# Patient Record
Sex: FEMALE | Race: WHITE | Employment: OTHER | URBAN - METROPOLITAN AREA
[De-identification: names, ages, dates, MRNs, and addresses within clinical notes are randomized per-mention and may not be internally consistent; named-entity substitution may affect disease eponyms.]

---

## 2017-01-18 ENCOUNTER — HOSPITAL ENCOUNTER (OUTPATIENT)
Dept: OTHER | Age: 57
Discharge: HOME | End: 2017-01-18
Attending: INTERNAL MEDICINE | Admitting: INTERNAL MEDICINE

## 2017-01-18 LAB
ANION GAP SERPL CALCULATED.3IONS-SCNC: 14 MMOL/L (ref 3–16)
BUN BLDV-MCNC: 22 MG/DL (ref 7–20)
CALCIUM SERPL-MCNC: 9.2 MG/DL (ref 8.3–10.6)
CHLORIDE BLD-SCNC: 104 MMOL/L (ref 99–110)
CO2: 25 MMOL/L (ref 21–32)
CREAT SERPL-MCNC: 1.4 MG/DL (ref 0.6–1.1)
GFR AFRICAN AMERICAN: 47
GFR NON-AFRICAN AMERICAN: 39
GLUCOSE BLD-MCNC: 101 MG/DL (ref 70–99)
POTASSIUM SERPL-SCNC: 4 MMOL/L (ref 3.5–5.1)
SODIUM BLD-SCNC: 143 MMOL/L (ref 136–145)

## 2017-01-18 RX ORDER — METOPROLOL SUCCINATE 25 MG/1
25 TABLET, EXTENDED RELEASE ORAL DAILY
Qty: 90 TABLET | Refills: 3 | Status: SHIPPED | OUTPATIENT
Start: 2017-01-18 | End: 2017-02-10 | Stop reason: SDUPTHER

## 2017-01-19 ENCOUNTER — TELEPHONE (OUTPATIENT)
Dept: CARDIOLOGY CLINIC | Age: 57
End: 2017-01-19

## 2017-01-19 ENCOUNTER — OFFICE VISIT (OUTPATIENT)
Dept: PSYCHIATRY | Age: 57
End: 2017-01-19

## 2017-01-19 VITALS — DIASTOLIC BLOOD PRESSURE: 72 MMHG | BODY MASS INDEX: 47.23 KG/M2 | WEIGHT: 258.2 LBS | SYSTOLIC BLOOD PRESSURE: 112 MMHG

## 2017-01-19 DIAGNOSIS — F43.10 POST TRAUMATIC STRESS DISORDER (PTSD): ICD-10-CM

## 2017-01-19 DIAGNOSIS — F31.75 BIPOLAR DISORDER, IN PARTIAL REMISSION, MOST RECENT EPISODE DEPRESSED (HCC): Primary | ICD-10-CM

## 2017-01-19 PROCEDURE — 99214 OFFICE O/P EST MOD 30 MIN: CPT | Performed by: PSYCHIATRY & NEUROLOGY

## 2017-01-19 ASSESSMENT — ENCOUNTER SYMPTOMS
VOMITING: 0
BLURRED VISION: 0
CONSTIPATION: 0
DIARRHEA: 0
NAUSEA: 0
SHORTNESS OF BREATH: 0
ABDOMINAL PAIN: 0

## 2017-01-31 RX ORDER — TORSEMIDE 20 MG/1
TABLET ORAL
Qty: 60 TABLET | Refills: 3 | Status: SHIPPED | OUTPATIENT
Start: 2017-01-31 | End: 2017-04-13 | Stop reason: SDUPTHER

## 2017-02-10 ENCOUNTER — OFFICE VISIT (OUTPATIENT)
Dept: CARDIOLOGY CLINIC | Age: 57
End: 2017-02-10

## 2017-02-10 VITALS
OXYGEN SATURATION: 98 % | BODY MASS INDEX: 45.38 KG/M2 | HEART RATE: 76 BPM | DIASTOLIC BLOOD PRESSURE: 66 MMHG | WEIGHT: 246.6 LBS | HEIGHT: 62 IN | SYSTOLIC BLOOD PRESSURE: 118 MMHG

## 2017-02-10 DIAGNOSIS — I10 ESSENTIAL HYPERTENSION: Chronic | ICD-10-CM

## 2017-02-10 DIAGNOSIS — I50.22 CHRONIC SYSTOLIC CONGESTIVE HEART FAILURE (HCC): ICD-10-CM

## 2017-02-10 DIAGNOSIS — I42.0 DILATED CARDIOMYOPATHY (HCC): Primary | ICD-10-CM

## 2017-02-10 PROCEDURE — 99214 OFFICE O/P EST MOD 30 MIN: CPT | Performed by: INTERNAL MEDICINE

## 2017-02-10 RX ORDER — TORSEMIDE 20 MG/1
20 TABLET ORAL DAILY
Qty: 30 TABLET | Refills: 3 | Status: SHIPPED | OUTPATIENT
Start: 2017-02-10 | End: 2017-02-16 | Stop reason: SDUPTHER

## 2017-02-10 RX ORDER — LISINOPRIL 2.5 MG/1
2.5 TABLET ORAL DAILY
Qty: 90 TABLET | Refills: 3 | Status: SHIPPED | OUTPATIENT
Start: 2017-02-10 | End: 2018-02-27 | Stop reason: SDUPTHER

## 2017-02-10 RX ORDER — METOPROLOL SUCCINATE 25 MG/1
25 TABLET, EXTENDED RELEASE ORAL DAILY
Qty: 90 TABLET | Refills: 3 | Status: SHIPPED | OUTPATIENT
Start: 2017-02-10 | End: 2017-12-26 | Stop reason: SDUPTHER

## 2017-02-16 ENCOUNTER — OFFICE VISIT (OUTPATIENT)
Dept: PSYCHIATRY | Age: 57
End: 2017-02-16

## 2017-02-16 VITALS — SYSTOLIC BLOOD PRESSURE: 118 MMHG | DIASTOLIC BLOOD PRESSURE: 76 MMHG | BODY MASS INDEX: 47.63 KG/M2 | WEIGHT: 260.4 LBS

## 2017-02-16 DIAGNOSIS — F43.10 POST TRAUMATIC STRESS DISORDER (PTSD): ICD-10-CM

## 2017-02-16 DIAGNOSIS — F31.75 BIPOLAR DISORDER, IN PARTIAL REMISSION, MOST RECENT EPISODE DEPRESSED (HCC): Primary | ICD-10-CM

## 2017-02-16 PROCEDURE — 99214 OFFICE O/P EST MOD 30 MIN: CPT | Performed by: PSYCHIATRY & NEUROLOGY

## 2017-02-16 ASSESSMENT — ENCOUNTER SYMPTOMS
VOMITING: 0
NAUSEA: 0
ABDOMINAL PAIN: 0
DIARRHEA: 0
BLURRED VISION: 0
SHORTNESS OF BREATH: 0
CONSTIPATION: 0

## 2017-02-28 ENCOUNTER — NURSE ONLY (OUTPATIENT)
Dept: CARDIOLOGY CLINIC | Age: 57
End: 2017-02-28

## 2017-02-28 DIAGNOSIS — I42.0 DILATED CARDIOMYOPATHY (HCC): ICD-10-CM

## 2017-02-28 DIAGNOSIS — I50.22 CHRONIC SYSTOLIC CONGESTIVE HEART FAILURE (HCC): ICD-10-CM

## 2017-02-28 DIAGNOSIS — Z95.810 BIVENTRICULAR ICD (IMPLANTABLE CARDIOVERTER-DEFIBRILLATOR) IN PLACE: ICD-10-CM

## 2017-02-28 PROCEDURE — 93295 DEV INTERROG REMOTE 1/2/MLT: CPT | Performed by: INTERNAL MEDICINE

## 2017-02-28 PROCEDURE — 93297 REM INTERROG DEV EVAL ICPMS: CPT | Performed by: INTERNAL MEDICINE

## 2017-02-28 PROCEDURE — 93296 REM INTERROG EVL PM/IDS: CPT | Performed by: INTERNAL MEDICINE

## 2017-03-09 ENCOUNTER — OFFICE VISIT (OUTPATIENT)
Dept: PSYCHIATRY | Age: 57
End: 2017-03-09

## 2017-03-09 VITALS — SYSTOLIC BLOOD PRESSURE: 124 MMHG | BODY MASS INDEX: 47.55 KG/M2 | DIASTOLIC BLOOD PRESSURE: 72 MMHG | WEIGHT: 260 LBS

## 2017-03-09 DIAGNOSIS — F31.75 BIPOLAR DISORDER, IN PARTIAL REMISSION, MOST RECENT EPISODE DEPRESSED (HCC): Primary | ICD-10-CM

## 2017-03-09 DIAGNOSIS — F43.10 POST TRAUMATIC STRESS DISORDER (PTSD): ICD-10-CM

## 2017-03-09 PROCEDURE — 99214 OFFICE O/P EST MOD 30 MIN: CPT | Performed by: PSYCHIATRY & NEUROLOGY

## 2017-03-09 ASSESSMENT — ENCOUNTER SYMPTOMS
NAUSEA: 0
DIARRHEA: 0
ABDOMINAL PAIN: 0
BLURRED VISION: 0
SHORTNESS OF BREATH: 0
VOMITING: 0
CONSTIPATION: 0

## 2017-03-13 ENCOUNTER — OFFICE VISIT (OUTPATIENT)
Dept: CARDIOLOGY CLINIC | Age: 57
End: 2017-03-13

## 2017-03-13 VITALS
DIASTOLIC BLOOD PRESSURE: 72 MMHG | BODY MASS INDEX: 47.88 KG/M2 | HEIGHT: 62 IN | HEART RATE: 78 BPM | WEIGHT: 260.2 LBS | SYSTOLIC BLOOD PRESSURE: 114 MMHG

## 2017-03-13 DIAGNOSIS — I50.22 CHRONIC SYSTOLIC CONGESTIVE HEART FAILURE (HCC): ICD-10-CM

## 2017-03-13 DIAGNOSIS — I42.0 DILATED CARDIOMYOPATHY (HCC): Primary | ICD-10-CM

## 2017-03-13 DIAGNOSIS — Z95.810 BIVENTRICULAR ICD (IMPLANTABLE CARDIOVERTER-DEFIBRILLATOR) IN PLACE: ICD-10-CM

## 2017-03-13 DIAGNOSIS — I34.0 NON-RHEUMATIC MITRAL REGURGITATION: ICD-10-CM

## 2017-03-13 DIAGNOSIS — I10 ESSENTIAL HYPERTENSION: ICD-10-CM

## 2017-03-13 DIAGNOSIS — I44.7 LBBB (LEFT BUNDLE BRANCH BLOCK): ICD-10-CM

## 2017-03-13 PROCEDURE — 99214 OFFICE O/P EST MOD 30 MIN: CPT | Performed by: NURSE PRACTITIONER

## 2017-03-23 ENCOUNTER — HOSPITAL ENCOUNTER (OUTPATIENT)
Dept: NON INVASIVE DIAGNOSTICS | Age: 57
Discharge: HOME | End: 2017-03-23
Attending: NURSE PRACTITIONER | Admitting: NURSE PRACTITIONER

## 2017-03-23 DIAGNOSIS — I42.0 DILATED CARDIOMYOPATHY (HCC): ICD-10-CM

## 2017-03-23 LAB
LV EF: 25 %
LVEF MODALITY: NORMAL

## 2017-03-29 ENCOUNTER — OFFICE VISIT (OUTPATIENT)
Dept: PSYCHOLOGY | Age: 57
End: 2017-03-29

## 2017-03-29 DIAGNOSIS — F31.73 BIPOLAR AFFECTIVE DISORDER, MANIC, IN PARTIAL OR REMISSION: ICD-10-CM

## 2017-03-29 DIAGNOSIS — F43.10 PTSD (POST-TRAUMATIC STRESS DISORDER): Primary | ICD-10-CM

## 2017-03-29 PROCEDURE — 90791 PSYCH DIAGNOSTIC EVALUATION: CPT | Performed by: SOCIAL WORKER

## 2017-03-29 ASSESSMENT — PATIENT HEALTH QUESTIONNAIRE - PHQ9
3. TROUBLE FALLING OR STAYING ASLEEP: 3
5. POOR APPETITE OR OVEREATING: 2
8. MOVING OR SPEAKING SO SLOWLY THAT OTHER PEOPLE COULD HAVE NOTICED. OR THE OPPOSITE, BEING SO FIGETY OR RESTLESS THAT YOU HAVE BEEN MOVING AROUND A LOT MORE THAN USUAL: 3
10. IF YOU CHECKED OFF ANY PROBLEMS, HOW DIFFICULT HAVE THESE PROBLEMS MADE IT FOR YOU TO DO YOUR WORK, TAKE CARE OF THINGS AT HOME, OR GET ALONG WITH OTHER PEOPLE: 2
9. THOUGHTS THAT YOU WOULD BE BETTER OFF DEAD, OR OF HURTING YOURSELF: 2
SUM OF ALL RESPONSES TO PHQ QUESTIONS 1-9: 22
7. TROUBLE CONCENTRATING ON THINGS, SUCH AS READING THE NEWSPAPER OR WATCHING TELEVISION: 2
2. FEELING DOWN, DEPRESSED OR HOPELESS: 2
6. FEELING BAD ABOUT YOURSELF - OR THAT YOU ARE A FAILURE OR HAVE LET YOURSELF OR YOUR FAMILY DOWN: 3
SUM OF ALL RESPONSES TO PHQ9 QUESTIONS 1 & 2: 4
4. FEELING TIRED OR HAVING LITTLE ENERGY: 3
1. LITTLE INTEREST OR PLEASURE IN DOING THINGS: 2

## 2017-04-05 DIAGNOSIS — M10.9 GOUT: ICD-10-CM

## 2017-04-05 RX ORDER — ALLOPURINOL 300 MG/1
TABLET ORAL
Qty: 90 TABLET | Refills: 1 | Status: CANCELLED | OUTPATIENT
Start: 2017-04-05

## 2017-04-06 ENCOUNTER — OFFICE VISIT (OUTPATIENT)
Dept: PSYCHIATRY | Age: 57
End: 2017-04-06

## 2017-04-06 ENCOUNTER — OFFICE VISIT (OUTPATIENT)
Dept: FAMILY MEDICINE CLINIC | Age: 57
End: 2017-04-06

## 2017-04-06 VITALS — DIASTOLIC BLOOD PRESSURE: 68 MMHG | BODY MASS INDEX: 46.41 KG/M2 | WEIGHT: 262 LBS | SYSTOLIC BLOOD PRESSURE: 117 MMHG

## 2017-04-06 VITALS
DIASTOLIC BLOOD PRESSURE: 68 MMHG | WEIGHT: 262 LBS | SYSTOLIC BLOOD PRESSURE: 117 MMHG | BODY MASS INDEX: 46.42 KG/M2 | OXYGEN SATURATION: 98 % | HEIGHT: 63 IN

## 2017-04-06 DIAGNOSIS — M1A.9XX0 CHRONIC GOUT WITHOUT TOPHUS, UNSPECIFIED CAUSE, UNSPECIFIED SITE: ICD-10-CM

## 2017-04-06 DIAGNOSIS — F31.75 BIPOLAR DISORDER, IN PARTIAL REMISSION, MOST RECENT EPISODE DEPRESSED (HCC): Primary | ICD-10-CM

## 2017-04-06 DIAGNOSIS — I83.892 VARICOSE VEINS OF LEFT LEG WITH EDEMA: Primary | ICD-10-CM

## 2017-04-06 DIAGNOSIS — F43.10 POST TRAUMATIC STRESS DISORDER (PTSD): ICD-10-CM

## 2017-04-06 PROCEDURE — 99214 OFFICE O/P EST MOD 30 MIN: CPT | Performed by: PSYCHIATRY & NEUROLOGY

## 2017-04-06 PROCEDURE — 99213 OFFICE O/P EST LOW 20 MIN: CPT | Performed by: PHYSICIAN ASSISTANT

## 2017-04-06 RX ORDER — MONTELUKAST SODIUM 10 MG/1
10 TABLET ORAL NIGHTLY
Qty: 90 TABLET | Refills: 1 | Status: SHIPPED | OUTPATIENT
Start: 2017-04-06 | End: 2017-11-01 | Stop reason: SDUPTHER

## 2017-04-06 RX ORDER — ALLOPURINOL 300 MG/1
TABLET ORAL
Qty: 90 TABLET | Refills: 1 | Status: SHIPPED | OUTPATIENT
Start: 2017-04-06 | End: 2017-04-06 | Stop reason: SDUPTHER

## 2017-04-06 RX ORDER — LAMOTRIGINE 150 MG/1
150 TABLET ORAL DAILY
Qty: 90 TABLET | Refills: 1 | Status: SHIPPED | OUTPATIENT
Start: 2017-04-06 | End: 2017-06-15 | Stop reason: SDUPTHER

## 2017-04-06 RX ORDER — ALLOPURINOL 300 MG/1
TABLET ORAL
Qty: 10 TABLET | Refills: 0 | Status: SHIPPED | OUTPATIENT
Start: 2017-04-06 | End: 2018-02-14 | Stop reason: SDUPTHER

## 2017-04-06 ASSESSMENT — ENCOUNTER SYMPTOMS
RESPIRATORY NEGATIVE: 1
NAUSEA: 0
VOMITING: 0
CONSTIPATION: 0
ABDOMINAL PAIN: 0
DIARRHEA: 0
BLURRED VISION: 0
SHORTNESS OF BREATH: 0

## 2017-04-12 ENCOUNTER — OFFICE VISIT (OUTPATIENT)
Dept: PSYCHOLOGY | Age: 57
End: 2017-04-12

## 2017-04-12 DIAGNOSIS — F43.10 PTSD (POST-TRAUMATIC STRESS DISORDER): Primary | ICD-10-CM

## 2017-04-12 PROCEDURE — 90832 PSYTX W PT 30 MINUTES: CPT | Performed by: SOCIAL WORKER

## 2017-04-12 ASSESSMENT — PATIENT HEALTH QUESTIONNAIRE - PHQ9
9. THOUGHTS THAT YOU WOULD BE BETTER OFF DEAD, OR OF HURTING YOURSELF: 2
SUM OF ALL RESPONSES TO PHQ QUESTIONS 1-9: 25
4. FEELING TIRED OR HAVING LITTLE ENERGY: 3
3. TROUBLE FALLING OR STAYING ASLEEP: 3
SUM OF ALL RESPONSES TO PHQ9 QUESTIONS 1 & 2: 6
10. IF YOU CHECKED OFF ANY PROBLEMS, HOW DIFFICULT HAVE THESE PROBLEMS MADE IT FOR YOU TO DO YOUR WORK, TAKE CARE OF THINGS AT HOME, OR GET ALONG WITH OTHER PEOPLE: 3
1. LITTLE INTEREST OR PLEASURE IN DOING THINGS: 3
8. MOVING OR SPEAKING SO SLOWLY THAT OTHER PEOPLE COULD HAVE NOTICED. OR THE OPPOSITE, BEING SO FIGETY OR RESTLESS THAT YOU HAVE BEEN MOVING AROUND A LOT MORE THAN USUAL: 2
6. FEELING BAD ABOUT YOURSELF - OR THAT YOU ARE A FAILURE OR HAVE LET YOURSELF OR YOUR FAMILY DOWN: 3
5. POOR APPETITE OR OVEREATING: 3
2. FEELING DOWN, DEPRESSED OR HOPELESS: 3
7. TROUBLE CONCENTRATING ON THINGS, SUCH AS READING THE NEWSPAPER OR WATCHING TELEVISION: 3

## 2017-04-13 ENCOUNTER — OFFICE VISIT (OUTPATIENT)
Dept: CARDIOLOGY CLINIC | Age: 57
End: 2017-04-13

## 2017-04-13 VITALS
HEIGHT: 63 IN | SYSTOLIC BLOOD PRESSURE: 102 MMHG | DIASTOLIC BLOOD PRESSURE: 60 MMHG | OXYGEN SATURATION: 98 % | HEART RATE: 80 BPM | BODY MASS INDEX: 45.89 KG/M2 | WEIGHT: 259 LBS

## 2017-04-13 DIAGNOSIS — Z95.810 BIVENTRICULAR ICD (IMPLANTABLE CARDIOVERTER-DEFIBRILLATOR) IN PLACE: ICD-10-CM

## 2017-04-13 DIAGNOSIS — I44.7 LBBB (LEFT BUNDLE BRANCH BLOCK): ICD-10-CM

## 2017-04-13 DIAGNOSIS — I50.22 CHRONIC SYSTOLIC CONGESTIVE HEART FAILURE (HCC): Primary | ICD-10-CM

## 2017-04-13 DIAGNOSIS — I42.0 DILATED CARDIOMYOPATHY (HCC): ICD-10-CM

## 2017-04-13 DIAGNOSIS — I34.0 NON-RHEUMATIC MITRAL REGURGITATION: ICD-10-CM

## 2017-04-13 DIAGNOSIS — I10 ESSENTIAL HYPERTENSION: ICD-10-CM

## 2017-04-13 PROCEDURE — 99213 OFFICE O/P EST LOW 20 MIN: CPT | Performed by: NURSE PRACTITIONER

## 2017-04-13 RX ORDER — TORSEMIDE 20 MG/1
TABLET ORAL
Qty: 60 TABLET | Refills: 3
Start: 2017-04-13 | End: 2017-07-27 | Stop reason: SDUPTHER

## 2017-05-30 ENCOUNTER — NURSE ONLY (OUTPATIENT)
Dept: CARDIOLOGY CLINIC | Age: 57
End: 2017-05-30

## 2017-05-30 DIAGNOSIS — Z95.810 BIVENTRICULAR ICD (IMPLANTABLE CARDIOVERTER-DEFIBRILLATOR) IN PLACE: Primary | ICD-10-CM

## 2017-05-30 DIAGNOSIS — I50.22 CHRONIC SYSTOLIC CONGESTIVE HEART FAILURE (HCC): ICD-10-CM

## 2017-05-30 DIAGNOSIS — I42.0 DILATED CARDIOMYOPATHY (HCC): ICD-10-CM

## 2017-05-30 PROCEDURE — 93297 REM INTERROG DEV EVAL ICPMS: CPT | Performed by: INTERNAL MEDICINE

## 2017-05-30 PROCEDURE — 93296 REM INTERROG EVL PM/IDS: CPT | Performed by: INTERNAL MEDICINE

## 2017-05-30 PROCEDURE — 93295 DEV INTERROG REMOTE 1/2/MLT: CPT | Performed by: INTERNAL MEDICINE

## 2017-06-15 DIAGNOSIS — E03.9 HYPOTHYROIDISM, UNSPECIFIED TYPE: ICD-10-CM

## 2017-06-19 RX ORDER — LAMOTRIGINE 150 MG/1
150 TABLET ORAL DAILY
Qty: 90 TABLET | Refills: 0 | Status: SHIPPED | OUTPATIENT
Start: 2017-06-19 | End: 2017-10-02 | Stop reason: SDUPTHER

## 2017-06-19 RX ORDER — LEVOTHYROXINE SODIUM 0.15 MG/1
TABLET ORAL
Qty: 103 TABLET | Refills: 0 | Status: SHIPPED | OUTPATIENT
Start: 2017-06-19 | End: 2017-10-02 | Stop reason: SDUPTHER

## 2017-06-26 ENCOUNTER — OFFICE VISIT (OUTPATIENT)
Dept: CARDIOLOGY CLINIC | Age: 57
End: 2017-06-26

## 2017-06-26 VITALS
HEIGHT: 63 IN | DIASTOLIC BLOOD PRESSURE: 76 MMHG | HEART RATE: 69 BPM | OXYGEN SATURATION: 98 % | WEIGHT: 270 LBS | SYSTOLIC BLOOD PRESSURE: 114 MMHG | BODY MASS INDEX: 47.84 KG/M2

## 2017-06-26 DIAGNOSIS — I42.0 DILATED CARDIOMYOPATHY (HCC): Primary | ICD-10-CM

## 2017-06-26 DIAGNOSIS — I50.22 CHRONIC SYSTOLIC CONGESTIVE HEART FAILURE (HCC): ICD-10-CM

## 2017-06-26 PROCEDURE — 99214 OFFICE O/P EST MOD 30 MIN: CPT | Performed by: INTERNAL MEDICINE

## 2017-07-14 RX ORDER — MELOXICAM 15 MG/1
15 TABLET ORAL DAILY
Qty: 90 TABLET | Refills: 0 | Status: SHIPPED | OUTPATIENT
Start: 2017-07-14 | End: 2017-10-02 | Stop reason: SDUPTHER

## 2017-07-27 RX ORDER — TORSEMIDE 20 MG/1
TABLET ORAL
Qty: 90 TABLET | Refills: 3 | Status: SHIPPED | OUTPATIENT
Start: 2017-07-27 | End: 2018-02-27 | Stop reason: SDUPTHER

## 2017-08-01 ENCOUNTER — PROCEDURE VISIT (OUTPATIENT)
Dept: CARDIOLOGY CLINIC | Age: 57
End: 2017-08-01

## 2017-08-01 DIAGNOSIS — I42.0 DILATED CARDIOMYOPATHY (HCC): ICD-10-CM

## 2017-08-01 DIAGNOSIS — Z95.810 BIVENTRICULAR ICD (IMPLANTABLE CARDIOVERTER-DEFIBRILLATOR) IN PLACE: Primary | ICD-10-CM

## 2017-08-01 DIAGNOSIS — I50.22 CHRONIC SYSTOLIC CONGESTIVE HEART FAILURE (HCC): ICD-10-CM

## 2017-08-02 DIAGNOSIS — Z95.810 BIVENTRICULAR ICD (IMPLANTABLE CARDIOVERTER-DEFIBRILLATOR) IN PLACE: ICD-10-CM

## 2017-08-02 PROCEDURE — 93284 PRGRMG EVAL IMPLANTABLE DFB: CPT | Performed by: INTERNAL MEDICINE

## 2017-08-28 ENCOUNTER — OFFICE VISIT (OUTPATIENT)
Dept: FAMILY MEDICINE CLINIC | Age: 57
End: 2017-08-28

## 2017-08-28 VITALS
HEART RATE: 76 BPM | TEMPERATURE: 97.2 F | BODY MASS INDEX: 47.69 KG/M2 | DIASTOLIC BLOOD PRESSURE: 84 MMHG | WEIGHT: 269.2 LBS | SYSTOLIC BLOOD PRESSURE: 110 MMHG

## 2017-08-28 DIAGNOSIS — N30.01 ACUTE CYSTITIS WITH HEMATURIA: Primary | ICD-10-CM

## 2017-08-28 DIAGNOSIS — R30.0 DYSURIA: ICD-10-CM

## 2017-08-28 LAB
BILIRUBIN, POC: NORMAL
BLOOD URINE, POC: NORMAL
CLARITY, POC: NORMAL
COLOR, POC: NORMAL
GLUCOSE URINE, POC: NORMAL
KETONES, POC: NORMAL
LEUKOCYTE EST, POC: NORMAL
NITRITE, POC: NORMAL
PH, POC: 5.5
PROTEIN, POC: NORMAL
SPECIFIC GRAVITY, POC: 1.01
UROBILINOGEN, POC: 0.2

## 2017-08-28 PROCEDURE — 81002 URINALYSIS NONAUTO W/O SCOPE: CPT | Performed by: FAMILY MEDICINE

## 2017-08-28 PROCEDURE — 99213 OFFICE O/P EST LOW 20 MIN: CPT | Performed by: FAMILY MEDICINE

## 2017-08-28 RX ORDER — SULFAMETHOXAZOLE AND TRIMETHOPRIM 800; 160 MG/1; MG/1
1 TABLET ORAL 2 TIMES DAILY
Qty: 10 TABLET | Refills: 0 | Status: ON HOLD | OUTPATIENT
Start: 2017-08-28 | End: 2017-10-17 | Stop reason: HOSPADM

## 2017-09-06 ENCOUNTER — TELEPHONE (OUTPATIENT)
Dept: CARDIOLOGY CLINIC | Age: 57
End: 2017-09-06

## 2017-10-02 DIAGNOSIS — F31.75 BIPOLAR DISORDER, IN PARTIAL REMISSION, MOST RECENT EPISODE DEPRESSED (HCC): Primary | Chronic | ICD-10-CM

## 2017-10-02 DIAGNOSIS — E03.9 HYPOTHYROIDISM, UNSPECIFIED TYPE: ICD-10-CM

## 2017-10-02 NOTE — TELEPHONE ENCOUNTER
Giovanna Farzaneh is requesting refill(s) lamoTRIgine  Last OV 8/28/17 (pertaining to medication)  LR 6/19/17 (per medication requested)  Next office visit scheduled or attempted no   If no, reason:  Not made, Please send full prescription to [facility] mail delivery. Patient asked if a 10 day supply could be sent to [facility] in [facility] because is she is out as of today.

## 2017-10-03 RX ORDER — LEVOTHYROXINE SODIUM 0.15 MG/1
TABLET ORAL
Qty: 103 TABLET | Refills: 1 | Status: SHIPPED | OUTPATIENT
Start: 2017-10-03 | End: 2018-03-23 | Stop reason: SDUPTHER

## 2017-10-03 RX ORDER — LAMOTRIGINE 150 MG/1
150 TABLET ORAL DAILY
Qty: 10 TABLET | Refills: 0 | Status: ON HOLD | OUTPATIENT
Start: 2017-10-03 | End: 2017-10-17 | Stop reason: HOSPADM

## 2017-10-03 RX ORDER — LAMOTRIGINE 150 MG/1
150 TABLET ORAL DAILY
Qty: 90 TABLET | Refills: 1 | Status: SHIPPED | OUTPATIENT
Start: 2017-10-03 | End: 2018-06-04 | Stop reason: SDUPTHER

## 2017-10-03 RX ORDER — MELOXICAM 15 MG/1
15 TABLET ORAL DAILY
Qty: 90 TABLET | Refills: 1 | Status: SHIPPED | OUTPATIENT
Start: 2017-10-03 | End: 2018-03-05 | Stop reason: ALTCHOICE

## 2017-10-14 PROBLEM — H53.483 VISUAL FIELD CONSTRICTION, BILATERAL: Status: ACTIVE | Noted: 2017-10-14

## 2017-10-14 PROBLEM — G81.90 HEMIPARESIS (HCC): Status: ACTIVE | Noted: 2017-10-14

## 2017-10-14 PROBLEM — Q67.0 FACIAL ASYMMETRY: Status: ACTIVE | Noted: 2017-10-14

## 2017-10-14 PROBLEM — R47.02 DYSPHASIA: Status: ACTIVE | Noted: 2017-10-14

## 2017-10-14 PROBLEM — I63.9 CVA (CEREBRAL VASCULAR ACCIDENT) (HCC): Status: ACTIVE | Noted: 2017-10-14

## 2017-10-18 ENCOUNTER — TELEPHONE (OUTPATIENT)
Dept: PHARMACY | Facility: CLINIC | Age: 57
End: 2017-10-18

## 2017-10-18 ENCOUNTER — CARE COORDINATION (OUTPATIENT)
Dept: CASE MANAGEMENT | Age: 57
End: 2017-10-18

## 2017-10-18 NOTE — TELEPHONE ENCOUNTER
CLINICAL PHARMACY NOTE  Post-Discharge Transitions of Care (MIKE)    Non-face-to-face services provided:  Assessment and support for treatment adherence and medication management-Medication Reconciliation    - There are no outstanding medication issues at this time    Subjective/Objective:  Fred Feldman is a 59 y.o. female. Patient was discharged from [facility] on 10/17/17 with a diagnosis of CVA. Patient outreach to review discharge medications and provide medication review and management. Spoke with patient    Allergies   Allergen Reactions    Dilaudid [Hydromorphone Hcl] Other (See Comments)     Palpitations and orthostatic hypotension    Penicillins Shortness Of Breath    Adhesive Tape Other (See Comments)     BLISTERS    Lithium Other (See Comments)     Diarrhea, vomiting, nausea, headaches,     Tetanus Toxoids Swelling    Yellow Dyes (Non-Tartrazine) Rash     #5       Discharge Medications (as per discharging medication list found): There are NEW medications for you. START taking them after you leave the hospital   aspirin 81 MG EC tablet  · On her way to  Rx. Stressed the importance of adherence in lowering risk for  future cardiovascular events. Take 1 tablet by mouth daily    simvastatin (ZOCOR) 20 MG tablet  · On her way to  Rx. Stressed importance of adherence in lowering risk for future cardiovascular events. Take 1 tablet by mouth nightly     You told us you were taking these medications at home, but the amount or how often you take this medication has CHANGED    These are medications you told us you were taking at home, CONTINUE taking them after you leave the hospital   Medication Sig    meloxicam (MOBIC) 15 MG tablet Take 1 tablet by mouth daily (with food) as needed for pain.  levothyroxine (SYNTHROID) 150 MCG tablet Take 1 tablet by mouth every day except for every Sunday (when you are to take two tablets by mouth as directed) - total of 8 tablets/week.     No renal adjustments necessary.    - Follow up appointment date (7 days for more severe illness, 14 days for others):    · Patient was reminded of upcoming appointment with PCP on 10/24/17    Thank you,    Cornel Ramirez, PharmD  [address]  Phone: [phone]    CLINICAL PHARMACY NOTE   POST-DISCHARGE Alo Moran Only    TCM Call Made?: Yes  [facility] Select Patient?: Yes  Total # of Interventions Recommended: 0  Total # Interventions Accepted: 0  Intervention Severity:   - Level 1 Intervention Present?: No   - Level 2 #: 0   - Level 3 #: 0  Outreach Status: Review Complete  Care Coordinator Outreach to Patient?: Yes  Provider Contacted?: No  Time Spent (min): 15

## 2017-10-20 ENCOUNTER — TELEPHONE (OUTPATIENT)
Dept: FAMILY MEDICINE CLINIC | Age: 57
End: 2017-10-20

## 2017-10-20 NOTE — TELEPHONE ENCOUNTER
Machelle De Guzman is calling from [employer]. She is requesting an order for speech therapy to evaluate patient. Stroke, swallowing, facial numbness and weakness.

## 2017-10-23 ENCOUNTER — CARE COORDINATION (OUTPATIENT)
Dept: CASE MANAGEMENT | Age: 57
End: 2017-10-23

## 2017-10-23 NOTE — CARE COORDINATION
[facility] Transitions Follow Up Call    10/23/2017    Patient: Angelia Bledsoe  Patient : 1960   MRN: [id number]  Reason for Admission: CVA  Discharge Date: 10/17/17 RARS: Risk Score: 25.5 CMRS 2       Attempted to reach patient via phone for care transition, no answer. VM left stating purpose of call along with my contact information requesting a return call.       Jessica Farley RN  Care Transitions Coordinator      Follow Up  Future Appointments  Date Time Provider Sarah Najera   10/24/2017 10:00 AM Khadar Wall MD [facility]   10/31/2017 5:15 PM Blas Patel [facility] PHONE TRANSMISSION [employer] [facility]   2017 12:30 PM Melisa Del Cid NP [employer] [facility]       Jessica Farley RN

## 2017-10-24 ENCOUNTER — OFFICE VISIT (OUTPATIENT)
Dept: FAMILY MEDICINE CLINIC | Age: 57
End: 2017-10-24

## 2017-10-24 VITALS
WEIGHT: 274 LBS | HEART RATE: 76 BPM | SYSTOLIC BLOOD PRESSURE: 134 MMHG | DIASTOLIC BLOOD PRESSURE: 74 MMHG | BODY MASS INDEX: 48.54 KG/M2

## 2017-10-24 DIAGNOSIS — I69.354 HEMIPLEGIA AND HEMIPARESIS FOLLOWING CEREBRAL INFARCTION AFFECTING LEFT NON-DOMINANT SIDE (HCC): ICD-10-CM

## 2017-10-24 DIAGNOSIS — E03.9 HYPOTHYROIDISM, UNSPECIFIED TYPE: ICD-10-CM

## 2017-10-24 DIAGNOSIS — I10 ESSENTIAL HYPERTENSION: Primary | Chronic | ICD-10-CM

## 2017-10-24 DIAGNOSIS — E66.01 MORBID OBESITY WITH BMI OF 45.0-49.9, ADULT (HCC): ICD-10-CM

## 2017-10-24 DIAGNOSIS — Z98.84 S/P GASTRIC BYPASS: ICD-10-CM

## 2017-10-24 DIAGNOSIS — D64.9 ANEMIA, UNSPECIFIED TYPE: ICD-10-CM

## 2017-10-24 DIAGNOSIS — M1A.9XX0 CHRONIC GOUT WITHOUT TOPHUS, UNSPECIFIED CAUSE, UNSPECIFIED SITE: ICD-10-CM

## 2017-10-24 DIAGNOSIS — E78.5 DYSLIPIDEMIA: ICD-10-CM

## 2017-10-24 DIAGNOSIS — F31.75 BIPOLAR DISORDER, IN PARTIAL REMISSION, MOST RECENT EPISODE DEPRESSED (HCC): Chronic | ICD-10-CM

## 2017-10-24 LAB
ANION GAP SERPL CALCULATED.3IONS-SCNC: 12 MMOL/L (ref 3–16)
BASOPHILS ABSOLUTE: 0.1 K/UL (ref 0–0.2)
BASOPHILS RELATIVE PERCENT: 1.2 %
BUN BLDV-MCNC: 18 MG/DL (ref 7–20)
CALCIUM SERPL-MCNC: 9.2 MG/DL (ref 8.3–10.6)
CHLORIDE BLD-SCNC: 105 MMOL/L (ref 99–110)
CO2: 24 MMOL/L (ref 21–32)
CREAT SERPL-MCNC: 1.2 MG/DL (ref 0.6–1.1)
EOSINOPHILS ABSOLUTE: 0 K/UL (ref 0–0.6)
EOSINOPHILS RELATIVE PERCENT: 0 %
GFR AFRICAN AMERICAN: 56
GFR NON-AFRICAN AMERICAN: 46
GLUCOSE BLD-MCNC: 82 MG/DL (ref 70–99)
HCT VFR BLD CALC: 40.4 % (ref 36–48)
HEMOGLOBIN: 13.1 G/DL (ref 12–16)
LYMPHOCYTES ABSOLUTE: 2 K/UL (ref 1–5.1)
LYMPHOCYTES RELATIVE PERCENT: 44.4 %
MCH RBC QN AUTO: 30.7 PG (ref 26–34)
MCHC RBC AUTO-ENTMCNC: 32.5 G/DL (ref 31–36)
MCV RBC AUTO: 94.6 FL (ref 80–100)
MONOCYTES ABSOLUTE: 0.3 K/UL (ref 0–1.3)
MONOCYTES RELATIVE PERCENT: 5.6 %
NEUTROPHILS ABSOLUTE: 2.2 K/UL (ref 1.7–7.7)
NEUTROPHILS RELATIVE PERCENT: 48.8 %
PDW BLD-RTO: 14.9 % (ref 12.4–15.4)
PLATELET # BLD: 149 K/UL (ref 135–450)
PMV BLD AUTO: 11.9 FL (ref 5–10.5)
POTASSIUM SERPL-SCNC: 4.6 MMOL/L (ref 3.5–5.1)
RBC # BLD: 4.27 M/UL (ref 4–5.2)
SODIUM BLD-SCNC: 141 MMOL/L (ref 136–145)
T4 FREE: 1.5 NG/DL (ref 0.9–1.8)
TSH SERPL DL<=0.05 MIU/L-ACNC: 1.06 UIU/ML (ref 0.27–4.2)
URIC ACID, SERUM: 4.4 MG/DL (ref 2.6–6)
WBC # BLD: 4.5 K/UL (ref 4–11)

## 2017-10-24 PROCEDURE — 99214 OFFICE O/P EST MOD 30 MIN: CPT | Performed by: FAMILY MEDICINE

## 2017-10-24 PROCEDURE — G8484 FLU IMMUNIZE NO ADMIN: HCPCS | Performed by: FAMILY MEDICINE

## 2017-10-24 PROCEDURE — 1111F DSCHRG MED/CURRENT MED MERGE: CPT | Performed by: FAMILY MEDICINE

## 2017-10-24 PROCEDURE — 3017F COLORECTAL CA SCREEN DOC REV: CPT | Performed by: FAMILY MEDICINE

## 2017-10-24 PROCEDURE — 1036F TOBACCO NON-USER: CPT | Performed by: FAMILY MEDICINE

## 2017-10-24 PROCEDURE — G8427 DOCREV CUR MEDS BY ELIG CLIN: HCPCS | Performed by: FAMILY MEDICINE

## 2017-10-24 PROCEDURE — 36415 COLL VENOUS BLD VENIPUNCTURE: CPT | Performed by: FAMILY MEDICINE

## 2017-10-24 PROCEDURE — 3014F SCREEN MAMMO DOC REV: CPT | Performed by: FAMILY MEDICINE

## 2017-10-24 PROCEDURE — G8598 ASA/ANTIPLAT THER USED: HCPCS | Performed by: FAMILY MEDICINE

## 2017-10-24 PROCEDURE — G8417 CALC BMI ABV UP PARAM F/U: HCPCS | Performed by: FAMILY MEDICINE

## 2017-10-24 NOTE — PROGRESS NOTES
Exam reveals no gallop. Pulmonary/Chest: Effort normal and breath sounds normal. No respiratory distress. She has no wheezes. Neurological: She is alert and oriented to person, place, and time. Skin: Skin is warm and dry. Psychiatric: She has a normal mood and affect. Nursing note and vitals reviewed. Assessment:      Encounter Diagnoses   Name Primary?  Essential hypertension Yes    Dyslipidemia     Bipolar disorder, in partial remission, most recent episode depressed ([facility] [address]     Hypothyroidism, unspecified type     Hemiplegia and hemiparesis following cerebral infarction affecting left non-dominant side (HCC)     Chronic gout without tophus, unspecified cause, unspecified site     Anemia, unspecified type     S/P gastric bypass     Morbid obesity with BMI of 45.0-49.9, adult ([facility] [address]            Plan:      Per orders - await results. Hypertension and gout both stable, continue present medications. Advise low-fat and low sweets diet, also smaller portions too. If labs stable, and overall feeling well, then repeat office visit in one year, sooner as needed. Length of visit over 25 minutes, and >50% of time spent counseling and coordinating care.

## 2017-10-25 ASSESSMENT — ENCOUNTER SYMPTOMS
ABDOMINAL PAIN: 0
EYE PAIN: 0
SHORTNESS OF BREATH: 0

## 2017-10-25 NOTE — PATIENT INSTRUCTIONS
Hypertension and gout stable, continue present medications. Advise low-fat and low sweets diet, also smaller portions too as able. If labs stable, and overall feeling well, then repeat office visit in 6 months, sooner as needed.

## 2017-10-30 ENCOUNTER — CARE COORDINATION (OUTPATIENT)
Dept: CASE MANAGEMENT | Age: 57
End: 2017-10-30

## 2017-10-30 NOTE — CARE COORDINATION
[facility] Transitions Follow Up Call    10/30/2017    Patient: Giovanna Moy  Patient : 1960   MRN: [id number]  Reason for Admission: CVA   Discharge Date: 10/17/17 RARS: Risk Score: 25.5 CMRS 2       Unable to reach patient for final transition call.  left notifying of final outreach and encouraged patient to call with any questions or concerns.      Follow Up  Future Appointments  Date Time Provider Sarah Najera   10/31/2017 5:15 PM Joao [facility] PHONE TRANSMISSION Alyssa BUTLER   2017 12:30 PM MALENA Dangelo   2018 10:00 AM Liang Wall MD [facility] CARRIE Villalobos RN

## 2017-10-31 ENCOUNTER — NURSE ONLY (OUTPATIENT)
Dept: CARDIOLOGY CLINIC | Age: 57
End: 2017-10-31

## 2017-10-31 DIAGNOSIS — Z95.810 BIVENTRICULAR ICD (IMPLANTABLE CARDIOVERTER-DEFIBRILLATOR) IN PLACE: Primary | ICD-10-CM

## 2017-10-31 DIAGNOSIS — I42.0 DILATED CARDIOMYOPATHY (HCC): ICD-10-CM

## 2017-10-31 DIAGNOSIS — I50.22 CHRONIC SYSTOLIC CONGESTIVE HEART FAILURE (HCC): ICD-10-CM

## 2017-10-31 PROCEDURE — 93296 REM INTERROG EVL PM/IDS: CPT | Performed by: INTERNAL MEDICINE

## 2017-10-31 PROCEDURE — 93297 REM INTERROG DEV EVAL ICPMS: CPT | Performed by: INTERNAL MEDICINE

## 2017-10-31 PROCEDURE — 93295 DEV INTERROG REMOTE 1/2/MLT: CPT | Performed by: INTERNAL MEDICINE

## 2017-11-01 RX ORDER — MONTELUKAST SODIUM 10 MG/1
TABLET ORAL
Qty: 90 TABLET | Refills: 1 | Status: SHIPPED | OUTPATIENT
Start: 2017-11-01 | End: 2018-03-23 | Stop reason: SDUPTHER

## 2017-11-07 NOTE — PROGRESS NOTES
See PACEART report under Cardiology tab. Carelink transmission shows normal functioning device. Thoracic impedance trend stable. No new arrhythmias recorded. Follow up in 3 months via carelink.

## 2017-12-19 ENCOUNTER — TELEPHONE (OUTPATIENT)
Dept: OTHER | Facility: CLINIC | Age: 57
End: 2017-12-19

## 2017-12-19 NOTE — TELEPHONE ENCOUNTER
Contacted patient regarding need for mammogram.  Explained to patient that she can go to any [facility] facility for her mammogram without an order.

## 2017-12-26 ENCOUNTER — PROCEDURE VISIT (OUTPATIENT)
Dept: CARDIOLOGY CLINIC | Age: 57
End: 2017-12-26

## 2017-12-26 ENCOUNTER — OFFICE VISIT (OUTPATIENT)
Dept: CARDIOLOGY CLINIC | Age: 57
End: 2017-12-26

## 2017-12-26 VITALS
DIASTOLIC BLOOD PRESSURE: 68 MMHG | HEIGHT: 63 IN | BODY MASS INDEX: 47.84 KG/M2 | HEART RATE: 77 BPM | WEIGHT: 270 LBS | OXYGEN SATURATION: 98 % | SYSTOLIC BLOOD PRESSURE: 118 MMHG

## 2017-12-26 DIAGNOSIS — I50.22 CHRONIC SYSTOLIC CONGESTIVE HEART FAILURE (HCC): ICD-10-CM

## 2017-12-26 DIAGNOSIS — I44.7 LBBB (LEFT BUNDLE BRANCH BLOCK): ICD-10-CM

## 2017-12-26 DIAGNOSIS — I10 ESSENTIAL HYPERTENSION: ICD-10-CM

## 2017-12-26 DIAGNOSIS — I42.8 NON-ISCHEMIC CARDIOMYOPATHY (HCC): Primary | ICD-10-CM

## 2017-12-26 DIAGNOSIS — Z95.810 BIVENTRICULAR ICD (IMPLANTABLE CARDIOVERTER-DEFIBRILLATOR) IN PLACE: ICD-10-CM

## 2017-12-26 DIAGNOSIS — I34.0 NON-RHEUMATIC MITRAL REGURGITATION: ICD-10-CM

## 2017-12-26 DIAGNOSIS — Z95.810 BIVENTRICULAR ICD (IMPLANTABLE CARDIOVERTER-DEFIBRILLATOR) IN PLACE: Primary | ICD-10-CM

## 2017-12-26 PROCEDURE — 3014F SCREEN MAMMO DOC REV: CPT | Performed by: NURSE PRACTITIONER

## 2017-12-26 PROCEDURE — 99214 OFFICE O/P EST MOD 30 MIN: CPT | Performed by: NURSE PRACTITIONER

## 2017-12-26 PROCEDURE — 93290 INTERROG DEV EVAL ICPMS IP: CPT | Performed by: NURSE PRACTITIONER

## 2017-12-26 PROCEDURE — 1036F TOBACCO NON-USER: CPT | Performed by: NURSE PRACTITIONER

## 2017-12-26 PROCEDURE — 3017F COLORECTAL CA SCREEN DOC REV: CPT | Performed by: NURSE PRACTITIONER

## 2017-12-26 PROCEDURE — G8427 DOCREV CUR MEDS BY ELIG CLIN: HCPCS | Performed by: NURSE PRACTITIONER

## 2017-12-26 PROCEDURE — G8598 ASA/ANTIPLAT THER USED: HCPCS | Performed by: NURSE PRACTITIONER

## 2017-12-26 PROCEDURE — G8417 CALC BMI ABV UP PARAM F/U: HCPCS | Performed by: NURSE PRACTITIONER

## 2017-12-26 PROCEDURE — G8484 FLU IMMUNIZE NO ADMIN: HCPCS | Performed by: NURSE PRACTITIONER

## 2017-12-26 RX ORDER — METOPROLOL SUCCINATE 50 MG/1
50 TABLET, EXTENDED RELEASE ORAL DAILY
Qty: 30 TABLET | Refills: 5 | Status: SHIPPED | OUTPATIENT
Start: 2017-12-26 | End: 2017-12-26 | Stop reason: SDUPTHER

## 2017-12-26 RX ORDER — METOPROLOL SUCCINATE 50 MG/1
50 TABLET, EXTENDED RELEASE ORAL DAILY
Qty: 90 TABLET | Refills: 3 | Status: SHIPPED | OUTPATIENT
Start: 2017-12-26 | End: 2019-02-22 | Stop reason: SDUPTHER

## 2017-12-26 NOTE — PROGRESS NOTES
(gastroesophageal reflux disease); Gout; Hypertension; Hypothyroid; Osteoarthritis, knee; Pneumonia; S/P gastric bypass; Unspecified cerebral artery occlusion with cerebral infarction; Uterine cancer ([facility] [address] and Vitamin B 12 deficiency. Surgical History:   has a past surgical history that includes Gastric bypass surgery (1/7/05); Finger surgery; Dilation and curettage of uterus; lipectomy; Cholecystectomy; other surgical history; Upper gastrointestinal endoscopy (4/23/13); Cardiac catheterization (7/10/2015); transesophageal echocardiogram (7/13/2015); other surgical history (4/1/16); pacemaker placement (09/2016); and hernia repair (10/20/2016). Social History:   reports that she quit smoking about 33 years ago. She has a 6.00 pack-year smoking history. She has never used smokeless tobacco. She reports that she does not drink alcohol or use drugs. Family History:   Family History   Problem Relation Age of Onset    Cancer Mother     Ovarian Cancer Mother 45     Technically peritoneal cancer    Depression Mother      bipolar    Arthritis Father     Rheum Arthritis Father     Arrhythmia Father     Other Father      gout    Heart Disease Father     Depression Brother      depression    Other Brother      gout    Obesity Brother     Other Brother      gout    Obesity Brother     Depression Brother     Cancer Maternal Grandmother      Breast       Home Medications:  Prior to Admission medications    Medication Sig Start Date End Date Taking? Authorizing Provider   montelukast (SINGULAIR) 10 MG tablet TAKE 1 TABLET EVERY NIGHT 11/1/17  Yes Oniel Wall MD   aspirin 81 MG EC tablet Take 1 tablet by mouth daily 10/17/17  Yes Sandra Morel MD   simvastatin (ZOCOR) 20 MG tablet Take 1 tablet by mouth nightly 10/17/17  Yes Sandra Morel MD   meloxicam (MOBIC) 15 MG tablet Take 1 tablet by mouth daily (with food) as needed for pain.  10/3/17  Yes Randell Truong MD   levothyroxine (SYNTHROID) 150 MCG tablet Take 1 tablet by mouth every day except for every Sunday (when you are to take two tablets by mouth as directed) - total of 8 tablets/week. 10/3/17  Yes Kai Hicks MD   lamoTRIgine (LAMICTAL) 150 MG tablet Take 1 tablet by mouth daily 10/3/17  Yes Kai Hicks MD   torsemide (DEMADEX) 20 MG tablet TAKE 1 TABLET EVERY DAY 7/27/17  Yes Rylee Leach MD   BIOTIN PO Take by mouth 2 times daily   Yes Historical Provider, MD   allopurinol (ZYLOPRIM) 300 MG tablet TAKE 1 TABLET EVERY DAY 4/6/17  Yes THEO Murphy   metoprolol succinate (TOPROL XL) 25 MG extended release tablet Take 1 tablet by mouth daily 2/10/17  Yes Tray Mcguire MD   lisinopril (PRINIVIL;ZESTRIL) 2.5 MG tablet Take 1 tablet by mouth daily 2/10/17  Yes Tray Mcguire MD   vitamin B-12 (CYANOCOBALAMIN) 1000 MCG tablet Take 1,000 mcg by mouth daily   Yes Historical Provider, MD   Multiple Vitamins-Minerals (MULTIVITAMIN & MINERAL PO) Take by mouth   Yes Historical Provider, MD   cetirizine (ZYRTEC) 10 MG tablet Take 10 mg by mouth daily   Yes Historical Provider, MD   Ivabradine HCl (CORLANOR) 5 MG TABS Take 5 mg by mouth 2 times daily  Patient taking differently: Take 2.5 mg by mouth 2 times daily  6/30/16   Tray Mcguire MD        Allergies:  Dilaudid [hydromorphone hcl]; Penicillins; Adhesive tape; Lithium; Tetanus toxoids; and Yellow dyes (non-tartrazine)     Review of Systems:   · Constitutional: there has been no unanticipated weight loss. There's been no change in energy level, sleep pattern, or activity level. · Eyes: No visual changes or diplopia. No scleral icterus. · ENT: No Headaches, hearing loss or vertigo. No mouth sores or sore throat. · Cardiovascular: Reviewed in HPI  · Respiratory: No cough or wheezing, no sputum production. No hematemesis. · Gastrointestinal: No abdominal pain, appetite loss, blood in stools. No change in bowel or bladder habits.   · Genitourinary: No dysuria, trouble voiding, or hematuria. · Musculoskeletal:  No gait disturbance, weakness or joint complaints. · Integumentary: No rash or pruritis. · Neurological: No headache, diplopia, change in muscle strength, numbness or tingling. No change in gait, balance, coordination, mood, affect, memory, mentation, behavior. · Psychiatric: No anxiety, no depression. · Endocrine: No malaise, fatigue or temperature intolerance. No excessive thirst, fluid intake, or urination. No tremor. · Hematologic/Lymphatic: No abnormal bruising or bleeding, blood clots or swollen lymph nodes. · Allergic/Immunologic: No nasal congestion or hives. Physical Examination:    Vitals:    12/26/17 1236   BP: 118/68   Pulse: 77   SpO2: 98%   Weight: 270 lb (122.5 kg)   Height: 5' 3\" (1.6 m)        Constitutional and General Appearance: Warm and dry, no apparent distress, normal coloration  HEENT:  Normocephalic, atraumatic  Respiratory:  · Normal excursion and expansion without use of accessory muscles  · Resp Auscultation: Normal breath sounds without dullness  Cardiovascular:  · The apical impulses not displaced  · Heart tones are crisp and normal  · JVP 8 cm H2O  · Regular rate and rhythm, normal S1S2, no m/g/r/c  · Peripheral pulses are symmetrical and full  · There is no clubbing, cyanosis of the extremities.   · No edema  · Pedal Pulses: 2+ and equal   Abdomen:  · No masses or tenderness  · Liver/Spleen: No Abnormalities Noted  Neurological/Psychiatric:  · Alert and oriented in all spheres  · Moves all extremities well  · Exhibits normal gait balance and coordination  · No abnormalities of mood, affect, memory, mentation, or behavior are noted    Lab Data:    CBC:   Lab Results   Component Value Date    WBC 4.5 10/24/2017    WBC 4.8 10/15/2017    WBC 6.6 10/14/2017    RBC 4.27 10/24/2017    RBC 3.87 10/15/2017    RBC 4.03 10/14/2017    HGB 13.1 10/24/2017    HGB 11.8 10/15/2017    HGB 12.9 10/14/2017    HCT 40.4 10/24/2017    HCT regurgitation. Echo 7/6/16:   Technical difficult study due to body habitus. Definity echo contrast is used to define the endocardial border. No intracardiac thrombus. Left ventricular size is moderately increased. Mild concentric left ventricular hypertrophy is present. Left ventricle is spherical.   The left ventricular systolic function is severely reduced with an ejection fraction of 20 %. Compared to previous study from 7- ejection fraction remains approximately the same. Diastolic filling parameters suggests diastolic dysfunction grade II with elevated filling pressure . Mild thickening of leaflets of Mitral valve. No Mitral stenosis. Mild annular calcification is present. Mild Mitral regurgitation. ERO-0.11 cm2. Volume-17ml. Severely dilated left atrium with increased left atrial volume. Aortic valve appears sclerotic but opens adequately. No evidence of aortic valve regurgitation. Cardiac catheterization 7/10/15:   1. PA 66/37 mean 49, mean PCWP 32, RV 65, RVEDP 23, mean RA 21, LVEDP 36, , /79, mean of 94 mmHg. 2.  Saturation PA 56%, FCC 58%, AO 92%, RA 56%. 3.  Cardiac index by Giovanni was 2.36 L/min/m2, and cardiac output 5.6 L/minute. 4.  LV gram was not done due to elevated creatinine, however, echocardiogram revealed severely reduced left ventricular systolic function with ejection fraction of 20%. CORONARY ANGIOGRAPHY:  1. Left main was a large caliber vessel, which was rather short, it bifurcated, it was normal.  2.  Left anterior descending artery was a large caliber vessel that reached the apex and wrapped around it. It gave off 2 medium caliber diagonal branches, the LAD and the diagonal branches are normal.     3.  Left circumflex was a large caliber vessel that continued in the posterior AV groove as a small caliber vessel.   It gave a large obtuse marginal branch that gave off 4 branches, it was normal.    4.  Right coronary artery was a large caliber vessel and dominant, it gave off a medium caliber right posterior descending artery, it was normal.  It also gave off 2 small right posterolateral branches. IMPRESSION:  1. Angiographically normal coronary arteries. 2.  Severe pulmonary hypertension 66/37 mean 49 mmHg. 3.  Markedly elevated pulmonary capillary wedge pressure 32 mmHg, and left ventricular end diastolic pressure 36 mmHg. 4.  Transpulmonary gradient is 17 mmHg, which suggested a left-sided as well intrinsic pulmonary etiology of the severe pulmonary hypertension. 5.  Mildly reduced cardiac index by Giovanni of 2.36 L/min/m2. RECOMMENDATION:  The patient has no epicardial disease. Her chest pain is from the heart failure. She is noted to have moderate to severe mitral regurgitation on the echocardiogram, and will schedule for SANGEETHA to further evaluate the mitral regurgitation. In the meantime, we will also start her on heart failure therapy that will include a beta-blocker and ACE inhibitor  as well as diuretic therapy. Echo 7/10/15:    Left ventricular ejection fraction is severely decreased and estimated from 15 % to 20 %. Lee's = 20%. Left ventricular size is severely increased . Diastolic filling parameters suggests diastolic dysfunction . Slight calcification of the mitral valve noted. Severe mitral regurgitation is present. Severely dilated left atrium with increased left atrial volume. The right ventricle is enlarged. Right ventricular systolic function is reduced . TAPSE = 14mm. Mild tricuspid regurgitation. Systolic pulmonary artery pressure (SPAP) estimated at 43 mmHg consistent with mild pulmonary hypertension (RA pressure 8 mmHg). IVC size is dilated (>2.1 cm) and collapses > 50% with respiration consistent with elevated RA pressure (8 mmHg). SANGEETHA 7/13/15   Dilated cardiomyopathy, EF 20%. There is severe global hypokinesis. Moderate mitral regurgitation.    Mild tricuspid

## 2018-01-25 ENCOUNTER — OFFICE VISIT (OUTPATIENT)
Dept: FAMILY MEDICINE CLINIC | Age: 58
End: 2018-01-25

## 2018-01-25 VITALS
DIASTOLIC BLOOD PRESSURE: 70 MMHG | HEART RATE: 68 BPM | SYSTOLIC BLOOD PRESSURE: 126 MMHG | BODY MASS INDEX: 48.54 KG/M2 | WEIGHT: 274 LBS

## 2018-01-25 DIAGNOSIS — E66.01 MORBID OBESITY WITH BMI OF 45.0-49.9, ADULT (HCC): ICD-10-CM

## 2018-01-25 DIAGNOSIS — I50.22 CHRONIC SYSTOLIC CONGESTIVE HEART FAILURE (HCC): ICD-10-CM

## 2018-01-25 DIAGNOSIS — R10.13 EPIGASTRIC PAIN: ICD-10-CM

## 2018-01-25 DIAGNOSIS — M79.605 LEFT LEG PAIN: ICD-10-CM

## 2018-01-25 DIAGNOSIS — I69.354 HEMIPLEGIA AND HEMIPARESIS FOLLOWING CEREBRAL INFARCTION AFFECTING LEFT NON-DOMINANT SIDE (HCC): ICD-10-CM

## 2018-01-25 DIAGNOSIS — I42.0 DILATED CARDIOMYOPATHY (HCC): ICD-10-CM

## 2018-01-25 DIAGNOSIS — I42.0 CARDIOMYOPATHY, DILATED (HCC): ICD-10-CM

## 2018-01-25 DIAGNOSIS — E78.5 DYSLIPIDEMIA: ICD-10-CM

## 2018-01-25 DIAGNOSIS — M1A.9XX0 CHRONIC GOUT WITHOUT TOPHUS, UNSPECIFIED CAUSE, UNSPECIFIED SITE: ICD-10-CM

## 2018-01-25 DIAGNOSIS — E03.9 HYPOTHYROIDISM, UNSPECIFIED TYPE: ICD-10-CM

## 2018-01-25 DIAGNOSIS — N18.30 STAGE 3 CHRONIC KIDNEY DISEASE (HCC): ICD-10-CM

## 2018-01-25 DIAGNOSIS — I10 ESSENTIAL HYPERTENSION: Primary | Chronic | ICD-10-CM

## 2018-01-25 DIAGNOSIS — Z98.84 S/P GASTRIC BYPASS: ICD-10-CM

## 2018-01-25 DIAGNOSIS — R73.09 ELEVATED GLUCOSE: ICD-10-CM

## 2018-01-25 LAB
A/G RATIO: 2.1 (ref 1.1–2.2)
ALBUMIN SERPL-MCNC: 4.2 G/DL (ref 3.4–5)
ALP BLD-CCNC: 129 U/L (ref 40–129)
ALT SERPL-CCNC: 15 U/L (ref 10–40)
ANION GAP SERPL CALCULATED.3IONS-SCNC: 13 MMOL/L (ref 3–16)
AST SERPL-CCNC: 26 U/L (ref 15–37)
BASOPHILS ABSOLUTE: 0 K/UL (ref 0–0.2)
BASOPHILS RELATIVE PERCENT: 0.5 %
BILIRUB SERPL-MCNC: 0.8 MG/DL (ref 0–1)
BUN BLDV-MCNC: 19 MG/DL (ref 7–20)
CALCIUM SERPL-MCNC: 9.2 MG/DL (ref 8.3–10.6)
CHLORIDE BLD-SCNC: 106 MMOL/L (ref 99–110)
CHOLESTEROL, TOTAL: 122 MG/DL (ref 0–199)
CO2: 25 MMOL/L (ref 21–32)
CREAT SERPL-MCNC: 1.2 MG/DL (ref 0.6–1.1)
CREATININE URINE: 89.6 MG/DL (ref 28–259)
EOSINOPHILS ABSOLUTE: 0 K/UL (ref 0–0.6)
EOSINOPHILS RELATIVE PERCENT: 0 %
GFR AFRICAN AMERICAN: 56
GFR NON-AFRICAN AMERICAN: 46
GLOBULIN: 2 G/DL
GLUCOSE BLD-MCNC: 90 MG/DL (ref 70–99)
HCT VFR BLD CALC: 39 % (ref 36–48)
HDLC SERPL-MCNC: 69 MG/DL (ref 40–60)
HEMOGLOBIN: 12.5 G/DL (ref 12–16)
LDL CHOLESTEROL CALCULATED: 39 MG/DL
LYMPHOCYTES ABSOLUTE: 1.8 K/UL (ref 1–5.1)
LYMPHOCYTES RELATIVE PERCENT: 38.6 %
MAGNESIUM: 2.3 MG/DL (ref 1.8–2.4)
MCH RBC QN AUTO: 30.4 PG (ref 26–34)
MCHC RBC AUTO-ENTMCNC: 32.1 G/DL (ref 31–36)
MCV RBC AUTO: 94.5 FL (ref 80–100)
MICROALBUMIN UR-MCNC: <1.2 MG/DL
MICROALBUMIN/CREAT UR-RTO: NORMAL MG/G (ref 0–30)
MONOCYTES ABSOLUTE: 0.4 K/UL (ref 0–1.3)
MONOCYTES RELATIVE PERCENT: 7.5 %
NEUTROPHILS ABSOLUTE: 2.5 K/UL (ref 1.7–7.7)
NEUTROPHILS RELATIVE PERCENT: 53.4 %
PARATHYROID HORMONE INTACT: 131.2 PG/ML (ref 14–72)
PDW BLD-RTO: 14.4 % (ref 12.4–15.4)
PLATELET # BLD: 124 K/UL (ref 135–450)
PMV BLD AUTO: 11.7 FL (ref 5–10.5)
POTASSIUM SERPL-SCNC: 4.8 MMOL/L (ref 3.5–5.1)
RBC # BLD: 4.13 M/UL (ref 4–5.2)
SODIUM BLD-SCNC: 144 MMOL/L (ref 136–145)
TOTAL PROTEIN: 6.2 G/DL (ref 6.4–8.2)
TRIGL SERPL-MCNC: 72 MG/DL (ref 0–150)
URIC ACID, SERUM: 3.3 MG/DL (ref 2.6–6)
VLDLC SERPL CALC-MCNC: 14 MG/DL
WBC # BLD: 4.8 K/UL (ref 4–11)

## 2018-01-25 PROCEDURE — G8417 CALC BMI ABV UP PARAM F/U: HCPCS | Performed by: FAMILY MEDICINE

## 2018-01-25 PROCEDURE — 1036F TOBACCO NON-USER: CPT | Performed by: FAMILY MEDICINE

## 2018-01-25 PROCEDURE — G8427 DOCREV CUR MEDS BY ELIG CLIN: HCPCS | Performed by: FAMILY MEDICINE

## 2018-01-25 PROCEDURE — 3014F SCREEN MAMMO DOC REV: CPT | Performed by: FAMILY MEDICINE

## 2018-01-25 PROCEDURE — 3017F COLORECTAL CA SCREEN DOC REV: CPT | Performed by: FAMILY MEDICINE

## 2018-01-25 PROCEDURE — G8598 ASA/ANTIPLAT THER USED: HCPCS | Performed by: FAMILY MEDICINE

## 2018-01-25 PROCEDURE — 36415 COLL VENOUS BLD VENIPUNCTURE: CPT | Performed by: FAMILY MEDICINE

## 2018-01-25 PROCEDURE — G8484 FLU IMMUNIZE NO ADMIN: HCPCS | Performed by: FAMILY MEDICINE

## 2018-01-25 PROCEDURE — 99214 OFFICE O/P EST MOD 30 MIN: CPT | Performed by: FAMILY MEDICINE

## 2018-01-25 NOTE — PROGRESS NOTES
Neck: Neck supple. Cardiovascular: Normal rate, regular rhythm and normal heart sounds. Pulmonary/Chest: Effort normal and breath sounds normal. No respiratory distress. Abdominal: Soft. She exhibits no distension. There is no tenderness. Lymphadenopathy:     She has no cervical adenopathy. Neurological: She is alert. Psychiatric: Her speech is normal and behavior is normal. Thought content normal. Her mood appears anxious (mild). She does not exhibit a depressed mood. Nursing note and vitals reviewed. Assessment:      Encounter Diagnoses   Name Primary?  Essential hypertension Yes    Stage 3 chronic kidney disease     Dyslipidemia     Elevated glucose     Epigastric pain     Left leg pain     S/P gastric bypass     Hypothyroidism, unspecified type     Chronic gout without tophus, unspecified cause, unspecified site     Chronic systolic congestive heart failure (HCC)     Dilated cardiomyopathy (HCC)     Hemiplegia and hemiparesis following cerebral infarction affecting left non-dominant side (HCC)     Morbid obesity with BMI of 45.0-49.9, adult ([facility] [address]            Plan:      Per orders - await results. Hypertension stable, continue present medication. Agree with trying to eat slower, also agree with better low-fat and low sweets diet, and consider starting generic Zantac or Pepcid twice daily as discussed. If symptoms persist, then suggest follow-up with gastroenterologist as next step. Also agree with minimizing NSAID, ideally to stop completely, Tylenol as needed (up to 1000mg TID) - if pain persists/worsens, then followup with PM&R physician. If labs stable, and overall feeling better, then repeat fasting office visit in 6 months, sooner as needed. Length of visit over 25 minutes, and >50% of time spent counseling and coordinating care.

## 2018-01-26 LAB
ESTIMATED AVERAGE GLUCOSE: 96.8 MG/DL
HBA1C MFR BLD: 5 %

## 2018-01-30 ENCOUNTER — NURSE ONLY (OUTPATIENT)
Dept: CARDIOLOGY CLINIC | Age: 58
End: 2018-01-30

## 2018-01-30 ENCOUNTER — HOSPITAL ENCOUNTER (OUTPATIENT)
Dept: MAMMOGRAPHY | Age: 58
Discharge: HOME | End: 2018-01-30
Attending: FAMILY MEDICINE | Admitting: FAMILY MEDICINE

## 2018-01-30 DIAGNOSIS — Z12.31 ENCOUNTER FOR SCREENING MAMMOGRAM FOR BREAST CANCER: ICD-10-CM

## 2018-01-30 DIAGNOSIS — I50.22 CHRONIC SYSTOLIC CONGESTIVE HEART FAILURE (HCC): ICD-10-CM

## 2018-01-30 DIAGNOSIS — Z95.810 BIVENTRICULAR ICD (IMPLANTABLE CARDIOVERTER-DEFIBRILLATOR) IN PLACE: Primary | ICD-10-CM

## 2018-01-30 DIAGNOSIS — I42.0 DILATED CARDIOMYOPATHY (HCC): ICD-10-CM

## 2018-01-30 PROCEDURE — 93295 DEV INTERROG REMOTE 1/2/MLT: CPT | Performed by: INTERNAL MEDICINE

## 2018-01-30 PROCEDURE — 93297 REM INTERROG DEV EVAL ICPMS: CPT | Performed by: INTERNAL MEDICINE

## 2018-01-30 PROCEDURE — 93296 REM INTERROG EVL PM/IDS: CPT | Performed by: INTERNAL MEDICINE

## 2018-02-14 DIAGNOSIS — M1A.9XX0 CHRONIC GOUT WITHOUT TOPHUS, UNSPECIFIED CAUSE, UNSPECIFIED SITE: ICD-10-CM

## 2018-02-15 RX ORDER — ALLOPURINOL 300 MG/1
TABLET ORAL
Qty: 90 TABLET | Refills: 3 | Status: SHIPPED | OUTPATIENT
Start: 2018-02-15 | End: 2019-03-04 | Stop reason: SDUPTHER

## 2018-02-25 ASSESSMENT — ENCOUNTER SYMPTOMS
BACK PAIN: 1
ABDOMINAL PAIN: 1

## 2018-02-27 ENCOUNTER — OFFICE VISIT (OUTPATIENT)
Dept: CARDIOLOGY CLINIC | Age: 58
End: 2018-02-27

## 2018-02-27 ENCOUNTER — PROCEDURE VISIT (OUTPATIENT)
Dept: CARDIOLOGY CLINIC | Age: 58
End: 2018-02-27

## 2018-02-27 VITALS
HEIGHT: 63 IN | DIASTOLIC BLOOD PRESSURE: 62 MMHG | SYSTOLIC BLOOD PRESSURE: 98 MMHG | OXYGEN SATURATION: 97 % | BODY MASS INDEX: 47.31 KG/M2 | HEART RATE: 82 BPM | WEIGHT: 267 LBS

## 2018-02-27 DIAGNOSIS — I10 ESSENTIAL HYPERTENSION: ICD-10-CM

## 2018-02-27 DIAGNOSIS — I50.22 CHRONIC SYSTOLIC CONGESTIVE HEART FAILURE (HCC): ICD-10-CM

## 2018-02-27 DIAGNOSIS — Z95.810 BIVENTRICULAR ICD (IMPLANTABLE CARDIOVERTER-DEFIBRILLATOR) IN PLACE: ICD-10-CM

## 2018-02-27 DIAGNOSIS — I44.7 LBBB (LEFT BUNDLE BRANCH BLOCK): ICD-10-CM

## 2018-02-27 DIAGNOSIS — I42.8 NON-ISCHEMIC CARDIOMYOPATHY (HCC): Primary | ICD-10-CM

## 2018-02-27 DIAGNOSIS — I27.20 PULMONARY HYPERTENSION (HCC): ICD-10-CM

## 2018-02-27 DIAGNOSIS — Z95.810 BIVENTRICULAR ICD (IMPLANTABLE CARDIOVERTER-DEFIBRILLATOR) IN PLACE: Primary | ICD-10-CM

## 2018-02-27 PROCEDURE — 3017F COLORECTAL CA SCREEN DOC REV: CPT | Performed by: NURSE PRACTITIONER

## 2018-02-27 PROCEDURE — G8417 CALC BMI ABV UP PARAM F/U: HCPCS | Performed by: NURSE PRACTITIONER

## 2018-02-27 PROCEDURE — 1036F TOBACCO NON-USER: CPT | Performed by: NURSE PRACTITIONER

## 2018-02-27 PROCEDURE — 3014F SCREEN MAMMO DOC REV: CPT | Performed by: NURSE PRACTITIONER

## 2018-02-27 PROCEDURE — G8484 FLU IMMUNIZE NO ADMIN: HCPCS | Performed by: NURSE PRACTITIONER

## 2018-02-27 PROCEDURE — G8598 ASA/ANTIPLAT THER USED: HCPCS | Performed by: NURSE PRACTITIONER

## 2018-02-27 PROCEDURE — 93290 INTERROG DEV EVAL ICPMS IP: CPT | Performed by: NURSE PRACTITIONER

## 2018-02-27 PROCEDURE — G8427 DOCREV CUR MEDS BY ELIG CLIN: HCPCS | Performed by: NURSE PRACTITIONER

## 2018-02-27 PROCEDURE — 99213 OFFICE O/P EST LOW 20 MIN: CPT | Performed by: NURSE PRACTITIONER

## 2018-02-27 RX ORDER — TORSEMIDE 20 MG/1
20 TABLET ORAL EVERY OTHER DAY
Qty: 90 TABLET | Refills: 3
Start: 2018-02-27 | End: 2019-05-08

## 2018-02-27 RX ORDER — LISINOPRIL 2.5 MG/1
2.5 TABLET ORAL EVERY EVENING
Qty: 90 TABLET | Refills: 3
Start: 2018-02-27 | End: 2018-03-23 | Stop reason: SDUPTHER

## 2018-02-27 NOTE — PATIENT INSTRUCTIONS
1. Decrease the torsemide 20 mg to every other day but okay to take an additional dose as needed for swelling, weight gain or chest discomfort  2. No need to repeat blood work  3. Continue same dose of lisinopril in PM and Toprol in AM  4. Continue the aspirin and simvastatin  5. Follow up with Dr. Sarah Crowley in 3 months  6.  Follow up with Dr. Frandy Padilla at first available

## 2018-02-27 NOTE — PROGRESS NOTES
10/17/2017     BNP:   Lab Results   Component Value Date    PROBNP 2,958 06/30/2016    PROBNP 5,059 11/19/2015    PROBNP 5,674 07/09/2015        Cardiac Imaging:  Echo 10/16/17:    Normal size left ventricle with mild concentric left ventricular hypertrophy. Systolic function appears mildly reduced with an estimated ejection fraction of 40-45 %. There is hypokinesis of the basal,mid inferoseptal wall, inferior wall and anteroseptal wall. Elevated left ventricle diastolic filling pressure ( IVRT - 53 /ms ). Compared to last echo on 3/23/2017 the left ventricle systolic function has improved. Mild thickening of leaflets of mitral valve. Mild mitral regurgitation. A bubble study was performed and shows evidence of right to left shunting consistent with a patent foramen ovale or atrial septal defect. No intracardiac mass vegetations or thrombi. Echo 3/23/17:    Left ventricle systolic function is severely reduced with an estimated ejection fraction of 25%. There is severe global hypokinesis. Left ventricular size is mildly increased with normal left ventricular wall thickness. Grade I diastolic dysfunction with normal filling pressure. Moderate mitral regurgitation. Echo 7/6/16:   Technical difficult study due to body habitus. Definity echo contrast is used to define the endocardial border. No intracardiac thrombus. Left ventricular size is moderately increased. Mild concentric left ventricular hypertrophy is present. Left ventricle is spherical.   The left ventricular systolic function is severely reduced with an ejection fraction of 20 %. Compared to previous study from 7- ejection fraction remains approximately the same. Diastolic filling parameters suggests diastolic dysfunction grade II with elevated filling pressure . Mild thickening of leaflets of Mitral valve. No Mitral stenosis. Mild annular calcification is present. Mild Mitral regurgitation. ERO-0.11 cm2.

## 2018-03-05 ENCOUNTER — OFFICE VISIT (OUTPATIENT)
Dept: FAMILY MEDICINE CLINIC | Age: 58
End: 2018-03-05

## 2018-03-05 VITALS
BODY MASS INDEX: 47.12 KG/M2 | SYSTOLIC BLOOD PRESSURE: 108 MMHG | TEMPERATURE: 97.5 F | HEART RATE: 72 BPM | DIASTOLIC BLOOD PRESSURE: 60 MMHG | WEIGHT: 266 LBS

## 2018-03-05 DIAGNOSIS — F31.70 BIPOLAR DISORDER IN FULL REMISSION, MOST RECENT EPISODE UNSPECIFIED TYPE (HCC): ICD-10-CM

## 2018-03-05 DIAGNOSIS — M54.50 ACUTE BILATERAL LOW BACK PAIN WITHOUT SCIATICA: ICD-10-CM

## 2018-03-05 DIAGNOSIS — R30.0 DYSURIA: ICD-10-CM

## 2018-03-05 DIAGNOSIS — R53.81 MALAISE: Primary | ICD-10-CM

## 2018-03-05 PROCEDURE — G8427 DOCREV CUR MEDS BY ELIG CLIN: HCPCS | Performed by: FAMILY MEDICINE

## 2018-03-05 PROCEDURE — G8417 CALC BMI ABV UP PARAM F/U: HCPCS | Performed by: FAMILY MEDICINE

## 2018-03-05 PROCEDURE — 1036F TOBACCO NON-USER: CPT | Performed by: FAMILY MEDICINE

## 2018-03-05 PROCEDURE — 3014F SCREEN MAMMO DOC REV: CPT | Performed by: FAMILY MEDICINE

## 2018-03-05 PROCEDURE — G8598 ASA/ANTIPLAT THER USED: HCPCS | Performed by: FAMILY MEDICINE

## 2018-03-05 PROCEDURE — G8484 FLU IMMUNIZE NO ADMIN: HCPCS | Performed by: FAMILY MEDICINE

## 2018-03-05 PROCEDURE — 3017F COLORECTAL CA SCREEN DOC REV: CPT | Performed by: FAMILY MEDICINE

## 2018-03-05 PROCEDURE — 99213 OFFICE O/P EST LOW 20 MIN: CPT | Performed by: FAMILY MEDICINE

## 2018-03-05 RX ORDER — SULFAMETHOXAZOLE AND TRIMETHOPRIM 400; 80 MG/1; MG/1
1 TABLET ORAL 2 TIMES DAILY
Qty: 14 TABLET | Refills: 0 | Status: SHIPPED | OUTPATIENT
Start: 2018-03-05 | End: 2018-06-04 | Stop reason: SDUPTHER

## 2018-03-06 ASSESSMENT — ENCOUNTER SYMPTOMS: COUGH: 0

## 2018-03-06 NOTE — PATIENT INSTRUCTIONS
Start antibiotic as prescribed for presumed UTI. (Rapid flu test was negative.)     Go to ER if any severe symptoms. Call if any questions.

## 2018-03-07 LAB — URINE CULTURE, ROUTINE: NORMAL

## 2018-03-20 ENCOUNTER — OFFICE VISIT (OUTPATIENT)
Dept: CARDIOLOGY CLINIC | Age: 58
End: 2018-03-20

## 2018-03-20 ENCOUNTER — PROCEDURE VISIT (OUTPATIENT)
Dept: CARDIOLOGY CLINIC | Age: 58
End: 2018-03-20

## 2018-03-20 VITALS
HEIGHT: 63 IN | DIASTOLIC BLOOD PRESSURE: 70 MMHG | SYSTOLIC BLOOD PRESSURE: 110 MMHG | HEART RATE: 70 BPM | BODY MASS INDEX: 47.13 KG/M2 | WEIGHT: 266 LBS

## 2018-03-20 DIAGNOSIS — Z95.810 BIVENTRICULAR ICD (IMPLANTABLE CARDIOVERTER-DEFIBRILLATOR) IN PLACE: Primary | ICD-10-CM

## 2018-03-20 DIAGNOSIS — I42.0 DILATED CARDIOMYOPATHY (HCC): ICD-10-CM

## 2018-03-20 DIAGNOSIS — I50.22 CHRONIC SYSTOLIC CONGESTIVE HEART FAILURE (HCC): ICD-10-CM

## 2018-03-20 PROCEDURE — G8427 DOCREV CUR MEDS BY ELIG CLIN: HCPCS | Performed by: INTERNAL MEDICINE

## 2018-03-20 PROCEDURE — 93290 INTERROG DEV EVAL ICPMS IP: CPT | Performed by: INTERNAL MEDICINE

## 2018-03-20 PROCEDURE — 3017F COLORECTAL CA SCREEN DOC REV: CPT | Performed by: INTERNAL MEDICINE

## 2018-03-20 PROCEDURE — 93284 PRGRMG EVAL IMPLANTABLE DFB: CPT | Performed by: INTERNAL MEDICINE

## 2018-03-20 PROCEDURE — G8598 ASA/ANTIPLAT THER USED: HCPCS | Performed by: INTERNAL MEDICINE

## 2018-03-20 PROCEDURE — 3014F SCREEN MAMMO DOC REV: CPT | Performed by: INTERNAL MEDICINE

## 2018-03-20 PROCEDURE — G8482 FLU IMMUNIZE ORDER/ADMIN: HCPCS | Performed by: INTERNAL MEDICINE

## 2018-03-20 PROCEDURE — 99214 OFFICE O/P EST MOD 30 MIN: CPT | Performed by: INTERNAL MEDICINE

## 2018-03-20 PROCEDURE — 1036F TOBACCO NON-USER: CPT | Performed by: INTERNAL MEDICINE

## 2018-03-20 PROCEDURE — G8417 CALC BMI ABV UP PARAM F/U: HCPCS | Performed by: INTERNAL MEDICINE

## 2018-03-20 NOTE — PROGRESS NOTES
[address]   Cardiac Followup    Referring Provider:  Laray Bosworth, MD         HPI:  Patricia Collazo is a 59 y.o. female who presents for cardiomyopathy and SCA risk. She had cardiomyopathy identified 7/15 with LVEF . 20 by echo. Subsequent cath showed normal coronaries. She has been treated with beta blocker and ACE inhibitor, but the ACE was subsequently discontinued related to worsening renal function and hypotension. Repeat echo 7/6/16 showed LVEF . 20. She reports easy fatiguability, TAPIA with routine workloads. She did have a recent episode of syncope. ECG's have demonstrated consistent LBBB. She underwent BiV ICD placement on 07/29/16. Her device check today shows normal device function with no issues, 97% BiV pacing. Her EKG from today shows sinus rhythm rate 70. She reports she has been feeling better since the device was implanted. She has been taking her medications as prescribed. She has been exercising regularly. She denies chest pain, palpitations, syncope, dizziness, shortness of breath or edema. Past Medical History:   has a past medical history of Autoimmune hemolytic anemia ([address] Bipolar disorder ([address] Bundle branch block; Cardiomyopathy ([address] Chronic systolic CHF (congestive heart failure) ([address] Depression; Family history of early CAD; Family history of ovarian cancer; GERD (gastroesophageal reflux disease); Gout; Hypertension; Hypothyroid; Osteoarthritis, knee; Pneumonia; S/P gastric bypass; Unspecified cerebral artery occlusion with cerebral infarction; Uterine cancer ([address] and Vitamin B 12 deficiency. Surgical History:   has a past surgical history that includes Gastric bypass surgery (1/7/05); Finger surgery; Dilation and curettage of uterus; lipectomy; Cholecystectomy; other surgical history; Upper gastrointestinal endoscopy (4/23/13);  Cardiac catheterization (7/10/2015); transesophageal echocardiogram (7/13/2015); other surgical history (4/1/16); pacemaker

## 2018-03-20 NOTE — LETTER
[address] [place] [address] Cardiology - [address] [place] [address]  Phone: [phone]  Fax: [phone]    Lisa Pena MD        April 4, 2018     Reyes Gonzalez MD  [address] [facility]    Patient: Varinder Dixon  MR Number: [id number]  YOB: 1960  Date of Visit: 3/20/2018    Dear Dr. Reyes Gonzalez: Thank you for allowing me to participate in the care of Dominic Julien. Below are the relevant portions of my assessment and plan of care. Assessment:  1. NICM    2. CHF- NYHA 3  3. Unexplained syncope  4. LBBB  5. S/P gastric bypass  6. S/P Bi-V ICD- she has had remarkable symptomatic improvement with CRT. Her device is functioning normally and her ECG is markedly improved     Plan:  1. Continue current medications. 2. The patient is asked to make an attempt to improve diet and exercise patterns to aid in medical management of this problem. 3. Continue to follow up with Dr. Edel Lawrence and Sofi Mejia CNP. 4. Follow up with me in 1 year. If you have questions, please do not hesitate to call me. I look forward to following Sudhir Lopez along with you.     Sincerely,        Lisa Pena MD

## 2018-03-23 DIAGNOSIS — E03.9 HYPOTHYROIDISM, UNSPECIFIED TYPE: ICD-10-CM

## 2018-03-23 RX ORDER — LISINOPRIL 2.5 MG/1
TABLET ORAL
Qty: 90 TABLET | Refills: 1 | Status: SHIPPED | OUTPATIENT
Start: 2018-03-23 | End: 2018-11-13 | Stop reason: SDUPTHER

## 2018-03-23 NOTE — TELEPHONE ENCOUNTER
Refill request for levothyroxine & montelukast medication.      Name of [facility] mail order    Last visit - 3/5/18     Pending visit - 7/26/18    Last refill -levothyroxine lF 10/3/17 & montelukast LF 11/1/17

## 2018-03-26 RX ORDER — LEVOTHYROXINE SODIUM 0.15 MG/1
TABLET ORAL
Qty: 103 TABLET | Refills: 1 | Status: SHIPPED | OUTPATIENT
Start: 2018-03-26 | End: 2018-12-26 | Stop reason: SDUPTHER

## 2018-03-26 RX ORDER — MONTELUKAST SODIUM 10 MG/1
TABLET ORAL
Qty: 90 TABLET | Refills: 1 | Status: SHIPPED | OUTPATIENT
Start: 2018-03-26 | End: 2018-11-27 | Stop reason: SDUPTHER

## 2018-04-25 RX ORDER — SIMVASTATIN 20 MG
20 TABLET ORAL NIGHTLY
Qty: 90 TABLET | Refills: 1 | Status: SHIPPED | OUTPATIENT
Start: 2018-04-25 | End: 2018-10-26 | Stop reason: SDUPTHER

## 2018-05-08 RX ORDER — MELOXICAM 15 MG/1
TABLET ORAL
Qty: 90 TABLET | Refills: 1 | Status: SHIPPED | OUTPATIENT
Start: 2018-05-08 | End: 2018-12-20 | Stop reason: SDUPTHER

## 2018-05-11 ENCOUNTER — OFFICE VISIT (OUTPATIENT)
Dept: FAMILY MEDICINE CLINIC | Age: 58
End: 2018-05-11

## 2018-05-11 VITALS
BODY MASS INDEX: 47.41 KG/M2 | OXYGEN SATURATION: 97 % | HEIGHT: 63 IN | DIASTOLIC BLOOD PRESSURE: 60 MMHG | SYSTOLIC BLOOD PRESSURE: 110 MMHG | HEART RATE: 86 BPM | WEIGHT: 267.6 LBS

## 2018-05-11 DIAGNOSIS — F31.62 BIPOLAR DISORDER, CURRENT EPISODE MIXED, MODERATE (HCC): Primary | ICD-10-CM

## 2018-05-11 DIAGNOSIS — R53.83 FATIGUE, UNSPECIFIED TYPE: ICD-10-CM

## 2018-05-11 DIAGNOSIS — E03.9 HYPOTHYROIDISM, UNSPECIFIED TYPE: ICD-10-CM

## 2018-05-11 LAB
A/G RATIO: 2 (ref 1.1–2.2)
ALBUMIN SERPL-MCNC: 4.5 G/DL (ref 3.4–5)
ALP BLD-CCNC: 132 U/L (ref 40–129)
ALT SERPL-CCNC: 12 U/L (ref 10–40)
ANION GAP SERPL CALCULATED.3IONS-SCNC: 15 MMOL/L (ref 3–16)
AST SERPL-CCNC: 22 U/L (ref 15–37)
BASOPHILS ABSOLUTE: 0 K/UL (ref 0–0.2)
BASOPHILS RELATIVE PERCENT: 0.8 %
BILIRUB SERPL-MCNC: 0.8 MG/DL (ref 0–1)
BUN BLDV-MCNC: 22 MG/DL (ref 7–20)
CALCIUM SERPL-MCNC: 9.4 MG/DL (ref 8.3–10.6)
CHLORIDE BLD-SCNC: 105 MMOL/L (ref 99–110)
CO2: 22 MMOL/L (ref 21–32)
CREAT SERPL-MCNC: 1.3 MG/DL (ref 0.6–1.1)
EOSINOPHILS ABSOLUTE: 0 K/UL (ref 0–0.6)
EOSINOPHILS RELATIVE PERCENT: 0 %
GFR AFRICAN AMERICAN: 51
GFR NON-AFRICAN AMERICAN: 42
GLOBULIN: 2.3 G/DL
GLUCOSE BLD-MCNC: 88 MG/DL (ref 70–99)
HCT VFR BLD CALC: 42 % (ref 36–48)
HEMOGLOBIN: 13.5 G/DL (ref 12–16)
LYMPHOCYTES ABSOLUTE: 2.4 K/UL (ref 1–5.1)
LYMPHOCYTES RELATIVE PERCENT: 39.8 %
MCH RBC QN AUTO: 31.2 PG (ref 26–34)
MCHC RBC AUTO-ENTMCNC: 32.2 G/DL (ref 31–36)
MCV RBC AUTO: 96.8 FL (ref 80–100)
MONOCYTES ABSOLUTE: 0.4 K/UL (ref 0–1.3)
MONOCYTES RELATIVE PERCENT: 7 %
NEUTROPHILS ABSOLUTE: 3.1 K/UL (ref 1.7–7.7)
NEUTROPHILS RELATIVE PERCENT: 52.4 %
PDW BLD-RTO: 15.6 % (ref 12.4–15.4)
PLATELET # BLD: 169 K/UL (ref 135–450)
PMV BLD AUTO: 11.7 FL (ref 5–10.5)
POTASSIUM SERPL-SCNC: 5.1 MMOL/L (ref 3.5–5.1)
RBC # BLD: 4.34 M/UL (ref 4–5.2)
SODIUM BLD-SCNC: 142 MMOL/L (ref 136–145)
T4 FREE: 1.9 NG/DL (ref 0.9–1.8)
TOTAL PROTEIN: 6.8 G/DL (ref 6.4–8.2)
TSH SERPL DL<=0.05 MIU/L-ACNC: 0.16 UIU/ML (ref 0.27–4.2)
WBC # BLD: 6 K/UL (ref 4–11)

## 2018-05-11 PROCEDURE — 3017F COLORECTAL CA SCREEN DOC REV: CPT | Performed by: FAMILY MEDICINE

## 2018-05-11 PROCEDURE — G8427 DOCREV CUR MEDS BY ELIG CLIN: HCPCS | Performed by: FAMILY MEDICINE

## 2018-05-11 PROCEDURE — 1036F TOBACCO NON-USER: CPT | Performed by: FAMILY MEDICINE

## 2018-05-11 PROCEDURE — 99213 OFFICE O/P EST LOW 20 MIN: CPT | Performed by: FAMILY MEDICINE

## 2018-05-11 PROCEDURE — G8417 CALC BMI ABV UP PARAM F/U: HCPCS | Performed by: FAMILY MEDICINE

## 2018-05-11 PROCEDURE — G8598 ASA/ANTIPLAT THER USED: HCPCS | Performed by: FAMILY MEDICINE

## 2018-05-11 PROCEDURE — 36415 COLL VENOUS BLD VENIPUNCTURE: CPT | Performed by: FAMILY MEDICINE

## 2018-06-01 ENCOUNTER — OFFICE VISIT (OUTPATIENT)
Dept: CARDIOLOGY CLINIC | Age: 58
End: 2018-06-01

## 2018-06-01 VITALS
BODY MASS INDEX: 48.02 KG/M2 | HEIGHT: 63 IN | SYSTOLIC BLOOD PRESSURE: 126 MMHG | HEART RATE: 65 BPM | OXYGEN SATURATION: 97 % | WEIGHT: 271 LBS | DIASTOLIC BLOOD PRESSURE: 78 MMHG

## 2018-06-01 DIAGNOSIS — I27.20 PULMONARY HTN (HCC): ICD-10-CM

## 2018-06-01 DIAGNOSIS — I10 ESSENTIAL HYPERTENSION: Chronic | ICD-10-CM

## 2018-06-01 DIAGNOSIS — E78.5 DYSLIPIDEMIA: ICD-10-CM

## 2018-06-01 DIAGNOSIS — I42.0 DILATED CARDIOMYOPATHY (HCC): Primary | ICD-10-CM

## 2018-06-01 DIAGNOSIS — Z95.810 BIVENTRICULAR ICD (IMPLANTABLE CARDIOVERTER-DEFIBRILLATOR) IN PLACE: ICD-10-CM

## 2018-06-01 DIAGNOSIS — I44.7 LBBB (LEFT BUNDLE BRANCH BLOCK): ICD-10-CM

## 2018-06-01 PROCEDURE — 1036F TOBACCO NON-USER: CPT | Performed by: INTERNAL MEDICINE

## 2018-06-01 PROCEDURE — G8427 DOCREV CUR MEDS BY ELIG CLIN: HCPCS | Performed by: INTERNAL MEDICINE

## 2018-06-01 PROCEDURE — G8417 CALC BMI ABV UP PARAM F/U: HCPCS | Performed by: INTERNAL MEDICINE

## 2018-06-01 PROCEDURE — 3017F COLORECTAL CA SCREEN DOC REV: CPT | Performed by: INTERNAL MEDICINE

## 2018-06-01 PROCEDURE — G8598 ASA/ANTIPLAT THER USED: HCPCS | Performed by: INTERNAL MEDICINE

## 2018-06-01 PROCEDURE — 99214 OFFICE O/P EST MOD 30 MIN: CPT | Performed by: INTERNAL MEDICINE

## 2018-06-04 ENCOUNTER — OFFICE VISIT (OUTPATIENT)
Dept: FAMILY MEDICINE CLINIC | Age: 58
End: 2018-06-04

## 2018-06-04 VITALS — DIASTOLIC BLOOD PRESSURE: 60 MMHG | BODY MASS INDEX: 47.65 KG/M2 | WEIGHT: 269 LBS | SYSTOLIC BLOOD PRESSURE: 100 MMHG

## 2018-06-04 DIAGNOSIS — R30.0 DYSURIA: Primary | ICD-10-CM

## 2018-06-04 DIAGNOSIS — F31.77 BIPOLAR DISORDER, IN PARTIAL REMISSION, MOST RECENT EPISODE MIXED (HCC): Chronic | ICD-10-CM

## 2018-06-04 LAB
BILIRUBIN, POC: 0
BLOOD URINE, POC: NORMAL
CLARITY, POC: NORMAL
COLOR, POC: YELLOW
GLUCOSE URINE, POC: 0
KETONES, POC: 0
LEUKOCYTE EST, POC: NORMAL
NITRITE, POC: 0
PH, POC: 5.5
PROTEIN, POC: 0
SPECIFIC GRAVITY, POC: 1.01
UROBILINOGEN, POC: 0.2

## 2018-06-04 PROCEDURE — G8598 ASA/ANTIPLAT THER USED: HCPCS | Performed by: FAMILY MEDICINE

## 2018-06-04 PROCEDURE — G8417 CALC BMI ABV UP PARAM F/U: HCPCS | Performed by: FAMILY MEDICINE

## 2018-06-04 PROCEDURE — 81002 URINALYSIS NONAUTO W/O SCOPE: CPT | Performed by: FAMILY MEDICINE

## 2018-06-04 PROCEDURE — 3017F COLORECTAL CA SCREEN DOC REV: CPT | Performed by: FAMILY MEDICINE

## 2018-06-04 PROCEDURE — 99213 OFFICE O/P EST LOW 20 MIN: CPT | Performed by: FAMILY MEDICINE

## 2018-06-04 PROCEDURE — G8427 DOCREV CUR MEDS BY ELIG CLIN: HCPCS | Performed by: FAMILY MEDICINE

## 2018-06-04 PROCEDURE — 1036F TOBACCO NON-USER: CPT | Performed by: FAMILY MEDICINE

## 2018-06-04 RX ORDER — LAMOTRIGINE 150 MG/1
150 TABLET ORAL 2 TIMES DAILY
Qty: 180 TABLET | Refills: 1 | Status: SHIPPED | OUTPATIENT
Start: 2018-06-04 | End: 2019-03-04 | Stop reason: SDUPTHER

## 2018-06-04 RX ORDER — SULFAMETHOXAZOLE AND TRIMETHOPRIM 400; 80 MG/1; MG/1
1 TABLET ORAL 2 TIMES DAILY
Qty: 14 TABLET | Refills: 0 | Status: SHIPPED | OUTPATIENT
Start: 2018-06-04 | End: 2018-06-07

## 2018-06-04 ASSESSMENT — ENCOUNTER SYMPTOMS
NAUSEA: 1
VOMITING: 0
BACK PAIN: 1

## 2018-06-07 DIAGNOSIS — N30.00 ACUTE CYSTITIS WITHOUT HEMATURIA: Primary | ICD-10-CM

## 2018-06-07 LAB
ORGANISM: ABNORMAL
URINE CULTURE, ROUTINE: ABNORMAL
URINE CULTURE, ROUTINE: ABNORMAL

## 2018-06-07 RX ORDER — CIPROFLOXACIN 500 MG/1
500 TABLET, FILM COATED ORAL 2 TIMES DAILY
Qty: 10 TABLET | Refills: 0 | Status: SHIPPED | OUTPATIENT
Start: 2018-06-07 | End: 2018-06-12

## 2018-06-18 ASSESSMENT — ENCOUNTER SYMPTOMS
SHORTNESS OF BREATH: 0
ABDOMINAL PAIN: 0

## 2018-06-26 ENCOUNTER — NURSE ONLY (OUTPATIENT)
Dept: CARDIOLOGY CLINIC | Age: 58
End: 2018-06-26

## 2018-06-26 DIAGNOSIS — Z95.810 BIVENTRICULAR ICD (IMPLANTABLE CARDIOVERTER-DEFIBRILLATOR) IN PLACE: Primary | ICD-10-CM

## 2018-06-26 DIAGNOSIS — I42.0 DILATED CARDIOMYOPATHY (HCC): ICD-10-CM

## 2018-06-26 DIAGNOSIS — I50.22 CHRONIC SYSTOLIC CONGESTIVE HEART FAILURE (HCC): ICD-10-CM

## 2018-06-26 PROCEDURE — 93295 DEV INTERROG REMOTE 1/2/MLT: CPT | Performed by: INTERNAL MEDICINE

## 2018-06-26 PROCEDURE — 93296 REM INTERROG EVL PM/IDS: CPT | Performed by: INTERNAL MEDICINE

## 2018-06-26 PROCEDURE — 93297 REM INTERROG DEV EVAL ICPMS: CPT | Performed by: INTERNAL MEDICINE

## 2018-07-26 ENCOUNTER — OFFICE VISIT (OUTPATIENT)
Dept: FAMILY MEDICINE CLINIC | Age: 58
End: 2018-07-26

## 2018-07-26 VITALS
HEIGHT: 63 IN | OXYGEN SATURATION: 93 % | BODY MASS INDEX: 46.99 KG/M2 | SYSTOLIC BLOOD PRESSURE: 107 MMHG | DIASTOLIC BLOOD PRESSURE: 73 MMHG | HEART RATE: 114 BPM | WEIGHT: 265.2 LBS

## 2018-07-26 DIAGNOSIS — B37.31 YEAST VAGINITIS: ICD-10-CM

## 2018-07-26 DIAGNOSIS — F31.77 BIPOLAR DISORDER, IN PARTIAL REMISSION, MOST RECENT EPISODE MIXED (HCC): Chronic | ICD-10-CM

## 2018-07-26 DIAGNOSIS — E03.9 HYPOTHYROIDISM, UNSPECIFIED TYPE: ICD-10-CM

## 2018-07-26 DIAGNOSIS — Z13.220 LIPID SCREENING: ICD-10-CM

## 2018-07-26 DIAGNOSIS — I10 ESSENTIAL HYPERTENSION: Primary | Chronic | ICD-10-CM

## 2018-07-26 DIAGNOSIS — Z11.59 NEED FOR HEPATITIS C SCREENING TEST: ICD-10-CM

## 2018-07-26 PROBLEM — E78.2 MIXED HYPERLIPIDEMIA: Status: ACTIVE | Noted: 2018-07-26

## 2018-07-26 LAB
CHOLESTEROL, TOTAL: 135 MG/DL (ref 0–199)
HDLC SERPL-MCNC: 77 MG/DL (ref 40–60)
HEPATITIS C ANTIBODY INTERPRETATION: NORMAL
LDL CHOLESTEROL CALCULATED: 40 MG/DL
T4 FREE: 1.6 NG/DL (ref 0.9–1.8)
TRIGL SERPL-MCNC: 89 MG/DL (ref 0–150)
TSH SERPL DL<=0.05 MIU/L-ACNC: 2.72 UIU/ML (ref 0.27–4.2)
VLDLC SERPL CALC-MCNC: 18 MG/DL

## 2018-07-26 PROCEDURE — G8417 CALC BMI ABV UP PARAM F/U: HCPCS | Performed by: FAMILY MEDICINE

## 2018-07-26 PROCEDURE — 1036F TOBACCO NON-USER: CPT | Performed by: FAMILY MEDICINE

## 2018-07-26 PROCEDURE — G8598 ASA/ANTIPLAT THER USED: HCPCS | Performed by: FAMILY MEDICINE

## 2018-07-26 PROCEDURE — 36415 COLL VENOUS BLD VENIPUNCTURE: CPT | Performed by: FAMILY MEDICINE

## 2018-07-26 PROCEDURE — 3017F COLORECTAL CA SCREEN DOC REV: CPT | Performed by: FAMILY MEDICINE

## 2018-07-26 PROCEDURE — G8428 CUR MEDS NOT DOCUMENT: HCPCS | Performed by: FAMILY MEDICINE

## 2018-07-26 PROCEDURE — 99213 OFFICE O/P EST LOW 20 MIN: CPT | Performed by: FAMILY MEDICINE

## 2018-07-26 RX ORDER — FLUCONAZOLE 150 MG/1
150 TABLET ORAL ONCE
Qty: 2 TABLET | Refills: 0 | Status: SHIPPED | OUTPATIENT
Start: 2018-07-26 | End: 2018-07-26

## 2018-07-27 ENCOUNTER — OFFICE VISIT (OUTPATIENT)
Dept: FAMILY MEDICINE CLINIC | Age: 58
End: 2018-07-27

## 2018-07-27 ENCOUNTER — HOSPITAL ENCOUNTER (INPATIENT)
Age: 58
LOS: 4 days | Discharge: HOME | DRG: 445 | End: 2018-07-31
Attending: EMERGENCY MEDICINE | Admitting: INTERNAL MEDICINE
Payer: MEDICARE

## 2018-07-27 ENCOUNTER — APPOINTMENT (OUTPATIENT)
Dept: CT IMAGING | Age: 58
DRG: 445 | End: 2018-07-27
Payer: MEDICARE

## 2018-07-27 VITALS
DIASTOLIC BLOOD PRESSURE: 60 MMHG | OXYGEN SATURATION: 99 % | WEIGHT: 265 LBS | HEIGHT: 63 IN | HEART RATE: 96 BPM | BODY MASS INDEX: 46.95 KG/M2 | SYSTOLIC BLOOD PRESSURE: 95 MMHG

## 2018-07-27 DIAGNOSIS — R74.8 ELEVATED LIPASE: ICD-10-CM

## 2018-07-27 DIAGNOSIS — R53.83 FATIGUE, UNSPECIFIED TYPE: ICD-10-CM

## 2018-07-27 DIAGNOSIS — R10.13 EPIGASTRIC PAIN: ICD-10-CM

## 2018-07-27 DIAGNOSIS — K80.50 CHOLEDOCHOLITHIASIS: Primary | ICD-10-CM

## 2018-07-27 DIAGNOSIS — R19.7 DIARRHEA, UNSPECIFIED TYPE: Primary | ICD-10-CM

## 2018-07-27 DIAGNOSIS — N28.9 RENAL INSUFFICIENCY: ICD-10-CM

## 2018-07-27 DIAGNOSIS — R11.2 NON-INTRACTABLE VOMITING WITH NAUSEA, UNSPECIFIED VOMITING TYPE: ICD-10-CM

## 2018-07-27 DIAGNOSIS — R74.01 TRANSAMINITIS: ICD-10-CM

## 2018-07-27 PROBLEM — R10.9 ABDOMINAL PAIN: Status: ACTIVE | Noted: 2018-07-27

## 2018-07-27 LAB
A/G RATIO: 1.5 (ref 1.1–2.2)
ALBUMIN SERPL-MCNC: 4.2 G/DL (ref 3.4–5)
ALP BLD-CCNC: 334 U/L (ref 40–129)
ALT SERPL-CCNC: 132 U/L (ref 10–40)
ANION GAP SERPL CALCULATED.3IONS-SCNC: 11 MMOL/L (ref 3–16)
AST SERPL-CCNC: 370 U/L (ref 15–37)
BASOPHILS ABSOLUTE: 0 K/UL (ref 0–0.2)
BASOPHILS RELATIVE PERCENT: 0.5 %
BILIRUB SERPL-MCNC: 1.1 MG/DL (ref 0–1)
BILIRUBIN URINE: NEGATIVE
BLOOD, URINE: NEGATIVE
BUN BLDV-MCNC: 25 MG/DL (ref 7–20)
CALCIUM SERPL-MCNC: 9.5 MG/DL (ref 8.3–10.6)
CHLORIDE BLD-SCNC: 107 MMOL/L (ref 99–110)
CLARITY: CLEAR
CO2: 23 MMOL/L (ref 21–32)
COLOR: YELLOW
CREAT SERPL-MCNC: 1.4 MG/DL (ref 0.6–1.1)
EOSINOPHILS ABSOLUTE: 0 K/UL (ref 0–0.6)
EOSINOPHILS RELATIVE PERCENT: 0 %
GFR AFRICAN AMERICAN: 46
GFR NON-AFRICAN AMERICAN: 38
GLOBULIN: 2.8 G/DL
GLUCOSE BLD-MCNC: 103 MG/DL (ref 70–99)
GLUCOSE URINE: NEGATIVE MG/DL
HCT VFR BLD CALC: 40.9 % (ref 36–48)
HEMOGLOBIN: 13.2 G/DL (ref 12–16)
KETONES, URINE: NEGATIVE MG/DL
LEUKOCYTE ESTERASE, URINE: NEGATIVE
LIPASE: 128 U/L (ref 13–60)
LYMPHOCYTES ABSOLUTE: 1 K/UL (ref 1–5.1)
LYMPHOCYTES RELATIVE PERCENT: 14.4 %
MCH RBC QN AUTO: 30.7 PG (ref 26–34)
MCHC RBC AUTO-ENTMCNC: 32.3 G/DL (ref 31–36)
MCV RBC AUTO: 95.1 FL (ref 80–100)
MICROSCOPIC EXAMINATION: NORMAL
MONOCYTES ABSOLUTE: 0.4 K/UL (ref 0–1.3)
MONOCYTES RELATIVE PERCENT: 6.3 %
NEUTROPHILS ABSOLUTE: 5.6 K/UL (ref 1.7–7.7)
NEUTROPHILS RELATIVE PERCENT: 78.8 %
NITRITE, URINE: NEGATIVE
PDW BLD-RTO: 14.7 % (ref 12.4–15.4)
PH UA: 6
PLATELET # BLD: 144 K/UL (ref 135–450)
PMV BLD AUTO: 10.4 FL (ref 5–10.5)
POTASSIUM SERPL-SCNC: 4.6 MMOL/L (ref 3.5–5.1)
PROTEIN UA: NEGATIVE MG/DL
RBC # BLD: 4.3 M/UL (ref 4–5.2)
SODIUM BLD-SCNC: 141 MMOL/L (ref 136–145)
SPECIFIC GRAVITY UA: 1.01
TOTAL PROTEIN: 7 G/DL (ref 6.4–8.2)
URINE TYPE: NORMAL
UROBILINOGEN, URINE: 0.2 E.U./DL
WBC # BLD: 7.2 K/UL (ref 4–11)

## 2018-07-27 PROCEDURE — 6370000000 HC RX 637 (ALT 250 FOR IP): Performed by: INTERNAL MEDICINE

## 2018-07-27 PROCEDURE — 81003 URINALYSIS AUTO W/O SCOPE: CPT

## 2018-07-27 PROCEDURE — 6360000002 HC RX W HCPCS: Performed by: INTERNAL MEDICINE

## 2018-07-27 PROCEDURE — 6360000002 HC RX W HCPCS: Performed by: EMERGENCY MEDICINE

## 2018-07-27 PROCEDURE — 96375 TX/PRO/DX INJ NEW DRUG ADDON: CPT

## 2018-07-27 PROCEDURE — 99285 EMERGENCY DEPT VISIT HI MDM: CPT

## 2018-07-27 PROCEDURE — 80053 COMPREHEN METABOLIC PANEL: CPT

## 2018-07-27 PROCEDURE — G8427 DOCREV CUR MEDS BY ELIG CLIN: HCPCS | Performed by: FAMILY MEDICINE

## 2018-07-27 PROCEDURE — 1200000000 HC SEMI PRIVATE

## 2018-07-27 PROCEDURE — 96374 THER/PROPH/DIAG INJ IV PUSH: CPT

## 2018-07-27 PROCEDURE — 3017F COLORECTAL CA SCREEN DOC REV: CPT | Performed by: FAMILY MEDICINE

## 2018-07-27 PROCEDURE — 2580000003 HC RX 258: Performed by: INTERNAL MEDICINE

## 2018-07-27 PROCEDURE — G8598 ASA/ANTIPLAT THER USED: HCPCS | Performed by: FAMILY MEDICINE

## 2018-07-27 PROCEDURE — 96361 HYDRATE IV INFUSION ADD-ON: CPT

## 2018-07-27 PROCEDURE — G8417 CALC BMI ABV UP PARAM F/U: HCPCS | Performed by: FAMILY MEDICINE

## 2018-07-27 PROCEDURE — 6360000004 HC RX CONTRAST MEDICATION: Performed by: PHYSICIAN ASSISTANT

## 2018-07-27 PROCEDURE — 1036F TOBACCO NON-USER: CPT | Performed by: FAMILY MEDICINE

## 2018-07-27 PROCEDURE — 99213 OFFICE O/P EST LOW 20 MIN: CPT | Performed by: FAMILY MEDICINE

## 2018-07-27 PROCEDURE — 74177 CT ABD & PELVIS W/CONTRAST: CPT

## 2018-07-27 PROCEDURE — 85025 COMPLETE CBC W/AUTO DIFF WBC: CPT

## 2018-07-27 PROCEDURE — 83690 ASSAY OF LIPASE: CPT

## 2018-07-27 PROCEDURE — 2580000003 HC RX 258: Performed by: PHYSICIAN ASSISTANT

## 2018-07-27 PROCEDURE — 6360000002 HC RX W HCPCS: Performed by: PHYSICIAN ASSISTANT

## 2018-07-27 RX ORDER — MONTELUKAST SODIUM 10 MG/1
10 TABLET ORAL NIGHTLY
Status: DISCONTINUED | OUTPATIENT
Start: 2018-07-27 | End: 2018-07-31 | Stop reason: HOSPADM

## 2018-07-27 RX ORDER — LISINOPRIL 2.5 MG/1
2.5 TABLET ORAL DAILY
Status: DISCONTINUED | OUTPATIENT
Start: 2018-07-27 | End: 2018-07-31 | Stop reason: HOSPADM

## 2018-07-27 RX ORDER — SODIUM CHLORIDE 0.9 % (FLUSH) 0.9 %
10 SYRINGE (ML) INJECTION PRN
Status: DISCONTINUED | OUTPATIENT
Start: 2018-07-27 | End: 2018-07-31 | Stop reason: HOSPADM

## 2018-07-27 RX ORDER — SODIUM CHLORIDE 0.9 % (FLUSH) 0.9 %
10 SYRINGE (ML) INJECTION EVERY 12 HOURS SCHEDULED
Status: DISCONTINUED | OUTPATIENT
Start: 2018-07-27 | End: 2018-07-31 | Stop reason: HOSPADM

## 2018-07-27 RX ORDER — MORPHINE SULFATE 4 MG/ML
4 INJECTION, SOLUTION INTRAMUSCULAR; INTRAVENOUS EVERY 4 HOURS PRN
Status: DISCONTINUED | OUTPATIENT
Start: 2018-07-27 | End: 2018-07-31 | Stop reason: HOSPADM

## 2018-07-27 RX ORDER — METOPROLOL SUCCINATE 50 MG/1
50 TABLET, EXTENDED RELEASE ORAL DAILY
Status: DISCONTINUED | OUTPATIENT
Start: 2018-07-28 | End: 2018-07-31 | Stop reason: HOSPADM

## 2018-07-27 RX ORDER — MORPHINE SULFATE 2 MG/ML
2 INJECTION, SOLUTION INTRAMUSCULAR; INTRAVENOUS EVERY 4 HOURS PRN
Status: DISCONTINUED | OUTPATIENT
Start: 2018-07-27 | End: 2018-07-31 | Stop reason: HOSPADM

## 2018-07-27 RX ORDER — ACETAMINOPHEN 325 MG/1
650 TABLET ORAL EVERY 6 HOURS PRN
COMMUNITY

## 2018-07-27 RX ORDER — CETIRIZINE HYDROCHLORIDE 10 MG/1
10 TABLET ORAL DAILY
Status: DISCONTINUED | OUTPATIENT
Start: 2018-07-28 | End: 2018-07-31 | Stop reason: HOSPADM

## 2018-07-27 RX ORDER — MORPHINE SULFATE 2 MG/ML
2 INJECTION, SOLUTION INTRAMUSCULAR; INTRAVENOUS ONCE
Status: COMPLETED | OUTPATIENT
Start: 2018-07-27 | End: 2018-07-27

## 2018-07-27 RX ORDER — ONDANSETRON 2 MG/ML
4 INJECTION INTRAMUSCULAR; INTRAVENOUS EVERY 6 HOURS PRN
Status: DISCONTINUED | OUTPATIENT
Start: 2018-07-27 | End: 2018-07-31 | Stop reason: HOSPADM

## 2018-07-27 RX ORDER — 0.9 % SODIUM CHLORIDE 0.9 %
1000 INTRAVENOUS SOLUTION INTRAVENOUS ONCE
Status: COMPLETED | OUTPATIENT
Start: 2018-07-27 | End: 2018-07-27

## 2018-07-27 RX ORDER — ASPIRIN 81 MG/1
81 TABLET ORAL EVERY EVENING
Status: DISCONTINUED | OUTPATIENT
Start: 2018-07-27 | End: 2018-07-31 | Stop reason: HOSPADM

## 2018-07-27 RX ORDER — ALLOPURINOL 300 MG/1
300 TABLET ORAL DAILY
Status: DISCONTINUED | OUTPATIENT
Start: 2018-07-28 | End: 2018-07-31 | Stop reason: HOSPADM

## 2018-07-27 RX ORDER — SIMVASTATIN 10 MG
20 TABLET ORAL NIGHTLY
Status: DISCONTINUED | OUTPATIENT
Start: 2018-07-27 | End: 2018-07-31 | Stop reason: HOSPADM

## 2018-07-27 RX ORDER — LEVOTHYROXINE SODIUM 0.15 MG/1
150 TABLET ORAL SEE ADMIN INSTRUCTIONS
Status: DISCONTINUED | OUTPATIENT
Start: 2018-07-27 | End: 2018-07-31 | Stop reason: HOSPADM

## 2018-07-27 RX ORDER — ONDANSETRON 2 MG/ML
4 INJECTION INTRAMUSCULAR; INTRAVENOUS ONCE
Status: COMPLETED | OUTPATIENT
Start: 2018-07-27 | End: 2018-07-27

## 2018-07-27 RX ORDER — LISINOPRIL 2.5 MG/1
1 TABLET ORAL EVERY EVENING
Status: CANCELLED | OUTPATIENT
Start: 2018-07-27

## 2018-07-27 RX ADMIN — SIMVASTATIN 20 MG: 10 TABLET, FILM COATED ORAL at 21:47

## 2018-07-27 RX ADMIN — IOPAMIDOL 75 ML: 755 INJECTION, SOLUTION INTRAVENOUS at 13:36

## 2018-07-27 RX ADMIN — LAMOTRIGINE 150 MG: 100 TABLET ORAL at 21:47

## 2018-07-27 RX ADMIN — SODIUM CHLORIDE 1000 ML: 9 INJECTION, SOLUTION INTRAVENOUS at 12:16

## 2018-07-27 RX ADMIN — LISINOPRIL 2.5 MG: 2.5 TABLET ORAL at 21:47

## 2018-07-27 RX ADMIN — IOHEXOL 50 ML: 240 INJECTION, SOLUTION INTRATHECAL; INTRAVASCULAR; INTRAVENOUS; ORAL at 12:16

## 2018-07-27 RX ADMIN — ENOXAPARIN SODIUM 40 MG: 40 INJECTION, SOLUTION INTRAVENOUS; SUBCUTANEOUS at 21:47

## 2018-07-27 RX ADMIN — MORPHINE SULFATE 2 MG: 2 INJECTION, SOLUTION INTRAMUSCULAR; INTRAVENOUS at 14:06

## 2018-07-27 RX ADMIN — Medication 10 ML: at 21:47

## 2018-07-27 RX ADMIN — MONTELUKAST SODIUM 10 MG: 10 TABLET, FILM COATED ORAL at 21:47

## 2018-07-27 RX ADMIN — ASPIRIN 81 MG: 81 TABLET, COATED ORAL at 21:47

## 2018-07-27 RX ADMIN — ONDANSETRON 4 MG: 2 INJECTION, SOLUTION INTRAMUSCULAR; INTRAVENOUS at 12:16

## 2018-07-27 ASSESSMENT — ENCOUNTER SYMPTOMS
DIARRHEA: 1
EYES NEGATIVE: 1
VOMITING: 1
SHORTNESS OF BREATH: 0
DIARRHEA: 1
ABDOMINAL PAIN: 1
COUGH: 0
NAUSEA: 1
ABDOMINAL PAIN: 0
SHORTNESS OF BREATH: 0
COLOR CHANGE: 0

## 2018-07-27 ASSESSMENT — PAIN DESCRIPTION - LOCATION
LOCATION: ABDOMEN
LOCATION: ABDOMEN

## 2018-07-27 ASSESSMENT — PAIN DESCRIPTION - ORIENTATION
ORIENTATION: RIGHT;UPPER
ORIENTATION: UPPER

## 2018-07-27 ASSESSMENT — PAIN DESCRIPTION - PAIN TYPE
TYPE: ACUTE PAIN
TYPE: ACUTE PAIN

## 2018-07-27 ASSESSMENT — PAIN SCALES - GENERAL
PAINLEVEL_OUTOF10: 9
PAINLEVEL_OUTOF10: 8
PAINLEVEL_OUTOF10: 5
PAINLEVEL_OUTOF10: 6

## 2018-07-27 NOTE — PROGRESS NOTES
Subjective:      Patient ID: Sharmila Santacruz is a 59 y.o. female. Diarrhea    Associated symptoms include abdominal pain and vomiting. C/o vomitting since about 0300, today, about 4-5 times, and same episodes of diarrhea. Last time like this was partial bowel obstruction. Diarrhea for about one week, sometimes slightly formed, sometimes completely yellow (within the two days) - today with significant abdominal pain. Quite limited liquids by mouth y-day, with total fluid intake within the last 24 hrs is max of 2-3 cups per patient. (Liquid was water, came back up, tea, which came back later, Sprite and tea ended coming up later on. STATES SYMPTOMS ARE ALMOST IDENTICAL to when was admitted for SBO (back in 3/30/16). Review of Systems   Constitutional: Positive for appetite change (decreased) and fatigue. Cardiovascular: Negative for chest pain and palpitations. Gastrointestinal: Positive for abdominal pain, diarrhea, nausea and vomiting. Negative for rectal pain. Musculoskeletal: Positive for gait problem (dizzy and weak). Neurological: Negative for dizziness and weakness. Objective:   Physical Exam   Constitutional: She appears well-nourished. Neck: Neck supple. Cardiovascular: Normal rate, regular rhythm and normal heart sounds. Pulmonary/Chest: Effort normal and breath sounds normal. No respiratory distress. Abdominal: Soft. She exhibits no distension. There is tenderness (generalized epigastric tenderness). Genitourinary: Rectal exam shows guaiac negative stool. Musculoskeletal: She exhibits no edema. Nursing note and vitals reviewed. Assessment:      Encounter Diagnoses   Name Primary?  Diarrhea, unspecified type Yes    Epigastric pain     Non-intractable vomiting with nausea, unspecified vomiting type     Fatigue, unspecified type          Plan:       With high likelihood of dehydration, as well as persisting abdominal discomfort, and per patient sxs very

## 2018-07-27 NOTE — CONSULTS
Gastroenterology Consult Note    Patient:   Bia Roberto   YOB: 1960   Facility:   [facility]   Referring/PCP: Roscoe Bella MD  Date:     7/27/2018  Consultant:   Rehana Hudson MD    Subjective: This 59 y.o. female was admitted 7/27/2018 with a diagnosis of \"No admission diagnoses are documented for this encounter. \" and is seen in consultation regarding \"pancreatitis\". Information was obtained from interview of  the patient, examination of the patient, and review of records. I did  update the past medical, surgical, social and / or family history. Pancreatitis mild in upper abd for days-weeks assoc w elev LFT's and abnl CT    Current status  Present  Diet Order: Diet NPO Effective Now and she is not tolerating diet. Recently, she has experienced moderate, maximum 6/10 epigastric abdominal  Pain and she has required Intravenous narcotic analgesics. The patient has also experienced no constipation, diarrhea, fever, hematochezia and melena      Prior to Admission medications    Medication Sig Start Date End Date Taking? Authorizing Provider   lamoTRIgine (LAMICTAL) 150 MG tablet Take 1 tablet by mouth 2 times daily (or as otherwise directed).  6/4/18   Shalini Wall MD   meloxicam (MOBIC) 15 MG tablet TAKE 1 TABLET BY MOUTH DAILY (WITH FOOD) AS NEEDED FOR PAIN. 5/8/18   Shalini Wall MD   simvastatin (ZOCOR) 20 MG tablet TAKE 1 TABLET BY MOUTH NIGHTLY 4/25/18   Shalini Wall MD   levothyroxine (SYNTHROID) 150 MCG tablet TAKE 1 TABLET EVERY DAY EXCEPT FOR SUNDAY WHEN YOU ARE TO TAKE 2 TABLETS AS DIRECTED FOR A TOTAL OF 8 TABLETS/WEEK 3/26/18   Shalini Wall MD   montelukast (SINGULAIR) 10 MG tablet TAKE 1 TABLET EVERY NIGHT 3/26/18   Shalini Wall MD   lisinopril (PRINIVIL;ZESTRIL) 2.5 MG tablet TAKE 1 TABLET EVERY DAY 3/23/18   Js Miles MD   torsemide (DEMADEX) 20 MG tablet Take 1 tablet by mouth every other day Plus an additional dose as needed for weight gain, swelling or chest discomfort 2/27/18   ALICIA Castellano CNP   allopurinol (ZYLOPRIM) 300 MG tablet TAKE 1 TABLET EVERY DAY 2/15/18   Sumeet Wall MD   metoprolol succinate (TOPROL XL) 50 MG extended release tablet Take 1 tablet by mouth daily 12/26/17   ALICIA Castellano CNP   aspirin 81 MG EC tablet Take 1 tablet by mouth daily 10/17/17   Traci Rivera MD   vitamin B-12 (CYANOCOBALAMIN) 1000 MCG tablet Take 1,000 mcg by mouth daily    Historical Provider, MD   Multiple Vitamins-Minerals (MULTIVITAMIN & MINERAL PO) Take by mouth    Historical Provider, MD   cetirizine (ZYRTEC) 10 MG tablet Take 10 mg by mouth daily    Historical Provider, MD      Scheduled Medications:   Infusions:   PRN Medications: Allergies: Allergies   Allergen Reactions    Dilaudid [Hydromorphone Hcl] Other (See Comments)     Palpitations and orthostatic hypotension    Penicillins Shortness Of Breath    Seroquel [Quetiapine Fumarate] Other (See Comments)     Very lethargic/almost not funtioning at all (per patient).  Adhesive Tape Other (See Comments)     BLISTERS    Hydromorphone Hcl      Other reaction(s):  Other (See Comments)  Rapid heart rate     Lithium Other (See Comments)     Diarrhea, vomiting, nausea, headaches,     Tetanus Toxoids Swelling    Yellow Dyes (Non-Tartrazine) Rash     #5       Past Medical History:   Diagnosis Date    Autoimmune hemolytic anemia (HCC)     during uterine cancer    Bipolar disorder ([facility]     managed by Diane James)    Bundle branch block     Cardiomyopathy ([facility]     Chronic systolic CHF (congestive heart failure) ([facility] 7/9/2015    Depression     Family history of early CAD     Family history of ovarian cancer     mother    GERD (gastroesophageal reflux disease)     Gout     Hypertension     Hypothyroid     Osteoarthritis, knee     Shybut    Pneumonia     history of pneumonia    S/P gastric bypass 1/7/05   

## 2018-07-27 NOTE — ED PROVIDER NOTES
systems reviewed and are negative. Except as noted above in the ROS, all other systems were reviewed and negative. PAST MEDICAL HISTORY:     Past Medical History:   Diagnosis Date    Autoimmune hemolytic anemia (HCC)     during uterine cancer    Bipolar disorder ([facility]     managed by Diane James)    Bundle branch block     Cardiomyopathy ([facility]     Chronic systolic CHF (congestive heart failure) ([facility] 7/9/2015    Depression     Family history of early CAD     Family history of ovarian cancer     mother    GERD (gastroesophageal reflux disease)     Gout     Hypertension     Hypothyroid     Osteoarthritis, knee     Shybut    Pneumonia     history of pneumonia    S/P gastric bypass 1/7/05    Unspecified cerebral artery occlusion with cerebral infarction     TIA    Uterine cancer ([facility]     endometrial adenocarcinoma    Vitamin B 12 deficiency 7/09         SURGICAL HISTORY:      Past Surgical History:   Procedure Laterality Date    CARDIAC CATHETERIZATION  7/10/2015    Normal Coronary Arteries    CHOLECYSTECTOMY      DILATION AND CURETTAGE OF UTERUS      4 times    FINGER SURGERY      gout    GASTRIC BYPASS SURGERY  1/7/05    HERNIA REPAIR  10/20/2016    lap ventral hernia    LIPECTOMY      gangrenous    OTHER SURGICAL HISTORY      radium implants into uterus for uterine surgery    OTHER SURGICAL HISTORY  4/1/16    exp. lap lysis of adhesion    PACEMAKER PLACEMENT  09/2016    TRANSESOPHAGEAL ECHOCARDIOGRAM  7/13/2015    UPPER GASTROINTESTINAL ENDOSCOPY  4/23/13         CURRENT MEDICATIONS:       Previous Medications    ALLOPURINOL (ZYLOPRIM) 300 MG TABLET    TAKE 1 TABLET EVERY DAY    ASPIRIN 81 MG EC TABLET    Take 1 tablet by mouth daily    CETIRIZINE (ZYRTEC) 10 MG TABLET    Take 10 mg by mouth daily    LAMOTRIGINE (LAMICTAL) 150 MG TABLET    Take 1 tablet by mouth 2 times daily (or as otherwise directed).     LEVOTHYROXINE (SYNTHROID) 150 MCG TABLET    TAKE 1 TABLET EVERY DAY EXCEPT FOR SUNDAY WHEN YOU ARE TO TAKE 2 TABLETS AS DIRECTED FOR A TOTAL OF 8 TABLETS/WEEK    LISINOPRIL (PRINIVIL;ZESTRIL) 2.5 MG TABLET    TAKE 1 TABLET EVERY DAY    MELOXICAM (MOBIC) 15 MG TABLET    TAKE 1 TABLET BY MOUTH DAILY (WITH FOOD) AS NEEDED FOR PAIN. METOPROLOL SUCCINATE (TOPROL XL) 50 MG EXTENDED RELEASE TABLET    Take 1 tablet by mouth daily    MONTELUKAST (SINGULAIR) 10 MG TABLET    TAKE 1 TABLET EVERY NIGHT    MULTIPLE VITAMINS-MINERALS (MULTIVITAMIN & MINERAL PO)    Take by mouth    SIMVASTATIN (ZOCOR) 20 MG TABLET    TAKE 1 TABLET BY MOUTH NIGHTLY    TORSEMIDE (DEMADEX) 20 MG TABLET    Take 1 tablet by mouth every other day Plus an additional dose as needed for weight gain, swelling or chest discomfort    VITAMIN B-12 (CYANOCOBALAMIN) 1000 MCG TABLET    Take 1,000 mcg by mouth daily         ALLERGIES:    Dilaudid [hydromorphone hcl]; Penicillins; Seroquel [quetiapine fumarate]; Adhesive tape; Hydromorphone hcl; Lithium; Tetanus toxoids; and Yellow dyes (non-tartrazine)    FAMILY HISTORY:       Family History   Problem Relation Age of Onset   [facility] Cancer Mother     Ovarian Cancer Mother 45        Technically peritoneal cancer    Depression Mother         bipolar    Arthritis Father     Rheum Arthritis Father     Arrhythmia Father     Other Father         gout    Heart Disease Father     Depression Brother         depression    Other Brother         gout    Obesity Brother     Other Brother         gout    Obesity Brother     Depression Brother     Cancer Maternal Grandmother         Breast          SOCIAL HISTORY:       Social History     Social History    Marital status:      Spouse name: N/A    Number of children: N/A    Years of education: N/A     Social History Main Topics    Smoking status: Former Smoker     Packs/day: 3.00     Years: 2.00     Quit date: 1/1/1985    Smokeless tobacco: Never Used      Comment: quit smoking 30 years ago.     Alcohol use No    Drug use: No    Sexual activity: Not Asked     Other Topics Concern    None     Social History Narrative    None       SCREENINGS:             PHYSICAL EXAM:       ED Triage Vitals [07/27/18 1134]   BP Temp Temp Source Pulse Resp SpO2 Height Weight   (!) 118/58 98 °F (36.7 °C) Oral 73 18 99 % 5' 3\" (1.6 m) 266 lb (120.7 kg)     Body mass index is 47.12 kg/m². Physical Exam    CONSTITUTIONAL: Awake and alert. Cooperative. Well-developed. Well-nourished. Non-toxic. No acute distress. HENT: Normocephalic. Atraumatic. External ears normal, without discharge. No nasal discharge. Oropharynx clear. Mucous membranes moist.  EYES: Conjunctiva non-injected. No scleral icterus. PERRL. EOM's grossly intact. NECK: Supple. Normal ROM. CARDIOVASCULAR: RRR. No Murmer. Intact distal pulses. PULMONARY/CHEST WALL: Effort normal. No tachypnea. Lungs clear to ausculation. ABDOMEN: Normal BS. Soft. Nondistended. Tenderness to palpate across the upper abdomen. No guarding. /ANORECTAL: Not assessed  MUSKULOSKELETAL: Normal ROM. No acute deformities. No edema. No tenderness to palpate. SKIN: Warm and dry. NEUROLOGICAL: Alert and oriented x 3. GCS 15. CN II-XII grossly intact. Strength is 5/5 in all extremities and sensation is intact. Normal gait.    PSYCHIATRIC: Normal affect        DIAGNOSTIC RESULTS:     LABS:    Results for orders placed or performed during the hospital encounter of 07/27/18   Comprehensive Metabolic Panel   Result Value Ref Range    Sodium 141 136 - 145 mmol/L    Potassium 4.6 3.5 - 5.1 mmol/L    Chloride 107 99 - 110 mmol/L    CO2 23 21 - 32 mmol/L    Anion Gap 11 3 - 16    Glucose 103 (H) 70 - 99 mg/dL    BUN 25 (H) 7 - 20 mg/dL    CREATININE 1.4 (H) 0.6 - 1.1 mg/dL    GFR Non- 38 (A) >60    GFR  46 (A) >60    Calcium 9.5 8.3 - 10.6 mg/dL    Total Protein 7.0 6.4 - 8.2 g/dL    Alb 4.2 3.4 - 5.0 g/dL    Albumin/Globulin Ratio 1.5 1.1 - 2.2    Total Bilirubin 1.1 Height: 5' 3\" (1.6 m)          Patient was given the following medications:  Medications   iohexol (OMNIPAQUE 240) injection 50 mL (50 mLs Oral Given 7/27/18 1216)   0.9 % sodium chloride bolus (0 mLs Intravenous Stopped 7/27/18 1342)   ondansetron (ZOFRAN) injection 4 mg (4 mg Intravenous Given 7/27/18 1216)   iopamidol (ISOVUE-370) 76 % injection 75 mL (75 mLs Intravenous Given 7/27/18 1336)   morphine injection 2 mg (2 mg Intravenous Given 7/27/18 1406)         Patient was evaluated by both myself and Evan Gay DO. The patient arrives reporting 1 week of upper abdominal pain with nausea and vomiting off and on. Symptoms have gradually worsened and she arrived to the ED concerned she might have another bowel obstruction. Labs were drawn and a urine was obtained. She had a CT scan of the abdomen and pelvis with oral contrast.  The patient has a normal CBC without leukocytosis. Her CMP reveals renal insufficiency with a BUN of 25 and a creatinine of 1.4. Her LFTs are elevated including alk phos 334, ,  and total bilirubin 1.1. Her lipase is also elevated at 128. Her urinalysis is unremarkable. CT imaging of the abdomen and pelvis shows evidence of moderate intra-and extract hepatic biliary ductal dilatation which is increased from prior studies. Findings suspicious for choledocholithiasis in the distal common duct. I consulted GI and spoke with Dr. Dimple Hernandez who stated he would come see the patient. I will consult the hospitalist for admission    I spoke with Dr. Dimple Hernandez and Dr. Rogerio Mckeon. We thoroughly discussed the history, physical exam, laboratory and imaging studies, as well as, emergency department course. Based upon that discussion, we've decided to admit Bia Roberto for further observation and evaluation of Nico Tobin's abdominal pain and choledocholithiasis.   As I have deemed necessary from their history, physical and studies, I have considered and evaluated Bia Roberto

## 2018-07-27 NOTE — PROGRESS NOTES
PS hosp @ 1459  RE: choledocholithiasis per Mireya Child PA  Dr. Morro Hull returned call @ [phone], spoke to Hipolito Rios.

## 2018-07-27 NOTE — H&P
MG tablet Take 10 mg by mouth daily    Historical Provider, MD       Allergies:  Dilaudid [hydromorphone hcl]; Penicillins; Seroquel [quetiapine fumarate]; Adhesive tape; Hydromorphone hcl; Lithium; Tetanus toxoids; and Yellow dyes (non-tartrazine)    Social History:      The patient currently lives at home    TOBACCO:   reports that she quit smoking about 33 years ago. She has a 6.00 pack-year smoking history. She has never used smokeless tobacco.  ETOH:   reports that she does not drink alcohol. Family History:       Reviewed in detail and negative for DM, CAD, Cancer, CVA. Positive as follows:    Family History   Problem Relation Age of Onset    Cancer Mother     Ovarian Cancer Mother 45        Technically peritoneal cancer    Depression Mother         bipolar    Arthritis Father     Rheum Arthritis Father     Arrhythmia Father     Other Father         gout    Heart Disease Father     Depression Brother         depression    Other Brother         gout    Obesity Brother     Other Brother         gout    Obesity Brother     Depression Brother     Cancer Maternal Grandmother         Breast       REVIEW OF SYSTEMS:   Pertinent positives as noted in the HPI. All other systems reviewed and negative. PHYSICAL EXAM PERFORMED:    /67   Pulse 88   Temp 97.5 °F (36.4 °C) (Oral)   Resp 16   Ht 5' 3\" (1.6 m)   Wt 266 lb (120.7 kg)   SpO2 99%   BMI 47.12 kg/m²     General appearance:  No apparent distress, appears stated age and cooperative. HEENT:  Normal cephalic, atraumatic without obvious deformity. Pupils equal, round, and reactive to light. Extra ocular muscles intact. Conjunctivae/corneas clear. Neck: Supple, with full range of motion. No jugular venous distention. Trachea midline. Respiratory:  Normal respiratory effort. Clear to auscultation, bilaterally without Rales/Wheezes/Rhonchi.   Cardiovascular:  Regular rate and rhythm with normal S1/S2 without murmurs, rubs or gallops. Abdomen: Soft,mildly tender, non-distended with normal bowel sounds. Musculoskeletal:  No clubbing, cyanosis or edema bilaterally. Full range of motion without deformity. Skin: Skin color, texture, turgor normal.  No rashes or lesions. Neurologic:  Neurovascularly intact without any focal sensory/motor deficits. Cranial nerves: II-XII intact, grossly non-focal.  Psychiatric:  Alert and oriented, thought content appropriate, normal insight  Capillary Refill: Brisk,< 3 seconds   Peripheral Pulses: +2 palpable, equal bilaterally       Labs:     Recent Labs      07/27/18   1137   WBC  7.2   HGB  13.2   HCT  40.9   PLT  144     Recent Labs      07/27/18   1137   NA  141   K  4.6   CL  107   CO2  23   BUN  25*   CREATININE  1.4*   CALCIUM  9.5     Recent Labs      07/27/18   1137   AST  370*   ALT  132*   BILITOT  1.1*   ALKPHOS  334*     No results for input(s): INR in the last 72 hours. No results for input(s): Daphney Johnston in the last 72 hours. Urinalysis:      Lab Results   Component Value Date    NITRU Negative 07/27/2018    WBCUA 20-50 07/05/2016    BACTERIA 3+ 07/05/2016    RBCUA 0-2 07/05/2016    BLOODU Negative 07/27/2018    SPECGRAV 1.015 07/27/2018    GLUCOSEU Negative 07/27/2018    GLUCOSEU NEGATIVE 06/04/2012       Radiology:     CXR: I have reviewed the CXR with the following interpretation: na  EKG:  I have reviewed the EKG with the following interpretation: na    CT ABDOMEN PELVIS W IV CONTRAST Additional Contrast? Oral   Final Result   Moderate intra and extrahepatic biliary ductal dilation which is increased   from prior studies. Findings suspicious for choledocholithiasis in the   distal common duct. Consider MRI for confirmation if indicated. No evidence of bowel obstruction. Postsurgical changes of gastric bypass with moderate hiatal hernia.              ASSESSMENT:    Active Hospital Problems    Diagnosis Date Noted    Abdominal pain [R10.9] 07/27/2018

## 2018-07-28 LAB
ALBUMIN SERPL-MCNC: 3.8 G/DL (ref 3.4–5)
ALP BLD-CCNC: 276 U/L (ref 40–129)
ALT SERPL-CCNC: 118 U/L (ref 10–40)
ANION GAP SERPL CALCULATED.3IONS-SCNC: 9 MMOL/L (ref 3–16)
AST SERPL-CCNC: 215 U/L (ref 15–37)
BILIRUB SERPL-MCNC: 1.2 MG/DL (ref 0–1)
BILIRUBIN DIRECT: 0.4 MG/DL (ref 0–0.3)
BILIRUBIN, INDIRECT: 0.8 MG/DL (ref 0–1)
BUN BLDV-MCNC: 16 MG/DL (ref 7–20)
CALCIUM SERPL-MCNC: 9.2 MG/DL (ref 8.3–10.6)
CHLORIDE BLD-SCNC: 105 MMOL/L (ref 99–110)
CO2: 22 MMOL/L (ref 21–32)
CREAT SERPL-MCNC: 1.2 MG/DL (ref 0.6–1.1)
GFR AFRICAN AMERICAN: 55
GFR NON-AFRICAN AMERICAN: 46
GLUCOSE BLD-MCNC: 94 MG/DL (ref 70–99)
POTASSIUM REFLEX MAGNESIUM: 4.6 MMOL/L (ref 3.5–5.1)
SODIUM BLD-SCNC: 136 MMOL/L (ref 136–145)
TOTAL PROTEIN: 6.3 G/DL (ref 6.4–8.2)

## 2018-07-28 PROCEDURE — 1200000000 HC SEMI PRIVATE

## 2018-07-28 PROCEDURE — 6360000002 HC RX W HCPCS: Performed by: NURSE PRACTITIONER

## 2018-07-28 PROCEDURE — 6370000000 HC RX 637 (ALT 250 FOR IP): Performed by: INTERNAL MEDICINE

## 2018-07-28 PROCEDURE — 2580000003 HC RX 258: Performed by: INTERNAL MEDICINE

## 2018-07-28 PROCEDURE — 6360000002 HC RX W HCPCS: Performed by: INTERNAL MEDICINE

## 2018-07-28 PROCEDURE — 80076 HEPATIC FUNCTION PANEL: CPT

## 2018-07-28 PROCEDURE — 80048 BASIC METABOLIC PNL TOTAL CA: CPT

## 2018-07-28 PROCEDURE — 36415 COLL VENOUS BLD VENIPUNCTURE: CPT

## 2018-07-28 RX ADMIN — LAMOTRIGINE 150 MG: 100 TABLET ORAL at 11:07

## 2018-07-28 RX ADMIN — MONTELUKAST SODIUM 10 MG: 10 TABLET, FILM COATED ORAL at 21:12

## 2018-07-28 RX ADMIN — MORPHINE SULFATE 4 MG: 4 INJECTION INTRAVENOUS at 06:30

## 2018-07-28 RX ADMIN — Medication 10 ML: at 06:30

## 2018-07-28 RX ADMIN — LAMOTRIGINE 150 MG: 100 TABLET ORAL at 21:12

## 2018-07-28 RX ADMIN — Medication 10 ML: at 11:08

## 2018-07-28 RX ADMIN — ASPIRIN 81 MG: 81 TABLET, COATED ORAL at 21:12

## 2018-07-28 RX ADMIN — ALLOPURINOL 300 MG: 300 TABLET ORAL at 11:02

## 2018-07-28 RX ADMIN — CETIRIZINE HYDROCHLORIDE 10 MG: 10 TABLET, FILM COATED ORAL at 11:02

## 2018-07-28 RX ADMIN — ENOXAPARIN SODIUM 40 MG: 40 INJECTION, SOLUTION INTRAVENOUS; SUBCUTANEOUS at 11:02

## 2018-07-28 RX ADMIN — Medication 10 ML: at 21:13

## 2018-07-28 RX ADMIN — SIMVASTATIN 20 MG: 10 TABLET, FILM COATED ORAL at 21:12

## 2018-07-28 ASSESSMENT — PAIN SCALES - GENERAL: PAINLEVEL_OUTOF10: 8

## 2018-07-28 NOTE — PROGRESS NOTES
11/12/2012    MONOPCT 6.3 07/27/2018    MYELOPCT CANCELED 11/12/2012    EOSPCT 0.0 03/14/2012    BASOPCT 0.5 07/27/2018    MONOSABS 0.4 07/27/2018    LYMPHSABS 1.0 07/27/2018    EOSABS 0.0 07/27/2018    BASOSABS 0.0 07/27/2018    DIFFTYPE Auto 04/08/2013     CMP:    Lab Results   Component Value Date     07/28/2018    K 4.6 07/28/2018     07/28/2018    CO2 22 07/28/2018    BUN 16 07/28/2018    CREATININE 1.2 07/28/2018    GFRAA 55 07/28/2018    GFRAA >60 04/08/2013    AGRATIO 1.5 07/27/2018    LABGLOM 46 07/28/2018    LABGLOM 72 11/12/2012    GLUCOSE 94 07/28/2018    PROT 6.3 07/28/2018    PROT 6.6 05/18/2012    LABALBU 3.8 07/28/2018    CALCIUM 9.2 07/28/2018    BILITOT 1.2 07/28/2018    ALKPHOS 276 07/28/2018     07/28/2018     07/28/2018     CT: Moderate intra and extrahepatic biliary ductal dilation which is increased   from prior studies.  Findings suspicious for choledocholithiasis in the   distal common duct.  Consider MRI for confirmation if indicated.       No evidence of bowel obstruction.       Postsurgical changes of gastric bypass with moderate hiatal hernia. ASSESSMENT AND PLAN  61yo F s/p RYGB and CCY admitted with abdominal pain. CT demonstrated biliary dilatation. LFTs are elevated. CBD stone is suggested. An MRCP is needed to clarify the etiology of the obstructive biliary process. This will require [employer] manipulation. Perhaps this can take place on Monday once a representative of the [employer]  can be contacted. She has had this done before successfully. LFTs will be repeated tomorrow.   OK to advance diet

## 2018-07-28 NOTE — PLAN OF CARE
Problem: Safety:  Goal: Free from accidental physical injury  Free from accidental physical injury  Outcome: Ongoing  Pt remains free from accidental injury or fall. Pt ambulates self in room without difficulty. Will monitor.

## 2018-07-28 NOTE — PROGRESS NOTES
Pt arrived to floor via wheelchair. Pt alert and oriented, VSS. Admission assessment completed and documented. Pt oriented to room, belongings put away. Pt has slight pain/nausea, but states is tolerable. Pt denies any needs at this time. Bed locked and in lowest position, call light within reach. Will continue to monitor.

## 2018-07-28 NOTE — PROGRESS NOTES
Pt. Resting in bed. Alert/oriented. Vitals and assessment stable as charted. BP 90/60, HR 84; held metoprolol and lisinopril at this time. States abdominal pain has improved from this morning and is tolerable at 2-3/10. Denies nausea. Call light in reach. Will continue to monitor.

## 2018-07-28 NOTE — PROGRESS NOTES
normal.  No rashes or lesions. Neurologic:  Neurovascularly intact without any focal sensory/motor deficits. Cranial nerves: II-XII intact, grossly non-focal.  Psychiatric:  Alert and oriented, thought content appropriate, normal insight  Capillary Refill: Brisk,< 3 seconds   Peripheral Pulses: +2 palpable, equal bilaterally     Labs:   Recent Labs      07/27/18   1137   WBC  7.2   HGB  13.2   HCT  40.9   PLT  144     Recent Labs      07/27/18   1137  07/28/18   0618   NA  141  136   K  4.6  4.6   CL  107  105   CO2  23  22   BUN  25*  16   CREATININE  1.4*  1.2*   CALCIUM  9.5  9.2     Recent Labs      07/27/18   1137  07/28/18   0618   AST  370*  215*   ALT  132*  118*   BILIDIR   --   0.4*   BILITOT  1.1*  1.2*   ALKPHOS  334*  276*     No results for input(s): INR in the last 72 hours. No results for input(s): Estrada Burner in the last 72 hours. Urinalysis:    Lab Results   Component Value Date    NITRU Negative 07/27/2018    WBCUA 20-50 07/05/2016    BACTERIA 3+ 07/05/2016    RBCUA 0-2 07/05/2016    BLOODU Negative 07/27/2018    SPECGRAV 1.015 07/27/2018    GLUCOSEU Negative 07/27/2018    GLUCOSEU NEGATIVE 06/04/2012       Radiology:  CT ABDOMEN PELVIS W IV CONTRAST Additional Contrast? Oral   Final Result   Moderate intra and extrahepatic biliary ductal dilation which is increased   from prior studies. Findings suspicious for choledocholithiasis in the   distal common duct. Consider MRI for confirmation if indicated. No evidence of bowel obstruction. Postsurgical changes of gastric bypass with moderate hiatal hernia.                  Assessment/Plan:    Active Hospital Problems    Diagnosis Date Noted    Abdominal pain [R10.9] 07/27/2018     N/v/abd pain- due to choledocholithiasis  -clears  -GI consulted and felt not amenable to scope  -gen surg consulted, apprec recs     transaminitis- due to above  -repeat labs in am  -likely needs stone removed     Chronic systolic HF-

## 2018-07-28 NOTE — PLAN OF CARE
Problem: Pain:  Goal: Pain level will decrease  Pain level will decrease   Outcome: Ongoing  Pt verbalizes how to use 1-10 pain scale. PRN morphine available. Reassessment of pain after administration of pain medication. Will continue to monitor.

## 2018-07-29 LAB
ALBUMIN SERPL-MCNC: 3.2 G/DL (ref 3.4–5)
ALP BLD-CCNC: 216 U/L (ref 40–129)
ALT SERPL-CCNC: 74 U/L (ref 10–40)
AST SERPL-CCNC: 93 U/L (ref 15–37)
BILIRUB SERPL-MCNC: 0.8 MG/DL (ref 0–1)
BILIRUBIN DIRECT: 0.3 MG/DL (ref 0–0.3)
BILIRUBIN, INDIRECT: 0.5 MG/DL (ref 0–1)
TOTAL PROTEIN: 5.6 G/DL (ref 6.4–8.2)

## 2018-07-29 PROCEDURE — 1200000000 HC SEMI PRIVATE

## 2018-07-29 PROCEDURE — 6360000002 HC RX W HCPCS: Performed by: INTERNAL MEDICINE

## 2018-07-29 PROCEDURE — 36415 COLL VENOUS BLD VENIPUNCTURE: CPT

## 2018-07-29 PROCEDURE — 80076 HEPATIC FUNCTION PANEL: CPT

## 2018-07-29 PROCEDURE — 2580000003 HC RX 258: Performed by: INTERNAL MEDICINE

## 2018-07-29 PROCEDURE — 6370000000 HC RX 637 (ALT 250 FOR IP): Performed by: INTERNAL MEDICINE

## 2018-07-29 RX ADMIN — ENOXAPARIN SODIUM 40 MG: 40 INJECTION, SOLUTION INTRAVENOUS; SUBCUTANEOUS at 08:30

## 2018-07-29 RX ADMIN — LAMOTRIGINE 150 MG: 100 TABLET ORAL at 20:26

## 2018-07-29 RX ADMIN — MONTELUKAST SODIUM 10 MG: 10 TABLET, FILM COATED ORAL at 20:26

## 2018-07-29 RX ADMIN — CETIRIZINE HYDROCHLORIDE 10 MG: 10 TABLET, FILM COATED ORAL at 08:28

## 2018-07-29 RX ADMIN — METOPROLOL SUCCINATE 50 MG: 50 TABLET, EXTENDED RELEASE ORAL at 08:27

## 2018-07-29 RX ADMIN — ASPIRIN 81 MG: 81 TABLET, COATED ORAL at 16:57

## 2018-07-29 RX ADMIN — ALLOPURINOL 300 MG: 300 TABLET ORAL at 08:28

## 2018-07-29 RX ADMIN — SIMVASTATIN 20 MG: 10 TABLET, FILM COATED ORAL at 20:26

## 2018-07-29 RX ADMIN — LISINOPRIL 2.5 MG: 2.5 TABLET ORAL at 20:26

## 2018-07-29 RX ADMIN — Medication 10 ML: at 20:26

## 2018-07-29 RX ADMIN — Medication 10 ML: at 08:29

## 2018-07-29 RX ADMIN — LAMOTRIGINE 150 MG: 100 TABLET ORAL at 08:28

## 2018-07-29 ASSESSMENT — ENCOUNTER SYMPTOMS
RECTAL PAIN: 0
ABDOMINAL PAIN: 1
NAUSEA: 1
VOMITING: 1

## 2018-07-29 NOTE — PROGRESS NOTES
GI Progress Note      SUBJECTIVE:  Feels better. Pain has diminished. Denies nausea, emesis. Tolerating a full liquid diet.     OBJECTIVE      Medications    Current Facility-Administered Medications: allopurinol (ZYLOPRIM) tablet 300 mg, 300 mg, Oral, Daily  lamoTRIgine (LAMICTAL) tablet 150 mg, 150 mg, Oral, BID  levothyroxine (SYNTHROID) tablet 150 mcg, 150 mcg, Oral, See Admin Instructions  metoprolol succinate (TOPROL XL) extended release tablet 50 mg, 50 mg, Oral, Daily  montelukast (SINGULAIR) tablet 10 mg, 10 mg, Oral, Nightly  simvastatin (ZOCOR) tablet 20 mg, 20 mg, Oral, Nightly  aspirin EC tablet 81 mg, 81 mg, Oral, QPM  cetirizine (ZYRTEC) tablet 10 mg, 10 mg, Oral, Daily  lisinopril (PRINIVIL;ZESTRIL) tablet 2.5 mg, 2.5 mg, Oral, Daily  sodium chloride flush 0.9 % injection 10 mL, 10 mL, Intravenous, 2 times per day  sodium chloride flush 0.9 % injection 10 mL, 10 mL, Intravenous, PRN  magnesium hydroxide (MILK OF MAGNESIA) 400 MG/5ML suspension 30 mL, 30 mL, Oral, Daily PRN  ondansetron (ZOFRAN) injection 4 mg, 4 mg, Intravenous, Q6H PRN  enoxaparin (LOVENOX) injection 40 mg, 40 mg, Subcutaneous, Daily  morphine injection 2 mg, 2 mg, Intravenous, Q4H PRN **OR** morphine (PF) injection 4 mg, 4 mg, Intravenous, Q4H PRN  Physical    VITALS:  /73   Pulse 74   Temp 97.8 °F (36.6 °C) (Oral)   Resp 16   Ht 5' 3\" (1.6 m)   Wt 263 lb 12.8 oz (119.7 kg)   SpO2 99%   BMI 46.73 kg/m²   ABD: soft, NT, ND, NABs  Data    CBC:   Lab Results   Component Value Date    WBC 7.2 07/27/2018    RBC 4.30 07/27/2018    HGB 13.2 07/27/2018    HCT 40.9 07/27/2018    MCV 95.1 07/27/2018    MCH 30.7 07/27/2018    MCHC 32.3 07/27/2018    RDW 14.7 07/27/2018     07/27/2018    MPV 10.4 07/27/2018     CMP:    Lab Results   Component Value Date     07/28/2018    K 4.6 07/28/2018     07/28/2018    CO2 22 07/28/2018    BUN 16 07/28/2018    CREATININE 1.2 07/28/2018    GFRAA 55 07/28/2018    GFRAA >60 04/08/2013    AGRATIO 1.5 07/27/2018    LABGLOM 46 07/28/2018    LABGLOM 72 11/12/2012    GLUCOSE 94 07/28/2018    PROT 5.6 07/29/2018    PROT 6.6 05/18/2012    LABALBU 3.2 07/29/2018    CALCIUM 9.2 07/28/2018    BILITOT 0.8 07/29/2018    ALKPHOS 216 07/29/2018    AST 93 07/29/2018    ALT 74 07/29/2018       ASSESSMENT AND PLAN  61yo F s/p RYGB and CCY admitted with abdominal pain. CT demonstrated biliary dilatation. LFTs are elevated. CBD stone is suggested. An MRCP is needed to clarify the etiology of the obstructive biliary process. This will require [employer] manipulation. Perhaps this can take place on Monday once a representative of the [employer]  can be contacted. She has had this done before successfully. As sxs and LFTs have improved I suspect she may have passed a stone.   The diet will be advanced

## 2018-07-29 NOTE — PROGRESS NOTES
turgor normal.  No rashes or lesions. Neurologic:  Neurovascularly intact without any focal sensory/motor deficits. Cranial nerves: II-XII intact, grossly non-focal.  Psychiatric:  Alert and oriented, thought content appropriate, normal insight  Capillary Refill: Brisk,< 3 seconds   Peripheral Pulses: +2 palpable, equal bilaterally       Labs:   Recent Labs      07/27/18   1137   WBC  7.2   HGB  13.2   HCT  40.9   PLT  144     Recent Labs      07/27/18   1137  07/28/18   0618   NA  141  136   K  4.6  4.6   CL  107  105   CO2  23  22   BUN  25*  16   CREATININE  1.4*  1.2*   CALCIUM  9.5  9.2     Recent Labs      07/27/18   1137  07/28/18   0618  07/29/18   0558   AST  370*  215*  93*   ALT  132*  118*  74*   BILIDIR   --   0.4*  0.3   BILITOT  1.1*  1.2*  0.8   ALKPHOS  334*  276*  216*     No results for input(s): INR in the last 72 hours. No results for input(s): Ladena Quale in the last 72 hours. Urinalysis:    Lab Results   Component Value Date    NITRU Negative 07/27/2018    WBCUA 20-50 07/05/2016    BACTERIA 3+ 07/05/2016    RBCUA 0-2 07/05/2016    BLOODU Negative 07/27/2018    SPECGRAV 1.015 07/27/2018    GLUCOSEU Negative 07/27/2018    GLUCOSEU NEGATIVE 06/04/2012       Radiology:  CT ABDOMEN PELVIS W IV CONTRAST Additional Contrast? Oral   Final Result   Moderate intra and extrahepatic biliary ductal dilation which is increased   from prior studies. Findings suspicious for choledocholithiasis in the   distal common duct. Consider MRI for confirmation if indicated. No evidence of bowel obstruction. Postsurgical changes of gastric bypass with moderate hiatal hernia.                  Assessment/Plan:    Active Hospital Problems    Diagnosis Date Noted    Abdominal pain [R10.9] 07/27/2018     N/v/abd pain- due to choledocholithiasis  -clears  -GI consulted and felt not amenable to scope  -gen surg consulted, apprec recs   -MRCP ordered, [facility]    transaminitis- due to above  -repeat labs in am  -likely needs stone removed     Chronic systolic HF- compensated  -acei,bb continued  -torsemide  mwf(held for the weeknd)     htn- stable  Acei/bb continued     Hypothyroid- synthroid continued     Gout-stable, allopurinol continued  -Held mobic given ckd    DVT Prophylaxis: lovenox  Diet: DIET FULL LIQUID;  Code Status: Full Code    PT/OT Eval Status: not needed    Dispo - MRCP Monday planned(will need to contact [employer] aicd rep on Monday to come and place aicd on 'standby' while doing mrcp)    Gaby Johnson MD

## 2018-07-29 NOTE — PROGRESS NOTES
Pt. Resting in bed. Alert/oriented. Vitals and assessment stable as charted. Denies any pain/nausea or any other discomfort at present time. Still with decreased appetite, but tolerating full liquids so far. Call light in reach. Will continue to monitor.

## 2018-07-30 LAB
ALBUMIN SERPL-MCNC: 3.2 G/DL (ref 3.4–5)
ALP BLD-CCNC: 205 U/L (ref 40–129)
ALT SERPL-CCNC: 56 U/L (ref 10–40)
ANION GAP SERPL CALCULATED.3IONS-SCNC: 8 MMOL/L (ref 3–16)
AST SERPL-CCNC: 56 U/L (ref 15–37)
BILIRUB SERPL-MCNC: 0.5 MG/DL (ref 0–1)
BILIRUBIN DIRECT: <0.2 MG/DL (ref 0–0.3)
BILIRUBIN, INDIRECT: ABNORMAL MG/DL (ref 0–1)
BUN BLDV-MCNC: 13 MG/DL (ref 7–20)
CALCIUM SERPL-MCNC: 8.5 MG/DL (ref 8.3–10.6)
CHLORIDE BLD-SCNC: 108 MMOL/L (ref 99–110)
CO2: 24 MMOL/L (ref 21–32)
CREAT SERPL-MCNC: 1 MG/DL (ref 0.6–1.1)
GFR AFRICAN AMERICAN: >60
GFR NON-AFRICAN AMERICAN: 57
GLUCOSE BLD-MCNC: 89 MG/DL (ref 70–99)
PHOSPHORUS: 2.8 MG/DL (ref 2.5–4.9)
POTASSIUM SERPL-SCNC: 4.3 MMOL/L (ref 3.5–5.1)
SODIUM BLD-SCNC: 140 MMOL/L (ref 136–145)
TOTAL PROTEIN: 5.5 G/DL (ref 6.4–8.2)

## 2018-07-30 PROCEDURE — 94761 N-INVAS EAR/PLS OXIMETRY MLT: CPT

## 2018-07-30 PROCEDURE — 36415 COLL VENOUS BLD VENIPUNCTURE: CPT

## 2018-07-30 PROCEDURE — 6370000000 HC RX 637 (ALT 250 FOR IP): Performed by: INTERNAL MEDICINE

## 2018-07-30 PROCEDURE — 84100 ASSAY OF PHOSPHORUS: CPT

## 2018-07-30 PROCEDURE — 1200000000 HC SEMI PRIVATE

## 2018-07-30 PROCEDURE — 80048 BASIC METABOLIC PNL TOTAL CA: CPT

## 2018-07-30 PROCEDURE — 80076 HEPATIC FUNCTION PANEL: CPT

## 2018-07-30 PROCEDURE — 2580000003 HC RX 258: Performed by: INTERNAL MEDICINE

## 2018-07-30 RX ADMIN — MONTELUKAST SODIUM 10 MG: 10 TABLET, FILM COATED ORAL at 20:13

## 2018-07-30 RX ADMIN — LISINOPRIL 2.5 MG: 2.5 TABLET ORAL at 20:13

## 2018-07-30 RX ADMIN — Medication 10 ML: at 20:16

## 2018-07-30 RX ADMIN — LAMOTRIGINE 150 MG: 100 TABLET ORAL at 20:13

## 2018-07-30 RX ADMIN — SIMVASTATIN 20 MG: 10 TABLET, FILM COATED ORAL at 20:13

## 2018-07-30 RX ADMIN — METOPROLOL SUCCINATE 50 MG: 50 TABLET, EXTENDED RELEASE ORAL at 09:02

## 2018-07-30 RX ADMIN — Medication 10 ML: at 09:03

## 2018-07-30 RX ADMIN — CETIRIZINE HYDROCHLORIDE 10 MG: 10 TABLET, FILM COATED ORAL at 09:02

## 2018-07-30 RX ADMIN — LAMOTRIGINE 150 MG: 100 TABLET ORAL at 09:02

## 2018-07-30 RX ADMIN — ALLOPURINOL 300 MG: 300 TABLET ORAL at 09:02

## 2018-07-30 ASSESSMENT — PAIN SCALES - GENERAL: PAINLEVEL_OUTOF10: 0

## 2018-07-30 NOTE — PROGRESS NOTES
Rojelio Rivers is a 59 y.o. female patient.     Current Facility-Administered Medications   Medication Dose Route Frequency Provider Last Rate Last Dose    allopurinol (ZYLOPRIM) tablet 300 mg  300 mg Oral Daily Syed Tesfaye MD   300 mg at 07/30/18 0902    lamoTRIgine (LAMICTAL) tablet 150 mg  150 mg Oral BID Syed Tesfaye MD   150 mg at 07/30/18 4151    levothyroxine (SYNTHROID) tablet 150 mcg  150 mcg Oral See Admin Instructions Syed Tesfaye MD        metoprolol succinate (TOPROL XL) extended release tablet 50 mg  50 mg Oral Daily Syed Tesfaye MD   50 mg at 07/30/18 0902    montelukast (SINGULAIR) tablet 10 mg  10 mg Oral Nightly Syed Tesfaye MD   10 mg at 07/29/18 2026    simvastatin (ZOCOR) tablet 20 mg  20 mg Oral Nightly Syed Tesfaye MD   20 mg at 07/29/18 2026    aspirin EC tablet 81 mg  81 mg Oral QPM Syed Tesfaye MD   81 mg at 07/29/18 1657    cetirizine (ZYRTEC) tablet 10 mg  10 mg Oral Daily Syed Tesfaye MD   10 mg at 07/30/18 0902    lisinopril (PRINIVIL;ZESTRIL) tablet 2.5 mg  2.5 mg Oral Daily Syed Tesfaye MD   2.5 mg at 07/29/18 2026    sodium chloride flush 0.9 % injection 10 mL  10 mL Intravenous 2 times per day Syed Tesfaye MD   10 mL at 07/30/18 0903    sodium chloride flush 0.9 % injection 10 mL  10 mL Intravenous PRN Syed Tesfaye MD   10 mL at 07/28/18 0630    magnesium hydroxide (MILK OF MAGNESIA) 400 MG/5ML suspension 30 mL  30 mL Oral Daily PRN Syed Tesfaye MD        ondansetron [facility]) injection 4 mg  4 mg Intravenous Q6H PRN Syed Tesfaye MD        enoxaparin (LOVENOX) injection 40 mg  40 mg Subcutaneous Daily Syed Tesfaye MD   40 mg at 07/29/18 0830    morphine injection 2 mg  2 mg Intravenous Q4H PRN Dearl ALICIA Fajardo - CNP        Or    morphine (PF) injection 4 mg  4 mg Intravenous Q4H PRN DearALICIA Palomo CNP   4 mg at 07/28/18 0630     Allergies   Allergen Reactions    Dilaudid [Hydromorphone Hcl] Louann Collins MD       (O) [phone]    Red Canales MD  7/30/2018

## 2018-07-30 NOTE — PROGRESS NOTES
Called pt cardiologist x2 today to try to get forms filled out and sent back for MRCP. Per office, they didn't send paperwork over until 4:00. Contacted MRI and they attempted to get [employer] out to our facility, but were unable to get someone out today.   A rep will be by on 07/31/2018 to assist at 7:30 am.

## 2018-07-30 NOTE — CARE COORDINATION
FLORIDALMA Win met with OLEGARIO Goodwin who completed Pt assessment. Pt denies needs at this time. Pt to have MRCP today. Case management will sign off. Please consult for any d/c planning needs.

## 2018-07-30 NOTE — PROGRESS NOTES
Hospitalist Progress Note      PCP: Jayda Brannon MD    Date of Admission: 7/27/2018    Chief Complaint:  abd pain/n/v     Hospital Course:       Subjective:   Patient is up in bed, comfortable, not in distress. Less abdominal pain, tolerating by mouth diet well. No new event overnight noted. Medications:  Reviewed    Infusion Medications   Scheduled Medications    allopurinol  300 mg Oral Daily    lamoTRIgine  150 mg Oral BID    levothyroxine  150 mcg Oral See Admin Instructions    metoprolol succinate  50 mg Oral Daily    montelukast  10 mg Oral Nightly    simvastatin  20 mg Oral Nightly    aspirin  81 mg Oral QPM    cetirizine  10 mg Oral Daily    lisinopril  2.5 mg Oral Daily    sodium chloride flush  10 mL Intravenous 2 times per day    enoxaparin  40 mg Subcutaneous Daily     PRN Meds: sodium chloride flush, magnesium hydroxide, ondansetron, morphine **OR** morphine      Intake/Output Summary (Last 24 hours) at 07/30/18 1428  Last data filed at 07/30/18 0530   Gross per 24 hour   Intake             1080 ml   Output                0 ml   Net             1080 ml       Physical Exam Performed:    /65   Pulse 71   Temp 97.8 °F (36.6 °C) (Oral)   Resp 16   Ht 5' 3\" (1.6 m)   Wt 263 lb 12.8 oz (119.7 kg)   SpO2 100%   BMI 46.73 kg/m²     General appearance:  No apparent distress, appears stated age and cooperative. HEENT:  Normal cephalic, atraumatic without obvious deformity. Pupils equal, round, and reactive to light.  Extra ocular muscles intact. Conjunctivae/corneas clear. Neck: Supple, with full range of motion. No jugular venous distention. Trachea midline. Respiratory:  Normal respiratory effort. Clear to auscultation, bilaterally without Rales/Wheezes/Rhonchi. Cardiovascular:  Regular rate and rhythm with normal S1/S2 without murmurs, rubs or gallops. Abdomen: Soft, non tender, non-distended with normal bowel sounds.   Musculoskeletal:  No clubbing, cyanosis or edema bilaterally.  Full range of motion without deformity. Skin: Skin color, texture, turgor normal.  No rashes or lesions. Neurologic:  Neurovascularly intact without any focal sensory/motor deficits. Cranial nerves: II-XII intact, grossly non-focal.  Psychiatric:  Alert and oriented, thought content appropriate, normal insight  Capillary Refill: Brisk,< 3 seconds   Peripheral Pulses: +2 palpable, equal bilaterally       Labs:     Recent Labs      07/28/18 0618  07/30/18   0435   NA  136  140   K  4.6  4.3   CL  105  108   CO2  22  24   BUN  16  13   CREATININE  1.2*  1.0   CALCIUM  9.2  8.5   PHOS   --   2.8     Recent Labs      07/28/18   0618  07/29/18   0558  07/30/18   0435   AST  215*  93*  56*   ALT  118*  74*  56*   BILIDIR  0.4*  0.3  <0.2   BILITOT  1.2*  0.8  0.5   ALKPHOS  276*  216*  205*       Urinalysis:      Lab Results   Component Value Date    NITRU Negative 07/27/2018    WBCUA 20-50 07/05/2016    BACTERIA 3+ 07/05/2016    RBCUA 0-2 07/05/2016    BLOODU Negative 07/27/2018    SPECGRAV 1.015 07/27/2018    GLUCOSEU Negative 07/27/2018    GLUCOSEU NEGATIVE 06/04/2012       Radiology:  CT ABDOMEN PELVIS W IV CONTRAST Additional Contrast? Oral   Final Result   Moderate intra and extrahepatic biliary ductal dilation which is increased   from prior studies. Findings suspicious for choledocholithiasis in the   distal common duct. Consider MRI for confirmation if indicated. No evidence of bowel obstruction. Postsurgical changes of gastric bypass with moderate hiatal hernia.          MRI ABDOMEN WO CONTRAST MRCP    (Results Pending)           Assessment/Plan:    Active Hospital Problems    Diagnosis Date Noted    Abdominal pain [R10.9] 07/27/2018     N/v/abd pain- due to choledocholithiasis  -clears  -GI consulted and felt not amenable to scope  -gen surg consulted, apprec recs   -Plan for MRCP today,  contact [employer] rep on Monday to coordinate    transaminitis- due to above  -repeat labs in

## 2018-07-31 ENCOUNTER — APPOINTMENT (OUTPATIENT)
Dept: MRI IMAGING | Age: 58
DRG: 445 | End: 2018-07-31
Payer: MEDICARE

## 2018-07-31 VITALS
OXYGEN SATURATION: 97 % | BODY MASS INDEX: 46.74 KG/M2 | HEART RATE: 70 BPM | TEMPERATURE: 97.9 F | DIASTOLIC BLOOD PRESSURE: 63 MMHG | HEIGHT: 63 IN | RESPIRATION RATE: 16 BRPM | WEIGHT: 263.8 LBS | SYSTOLIC BLOOD PRESSURE: 113 MMHG

## 2018-07-31 LAB
ALBUMIN SERPL-MCNC: 3.8 G/DL (ref 3.4–5)
ALP BLD-CCNC: 213 U/L (ref 40–129)
ALT SERPL-CCNC: 53 U/L (ref 10–40)
AST SERPL-CCNC: 48 U/L (ref 15–37)
BILIRUB SERPL-MCNC: 0.6 MG/DL (ref 0–1)
BILIRUBIN DIRECT: <0.2 MG/DL (ref 0–0.3)
BILIRUBIN, INDIRECT: ABNORMAL MG/DL (ref 0–1)
TOTAL PROTEIN: 6.6 G/DL (ref 6.4–8.2)

## 2018-07-31 PROCEDURE — 74181 MRI ABDOMEN W/O CONTRAST: CPT

## 2018-07-31 PROCEDURE — 36415 COLL VENOUS BLD VENIPUNCTURE: CPT

## 2018-07-31 PROCEDURE — 6370000000 HC RX 637 (ALT 250 FOR IP): Performed by: INTERNAL MEDICINE

## 2018-07-31 PROCEDURE — 2580000003 HC RX 258: Performed by: INTERNAL MEDICINE

## 2018-07-31 PROCEDURE — 6360000002 HC RX W HCPCS: Performed by: INTERNAL MEDICINE

## 2018-07-31 PROCEDURE — 80076 HEPATIC FUNCTION PANEL: CPT

## 2018-07-31 RX ORDER — ACETAMINOPHEN 325 MG/1
650 TABLET ORAL EVERY 4 HOURS PRN
Status: DISCONTINUED | OUTPATIENT
Start: 2018-07-31 | End: 2018-07-31 | Stop reason: HOSPADM

## 2018-07-31 RX ADMIN — ALLOPURINOL 300 MG: 300 TABLET ORAL at 08:54

## 2018-07-31 RX ADMIN — ACETAMINOPHEN 650 MG: 325 TABLET, FILM COATED ORAL at 13:06

## 2018-07-31 RX ADMIN — METOPROLOL SUCCINATE 50 MG: 50 TABLET, EXTENDED RELEASE ORAL at 08:54

## 2018-07-31 RX ADMIN — ENOXAPARIN SODIUM 40 MG: 40 INJECTION, SOLUTION INTRAVENOUS; SUBCUTANEOUS at 08:57

## 2018-07-31 RX ADMIN — LAMOTRIGINE 150 MG: 100 TABLET ORAL at 08:54

## 2018-07-31 RX ADMIN — Medication 10 ML: at 08:55

## 2018-07-31 RX ADMIN — CETIRIZINE HYDROCHLORIDE 10 MG: 10 TABLET, FILM COATED ORAL at 08:54

## 2018-07-31 ASSESSMENT — PAIN SCALES - GENERAL: PAINLEVEL_OUTOF10: 7

## 2018-07-31 NOTE — PROGRESS NOTES
Pt left room to go to MRI. This writer leaving with pt to go to MRI to monitor heart monitor after defibrillator is placed in safe mode by [employer].

## 2018-07-31 NOTE — PLAN OF CARE
Problem: Safety:  Goal: Free from accidental physical injury  Free from accidental physical injury   Outcome: Ongoing  Bed locked and in lowest position, call light within reach, room free from clutter, non-skid socks in place. Will continue to monitor.

## 2018-07-31 NOTE — PROGRESS NOTES
Pt returned to room from MRI. This writer went to MRI with pt while [employer] placed defibrillator in safe mode. Monitored pt on heart monitor throughout MRI. Defibrillator turned back on by [employer] before leaving MRI.

## 2018-07-31 NOTE — PROGRESS NOTES
Patricia Castro is a 59 y.o. female patient.     Current Facility-Administered Medications   Medication Dose Route Frequency Provider Last Rate Last Dose    allopurinol (ZYLOPRIM) tablet 300 mg  300 mg Oral Daily Ayleen Pond MD   300 mg at 07/31/18 0854    lamoTRIgine (LAMICTAL) tablet 150 mg  150 mg Oral BID Ayleen Pond MD   150 mg at 07/31/18 9372    levothyroxine (SYNTHROID) tablet 150 mcg  150 mcg Oral See Admin Instructions Ayleen Pond MD        metoprolol succinate (TOPROL XL) extended release tablet 50 mg  50 mg Oral Daily Ayleen Pond MD   50 mg at 07/31/18 0854    montelukast (SINGULAIR) tablet 10 mg  10 mg Oral Nightly Ayleen Pond MD   10 mg at 07/30/18 2013    simvastatin (ZOCOR) tablet 20 mg  20 mg Oral Nightly Ayleen Pond MD   20 mg at 07/30/18 2013    aspirin EC tablet 81 mg  81 mg Oral QPM Ayleen Pond MD   81 mg at 07/29/18 1657    cetirizine (ZYRTEC) tablet 10 mg  10 mg Oral Daily Ayleen Pond MD   10 mg at 07/31/18 0854    lisinopril (PRINIVIL;ZESTRIL) tablet 2.5 mg  2.5 mg Oral Daily Ayleen Pond MD   2.5 mg at 07/30/18 2013    sodium chloride flush 0.9 % injection 10 mL  10 mL Intravenous 2 times per day Ayleen Pond MD   10 mL at 07/31/18 0855    sodium chloride flush 0.9 % injection 10 mL  10 mL Intravenous PRN Ayleen Pond MD   10 mL at 07/28/18 0630    magnesium hydroxide (MILK OF MAGNESIA) 400 MG/5ML suspension 30 mL  30 mL Oral Daily PRN Ayleen Pond MD        ondansetron [facility]) injection 4 mg  4 mg Intravenous Q6H PRN Ayleen Pond MD        enoxaparin (LOVENOX) injection 40 mg  40 mg Subcutaneous Daily Ayleen Pond MD   40 mg at 07/31/18 0857    morphine injection 2 mg  2 mg Intravenous Q4H PRN Kendell Clamp, APRN - CNP        Or    morphine (PF) injection 4 mg  4 mg Intravenous Q4H PRN Kendell Clamp, APRN - CNP   4 mg at 07/28/18 0630     Allergies   Allergen Reactions    Dilaudid [Hydromorphone Hcl] MD       (O) [phone]    Aicha Hou MD  7/31/2018

## 2018-07-31 NOTE — PROGRESS NOTES
cyanosis or edema bilaterally.  Full range of motion without deformity. Skin: Skin color, texture, turgor normal.  No rashes or lesions. Neurologic:  Neurovascularly intact without any focal sensory/motor deficits. Cranial nerves: II-XII intact, grossly non-focal.  Psychiatric:  Alert and oriented, thought content appropriate, normal insight  Capillary Refill: Brisk,< 3 seconds   Peripheral Pulses: +2 palpable, equal bilaterally       Labs:     Recent Labs      07/30/18   0435   NA  140   K  4.3   CL  108   CO2  24   BUN  13   CREATININE  1.0   CALCIUM  8.5   PHOS  2.8     Recent Labs      07/29/18   0558  07/30/18   0435  07/31/18   0920   AST  93*  56*  48*   ALT  74*  56*  53*   BILIDIR  0.3  <0.2  <0.2   BILITOT  0.8  0.5  0.6   ALKPHOS  216*  205*  213*       Urinalysis:      Lab Results   Component Value Date    NITRU Negative 07/27/2018    WBCUA 20-50 07/05/2016    BACTERIA 3+ 07/05/2016    RBCUA 0-2 07/05/2016    BLOODU Negative 07/27/2018    SPECGRAV 1.015 07/27/2018    GLUCOSEU Negative 07/27/2018    GLUCOSEU NEGATIVE 06/04/2012       Radiology:  MRI ABDOMEN WO CONTRAST MRCP   Final Result   Abnormal biliary ductal dilatation, secondary to at least 3 stones in the   distal common duct. The largest stone measures approximately 5 mm. CT ABDOMEN PELVIS W IV CONTRAST Additional Contrast? Oral   Final Result   Moderate intra and extrahepatic biliary ductal dilation which is increased   from prior studies. Findings suspicious for choledocholithiasis in the   distal common duct. Consider MRI for confirmation if indicated. No evidence of bowel obstruction. Postsurgical changes of gastric bypass with moderate hiatal hernia.                  Assessment/Plan:    Active Hospital Problems    Diagnosis Date Noted    Abdominal pain [R10.9] 07/27/2018     N/v/abd pain- due to choledocholithiasis  -clears  -GI consulted and felt not amenable to scope  -gen surg consulted, apprec recs   -s/p MRCP

## 2018-07-31 NOTE — DISCHARGE SUMMARY
non-tender, non-distended with normal bowel sounds. Musculoskeletal:  No clubbing, cyanosis or edema bilaterally. Full range of motion without deformity. Skin: Skin color, texture, turgor normal.  No rashes or lesions. Neurologic:  Neurovascularly intact without any focal sensory/motor deficits. Cranial nerves: II-XII intact, grossly non-focal.  Psychiatric:  Alert and oriented, thought content appropriate, normal insight  Capillary Refill: Brisk,< 3 seconds   Peripheral Pulses: +2 palpable, equal bilaterally       Labs: For convenience and continuity at follow-up the following most recent labs are provided:      CBC:    Lab Results   Component Value Date    WBC 7.2 07/27/2018    HGB 13.2 07/27/2018    HCT 40.9 07/27/2018     07/27/2018       Renal:    Lab Results   Component Value Date     07/30/2018    K 4.3 07/30/2018    K 4.6 07/28/2018     07/30/2018    CO2 24 07/30/2018    BUN 13 07/30/2018    CREATININE 1.0 07/30/2018    CALCIUM 8.5 07/30/2018    PHOS 2.8 07/30/2018         Significant Diagnostic Studies    Radiology:   MRI ABDOMEN WO CONTRAST MRCP   Final Result   Abnormal biliary ductal dilatation, secondary to at least 3 stones in the   distal common duct. The largest stone measures approximately 5 mm. CT ABDOMEN PELVIS W IV CONTRAST Additional Contrast? Oral   Final Result   Moderate intra and extrahepatic biliary ductal dilation which is increased   from prior studies. Findings suspicious for choledocholithiasis in the   distal common duct. Consider MRI for confirmation if indicated. No evidence of bowel obstruction. Postsurgical changes of gastric bypass with moderate hiatal hernia.                 Consults:     IP CONSULT TO GI  IP CONSULT TO HOSPITALIST  IP CONSULT TO GENERAL SURGERY  IP CONSULT TO GENERAL SURGERY    Disposition:  Home     Condition at Discharge: Stable    Discharge Instructions/Follow-up:  Gen Surgery as directed    Code Status:  Full Code Activity: activity as tolerated    Diet: low fat, low cholesterol diet      Discharge Medications:     Current Discharge Medication List           Details   acetaminophen (TYLENOL) 325 MG tablet Take 650 mg by mouth every 6 hours as needed for Pain      lamoTRIgine (LAMICTAL) 150 MG tablet Take 1 tablet by mouth 2 times daily (or as otherwise directed). Qty: 180 tablet, Refills: 1    Associated Diagnoses: Bipolar disorder, in partial remission, most recent episode mixed (HCC)      meloxicam (MOBIC) 15 MG tablet TAKE 1 TABLET BY MOUTH DAILY (WITH FOOD) AS NEEDED FOR PAIN.   Qty: 90 tablet, Refills: 1      simvastatin (ZOCOR) 20 MG tablet TAKE 1 TABLET BY MOUTH NIGHTLY  Qty: 90 tablet, Refills: 1      levothyroxine (SYNTHROID) 150 MCG tablet TAKE 1 TABLET EVERY DAY EXCEPT FOR SUNDAY WHEN YOU ARE TO TAKE 2 TABLETS AS DIRECTED FOR A TOTAL OF 8 TABLETS/WEEK  Qty: 103 tablet, Refills: 1    Associated Diagnoses: Hypothyroidism, unspecified type      montelukast (SINGULAIR) 10 MG tablet TAKE 1 TABLET EVERY NIGHT  Qty: 90 tablet, Refills: 1      lisinopril (PRINIVIL;ZESTRIL) 2.5 MG tablet TAKE 1 TABLET EVERY DAY  Qty: 90 tablet, Refills: 1      torsemide (DEMADEX) 20 MG tablet Take 1 tablet by mouth every other day Plus an additional dose as needed for weight gain, swelling or chest discomfort  Qty: 90 tablet, Refills: 3      allopurinol (ZYLOPRIM) 300 MG tablet TAKE 1 TABLET EVERY DAY  Qty: 90 tablet, Refills: 3    Associated Diagnoses: Chronic gout without tophus, unspecified cause, unspecified site      metoprolol succinate (TOPROL XL) 50 MG extended release tablet Take 1 tablet by mouth daily  Qty: 90 tablet, Refills: 3      aspirin 81 MG EC tablet Take 1 tablet by mouth daily  Qty: 30 tablet, Refills: 3      vitamin B-12 (CYANOCOBALAMIN) 1000 MCG tablet Take 1,000 mcg by mouth daily      Multiple Vitamins-Minerals (MULTIVITAMIN & MINERAL PO) Take by mouth      cetirizine (ZYRTEC) 10 MG tablet Take 10 mg by mouth daily             Time Spent on discharge is more than 30 minutes in the examination, evaluation, counseling and review of medications and discharge plan. Signed:    Katlin Walls MD   7/31/2018      Thank you Anjel Browning MD for the opportunity to be involved in this patient's care. If you have any questions or concerns please feel free to contact me at [phone].

## 2018-08-01 ENCOUNTER — CARE COORDINATION (OUTPATIENT)
Dept: CASE MANAGEMENT | Age: 58
End: 2018-08-01

## 2018-08-01 NOTE — CARE COORDINATION
[facility] Transitions Initial Follow Up Call    Call within 2 business days of discharge: Yes    Patient: Rojelio Rivers Patient : 1960   MRN: [id number]  Reason for Admission: Choledocholithiasis  Discharge Date: 18 RARS: Readmission Risk Score: 9/CMRS 5     Spoke with: [address]: [place]     Non-face-to-face services provided:  Obtained and reviewed discharge summary and/or continuity of care documents    Care Transitions 24 Hour Call    Do you have any ongoing symptoms?:  No  Do you have a copy of your discharge instructions?:  Yes  Do you have all of your prescriptions and are they filled?:  Yes  Have you been contacted by a [address]?:  No  Have you scheduled your follow up appointment?:  No  Were you discharged with any Home Care or Post Acute Services:  No  Post Acute Services:  Home Health (Comment: [facility])  Patient DME:  Other, Shower chair, Walker, Chair lift  Other Patient DME:  handicap shower, reacher, soap puller, GB in bathroom, ramp   Do you have support at home?:  Partner/Spouse/SO, Roommate/Housemate  Do you feel like you have everything you need to keep you well at home?:  Yes  Are you an active caregiver in your home?:  Yes  Care Transitions Interventions       Spoke with patient who reports she is doing ok. Patient denied any N/V/D or pain. Patient stated she is waiting for Dr. Renae Starr office to contact her to schedule surgery. Patient stated she will discuss with Dr. Shiv Espinal about seeing if Dr. Yanni Hanson would be able to do surgery since he has done a previous surgery on patient and per patient understands her unique anatomy. Patient denied any changes with her medications and denied any further needs at this time. Reminded patient that if they have any questions/concerns at anytime, they can always utilize CTC or call PCP/specialist as they have an MD on call .          Follow Up  Future Appointments  Date Time Provider Sarah Najera

## 2018-08-02 ENCOUNTER — TELEPHONE (OUTPATIENT)
Dept: FAMILY MEDICINE CLINIC | Age: 58
End: 2018-08-02

## 2018-08-02 ENCOUNTER — TELEPHONE (OUTPATIENT)
Dept: SURGERY | Age: 58
End: 2018-08-02

## 2018-08-02 DIAGNOSIS — K80.50 CHOLEDOCHOLITHIASIS: Primary | ICD-10-CM

## 2018-08-02 NOTE — TELEPHONE ENCOUNTER
VALE  --  To add to previous encounter. Patient was seen on 7-27-18 here for an office visit. She was taken over to the ER. She will now be seeing Dr. Yvette Jackson to discuss possibly removing or put a plan in place for stones that are located in the distal duct. The referral has been started for her to see Dr. Shirley Max and sent to [facility].

## 2018-08-03 ENCOUNTER — CARE COORDINATION (OUTPATIENT)
Dept: CASE MANAGEMENT | Age: 58
End: 2018-08-03

## 2018-08-06 NOTE — CARE COORDINATION
Erma [facility] Transitions Follow Up Call    2018    Patient: Karime Franklin  Patient : 1960   MRN: [id number]  Reason for Admission: Choledocholithiasis   Discharge Date: 18 RARS: Readmission Risk Score: 9/CMRS 5       2nd attempt made to reach patient for transition call, no answer. VM left stating purpose of call along with my contact information requesting a return call.               Follow Up  Future Appointments  Date Time Provider Sarah Najera   2018 11:00 AM Rekha Ward MD FF G&L SURG [facility]   2018 8:00 AM [facility] PHONE TRANSMISSION Lissa BUTLER   12/3/2018 12:30 PM ALICIA Barrera - CNP Lissa [facility]       Kojo Newton RN

## 2018-08-07 NOTE — CARE COORDINATION
Erma [facility] Transitions Follow Up Call    2018    Patient: Nasir Russo  Patient : 1960   MRN: [id number]  Reason for Admission: Choledochlithiasis  Discharge Date: 18 RARS: Readmission Risk Score: 9       Spoke with: patient [facility] Transitions Subsequent and Final Call    Subsequent and Final Calls  Do you have any ongoing symptoms?:  No  Have your medications changed?:  No  Do you have any questions related to your medications?:  No  Do you currently have any active services?:  No  Are you currently active with any services?:  Home Health  Do you have any needs or concerns that I can assist you with?:  No  Identified Barriers:  None  Care Transitions Interventions  Other Interventions:        Patient reports she is doing really good, has not had any attacks. Has been eating and tolerating food without problems, having regular BM's. Has appt with surgeon at [facility] next week, stated this is the only surgeon that would even \"touch her case\" so she is very hopeful. Denies any needs and stated she didn't feel any further calls were necessary, stated any questions she had would be for the surgeon, stated she did appreciate the f/u. Reminded patient that if they have any questions/concerns at anytime to call PCP/specialist as they have an MD on call .   No further CTC outreach will be made per patient request.      Follow Up  Future Appointments  Date Time Provider Sarah Najera   2018 11:00 AM Volodymyr Xiao MD FF G&L SURG [facility]   2018 8:00 AM [facility] PHONE TRANSMISSION Nilson BUTLER   12/3/2018 12:30 PM ALICIA Spear - SANTO Russo RN

## 2018-08-10 NOTE — TELEPHONE ENCOUNTER
Insurance referral required.   Lorna Avery Auth # [id number]  Valid 8/10/18 - 8/5/19    Scanned to chart  Copy mailed to patient

## 2018-08-16 ENCOUNTER — OFFICE VISIT (OUTPATIENT)
Dept: SURGERY | Age: 58
End: 2018-08-16

## 2018-08-16 VITALS
SYSTOLIC BLOOD PRESSURE: 124 MMHG | DIASTOLIC BLOOD PRESSURE: 72 MMHG | WEIGHT: 263 LBS | HEIGHT: 63 IN | BODY MASS INDEX: 46.6 KG/M2

## 2018-08-16 DIAGNOSIS — K80.51 CALCULUS OF BILE DUCT WITHOUT CHOLECYSTITIS WITH OBSTRUCTION: Primary | ICD-10-CM

## 2018-08-16 PROCEDURE — 99213 OFFICE O/P EST LOW 20 MIN: CPT | Performed by: SURGERY

## 2018-08-16 PROCEDURE — G8428 CUR MEDS NOT DOCUMENT: HCPCS | Performed by: SURGERY

## 2018-08-16 PROCEDURE — G8417 CALC BMI ABV UP PARAM F/U: HCPCS | Performed by: SURGERY

## 2018-08-16 PROCEDURE — 3017F COLORECTAL CA SCREEN DOC REV: CPT | Performed by: SURGERY

## 2018-08-16 ASSESSMENT — ENCOUNTER SYMPTOMS
VOMITING: 1
NAUSEA: 1
RESPIRATORY NEGATIVE: 1
ABDOMINAL DISTENTION: 1
DIARRHEA: 1
ABDOMINAL PAIN: 1
EYES NEGATIVE: 1

## 2018-08-16 NOTE — PROGRESS NOTES
Allergies:  Dilaudid [hydromorphone hcl]; Penicillins; Seroquel [quetiapine fumarate]; Adhesive tape; Hydromorphone hcl; Lithium; Tetanus toxoids; and Yellow dyes (non-tartrazine)    Social History:    reports that she quit smoking about 33 years ago. She has a 6.00 pack-year smoking history. She has never used smokeless tobacco. She reports that she does not drink alcohol or use drugs. Family History:    Family History   Problem Relation Age of Onset   [facility] Cancer Mother     Ovarian Cancer Mother 45        Technically peritoneal cancer    Depression Mother         bipolar    Arthritis Father     Rheum Arthritis Father     Arrhythmia Father     Other Father         gout    Heart Disease Father     Depression Brother         depression    Other Brother         gout    Obesity Brother     Other Brother         gout    Obesity Brother     Depression Brother     Cancer Maternal Grandmother         Breast       REVIEW OF SYSTEMS:  Review of Systems   Constitutional: Positive for activity change, appetite change, chills and fatigue. HENT: Negative. Eyes: Negative. Respiratory: Negative. Cardiovascular: Positive for palpitations. Gastrointestinal: Positive for abdominal distention, abdominal pain, diarrhea, nausea and vomiting. Endocrine: Negative. Genitourinary: Negative. Musculoskeletal: Positive for joint swelling. Skin: Negative. Allergic/Immunologic: Positive for food allergies. Neurological: Positive for light-headedness and headaches. Hematological: Negative. Psychiatric/Behavioral: Positive for agitation, decreased concentration, sleep disturbance and suicidal ideas. The patient is nervous/anxious.         PHYSICAL EXAM:  VITALS:  /72   Ht 5' 3\" (1.6 m)   Wt 263 lb (119.3 kg)   BMI 46.59 kg/m²   CONSTITUTIONAL:  alert, no apparent distress and morbidly obese  EYES:  sclera clear  ENT:  normocepalic, without obvious abnormality  NECK:  supple, symmetrical, trachea midline and no carotid bruits  LUNGS:  clear to auscultation  CARDIOVASCULAR:  regular rate and rhythm and no murmur noted  ABDOMEN:  scars noted are healed, obese, normal bowel sounds, soft, non-distended, tenderness noted in the right upper quadrant mildly, voluntary guarding absent, no masses palpated, no hepatosplenomegally and hernia absent  MUSCULOSKELETAL:  0+ pitting edema lower extremities  NEUROLOGIC:  Mental Status Exam:  Level of Alertness:   awake  Orientation:   person, place, time  SKIN:  no bruising or bleeding, normal skin color, texture, turgor, no redness, warmth, or swelling and no jaundice       Ref.  Range 7/30/2018 04:35 7/31/2018 09:20   Sodium Latest Ref Range: 136 - 145 mmol/L 140    Potassium Latest Ref Range: 3.5 - 5.1 mmol/L 4.3    Chloride Latest Ref Range: 99 - 110 mmol/L 108    CO2 Latest Ref Range: 21 - 32 mmol/L 24    BUN Latest Ref Range: 7 - 20 mg/dL 13    Creatinine Latest Ref Range: 0.6 - 1.1 mg/dL 1.0    Anion Gap Latest Ref Range: 3 - 16  8    GFR Non- Latest Ref Range: >60  57 (A)    GFR  Latest Ref Range: >60  >60    Glucose Latest Ref Range: 70 - 99 mg/dL 89    Calcium Latest Ref Range: 8.3 - 10.6 mg/dL 8.5    Phosphorus Latest Ref Range: 2.5 - 4.9 mg/dL 2.8    Total Protein Latest Ref Range: 6.4 - 8.2 g/dL 5.5 (L) 6.6   Albumin Latest Ref Range: 3.4 - 5.0 g/dL 3.2 (L) 3.8   Alk Phos Latest Ref Range: 40 - 129 U/L 205 (H) 213 (H)   ALT Latest Ref Range: 10 - 40 U/L 56 (H) 53 (H)   AST Latest Ref Range: 15 - 37 U/L 56 (H) 48 (H)   Bilirubin Latest Ref Range: 0.0 - 1.0 mg/dL 0.5 0.6   Bilirubin, Direct Latest Ref Range: 0.0 - 0.3 mg/dL <0.2 <0.2   Bilirubin, Indirect Latest Ref Range: 0.0 - 1.0 mg/dL see below see below       Radiology Review:  EXAMINATION:   CT OF THE ABDOMEN AND PELVIS WITH CONTRAST 7/27/2018 1:41 pm       TECHNIQUE:   CT of the abdomen and pelvis was performed with the administration of   intravenous contrast. the   administration of intravenous contrast.  After initial T2 axial and coronal   images, thick slab, thin slab and 3D coronal MRCP sequences were obtained   without the administration of intravenous contrast.  MIP images are provided   for review.       COMPARISON:   None       HISTORY:   ORDERING SYSTEM PROVIDED HISTORY:   TECHNOLOGIST PROVIDED HISTORY:   Has AICD   Ordering Physician Provided Reason for Exam: Has AICD, common bile duct   Acuity: Acute   Type of Exam: Initial       FINDINGS:   Gallbladder: Gallbladder is surgically absent.       Bile Ducts: There is both intra and extrahepatic biliary ductal dilatation. Extrahepatic common duct measures 1.2 cm.  There are at least 3 Small filling   defects are seen in the distal common duct.  Largest stone measures   approximately 5 mm       Pancreatic Duct: No peripancreatic fluid       Other:  No hydronephrosis on right or left.       Hiatal hernia is seen       Moderate stool load is seen. Gregg Orlando is diverticulosis. Gregg Orlando is   susceptibility artifact in the anterior abdominal wall from prior surgery           Impression   Abnormal biliary ductal dilatation, secondary to at least 3 stones in the   distal common duct.  The largest stone measures approximately 5 mm.           IMPRESSION/RECOMMENDATIONS:    Choledocholithiasis  S/P GBP, Lap, roland, prior obstruction, and hernia repair    Needs CBD stones removed  DW pt, options, alternatives, R/B. Will plan lap gastrotomy, intraop ERCP, possible open, possible CBDE if ERCP technically unable to be completed  Schedule at [facility] in conjunction with Dr. Vipin Mills    The problem and plan were discussed in detail with the patient today. All questions have been answered, and they agree to proceed.     Neida Flannery

## 2018-08-29 ENCOUNTER — TELEPHONE (OUTPATIENT)
Dept: SURGERY | Age: 58
End: 2018-08-29

## 2018-08-30 ENCOUNTER — TELEPHONE (OUTPATIENT)
Dept: CARDIOLOGY CLINIC | Age: 58
End: 2018-08-30

## 2018-08-30 ENCOUNTER — OFFICE VISIT (OUTPATIENT)
Dept: FAMILY MEDICINE CLINIC | Age: 58
End: 2018-08-30

## 2018-08-30 VITALS
BODY MASS INDEX: 46.71 KG/M2 | SYSTOLIC BLOOD PRESSURE: 130 MMHG | RESPIRATION RATE: 14 BRPM | HEIGHT: 63 IN | TEMPERATURE: 97.6 F | OXYGEN SATURATION: 99 % | WEIGHT: 263.6 LBS | DIASTOLIC BLOOD PRESSURE: 81 MMHG | HEART RATE: 68 BPM

## 2018-08-30 DIAGNOSIS — Z01.818 PREOP EXAMINATION: Primary | ICD-10-CM

## 2018-08-30 DIAGNOSIS — K80.50 CHOLEDOCHOLITHIASIS: ICD-10-CM

## 2018-08-30 PROCEDURE — 1036F TOBACCO NON-USER: CPT | Performed by: PHYSICIAN ASSISTANT

## 2018-08-30 PROCEDURE — G8417 CALC BMI ABV UP PARAM F/U: HCPCS | Performed by: PHYSICIAN ASSISTANT

## 2018-08-30 PROCEDURE — 1111F DSCHRG MED/CURRENT MED MERGE: CPT | Performed by: PHYSICIAN ASSISTANT

## 2018-08-30 PROCEDURE — 3017F COLORECTAL CA SCREEN DOC REV: CPT | Performed by: PHYSICIAN ASSISTANT

## 2018-08-30 PROCEDURE — G8598 ASA/ANTIPLAT THER USED: HCPCS | Performed by: PHYSICIAN ASSISTANT

## 2018-08-30 PROCEDURE — 99213 OFFICE O/P EST LOW 20 MIN: CPT | Performed by: PHYSICIAN ASSISTANT

## 2018-08-30 PROCEDURE — G8427 DOCREV CUR MEDS BY ELIG CLIN: HCPCS | Performed by: PHYSICIAN ASSISTANT

## 2018-08-30 NOTE — PROGRESS NOTES
Pulse: 68   Resp: 14   Temp: 97.6 °F (36.4 °C)   TempSrc: Oral   SpO2: 99%   Weight: 263 lb 9.6 oz (119.6 kg)   Height: 5' 3\" (1.6 m)   Body mass index is 46.69 kg/m². Constitutional:  Awake, alert, cooperative, no apparent distress. Morbid obesity  Eyes:  Lids and lashes normal, pupils equal, round and reactive to light, extra ocular muscles intact, sclera clear, conjunctiva normal  Head/ENT:  Normocephalic, without obvious abnormality, atramatic, sinuses nontender on palpation, external ears without lesions, oral pharynx with moist mucus membranes, tonsils without erythema or exudates, gums normal and good dentition. Neck:  Supple, symmetrical, trachea midline, no adenopathy, thyroid symmetric, not enlarged and no tenderness, skin normal, No carotid bruit  Heart:  PACE MAKER. Normal apical impulse, regular rate and rhythm, normal S1 and S2, no S3 or S4, and no murmur noted  Lungs:  No increased work of breathing, good air exchange, clear to auscultation bilaterally, no crackles or wheezing  Abdomen:  Scars, normal bowel sounds, soft, non-distended, RUQtender, no masses palpated, no hepatosplenomegally  Extremities:  No clubbing, cyanosis, or edema  Neurologic:  Awake, alert, oriented to name, place and time. Cranial nerves II-XII are grossly intact. Motor is 5 out of 5 bilaterally. ADDITIONAL DATA:  LABWORK:  No orders of the defined types were placed in this encounter. ASSESSMENT and PLAN:    Pre-Operative Medical Clearance Examination for lap gastrotomy, intraop ERCP, possible open, possible CBDE if ERCP technically unable to be completed      Cony Gutiérrez 1960 59 y.o. female is medically satisfactory for surgery. Is a high risk patient due to cardiac issues. She requires a cardiology evaluation prior to procedure. Cony Gutiérrez was advised to STOP all  NSAID medications including, but not limited to, aspirin, ibuprofen, and naprosyn 7-10 days prior to procedure.           Provider: Benjamín Mims, [address]   Encounter Date: 08/30/18 10:50 AM

## 2018-08-30 NOTE — TELEPHONE ENCOUNTER
Spoke with Balwinder Hopkins, relayed cardiac clearance message. Faxed clearance letter as requested.

## 2018-09-07 PROBLEM — K80.50 CHOLEDOCHOLITHIASIS: Status: ACTIVE | Noted: 2018-09-07

## 2018-09-10 ENCOUNTER — CARE COORDINATION (OUTPATIENT)
Dept: CASE MANAGEMENT | Age: 58
End: 2018-09-10

## 2018-09-10 DIAGNOSIS — K80.50 CHOLEDOCHOLITHIASIS: Primary | ICD-10-CM

## 2018-09-19 ENCOUNTER — CARE COORDINATION (OUTPATIENT)
Dept: CASE MANAGEMENT | Age: 58
End: 2018-09-19

## 2018-09-19 NOTE — CARE COORDINATION
Erma [facility] Transitions Follow Up Call    2018    Patient: Nasir Russo  Patient : 1960   MRN: [id number]  Reason for Admission: Laparoscopic lysis of adhesions, gastrotomy, intraoperative ERCP through native stomach, gastrotomy closure, clearance of choledocholithiasis    Discharge Date: 18 RARS: Readmission Risk Score: 10   CMRS: 9     CTC attempted Care transition follow up call, no answer,  left message on  Voicemail  with reason for call and contact information.        Follow Up  Future Appointments  Date Time Provider Sarah Najera   2018 11:00 AM Christa Mike MD FF G&L SURG [facility]   2018 8:00 AM [facility] PHONE TRANSMISSION Nilson Pereira [facility]   12/3/2018 12:30 PM ALICIA Spear - SANTO Pereira [facility]       Asha Flores RN

## 2018-09-20 ENCOUNTER — OFFICE VISIT (OUTPATIENT)
Dept: SURGERY | Age: 58
End: 2018-09-20

## 2018-09-20 VITALS — SYSTOLIC BLOOD PRESSURE: 102 MMHG | BODY MASS INDEX: 45.88 KG/M2 | WEIGHT: 259 LBS | DIASTOLIC BLOOD PRESSURE: 74 MMHG

## 2018-09-20 DIAGNOSIS — K80.51 CALCULUS OF BILE DUCT WITHOUT CHOLECYSTITIS WITH OBSTRUCTION: Primary | ICD-10-CM

## 2018-09-20 PROCEDURE — 99024 POSTOP FOLLOW-UP VISIT: CPT | Performed by: SURGERY

## 2018-09-25 ENCOUNTER — NURSE ONLY (OUTPATIENT)
Dept: CARDIOLOGY CLINIC | Age: 58
End: 2018-09-25
Payer: MEDICARE

## 2018-09-25 DIAGNOSIS — Z95.810 BIVENTRICULAR ICD (IMPLANTABLE CARDIOVERTER-DEFIBRILLATOR) IN PLACE: ICD-10-CM

## 2018-09-25 DIAGNOSIS — I42.0 DILATED CARDIOMYOPATHY (HCC): ICD-10-CM

## 2018-09-25 DIAGNOSIS — I44.7 LBBB (LEFT BUNDLE BRANCH BLOCK): ICD-10-CM

## 2018-09-25 DIAGNOSIS — I50.22 CHRONIC SYSTOLIC CONGESTIVE HEART FAILURE (HCC): ICD-10-CM

## 2018-10-01 PROCEDURE — 93295 DEV INTERROG REMOTE 1/2/MLT: CPT | Performed by: INTERNAL MEDICINE

## 2018-10-01 PROCEDURE — 93296 REM INTERROG EVL PM/IDS: CPT | Performed by: INTERNAL MEDICINE

## 2018-10-01 PROCEDURE — 93297 REM INTERROG DEV EVAL ICPMS: CPT | Performed by: INTERNAL MEDICINE

## 2018-10-26 NOTE — TELEPHONE ENCOUNTER
Eros Ruiz is requesting refill(s) SIMVASTATIN  Last OV 8/30/2018 (pertaining to medication)  LR 7/27/2018 (per medication requested)  Next office visit scheduled or attempted Yes   If no, reason:  Patient will schedule appointment for 6 month follow up.

## 2018-10-29 RX ORDER — SIMVASTATIN 20 MG
20 TABLET ORAL NIGHTLY
Qty: 90 TABLET | Refills: 1 | Status: SHIPPED | OUTPATIENT
Start: 2018-10-29 | End: 2019-05-07 | Stop reason: SDUPTHER

## 2018-11-13 RX ORDER — LISINOPRIL 2.5 MG/1
TABLET ORAL
Qty: 90 TABLET | Refills: 0 | Status: SHIPPED | OUTPATIENT
Start: 2018-11-13 | End: 2018-11-27 | Stop reason: SDUPTHER

## 2018-11-27 RX ORDER — LISINOPRIL 2.5 MG/1
TABLET ORAL
Qty: 90 TABLET | Refills: 3 | Status: SHIPPED | OUTPATIENT
Start: 2018-11-27 | End: 2020-02-10 | Stop reason: SDUPTHER

## 2018-11-27 RX ORDER — MONTELUKAST SODIUM 10 MG/1
10 TABLET ORAL NIGHTLY
Qty: 90 TABLET | Refills: 3 | Status: SHIPPED | OUTPATIENT
Start: 2018-11-27 | End: 2019-12-28 | Stop reason: SDUPTHER

## 2018-11-27 NOTE — TELEPHONE ENCOUNTER
From: Mor Champion  To: ALICIA Narvaez CNP  Sent: 11/26/2018 10:32 PM EST  Subject: Medication Renewal Request    Ashlie Jack.  Miguelangel Rodriguez would like a refill of the following medications:     lisinopril (PRINIVIL;ZESTRIL) 2.5 MG tablet ALICIA Narvaez CNP]    Preferred pharmacy: [address] [place] [address] [phone] - F [phone]    Comment:      Medication renewals requested in this message routed separately:     montelukast (SINGULAIR) 10 MG tablet [Arie Wall MD]

## 2018-12-04 ENCOUNTER — TELEPHONE (OUTPATIENT)
Dept: CARDIOLOGY CLINIC | Age: 58
End: 2018-12-04

## 2018-12-20 ENCOUNTER — OFFICE VISIT (OUTPATIENT)
Dept: FAMILY MEDICINE CLINIC | Age: 58
End: 2018-12-20
Payer: MEDICARE

## 2018-12-20 VITALS
WEIGHT: 260 LBS | HEIGHT: 63 IN | HEART RATE: 76 BPM | TEMPERATURE: 97.6 F | BODY MASS INDEX: 46.07 KG/M2 | OXYGEN SATURATION: 98 % | DIASTOLIC BLOOD PRESSURE: 80 MMHG | RESPIRATION RATE: 18 BRPM | SYSTOLIC BLOOD PRESSURE: 118 MMHG

## 2018-12-20 DIAGNOSIS — R53.81 MALAISE AND FATIGUE: ICD-10-CM

## 2018-12-20 DIAGNOSIS — R50.9 FEVER, UNSPECIFIED FEVER CAUSE: ICD-10-CM

## 2018-12-20 DIAGNOSIS — M17.10 LOCALIZED OSTEOARTHROSIS, LOWER LEG: ICD-10-CM

## 2018-12-20 DIAGNOSIS — R53.83 MALAISE AND FATIGUE: ICD-10-CM

## 2018-12-20 DIAGNOSIS — J06.9 VIRAL URI: Primary | ICD-10-CM

## 2018-12-20 LAB
INFLUENZA A ANTIGEN, POC: NEGATIVE
INFLUENZA B ANTIGEN, POC: NEGATIVE

## 2018-12-20 PROCEDURE — 1036F TOBACCO NON-USER: CPT | Performed by: PHYSICIAN ASSISTANT

## 2018-12-20 PROCEDURE — 3017F COLORECTAL CA SCREEN DOC REV: CPT | Performed by: PHYSICIAN ASSISTANT

## 2018-12-20 PROCEDURE — G8598 ASA/ANTIPLAT THER USED: HCPCS | Performed by: PHYSICIAN ASSISTANT

## 2018-12-20 PROCEDURE — G8417 CALC BMI ABV UP PARAM F/U: HCPCS | Performed by: PHYSICIAN ASSISTANT

## 2018-12-20 PROCEDURE — 99213 OFFICE O/P EST LOW 20 MIN: CPT | Performed by: PHYSICIAN ASSISTANT

## 2018-12-20 PROCEDURE — G8427 DOCREV CUR MEDS BY ELIG CLIN: HCPCS | Performed by: PHYSICIAN ASSISTANT

## 2018-12-20 PROCEDURE — 87804 INFLUENZA ASSAY W/OPTIC: CPT | Performed by: PHYSICIAN ASSISTANT

## 2018-12-20 PROCEDURE — G8484 FLU IMMUNIZE NO ADMIN: HCPCS | Performed by: PHYSICIAN ASSISTANT

## 2018-12-20 RX ORDER — MELOXICAM 15 MG/1
TABLET ORAL
Qty: 90 TABLET | Refills: 1 | Status: SHIPPED | OUTPATIENT
Start: 2018-12-20 | End: 2018-12-31 | Stop reason: SDUPTHER

## 2018-12-20 NOTE — PROGRESS NOTES
ASSESSMENT:  1. Viral URI    2. Malaise and fatigue    3. Fever, unspecified fever cause    4.  Localized osteoarthrosis, lower leg        PLAN:   Symptomatic therapy suggested: push fluids and rest.

## 2018-12-20 NOTE — PATIENT INSTRUCTIONS
mucus from your lungs by breathing deeply and coughing. · Gargle with warm salt water once an hour. This can help reduce swelling and throat pain. Use 1 teaspoon of salt mixed in 1 cup of warm water. · Do not smoke or allow others to smoke around you. If you need help quitting, talk to your doctor about stop-smoking programs and medicines. These can increase your chances of quitting for good. To avoid spreading the virus  · Cough or sneeze into a tissue. Then throw the tissue away. · If you don't have a tissue, use your hand to cover your cough or sneeze. Then clean your hand. You can also cough into your sleeve. · Wash your hands often. Use soap and warm water. Wash for 15 to 20 seconds each time. · If you don't have soap and water near you, you can clean your hands with alcohol wipes or gel. When should you call for help? Call your doctor now or seek immediate medical care if:    · You have a new or higher fever.     · Your fever lasts more than 48 hours.     · You have trouble breathing.     · You have a fever with a stiff neck or a severe headache.     · You are sensitive to light.     · You feel very sleepy or confused.    Watch closely for changes in your health, and be sure to contact your doctor if:    · You do not get better as expected. Where can you learn more? Go to [email or web address]. org and sign in to your [employer] account. Enter H387 in the [facility] box to learn more about \"Viral Respiratory Infection: Care Instructions. \"     If you do not have an account, please click on the \"Sign Up Now\" link. Current as of: December 6, 2017  Content Version: 11.8  © 1643-4949 [employer]. Care instructions adapted under license by [employer] [facility] ([facility]). If you have questions about a medical condition or this instruction, always ask your healthcare professional. [address] any warranty or liability for your use of this information.

## 2018-12-26 DIAGNOSIS — E03.9 HYPOTHYROIDISM, UNSPECIFIED TYPE: ICD-10-CM

## 2018-12-27 RX ORDER — LEVOTHYROXINE SODIUM 0.15 MG/1
TABLET ORAL
Qty: 103 TABLET | Refills: 1 | Status: SHIPPED | OUTPATIENT
Start: 2018-12-27 | End: 2018-12-31 | Stop reason: SDUPTHER

## 2018-12-28 ENCOUNTER — PATIENT MESSAGE (OUTPATIENT)
Dept: FAMILY MEDICINE CLINIC | Age: 58
End: 2018-12-28

## 2018-12-28 DIAGNOSIS — E03.9 HYPOTHYROIDISM, UNSPECIFIED TYPE: ICD-10-CM

## 2018-12-28 DIAGNOSIS — M79.671 RIGHT FOOT PAIN: Primary | ICD-10-CM

## 2018-12-31 RX ORDER — MELOXICAM 15 MG/1
TABLET ORAL
Qty: 90 TABLET | Refills: 1 | Status: SHIPPED | OUTPATIENT
Start: 2018-12-31 | End: 2019-09-26 | Stop reason: SDUPTHER

## 2018-12-31 RX ORDER — LEVOTHYROXINE SODIUM 0.15 MG/1
TABLET ORAL
Qty: 103 TABLET | Refills: 3 | Status: SHIPPED | OUTPATIENT
Start: 2018-12-31 | End: 2019-09-26 | Stop reason: SDUPTHER

## 2019-01-04 ENCOUNTER — OFFICE VISIT (OUTPATIENT)
Dept: FAMILY MEDICINE CLINIC | Age: 59
End: 2019-01-04
Payer: MEDICARE

## 2019-01-04 VITALS
HEIGHT: 63 IN | OXYGEN SATURATION: 96 % | WEIGHT: 259.4 LBS | BODY MASS INDEX: 45.96 KG/M2 | SYSTOLIC BLOOD PRESSURE: 120 MMHG | HEART RATE: 69 BPM | DIASTOLIC BLOOD PRESSURE: 72 MMHG

## 2019-01-04 DIAGNOSIS — J01.90 ACUTE BACTERIAL SINUSITIS: Primary | ICD-10-CM

## 2019-01-04 DIAGNOSIS — B96.89 ACUTE BACTERIAL SINUSITIS: Primary | ICD-10-CM

## 2019-01-04 PROCEDURE — G8417 CALC BMI ABV UP PARAM F/U: HCPCS | Performed by: PHYSICIAN ASSISTANT

## 2019-01-04 PROCEDURE — 99213 OFFICE O/P EST LOW 20 MIN: CPT | Performed by: PHYSICIAN ASSISTANT

## 2019-01-04 PROCEDURE — 3017F COLORECTAL CA SCREEN DOC REV: CPT | Performed by: PHYSICIAN ASSISTANT

## 2019-01-04 PROCEDURE — G8599 NO ASA/ANTIPLAT THER USE RNG: HCPCS | Performed by: PHYSICIAN ASSISTANT

## 2019-01-04 PROCEDURE — G8484 FLU IMMUNIZE NO ADMIN: HCPCS | Performed by: PHYSICIAN ASSISTANT

## 2019-01-04 PROCEDURE — G0008 ADMIN INFLUENZA VIRUS VAC: HCPCS | Performed by: PHYSICIAN ASSISTANT

## 2019-01-04 PROCEDURE — G8427 DOCREV CUR MEDS BY ELIG CLIN: HCPCS | Performed by: PHYSICIAN ASSISTANT

## 2019-01-04 PROCEDURE — 90686 IIV4 VACC NO PRSV 0.5 ML IM: CPT | Performed by: PHYSICIAN ASSISTANT

## 2019-01-04 PROCEDURE — 1036F TOBACCO NON-USER: CPT | Performed by: PHYSICIAN ASSISTANT

## 2019-01-04 RX ORDER — AZITHROMYCIN 250 MG/1
TABLET, FILM COATED ORAL
Qty: 1 PACKET | Refills: 0 | Status: SHIPPED | OUTPATIENT
Start: 2019-01-04 | End: 2019-01-14

## 2019-01-07 ENCOUNTER — OFFICE VISIT (OUTPATIENT)
Dept: CARDIOLOGY CLINIC | Age: 59
End: 2019-01-07
Payer: MEDICARE

## 2019-01-07 ENCOUNTER — PROCEDURE VISIT (OUTPATIENT)
Dept: CARDIOLOGY CLINIC | Age: 59
End: 2019-01-07
Payer: MEDICARE

## 2019-01-07 VITALS
BODY MASS INDEX: 46.03 KG/M2 | OXYGEN SATURATION: 98 % | SYSTOLIC BLOOD PRESSURE: 130 MMHG | HEART RATE: 71 BPM | WEIGHT: 259.8 LBS | HEIGHT: 63 IN | DIASTOLIC BLOOD PRESSURE: 76 MMHG

## 2019-01-07 DIAGNOSIS — Z95.810 BIVENTRICULAR ICD (IMPLANTABLE CARDIOVERTER-DEFIBRILLATOR) IN PLACE: Primary | ICD-10-CM

## 2019-01-07 DIAGNOSIS — E66.01 MORBID OBESITY WITH BMI OF 45.0-49.9, ADULT (HCC): ICD-10-CM

## 2019-01-07 DIAGNOSIS — D64.9 ANEMIA, UNSPECIFIED TYPE: ICD-10-CM

## 2019-01-07 DIAGNOSIS — I50.22 CHRONIC SYSTOLIC CONGESTIVE HEART FAILURE (HCC): Primary | ICD-10-CM

## 2019-01-07 DIAGNOSIS — I50.22 CHRONIC SYSTOLIC CONGESTIVE HEART FAILURE (HCC): ICD-10-CM

## 2019-01-07 DIAGNOSIS — I42.0 DILATED CARDIOMYOPATHY (HCC): ICD-10-CM

## 2019-01-07 DIAGNOSIS — Z95.810 BIVENTRICULAR ICD (IMPLANTABLE CARDIOVERTER-DEFIBRILLATOR) IN PLACE: ICD-10-CM

## 2019-01-07 DIAGNOSIS — I10 ESSENTIAL HYPERTENSION: ICD-10-CM

## 2019-01-07 PROCEDURE — G8599 NO ASA/ANTIPLAT THER USE RNG: HCPCS | Performed by: NURSE PRACTITIONER

## 2019-01-07 PROCEDURE — 93290 INTERROG DEV EVAL ICPMS IP: CPT | Performed by: NURSE PRACTITIONER

## 2019-01-07 PROCEDURE — G8482 FLU IMMUNIZE ORDER/ADMIN: HCPCS | Performed by: NURSE PRACTITIONER

## 2019-01-07 PROCEDURE — 99214 OFFICE O/P EST MOD 30 MIN: CPT | Performed by: NURSE PRACTITIONER

## 2019-01-07 PROCEDURE — 3017F COLORECTAL CA SCREEN DOC REV: CPT | Performed by: NURSE PRACTITIONER

## 2019-01-07 PROCEDURE — G8427 DOCREV CUR MEDS BY ELIG CLIN: HCPCS | Performed by: NURSE PRACTITIONER

## 2019-01-07 PROCEDURE — 93284 PRGRMG EVAL IMPLANTABLE DFB: CPT | Performed by: INTERNAL MEDICINE

## 2019-01-07 PROCEDURE — 1036F TOBACCO NON-USER: CPT | Performed by: NURSE PRACTITIONER

## 2019-01-07 PROCEDURE — G8417 CALC BMI ABV UP PARAM F/U: HCPCS | Performed by: NURSE PRACTITIONER

## 2019-01-07 RX ORDER — ACETAMINOPHEN 160 MG
TABLET,DISINTEGRATING ORAL DAILY
COMMUNITY
End: 2020-09-04 | Stop reason: ALTCHOICE

## 2019-01-16 ENCOUNTER — TELEPHONE (OUTPATIENT)
Dept: FAMILY MEDICINE CLINIC | Age: 59
End: 2019-01-16

## 2019-01-16 NOTE — TELEPHONE ENCOUNTER
Patient is calling she was seen by Lexii Porter on 1/4/18 and was prescribed a Z-pack. She says she still is sick with the same symptoms congestion / upper respitory issues. Please call patient had advise if something can be called in.

## 2019-02-11 ENCOUNTER — OFFICE VISIT (OUTPATIENT)
Dept: FAMILY MEDICINE CLINIC | Age: 59
End: 2019-02-11
Payer: MEDICARE

## 2019-02-11 VITALS
DIASTOLIC BLOOD PRESSURE: 82 MMHG | HEIGHT: 63 IN | OXYGEN SATURATION: 98 % | WEIGHT: 267.8 LBS | HEART RATE: 68 BPM | SYSTOLIC BLOOD PRESSURE: 122 MMHG | BODY MASS INDEX: 47.45 KG/M2

## 2019-02-11 DIAGNOSIS — Z98.890 HISTORY OF ABDOMINAL SURGERY: ICD-10-CM

## 2019-02-11 DIAGNOSIS — R19.02 ABDOMINAL MASS, LEFT UPPER QUADRANT: ICD-10-CM

## 2019-02-11 DIAGNOSIS — D64.9 ANEMIA, UNSPECIFIED TYPE: ICD-10-CM

## 2019-02-11 DIAGNOSIS — Z98.84 HISTORY OF GASTRIC BYPASS: ICD-10-CM

## 2019-02-11 DIAGNOSIS — R10.9 LEFT SIDED ABDOMINAL PAIN: Primary | ICD-10-CM

## 2019-02-11 DIAGNOSIS — R19.04 ABDOMINAL MASS, LEFT LOWER QUADRANT: ICD-10-CM

## 2019-02-11 PROCEDURE — 99213 OFFICE O/P EST LOW 20 MIN: CPT | Performed by: FAMILY MEDICINE

## 2019-02-11 PROCEDURE — 1036F TOBACCO NON-USER: CPT | Performed by: FAMILY MEDICINE

## 2019-02-11 PROCEDURE — G8417 CALC BMI ABV UP PARAM F/U: HCPCS | Performed by: FAMILY MEDICINE

## 2019-02-11 PROCEDURE — G8482 FLU IMMUNIZE ORDER/ADMIN: HCPCS | Performed by: FAMILY MEDICINE

## 2019-02-11 PROCEDURE — G8599 NO ASA/ANTIPLAT THER USE RNG: HCPCS | Performed by: FAMILY MEDICINE

## 2019-02-11 PROCEDURE — 3017F COLORECTAL CA SCREEN DOC REV: CPT | Performed by: FAMILY MEDICINE

## 2019-02-11 PROCEDURE — 36415 COLL VENOUS BLD VENIPUNCTURE: CPT | Performed by: FAMILY MEDICINE

## 2019-02-11 PROCEDURE — G8427 DOCREV CUR MEDS BY ELIG CLIN: HCPCS | Performed by: FAMILY MEDICINE

## 2019-02-12 LAB
A/G RATIO: 1.5 (ref 1.1–2.2)
ALBUMIN SERPL-MCNC: 4 G/DL (ref 3.4–5)
ALP BLD-CCNC: 126 U/L (ref 40–129)
ALT SERPL-CCNC: 11 U/L (ref 10–40)
ANION GAP SERPL CALCULATED.3IONS-SCNC: 15 MMOL/L (ref 3–16)
AST SERPL-CCNC: 19 U/L (ref 15–37)
BASOPHILS ABSOLUTE: 0 K/UL (ref 0–0.2)
BASOPHILS RELATIVE PERCENT: 1 %
BILIRUB SERPL-MCNC: 0.8 MG/DL (ref 0–1)
BUN BLDV-MCNC: 25 MG/DL (ref 7–20)
C-REACTIVE PROTEIN: 2 MG/L (ref 0–5.1)
CALCIUM SERPL-MCNC: 9.2 MG/DL (ref 8.3–10.6)
CHLORIDE BLD-SCNC: 105 MMOL/L (ref 99–110)
CO2: 21 MMOL/L (ref 21–32)
CREAT SERPL-MCNC: 1.5 MG/DL (ref 0.6–1.2)
EOSINOPHILS ABSOLUTE: 0 K/UL (ref 0–0.6)
EOSINOPHILS RELATIVE PERCENT: 0 %
GFR AFRICAN AMERICAN: 43
GFR NON-AFRICAN AMERICAN: 35
GLOBULIN: 2.6 G/DL
GLUCOSE BLD-MCNC: 92 MG/DL (ref 70–99)
HCT VFR BLD CALC: 40.1 % (ref 36–48)
HEMOGLOBIN: 12.7 G/DL (ref 12–16)
LYMPHOCYTES ABSOLUTE: 1.9 K/UL (ref 1–5.1)
LYMPHOCYTES RELATIVE PERCENT: 40.4 %
MCH RBC QN AUTO: 31.5 PG (ref 26–34)
MCHC RBC AUTO-ENTMCNC: 31.6 G/DL (ref 31–36)
MCV RBC AUTO: 99.6 FL (ref 80–100)
MONOCYTES ABSOLUTE: 0.4 K/UL (ref 0–1.3)
MONOCYTES RELATIVE PERCENT: 8.3 %
NEUTROPHILS ABSOLUTE: 2.4 K/UL (ref 1.7–7.7)
NEUTROPHILS RELATIVE PERCENT: 50.3 %
PDW BLD-RTO: 17.7 % (ref 12.4–15.4)
PLATELET # BLD: 147 K/UL (ref 135–450)
PMV BLD AUTO: 11.9 FL (ref 5–10.5)
POTASSIUM SERPL-SCNC: 5.2 MMOL/L (ref 3.5–5.1)
RBC # BLD: 4.03 M/UL (ref 4–5.2)
SEDIMENTATION RATE, ERYTHROCYTE: 16 MM/HR (ref 0–30)
SODIUM BLD-SCNC: 141 MMOL/L (ref 136–145)
TOTAL PROTEIN: 6.6 G/DL (ref 6.4–8.2)
WBC # BLD: 4.7 K/UL (ref 4–11)

## 2019-02-12 ASSESSMENT — ENCOUNTER SYMPTOMS
ANAL BLEEDING: 0
CONSTIPATION: 0
SHORTNESS OF BREATH: 0
VOMITING: 0
ABDOMINAL PAIN: 1

## 2019-02-16 ENCOUNTER — HOSPITAL ENCOUNTER (OUTPATIENT)
Dept: CT IMAGING | Age: 59
Discharge: HOME | End: 2019-02-16
Payer: MEDICARE

## 2019-02-16 DIAGNOSIS — R19.02 ABDOMINAL MASS, LEFT UPPER QUADRANT: ICD-10-CM

## 2019-02-16 DIAGNOSIS — Z98.890 HISTORY OF ABDOMINAL SURGERY: ICD-10-CM

## 2019-02-16 DIAGNOSIS — R10.9 LEFT SIDED ABDOMINAL PAIN: ICD-10-CM

## 2019-02-16 DIAGNOSIS — Z98.84 HISTORY OF GASTRIC BYPASS: ICD-10-CM

## 2019-02-16 DIAGNOSIS — R19.04 ABDOMINAL MASS, LEFT LOWER QUADRANT: ICD-10-CM

## 2019-02-16 PROCEDURE — 6360000004 HC RX CONTRAST MEDICATION: Performed by: FAMILY MEDICINE

## 2019-02-16 PROCEDURE — 74176 CT ABD & PELVIS W/O CONTRAST: CPT

## 2019-02-16 RX ADMIN — IOHEXOL 50 ML: 240 INJECTION, SOLUTION INTRATHECAL; INTRAVASCULAR; INTRAVENOUS; ORAL at 10:22

## 2019-02-19 DIAGNOSIS — R10.32 LLQ PAIN: ICD-10-CM

## 2019-02-19 DIAGNOSIS — Z12.11 COLON CANCER SCREENING: Primary | ICD-10-CM

## 2019-02-22 RX ORDER — METOPROLOL SUCCINATE 50 MG/1
50 TABLET, EXTENDED RELEASE ORAL DAILY
Qty: 90 TABLET | Refills: 0 | Status: SHIPPED | OUTPATIENT
Start: 2019-02-22 | End: 2019-03-11 | Stop reason: SDUPTHER

## 2019-02-26 ENCOUNTER — INITIAL CONSULT (OUTPATIENT)
Dept: GASTROENTEROLOGY | Age: 59
End: 2019-02-26
Payer: MEDICARE

## 2019-02-26 VITALS
HEART RATE: 84 BPM | DIASTOLIC BLOOD PRESSURE: 74 MMHG | HEIGHT: 63 IN | SYSTOLIC BLOOD PRESSURE: 120 MMHG | WEIGHT: 269 LBS | OXYGEN SATURATION: 99 % | BODY MASS INDEX: 47.66 KG/M2

## 2019-02-26 DIAGNOSIS — R10.32 LLQ PAIN: ICD-10-CM

## 2019-02-26 DIAGNOSIS — R10.12 LUQ PAIN: Primary | ICD-10-CM

## 2019-02-26 DIAGNOSIS — Z98.84 HISTORY OF GASTRIC BYPASS: ICD-10-CM

## 2019-02-26 PROCEDURE — 99204 OFFICE O/P NEW MOD 45 MIN: CPT | Performed by: INTERNAL MEDICINE

## 2019-02-26 PROCEDURE — 1036F TOBACCO NON-USER: CPT | Performed by: INTERNAL MEDICINE

## 2019-02-26 PROCEDURE — G8417 CALC BMI ABV UP PARAM F/U: HCPCS | Performed by: INTERNAL MEDICINE

## 2019-02-26 PROCEDURE — G8482 FLU IMMUNIZE ORDER/ADMIN: HCPCS | Performed by: INTERNAL MEDICINE

## 2019-02-26 PROCEDURE — 3017F COLORECTAL CA SCREEN DOC REV: CPT | Performed by: INTERNAL MEDICINE

## 2019-02-26 PROCEDURE — G8599 NO ASA/ANTIPLAT THER USE RNG: HCPCS | Performed by: INTERNAL MEDICINE

## 2019-02-26 PROCEDURE — G8427 DOCREV CUR MEDS BY ELIG CLIN: HCPCS | Performed by: INTERNAL MEDICINE

## 2019-02-26 RX ORDER — POLYETHYLENE GLYCOL 3350 17 G/17G
238 POWDER ORAL DAILY
Qty: 255 G | Refills: 0 | Status: SHIPPED | OUTPATIENT
Start: 2019-02-26 | End: 2020-03-04

## 2019-03-04 DIAGNOSIS — F31.77 BIPOLAR DISORDER, IN PARTIAL REMISSION, MOST RECENT EPISODE MIXED (HCC): Chronic | ICD-10-CM

## 2019-03-04 DIAGNOSIS — M1A.9XX0 CHRONIC GOUT WITHOUT TOPHUS, UNSPECIFIED CAUSE, UNSPECIFIED SITE: ICD-10-CM

## 2019-03-05 ENCOUNTER — TELEPHONE (OUTPATIENT)
Dept: GASTROENTEROLOGY | Age: 59
End: 2019-03-05

## 2019-03-05 RX ORDER — ALLOPURINOL 300 MG/1
TABLET ORAL
Qty: 90 TABLET | Refills: 3 | Status: SHIPPED | OUTPATIENT
Start: 2019-03-05 | End: 2020-03-23 | Stop reason: SDUPTHER

## 2019-03-05 RX ORDER — LAMOTRIGINE 150 MG/1
150 TABLET ORAL 2 TIMES DAILY
Qty: 180 TABLET | Refills: 1 | Status: SHIPPED | OUTPATIENT
Start: 2019-03-05 | End: 2020-06-10 | Stop reason: SDUPTHER

## 2019-03-06 ENCOUNTER — ANESTHESIA EVENT (OUTPATIENT)
Dept: ENDOSCOPY | Age: 59
End: 2019-03-06
Payer: MEDICARE

## 2019-03-06 ENCOUNTER — TELEPHONE (OUTPATIENT)
Dept: GASTROENTEROLOGY | Age: 59
End: 2019-03-06

## 2019-03-06 ENCOUNTER — HOSPITAL ENCOUNTER (OUTPATIENT)
Age: 59
Setting detail: OUTPATIENT SURGERY
Discharge: HOME | End: 2019-03-06
Attending: INTERNAL MEDICINE | Admitting: INTERNAL MEDICINE
Payer: MEDICARE

## 2019-03-06 ENCOUNTER — ANESTHESIA (OUTPATIENT)
Dept: ENDOSCOPY | Age: 59
End: 2019-03-06
Payer: MEDICARE

## 2019-03-06 VITALS
HEIGHT: 63 IN | WEIGHT: 267 LBS | SYSTOLIC BLOOD PRESSURE: 129 MMHG | RESPIRATION RATE: 16 BRPM | TEMPERATURE: 98.1 F | BODY MASS INDEX: 47.31 KG/M2 | OXYGEN SATURATION: 100 % | DIASTOLIC BLOOD PRESSURE: 67 MMHG | HEART RATE: 65 BPM

## 2019-03-06 VITALS — DIASTOLIC BLOOD PRESSURE: 78 MMHG | OXYGEN SATURATION: 98 % | SYSTOLIC BLOOD PRESSURE: 108 MMHG

## 2019-03-06 PROCEDURE — 7100000011 HC PHASE II RECOVERY - ADDTL 15 MIN: Performed by: INTERNAL MEDICINE

## 2019-03-06 PROCEDURE — 3609009500 HC COLONOSCOPY DIAGNOSTIC OR SCREENING: Performed by: INTERNAL MEDICINE

## 2019-03-06 PROCEDURE — 43235 EGD DIAGNOSTIC BRUSH WASH: CPT | Performed by: INTERNAL MEDICINE

## 2019-03-06 PROCEDURE — 2580000003 HC RX 258: Performed by: INTERNAL MEDICINE

## 2019-03-06 PROCEDURE — 3609017100 HC EGD: Performed by: INTERNAL MEDICINE

## 2019-03-06 PROCEDURE — 2580000003 HC RX 258: Performed by: NURSE ANESTHETIST, CERTIFIED REGISTERED

## 2019-03-06 PROCEDURE — 7100000010 HC PHASE II RECOVERY - FIRST 15 MIN: Performed by: INTERNAL MEDICINE

## 2019-03-06 PROCEDURE — 6360000002 HC RX W HCPCS: Performed by: NURSE ANESTHETIST, CERTIFIED REGISTERED

## 2019-03-06 PROCEDURE — G0121 COLON CA SCRN NOT HI RSK IND: HCPCS | Performed by: INTERNAL MEDICINE

## 2019-03-06 PROCEDURE — 2709999900 HC NON-CHARGEABLE SUPPLY: Performed by: INTERNAL MEDICINE

## 2019-03-06 PROCEDURE — 2500000003 HC RX 250 WO HCPCS: Performed by: NURSE ANESTHETIST, CERTIFIED REGISTERED

## 2019-03-06 PROCEDURE — 3700000001 HC ADD 15 MINUTES (ANESTHESIA): Performed by: INTERNAL MEDICINE

## 2019-03-06 PROCEDURE — 3700000000 HC ANESTHESIA ATTENDED CARE: Performed by: INTERNAL MEDICINE

## 2019-03-06 RX ORDER — PANTOPRAZOLE SODIUM 20 MG/1
20 TABLET, DELAYED RELEASE ORAL
Qty: 30 TABLET | Refills: 11 | Status: SHIPPED | OUTPATIENT
Start: 2019-03-06 | End: 2020-03-04 | Stop reason: ALTCHOICE

## 2019-03-06 RX ORDER — PROPOFOL 10 MG/ML
INJECTION, EMULSION INTRAVENOUS PRN
Status: DISCONTINUED | OUTPATIENT
Start: 2019-03-06 | End: 2019-03-06 | Stop reason: SDUPTHER

## 2019-03-06 RX ORDER — SODIUM CHLORIDE, SODIUM LACTATE, POTASSIUM CHLORIDE, CALCIUM CHLORIDE 600; 310; 30; 20 MG/100ML; MG/100ML; MG/100ML; MG/100ML
INJECTION, SOLUTION INTRAVENOUS ONCE
Status: COMPLETED | OUTPATIENT
Start: 2019-03-06 | End: 2019-03-06

## 2019-03-06 RX ORDER — LIDOCAINE HYDROCHLORIDE 20 MG/ML
INJECTION, SOLUTION INFILTRATION; PERINEURAL PRN
Status: DISCONTINUED | OUTPATIENT
Start: 2019-03-06 | End: 2019-03-06 | Stop reason: SDUPTHER

## 2019-03-06 RX ORDER — SODIUM CHLORIDE, SODIUM LACTATE, POTASSIUM CHLORIDE, CALCIUM CHLORIDE 600; 310; 30; 20 MG/100ML; MG/100ML; MG/100ML; MG/100ML
INJECTION, SOLUTION INTRAVENOUS CONTINUOUS PRN
Status: DISCONTINUED | OUTPATIENT
Start: 2019-03-06 | End: 2019-03-06 | Stop reason: SDUPTHER

## 2019-03-06 RX ADMIN — SODIUM CHLORIDE, POTASSIUM CHLORIDE, SODIUM LACTATE AND CALCIUM CHLORIDE: 600; 310; 30; 20 INJECTION, SOLUTION INTRAVENOUS at 09:09

## 2019-03-06 RX ADMIN — SODIUM CHLORIDE, POTASSIUM CHLORIDE, SODIUM LACTATE AND CALCIUM CHLORIDE: 600; 310; 30; 20 INJECTION, SOLUTION INTRAVENOUS at 09:12

## 2019-03-06 RX ADMIN — LIDOCAINE HYDROCHLORIDE 60 MG: 20 INJECTION, SOLUTION INFILTRATION; PERINEURAL at 09:18

## 2019-03-06 RX ADMIN — PROPOFOL 500 MG: 10 INJECTION, EMULSION INTRAVENOUS at 09:18

## 2019-03-06 ASSESSMENT — PAIN SCALES - GENERAL
PAINLEVEL_OUTOF10: 0

## 2019-03-06 ASSESSMENT — PAIN - FUNCTIONAL ASSESSMENT: PAIN_FUNCTIONAL_ASSESSMENT: 0-10

## 2019-03-11 RX ORDER — METOPROLOL SUCCINATE 50 MG/1
50 TABLET, EXTENDED RELEASE ORAL DAILY
Qty: 90 TABLET | Refills: 2 | Status: SHIPPED | OUTPATIENT
Start: 2019-03-11 | End: 2019-12-28 | Stop reason: SDUPTHER

## 2019-03-14 DIAGNOSIS — F31.75 BIPOLAR DISORDER, IN PARTIAL REMISSION, MOST RECENT EPISODE DEPRESSED (HCC): Chronic | ICD-10-CM

## 2019-03-18 RX ORDER — LAMOTRIGINE 150 MG/1
150 TABLET ORAL DAILY
Qty: 180 TABLET | Refills: 1 | Status: SHIPPED | OUTPATIENT
Start: 2019-03-18 | End: 2019-04-24 | Stop reason: SDUPTHER

## 2019-04-09 ENCOUNTER — NURSE ONLY (OUTPATIENT)
Dept: CARDIOLOGY CLINIC | Age: 59
End: 2019-04-09
Payer: MEDICARE

## 2019-04-09 DIAGNOSIS — Z95.810 BIVENTRICULAR ICD (IMPLANTABLE CARDIOVERTER-DEFIBRILLATOR) IN PLACE: ICD-10-CM

## 2019-04-09 DIAGNOSIS — I50.22 CHRONIC SYSTOLIC CONGESTIVE HEART FAILURE (HCC): ICD-10-CM

## 2019-04-09 DIAGNOSIS — I42.0 DILATED CARDIOMYOPATHY (HCC): ICD-10-CM

## 2019-04-09 PROCEDURE — 93297 REM INTERROG DEV EVAL ICPMS: CPT | Performed by: INTERNAL MEDICINE

## 2019-04-09 PROCEDURE — 93296 REM INTERROG EVL PM/IDS: CPT | Performed by: INTERNAL MEDICINE

## 2019-04-09 PROCEDURE — 93295 DEV INTERROG REMOTE 1/2/MLT: CPT | Performed by: INTERNAL MEDICINE

## 2019-04-09 NOTE — LETTER
[address] [facility] [phone]  [address] [place] [address]  Janine Adkins [address] [place] [address]    Pacemaker/Defibrillator Clinic          04/10/19        Bryce Cronin  [address]  [place] [id number]        Dear Bryce Cronin    This letter is to inform you that we received the transmission from your monitor at home that checks your pacemaker and/or defibrillator, or implanted heart monitor. The next date your monitor will automatically transmit will be 7/16/19. Please do not send additional routine transmissions unless specifically requested. Your device and monitor are wireless and most transmit cellularly, but please periodically check your monitor is still plugged in to the electrical outlet. If you still use the telephone land line to send please ensure the connection to the phone michael is secure. This will help to ensure successful automatic transmissions in the future. Also, the monitor needs to be close to you while sleeping at night. Please be aware that the remote device transmission sites are periodically monitored only during regular business hours during which simultaneous in-office device clinics are being run. If your transmission requires attention, we will contact you as soon as possible. Thank you.             Beth [address]

## 2019-04-10 NOTE — PROGRESS NOTES
Carelink transmission shows normal sensing and pacing function. See interrogation for more details. Thoracic impedance trend stable. No new arrhythmias. Total -97.3%  OV NPRB 5/8/19  Follow up in 3 months via carelink.

## 2019-04-15 ENCOUNTER — TELEPHONE (OUTPATIENT)
Dept: FAMILY MEDICINE CLINIC | Age: 59
End: 2019-04-15

## 2019-04-15 NOTE — TELEPHONE ENCOUNTER
Pritchard Persons   1960    Is requesting a referral to see the following specialist:     Name of provider / Doctor: Ira Bronson/ [address]  Phone. [phone]  Fax. [phone]  Specialty: Gynocology  NPI #  If available:  TAX ID# If available:  Specialist appointment scheduled (DOS)? 4/29/19  Primary reason / diagnosis (ICD. 10code) for the appointment: Pelvic pain/discharge/urinary leaks  Primary Insurance: [employer]   (CONFIRM insurance)

## 2019-04-24 DIAGNOSIS — F31.75 BIPOLAR DISORDER, IN PARTIAL REMISSION, MOST RECENT EPISODE DEPRESSED (HCC): Chronic | ICD-10-CM

## 2019-04-24 NOTE — TELEPHONE ENCOUNTER
Please re-send Lamictal to Lisa 18. It was sent to [facility] in March by mistake.

## 2019-04-26 RX ORDER — LAMOTRIGINE 150 MG/1
TABLET ORAL
Qty: 180 TABLET | Refills: 1 | Status: SHIPPED | OUTPATIENT
Start: 2019-04-26 | End: 2019-07-09 | Stop reason: SDUPTHER

## 2019-05-07 DIAGNOSIS — E78.5 HYPERLIPIDEMIA, UNSPECIFIED HYPERLIPIDEMIA TYPE: Primary | ICD-10-CM

## 2019-05-08 ENCOUNTER — PROCEDURE VISIT (OUTPATIENT)
Dept: CARDIOLOGY CLINIC | Age: 59
End: 2019-05-08
Payer: MEDICARE

## 2019-05-08 ENCOUNTER — HOSPITAL ENCOUNTER (OUTPATIENT)
Age: 59
Discharge: HOME | End: 2019-05-08
Payer: MEDICARE

## 2019-05-08 ENCOUNTER — OFFICE VISIT (OUTPATIENT)
Dept: CARDIOLOGY CLINIC | Age: 59
End: 2019-05-08
Payer: MEDICARE

## 2019-05-08 VITALS
HEIGHT: 63 IN | WEIGHT: 274.8 LBS | DIASTOLIC BLOOD PRESSURE: 64 MMHG | SYSTOLIC BLOOD PRESSURE: 120 MMHG | BODY MASS INDEX: 48.69 KG/M2 | OXYGEN SATURATION: 98 % | HEART RATE: 59 BPM

## 2019-05-08 DIAGNOSIS — I50.22 CHRONIC SYSTOLIC CONGESTIVE HEART FAILURE (HCC): ICD-10-CM

## 2019-05-08 DIAGNOSIS — Z95.810 BIVENTRICULAR ICD (IMPLANTABLE CARDIOVERTER-DEFIBRILLATOR) IN PLACE: ICD-10-CM

## 2019-05-08 DIAGNOSIS — I50.32 CHRONIC DIASTOLIC CONGESTIVE HEART FAILURE (HCC): Primary | ICD-10-CM

## 2019-05-08 DIAGNOSIS — I50.32 CHRONIC DIASTOLIC CONGESTIVE HEART FAILURE (HCC): ICD-10-CM

## 2019-05-08 DIAGNOSIS — I42.0 DILATED CARDIOMYOPATHY (HCC): ICD-10-CM

## 2019-05-08 DIAGNOSIS — I10 ESSENTIAL HYPERTENSION: ICD-10-CM

## 2019-05-08 LAB
ANION GAP SERPL CALCULATED.3IONS-SCNC: 9 MMOL/L (ref 3–16)
BUN BLDV-MCNC: 21 MG/DL (ref 7–20)
CALCIUM SERPL-MCNC: 9 MG/DL (ref 8.3–10.6)
CHLORIDE BLD-SCNC: 105 MMOL/L (ref 99–110)
CO2: 24 MMOL/L (ref 21–32)
CREAT SERPL-MCNC: 1.2 MG/DL (ref 0.6–1.2)
GFR AFRICAN AMERICAN: 55
GFR NON-AFRICAN AMERICAN: 46
GLUCOSE BLD-MCNC: 88 MG/DL (ref 70–99)
HCT VFR BLD CALC: 38.2 % (ref 36–48)
HEMOGLOBIN: 12.6 G/DL (ref 12–16)
MCH RBC QN AUTO: 31.8 PG (ref 26–34)
MCHC RBC AUTO-ENTMCNC: 32.9 G/DL (ref 31–36)
MCV RBC AUTO: 96.5 FL (ref 80–100)
PDW BLD-RTO: 14.3 % (ref 12.4–15.4)
PLATELET # BLD: 136 K/UL (ref 135–450)
PMV BLD AUTO: 11.2 FL (ref 5–10.5)
POTASSIUM SERPL-SCNC: 4.9 MMOL/L (ref 3.5–5.1)
RBC # BLD: 3.96 M/UL (ref 4–5.2)
SODIUM BLD-SCNC: 138 MMOL/L (ref 136–145)
WBC # BLD: 5.1 K/UL (ref 4–11)

## 2019-05-08 PROCEDURE — 93290 INTERROG DEV EVAL ICPMS IP: CPT | Performed by: NURSE PRACTITIONER

## 2019-05-08 PROCEDURE — 99214 OFFICE O/P EST MOD 30 MIN: CPT | Performed by: NURSE PRACTITIONER

## 2019-05-08 PROCEDURE — 3017F COLORECTAL CA SCREEN DOC REV: CPT | Performed by: NURSE PRACTITIONER

## 2019-05-08 PROCEDURE — G8427 DOCREV CUR MEDS BY ELIG CLIN: HCPCS | Performed by: NURSE PRACTITIONER

## 2019-05-08 PROCEDURE — G8599 NO ASA/ANTIPLAT THER USE RNG: HCPCS | Performed by: NURSE PRACTITIONER

## 2019-05-08 PROCEDURE — 1036F TOBACCO NON-USER: CPT | Performed by: NURSE PRACTITIONER

## 2019-05-08 PROCEDURE — G8417 CALC BMI ABV UP PARAM F/U: HCPCS | Performed by: NURSE PRACTITIONER

## 2019-05-08 PROCEDURE — 80048 BASIC METABOLIC PNL TOTAL CA: CPT

## 2019-05-08 PROCEDURE — 85027 COMPLETE CBC AUTOMATED: CPT

## 2019-05-08 PROCEDURE — 36415 COLL VENOUS BLD VENIPUNCTURE: CPT

## 2019-05-08 RX ORDER — TORSEMIDE 20 MG/1
TABLET ORAL
Qty: 90 TABLET | Refills: 3
Start: 2019-05-08 | End: 2019-06-05 | Stop reason: SDUPTHER

## 2019-05-08 NOTE — PROGRESS NOTES
[address]   Cardiac Follow-up    Primary Care Doctor: Emily Swan MD    Chief Complaint   Patient presents with    Follow-up        History of Present Illness:   I had the pleasure of seeing Ronal Lebron in follow up for cardiomyopathy. Hx of CHF, non-ischemic dilated cardiomyopathy, S/P  BiV-AICD 2016, pulmonary HTN, LBBB, HTN. Since last visit, she was seen by GI. Her weight is up to 272lbs, up about 15lbs since last visit. She thinks it is due to her diet, she hasn't been watching what she eats or drinks lately. She is having some care-giver stress at home, trying to take care of her  with dementia. She is taking the water pill three times a week; not getting the leg cramps like before. She feels better overall except for the weight gain. Minimal edema, she doesn't feel like she is holding onto much water. She was able to cut the grass and weed eat for the 1st time in a long time. She feels like the device has really helped her energy level. Breathing is stable and not getting short of breath with cutting the grass. She does pace herself through the activity to not get too tired. Not sure if she drinking too much. Not sleeping well at night- due to worrying and listening for her     Home weight 272lbs    Ronal Lebron describes symptoms including edema but denies chest pain, dyspnea, palpitations, orthopnea, early saiety, syncope.      Past Medical History:   has a past medical history of Anesthesia complication, Autoimmune hemolytic anemia ([address] Bipolar disorder ([address] Bundle branch block, Cardiomyopathy ([address] Chronic systolic CHF (congestive heart failure) ([address] Depression, Family history of early CAD, Family history of ovarian cancer, GERD (gastroesophageal reflux disease), Gout, Hypertension, Hypothyroid, Osteoarthritis, knee, Pneumonia, S/P gastric bypass, Unspecified cerebral artery occlusion with cerebral infarction, Uterine cancer ([address] and Vitamin B 12 deficiency. Surgical History:   has a past surgical history that includes Gastric bypass surgery (1/7/05); Finger surgery; Dilation and curettage of uterus; lipectomy; Cholecystectomy; other surgical history; Upper gastrointestinal endoscopy (4/23/13); Cardiac catheterization (7/10/2015); transesophageal echocardiogram (7/13/2015); other surgical history (4/1/16); pacemaker placement (09/2016); hernia repair (10/20/2016); ERCP; other surgical history; Upper gastrointestinal endoscopy (03/06/2019); Colonoscopy (03/06/2019); Upper gastrointestinal endoscopy (N/A, 3/6/2019); and Colonoscopy (N/A, 3/6/2019). Social History:   reports that she quit smoking about 34 years ago. Her smoking use included cigarettes. She has a 6.00 pack-year smoking history. She has never used smokeless tobacco. She reports that she does not drink alcohol or use drugs. Family History:   Family History   Problem Relation Age of Onset    Cancer Mother     Ovarian Cancer Mother 45        Technically peritoneal cancer    Depression Mother         bipolar    Arthritis Father     Rheum Arthritis Father     Arrhythmia Father     Other Father         gout    Heart Disease Father     Depression Brother         depression    Other Brother         gout    Obesity Brother     Other Brother         gout    Obesity Brother     Depression Brother     Cancer Maternal Grandmother         Breast       Home Medications:  Prior to Admission medications    Medication Sig Start Date End Date Taking? Authorizing Provider   metoprolol succinate (TOPROL XL) 50 MG extended release tablet Take 1 tablet by mouth daily 3/11/19  Yes ALICIA Sousa CNP   allopurinol (ZYLOPRIM) 300 MG tablet TAKE 1 TABLET EVERY DAY 3/5/19  Yes Danyel Wall MD   lamoTRIgine (LAMICTAL) 150 MG tablet Take 1 tablet by mouth 2 times daily (or as otherwise directed).  3/5/19  Yes Aire Wall MD   bisacodyl (DULCOLAX) 5 MG EC tablet Take 1 tablet by mouth well  Neurological/Psychiatric:  · Alert and oriented in all spheres  · No abnormalities of mood, affect, memory, mentation, or behavior are noted      Lab Data:  CBC:   Lab Results   Component Value Date    WBC 4.7 02/11/2019    WBC 5.6 09/09/2018    WBC 8.0 09/08/2018    RBC 4.03 02/11/2019    RBC 3.34 09/09/2018    RBC 3.91 09/08/2018    HGB 12.7 02/11/2019    HGB 10.7 09/09/2018    HGB 11.9 09/08/2018    HCT 40.1 02/11/2019    HCT 32.6 09/09/2018    HCT 38.2 09/08/2018    MCV 99.6 02/11/2019    MCV 97.4 09/09/2018    MCV 97.8 09/08/2018    RDW 17.7 02/11/2019    RDW 14.9 09/09/2018    RDW 15.0 09/08/2018     02/11/2019     09/09/2018     09/08/2018     Iron:   Lab Results   Component Value Date    IRON 74 11/12/2012    TIBC 294 11/12/2012    FERRITIN 46.4 06/04/2012     BMP:   Lab Results   Component Value Date     02/11/2019     09/09/2018     09/08/2018    K 5.2 02/11/2019    K 4.8 09/09/2018    K 5.1 09/08/2018    K 4.2 09/07/2018    K 4.6 07/28/2018     02/11/2019     09/09/2018     09/08/2018    CO2 21 02/11/2019    CO2 25 09/09/2018    CO2 23 09/08/2018    PHOS 2.8 07/30/2018    PHOS 2.6 07/08/2016    PHOS 2.9 06/04/2012    BUN 25 02/11/2019    BUN 13 09/09/2018    BUN 18 09/08/2018    CREATININE 1.5 02/11/2019    CREATININE 1.0 09/09/2018    CREATININE 1.2 09/08/2018     BNP:   Lab Results   Component Value Date    PROBNP 2,958 06/30/2016    PROBNP 5,059 11/19/2015    PROBNP 5,674 07/09/2015     Lipids:   Lab Results   Component Value Date    CHOL 135 07/26/2018        Lab Results   Component Value Date    TRIG 89 07/26/2018        Lab Results   Component Value Date    HDL 77 (H) 07/26/2018        Lab Results   Component Value Date    LDLCALC 40 07/26/2018        Lab Results   Component Value Date    LABVLDL 18 07/26/2018      No results found for: CHOLHDLRATIO    EF: No results found for: LVEF, LVEFMODE    Recent Testing:  Echo  SANGEETHA: 07/13/15  Dilated cardiomyopathy, EF 20%. There is severe global hypokinesis. Moderate mitral regurgitation. Mild tricuspid regurgitation. Systolic pulmonary artery pressure is estimated at 42 mmHg consistent   with mild pulmonary hypertension.     Cardiac cath: 07/13/15  1.  Angiographically normal coronary arteries. 2.  Severe pulmonary hypertension 66/37 mean 49 mmHg. 3.  Markedly elevated pulmonary capillary wedge pressure 32 mmHg, and left  ventricular end diastolic pressure 36 mmHg.    4.  Transpulmonary gradient is 17 mmHg, which suggested a left-sided as well  intrinsic pulmonary etiology of the severe pulmonary hypertension.    5.  Mildly reduced cardiac index by Giovanni of 2.36 L/min/m2.     RECOMMENDATION:  The patient has no epicardial disease.  Her chest pain is  from the heart failure.  She is noted to have moderate to severe mitral  regurgitation on the echocardiogram, and will schedule for SANGEETHA to further  evaluate the mitral regurgitation.  In the meantime, we will also start her    on heart failure therapy that will include a beta-blocker and ACE inhibitor    as well as diuretic therapy.      Echo 10/16/17  Normal size left ventricle with mild concentric left ventricular  hypertrophy. Systolic function appears mildly reduced with an estimated ejection fraction  of 40-45 %. There is hypokinesis of the basal,mid inferoseptal wall, inferior wall and  anteroseptal wall. Elevated left ventricle diastolic filling pressure ( IVRT - 53 /ms ). Compared to last echo on 3/23/2017 the left ventricle systolic function has  improved. Mild thickening of leaflets of mitral valve. Mild mitral regurgitation. A bubble study was performed and shows evidence of right to left shunting  consistent with a patent foramen ovale or atrial septal defect.  No  intracardiac mass vegetations or thrombi.       NYHA:   II  ACC/ AHA Stage:    C    Pertinent Problems:  - Non-ischemic dilated cardiomyopathy, EF 45% Oct 17', previous EF 20%  - s/p BiV-AICD  - Pulmonary HTN due to CHF & intrinsic pulmonary disease   - LBBB  - status post gastric bypass    - Hypertension  - Hyperlipidemia    Visit Diagnosis:    1. Chronic diastolic congestive heart failure ([address]    2. Dilated cardiomyopathy ([address]    3. Chronic systolic congestive heart failure ([address]    4. Essential hypertension    5. Biventricular ICD (implantable cardioverter-defibrillator) in place        Plan:   1. Check BMP and CBC  2. Continue lisinopril  3. Continue Toprol Xl   4. No medication changes today  5. Continue torsemide 20 mg three times a week  6. Check daily weights and record them  7. Target about 2500mg of sodium or less a day and 64 fluid ounces a day  8. Stay as active as possible; you are doing a great job  9. Will consider increasing lisinopril to 5mg daily for cardiomyopathy if potassium is better, last check it was 5.2  10. Her weight gain is not from fluid overload or volume, due to excess calories. Her [place] shows stable thoracic impedence consistent with stable fluid index. 11. Follow up 5-6 months       QUALITY MEASURES  1. Tobacco Cessation Counseling: NA  2. Retake of BP if >140/90:   NA  3. Documentation to PCP/referring for new patient:  Sent to PCP at close of office visit  4. CAD patient on anti-platelet: NA  5. CAD patient on STATIN therapy:  NA  6. Patient with CHF and aFib on anticoagulation:  NA     I appreciate the opportunity for caring for this patient.      Dontae Dumont CNP, 5/8/2019, 1:08 PM

## 2019-05-08 NOTE — LETTER
[address]   Cardiac Follow-up    Primary Care Doctor: Sara Ragland MD    Chief Complaint   Patient presents with    Follow-up        History of Present Illness:   I had the pleasure of seeing Juanito Almanzar in follow up for cardiomyopathy. Hx of CHF, non-ischemic dilated cardiomyopathy, S/P  BiV-AICD 2016, pulmonary HTN, LBBB, HTN. Since last visit, she was seen by GI. Her weight is up to 272lbs, up about 15lbs since last visit. She thinks it is due to her diet, she hasn't been watching what she eats or drinks lately. She is having some care-giver stress at home, trying to take care of her  with dementia. She is taking the water pill three times a week; not getting the leg cramps like before. She feels better overall except for the weight gain. Minimal edema, she doesn't feel like she is holding onto much water. She was able to cut the grass and weed eat for the 1st time in a long time. She feels like the device has really helped her energy level. Breathing is stable and not getting short of breath with cutting the grass. She does pace herself through the activity to not get too tired. Not sure if she drinking too much. Not sleeping well at night- due to worrying and listening for her     Home weight 272lbs    Juanito Almanzar describes symptoms including edema but denies chest pain, dyspnea, palpitations, orthopnea, early saiety, syncope.      Past Medical History:   has a past medical history of Anesthesia complication, Autoimmune hemolytic anemia ([address] Bipolar disorder ([address] Bundle branch block, Cardiomyopathy ([address] Chronic systolic CHF (congestive heart failure) ([address] Depression, Family history of early CAD, Family history of ovarian cancer, GERD (gastroesophageal reflux disease), Gout, Hypertension, Hypothyroid, Osteoarthritis, knee, Pneumonia, S/P gastric bypass, Unspecified cerebral artery occlusion with cerebral infarction, Uterine cancer ([facility] and Vitamin B 12 deficiency. Surgical History:   has a past surgical history that includes Gastric bypass surgery (1/7/05); Finger surgery; Dilation and curettage of uterus; lipectomy; Cholecystectomy; other surgical history; Upper gastrointestinal endoscopy (4/23/13); Cardiac catheterization (7/10/2015); transesophageal echocardiogram (7/13/2015); other surgical history (4/1/16); pacemaker placement (09/2016); hernia repair (10/20/2016); ERCP; other surgical history; Upper gastrointestinal endoscopy (03/06/2019); Colonoscopy (03/06/2019); Upper gastrointestinal endoscopy (N/A, 3/6/2019); and Colonoscopy (N/A, 3/6/2019). Social History:   reports that she quit smoking about 34 years ago. Her smoking use included cigarettes. She has a 6.00 pack-year smoking history. She has never used smokeless tobacco. She reports that she does not drink alcohol or use drugs. Family History:   Family History   Problem Relation Age of Onset    Cancer Mother     Ovarian Cancer Mother 45        Technically peritoneal cancer    Depression Mother         bipolar    Arthritis Father     Rheum Arthritis Father     Arrhythmia Father     Other Father         gout    Heart Disease Father     Depression Brother         depression    Other Brother         gout    Obesity Brother     Other Brother         gout    Obesity Brother     Depression Brother     Cancer Maternal Grandmother         Breast       Home Medications:  Prior to Admission medications    Medication Sig Start Date End Date Taking? Authorizing Provider   metoprolol succinate (TOPROL XL) 50 MG extended release tablet Take 1 tablet by mouth daily 3/11/19  Yes ALICIA Stern CNP   allopurinol (ZYLOPRIM) 300 MG tablet TAKE 1 TABLET EVERY DAY 3/5/19  Yes Kurt Wall MD   lamoTRIgine (LAMICTAL) 150 MG tablet Take 1 tablet by mouth 2 times daily (or as otherwise directed).  3/5/19  Yes Gloria Javier MD bisacodyl (DULCOLAX) 5 MG EC tablet Take 1 tablet by mouth daily as needed for Constipation 2/26/19  Yes Kota Lynch MD   Cholecalciferol (VITAMIN D3) 2000 units CAPS Take by mouth daily   Yes Historical Provider, MD   BIOTIN PO Take by mouth daily   Yes Historical Provider, MD   levothyroxine (SYNTHROID) 150 MCG tablet TAKE 1 TABLET EVERY DAY EXCEPT FOR SUNDAY WHEN YOU ARE TO TAKE 2 TABLETS AS DIRECTED FOR A TOTAL OF 8 TABLETS/WEEK 12/31/18  Yes Anmol Wall MD   meloxicam (MOBIC) 15 MG tablet TAKE 1 TABLET BY MOUTH DAILY (WITH FOOD) AS NEEDED FOR PAIN. 12/31/18  Yes Anmol Wall MD   montelukast (SINGULAIR) 10 MG tablet Take 1 tablet by mouth nightly 11/27/18  Yes Arie Wall MD   lisinopril (PRINIVIL;ZESTRIL) 2.5 MG tablet TAKE 1 TABLET EVERY DAY 11/27/18  Yes ALICIA Brantley CNP   simvastatin (ZOCOR) 20 MG tablet TAKE 1 TABLET BY MOUTH NIGHTLY 10/29/18  Yes Arie Wall MD   acetaminophen (TYLENOL) 325 MG tablet Take 650 mg by mouth every 6 hours as needed for Pain TAKES EVERY DAY   Yes Historical Provider, MD   torsemide (DEMADEX) 20 MG tablet Take 1 tablet by mouth every other day Plus an additional dose as needed for weight gain, swelling or chest discomfort 2/27/18  Yes ALICIA Brantley CNP   aspirin 81 MG EC tablet Take 1 tablet by mouth daily 10/17/17  Yes Cleve Bennett MD   vitamin B-12 (CYANOCOBALAMIN) 1000 MCG tablet Take 1,000 mcg by mouth daily   Yes Historical Provider, MD   cetirizine (ZYRTEC) 10 MG tablet Take 10 mg by mouth daily   Yes Historical Provider, MD   lamoTRIgine (LAMICTAL) 150 MG tablet TAKE 1 TABLET BY MOUTH 2 TIMES DAILY (OR AS OTHERWISE DIRECTED).  4/26/19   Anmol Wall MD   pantoprazole (PROTONIX) 20 MG tablet Take 1 tablet by mouth every morning (before breakfast) 3/6/19 4/5/19  Kota Lynch MD   polyethylene glycol [facility]) POWD powder Take 238 g by mouth daily Take as directed for colonoscopy 2/26/19   Zeus Waters MD   Multiple Vitamins-Minerals (MULTIVITAMIN & MINERAL PO) Take by mouth daily     Historical Provider, MD        Allergies:  Dilaudid [hydromorphone hcl]; Lavender oil; Penicillins; Seroquel [quetiapine fumarate]; Adhesive tape; Hydromorphone hcl; Lithium; and Tetanus toxoids     Review of Systems:   · Constitutional: there has been no unanticipated weight loss. + weight gain    · Eyes: No vision changes  · ENT: No Headaches, no nasal congestion. No mouth sores or sore throat. · Cardiovascular: Reviewed in HPI  · Respiratory: No cough or wheezing, no sputum production. · Gastrointestinal: No abdominal pain, no constipation or diarrhea  · Genitourinary: No dysuria, trouble voiding, or hematuria. · Musculoskeletal:  No weakness or joint complaints. · Integumentary: No rash or pruritis. · Neurological: No numbness or tingling. No weakness. No tremor. · Psychiatric: +anxiety, + depression. · Endocrine:  No excessive thirst or urination. · Hematologic/Lymphatic: No abnormal bruising or bleeding, blood clots or swollen lymph nodes.     Physical Examination:    Vitals:    05/08/19 1021   BP: 120/64   Pulse: 59   SpO2: 98%   Weight: 274 lb 12.8 oz (124.6 kg)   Height: 5' 3\" (1.6 m)        Constitutional and General Appearance: no apparent distress, obese  HEENT: non-icteric sclera, oropharynx without exudate, oral mucosa moist  Neck: JVP less than 8 cm H20  Respiratory:  · No use of accessory muscles  · Clear breath sounds throughout, no wheezing, no crackles, no rhonchi  Cardiovascular:  · The apical impulses not displaced  · Heart tones are crisp and normal, no murmur/rub/gallop  · Regular rate and rhythm, S1,S2 normal  · Radial pulses 2+ and equal bilaterally  · No edema  · Pedal Pulses: 2+ and equal   Abdomen:  · No masses or tenderness  · Liver: No Abnormalities Noted  Musculoskeletal/Skin:  · Exhibits normal gait balance and coordination No results found for: CHOLHDLRATIO    EF: No results found for: LVEF, LVEFMODE    Recent Testing:  Echo  SANGEETHA: 07/13/15  Dilated cardiomyopathy, EF 20%. There is severe global hypokinesis. Moderate mitral regurgitation. Mild tricuspid regurgitation. Systolic pulmonary artery pressure is estimated at 42 mmHg consistent   with mild pulmonary hypertension.     Cardiac cath: 07/13/15  1.  Angiographically normal coronary arteries. 2.  Severe pulmonary hypertension 66/37 mean 49 mmHg. 3.  Markedly elevated pulmonary capillary wedge pressure 32 mmHg, and left  ventricular end diastolic pressure 36 mmHg.    4.  Transpulmonary gradient is 17 mmHg, which suggested a left-sided as well  intrinsic pulmonary etiology of the severe pulmonary hypertension.    5.  Mildly reduced cardiac index by Giovanni of 2.36 L/min/m2.     RECOMMENDATION:  The patient has no epicardial disease.  Her chest pain is  from the heart failure.  She is noted to have moderate to severe mitral  regurgitation on the echocardiogram, and will schedule for SANGEETHA to further  evaluate the mitral regurgitation.  In the meantime, we will also start her    on heart failure therapy that will include a beta-blocker and ACE inhibitor    as well as diuretic therapy.      Echo 10/16/17  Normal size left ventricle with mild concentric left ventricular  hypertrophy. Systolic function appears mildly reduced with an estimated ejection fraction  of 40-45 %. There is hypokinesis of the basal,mid inferoseptal wall, inferior wall and  anteroseptal wall. Elevated left ventricle diastolic filling pressure ( IVRT - 53 /ms ). Compared to last echo on 3/23/2017 the left ventricle systolic function has  improved. Mild thickening of leaflets of mitral valve. Mild mitral regurgitation. A bubble study was performed and shows evidence of right to left shunting  consistent with a patent foramen ovale or atrial septal defect.  No  intracardiac mass vegetations or thrombi.    NYHA:   II  ACC/ AHA Stage:    C    Pertinent Problems:  - Non-ischemic dilated cardiomyopathy, EF 45% Oct 17', previous EF 20%  - s/p BiV-AICD  - Pulmonary HTN due to CHF & intrinsic pulmonary disease   - LBBB  - status post gastric bypass    - Hypertension  - Hyperlipidemia    Visit Diagnosis:    1. Chronic diastolic congestive heart failure ([address]    2. Dilated cardiomyopathy ([address]    3. Chronic systolic congestive heart failure ([address]    4. Essential hypertension    5. Biventricular ICD (implantable cardioverter-defibrillator) in place        Plan:   1. Check BMP and CBC  2. Continue lisinopril  3. Continue Toprol Xl   4. No medication changes today  5. Continue torsemide 20 mg three times a week  6. Check daily weights and record them  7. Target about 2500mg of sodium or less a day and 64 fluid ounces a day  8. Stay as active as possible; you are doing a great job  9. Will consider increasing lisinopril to 5mg daily for cardiomyopathy if potassium is better, last check it was 5.2  10. Her weight gain is not from fluid overload or volume, due to excess calories. Her [place] shows stable thoracic impedence consistent with stable fluid index. 11. Follow up 5-6 months       QUALITY MEASURES  1. Tobacco Cessation Counseling: NA  2. Retake of BP if >140/90:   NA  3. Documentation to PCP/referring for new patient:  Sent to PCP at close of office visit  4. CAD patient on anti-platelet: NA  5. CAD patient on STATIN therapy:  NA  6. Patient with CHF and aFib on anticoagulation:  NA     I appreciate the opportunity for caring for this patient.      Savanah Junior CNP, 5/8/2019, 1:08 PM

## 2019-05-10 RX ORDER — SIMVASTATIN 20 MG
20 TABLET ORAL NIGHTLY
Qty: 90 TABLET | Refills: 1 | Status: SHIPPED | OUTPATIENT
Start: 2019-05-10 | End: 2019-11-02 | Stop reason: SDUPTHER

## 2019-06-04 ENCOUNTER — TELEPHONE (OUTPATIENT)
Dept: CARDIOLOGY CLINIC | Age: 59
End: 2019-06-04

## 2019-06-04 NOTE — TELEPHONE ENCOUNTER
Pt needs refill on torsemide (DEMADEX) 20 MG tablet     Pt is requesting this be changed to a 90 day supply. [facility]

## 2019-06-05 RX ORDER — TORSEMIDE 20 MG/1
TABLET ORAL
Qty: 90 TABLET | Refills: 3 | Status: SHIPPED | OUTPATIENT
Start: 2019-06-05 | End: 2019-08-05 | Stop reason: SDUPTHER

## 2019-07-09 DIAGNOSIS — F31.75 BIPOLAR DISORDER, IN PARTIAL REMISSION, MOST RECENT EPISODE DEPRESSED (HCC): Chronic | ICD-10-CM

## 2019-07-09 NOTE — TELEPHONE ENCOUNTER
Pepe Hoffmann [phone] (home) [phone] (work)   is requesting refill(s) of medication Lamotrigine to [address] 2/11/19 (pertaining to medication)   Last refill 4/26/19 (per medication requested)  Next office visit scheduled or attempted No  Date   If No, reason Pt is not scheduled.

## 2019-07-10 RX ORDER — LAMOTRIGINE 150 MG/1
TABLET ORAL
Qty: 180 TABLET | Refills: 1 | Status: SHIPPED | OUTPATIENT
Start: 2019-07-10 | End: 2020-03-04

## 2019-07-16 ENCOUNTER — NURSE ONLY (OUTPATIENT)
Dept: CARDIOLOGY CLINIC | Age: 59
End: 2019-07-16
Payer: MEDICARE

## 2019-07-16 DIAGNOSIS — Z95.810 BIVENTRICULAR ICD (IMPLANTABLE CARDIOVERTER-DEFIBRILLATOR) IN PLACE: ICD-10-CM

## 2019-07-16 DIAGNOSIS — I50.22 CHRONIC SYSTOLIC CONGESTIVE HEART FAILURE (HCC): ICD-10-CM

## 2019-07-16 DIAGNOSIS — I42.0 DILATED CARDIOMYOPATHY (HCC): ICD-10-CM

## 2019-07-16 PROCEDURE — 93296 REM INTERROG EVL PM/IDS: CPT | Performed by: INTERNAL MEDICINE

## 2019-07-16 PROCEDURE — 93297 REM INTERROG DEV EVAL ICPMS: CPT | Performed by: INTERNAL MEDICINE

## 2019-07-16 PROCEDURE — 93295 DEV INTERROG REMOTE 1/2/MLT: CPT | Performed by: INTERNAL MEDICINE

## 2019-07-29 ENCOUNTER — TELEPHONE (OUTPATIENT)
Dept: FAMILY MEDICINE CLINIC | Age: 59
End: 2019-07-29

## 2019-08-05 DIAGNOSIS — E78.5 HYPERLIPIDEMIA, UNSPECIFIED HYPERLIPIDEMIA TYPE: ICD-10-CM

## 2019-08-06 RX ORDER — SIMVASTATIN 20 MG
20 TABLET ORAL NIGHTLY
Qty: 90 TABLET | Refills: 1 | OUTPATIENT
Start: 2019-08-06

## 2019-08-08 ENCOUNTER — OFFICE VISIT (OUTPATIENT)
Dept: SURGERY | Age: 59
End: 2019-08-08
Payer: MEDICARE

## 2019-08-08 VITALS
DIASTOLIC BLOOD PRESSURE: 80 MMHG | WEIGHT: 274 LBS | SYSTOLIC BLOOD PRESSURE: 124 MMHG | BODY MASS INDEX: 48.55 KG/M2 | HEIGHT: 63 IN

## 2019-08-08 DIAGNOSIS — R10.84 GENERALIZED ABDOMINAL PAIN: Primary | ICD-10-CM

## 2019-08-08 DIAGNOSIS — M60.20 FOREIGN BODY GRANULOMA OF SOFT TISSUE: ICD-10-CM

## 2019-08-08 PROCEDURE — G8599 NO ASA/ANTIPLAT THER USE RNG: HCPCS | Performed by: SURGERY

## 2019-08-08 PROCEDURE — G8417 CALC BMI ABV UP PARAM F/U: HCPCS | Performed by: SURGERY

## 2019-08-08 PROCEDURE — 99214 OFFICE O/P EST MOD 30 MIN: CPT | Performed by: SURGERY

## 2019-08-08 PROCEDURE — 1036F TOBACCO NON-USER: CPT | Performed by: SURGERY

## 2019-08-08 PROCEDURE — G8427 DOCREV CUR MEDS BY ELIG CLIN: HCPCS | Performed by: SURGERY

## 2019-08-08 PROCEDURE — 3017F COLORECTAL CA SCREEN DOC REV: CPT | Performed by: SURGERY

## 2019-08-08 NOTE — PROGRESS NOTES
[facility] SURGERY    CHIEF COMPLAINT: Abdominal pain    SUBJECTIVE:   Patient presents for evaluation of some left-sided abdominal pain. She reports this is been going on for several months. She states is that she can feel a knot in her left mid abdomen. It hurts to press on it. It does not really bother her to move around. She was concerned she may have a recurrent hernia. Nothing other than touching it seems to make it better or worse. Straining does not aggravate it. It does seem to be more prominent when she is laying down. Allergies   Allergen Reactions    Dilaudid [Hydromorphone Hcl] Other (See Comments)     Palpitations and orthostatic hypotension    Lavender Oil Shortness Of Breath and Rash    Penicillins Shortness Of Breath    Seroquel [Quetiapine Fumarate] Other (See Comments)     Very lethargic/almost not funtioning at all (per patient).  Adhesive Tape Other (See Comments)     BLISTERS, paper tape okay    Hydromorphone Hcl      Other reaction(s): Other (See Comments)  Rapid heart rate     Lithium Other (See Comments)     Diarrhea, vomiting, nausea, headaches,     Tetanus Toxoids Swelling     Outpatient Medications Marked as Taking for the 8/8/19 encounter (Office Visit) with Megan Nolasco MD   Medication Sig Dispense Refill    torsemide (DEMADEX) 20 MG tablet Three times a week 30 tablet 3    lamoTRIgine (LAMICTAL) 150 MG tablet TAKE 1 TABLET TWICE DAILY OR AS OTHERWISE DIRECTED 180 tablet 1    simvastatin (ZOCOR) 20 MG tablet TAKE 1 TABLET BY MOUTH NIGHTLY 90 tablet 1    metoprolol succinate (TOPROL XL) 50 MG extended release tablet Take 1 tablet by mouth daily 90 tablet 2    allopurinol (ZYLOPRIM) 300 MG tablet TAKE 1 TABLET EVERY DAY 90 tablet 3    lamoTRIgine (LAMICTAL) 150 MG tablet Take 1 tablet by mouth 2 times daily (or as otherwise directed).  180 tablet 1    bisacodyl (DULCOLAX) 5 MG EC tablet Take 1 tablet by mouth daily as needed for Constipation 4 CATHETERIZATION  7/10/2015    Normal Coronary Arteries    CHOLECYSTECTOMY      COLONOSCOPY  03/06/2019    NL    COLONOSCOPY N/A 3/6/2019    EGD AND COLONOSCOPY WITH ANESTHESIA performed by Bean Roach MD at [address] [place] [address]      4 times    ERCP      FINGER SURGERY      gout X 2    GASTRIC BYPASS SURGERY  1/7/05    HERNIA REPAIR  10/20/2016    lap ventral hernia    LIPECTOMY      gangrenous    OTHER SURGICAL HISTORY      radium implants into uterus for uterine surgery X 2    OTHER SURGICAL HISTORY  4/1/16    exp.  lap lysis of adhesion    OTHER SURGICAL HISTORY      biventricular pacer and defibrillator    PACEMAKER PLACEMENT  09/2016    TRANSESOPHAGEAL ECHOCARDIOGRAM  7/13/2015    UPPER GASTROINTESTINAL ENDOSCOPY  4/23/13    UPPER GASTROINTESTINAL ENDOSCOPY  03/06/2019    NL    UPPER GASTROINTESTINAL ENDOSCOPY N/A 3/6/2019    EGD AND COLONOSCOPY WITH ANESTHESIA performed by Bean Roach MD at [address]     Family History   Problem Relation Age of Onset    Cancer Mother     Ovarian Cancer Mother 45        Technically peritoneal cancer    Depression Mother         bipolar    Arthritis Father    Arthur [facility]ian Rheum Arthritis Father     Arrhythmia Father     Other Father         gout    Heart Disease Father     Depression Brother         depression    Other Brother         gout    Obesity Brother     Other Brother         gout    Obesity Brother     Depression Brother     Cancer Maternal Grandmother         Breast     Social History     Socioeconomic History    Marital status:      Spouse name: Not on file    Number of children: Not on file    Years of education: Not on file    Highest education level: Not on file   Occupational History    Not on file   Social Needs    Financial resource strain: Not on file    Food insecurity:     Worry: Not on file     Inability: Not on file    Transportation needs:     Medical: Not

## 2019-09-26 DIAGNOSIS — M79.671 RIGHT FOOT PAIN: ICD-10-CM

## 2019-09-26 DIAGNOSIS — E03.9 HYPOTHYROIDISM, UNSPECIFIED TYPE: ICD-10-CM

## 2019-09-27 RX ORDER — MELOXICAM 15 MG/1
TABLET ORAL
Qty: 90 TABLET | Refills: 1 | Status: SHIPPED | OUTPATIENT
Start: 2019-09-27 | End: 2020-03-04

## 2019-09-27 RX ORDER — LEVOTHYROXINE SODIUM 0.15 MG/1
TABLET ORAL
Qty: 103 TABLET | Refills: 3 | Status: SHIPPED | OUTPATIENT
Start: 2019-09-27 | End: 2020-07-10

## 2019-10-07 ENCOUNTER — OFFICE VISIT (OUTPATIENT)
Dept: FAMILY MEDICINE CLINIC | Age: 59
End: 2019-10-07
Payer: MEDICARE

## 2019-10-07 VITALS
DIASTOLIC BLOOD PRESSURE: 82 MMHG | BODY MASS INDEX: 49.79 KG/M2 | OXYGEN SATURATION: 98 % | HEIGHT: 63 IN | WEIGHT: 281 LBS | HEART RATE: 87 BPM | SYSTOLIC BLOOD PRESSURE: 122 MMHG

## 2019-10-07 DIAGNOSIS — R53.83 FATIGUE, UNSPECIFIED TYPE: ICD-10-CM

## 2019-10-07 DIAGNOSIS — F31.75 BIPOLAR DISORDER, IN PARTIAL REMISSION, MOST RECENT EPISODE DEPRESSED (HCC): ICD-10-CM

## 2019-10-07 DIAGNOSIS — E55.9 VITAMIN D DEFICIENCY: ICD-10-CM

## 2019-10-07 DIAGNOSIS — R73.09 ELEVATED GLUCOSE: ICD-10-CM

## 2019-10-07 DIAGNOSIS — I10 ESSENTIAL HYPERTENSION, BENIGN: Primary | ICD-10-CM

## 2019-10-07 DIAGNOSIS — Z23 NEED FOR INFLUENZA VACCINATION: ICD-10-CM

## 2019-10-07 DIAGNOSIS — Z86.69 HISTORY OF HEMIPARESIS: ICD-10-CM

## 2019-10-07 DIAGNOSIS — E78.5 HYPERLIPIDEMIA, UNSPECIFIED HYPERLIPIDEMIA TYPE: ICD-10-CM

## 2019-10-07 DIAGNOSIS — E03.9 HYPOTHYROIDISM, UNSPECIFIED TYPE: ICD-10-CM

## 2019-10-07 LAB
A/G RATIO: 2.2 (ref 1.1–2.2)
ALBUMIN SERPL-MCNC: 4.7 G/DL (ref 3.4–5)
ALP BLD-CCNC: 135 U/L (ref 40–129)
ALT SERPL-CCNC: 10 U/L (ref 10–40)
ANION GAP SERPL CALCULATED.3IONS-SCNC: 14 MMOL/L (ref 3–16)
AST SERPL-CCNC: 19 U/L (ref 15–37)
BILIRUB SERPL-MCNC: 0.5 MG/DL (ref 0–1)
BUN BLDV-MCNC: 24 MG/DL (ref 7–20)
CALCIUM SERPL-MCNC: 9.5 MG/DL (ref 8.3–10.6)
CHLORIDE BLD-SCNC: 105 MMOL/L (ref 99–110)
CHOLESTEROL, TOTAL: 143 MG/DL (ref 0–199)
CO2: 22 MMOL/L (ref 21–32)
CREAT SERPL-MCNC: 1.2 MG/DL (ref 0.6–1.2)
FOLATE: 17.78 NG/ML (ref 4.78–24.2)
GFR AFRICAN AMERICAN: 55
GFR NON-AFRICAN AMERICAN: 46
GLOBULIN: 2.1 G/DL
GLUCOSE BLD-MCNC: 98 MG/DL (ref 70–99)
HDLC SERPL-MCNC: 90 MG/DL (ref 40–60)
LDL CHOLESTEROL CALCULATED: 40 MG/DL
POTASSIUM SERPL-SCNC: 4.9 MMOL/L (ref 3.5–5.1)
SODIUM BLD-SCNC: 141 MMOL/L (ref 136–145)
TOTAL PROTEIN: 6.8 G/DL (ref 6.4–8.2)
TRIGL SERPL-MCNC: 64 MG/DL (ref 0–150)
VITAMIN B-12: >2000 PG/ML (ref 211–911)
VITAMIN D 25-HYDROXY: 41.8 NG/ML
VLDLC SERPL CALC-MCNC: 13 MG/DL

## 2019-10-07 PROCEDURE — G8427 DOCREV CUR MEDS BY ELIG CLIN: HCPCS | Performed by: FAMILY MEDICINE

## 2019-10-07 PROCEDURE — 36415 COLL VENOUS BLD VENIPUNCTURE: CPT | Performed by: FAMILY MEDICINE

## 2019-10-07 PROCEDURE — G8482 FLU IMMUNIZE ORDER/ADMIN: HCPCS | Performed by: FAMILY MEDICINE

## 2019-10-07 PROCEDURE — 3017F COLORECTAL CA SCREEN DOC REV: CPT | Performed by: FAMILY MEDICINE

## 2019-10-07 PROCEDURE — G8599 NO ASA/ANTIPLAT THER USE RNG: HCPCS | Performed by: FAMILY MEDICINE

## 2019-10-07 PROCEDURE — 1036F TOBACCO NON-USER: CPT | Performed by: FAMILY MEDICINE

## 2019-10-07 PROCEDURE — 90686 IIV4 VACC NO PRSV 0.5 ML IM: CPT | Performed by: FAMILY MEDICINE

## 2019-10-07 PROCEDURE — 99214 OFFICE O/P EST MOD 30 MIN: CPT | Performed by: FAMILY MEDICINE

## 2019-10-07 PROCEDURE — G8417 CALC BMI ABV UP PARAM F/U: HCPCS | Performed by: FAMILY MEDICINE

## 2019-10-07 PROCEDURE — G0008 ADMIN INFLUENZA VIRUS VAC: HCPCS | Performed by: FAMILY MEDICINE

## 2019-10-08 LAB
ESTIMATED AVERAGE GLUCOSE: 105.4 MG/DL
HBA1C MFR BLD: 5.3 %

## 2019-10-15 ENCOUNTER — NURSE ONLY (OUTPATIENT)
Dept: CARDIOLOGY CLINIC | Age: 59
End: 2019-10-15

## 2019-10-15 DIAGNOSIS — Z95.810 BIVENTRICULAR ICD (IMPLANTABLE CARDIOVERTER-DEFIBRILLATOR) IN PLACE: ICD-10-CM

## 2019-10-23 ENCOUNTER — OFFICE VISIT (OUTPATIENT)
Dept: CARDIOLOGY CLINIC | Age: 59
End: 2019-10-23
Payer: MEDICARE

## 2019-10-23 ENCOUNTER — NURSE ONLY (OUTPATIENT)
Dept: CARDIOLOGY CLINIC | Age: 59
End: 2019-10-23
Payer: MEDICARE

## 2019-10-23 VITALS
HEIGHT: 63 IN | HEART RATE: 63 BPM | OXYGEN SATURATION: 99 % | WEIGHT: 285.8 LBS | DIASTOLIC BLOOD PRESSURE: 72 MMHG | SYSTOLIC BLOOD PRESSURE: 112 MMHG | BODY MASS INDEX: 50.64 KG/M2

## 2019-10-23 DIAGNOSIS — I10 ESSENTIAL HYPERTENSION: ICD-10-CM

## 2019-10-23 DIAGNOSIS — I50.22 CHRONIC SYSTOLIC CONGESTIVE HEART FAILURE (HCC): Primary | ICD-10-CM

## 2019-10-23 DIAGNOSIS — I50.22 CHRONIC SYSTOLIC CONGESTIVE HEART FAILURE (HCC): ICD-10-CM

## 2019-10-23 DIAGNOSIS — I42.0 DILATED CARDIOMYOPATHY (HCC): ICD-10-CM

## 2019-10-23 DIAGNOSIS — Z95.810 BIVENTRICULAR ICD (IMPLANTABLE CARDIOVERTER-DEFIBRILLATOR) IN PLACE: ICD-10-CM

## 2019-10-23 DIAGNOSIS — I50.32 CHRONIC DIASTOLIC CONGESTIVE HEART FAILURE (HCC): ICD-10-CM

## 2019-10-23 PROCEDURE — G8482 FLU IMMUNIZE ORDER/ADMIN: HCPCS | Performed by: NURSE PRACTITIONER

## 2019-10-23 PROCEDURE — 93290 INTERROG DEV EVAL ICPMS IP: CPT | Performed by: NURSE PRACTITIONER

## 2019-10-23 PROCEDURE — 99213 OFFICE O/P EST LOW 20 MIN: CPT | Performed by: NURSE PRACTITIONER

## 2019-10-23 PROCEDURE — 1036F TOBACCO NON-USER: CPT | Performed by: NURSE PRACTITIONER

## 2019-10-23 PROCEDURE — 93284 PRGRMG EVAL IMPLANTABLE DFB: CPT | Performed by: INTERNAL MEDICINE

## 2019-10-23 PROCEDURE — G8417 CALC BMI ABV UP PARAM F/U: HCPCS | Performed by: NURSE PRACTITIONER

## 2019-10-23 PROCEDURE — G8427 DOCREV CUR MEDS BY ELIG CLIN: HCPCS | Performed by: NURSE PRACTITIONER

## 2019-10-23 PROCEDURE — 3017F COLORECTAL CA SCREEN DOC REV: CPT | Performed by: NURSE PRACTITIONER

## 2019-10-23 PROCEDURE — G8599 NO ASA/ANTIPLAT THER USE RNG: HCPCS | Performed by: NURSE PRACTITIONER

## 2019-11-01 ENCOUNTER — HOSPITAL ENCOUNTER (OUTPATIENT)
Dept: NON INVASIVE DIAGNOSTICS | Age: 59
Discharge: HOME | End: 2019-11-01
Payer: MEDICARE

## 2019-11-01 ENCOUNTER — TELEPHONE (OUTPATIENT)
Dept: CARDIOLOGY CLINIC | Age: 59
End: 2019-11-01

## 2019-11-01 DIAGNOSIS — I50.22 CHRONIC SYSTOLIC CONGESTIVE HEART FAILURE (HCC): ICD-10-CM

## 2019-11-01 LAB
LV EF: 43 %
LVEF MODALITY: NORMAL

## 2019-11-01 PROCEDURE — 93306 TTE W/DOPPLER COMPLETE: CPT

## 2019-11-02 DIAGNOSIS — E78.5 HYPERLIPIDEMIA, UNSPECIFIED HYPERLIPIDEMIA TYPE: ICD-10-CM

## 2019-11-05 ASSESSMENT — ENCOUNTER SYMPTOMS
ABDOMINAL PAIN: 0
SHORTNESS OF BREATH: 0
EYE PAIN: 0

## 2019-11-06 RX ORDER — SIMVASTATIN 20 MG
20 TABLET ORAL NIGHTLY
Qty: 90 TABLET | Refills: 1 | Status: SHIPPED | OUTPATIENT
Start: 2019-11-06 | End: 2020-05-15 | Stop reason: SDUPTHER

## 2019-12-31 RX ORDER — MONTELUKAST SODIUM 10 MG/1
10 TABLET ORAL NIGHTLY
Qty: 90 TABLET | Refills: 3 | Status: SHIPPED | OUTPATIENT
Start: 2019-12-31 | End: 2020-10-14 | Stop reason: SDUPTHER

## 2020-01-21 ENCOUNTER — NURSE ONLY (OUTPATIENT)
Dept: CARDIOLOGY CLINIC | Age: 60
End: 2020-01-21
Payer: MEDICARE

## 2020-01-21 PROCEDURE — 93295 DEV INTERROG REMOTE 1/2/MLT: CPT | Performed by: INTERNAL MEDICINE

## 2020-01-21 PROCEDURE — 93297 REM INTERROG DEV EVAL ICPMS: CPT | Performed by: NURSE PRACTITIONER

## 2020-01-21 PROCEDURE — 93296 REM INTERROG EVL PM/IDS: CPT | Performed by: INTERNAL MEDICINE

## 2020-01-23 ENCOUNTER — TELEPHONE (OUTPATIENT)
Dept: FAMILY MEDICINE CLINIC | Age: 60
End: 2020-01-23

## 2020-01-24 NOTE — TELEPHONE ENCOUNTER
With her complex medical conditions she would be better served seeing a physician, I would refer to Dr. Loco Dominguez

## 2020-01-27 NOTE — TELEPHONE ENCOUNTER
I am unable to accommodate please refer to another [facility] office.

## 2020-02-07 ENCOUNTER — OFFICE VISIT (OUTPATIENT)
Dept: CARDIOLOGY CLINIC | Age: 60
End: 2020-02-07
Payer: MEDICARE

## 2020-02-07 ENCOUNTER — HOSPITAL ENCOUNTER (OUTPATIENT)
Age: 60
Discharge: HOME | End: 2020-02-07
Payer: MEDICARE

## 2020-02-07 VITALS
WEIGHT: 288 LBS | DIASTOLIC BLOOD PRESSURE: 76 MMHG | BODY MASS INDEX: 51.03 KG/M2 | HEIGHT: 63 IN | OXYGEN SATURATION: 98 % | HEART RATE: 76 BPM | SYSTOLIC BLOOD PRESSURE: 136 MMHG

## 2020-02-07 LAB
A/G RATIO: 1.6 (ref 1.1–2.2)
ALBUMIN SERPL-MCNC: 4.2 G/DL (ref 3.4–5)
ALP BLD-CCNC: 126 U/L (ref 40–129)
ALT SERPL-CCNC: 10 U/L (ref 10–40)
ANION GAP SERPL CALCULATED.3IONS-SCNC: 12 MMOL/L (ref 3–16)
AST SERPL-CCNC: 17 U/L (ref 15–37)
BILIRUB SERPL-MCNC: 0.5 MG/DL (ref 0–1)
BUN BLDV-MCNC: 20 MG/DL (ref 7–20)
CALCIUM SERPL-MCNC: 9.5 MG/DL (ref 8.3–10.6)
CHLORIDE BLD-SCNC: 109 MMOL/L (ref 99–110)
CO2: 22 MMOL/L (ref 21–32)
CREAT SERPL-MCNC: 1.2 MG/DL (ref 0.6–1.2)
GFR AFRICAN AMERICAN: 55
GFR NON-AFRICAN AMERICAN: 46
GLOBULIN: 2.6 G/DL
GLUCOSE BLD-MCNC: 73 MG/DL (ref 70–99)
HCT VFR BLD CALC: 38.5 % (ref 36–48)
HEMOGLOBIN: 12.6 G/DL (ref 12–16)
MCH RBC QN AUTO: 30.6 PG (ref 26–34)
MCHC RBC AUTO-ENTMCNC: 32.7 G/DL (ref 31–36)
MCV RBC AUTO: 93.5 FL (ref 80–100)
PDW BLD-RTO: 14.8 % (ref 12.4–15.4)
PLATELET # BLD: 148 K/UL (ref 135–450)
PMV BLD AUTO: 11.1 FL (ref 5–10.5)
POTASSIUM SERPL-SCNC: 4.6 MMOL/L (ref 3.5–5.1)
RBC # BLD: 4.12 M/UL (ref 4–5.2)
SODIUM BLD-SCNC: 143 MMOL/L (ref 136–145)
TOTAL PROTEIN: 6.8 G/DL (ref 6.4–8.2)
WBC # BLD: 6.1 K/UL (ref 4–11)

## 2020-02-07 PROCEDURE — 36415 COLL VENOUS BLD VENIPUNCTURE: CPT

## 2020-02-07 PROCEDURE — 85027 COMPLETE CBC AUTOMATED: CPT

## 2020-02-07 PROCEDURE — G8427 DOCREV CUR MEDS BY ELIG CLIN: HCPCS | Performed by: NURSE PRACTITIONER

## 2020-02-07 PROCEDURE — 3017F COLORECTAL CA SCREEN DOC REV: CPT | Performed by: NURSE PRACTITIONER

## 2020-02-07 PROCEDURE — 1036F TOBACCO NON-USER: CPT | Performed by: NURSE PRACTITIONER

## 2020-02-07 PROCEDURE — G8417 CALC BMI ABV UP PARAM F/U: HCPCS | Performed by: NURSE PRACTITIONER

## 2020-02-07 PROCEDURE — G8482 FLU IMMUNIZE ORDER/ADMIN: HCPCS | Performed by: NURSE PRACTITIONER

## 2020-02-07 PROCEDURE — 99213 OFFICE O/P EST LOW 20 MIN: CPT | Performed by: NURSE PRACTITIONER

## 2020-02-07 PROCEDURE — 80053 COMPREHEN METABOLIC PANEL: CPT

## 2020-02-07 NOTE — PATIENT INSTRUCTIONS
1. Check labs CMP and CBC  2. Continue lisinopril - may consider adjusting this med for heart function   3. Continue Toprol Xl   4. Continue torsemide 20 mg three times a week- if lisinopril is increased then will plan to decrease the torsemide to 10mg three times a week. 5. Check daily weights and record them; stay as active as possible  6.  Follow up 3 months

## 2020-02-07 NOTE — PROGRESS NOTES
[facility]   [facility]    Primary Care Doctor: Carissa Casillas MD    Chief Complaint   Patient presents with    3 Month Follow-Up        History of Present Illness:   I had the pleasure of seeing Luz Marina Wilcox in follow up for cardiomyopathy. Hx of CHF, non-ischemic dilated cardiomyopathy, S/P  BiV-AICD 2016, pulmonary HTN, LBBB, HTN. Since last visit, echo showed same LVEF. She contues to take care her , who requires 24 hours care. Diet  is off due to stressors, not eating good foods, grazing again. Weight is up. No edema. Having a lot of anxiety attacks, trying to cope. verablizes the need for exercise. Taking torsemide three times a week- does have a response to it and on some torsemide days she has a little more lightheadedness. Doesn't sleep well. Chest pains with the anxiety at times. Not checking daily weights  Weight is up another 3lbs since last visit. Luz Marina Wilcox describes symptoms including palpitations but denies early saiety, edema, syncope. Past Medical History:   has a past medical history of Anesthesia complication, Autoimmune hemolytic anemia ([address] Bipolar disorder ([address] Bundle branch block, Cardiomyopathy ([address] Chronic systolic CHF (congestive heart failure) ([address] Depression, Family history of early CAD, Family history of ovarian cancer, GERD (gastroesophageal reflux disease), Gout, Hypertension, Hypothyroid, Osteoarthritis, knee, Pneumonia, S/P gastric bypass, Unspecified cerebral artery occlusion with cerebral infarction, Uterine cancer ([address] and Vitamin B 12 deficiency. Surgical History:   has a past surgical history that includes Gastric bypass surgery (1/7/05); Finger surgery; Dilation and curettage of uterus; lipectomy; Cholecystectomy; other surgical history; Upper gastrointestinal endoscopy (4/23/13);  Cardiac catheterization (7/10/2015); transesophageal echocardiogram (7/13/2015); other surgical history (4/1/16); pacemaker AS OTHERWISE DIRECTED 7/10/19  Yes Arie Wall MD   allopurinol (ZYLOPRIM) 300 MG tablet TAKE 1 TABLET EVERY DAY 3/5/19  Yes Camryn Wall MD   lamoTRIgine (LAMICTAL) 150 MG tablet Take 1 tablet by mouth 2 times daily (or as otherwise directed). 3/5/19  Yes Camryn Wall MD   bisacodyl (DULCOLAX) 5 MG EC tablet Take 1 tablet by mouth daily as needed for Constipation 2/26/19  Yes Lesly Clay MD   polyethylene glycol [facility]) POWD powder Take 238 g by mouth daily Take as directed for colonoscopy 2/26/19  Yes Lesly Clay MD   Cholecalciferol (VITAMIN D3) 2000 units CAPS Take by mouth daily   Yes Historical Provider, MD   BIOTIN PO Take by mouth daily   Yes Historical Provider, MD   lisinopril (PRINIVIL;ZESTRIL) 2.5 MG tablet TAKE 1 TABLET EVERY DAY 11/27/18  Yes ALICIA Brand CNP   acetaminophen (TYLENOL) 325 MG tablet Take 650 mg by mouth every 6 hours as needed for Pain TAKES EVERY DAY   Yes Historical Provider, MD   aspirin 81 MG EC tablet Take 1 tablet by mouth daily 10/17/17  Yes Pia Ellison MD   vitamin B-12 (CYANOCOBALAMIN) 1000 MCG tablet Take 1,000 mcg by mouth daily   Yes Historical Provider, MD   Multiple Vitamins-Minerals (MULTIVITAMIN & MINERAL PO) Take by mouth daily    Yes Historical Provider, MD   cetirizine (ZYRTEC) 10 MG tablet Take 10 mg by mouth daily   Yes Historical Provider, MD   pantoprazole (PROTONIX) 20 MG tablet Take 1 tablet by mouth every morning (before breakfast) 3/6/19 4/5/19  Lesly Clay MD        Allergies:  Dilaudid [hydromorphone hcl]; Lavender oil; Penicillins; Seroquel [quetiapine fumarate]; Adhesive tape; Hydromorphone hcl; Lithium; and Tetanus toxoids     Review of Systems:   · Constitutional: there has been no unanticipated weight loss. + weight gain    · Eyes: No vision changes  · ENT: No Headaches, no nasal congestion. No mouth sores or sore throat.   · Cardiovascular: Reviewed in HPI  · Respiratory: No cough or wheezing, no sputum production. · Gastrointestinal: No abdominal pain, no constipation or diarrhea  · Genitourinary: No dysuria, trouble voiding, or hematuria. · Musculoskeletal:  + weakness or joint complaints. · Integumentary: No rash or pruritis. · Neurological: No numbness or tingling. No weakness. No tremor. · Psychiatric: +anxiety, + depression. · Endocrine:  No excessive thirst or urination. · Hematologic/Lymphatic: No abnormal bruising or bleeding, blood clots or swollen lymph nodes.     Physical Examination:    Vitals:    02/07/20 0857   BP: 136/76   Pulse: 76   SpO2: 98%   Weight: 288 lb (130.6 kg)   Height: 5' 3\" (1.6 m)        Constitutional and General Appearance: no apparent distress, obese, appears tired and pale  HEENT: non-icteric sclera, oropharynx without exudate, oral mucosa moist  Neck: JVP less than 8 cm H20  Respiratory:  · No use of accessory muscles  · Clear breath sounds throughout, no wheezing, no crackles, no rhonchi  Cardiovascular:  · The apical impulses not displaced  ·  no murmur/rub/gallop  · Regular rate and rhythm, S1,S2 normal  · Radial pulses 2+ and equal bilaterally  · + BLE edema  · Pedal Pulses: 2+ and equal   Abdomen:  · No masses or tenderness  · Liver: No Abnormalities Noted  Musculoskeletal/Skin:  · Exhibits normal gait balance and coordination  · There is no clubbing, cyanosis of the extremities  · Skin is warm and dry  · Moves all extremities well  Neurological/Psychiatric:  · Alert and oriented in all spheres, tangential speech  · No abnormalities of affect, memory, mentation, or behavior are noted    Lab Data:  CBC:   Lab Results   Component Value Date    WBC 5.1 05/08/2019    WBC 4.7 02/11/2019    WBC 5.6 09/09/2018    RBC 3.96 05/08/2019    RBC 4.03 02/11/2019    RBC 3.34 09/09/2018    HGB 12.6 05/08/2019    HGB 12.7 02/11/2019    HGB 10.7 09/09/2018    HCT 38.2 05/08/2019    HCT 40.1 02/11/2019    HCT 32.6 09/09/2018    MCV 96.5 05/08/2019    MCV 99.6 02/11/2019    MCV 97.4 09/09/2018    RDW 14.3 05/08/2019    RDW 17.7 02/11/2019    RDW 14.9 09/09/2018     05/08/2019     02/11/2019     09/09/2018     Iron:   Lab Results   Component Value Date    IRON 74 11/12/2012    TIBC 294 11/12/2012    FERRITIN 46.4 06/04/2012     BMP:   Lab Results   Component Value Date     10/07/2019     05/08/2019     02/11/2019    K 4.9 10/07/2019    K 4.9 05/08/2019    K 5.2 02/11/2019    K 5.1 09/08/2018    K 4.6 07/28/2018     10/07/2019     05/08/2019     02/11/2019    CO2 22 10/07/2019    CO2 24 05/08/2019    CO2 21 02/11/2019    PHOS 2.8 07/30/2018    PHOS 2.6 07/08/2016    PHOS 2.9 06/04/2012    BUN 24 10/07/2019    BUN 21 05/08/2019    BUN 25 02/11/2019    CREATININE 1.2 10/07/2019    CREATININE 1.2 05/08/2019    CREATININE 1.5 02/11/2019     BNP:   Lab Results   Component Value Date    PROBNP 2,958 06/30/2016    PROBNP 5,059 11/19/2015    PROBNP 5,674 07/09/2015     Lipids:   Lab Results   Component Value Date    CHOL 143 10/07/2019        Lab Results   Component Value Date    TRIG 64 10/07/2019        Lab Results   Component Value Date    HDL 90 (H) 10/07/2019        Lab Results   Component Value Date    LDLCALC 40 10/07/2019        Lab Results   Component Value Date    LABVLDL 13 10/07/2019      No results found for: CHOLHDLRATIO    EF:   Lab Results   Component Value Date    LVEF 43 11/01/2019       Recent Testing:  SANGEETHA: 07/13/15  Dilated cardiomyopathy, EF 20%. There is severe global hypokinesis. Moderate mitral regurgitation. Mild tricuspid regurgitation. Systolic pulmonary artery pressure is estimated at 42 mmHg consistent   with mild pulmonary hypertension.     Cardiac cath: 07/13/15  1.  Angiographically normal coronary arteries. 2.  Severe pulmonary hypertension 66/37 mean 49 mmHg.   3.  Markedly elevated pulmonary capillary wedge pressure 32 mmHg, and left  ventricular end diastolic pressure 36 mmHg.    4.  Transpulmonary gradient is 17 mmHg, which suggested a left-sided as well  intrinsic pulmonary etiology of the severe pulmonary hypertension.    5.  Mildly reduced cardiac index by Giovanni of 2.36 L/min/m2.     RECOMMENDATION:  The patient has no epicardial disease.  Her chest pain is from the heart failure.  She is noted to have moderate to severe mitral  regurgitation on the echocardiogram, and will schedule for SANGEETHA to further  evaluate the mitral regurgitation.  In the meantime, we will also start her    on heart failure therapy that will include a beta-blocker and ACE inhibitor    as well as diuretic therapy.      Echo 10/16/17  Normal size left ventricle with mild concentric left ventricular  hypertrophy. Systolic function appears mildly reduced with an estimated ejection fraction  of 40-45 %. There is hypokinesis of the basal,mid inferoseptal wall, inferior wall and  anteroseptal wall. Elevated left ventricle diastolic filling pressure ( IVRT - 53 /ms ). Compared to last echo on 3/23/2017 the left ventricle systolic function has  improved. Mild thickening of leaflets of mitral valve. Mild mitral regurgitation. A bubble study was performed and shows evidence of right to left shunting  consistent with a patent foramen ovale or atrial septal defect. No  intracardiac mass vegetations or thrombi. Echo 11/1/2019  Summary   The left ventricular systolic function is mildly reduced with an ejection   fraction of 40-45 %. Anteroseptal wall hypokinesis. There is mild concentric left ventricular hypertrophy. Left ventricular cavity size is mildly dilated. Grade I diastolic dysfunction with normal left ventricular filling pressure. Mild posterior mitral annular calcification. Mild thickening of the anterior leaflet of mitral valve. Moderate mitral regurgitation. The left atrium is mildly dilated.    Frequent premature beats makes it difficult to identify exact wall motion   abnormality.       NYHA:   II  ACC/ AHA Stage: C    Pertinent Problems:  - Non-ischemic dilated cardiomyopathy, EF 45% Oct 17', previous EF 20%  - s/p BiV-AICD  - Pulmonary HTN due to CHF & intrinsic pulmonary disease   - LBBB  - status post gastric bypass    - Hypertension  - Hyperlipidemia    Visit Diagnosis:    1. Chronic systolic congestive heart failure ([facility]    2. Biventricular ICD (implantable cardioverter-defibrillator) in place    3. Dilated cardiomyopathy ([facility]    4. Essential hypertension    5. Morbid obesity with BMI of 45.0-49.9, adult ([facility]        Plan:     1. Check labs CMP and CBC  2. Continue lisinopril - may consider adjusting this med for heart function   3. Continue Toprol Xl   4. Continue torsemide 20 mg three times a week- if lisinopril is increased then will plan to decrease the torsemide to 10mg three times a week. 5. Check daily weights and record them  6. Follow up 3 months   7. Discussed exercise at length- plan is to start exercise 2-3 minutes daily and build up. QUALITY MEASURES  1. Tobacco Cessation Counseling: NA  2. Retake of BP if >140/90:   NA   3. Documentation to PCP/referring for new patient:  Sent to PCP at close of office visit  4. CAD patient on anti-platelet: NA  5. CAD patient on STATIN therapy:  NA  6. Patient with CHF and aFib on anticoagulation:  NA     I appreciate the opportunity for caring for this patient.      Cinthya Kulkarni CNP, 2/7/2020, 10:06 AM

## 2020-02-10 ENCOUNTER — TELEPHONE (OUTPATIENT)
Dept: CARDIOLOGY CLINIC | Age: 60
End: 2020-02-10

## 2020-02-10 RX ORDER — LISINOPRIL 5 MG/1
5 TABLET ORAL DAILY
Qty: 90 TABLET | Refills: 3 | Status: SHIPPED | OUTPATIENT
Start: 2020-02-10 | End: 2020-12-04

## 2020-02-10 NOTE — TELEPHONE ENCOUNTER
----- Message from ALICIA Winn CNP sent at 2/10/2020  8:43 AM EST -----  Labs are stable. Kidney function is stable. Liver and electrolytes are normal. Please increase lisinopril to 5 mg daily and  decrease the torsemide to 10mg three times a week.  Repeat BMP in about 2 weeks to monitor potassium and kidney levels

## 2020-02-10 NOTE — TELEPHONE ENCOUNTER
Created telephone encounter. Spoke with [place] [employer] relayed message per [facility] regarding labs. Pt verbalized understanding.

## 2020-03-04 ENCOUNTER — OFFICE VISIT (OUTPATIENT)
Dept: FAMILY MEDICINE CLINIC | Age: 60
End: 2020-03-04
Payer: MEDICARE

## 2020-03-04 ENCOUNTER — HOSPITAL ENCOUNTER (OUTPATIENT)
Dept: WOMENS IMAGING | Age: 60
Discharge: HOME | End: 2020-03-04
Payer: MEDICARE

## 2020-03-04 VITALS
BODY MASS INDEX: 51.19 KG/M2 | SYSTOLIC BLOOD PRESSURE: 120 MMHG | DIASTOLIC BLOOD PRESSURE: 82 MMHG | WEIGHT: 289 LBS | OXYGEN SATURATION: 99 % | HEART RATE: 75 BPM

## 2020-03-04 DIAGNOSIS — R79.9 ABNORMAL FINDING OF BLOOD CHEMISTRY, UNSPECIFIED: ICD-10-CM

## 2020-03-04 DIAGNOSIS — R73.03 PREDIABETES: ICD-10-CM

## 2020-03-04 DIAGNOSIS — N18.30 CKD (CHRONIC KIDNEY DISEASE) STAGE 3, GFR 30-59 ML/MIN (HCC): ICD-10-CM

## 2020-03-04 PROBLEM — R10.9 ABDOMINAL PAIN: Status: RESOLVED | Noted: 2018-07-27 | Resolved: 2020-03-04

## 2020-03-04 LAB
FERRITIN: 25.1 NG/ML (ref 15–150)
FOLATE: 19.48 NG/ML (ref 4.78–24.2)
IRON SATURATION: 18 % (ref 15–50)
IRON: 64 UG/DL (ref 37–145)
PARATHYROID HORMONE INTACT: 96.8 PG/ML (ref 14–72)
T4 FREE: 1.8 NG/DL (ref 0.9–1.8)
TOTAL IRON BINDING CAPACITY: 357 UG/DL (ref 260–445)
TSH REFLEX: 0.14 UIU/ML (ref 0.27–4.2)
URIC ACID, SERUM: 3.7 MG/DL (ref 2.6–6)
VITAMIN B-12: >2000 PG/ML (ref 211–911)

## 2020-03-04 PROCEDURE — G8427 DOCREV CUR MEDS BY ELIG CLIN: HCPCS | Performed by: FAMILY MEDICINE

## 2020-03-04 PROCEDURE — G8482 FLU IMMUNIZE ORDER/ADMIN: HCPCS | Performed by: FAMILY MEDICINE

## 2020-03-04 PROCEDURE — 99214 OFFICE O/P EST MOD 30 MIN: CPT | Performed by: FAMILY MEDICINE

## 2020-03-04 PROCEDURE — 1036F TOBACCO NON-USER: CPT | Performed by: FAMILY MEDICINE

## 2020-03-04 PROCEDURE — 77067 SCR MAMMO BI INCL CAD: CPT

## 2020-03-04 PROCEDURE — G8417 CALC BMI ABV UP PARAM F/U: HCPCS | Performed by: FAMILY MEDICINE

## 2020-03-04 PROCEDURE — 3017F COLORECTAL CA SCREEN DOC REV: CPT | Performed by: FAMILY MEDICINE

## 2020-03-04 ASSESSMENT — ENCOUNTER SYMPTOMS
ABDOMINAL PAIN: 0
COLOR CHANGE: 0
CONSTIPATION: 0
BLOOD IN STOOL: 0
SHORTNESS OF BREATH: 0
BACK PAIN: 0
VOMITING: 0
TROUBLE SWALLOWING: 0
DIARRHEA: 0
NAUSEA: 0
COUGH: 0

## 2020-03-04 NOTE — PATIENT INSTRUCTIONS
Patient Education        Medicines to Avoid With Kidney Disease: Care Instructions  Your Care Instructions    Kidney disease means that your kidneys are not able to get rid of waste from the blood. So they can't keep your body's fluids and chemicals in balance. Usually, the kidneys get rid of waste from the blood through the urine. And they balance the fluids in the body. When your kidneys don't work as they should, you have to be careful about some medicines. They may harm your kidneys. Your doctor may tell you not to take them. Or he or she may change the dose. Medicines for pain and swelling, such as ibuprofen (Advil or Motrin) or naproxen (Aleve), can cause harm. So can some antibiotics and antacids. And you need to be careful about some drugs that treat cancer, lower blood pressure, or get rid of water from the body. Some herbal products could cause harm too. Follow-up care is a key part of your treatment and safety. Be sure to make and go to all appointments, and call your doctor if you are having problems. It's also a good idea to know your test results and keep a list of the medicines you take. How can you care for yourself at home? · Tell your doctor all the prescription, herbal, or over-the-counter medicines you take. Do not take any new ones unless you talk to your doctor first.  · Do not take anti-inflammatory medicines. These include ibuprofen (Advil, Motrin) and naproxen (Aleve). You can use acetaminophen (Tylenol) for pain. · Do not take two or more pain medicines at the same time unless the doctor told you to. Many pain medicines have acetaminophen, which is Tylenol. Too much acetaminophen (Tylenol) can be harmful. · Tell all doctors and others who work with your health care that you have kidney disease. · Wear medical alert jewelry that lists your health problem. You can buy this at most drugstores. Where can you learn more? Go to https://choctavio.[employer]. org and sign in to salt, sodium, or monosodium glutamate (MSG) as an ingredient. · Buy foods that are labeled \"no salt added,\" \"sodium-free,\" or \"low-sodium. \" Foods labeled \"reduced-sodium\" and \"light sodium\" may still have too much sodium. · Limit processed foods, fast food, and restaurant foods. These types of food are very high in sodium. · Avoid salted pretzels, chips, popcorn, and other salted snacks. · Avoid smoked, cured, salted, and canned meat, fish, and poultry. This includes ham, daniels, hot dogs, and luncheon meats. · You may use lemon, herbs, and spices to flavor your meals. To limit potassium  · Choose low-potassium fruits such as applesauce, pineapple, grapes, blueberries, and raspberries. · Choose low-potassium vegetables such as lettuce, green beans, cucumber, and radishes. · Choose low-potassium foods such as hummus, spaghetti, macaroni, rice, tortillas, and bagels. · Limit or avoid high-potassium foods such as milk, bananas, oranges, cantaloupe, potatoes, spinach, tomatoes, broccoli, and sweet potatoes. · Do not use a salt substitute or lite salt unless your doctor says it is okay. Most salt substitutes and lite salts are high in potassium. To limit phosphorus  · Follow your food plan to know how much milk and milk products you can have. · Limit nuts, peanut butter, seeds, lentils, beans, organ meats, and sardines. · Avoid cola drinks. · Avoid bran breads or bran cereals. If you need to limit fluids  · Know how much fluid you can drink. Every day fill a pitcher with that amount of water. If you drink another fluid (such as coffee) that day, pour an equal amount of water out of the pitcher. · Count foods that are liquid at room temperature, such as gelatin dessert and ice cream, as fluids. Where can you learn more? Go to https://janee.[employer]. org and sign in to your [employer] account.  Enter D024 in the [place] box to learn more about \"Diet for Chronic Kidney Disease (Before Dialysis): Care Instructions. \"     If you do not have an account, please click on the \"Sign Up Now\" link. Current as of: August 21, 2019  Content Version: 12.3  © 1249-8123 Healthwise, Incorporated. Care instructions adapted under license by [facility] ([facility]). If you have questions about a medical condition or this instruction, always ask your healthcare professional. [address] any warranty or liability for your use of this information.
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
## 2020-03-13 ENCOUNTER — OFFICE VISIT (OUTPATIENT)
Dept: FAMILY MEDICINE CLINIC | Age: 60
End: 2020-03-13
Payer: MEDICARE

## 2020-03-13 VITALS
RESPIRATION RATE: 14 BRPM | SYSTOLIC BLOOD PRESSURE: 130 MMHG | WEIGHT: 293 LBS | BODY MASS INDEX: 50.02 KG/M2 | DIASTOLIC BLOOD PRESSURE: 80 MMHG | HEART RATE: 54 BPM | HEIGHT: 64 IN | OXYGEN SATURATION: 92 %

## 2020-03-13 PROCEDURE — G8482 FLU IMMUNIZE ORDER/ADMIN: HCPCS | Performed by: FAMILY MEDICINE

## 2020-03-13 PROCEDURE — G0444 DEPRESSION SCREEN ANNUAL: HCPCS | Performed by: FAMILY MEDICINE

## 2020-03-13 PROCEDURE — 3017F COLORECTAL CA SCREEN DOC REV: CPT | Performed by: FAMILY MEDICINE

## 2020-03-13 PROCEDURE — G0439 PPPS, SUBSEQ VISIT: HCPCS | Performed by: FAMILY MEDICINE

## 2020-03-13 ASSESSMENT — LIFESTYLE VARIABLES: HOW OFTEN DO YOU HAVE A DRINK CONTAINING ALCOHOL: 0

## 2020-03-13 ASSESSMENT — PATIENT HEALTH QUESTIONNAIRE - PHQ9: SUM OF ALL RESPONSES TO PHQ QUESTIONS 1-9: 16

## 2020-03-13 NOTE — PROGRESS NOTES
Medicare Annual Wellness Visit  Name: Faiza Krishnan Date: 3/13/2020   MRN: [id number] Sex: Female   Age: 62 y.o. Ethnicity: Non-/Non    : 1960 Race: Tray Khan is here for Medicare AWV    Screenings for behavioral, psychosocial and functional/safety risks, and cognitive dysfunction are all negative except as indicated below. These results, as well as other patient data from the [address] form, are documented in Flowsheets linked to this Encounter. Allergies   Allergen Reactions    Dilaudid [Hydromorphone Hcl] Other (See Comments)     Palpitations and orthostatic hypotension    Lavender Oil Shortness Of Breath and Rash    Penicillins Shortness Of Breath    Seroquel [Quetiapine Fumarate] Other (See Comments)     Very lethargic/almost not funtioning at all (per patient).  Adhesive Tape Other (See Comments)     BLISTERS, paper tape okay    Hydromorphone Hcl      Other reaction(s): Other (See Comments)  Rapid heart rate     Lithium Other (See Comments)     Diarrhea, vomiting, nausea, headaches,     Tetanus Toxoids Swelling       Prior to Visit Medications    Medication Sig Taking?  Authorizing Provider   diclofenac sodium 1 % GEL Apply 2 g topically 2 times daily  Kell Mayes MD   lisinopril (PRINIVIL;ZESTRIL) 5 MG tablet Take 1 tablet by mouth daily TAKE 1 TABLET EVERY DAY  ALICIA Fortune CNP   montelukast (SINGULAIR) 10 MG tablet Take 1 tablet by mouth nightly  Marc Wall MD   metoprolol succinate (TOPROL XL) 50 MG extended release tablet Take 1 tablet by mouth daily  ALICIA Michelle CNP   torsemide (DEMADEX) 20 MG tablet Three times a week  Patient taking differently: 10 mg 10 mg Three times a week  RoxALICIA Lyle CNP   simvastatin (ZOCOR) 20 MG tablet TAKE 1 TABLET BY MOUTH NIGHTLY  Arie Wall MD   levothyroxine (SYNTHROID) 150 MCG tablet TAKE 1 TABLET EVERY DAY EXCEPT FOR  WHEN YOU ARE TO TAKE 2 TABLETS AS OF UTERUS      4 times    ERCP      FINGER SURGERY      gout X 2    GASTRIC BYPASS SURGERY  1/7/05    HERNIA REPAIR  10/20/2016    lap ventral hernia    LIPECTOMY      gangrenous    OTHER SURGICAL HISTORY      radium implants into uterus for uterine surgery X 2    OTHER SURGICAL HISTORY  4/1/16    exp. lap lysis of adhesion    OTHER SURGICAL HISTORY      biventricular pacer and defibrillator    PACEMAKER PLACEMENT  09/2016    TRANSESOPHAGEAL ECHOCARDIOGRAM  7/13/2015    UPPER GASTROINTESTINAL ENDOSCOPY  4/23/13    UPPER GASTROINTESTINAL ENDOSCOPY  03/06/2019    NL    UPPER GASTROINTESTINAL ENDOSCOPY N/A 3/6/2019    EGD AND COLONOSCOPY WITH ANESTHESIA performed by Merlinda Dux, MD at [facility] History   Problem Relation Age of Onset    Cancer Mother     Ovarian Cancer Mother 45        Technically peritoneal cancer    Depression Mother         bipolar    Arthritis Father    Everlean Makua Rheum Arthritis Father     Arrhythmia Father     Other Father         gout    Heart Disease Father     Depression Brother         depression    Other Brother         gout    Obesity Brother     Other Brother         gout    Obesity Brother     Depression Brother     Cancer Maternal Grandmother         Breast       CareTeam (Including outside providers/suppliers regularly involved in providing care):   Patient Care Team:  Ellen Alcantara MD as PCP - General (Family Medicine)  Ellen Alcantara MD as PCP - [facility] Empaneled Provider  Milagros Magaña MD as Surgeon (General Surgery)    Wt Readings from Last 3 Encounters:   03/13/20 293 lb 6.4 oz (133.1 kg)   03/04/20 289 lb (131.1 kg)   02/07/20 288 lb (130.6 kg)     Vitals:    03/13/20 1042   BP: 130/80   Pulse: 54   Resp: 14   SpO2: 92%   Weight: 293 lb 6.4 oz (133.1 kg)   Height: 5' 4\" (1.626 m)     Body mass index is 50.36 kg/m².     Based upon direct observation of the patient, evaluation of cognition reveals recent and remote memory intact. Patient's complete Health Risk Assessment and screening values have been reviewed and are found in Flowsheets. The following problems were reviewed today and where indicated follow up appointments were made and/or referrals ordered. Positive Risk Factor Screenings with Interventions:     Depression:  PHQ-2 Score: 4  PHQ-9 Total Score: 16  Depression Screening Interpretation: (!) PHQ-9 Score 15-19: Moderately Severe Depression  Depression Interventions:  · LPN INTERVENTION GUIDE: SCORE 15 OR ABOVE = MODERATE TO SEVERE DEPRESSION: PCP CONSULTED DURING VISIT    General Health:  General  In general, how would you say your health is?: Fair  In the past 7 days, have you experienced any of the following? New or Increased Pain, New or Increased Fatigue, Loneliness, Social Isolation, Stress or Anger?: (!) Loneliness, Social Isolation, Stress, Anger  Do you get the social and emotional support that you need?: (!) No  Do you have a Living Will?: (!) No  General Health Risk Interventions:  · No Living Will: provided the state-specific advance directive document to the patient    Health Habits/Nutrition:  Health Habits/Nutrition  Do you exercise for at least 20 minutes 2-3 times per week?: (!) No  Have you lost any weight without trying in the past 3 months?: No  Do you eat fewer than 2 meals per day?: No  Have you seen a dentist within the past year?: Yes  Body mass index is 50.36 kg/m².   Health Habits/Nutrition Interventions:  · Nutritional issues:  educational materials for healthy, well-balanced diet provided    Safety:  Safety  Do you have working smoke detectors?: Yes  Have all throw rugs been removed or fastened?: (!) No  Do you have non-slip mats or surfaces in all bathtubs/showers?: Yes  Do all of your stairways have a railing or banister?: Yes  Are your doorways, halls and stairs free of clutter?: Yes  Do you always fasten your seatbelt when you are in a car?: Yes  Safety Interventions:  · Home safety tips provided    Personalized Preventive Plan   Current Health Maintenance Status  Immunization History   Administered Date(s) Administered    Influenza Virus Vaccine 01/28/2012, 10/17/2012, 11/10/2014, 10/22/2015    Influenza Whole 11/10/2014    Influenza, Intradermal, Preservative free 09/24/2013    Influenza, MDCK Quadv, IM, PF (Flucelvax 4 yrs and older) 10/17/2017    Influenza, Quadv, IM, PF (6 mo and older Fluzone, Flulaval, Fluarix, and 3 yrs and older Afluria) 12/01/2016, 01/04/2019, 10/07/2019    Pneumococcal Polysaccharide ([id number]) 01/28/2012    Tetanus 03/01/1988        Health Maintenance   Topic Date Due    HIV screen  08/16/1973    Shingles Vaccine (1 of 2) 08/16/2008    Cervical cancer screen  08/29/2016    Annual Wellness Visit (AWV)  05/29/2019    Lipid screen  10/07/2020    Potassium monitoring  02/07/2021    Creatinine monitoring  02/07/2021    TSH testing  03/04/2021    Breast cancer screen  03/04/2022    Colon cancer screen colonoscopy  03/06/2029    Flu vaccine  Completed    Pneumococcal 0-64 years Vaccine  Completed    Hepatitis C screen  Completed    Hepatitis A vaccine  Aged Out    Hepatitis B vaccine  Aged Out    Hib vaccine  Aged Out    Meningococcal (ACWY) vaccine  Aged Out     Recommendations for [employer] Due: see orders and patient instructions/AVS.  . Recommended screening schedule for the next 5-10 years is provided to the patient in written form: see Patient Instructions/AVS.    Layla RIGGS LPN, [birth year], performed the documented evaluation under the direct supervision of the attending physician. This encounter was performed under Gwen terrell MDs, direct supervision, 3/13/2020.

## 2020-03-13 NOTE — PATIENT INSTRUCTIONS
Personalized Preventive Plan for Liz Barrientos - 3/13/2020  Medicare offers a range of preventive health benefits. Some of the tests and screenings are paid in full while other may be subject to a deductible, co-insurance, and/or copay. Some of these benefits include a comprehensive review of your medical history including lifestyle, illnesses that may run in your family, and various assessments and screenings as appropriate. After reviewing your medical record and screening and assessments performed today your provider may have ordered immunizations, labs, imaging, and/or referrals for you. A list of these orders (if applicable) as well as your Preventive Care list are included within your After Visit Summary for your review. Other Preventive Recommendations:    · A preventive eye exam performed by an eye specialist is recommended every 1-2 years to screen for glaucoma; cataracts, macular degeneration, and other eye disorders. · A preventive dental visit is recommended every 6 months. · Try to get at least 150 minutes of exercise per week or 10,000 steps per day on a pedometer . · Order or download the FREE \"Exercise & Physical Activity: Your Everyday Guide\" from [employer]. Call 1-530.559.3384 or search [employer] online. · You need 7715-0664 mg of calcium and 5214-5231 IU of vitamin D per day. It is possible to meet your calcium requirement with diet alone, but a vitamin D supplement is usually necessary to meet this goal.  · When exposed to the sun, use a sunscreen that protects against both UVA and UVB radiation with an SPF of 30 or greater. Reapply every 2 to 3 hours or after sweating, drying off with a towel, or swimming. · Always wear a seat belt when traveling in a car. Always wear a helmet when riding a bicycle or motorcycle. Heart-Healthy Diet   Sodium, Fat, and Cholesterol Controlled Diet       What Is a Heart Healthy Diet?    A heart-healthy diet is one that limits sodium , certain types of fat , and cholesterol . This type of diet is recommended for:   People with any form of cardiovascular disease (eg, coronary heart disease , peripheral vascular disease , previous heart attack , previous stroke )   People with risk factors for cardiovascular disease, such as high blood pressure , high cholesterol , or diabetes   Anyone who wants to lower their risk of developing cardiovascular disease   Sodium    Sodium is a mineral found in many foods. In general, most people consume much more sodium than they need. Diets high in sodium can increase blood pressure and lead to edema (water retention). On a heart-healthy diet, you should consume no more than 2,300 mg (milligrams) of sodium per dayabout the amount in one teaspoon of table salt. The foods highest in sodium include table salt (about 50% sodium), processed foods, convenience foods, and preserved foods. Cholesterol    Cholesterol is a fat-like, waxy substance in your blood. Our bodies make some cholesterol. It is also found in animal products, with the highest amounts in fatty meat, egg yolks, whole milk, cheese, shellfish, and organ meats. On a heart-healthy diet, you should limit your cholesterol intake to less than 200 mg per day. It is normal and important to have some cholesterol in your bloodstream. But too much cholesterol can cause plaque to build up within your arteries, which can eventually lead to a heart attack or stroke. The two types of cholesterol that are most commonly referred to are:   Low-density lipoprotein (LDL) cholesterol  Also known as bad cholesterol, this is the cholesterol that tends to build up along your arteries. Bad cholesterol levels are increased by eating fats that are saturated or hydrogenated. Optimal level of this cholesterol is less than 100. Over 130 starts to get risky for heart disease.    High-density lipoprotein (HDL) cholesterol  Also known as good example, this would mean 60 grams of fat or less per day. Saturated fat and trans fat in your diet raises your blood cholesterol the most, much more than dietary cholesterol does. For this reason, on a heart-healthy diet, less than 7% of your calories should come from saturated fat and ideally 0% from trans fat. On an 1800-calorie diet, this translates into less than 14 grams of saturated fat per day, leaving 46 grams of fat to come from mono- and polyunsaturated fats.    Food Choices on a Heart Healthy Diet   Food Category   Foods Recommended   Foods to Avoid   Grains   Breads and rolls without salted tops Most dry and cooked cereals Unsalted crackers and breadsticks Low-sodium or homemade breadcrumbs or stuffing All rice and pastas   Breads, rolls, and crackers with salted tops High-fat baked goods (eg, muffins, donuts, pastries) Quick breads, self-rising flour, and biscuit mixes Regular bread crumbs Instant hot cereals Commercially prepared rice, pasta, or stuffing mixes   Vegetables   Most fresh, frozen, and low-sodium canned vegetables Low-sodium and salt-free vegetable juices Canned vegetables if unsalted or rinsed   Regular canned vegetables and juices, including sauerkraut and pickled vegetables Frozen vegetables with sauces Commercially prepared potato and vegetable mixes   Fruits   Most fresh, frozen, and canned fruits All fruit juices   Fruits processed with salt or sodium   Milk   Nonfat or low-fat (1%) milk Nonfat or low-fat yogurt Cottage cheese, low-fat ricotta, cheeses labeled as low-fat and low-sodium   Whole milk Reduced-fat (2%) milk Malted and chocolate milk Full fat yogurt Most cheeses (unless low-fat and low salt) Buttermilk (no more than 1 cup per week)   Meats and Beans   Lean cuts of fresh or frozen beef, veal, lamb, or pork (look for the word loin) Fresh or frozen poultry without the skin Fresh or frozen fish and some shellfish Egg whites and egg substitutes (Limit whole eggs to three per week) Tofu Nuts or seeds (unsalted, dry-roasted), low-sodium peanut butter Dried peas, beans, and lentils   Any smoked, cured, salted, or canned meat, fish, or poultry (including daniels, chipped beef, cold cuts, hot dogs, sausages, sardines, and anchovies) Poultry skins Breaded and/or fried fish or meats Canned peas, beans, and lentils Salted nuts   Fats and Oils   Olive oil and canola oil Low-sodium, low-fat salad dressings and mayonnaise   Butter, margarine, coconut and palm oils, daniels fat   Snacks, Sweets, and Condiments   Low-sodium or unsalted versions of broths, soups, soy sauce, and condiments Pepper, herbs, and spices; vinegar, lemon, or lime juice Low-fat frozen desserts (yogurt, sherbet, fruit bars) Sugar, cocoa powder, honey, syrup, jam, and preserves Low-fat, trans-fat free cookies, cakes, and pies Jd and animal crackers, fig bars, hardeep snaps   High-fat desserts Broth, soups, gravies, and sauces, made from instant mixes or other high-sodium ingredients Salted snack foods Canned olives Meat tenderizers, seasoning salt, and most flavored vinegars   Beverages   Low-sodium carbonated beverages Tea and coffee in moderation Soy milk   Commercially softened water   Suggestions   Make whole grains, fruits, and vegetables the base of your diet. Choose heart-healthy fats such as canola, olive, and flaxseed oil, and foods high in heart-healthy fats, such as nuts, seeds, soybeans, tofu, and fish. Eat fish at least twice per week; the fish highest in omega-3 fatty acids and lowest in mercury include salmon, herring, mackerel, sardines, and canned chunk light tuna. If you eat fish less than twice per week or have high triglycerides, talk to your doctor about taking fish oil supplements. Read food labels.    For products low in fat and cholesterol, look for fat free, low-fat, cholesterol free, saturated fat free, and trans fat freeAlso scan the Nutrition Facts Label, which lists saturated fat, trans fat, memory aids can help:   Calendars and day planners   Electronic organizers to store all sorts of helpful informationThese devices can \"beep\" to remind you of appointments. A book of days to record birthdays, anniversaries, and other occasions that occur on the same date every year   Detailed \"to-do\" lists and strategically placed sticky notes   Quick \"study\" sessionsBefore a gathering, review who will be there so their names will be fresh in your mind. Establish routinesFor example, keep your keys, wallet, and umbrella in the same place all the time or take medicine with your 8:00 AM glass of juice   Live a Healthy Life   Many actions that will keep your body strong will do the same for your mind. For example:   Talk to Your Doctor About Herbs and Supplements    Malnutrition and vitamin deficiencies can impair your mental function. For example, vitamin B12 deficiency can cause a range of symptoms, including confusion. But, what if your nutritional needs are being met? Can herbs and supplements still offer a benefit? Researchers have investigated a range of natural remedies, such as ginkgo , ginseng , and the supplement phosphatidylserine (PS). So far, though, the evidence is inconsistent as to whether these products can improve memory or thinking. If you are interested in taking herbs and supplements, talk to your doctor first because they may interact with other medicines that you are taking. Exercise Regularly    Among the many benefits of regular exercise are increased blood flow to the brain and decreased risk of certain diseases that can interfere with memory function. One study found that even moderate exercise has a beneficial effect. Examples of \"moderate\" exercise include:   Playing 18 holes of golf once a week, without a cart   Playing tennis twice a week   Walking one mile per day   Manage Stress    It can be tough to remember what is important when your mind is cluttered.  Make time for relaxation. Choose activities that calm you down, and make it routine. Manage Chronic Conditions    Side effects of high blood pressure , diabetes, and heart disease can interfere with mental function. Many of the lifestyle steps discussed here can help manage these conditions. Strive to eat a healthy diet, exercise regularly, get stress under control, and follow your doctor's advice for your condition. Minimize Medications    Talk to your doctor about the medicines that you take. Some may be unnecessary. Also, healthy lifestyle habits may lower the need for certain drugs. Last Reviewed: April 2010 Jarad Gardner MD   Updated: 4/13/2010   ·        [address]  What is a living will? A living will is a legal form you use to write down the kind of care you want at the end of your life. It is used by the health professionals who will treat you if you aren't able to decide for yourself. If you put your wishes in writing, your loved ones and others will know what kind of care you want. They won't need to guess. This can ease your mind and be helpful to others. A living will is not the same as an estate or property will. An estate will explains what you want to happen with your money and property after you die. Is a living will a legal document? A living will is a legal document. Each state has its own laws about living mcintyre. If you move to another state, make sure that your living will is legal in the state where you now live. Or you might use a universal form that has been approved by many states. This kind of form can sometimes be completed and stored online. Your electronic copy will then be available wherever you have a connection to the Internet. In most cases, doctors will respect your wishes even if you have a form from a different state. You don't need an  to complete a living will.  But legal advice can be helpful if your state's laws are unclear, your health history is complicated, or your family can't agree on what should be in your living will. You can change your living will at any time. Some people find that their wishes about end-of-life care change as their health changes. In addition to making a living will, think about completing a medical power of  form. This form lets you name the person you want to make end-of-life treatment decisions for you (your \"health care agent\") if you're not able to. Many hospitals and nursing homes will give you the forms you need to complete a living will and a medical power of . Your living will is used only if you can't make or communicate decisions for yourself anymore. If you become able to make decisions again, you can accept or refuse any treatment, no matter what you wrote in your living will. Your state may offer an online registry. This is a place where you can store your living will online so the doctors and nurses who need to treat you can find it right away. What should you think about when creating a living will? Talk about your end-of-life wishes with your family members and your doctor. Let them know what you want. That way the people making decisions for you won't be surprised by your choices. Think about these questions as you make your living will:  Do you know enough about life support methods that might be used? If not, talk to your doctor so you know what might be done if you can't breathe on your own, your heart stops, or you're unable to swallow. What things would you still want to be able to do after you receive life-support methods? Would you want to be able to walk? To speak? To eat on your own? To live without the help of machines? If you have a choice, where do you want to be cared for? In your home? At a hospital or nursing home? Do you want certain Buddhist practices performed if you become very ill? If you have a choice at the end of your life, where would you prefer to die? At home? In a hospital or nursing home? Somewhere else? Would you prefer to be buried or cremated? Do you want your organs to be donated after you die? What should you do with your living will? Make sure that your family members and your health care agent have copies of your living will. Give your doctor a copy of your living will to keep in your medical record. If you have more than one doctor, make sure that each one has a copy. You may want to put a copy of your living will where it can be easily found. Where can you learn more? Go to https://[employer] Pharmapepiceweb.CARGOBR. org and sign in to your [employer] account. Enter H999 in the GI-View box to learn more about \"Learning About Living Perroy. \"     If you do not have an account, please click on the \"Sign Up Now\" link. Current as of: April 1, 2019  Content Version: 12.3  © 9181-8171 Healthwise, Incorporated. Care instructions adapted under license by [facility] ([facility]). If you have questions about a medical condition or this instruction, always ask your healthcare professional. [place] [address] any warranty or liability for your use of this information.     ·

## 2020-03-17 ENCOUNTER — HOSPITAL ENCOUNTER (OUTPATIENT)
Dept: VASCULAR LAB | Age: 60
Discharge: HOME | End: 2020-03-17
Payer: MEDICARE

## 2020-03-17 PROCEDURE — 93971 EXTREMITY STUDY: CPT

## 2020-03-18 ENCOUNTER — TELEPHONE (OUTPATIENT)
Dept: FAMILY MEDICINE CLINIC | Age: 60
End: 2020-03-18

## 2020-03-18 NOTE — TELEPHONE ENCOUNTER
Patient said the ultrasound tech didn't scan the side of her leg that hurt and it was negative for a DVT but she is still having tenderness and pain. Wants to know the next steps?

## 2020-03-18 NOTE — TELEPHONE ENCOUNTER
Since the ultrasound was okay it is been assuming muscle ligament tendon.  We could ask around and see if anyone is available tomorrow- possibly  Per Dr. Dimple Díaz

## 2020-03-19 ENCOUNTER — OFFICE VISIT (OUTPATIENT)
Dept: FAMILY MEDICINE CLINIC | Age: 60
End: 2020-03-19
Payer: MEDICARE

## 2020-03-19 VITALS — OXYGEN SATURATION: 98 % | HEART RATE: 78 BPM | SYSTOLIC BLOOD PRESSURE: 130 MMHG | DIASTOLIC BLOOD PRESSURE: 72 MMHG

## 2020-03-19 PROCEDURE — G8482 FLU IMMUNIZE ORDER/ADMIN: HCPCS | Performed by: FAMILY MEDICINE

## 2020-03-19 PROCEDURE — G8417 CALC BMI ABV UP PARAM F/U: HCPCS | Performed by: FAMILY MEDICINE

## 2020-03-19 PROCEDURE — 3017F COLORECTAL CA SCREEN DOC REV: CPT | Performed by: FAMILY MEDICINE

## 2020-03-19 PROCEDURE — G8427 DOCREV CUR MEDS BY ELIG CLIN: HCPCS | Performed by: FAMILY MEDICINE

## 2020-03-19 PROCEDURE — 1036F TOBACCO NON-USER: CPT | Performed by: FAMILY MEDICINE

## 2020-03-19 PROCEDURE — 99213 OFFICE O/P EST LOW 20 MIN: CPT | Performed by: FAMILY MEDICINE

## 2020-03-19 RX ORDER — METHOCARBAMOL 500 MG/1
500 TABLET, FILM COATED ORAL 3 TIMES DAILY PRN
Qty: 30 TABLET | Refills: 0 | Status: SHIPPED | OUTPATIENT
Start: 2020-03-19 | End: 2020-06-25

## 2020-03-19 NOTE — PATIENT INSTRUCTIONS
the floor a few inches from a wall or countertop, and put the balls of your feet on it. Your heels should be on the floor. The book needs to be thick enough so that you can feel a gentle stretch in each calf. If you are not steady on your feet, hold on to a chair, counter, or wall while you do this stretch. 2. Bend your knees, and lean forward until you feel a stretch in each calf. 3. To get more stretch, add another book or use a thicker book, such as a phone book, a dictionary, or an encyclopedia. 4. Hold the stretch for at least 15 to 30 seconds. 5. Repeat 2 to 4 times. Ankle plantarflexion   1. Sit with your affected leg straight and supported on the floor. Your other leg should be bent, with that foot flat on the floor. 2. Keeping your affected leg straight, gently flex your foot downward so your toes are pointed away from your body. Then slowly relax your foot to the starting position. 3. Repeat 8 to 12 times. Ankle dorsiflexion   1. Sit with your affected leg straight and supported on the floor. Your other leg should be bent, with that foot flat on the floor. 2. Keeping your leg straight, gently flex your foot back so your toes point upward. Then slowly relax your foot to the starting position. 3. Repeat 8 to 12 times. Bilateral heel raises on step   1. Stand on the bottom step of a staircase, facing up toward the stairs. Put the balls of your feet on the step. If you are not steady on your feet, hold on to the banister or wall. 2. Keeping both knees straight, slowly lift your heels above the step so that you are standing on your toes. Then slowly lower your heels below the step and toward the floor. 3. Return to the starting position, with your feet even with the step. 4. Repeat 8 to 12 times. Follow-up care is a key part of your treatment and safety. Be sure to make and go to all appointments, and call your doctor if you are having problems.  It's also a good idea to know your test results and keep a list of the medicines you take. Where can you learn more? Go to https://chpepiceweb.Mesmo.tv. org and sign in to your [employer] account. Enter E926 in the [employer] box to learn more about \"Calf Strain: Rehab Exercises. \"     If you do not have an account, please click on the \"Sign Up Now\" link. Current as of: June 26, 2019Content Version: 12.4  © 9995-9885 Healthwise, Incorporated. Care instructions adapted under license by [facility] [place] ([facility]). If you have questions about a medical condition or this instruction, always ask your healthcare professional. [address] any warranty or liability for your use of this information.

## 2020-03-23 RX ORDER — ALLOPURINOL 300 MG/1
TABLET ORAL
Qty: 90 TABLET | Refills: 1 | Status: SHIPPED | OUTPATIENT
Start: 2020-03-23 | End: 2020-09-29 | Stop reason: SDUPTHER

## 2020-03-30 ENCOUNTER — TELEPHONE (OUTPATIENT)
Dept: FAMILY MEDICINE CLINIC | Age: 60
End: 2020-03-30

## 2020-03-30 NOTE — TELEPHONE ENCOUNTER
Patient called to follow up on request for allpurinol. She has not received it yet and is out. Told her it was sent in on 3/23/20.

## 2020-04-22 ENCOUNTER — NURSE ONLY (OUTPATIENT)
Dept: CARDIOLOGY CLINIC | Age: 60
End: 2020-04-22
Payer: MEDICARE

## 2020-04-22 PROCEDURE — 93297 REM INTERROG DEV EVAL ICPMS: CPT | Performed by: NURSE PRACTITIONER

## 2020-04-22 PROCEDURE — 93296 REM INTERROG EVL PM/IDS: CPT | Performed by: INTERNAL MEDICINE

## 2020-04-22 PROCEDURE — 93295 DEV INTERROG REMOTE 1/2/MLT: CPT | Performed by: INTERNAL MEDICINE

## 2020-04-22 PROCEDURE — G2066 INTER DEVC REMOTE 30D: HCPCS | Performed by: NURSE PRACTITIONER

## 2020-04-22 NOTE — PROGRESS NOTES
Remote transmission of pacemaker and/or ICD, or implanted heart monitor shows normal cardiac device function. Patient's last device interrogation was on 1/23. Estimated device longevity is 3.2 years. Patient takes Toprol XL and ASA. AP 13.8%   93.3% (MVP off)    Since last device interrogation, no new arrhythmias recorded. Optivol is at baseline. Patient is to follow up in 3 months either remotely or in clinic. Please see the Paceart report under the Cardiology tab for more detail.

## 2020-05-06 ENCOUNTER — TELEPHONE (OUTPATIENT)
Dept: CARDIOLOGY CLINIC | Age: 60
End: 2020-05-06

## 2020-05-15 RX ORDER — SIMVASTATIN 20 MG
20 TABLET ORAL NIGHTLY
Qty: 90 TABLET | Refills: 1 | Status: SHIPPED | OUTPATIENT
Start: 2020-05-15 | End: 2020-05-18

## 2020-05-15 NOTE — TELEPHONE ENCOUNTER
Patient requesting a medication refill.   Medication: simvastatin (ZOCOR) 20 MG tablet   Pharmacy: [address] [place] [address] [phone]   Last office visit:3/19/2020  Next office visit: 9/4/2020

## 2020-05-18 RX ORDER — SIMVASTATIN 20 MG
20 TABLET ORAL NIGHTLY
Qty: 90 TABLET | Refills: 1 | Status: SHIPPED | OUTPATIENT
Start: 2020-05-18 | End: 2020-11-19

## 2020-06-10 RX ORDER — LAMOTRIGINE 150 MG/1
150 TABLET ORAL 2 TIMES DAILY
Qty: 180 TABLET | Refills: 1 | Status: SHIPPED | OUTPATIENT
Start: 2020-06-10 | End: 2020-11-19

## 2020-06-19 ENCOUNTER — TELEPHONE (OUTPATIENT)
Dept: FAMILY MEDICINE CLINIC | Age: 60
End: 2020-06-19

## 2020-06-25 ENCOUNTER — NURSE TRIAGE (OUTPATIENT)
Dept: OTHER | Facility: CLINIC | Age: 60
End: 2020-06-25

## 2020-06-25 ENCOUNTER — TELEPHONE (OUTPATIENT)
Dept: FAMILY MEDICINE CLINIC | Age: 60
End: 2020-06-25

## 2020-06-25 ENCOUNTER — VIRTUAL VISIT (OUTPATIENT)
Dept: FAMILY MEDICINE CLINIC | Age: 60
End: 2020-06-25
Payer: MEDICARE

## 2020-06-25 PROCEDURE — 99213 OFFICE O/P EST LOW 20 MIN: CPT | Performed by: NURSE PRACTITIONER

## 2020-06-25 PROCEDURE — 3017F COLORECTAL CA SCREEN DOC REV: CPT | Performed by: NURSE PRACTITIONER

## 2020-06-25 PROCEDURE — G8427 DOCREV CUR MEDS BY ELIG CLIN: HCPCS | Performed by: NURSE PRACTITIONER

## 2020-06-25 RX ORDER — NITROFURANTOIN 25; 75 MG/1; MG/1
100 CAPSULE ORAL 2 TIMES DAILY
Qty: 14 CAPSULE | Refills: 0 | Status: SHIPPED | OUTPATIENT
Start: 2020-06-25 | End: 2020-09-04 | Stop reason: ALTCHOICE

## 2020-06-25 ASSESSMENT — ENCOUNTER SYMPTOMS
VOMITING: 0
BACK PAIN: 1
CONSTIPATION: 0
NAUSEA: 0
DIARRHEA: 0
SHORTNESS OF BREATH: 0

## 2020-06-25 NOTE — TELEPHONE ENCOUNTER
Pt. Complaining of pressure with urination, burning upon urination, frequency, urgency, kidney pain, chills and ? Fever. Symptoms x 2 days. Attempted to complete and E-visit but she can't complete it and it says to contact provider.   Please advise

## 2020-06-25 NOTE — TELEPHONE ENCOUNTER
Reason for Disposition   Side (flank) or lower back pain present    Protocols used: URINATION PAIN - FEMALE-ADULT-[place]    Received call from [facility]. Caller is reporting symptoms of a uti that began 2 days ago. Caller does have painful urination, chills and R sided kidney pain. Recommended caller complete an E-visit at this time. Caller agreeable. Provided additional care advice. Please do not reply to the triage nurse through this encounter. Any subsequent communication should be directly with the patient.

## 2020-06-25 NOTE — TELEPHONE ENCOUNTER
Needs office visit to r/o pyelonephritis. Please schedule. Can be in office or [facility], preferably office,  but we need more information.

## 2020-06-25 NOTE — PROGRESS NOTES
Yes Davie Bo, APRN - CNP   levothyroxine (SYNTHROID) 150 MCG tablet TAKE 1 TABLET EVERY DAY EXCEPT FOR  WHEN YOU ARE TO TAKE 2 TABLETS AS DIRECTED FOR A TOTAL OF 8 TABLETS/WEEK Yes THEO Bearden   Cholecalciferol (VITAMIN D3) 2000 units CAPS Take by mouth daily Yes Historical Provider, MD   BIOTIN PO Take by mouth daily Yes Historical Provider, MD   acetaminophen (TYLENOL) 325 MG tablet Take 650 mg by mouth every 6 hours as needed for Pain TAKES EVERY DAY Yes Historical Provider, MD   aspirin 81 MG EC tablet Take 1 tablet by mouth daily Yes Kalyn Espinoza MD   vitamin B-12 (CYANOCOBALAMIN) 1000 MCG tablet Take 1,000 mcg by mouth daily Yes Historical Provider, MD   cetirizine (ZYRTEC) 10 MG tablet Take 10 mg by mouth daily Yes Historical Provider, MD   methocarbamol (ROBAXIN) 500 MG tablet Take 1 tablet by mouth 3 times daily as needed (pain)  Patient not taking: Reported on 2020  Dunia Dobson DO       Social History     Tobacco Use    Smoking status: Former Smoker     Packs/day: 3.00     Years: 2.00     Pack years: 6.00     Types: Cigarettes     Last attempt to quit: 1985     Years since quittin.5    Smokeless tobacco: Never Used   Substance Use Topics    Alcohol use: No    Drug use: No            PHYSICAL EXAMINATION:  [ INSTRUCTIONS:  \"[x]\" Indicates a positive item  \"[]\" Indicates a negative item  -- DELETE ALL ITEMS NOT EXAMINED]  Vital Signs: (As obtained by patient/caregiver or practitioner observation)    Blood pressure-  Heart rate-    Respiratory rate-    Temperature-  Pulse oximetry-     Constitutional: [x] Appears well-developed and well-nourished [x] No apparent distress      [] Abnormal-   Mental status  [x] Alert and awake  [x] Oriented to person/place/time []Able to follow commands      Eyes:  EOM    []  Normal  [] Abnormal-  Sclera  []  Normal  [] Abnormal -         Discharge []  None visible  [] Abnormal -    HENT:   [x] Normocephalic, atraumatic.   [] Abnormal   []

## 2020-07-10 RX ORDER — LEVOTHYROXINE SODIUM 0.15 MG/1
TABLET ORAL
Qty: 103 TABLET | Refills: 3 | Status: ON HOLD | OUTPATIENT
Start: 2020-07-10 | End: 2021-03-14 | Stop reason: HOSPADM

## 2020-07-22 ENCOUNTER — NURSE ONLY (OUTPATIENT)
Dept: CARDIOLOGY CLINIC | Age: 60
End: 2020-07-22
Payer: MEDICARE

## 2020-07-22 PROCEDURE — 93297 REM INTERROG DEV EVAL ICPMS: CPT | Performed by: NURSE PRACTITIONER

## 2020-07-22 PROCEDURE — G2066 INTER DEVC REMOTE 30D: HCPCS | Performed by: NURSE PRACTITIONER

## 2020-07-22 PROCEDURE — 93295 DEV INTERROG REMOTE 1/2/MLT: CPT | Performed by: INTERNAL MEDICINE

## 2020-07-22 PROCEDURE — 93296 REM INTERROG EVL PM/IDS: CPT | Performed by: INTERNAL MEDICINE

## 2020-07-22 NOTE — PROGRESS NOTES
Remote transmission of pacemaker and/or ICD, or implanted heart monitor shows normal cardiac device function. Patient's last device interrogation was on 4/22. Estimated device longevity is 2.6 years. AP 10.7%   96.3% (MVP off)    Since last device interrogation, no new arrhythmias recorded. Optivol is at baseline. Patient is to follow up in 3 months either remotely or in clinic. Please see the Paceart report under the Cardiology tab for more detail.

## 2020-09-03 ENCOUNTER — TELEPHONE (OUTPATIENT)
Dept: FAMILY MEDICINE CLINIC | Age: 60
End: 2020-09-03

## 2020-09-03 NOTE — TELEPHONE ENCOUNTER
Pt has lump under skin on abdomen that was size of palm of hand, id'd in past as scar tissue, but it is spreading to be from hip to belly button area; has survived cancer and mom  from stomach cancer. She also had gastric bypass and is concerned about possible scar tissue from that. Has gained weight. Thinks she has another UTI.

## 2020-09-04 ENCOUNTER — TELEMEDICINE (OUTPATIENT)
Dept: FAMILY MEDICINE CLINIC | Age: 60
End: 2020-09-04
Payer: MEDICARE

## 2020-09-04 PROBLEM — M79.662 PAIN OF LEFT CALF: Status: ACTIVE | Noted: 2020-09-04

## 2020-09-04 PROBLEM — R19.04 LEFT LOWER QUADRANT ABDOMINAL MASS: Status: ACTIVE | Noted: 2020-09-04

## 2020-09-04 PROBLEM — M17.0 LOCALIZED OSTEOARTHRITIS OF BOTH KNEES: Status: ACTIVE | Noted: 2020-09-04

## 2020-09-04 PROCEDURE — G8427 DOCREV CUR MEDS BY ELIG CLIN: HCPCS | Performed by: FAMILY MEDICINE

## 2020-09-04 PROCEDURE — 99214 OFFICE O/P EST MOD 30 MIN: CPT | Performed by: FAMILY MEDICINE

## 2020-09-04 PROCEDURE — G8417 CALC BMI ABV UP PARAM F/U: HCPCS | Performed by: FAMILY MEDICINE

## 2020-09-04 PROCEDURE — 1036F TOBACCO NON-USER: CPT | Performed by: FAMILY MEDICINE

## 2020-09-04 PROCEDURE — 3017F COLORECTAL CA SCREEN DOC REV: CPT | Performed by: FAMILY MEDICINE

## 2020-09-04 RX ORDER — CIPROFLOXACIN 500 MG/1
500 TABLET, FILM COATED ORAL 2 TIMES DAILY
Qty: 10 TABLET | Refills: 0 | Status: SHIPPED | OUTPATIENT
Start: 2020-09-04 | End: 2020-09-09

## 2020-09-04 RX ORDER — METHOCARBAMOL 500 MG/1
500 TABLET, FILM COATED ORAL 3 TIMES DAILY PRN
Qty: 90 TABLET | Refills: 1 | Status: SHIPPED | OUTPATIENT
Start: 2020-09-04 | End: 2021-01-19 | Stop reason: SDUPTHER

## 2020-09-04 ASSESSMENT — ENCOUNTER SYMPTOMS
SHORTNESS OF BREATH: 0
ABDOMINAL DISTENTION: 1
NAUSEA: 0
CONSTIPATION: 0
DIARRHEA: 0
BACK PAIN: 0
ABDOMINAL PAIN: 1
COLOR CHANGE: 0
VOMITING: 0
BLOOD IN STOOL: 0

## 2020-09-04 NOTE — PROGRESS NOTES
2020    TELEHEALTH EVALUATION -- Audio/Visual (During GAVEQ-24 public health emergency)    HPI:    Rafaela Petersen (:  1960) has requested an audio/video evaluation for the following concern(s): Mass on abd:  Started out with a ball of scar tissue, noted by Dr. Homar Chapman years ago via [address]  Within the last 6 mo, started to spread and thicken  Feels heavy  Most noticeable when she lays down  Last few weeks: feels pulling with BMs  Makes her nervous  Mother  of cancer in her abd wall  Has had 2 hernias, doesn't feel it's a hernia or wall weakening  BMs normal, no n/v, weights stable/mild gain    UTI:   Pain at urethral opening  Worse when flow is stopping  No noted skin changes surrounding   Urine is darker  Constant pain onw  Inc freq and urge  No gross hematuria   Suprapubic abd pain/pressure/p  No flank pain  No fevers/n/v    Pain in lower leg  Not knee   Robaxin and diclofenac gel have helped in the past  Front calf area  Mid shin  Hx of gout, questions gout deposits? No skin discoloration     Review of Systems   Constitutional: Negative for activity change, appetite change, chills, diaphoresis, fatigue, fever and unexpected weight change. Respiratory: Negative for shortness of breath. Cardiovascular: Negative for chest pain and leg swelling. Gastrointestinal: Positive for abdominal distention and abdominal pain. Negative for blood in stool, constipation, diarrhea, nausea and vomiting. Genitourinary: Positive for difficulty urinating, dysuria, frequency and urgency. Negative for decreased urine volume, dyspareunia, flank pain, genital sores, hematuria, menstrual problem, pelvic pain, vaginal bleeding, vaginal discharge and vaginal pain. Musculoskeletal: Positive for arthralgias, gait problem and myalgias. Negative for back pain, joint swelling, neck pain and neck stiffness. Skin: Negative for color change, pallor, rash and wound.    Allergic/Immunologic: Negative for immunocompromised Social History     Tobacco Use    Smoking status: Former Smoker     Packs/day: 3.00     Years: 2.00     Pack years: 6.00     Types: Cigarettes     Last attempt to quit: 1985     Years since quittin.6    Smokeless tobacco: Never Used   Substance Use Topics    Alcohol use: No    Drug use: No        Allergies   Allergen Reactions    Dilaudid [Hydromorphone Hcl] Other (See Comments)     Palpitations and orthostatic hypotension    Lavender Oil Shortness Of Breath and Rash    Penicillins Shortness Of Breath    Seroquel [Quetiapine Fumarate] Other (See Comments)     Very lethargic/almost not funtioning at all (per patient).  Adhesive Tape Other (See Comments)     BLISTERS, paper tape okay    Hydromorphone Hcl      Other reaction(s):  Other (See Comments)  Rapid heart rate     Lithium Other (See Comments)     Diarrhea, vomiting, nausea, headaches,     Tetanus Toxoids Swelling   ,   Past Medical History:   Diagnosis Date    Anesthesia complication     hypotension post op    Autoimmune hemolytic anemia (HCC)     during uterine cancer    Bipolar disorder ([facility]     managed by Verona Ndiaye Double)    Bundle branch block     Cardiomyopathy ([facility]     Chronic systolic CHF (congestive heart failure) ([facility] 2015    Depression     Family history of early CAD     Family history of ovarian cancer     mother    GERD (gastroesophageal reflux disease)     Gout     Hypertension     Hypothyroid     Osteoarthritis, knee     Shybut    Pneumonia     history of pneumonia    S/P gastric bypass 05    Unspecified cerebral artery occlusion with cerebral infarction     Uterine cancer ([facility]     endometrial adenocarcinoma    Vitamin B 12 deficiency    ,   Past Surgical History:   Procedure Laterality Date    CARDIAC CATHETERIZATION  7/10/2015    Normal Coronary Arteries    CHOLECYSTECTOMY      COLONOSCOPY  2019    NL    COLONOSCOPY N/A 3/6/2019    EGD AND COLONOSCOPY WITH ANESTHESIA performed by Raj Ward MD at [address] [place] [address]      4 times    ERCP      FINGER SURGERY      gout X 2    GASTRIC BYPASS SURGERY  05    HERNIA REPAIR  10/20/2016    lap ventral hernia    LIPECTOMY      gangrenous    OTHER SURGICAL HISTORY      radium implants into uterus for uterine surgery X 2    OTHER SURGICAL HISTORY  16    exp.  lap lysis of adhesion    OTHER SURGICAL HISTORY      biventricular pacer and defibrillator    PACEMAKER PLACEMENT  2016    TRANSESOPHAGEAL ECHOCARDIOGRAM  2015    UPPER GASTROINTESTINAL ENDOSCOPY  13    UPPER GASTROINTESTINAL ENDOSCOPY  2019    NL    UPPER GASTROINTESTINAL ENDOSCOPY N/A 3/6/2019    EGD AND COLONOSCOPY WITH ANESTHESIA performed by Raj Ward MD at [address]   ,   Social History     Tobacco Use    Smoking status: Former Smoker     Packs/day: 3.00     Years: 2.00     Pack years: 6.00     Types: Cigarettes     Last attempt to quit: 1985     Years since quittin.6    Smokeless tobacco: Never Used   Substance Use Topics    Alcohol use: No    Drug use: No   ,   Family History   Problem Relation Age of Onset    Cancer Mother     Ovarian Cancer Mother 45        Technically peritoneal cancer    Depression Mother         bipolar    Arthritis Father     Rheum Arthritis Father     Arrhythmia Father     Other Father         gout    Heart Disease Father     Depression Brother         depression    Other Brother         gout    Obesity Brother     Other Brother         gout    Obesity Brother     Depression Brother     Cancer Maternal Grandmother         Breast   ,   Immunization History   Administered Date(s) Administered    Influenza Virus Vaccine 2012, 10/17/2012, 11/10/2014, 10/22/2015    Influenza Whole 11/10/2014    Influenza, Intradermal, Preservative free 2013    Influenza, MDCK Quadv, IM, PF (Flucelvax 4 yrs and older) 10/17/2017    Influenza, Quadv, IM, PF (6 mo and older Fluzone, Flulaval, Fluarix, and 3 yrs and older Afluria) 12/01/2016, 01/04/2019, 10/07/2019    Pneumococcal Polysaccharide ([id number]) 01/28/2012    Tetanus 03/01/1988   ,   Health Maintenance   Topic Date Due    HIV screen  08/16/1973    Shingles Vaccine (1 of 2) 08/16/2008    Cervical cancer screen  08/29/2016    Flu vaccine (1) 09/01/2020    Lipid screen  10/07/2020    Potassium monitoring  02/07/2021    Creatinine monitoring  02/07/2021    TSH testing  03/04/2021    Annual Wellness Visit (AWV)  03/14/2021    Breast cancer screen  03/04/2022    Colon cancer screen colonoscopy  03/06/2029    Pneumococcal 0-64 years Vaccine  Completed    Hepatitis C screen  Completed    Hepatitis A vaccine  Aged Out    Hepatitis B vaccine  Aged Out    Hib vaccine  Aged Out    Meningococcal (ACWY) vaccine  Aged Out       PHYSICAL EXAMINATION:  [ INSTRUCTIONS:  \"[x]\" Indicates a positive item  \"[]\" Indicates a negative item  -- DELETE ALL ITEMS NOT EXAMINED]  Vital Signs: (As obtained by patient/caregiver or practitioner observation)    Blood pressure-  Heart rate-    Respiratory rate-    Temperature-  Pulse oximetry-     Constitutional: [x] Appears well-developed and well-nourished [x] No apparent distress      [] Abnormal-   Mental status  [x] Alert and awake  [] Oriented to person/place/time [x]Able to follow commands      Eyes:  EOM    [x]  Normal  [] Abnormal-  Sclera  [x]  Normal  [] Abnormal -         Discharge []  None visible  [] Abnormal -    HENT:   [x] Normocephalic, atraumatic.   [x] Abnormal   [] Mouth/Throat: Mucous membranes are moist.     External Ears [x] Normal  [] Abnormal-     Neck: [x] No visualized mass     Pulmonary/Chest: [x] Respiratory effort normal.  [x] No visualized signs of difficulty breathing or respiratory distress        [] Abnormal-      Musculoskeletal:   [] Normal gait with no signs of ataxia         [x] Normal range of motion of neck        [] Abnormal-       Neurological:        [x] No Facial Asymmetry (Cranial nerve 7 motor function) (limited exam to video visit)          [] No gaze palsy        [] Abnormal-         Skin:        [x] No significant exanthematous lesions or discoloration noted on facial skin         [] Abnormal-            Psychiatric:       [x] Normal Affect [] No Hallucinations        [] Abnormal-     Other pertinent observable physical exam findings-     ASSESSMENT/PLAN:  1. Left lower quadrant abdominal mass  See HPI for details. VV today so exam was not completed. - CT ABDOMEN WO CONTRAST Additional Contrast? Radiologist Recommendation; Future    2. Acute cystitis without hematuria  Supportive care discussed: increased PO water hydration, clean hygiene, wiping technique, loose fitting cotton underwear, role of probiotics, etc. Discussed s/s of pyelonephritis and when to present to the ED.   - ciprofloxacin (CIPRO) 500 MG tablet; Take 1 tablet by mouth 2 times daily for 5 days  Dispense: 10 tablet; Refill: 0  If still symptomatic at days 3-4, will extend to 7-10 day course. Previous cultures in the past have grown E Coli susceptible to Cipro. 3. Pain of left calf  Unable to perform exam. If not improved in 2-3 weeks, will need in person visit. Discussed. - methocarbamol (ROBAXIN) 500 MG tablet; Take 1 tablet by mouth 3 times daily as needed (pain)  Dispense: 90 tablet; Refill: 1    4. Localized osteoarthritis of both knees  improved  - diclofenac sodium (VOLTAREN) 1 % GEL; Apply 2 g topically 2 times daily  Dispense: 100 g; Refill: 2    5. Hemiplegia and hemiparesis following cerebral infarction affecting left non-dominant side (HCC)  [facility] update. Unchanged. No new acute concerning SXs      Return if symptoms worsen or fail to improve. Ajay Burdick is a 56 y.o. female being evaluated by a Virtual Visit (video visit) encounter to address concerns as mentioned above.   A caregiver was present when appropriate. Due to this being a TeleHealth encounter (During PLMVV-29 public health emergency), evaluation of the following organ systems was limited: Vitals/Constitutional/EENT/Resp/CV/GI//MS/Neuro/Skin/Heme-Lymph-Imm. Pursuant to the emergency declaration under the [address] [place] [address] and the [employer] and [employer] Act, this Virtual Visit was conducted with patient's (and/or legal guardian's) consent, to reduce the patient's risk of exposure to COVID-19 and provide necessary medical care. The patient (and/or legal guardian) has also been advised to contact this office for worsening conditions or problems, and seek emergency medical treatment and/or call 911 if deemed necessary. Services were provided through a video synchronous discussion virtually to substitute for in-person clinic visit. Patient and provider were located at their individual homes. --Christopher Chahal MD on 9/4/2020 at 10:58 AM    An electronic signature was used to authenticate this note.

## 2020-09-09 ENCOUNTER — TELEPHONE (OUTPATIENT)
Dept: FAMILY MEDICINE CLINIC | Age: 60
End: 2020-09-09
Payer: MEDICARE

## 2020-09-09 ENCOUNTER — TELEPHONE (OUTPATIENT)
Dept: FAMILY MEDICINE CLINIC | Age: 60
End: 2020-09-09

## 2020-09-09 PROCEDURE — 81002 URINALYSIS NONAUTO W/O SCOPE: CPT | Performed by: FAMILY MEDICINE

## 2020-09-09 NOTE — TELEPHONE ENCOUNTER
Pt states she finished her antibiotic for the UTI. States her symptoms are not better. Also asking about blood work, if she's supposed to have it after she had her video visit.

## 2020-09-09 NOTE — TELEPHONE ENCOUNTER
Pt is scheduled for ct 9/10/20 at 12:30. Jose Wade from the imaging center says that since she has mass in left lower quadrant, the scan that was ordered will not get the results. For left lower quadrant, it would need to say pelvis in the order.     Please call Jose Wade to discuss at [phone]

## 2020-09-10 ENCOUNTER — HOSPITAL ENCOUNTER (OUTPATIENT)
Dept: CT IMAGING | Age: 60
Discharge: HOME | End: 2020-09-10
Payer: MEDICARE

## 2020-09-10 LAB
BILIRUBIN, POC: NORMAL
BLOOD URINE, POC: NORMAL
CLARITY, POC: CLEAR
COLOR, POC: YELLOW
GLUCOSE URINE, POC: NORMAL
KETONES, POC: NORMAL
LEUKOCYTE EST, POC: NORMAL
NITRITE, POC: NORMAL
PH, POC: 6
PROTEIN, POC: NORMAL
SPECIFIC GRAVITY, POC: 1.02
UROBILINOGEN, POC: 0.2

## 2020-09-10 PROCEDURE — 74176 CT ABD & PELVIS W/O CONTRAST: CPT

## 2020-09-10 PROCEDURE — 6360000004 HC RX CONTRAST MEDICATION: Performed by: FAMILY MEDICINE

## 2020-09-10 RX ADMIN — IOHEXOL 50 ML: 240 INJECTION, SOLUTION INTRATHECAL; INTRAVASCULAR; INTRAVENOUS; ORAL at 12:29

## 2020-09-10 NOTE — TELEPHONE ENCOUNTER
Given the lack of response to a good abx based on her previous cultures, I would recommend she drop off a urine sample so we can see what bacteria is there. I have placed the orders. I will send in another abx to help until the urine results come back. I did not order blood work after the last visit. Please let her know.  Thanks

## 2020-09-11 ENCOUNTER — TELEPHONE (OUTPATIENT)
Dept: FAMILY MEDICINE CLINIC | Age: 60
End: 2020-09-11

## 2020-09-11 RX ORDER — NITROFURANTOIN 25; 75 MG/1; MG/1
100 CAPSULE ORAL 2 TIMES DAILY
Qty: 20 CAPSULE | Refills: 0 | Status: SHIPPED | OUTPATIENT
Start: 2020-09-11 | End: 2020-09-21

## 2020-09-12 LAB — URINE CULTURE, ROUTINE: NORMAL

## 2020-09-16 ENCOUNTER — TELEPHONE (OUTPATIENT)
Dept: FAMILY MEDICINE CLINIC | Age: 60
End: 2020-09-16

## 2020-09-16 NOTE — TELEPHONE ENCOUNTER
Pt called today for CT OF THE ABDOMEN AND PELVIS WITHOUT CONTRAST   results. Test was preformed at [facility] on 9/10/20. Test results are in Epic. Please advise.

## 2020-09-21 ENCOUNTER — OFFICE VISIT (OUTPATIENT)
Dept: FAMILY MEDICINE CLINIC | Age: 60
End: 2020-09-21
Payer: MEDICARE

## 2020-09-21 VITALS
SYSTOLIC BLOOD PRESSURE: 112 MMHG | HEART RATE: 77 BPM | TEMPERATURE: 97.2 F | OXYGEN SATURATION: 97 % | DIASTOLIC BLOOD PRESSURE: 80 MMHG

## 2020-09-21 PROCEDURE — 90686 IIV4 VACC NO PRSV 0.5 ML IM: CPT | Performed by: FAMILY MEDICINE

## 2020-09-21 PROCEDURE — 1036F TOBACCO NON-USER: CPT | Performed by: FAMILY MEDICINE

## 2020-09-21 PROCEDURE — 99213 OFFICE O/P EST LOW 20 MIN: CPT | Performed by: FAMILY MEDICINE

## 2020-09-21 PROCEDURE — G8417 CALC BMI ABV UP PARAM F/U: HCPCS | Performed by: FAMILY MEDICINE

## 2020-09-21 PROCEDURE — G8427 DOCREV CUR MEDS BY ELIG CLIN: HCPCS | Performed by: FAMILY MEDICINE

## 2020-09-21 PROCEDURE — 3017F COLORECTAL CA SCREEN DOC REV: CPT | Performed by: FAMILY MEDICINE

## 2020-09-21 PROCEDURE — G0008 ADMIN INFLUENZA VIRUS VAC: HCPCS | Performed by: FAMILY MEDICINE

## 2020-09-21 ASSESSMENT — ENCOUNTER SYMPTOMS
VOMITING: 0
COLOR CHANGE: 0
SHORTNESS OF BREATH: 0
CONSTIPATION: 0
DIARRHEA: 0
ABDOMINAL PAIN: 0
BLOOD IN STOOL: 0
NAUSEA: 0

## 2020-09-21 NOTE — PROGRESS NOTES
9/21/2020    This is a 56 y.o. female who presents for  Chief Complaint   Patient presents with    Mass       HPI:     LLQ masses  Have been present for months  Getting worse, pain related  Feels more with twisting, laying in bed, sitting up. No skin changes  BMs are normal, no n/v  No urinary complaints     Past Medical History:   Diagnosis Date    Anesthesia complication     hypotension post op    Autoimmune hemolytic anemia (HCC)     during uterine cancer    Bipolar disorder ([facility]     managed by Yonathan Cadet)    Bundle branch block     Cardiomyopathy ([facility]     Chronic systolic CHF (congestive heart failure) ([facility] 7/9/2015    Depression     Family history of early CAD     Family history of ovarian cancer     mother    GERD (gastroesophageal reflux disease)     Gout     Hypertension     Hypothyroid     Osteoarthritis, knee     Shybut    Pneumonia     history of pneumonia    S/P gastric bypass 1/7/05    Unspecified cerebral artery occlusion with cerebral infarction     Uterine cancer ([facility]     endometrial adenocarcinoma    Vitamin B 12 deficiency 7/09       Past Surgical History:   Procedure Laterality Date    CARDIAC CATHETERIZATION  7/10/2015    Normal Coronary Arteries    CHOLECYSTECTOMY      COLONOSCOPY  03/06/2019    NL    COLONOSCOPY N/A 3/6/2019    EGD AND COLONOSCOPY WITH ANESTHESIA performed by Marten Cockayne, MD at [address] [place] [address]      4 times    ERCP      FINGER SURGERY      gout X 2    GASTRIC BYPASS SURGERY  1/7/05    HERNIA REPAIR  10/20/2016    lap ventral hernia    LIPECTOMY      gangrenous    OTHER SURGICAL HISTORY      radium implants into uterus for uterine surgery X 2    OTHER SURGICAL HISTORY  4/1/16    exp.  lap lysis of adhesion    OTHER SURGICAL HISTORY      biventricular pacer and defibrillator    PACEMAKER PLACEMENT  09/2016    TRANSESOPHAGEAL ECHOCARDIOGRAM  7/13/2015    UPPER GASTROINTESTINAL ENDOSCOPY  13    UPPER GASTROINTESTINAL ENDOSCOPY  2019    NL    UPPER GASTROINTESTINAL ENDOSCOPY N/A 3/6/2019    EGD AND COLONOSCOPY WITH ANESTHESIA performed by Bailey Mclain MD at [address] Marital status:      Spouse name: Not on file    Number of children: Not on file    Years of education: Not on file    Highest education level: Not on file   Occupational History    Not on file   Social Needs    Financial resource strain: Not on file    Food insecurity     Worry: Not on file     Inability: Not on file    Transportation needs     Medical: Not on file     Non-medical: Not on file   Tobacco Use    Smoking status: Former Smoker     Packs/day: 3.00     Years: 2.00     Pack years: 6.00     Types: Cigarettes     Last attempt to quit: 1985     Years since quittin.7    Smokeless tobacco: Never Used   Substance and Sexual Activity    Alcohol use: No    Drug use: No    Sexual activity: Not Currently   Lifestyle    Physical activity     Days per week: Not on file     Minutes per session: Not on file    Stress: Not on file   Relationships    Social connections     Talks on phone: Not on file     Gets together: Not on file     Attends Orthodoxy service: Not on file     Active member of club or organization: Not on file     Attends meetings of clubs or organizations: Not on file     Relationship status: Not on file    Intimate partner violence     Fear of current or ex partner: Not on file     Emotionally abused: Not on file     Physically abused: Not on file     Forced sexual activity: Not on file   Other Topics Concern    Not on file   Social History Narrative    Not on file       Family History   Problem Relation Age of Onset    Cancer Mother     Ovarian Cancer Mother 45        Technically peritoneal cancer    Depression Mother         bipolar    Arthritis Father     Rheum Arthritis Father    [facility] Date(s) Administered    Influenza Virus Vaccine 01/28/2012, 10/17/2012, 11/10/2014, 10/22/2015    Influenza Whole 11/10/2014    Influenza, Intradermal, Preservative free 09/24/2013    Influenza, MDCK Quadv, IM, PF (Flucelvax 4 yrs and older) 10/17/2017    Influenza, Quadv, IM, PF (6 mo and older Fluzone, Flulaval, Fluarix, and 3 yrs and older Afluria) 12/01/2016, 01/04/2019, 10/07/2019, 09/21/2020    Pneumococcal Polysaccharide ([id number]) 01/28/2012    Tetanus 03/01/1988       Allergies   Allergen Reactions    Dilaudid [Hydromorphone Hcl] Other (See Comments)     Palpitations and orthostatic hypotension    Lavender Oil Shortness Of Breath and Rash    Penicillins Shortness Of Breath    Seroquel [Quetiapine Fumarate] Other (See Comments)     Very lethargic/almost not funtioning at all (per patient).  Adhesive Tape Other (See Comments)     BLISTERS, paper tape okay    Hydromorphone Hcl      Other reaction(s): Other (See Comments)  Rapid heart rate     Lithium Other (See Comments)     Diarrhea, vomiting, nausea, headaches,     Tetanus Toxoids Swelling       Review of Systems   Constitutional: Negative for activity change, appetite change, chills, diaphoresis, fatigue, fever and unexpected weight change. Respiratory: Negative for shortness of breath. Cardiovascular: Negative for chest pain. Gastrointestinal: Negative for abdominal pain, blood in stool, constipation, diarrhea, nausea and vomiting. Genitourinary: Negative for decreased urine volume and difficulty urinating. Musculoskeletal: Negative for arthralgias. Skin: Negative for color change, pallor, rash and wound. Allergic/Immunologic: Negative for immunocompromised state. Neurological: Negative for dizziness, light-headedness and headaches. Hematological: Negative for adenopathy.        /80 (Site: Left Lower Arm, Position: Sitting, Cuff Size: Medium Adult)   Pulse 77   Temp 97.2 °F (36.2 °C) (Temporal)   SpO2 97% Physical Exam  Vitals signs and nursing note reviewed. Constitutional:       General: She is not in acute distress. Appearance: She is well-developed. She is not diaphoretic. HENT:      Head: Normocephalic and atraumatic. Eyes:      Pupils: Pupils are equal, round, and reactive to light. Neck:      Musculoskeletal: Normal range of motion and neck supple. Cardiovascular:      Rate and Rhythm: Normal rate and regular rhythm. Pulmonary:      Effort: Pulmonary effort is normal. No respiratory distress. Breath sounds: Normal breath sounds. Abdominal:      General: Bowel sounds are normal. There is no distension. Palpations: Abdomen is soft. There is mass. Tenderness: There is no abdominal tenderness. There is no guarding or rebound. Comments: Small palpable masses ranging from 1.2 cm to 0.5 cm approx, locations as indicated, with moderate tenderness on deep palpation. Appear to be subcutaneous    Musculoskeletal: Normal range of motion. Skin:     General: Skin is warm and dry. Capillary Refill: Capillary refill takes 2 to 3 seconds. Coloration: Skin is not pale. Findings: No erythema or rash. Neurological:      Mental Status: She is alert. Psychiatric:         Behavior: Behavior normal.         Thought Content: Thought content normal.         Judgment: Judgment normal.         Plan  1. Abdominal wall mass of left lower quadrant  Based on imaging results and exam, they appear to be inflammatory/reactive indurations from surgical staples previously left in her abdominal wall. CT abd/pelvis was reviewed in detail with images of patients. Defer interventions to pt's pain threshold and surgeon.      2. Need for immunization against influenza  - INFLUENZA, QUADV, 3 YRS AND OLDER, IM PF, PREFILL SYR OR SDV, 0.5ML (AFLURIA QUADV, PF)        While assessing care for this patient, I have reviewed all pertinent lab work/imaging/ specialist notes and care in reference to those problems addressed above in detail. Appropriate medical decision making was based on this. Please note that portions of this note may have been completed with a voice recognition program. Efforts were made to edit the dictations but occasionally words are mis-transcribed. Return if symptoms worsen or fail to improve.

## 2020-09-30 RX ORDER — METOPROLOL SUCCINATE 50 MG/1
50 TABLET, EXTENDED RELEASE ORAL DAILY
Qty: 90 TABLET | Refills: 2 | Status: SHIPPED | OUTPATIENT
Start: 2020-09-30 | End: 2020-12-27 | Stop reason: SDUPTHER

## 2020-09-30 RX ORDER — ALLOPURINOL 300 MG/1
TABLET ORAL
Qty: 90 TABLET | Refills: 1 | Status: SHIPPED | OUTPATIENT
Start: 2020-09-30 | End: 2020-12-27 | Stop reason: SDUPTHER

## 2020-09-30 NOTE — TELEPHONE ENCOUNTER
.  Last office visit 9/21/2020     Last written 3- 90 with 1      Next office visit scheduled Visit date not found    Requested Prescriptions     Pending Prescriptions Disp Refills    allopurinol (ZYLOPRIM) 300 MG tablet 90 tablet 1     Sig: TAKE 1 TABLET EVERY DAY

## 2020-10-14 RX ORDER — MONTELUKAST SODIUM 10 MG/1
10 TABLET ORAL NIGHTLY
Qty: 90 TABLET | Refills: 1 | Status: SHIPPED | OUTPATIENT
Start: 2020-10-14 | End: 2021-04-22 | Stop reason: SDUPTHER

## 2020-10-14 NOTE — TELEPHONE ENCOUNTER
----- Message from Radha Pozobrandon sent at 10/14/2020  1:08 PM EDT -----  Subject: Refill Request    QUESTIONS  Name of Medication? montelukast (SINGULAIR) 10 MG tablet  Patient-reported dosage and instructions? one tablet a night   How many days do you have left? 4  Preferred Pharmacy? [facility] [address] [phone] [facility] [phone]  Pharmacy phone number (if available)? [phone]  Additional Information for Provider?   ---------------------------------------------------------------------------  --------------  [address]  What is the best way for the office to contact you? OK to leave message on   voicemail  Preferred Call Back Phone Number?  [phone]

## 2020-11-04 ENCOUNTER — TELEPHONE (OUTPATIENT)
Dept: CARDIOLOGY CLINIC | Age: 60
End: 2020-11-04

## 2020-11-04 NOTE — TELEPHONE ENCOUNTER
Last OV with VICENTE 3/20/18. Per [address] patient needs OV with VICENTE to discuss decrease in BiV pacing on most recent device interrogation. Please call to schedule OV with VICENTE next available, not urgent. Please also schedule on device clinic. Thank you.

## 2020-11-04 NOTE — TELEPHONE ENCOUNTER
----- Message from Roxana Motta MD sent at 10/30/2020  1:04 PM EDT -----  BiV pacing has decreased. Needs follow up with Dr. Renae Arcos to see if need to change therapy or find reason for decrease. Thanks.

## 2020-11-19 RX ORDER — SIMVASTATIN 20 MG
TABLET ORAL
Qty: 90 TABLET | Refills: 1 | Status: SHIPPED | OUTPATIENT
Start: 2020-11-19 | End: 2021-04-22 | Stop reason: SDUPTHER

## 2020-11-19 RX ORDER — LAMOTRIGINE 150 MG/1
TABLET ORAL
Qty: 180 TABLET | Refills: 1 | Status: SHIPPED | OUTPATIENT
Start: 2020-11-19 | End: 2021-04-23 | Stop reason: SDUPTHER

## 2020-11-19 NOTE — TELEPHONE ENCOUNTER
.  Last office visit 9/21/2020     Last written 5/18/2020 90 with 1   lamictal 6/10/2020 180 with 1      Next office visit scheduled Visit date not found    Requested Prescriptions     Pending Prescriptions Disp Refills    simvastatin (ZOCOR) 20 MG tablet [Pharmacy Med Name: SIMVASTATIN 20 MG Tablet] 90 tablet 1     Sig: TAKE 1 TABLET EVERY NIGHT    lamoTRIgine (LAMICTAL) 150 MG tablet [Pharmacy Med Name: LAMOTRIGINE 150 MG Tablet] 180 tablet 1     Sig: TAKE 1 TABLET TWICE DAILY OR AS OTHERWISE DIRECTED

## 2020-12-04 NOTE — TELEPHONE ENCOUNTER
Pt last OV 2/7/20 NPRB  Visit Diagnosis:    1. Chronic systolic congestive heart failure ([facility]    2. Biventricular ICD (implantable cardioverter-defibrillator) in place    3. Dilated cardiomyopathy ([facility]    4. Essential hypertension    5. Morbid obesity with BMI of 45.0-49.9, adult ([facility]          Plan:      1. Check labs CMP and CBC  2. Continue lisinopril - may consider adjusting this med for heart function   3. Continue Toprol Xl   4. Continue torsemide 20 mg three times a week- if lisinopril is increased then will plan to decrease the torsemide to 10mg three times a week. 5. Check daily weights and record them  6. Follow up 3 months   7.  Discussed exercise at length- plan is to start exercise 2-3 minutes daily and build up.

## 2020-12-07 RX ORDER — LISINOPRIL 5 MG/1
TABLET ORAL
Qty: 90 TABLET | Refills: 1 | Status: SHIPPED | OUTPATIENT
Start: 2020-12-07 | End: 2021-05-13

## 2020-12-28 RX ORDER — METOPROLOL SUCCINATE 50 MG/1
50 TABLET, EXTENDED RELEASE ORAL DAILY
Qty: 90 TABLET | Refills: 2 | Status: ON HOLD | OUTPATIENT
Start: 2020-12-28 | End: 2021-03-14 | Stop reason: HOSPADM

## 2020-12-28 RX ORDER — ALLOPURINOL 300 MG/1
TABLET ORAL
Qty: 90 TABLET | Refills: 1 | Status: SHIPPED | OUTPATIENT
Start: 2020-12-28 | End: 2021-04-30 | Stop reason: SDUPTHER

## 2020-12-28 NOTE — TELEPHONE ENCOUNTER
Refill Request     Last Seen: 9/21/2020    Last Written: 9/30/2020    Next Appointment:   Future Appointments   Date Time Provider Sarah Najera   1/25/2021  7:10 AM SCHEDULE, Dao Chapman [facility]   1/27/2021 11:00 AM SCHEDULE, [facility]   1/27/2021 11:00 AM Jeana Riley MD [facility]             Requested Prescriptions     Pending Prescriptions Disp Refills    allopurinol (ZYLOPRIM) 300 MG tablet 90 tablet 1     Sig: TAKE 1 TABLET EVERY DAY

## 2021-01-19 DIAGNOSIS — M79.662 PAIN OF LEFT CALF: ICD-10-CM

## 2021-01-19 NOTE — TELEPHONE ENCOUNTER
Refill Request     Last Seen: 9/21/2020    Last Written: 9/4/2020    Next Appointment:   Future Appointments   Date Time Provider Sarah Najera   1/27/2021 11:00 AM SCHEDULE, [facility] Mike Conklin [facility]   1/27/2021 11:00 AM MD Christopher Clinton [facility]   4/26/2021  7:35 AM SCHEDULE, [facility] Christopher Freitas [facility]           Requested Prescriptions     Pending Prescriptions Disp Refills    methocarbamol (ROBAXIN) 500 MG tablet 90 tablet 1     Sig: Take 1 tablet by mouth 3 times daily as needed (pain)

## 2021-01-20 RX ORDER — METHOCARBAMOL 500 MG/1
500 TABLET, FILM COATED ORAL 3 TIMES DAILY PRN
Qty: 90 TABLET | Refills: 1 | Status: SHIPPED | OUTPATIENT
Start: 2021-01-20 | End: 2021-04-20 | Stop reason: SDUPTHER

## 2021-01-27 ENCOUNTER — OFFICE VISIT (OUTPATIENT)
Dept: CARDIOLOGY CLINIC | Age: 61
End: 2021-01-27
Payer: MEDICARE

## 2021-01-27 ENCOUNTER — NURSE ONLY (OUTPATIENT)
Dept: CARDIOLOGY CLINIC | Age: 61
End: 2021-01-27
Payer: MEDICARE

## 2021-01-27 VITALS
HEART RATE: 78 BPM | DIASTOLIC BLOOD PRESSURE: 70 MMHG | BODY MASS INDEX: 50.02 KG/M2 | OXYGEN SATURATION: 99 % | SYSTOLIC BLOOD PRESSURE: 110 MMHG | HEIGHT: 64 IN | WEIGHT: 293 LBS

## 2021-01-27 DIAGNOSIS — I50.22 CHRONIC SYSTOLIC CONGESTIVE HEART FAILURE (HCC): Primary | ICD-10-CM

## 2021-01-27 DIAGNOSIS — I63.231 ARTERIAL ISCHEMIC STROKE, ICA, RIGHT, ACUTE (HCC): ICD-10-CM

## 2021-01-27 DIAGNOSIS — I63.9 CEREBROVASCULAR ACCIDENT (CVA), UNSPECIFIED MECHANISM (HCC): ICD-10-CM

## 2021-01-27 DIAGNOSIS — Z95.810 BIVENTRICULAR ICD (IMPLANTABLE CARDIOVERTER-DEFIBRILLATOR) IN PLACE: ICD-10-CM

## 2021-01-27 DIAGNOSIS — R55 SYNCOPE, UNSPECIFIED SYNCOPE TYPE: ICD-10-CM

## 2021-01-27 DIAGNOSIS — R00.2 PALPITATION: ICD-10-CM

## 2021-01-27 DIAGNOSIS — I42.0 DILATED CARDIOMYOPATHY (HCC): ICD-10-CM

## 2021-01-27 PROCEDURE — G8427 DOCREV CUR MEDS BY ELIG CLIN: HCPCS | Performed by: INTERNAL MEDICINE

## 2021-01-27 PROCEDURE — 93000 ELECTROCARDIOGRAM COMPLETE: CPT | Performed by: INTERNAL MEDICINE

## 2021-01-27 PROCEDURE — 99214 OFFICE O/P EST MOD 30 MIN: CPT | Performed by: INTERNAL MEDICINE

## 2021-01-27 PROCEDURE — G8482 FLU IMMUNIZE ORDER/ADMIN: HCPCS | Performed by: INTERNAL MEDICINE

## 2021-01-27 PROCEDURE — 1036F TOBACCO NON-USER: CPT | Performed by: INTERNAL MEDICINE

## 2021-01-27 PROCEDURE — 93284 PRGRMG EVAL IMPLANTABLE DFB: CPT | Performed by: INTERNAL MEDICINE

## 2021-01-27 PROCEDURE — 3017F COLORECTAL CA SCREEN DOC REV: CPT | Performed by: INTERNAL MEDICINE

## 2021-01-27 PROCEDURE — G8417 CALC BMI ABV UP PARAM F/U: HCPCS | Performed by: INTERNAL MEDICINE

## 2021-01-27 NOTE — LETTER
[facility]   Cardiac Followup    Date: 1/27/21  Patient Name: Arturo Miller  YOB: 1960    Primary Car Provider: Shanti Velazquez MD    CHIEF COMPLAINT:   Chief Complaint   Patient presents with    Follow-up     Pt reports that her  has passed away in June 2020    Other     Device maintance check    Shortness of Breath     On excertion and walking a distance    Chest Pain     Pt reportsthis occured in the last 10 days, squeezing feeling, reports that she feels her device moves and 'stands up'         HPI:  Arturo Miller is a 56 y.o. female who presents for cardiomyopathy and review of device diagnostics. She had cardiomyopathy identified 7/15 with LVEF 20% by echo. Subsequent cath showed normal coronaries. She has been treated with beta blocker and ACE inhibitor, but the ACE was subsequently discontinued related to worsening renal function and hypotension. Repeat echo 7/6/16 showed LVEF . 20. She reports easy fatiguability, TAPIA with routine workloads. She did have a recent episode of syncope. ECG's have demonstrated consistent LBBB. She underwent BiV ICD placement on 07/29/16. At her office visit in 3/2018, she reported she had been feeling better since the device was implanted. She underwent an echocardiogram in 11/2019 that showed an EF of 40-45%. Today, 1/27/2021, patient's device demonstrates AP 11%,  92%,  VT on 12/16/2020 (340 ms) which was terminated with antitachycardia pacing, no AF events, battery life 1.7 years remaining. She reports she feels much better since having her device placed in 2016. On December 16th, 2020 at 10AM, patient had VT recorded on her device. Patient reports she felt very poorly the day that she had VT. She reports she felt her heart being paced out of VT and felt much better. On exam, patient has pronounced veins in her left shoulder, but she is unaware of how long the veins have been pronounced. She reports she occasionally feels skipped beats. She reports she is taking all medications as prescribed and tolerates them well. Patient denies current chest pain, sob, palpitations, dizziness or syncope. Past Medical History:   has a past medical history of Anesthesia complication, Autoimmune hemolytic anemia, Bipolar disorder ([address] Bundle branch block, Cardiomyopathy ([address] Chronic systolic CHF (congestive heart failure) ([address] Depression, Family history of early CAD, Family history of ovarian cancer, GERD (gastroesophageal reflux disease), Gout, Hypertension, Hypothyroid, Osteoarthritis, knee, Pneumonia, S/P gastric bypass, Unspecified cerebral artery occlusion with cerebral infarction, Uterine cancer ([address] and Vitamin B 12 deficiency.     Surgical History:  montelukast (SINGULAIR) 10 MG tablet Take 1 tablet by mouth nightly 90 tablet 1    diclofenac sodium (VOLTAREN) 1 % GEL Apply 2 g topically 2 times daily 100 g 2    levothyroxine (SYNTHROID) 150 MCG tablet TAKE 1 TABLET EVERY DAY EXCEPT FOR SUNDAY TAKE 2 TABLETS AS DIRECTED FOR A TOTAL OF 8 TABLETS/WEEK 103 tablet 3    torsemide (DEMADEX) 20 MG tablet Three times a week (Patient taking differently: 10 mg 10 mg Three times a week) 30 tablet 3    BIOTIN PO Take by mouth daily      acetaminophen (TYLENOL) 325 MG tablet Take 650 mg by mouth every 6 hours as needed for Pain TAKES EVERY DAY      aspirin 81 MG EC tablet Take 1 tablet by mouth daily 30 tablet 3    cetirizine (ZYRTEC) 10 MG tablet Take 10 mg by mouth daily       No facility-administered encounter medications on file as of 1/27/2021. Allergies:  Dilaudid [hydromorphone hcl], Lavender oil, Penicillins, Seroquel [quetiapine fumarate], Adhesive tape, Hydromorphone hcl, Lithium, and Tetanus toxoids     Review of Systems   Constitutional: Negative. HENT: Negative. Eyes: Negative. Respiratory: Negative. Cardiovascular: Negative. Gastrointestinal: Negative. Genitourinary: Negative. Musculoskeletal: Negative. Skin: Negative. Neurological: Negative. Hematological: Negative. Psychiatric/Behavioral: Negative. /70   Pulse 78   Ht 5' 4\" (1.626 m)   Wt (!) 308 lb 8 oz (139.9 kg)   SpO2 99%   BMI 52.95 kg/m²     Data:    ECG 1/27/2021: BIV paced. 82 BPM.     ECHO 11/1/2019:   Summary   The left ventricular systolic function is mildly reduced with an ejection   fraction of 40-45 %. Anteroseptal wall hypokinesis. There is mild concentric left ventricular hypertrophy. Left ventricular cavity size is mildly dilated. Grade I diastolic dysfunction with normal left ventricular filling pressure. Mild posterior mitral annular calcification. Mild thickening of the anterior leaflet of mitral valve. Moderate mitral regurgitation. The left atrium is mildly dilated. Frequent premature beats makes it difficult to identify exact wall motion   abnormality. Objective:  Physical Exam   Constitutional: She is oriented to person, place, and time. She appears well-developed and well-nourished. HENT:   Head: Normocephalic and atraumatic. Eyes: Pupils are equal, round, and reactive to light. Neck: Normal range of motion. Cardiovascular: Normal rate, regular rhythm and normal heart sounds. Pulmonary/Chest: Effort normal and breath sounds normal.   Abdominal: Soft. No tenderness. Musculoskeletal: Normal range of motion. She exhibits no edema. Neurological: She is alert and oriented to person, place, and time. Skin: Skin is warm and dry. Psychiatric: She has a normal mood and affect. Assessment:  1. NICM  2. VT- detected on device diagnostics. This was terminated with ATP but occurred in the VF zone were only one ATP is available. VT zone successfully added to device settings 1/27/2021.   3. S/P Bi-V ICD- she has had remarkable symptomatic improvement with CRT. Her device is functioning normally and her ECG is markedly improved  4. Reduced BiV pacing percentage (92%)    Plan:  1. 24 hour monitor to quantify PVCs. 2. Adjustments made to device today: zone successfully added for VT. 3. Continue medications as prescribed. 4. Remote device checks every 3 months. 5. Follow up with EP NP in 6 months. QUALITY MEASURES  1. Tobacco Cessation Counseling: NA  2. Retake of BP if >140/90:   NA  3. Documentation to PCP/referring for new patient:  Sent to PCP at close of office visit  4. CAD patient on anti-platelet: NA  5. CAD patient on STATIN therapy:  NA  6. Patient with CHF and aFib on anticoagulation:  NA      This note has been scribed in the presence of Ab Landeros MD by Jessica Harp RN. I, Dr. Thresa Boast, personally performed the services described in this documentation as scribed by Jerry Dean RN in my presence, and it is both accurate and complete.       Thresa Boast, M.D.

## 2021-01-27 NOTE — PROGRESS NOTES
Patient presents to the device clinic today for a programming evaluation for her defibrillator. Patient has a history of DCM, syncope, and CVA. Takes Toprol XL. Last device interrogation was on 10/21. Since then, 1 VF event recorded on 12/16 @ 214 bpm x 10 seconds, receiving ATP x 1. Frequent ectopy is noted during testing - 44.8 PVCs per hour. AP 11.2%  92.2%    All sensing and pacing parameters are within normal range. Changes today include: VF zone set to 280 ms and VT zone set to 340 ms w/ burst x 8, 81%, 10 beats, 10 ms., 25J, 35J x4. Patient education was provided about device functionality, in home monitoring, and any other patient questions and/or concerns were addressed. Patient voices understanding. Please see interrogation for more detail. Patient will see Dr. Olimpia Leone today in office. Patient will follow up in 3 months in office or remotely. See Paceart report under the Cardiology tab.

## 2021-01-27 NOTE — PROGRESS NOTES
[facility], [place], operated by [facility]   Cardiac Followup    Date: 1/27/21  Patient Name: Africa Goodman  YOB: 1960    Primary Car Provider: Mery Muller MD    CHIEF COMPLAINT:   Chief Complaint   Patient presents with    Follow-up     Pt reports that her  has passed away in June 2020    Other     Device maintance check    Shortness of Breath     On excertion and walking a distance    Chest Pain     Pt reportsthis occured in the last 10 days, squeezing feeling, reports that she feels her device moves and 'stands up'         HPI:  Africa Goodman is a 56 y.o. female who presents for cardiomyopathy and review of device diagnostics. She had cardiomyopathy identified 7/15 with LVEF 20% by echo. Subsequent cath showed normal coronaries. She has been treated with beta blocker and ACE inhibitor, but the ACE was subsequently discontinued related to worsening renal function and hypotension. Repeat echo 7/6/16 showed LVEF . 20. She reports easy fatiguability, TAPIA with routine workloads. She did have a recent episode of syncope. ECG's have demonstrated consistent LBBB. She underwent BiV ICD placement on 07/29/16. At her office visit in 3/2018, she reported she had been feeling better since the device was implanted. She underwent an echocardiogram in 11/2019 that showed an EF of 40-45%. Today, 1/27/2021, patient's device demonstrates AP 11%,  92%,  VT on 12/16/2020 (340 ms) which was terminated with antitachycardia pacing, no AF events, battery life 1.7 years remaining. She reports she feels much better since having her device placed in 2016. On December 16th, 2020 at 10AM, patient had VT recorded on her device. Patient reports she felt very poorly the day that she had VT. She reports she felt her heart being paced out of VT and felt much better. On exam, patient has pronounced veins in her left shoulder, but she is unaware of how long the veins have been pronounced.  She reports she occasionally feels skipped beats. She reports she is taking all medications as prescribed and tolerates them well. Patient denies current chest pain, sob, palpitations, dizziness or syncope. Past Medical History:   has a past medical history of Anesthesia complication, Autoimmune hemolytic anemia, Bipolar disorder ([address] Bundle branch block, Cardiomyopathy ([address] Chronic systolic CHF (congestive heart failure) ([address] Depression, Family history of early CAD, Family history of ovarian cancer, GERD (gastroesophageal reflux disease), Gout, Hypertension, Hypothyroid, Osteoarthritis, knee, Pneumonia, S/P gastric bypass, Unspecified cerebral artery occlusion with cerebral infarction, Uterine cancer ([address] and Vitamin B 12 deficiency. Surgical History:   has a past surgical history that includes Gastric bypass surgery (1/7/05); Finger surgery; Dilation and curettage of uterus; lipectomy; Cholecystectomy; other surgical history; Upper gastrointestinal endoscopy (4/23/13); Cardiac catheterization (7/10/2015); transesophageal echocardiogram (7/13/2015); other surgical history (4/1/16); pacemaker placement (09/2016); hernia repair (10/20/2016); ERCP; other surgical history; Upper gastrointestinal endoscopy (03/06/2019); Colonoscopy (03/06/2019); Upper gastrointestinal endoscopy (N/A, 3/6/2019); and Colonoscopy (N/A, 3/6/2019). Social History:   reports that she quit smoking about 36 years ago. Her smoking use included cigarettes. She has a 6.00 pack-year smoking history. She has never used smokeless tobacco. She reports that she does not drink alcohol or use drugs. Family History:  family history includes Arrhythmia in her father; Arthritis in her father; Cancer in her maternal grandmother and mother; Depression in her brother, brother, and mother; Heart Disease in her father; Obesity in her brother and brother;  Other in her brother, brother, and father; Ovarian Cancer (age of onset: 45) in her mother; Rheum Arthritis in her father. Home Medications:  Outpatient Encounter Medications as of 1/27/2021   Medication Sig Dispense Refill    methocarbamol (ROBAXIN) 500 MG tablet Take 1 tablet by mouth 3 times daily as needed (pain) 90 tablet 1    metoprolol succinate (TOPROL XL) 50 MG extended release tablet Take 1 tablet by mouth daily 90 tablet 2    allopurinol (ZYLOPRIM) 300 MG tablet TAKE 1 TABLET EVERY DAY 90 tablet 1    lisinopril (PRINIVIL;ZESTRIL) 5 MG tablet TAKE 1 TABLET EVERY DAY 90 tablet 1    simvastatin (ZOCOR) 20 MG tablet TAKE 1 TABLET EVERY NIGHT 90 tablet 1    lamoTRIgine (LAMICTAL) 150 MG tablet TAKE 1 TABLET TWICE DAILY OR AS OTHERWISE DIRECTED 180 tablet 1    montelukast (SINGULAIR) 10 MG tablet Take 1 tablet by mouth nightly 90 tablet 1    diclofenac sodium (VOLTAREN) 1 % GEL Apply 2 g topically 2 times daily 100 g 2    levothyroxine (SYNTHROID) 150 MCG tablet TAKE 1 TABLET EVERY DAY EXCEPT FOR SUNDAY TAKE 2 TABLETS AS DIRECTED FOR A TOTAL OF 8 TABLETS/WEEK 103 tablet 3    torsemide (DEMADEX) 20 MG tablet Three times a week (Patient taking differently: 10 mg 10 mg Three times a week) 30 tablet 3    BIOTIN PO Take by mouth daily      acetaminophen (TYLENOL) 325 MG tablet Take 650 mg by mouth every 6 hours as needed for Pain TAKES EVERY DAY      aspirin 81 MG EC tablet Take 1 tablet by mouth daily 30 tablet 3    cetirizine (ZYRTEC) 10 MG tablet Take 10 mg by mouth daily       No facility-administered encounter medications on file as of 1/27/2021. Allergies:  Dilaudid [hydromorphone hcl], Lavender oil, Penicillins, Seroquel [quetiapine fumarate], Adhesive tape, Hydromorphone hcl, Lithium, and Tetanus toxoids     Review of Systems   Constitutional: Negative. HENT: Negative. Eyes: Negative. Respiratory: Negative. Cardiovascular: Negative. Gastrointestinal: Negative. Genitourinary: Negative. Musculoskeletal: Negative. Skin: Negative. Neurological: Negative.     Hematological: Negative. Psychiatric/Behavioral: Negative. /70   Pulse 78   Ht 5' 4\" (1.626 m)   Wt (!) 308 lb 8 oz (139.9 kg)   SpO2 99%   BMI 52.95 kg/m²     Data:    ECG 1/27/2021: BIV paced. 82 BPM.     ECHO 11/1/2019:   Summary   The left ventricular systolic function is mildly reduced with an ejection   fraction of 40-45 %. Anteroseptal wall hypokinesis. There is mild concentric left ventricular hypertrophy. Left ventricular cavity size is mildly dilated. Grade I diastolic dysfunction with normal left ventricular filling pressure. Mild posterior mitral annular calcification. Mild thickening of the anterior leaflet of mitral valve. Moderate mitral regurgitation. The left atrium is mildly dilated. Frequent premature beats makes it difficult to identify exact wall motion   abnormality. Objective:  Physical Exam   Constitutional: She is oriented to person, place, and time. She appears well-developed and well-nourished. HENT:   Head: Normocephalic and atraumatic. Eyes: Pupils are equal, round, and reactive to light. Neck: Normal range of motion. Cardiovascular: Normal rate, regular rhythm and normal heart sounds. Pulmonary/Chest: Effort normal and breath sounds normal.   Abdominal: Soft. No tenderness. Musculoskeletal: Normal range of motion. She exhibits no edema. Neurological: She is alert and oriented to person, place, and time. Skin: Skin is warm and dry. Psychiatric: She has a normal mood and affect. Assessment:  1. NICM  2. VT- detected on device diagnostics. This was terminated with ATP but occurred in the VF zone were only one ATP is available. VT zone successfully added to device settings 1/27/2021.   3. S/P Bi-V ICD- she has had remarkable symptomatic improvement with CRT. Her device is functioning normally and her ECG is markedly improved  4. Reduced BiV pacing percentage (92%)    Plan:  1. 24 hour monitor to quantify PVCs.    2. Adjustments made to device today: zone successfully added for VT. 3. Continue medications as prescribed. 4. Remote device checks every 3 months. 5. Follow up with EP NP in 6 months. QUALITY MEASURES  1. Tobacco Cessation Counseling: NA  2. Retake of BP if >140/90:   NA  3. Documentation to PCP/referring for new patient:  Sent to PCP at close of office visit  4. CAD patient on anti-platelet: NA  5. CAD patient on STATIN therapy:  NA  6. Patient with CHF and aFib on anticoagulation:  NA      This note has been scribed in the presence of Jovanny St MD by Den Garcia RN.       I, Dr. Jovanny St, personally performed the services described in this documentation as scribed by Den Garcia RN in my presence, and it is both accurate and complete.       Jovanny St M.D.

## 2021-01-27 NOTE — PATIENT INSTRUCTIONS
Plan:  1. 24 hour monitor to quantify PVCs. 2. Adjustments made to device today: vector added for VT. 3. Continue medications as prescribed. 4. Remote device checks every 3 months. 5. Follow up with EP NP in 6 months.

## 2021-01-28 ENCOUNTER — TELEPHONE (OUTPATIENT)
Dept: CARDIOLOGY CLINIC | Age: 61
End: 2021-01-28

## 2021-01-28 DIAGNOSIS — I50.9 CHRONIC CONGESTIVE HEART FAILURE, UNSPECIFIED HEART FAILURE TYPE (HCC): Primary | ICD-10-CM

## 2021-01-28 NOTE — TELEPHONE ENCOUNTER
Ok Diez,     We saw this patient in office yesterday. She expressed concerns regarding CHF interfering with her mobility. She was hoping to get a handicap placard as she is unable to walk very far without getting SOB. She says if you are agreeable, she would be happy to pick this up at the office or we could mail to the patient.

## 2021-01-28 NOTE — LETTER
[address] Cardiology - [address] [facility] [address] [facility]  Phone: [phone]  Fax: [phone], APRN - CNP        January 29, 2021     Patient: Hipolito Hu   YOB: 1960   Date of Visit: 1/28/2021       To Whom It May Concern: It is my medical opinion that Bronwyn Iniguez requires a disability parking placard for the following reasons:  She cannot walk 200 feet without stopping to rest.  Duration of need: 5 years    If you have any questions or concerns, please don't hesitate to call.     Sincerely,        Diandra Peoples, ALICIA - CNP

## 2021-02-15 ENCOUNTER — TELEPHONE (OUTPATIENT)
Dept: CARDIOLOGY CLINIC | Age: 61
End: 2021-02-15

## 2021-02-15 NOTE — TELEPHONE ENCOUNTER
spoke with zachery. VICENTE needs her to wear kristy 24 hour holter. She will come in 2/22/2021. Concerned about weather.

## 2021-03-02 ENCOUNTER — TELEPHONE (OUTPATIENT)
Dept: CARDIOLOGY CLINIC | Age: 61
End: 2021-03-02

## 2021-03-02 NOTE — TELEPHONE ENCOUNTER
Spoke with patient regarding mix up with scott monitor- the one she wore was unfortunately never activated. She is coming in to get another monitor today. Will double check to make sure patients monitor is activated prior to her leaving the office.

## 2021-03-04 ENCOUNTER — TELEPHONE (OUTPATIENT)
Dept: CARDIOLOGY CLINIC | Age: 61
End: 2021-03-04

## 2021-03-04 DIAGNOSIS — Z95.810 BIVENTRICULAR ICD (IMPLANTABLE CARDIOVERTER-DEFIBRILLATOR) IN PLACE: ICD-10-CM

## 2021-03-04 PROCEDURE — 93225 XTRNL ECG REC<48 HRS REC: CPT | Performed by: INTERNAL MEDICINE

## 2021-03-04 NOTE — TELEPHONE ENCOUNTER
Monitor placed by [address] [place] [address]  Length of monitor 24  Monitor ordered by [facility]   Serial number [id number]  Kit [id number]  Activation successful prior to pt leaving office?  Yes

## 2021-03-04 NOTE — TELEPHONE ENCOUNTER
Pt came in today to get 24 hour Junior monitor put on. Pt wanted to let VICENTE know that her veins located on her upper left shoulder did not appear til after October 2020. Pt also wanted to let VICENTE know that last Thursday 2/25/21 pt had experienced symptoms of SOB- on exertion, dizziness,palps, and nausea. Pt's last OV 1/27/21JMB. VICENTE please advise. Pt can be reached at [phone].       TY

## 2021-03-05 ENCOUNTER — TELEPHONE (OUTPATIENT)
Dept: FAMILY MEDICINE CLINIC | Age: 61
End: 2021-03-05

## 2021-03-05 NOTE — TELEPHONE ENCOUNTER
----- Message from Raymundo Pushpa sent at 3/5/2021  3:11 PM EST -----  Subject: Appointment Request    Reason for Call: Urgent Female Urinary Problem    QUESTIONS  Type of Appointment? Established Patient  Reason for appointment request? Available appointments did not meet   patient need  Additional Information for Provider? patient having issues with bladder   and was told by insurance that may need a referral to see a kidney doctor   and would like another opinion before scheduling an appt  ---------------------------------------------------------------------------  --------------  CALL BACK INFO  What is the best way for the office to contact you? OK to leave message on   voicemail  Preferred Call Back Phone Number? [phone]  ---------------------------------------------------------------------------  --------------  SCRIPT ANSWERS  Relationship to Patient? Self  Appointment reason? Symptomatic  Select script based on patient symptoms? Adult Female Urinary Symptoms   [Urine-related   UTI   Bladder Infection]   Are you having back pain with your urinary symptoms? No  Are you having vomiting or nausea? No  Are you having fevers (100.4)   chills   or sweats? No  Are you having increased thirst? No  Is there blood in your urine? No  Have you been seen by a provider for this symptom before? No  Have you been diagnosed with   tested for   or told that you are suspected of having COVID-19 (Coronavirus)? No  Have you had a fever or taken medication to treat a fever within the past   3 days? No  Have you had a cough   shortness of breath or flu-like symptoms within the past 3 days? No  Do you currently have flu-like symptoms including fever or chills   cough   shortness of breath   or difficulty breathing   or new loss of taste or smell? No  (Service Expert  click yes below to proceed with [facility] As Usual   Scheduling)?  Yes

## 2021-03-11 ENCOUNTER — HOSPITAL ENCOUNTER (EMERGENCY)
Age: 61
Discharge: ANOTHER HOSPITAL | End: 2021-03-11
Attending: EMERGENCY MEDICINE
Payer: MEDICARE

## 2021-03-11 ENCOUNTER — HOSPITAL ENCOUNTER (INPATIENT)
Age: 61
LOS: 3 days | Discharge: HOME | DRG: 291 | End: 2021-03-14
Attending: INTERNAL MEDICINE | Admitting: FAMILY MEDICINE
Payer: MEDICARE

## 2021-03-11 ENCOUNTER — TELEPHONE (OUTPATIENT)
Dept: CARDIOLOGY CLINIC | Age: 61
End: 2021-03-11

## 2021-03-11 ENCOUNTER — APPOINTMENT (OUTPATIENT)
Dept: GENERAL RADIOLOGY | Age: 61
End: 2021-03-11
Payer: MEDICARE

## 2021-03-11 ENCOUNTER — OFFICE VISIT (OUTPATIENT)
Dept: FAMILY MEDICINE CLINIC | Age: 61
End: 2021-03-11
Payer: MEDICARE

## 2021-03-11 VITALS
DIASTOLIC BLOOD PRESSURE: 80 MMHG | HEART RATE: 91 BPM | RESPIRATION RATE: 16 BRPM | OXYGEN SATURATION: 98 % | SYSTOLIC BLOOD PRESSURE: 130 MMHG | BODY MASS INDEX: 50.02 KG/M2 | TEMPERATURE: 97.6 F | WEIGHT: 293 LBS | HEIGHT: 64 IN

## 2021-03-11 VITALS
WEIGHT: 293 LBS | HEIGHT: 63 IN | RESPIRATION RATE: 19 BRPM | SYSTOLIC BLOOD PRESSURE: 124 MMHG | DIASTOLIC BLOOD PRESSURE: 68 MMHG | TEMPERATURE: 98.1 F | BODY MASS INDEX: 51.91 KG/M2 | OXYGEN SATURATION: 99 % | HEART RATE: 72 BPM

## 2021-03-11 DIAGNOSIS — R31.9 URINARY TRACT INFECTION WITH HEMATURIA, SITE UNSPECIFIED: Primary | ICD-10-CM

## 2021-03-11 DIAGNOSIS — N30.01 ACUTE CYSTITIS WITH HEMATURIA: ICD-10-CM

## 2021-03-11 DIAGNOSIS — N39.0 URINARY TRACT INFECTION WITH HEMATURIA, SITE UNSPECIFIED: Primary | ICD-10-CM

## 2021-03-11 DIAGNOSIS — N17.9 AKI (ACUTE KIDNEY INJURY) (HCC): Primary | ICD-10-CM

## 2021-03-11 DIAGNOSIS — R07.9 CHEST PAIN, UNSPECIFIED TYPE: Primary | ICD-10-CM

## 2021-03-11 LAB
A/G RATIO: 1.8 (ref 1.1–2.2)
ALBUMIN SERPL-MCNC: 4.4 G/DL (ref 3.4–5)
ALP BLD-CCNC: 147 U/L (ref 40–129)
ALT SERPL-CCNC: 11 U/L (ref 10–40)
ANION GAP SERPL CALCULATED.3IONS-SCNC: 11 MMOL/L (ref 3–16)
AST SERPL-CCNC: 21 U/L (ref 15–37)
BASOPHILS ABSOLUTE: 0.1 K/UL (ref 0–0.2)
BASOPHILS RELATIVE PERCENT: 1 %
BILIRUB SERPL-MCNC: 0.7 MG/DL (ref 0–1)
BILIRUBIN URINE: NEGATIVE
BILIRUBIN, POC: ABNORMAL
BLOOD URINE, POC: ABNORMAL
BLOOD, URINE: ABNORMAL
BUN BLDV-MCNC: 19 MG/DL (ref 7–20)
CALCIUM SERPL-MCNC: 9.6 MG/DL (ref 8.3–10.6)
CHLORIDE BLD-SCNC: 108 MMOL/L (ref 99–110)
CLARITY, POC: ABNORMAL
CLARITY: CLEAR
CO2: 22 MMOL/L (ref 21–32)
COLOR, POC: YELLOW
COLOR: YELLOW
CREAT SERPL-MCNC: 1.2 MG/DL (ref 0.6–1.2)
D DIMER: <200 NG/ML DDU (ref 0–229)
EKG ATRIAL RATE: 90 BPM
EKG DIAGNOSIS: NORMAL
EKG P AXIS: 83 DEGREES
EKG P-R INTERVAL: 168 MS
EKG Q-T INTERVAL: 426 MS
EKG QRS DURATION: 176 MS
EKG QTC CALCULATION (BAZETT): 521 MS
EKG R AXIS: -68 DEGREES
EKG T AXIS: 69 DEGREES
EKG VENTRICULAR RATE: 90 BPM
EOSINOPHILS ABSOLUTE: 0 K/UL (ref 0–0.6)
EOSINOPHILS RELATIVE PERCENT: 0.2 %
EPITHELIAL CELLS, UA: ABNORMAL /HPF (ref 0–5)
GFR AFRICAN AMERICAN: 55
GFR NON-AFRICAN AMERICAN: 45
GLOBULIN: 2.5 G/DL
GLUCOSE BLD-MCNC: 96 MG/DL (ref 70–99)
GLUCOSE URINE, POC: NEGATIVE
GLUCOSE URINE: NEGATIVE MG/DL
HCT VFR BLD CALC: 39.3 % (ref 36–48)
HEMOGLOBIN: 12.6 G/DL (ref 12–16)
KETONES, POC: NEGATIVE
KETONES, URINE: NEGATIVE MG/DL
LEUKOCYTE EST, POC: ABNORMAL
LEUKOCYTE ESTERASE, URINE: ABNORMAL
LYMPHOCYTES ABSOLUTE: 3 K/UL (ref 1–5.1)
LYMPHOCYTES RELATIVE PERCENT: 43.4 %
MCH RBC QN AUTO: 29.3 PG (ref 26–34)
MCHC RBC AUTO-ENTMCNC: 32.1 G/DL (ref 31–36)
MCV RBC AUTO: 91.3 FL (ref 80–100)
MICROSCOPIC EXAMINATION: YES
MONOCYTES ABSOLUTE: 0.7 K/UL (ref 0–1.3)
MONOCYTES RELATIVE PERCENT: 9.6 %
NEUTROPHILS ABSOLUTE: 3.2 K/UL (ref 1.7–7.7)
NEUTROPHILS RELATIVE PERCENT: 45.8 %
NITRITE, POC: NEGATIVE
NITRITE, URINE: NEGATIVE
PDW BLD-RTO: 15.2 % (ref 12.4–15.4)
PH UA: 6 (ref 5–8)
PH, POC: 5
PLATELET # BLD: 205 K/UL (ref 135–450)
PMV BLD AUTO: 10 FL (ref 5–10.5)
POTASSIUM REFLEX MAGNESIUM: 4.5 MMOL/L (ref 3.5–5.1)
PROTEIN UA: ABNORMAL MG/DL
PROTEIN, POC: 30
RBC # BLD: 4.3 M/UL (ref 4–5.2)
RBC UA: ABNORMAL /HPF (ref 0–4)
SODIUM BLD-SCNC: 141 MMOL/L (ref 136–145)
SPECIFIC GRAVITY UA: 1.01 (ref 1–1.03)
SPECIFIC GRAVITY, POC: 1.01
TOTAL PROTEIN: 6.9 G/DL (ref 6.4–8.2)
TROPONIN: <0.01 NG/ML
TROPONIN: <0.01 NG/ML
URINE REFLEX TO CULTURE: YES
URINE TYPE: ABNORMAL
UROBILINOGEN, POC: 0.2
UROBILINOGEN, URINE: 0.2 E.U./DL
WBC # BLD: 7 K/UL (ref 4–11)
WBC UA: >100 /HPF (ref 0–5)

## 2021-03-11 PROCEDURE — 81001 URINALYSIS AUTO W/SCOPE: CPT

## 2021-03-11 PROCEDURE — 80053 COMPREHEN METABOLIC PANEL: CPT

## 2021-03-11 PROCEDURE — 1200000000 HC SEMI PRIVATE

## 2021-03-11 PROCEDURE — 99213 OFFICE O/P EST LOW 20 MIN: CPT | Performed by: NURSE PRACTITIONER

## 2021-03-11 PROCEDURE — 2580000003 HC RX 258: Performed by: PHYSICIAN ASSISTANT

## 2021-03-11 PROCEDURE — 1036F TOBACCO NON-USER: CPT | Performed by: NURSE PRACTITIONER

## 2021-03-11 PROCEDURE — 99284 EMERGENCY DEPT VISIT MOD MDM: CPT

## 2021-03-11 PROCEDURE — G8482 FLU IMMUNIZE ORDER/ADMIN: HCPCS | Performed by: NURSE PRACTITIONER

## 2021-03-11 PROCEDURE — 93005 ELECTROCARDIOGRAM TRACING: CPT | Performed by: PHYSICIAN ASSISTANT

## 2021-03-11 PROCEDURE — G8417 CALC BMI ABV UP PARAM F/U: HCPCS | Performed by: NURSE PRACTITIONER

## 2021-03-11 PROCEDURE — 3017F COLORECTAL CA SCREEN DOC REV: CPT | Performed by: NURSE PRACTITIONER

## 2021-03-11 PROCEDURE — 6360000002 HC RX W HCPCS: Performed by: PHYSICIAN ASSISTANT

## 2021-03-11 PROCEDURE — 84484 ASSAY OF TROPONIN QUANT: CPT

## 2021-03-11 PROCEDURE — 85025 COMPLETE CBC W/AUTO DIFF WBC: CPT

## 2021-03-11 PROCEDURE — 87086 URINE CULTURE/COLONY COUNT: CPT

## 2021-03-11 PROCEDURE — 6370000000 HC RX 637 (ALT 250 FOR IP): Performed by: PHYSICIAN ASSISTANT

## 2021-03-11 PROCEDURE — 96365 THER/PROPH/DIAG IV INF INIT: CPT

## 2021-03-11 PROCEDURE — 71045 X-RAY EXAM CHEST 1 VIEW: CPT

## 2021-03-11 PROCEDURE — 81002 URINALYSIS NONAUTO W/O SCOPE: CPT | Performed by: NURSE PRACTITIONER

## 2021-03-11 PROCEDURE — 85379 FIBRIN DEGRADATION QUANT: CPT

## 2021-03-11 PROCEDURE — G8428 CUR MEDS NOT DOCUMENT: HCPCS | Performed by: NURSE PRACTITIONER

## 2021-03-11 PROCEDURE — 93010 ELECTROCARDIOGRAM REPORT: CPT | Performed by: INTERNAL MEDICINE

## 2021-03-11 RX ORDER — NITROFURANTOIN 25; 75 MG/1; MG/1
100 CAPSULE ORAL 2 TIMES DAILY
Qty: 20 CAPSULE | Refills: 0 | Status: ON HOLD | OUTPATIENT
Start: 2021-03-11 | End: 2021-03-14 | Stop reason: HOSPADM

## 2021-03-11 RX ORDER — ASPIRIN 81 MG/1
162 TABLET, CHEWABLE ORAL ONCE
Status: COMPLETED | OUTPATIENT
Start: 2021-03-11 | End: 2021-03-11

## 2021-03-11 RX ADMIN — ASPIRIN 162 MG: 81 TABLET, CHEWABLE ORAL at 17:37

## 2021-03-11 RX ADMIN — CEFTRIAXONE SODIUM 1000 MG: 1 INJECTION, POWDER, FOR SOLUTION INTRAMUSCULAR; INTRAVENOUS at 19:42

## 2021-03-11 ASSESSMENT — ENCOUNTER SYMPTOMS: GASTROINTESTINAL NEGATIVE: 1

## 2021-03-11 NOTE — TELEPHONE ENCOUNTER
Pt called stating from her knee down in her leg calf area, and abdominal area she is experiencing swelling. She saw her her pcp today and needed help walking out after appt due to increased sob as well. Cannot walk from living room to bathroom, distance is equal to our waiting room to weight scale. BP today is 130/80. Please advise and contact pt at [phone].

## 2021-03-11 NOTE — TELEPHONE ENCOUNTER
Spoke with pt. She is on her way home and will send a device transmission. She will go to the ER if her symptoms worsen.

## 2021-03-11 NOTE — ED NOTES
[employer] called with pacemaker interrogation result and spoke with THEO Marina.      Lauren Hickman, FLORIDALMA  03/11/21 2381

## 2021-03-11 NOTE — PROGRESS NOTES
3/11/2021     Ray Monday (:  1960) is a 56 y.o. female, here for evaluation of the following medical concerns:    HPI  Pt c/o urinary frequency, urgency and painful urination for the past several months. Denies flank pain, fever, chills, nausea or vomiting. Review of Systems   Constitutional: Negative. Gastrointestinal: Negative. Genitourinary: Positive for difficulty urinating, dysuria, frequency and urgency. Negative for decreased urine volume, flank pain, genital sores, hematuria, pelvic pain, vaginal bleeding, vaginal discharge and vaginal pain. Prior to Visit Medications    Medication Sig Taking?  Authorizing Provider   nitrofurantoin, macrocrystal-monohydrate, (MACROBID) 100 MG capsule Take 1 capsule by mouth 2 times daily for 7 days Yes ALICIA Winslow CNP   methocarbamol (ROBAXIN) 500 MG tablet Take 1 tablet by mouth 3 times daily as needed (pain) Yes Lexii Tripp MD   metoprolol succinate (TOPROL XL) 50 MG extended release tablet Take 1 tablet by mouth daily Yes ALICIA Toussaint CNP   allopurinol (ZYLOPRIM) 300 MG tablet TAKE 1 TABLET EVERY DAY Yes Lexii Tripp MD   lisinopril (PRINIVIL;ZESTRIL) 5 MG tablet TAKE 1 TABLET EVERY DAY Yes ALICIA Ortega CNP   simvastatin (ZOCOR) 20 MG tablet TAKE 1 TABLET EVERY NIGHT Yes Lexii Tripp MD   lamoTRIgine (LAMICTAL) 150 MG tablet TAKE 1 TABLET TWICE DAILY OR AS OTHERWISE DIRECTED Yes Lexii Tripp MD   montelukast (SINGULAIR) 10 MG tablet Take 1 tablet by mouth nightly Yes Lexii Tripp MD   diclofenac sodium (VOLTAREN) 1 % GEL Apply 2 g topically 2 times daily Yes Lexii Tripp MD   levothyroxine (SYNTHROID) 150 MCG tablet TAKE 1 TABLET EVERY DAY EXCEPT FOR  TAKE 2 TABLETS AS DIRECTED FOR A TOTAL OF 8 TABLETS/WEEK Yes Lexii Tripp MD   torsemide (DEMADEX) 20 MG tablet Three times a week  Patient taking differently: 10 mg 10 mg Three times a week Yes ALICIA Toussaint CNP   BIOTIN PO Take by mouth daily Yes Historical Provider, MD   acetaminophen (TYLENOL) 325 MG tablet Take 650 mg by mouth every 6 hours as needed for Pain TAKES EVERY DAY Yes Historical Provider, MD   aspirin 81 MG EC tablet Take 1 tablet by mouth daily Yes Yvette Weeks MD   cetirizine (ZYRTEC) 10 MG tablet Take 10 mg by mouth daily Yes Historical Provider, MD        Social History     Tobacco Use    Smoking status: Former Smoker     Packs/day: 3.00     Years: 2.00     Pack years: 6.00     Types: Cigarettes     Quit date: 1985     Years since quittin.2    Smokeless tobacco: Never Used   Substance Use Topics    Alcohol use: No        Vitals:    21 1045   BP: 130/80   Pulse: 91   Resp: 16   Temp: 97.6 °F (36.4 °C)   SpO2: 98%   Weight: (!) 308 lb 1.6 oz (139.8 kg)   Height: 5' 4\" (1.626 m)     Estimated body mass index is 52.89 kg/m² as calculated from the following:    Height as of this encounter: 5' 4\" (1.626 m). Weight as of this encounter: 308 lb 1.6 oz (139.8 kg). Physical Exam  Vitals signs reviewed. Constitutional:       Appearance: Normal appearance. Neck:      Musculoskeletal: Normal range of motion. Cardiovascular:      Rate and Rhythm: Regular rhythm. Pulmonary:      Effort: Pulmonary effort is normal.   Abdominal:      General: Abdomen is flat. Palpations: Abdomen is soft. Tenderness: There is no abdominal tenderness. There is no right CVA tenderness or left CVA tenderness. Skin:     General: Skin is warm and dry. Neurological:      Mental Status: She is alert. ASSESSMENT/PLAN:  1. Urinary tract infection with hematuria, site unspecified  Declined pyridium. Will send for culture, if negative she will need a referral to Urology for unexplained hematuria. - POCT Urinalysis no Micro  - Culture, Urine  - nitrofurantoin, macrocrystal-monohydrate, (MACROBID) 100 MG capsule; Take 1 capsule by mouth 2 times daily for 7 days  Dispense: 20 capsule;  Refill: 0      Return if symptoms worsen or fail to improve. An electronic signature was used to authenticate this note.     --Lianne Trejo, ALICIA - CNP on 3/11/2021 at 11:02 AM

## 2021-03-11 NOTE — ED PROVIDER NOTES
[address] ED  EMERGENCY DEPARTMENT ENCOUNTER        Pt Name: Mia Flores  MRN: [id number]  [place] 1960  Date of evaluation: 3/11/2021  Provider: THEO Samayoa  PCP: Kristian Carrera MD     I have seen and evaluated this patient with my supervising physician No att. providers found. CHIEF COMPLAINT       Chief Complaint   Patient presents with    Shortness of Breath     pt c/o sob x 1 week    Chest Pain     pt c/o chest pain x 2 days with tingling down left arm, pt states she has ICD unit and just had halter monitor on last week for fast HR with palpitations        HISTORY OF PRESENT ILLNESS   (Location, Timing/Onset, Context/Setting, Quality, Duration, Modifying Factors, Severity, Associated Signs and Symptoms)  Note limiting factors. Mia Flores is a 56 y.o. female presents to the emergency department for chest pain, shortness of breath. Over the last 2 days, patient has had increasing chest pain that radiates to her left arm. Feels short of breath with walking. Has had increasing swelling of her bilateral legs. Has been wearing a Holter monitor provided by her cardiologist Dr. Zakia See for 24 hours last week, was noted to have 5 abnormal episodes. Her ICD has also been going off. Called her office which recommends cardioversion. She has been having some burning at the end of urination since December. Denies fever, hemoptysis, calf swelling or pain, abdominal pain, nausea, vomiting, numbness, weakness. Nursing Notes were all reviewed and agreed with or any disagreements were addressed in the HPI. REVIEW OF SYSTEMS    (2-9 systems for level 4, 10 or more for level 5)     Review of Systems   Constitutional: Negative for fever. HENT: Negative for sore throat. Eyes: Negative for pain and visual disturbance. Respiratory: Positive for shortness of breath. Negative for cough. Cardiovascular: Positive for chest pain.    Gastrointestinal: Negative for abdominal pain, nausea and vomiting. Genitourinary: Positive for dysuria. Negative for frequency. Musculoskeletal: Negative for back pain and neck pain. Skin: Negative for rash. Neurological: Negative for dizziness, weakness, numbness and headaches. Psychiatric/Behavioral: Negative for confusion. Positives and Pertinent negatives as per HPI. Except as noted above in the ROS, all other systems were reviewed and negative. PAST MEDICAL HISTORY     Past Medical History:   Diagnosis Date    Anesthesia complication     hypotension post op    Autoimmune hemolytic anemia     during uterine cancer    Bipolar disorder ([facility]     managed by Lydia Cullen)    Bundle branch block     Cardiomyopathy ([facility]     Chronic systolic CHF (congestive heart failure) ([facility] 7/9/2015    Depression     Family history of early CAD     Family history of ovarian cancer     mother    GERD (gastroesophageal reflux disease)     Gout     Hypertension     Hypothyroid     Osteoarthritis, knee     Shybut    Pneumonia     history of pneumonia    S/P gastric bypass 1/7/05    Unspecified cerebral artery occlusion with cerebral infarction     Uterine cancer ([facility]     endometrial adenocarcinoma    Vitamin B 12 deficiency 7/09         SURGICAL HISTORY     Past Surgical History:   Procedure Laterality Date    CARDIAC CATHETERIZATION  7/10/2015    Normal Coronary Arteries    CHOLECYSTECTOMY      COLONOSCOPY  03/06/2019    NL    COLONOSCOPY N/A 3/6/2019    EGD AND COLONOSCOPY WITH ANESTHESIA performed by Kaley Ortiz MD at [address] [place] [address]      4 times    ERCP      FINGER SURGERY      gout X 2    GASTRIC BYPASS SURGERY  1/7/05    HERNIA REPAIR  10/20/2016    lap ventral hernia    LIPECTOMY      gangrenous    OTHER SURGICAL HISTORY      radium implants into uterus for uterine surgery X 2    OTHER SURGICAL HISTORY  4/1/16    exp.  lap lysis of adhesion    OTHER SURGICAL HISTORY      biventricular pacer and defibrillator    PACEMAKER PLACEMENT  09/2016    TRANSESOPHAGEAL ECHOCARDIOGRAM  7/13/2015    UPPER GASTROINTESTINAL ENDOSCOPY  4/23/13    UPPER GASTROINTESTINAL ENDOSCOPY  03/06/2019    NL    UPPER GASTROINTESTINAL ENDOSCOPY N/A 3/6/2019    EGD AND COLONOSCOPY WITH ANESTHESIA performed by Terri Borden MD at [address]       Discharge Medication List as of 3/11/2021 10:33 PM      CONTINUE these medications which have NOT CHANGED    Details   nitrofurantoin, macrocrystal-monohydrate, (MACROBID) 100 MG capsule Take 1 capsule by mouth 2 times daily for 7 days, Disp-20 capsule, R-0Normal      methocarbamol (ROBAXIN) 500 MG tablet Take 1 tablet by mouth 3 times daily as needed (pain), Disp-90 tablet, R-1Normal      metoprolol succinate (TOPROL XL) 50 MG extended release tablet Take 1 tablet by mouth daily, Disp-90 tablet, R-2Normal      allopurinol (ZYLOPRIM) 300 MG tablet TAKE 1 TABLET EVERY DAY, Disp-90 tablet, R-1Normal      lisinopril (PRINIVIL;ZESTRIL) 5 MG tablet TAKE 1 TABLET EVERY DAY, Disp-90 tablet, R-1Normal      simvastatin (ZOCOR) 20 MG tablet TAKE 1 TABLET EVERY NIGHT, Disp-90 tablet, R-1Normal      lamoTRIgine (LAMICTAL) 150 MG tablet TAKE 1 TABLET TWICE DAILY OR AS OTHERWISE DIRECTED, Disp-180 tablet, R-1Normal      montelukast (SINGULAIR) 10 MG tablet Take 1 tablet by mouth nightly, Disp-90 tablet,R-1Normal      diclofenac sodium (VOLTAREN) 1 % GEL Apply 2 g topically 2 times daily, Topical, 2 TIMES DAILY Starting Fri 9/4/2020, Disp-100 g,R-2, Normal      levothyroxine (SYNTHROID) 150 MCG tablet TAKE 1 TABLET EVERY DAY EXCEPT FOR SUNDAY TAKE 2 TABLETS AS DIRECTED FOR A TOTAL OF 8 TABLETS/WEEK, Disp-103 tablet,R-3Normal      torsemide (DEMADEX) 20 MG tablet Three times a week, Disp-30 tablet, R-3Normal      BIOTIN PO Take by mouth dailyHistorical Med      acetaminophen (TYLENOL) 325 MG tablet Take 650 mg by mouth every 6 hours as needed for Pain TAKES EVERY DAYHistorical Med      aspirin 81 MG EC tablet Take 1 tablet by mouth daily, Disp-30 tablet, R-3Print      cetirizine (ZYRTEC) 10 MG tablet Take 10 mg by mouth daily               ALLERGIES     Dilaudid [hydromorphone hcl], Lavender oil, Penicillins, Seroquel [quetiapine fumarate], Adhesive tape, Hydromorphone hcl, Lithium, and Tetanus toxoids    FAMILYHISTORY       Family History   Problem Relation Age of Onset    Cancer Mother     Ovarian Cancer Mother 45        Technically peritoneal cancer    Depression Mother         bipolar    Arthritis Father     Rheum Arthritis Father     Arrhythmia Father     Other Father         gout    Heart Disease Father     Depression Brother         depression    Other Brother         gout    Obesity Brother     Other Brother         gout    Obesity Brother     Depression Brother     Cancer Maternal Grandmother         Breast          SOCIAL HISTORY       Social History     Tobacco Use    Smoking status: Former Smoker     Packs/day: 3.00     Years: 2.00     Pack years: 6.00     Types: Cigarettes     Quit date: 1985     Years since quittin.2    Smokeless tobacco: Never Used   Substance Use Topics    Alcohol use: No    Drug use: No       SCREENINGS    Alysha Coma Scale  Eye Opening: Spontaneous  Best Verbal Response: Oriented  Best Motor Response: Obeys commands  [place] Coma Scale Score: 15 Heart Score for chest pain patients  History: Highly Suspicious  ECG: Non-Specifc repolarization disturbance/LBTB/PM  Patient Age: > 39 and < 65 years  *Risk factors for Atherosclerotic disease: Diabetes Mellitus, Hypertension, Obesity, Hypercholesterolemia  Risk Factors: > 3 Risk factors or history of atherosclerotic disease*  Troponin: < 1X normal limit  Heart Score Total: 6      PHYSICAL EXAM    (up to 7 for level 4, 8 or more for level 5)     ED Triage Vitals [21 1601]   BP Temp Temp Source Pulse Resp SpO2 Height Weight   (!) 150/81 98.1 °F (36.7 °C) Oral 87 18 99 % 5' 3\" (1.6 m) (!) 308 lb (139.7 kg)       Physical Exam  Vitals signs reviewed. Constitutional:       Appearance: She is not diaphoretic. HENT:      Nose: No congestion or rhinorrhea. Eyes:      General: No scleral icterus. Conjunctiva/sclera: Conjunctivae normal.   Neck:      Musculoskeletal: Normal range of motion and neck supple. Cardiovascular:      Rate and Rhythm: Normal rate and regular rhythm. Pulses: Normal pulses. Heart sounds: Normal heart sounds. No murmur. No friction rub. No gallop. Pulmonary:      Effort: Pulmonary effort is normal. No respiratory distress. Breath sounds: Normal breath sounds. No stridor. No wheezing, rhonchi or rales. Abdominal:      General: There is no distension. Palpations: Abdomen is soft. Tenderness: There is no abdominal tenderness. There is no right CVA tenderness, left CVA tenderness, guarding or rebound. Musculoskeletal: Normal range of motion. General: No swelling or tenderness. Skin:     General: Skin is warm and dry. Neurological:      General: No focal deficit present. Mental Status: She is alert and oriented to person, place, and time. Sensory: No sensory deficit. Motor: No weakness.    Psychiatric:         Mood and Affect: Mood normal.         Behavior: Behavior normal.         DIAGNOSTIC RESULTS   LABS:    Labs Reviewed   COMPREHENSIVE METABOLIC PANEL W/ REFLEX TO MG FOR LOW K - Abnormal; Notable for the following components:       Result Value    GFR Non- 45 (*)     GFR African American 55 (*)     Alkaline Phosphatase 147 (*)     All other components within normal limits    Narrative:     Performed at:  [facility],  [place], [facility]   Phone [phone]   URINE RT REFLEX TO CULTURE - Abnormal; Notable for the following components:    Blood, Urine MODERATE (*)     Protein, UA TRACE (*)     Leukocyte Esterase, Urine MODERATE (*)     All other components within normal limits    Narrative:     Performed at:  [facility],  [place], [facility]   Phone [phone]   MICROSCOPIC URINALYSIS - Abnormal; Notable for the following components:    WBC, UA >100 (*)     RBC, UA 21-50 (*)     Epithelial Cells, UA 6-10 (*)     All other components within normal limits    Narrative:     Performed at:  [facility],  [place], [facility]   Phone [phone]   CULTURE, URINE   CBC WITH AUTO DIFFERENTIAL    Narrative:     Performed at:  [place] [address],  [place], [facility]   Phone [phone]   TROPONIN    Narrative:     Performed at:  [facility] [address],  [place], [facility]   Phone [phone]   D-DIMER, QUANTITATIVE    Narrative:     Performed at:  [facility],  [place], [facility]   Phone [phone]   TROPONIN    Narrative:     Performed at:  [facility],  [place], [facility]   Phone [phone]       All other labs were within normal range or not returned as of this dictation. EKG: All EKG's are interpreted by the Emergency Department Physician in the absence of a cardiologist.  Please see their note for interpretation of EKG. RADIOLOGY:   Non-plain film images such as CT, Ultrasound and MRI are read by the radiologist. Plain radiographic images are visualized and preliminarily interpreted by the ED Provider with the below findings:        Interpretation per the Radiologist below, if available at the time of this note:    XR CHEST PORTABLE   Final Result   No acute process. Stable cardiomegaly           No results found.         PROCEDURES   Unless otherwise noted below, none     Procedures    CRITICAL CARE TIME   N/A    CONSULTS:  IP CONSULT TO HOSPITALIST      EMERGENCY DEPARTMENT COURSE and DIFFERENTIAL DIAGNOSIS/MDM:   Vitals:    Vitals:    03/11/21 1935 03/11/21 2122 03/11/21 2136 03/11/21 2207   BP: (!) 143/87 130/69 (!) 122/90 124/68   Pulse: 91 94 89 72   Resp: 18 19     Temp:       TempSrc:       SpO2: 98% 100% 100% 99%   Weight:       Height:           Patient was given the following medications:  Medications   aspirin chewable tablet 162 mg (162 mg Oral Given 3/11/21 1737)   cefTRIAXone (ROCEPHIN) 1000 mg IVPB in 50 mL D5W minibag (0 mg Intravenous Stopped 3/11/21 2018)     ED Course as of Mar 11 2356   Thu Mar 11, 2021   2347 The Ekg interpreted by me shows  normal pacemaker rhythm with a rate of 90  Axis is   Left axis deviation  QTc is  within an acceptable range  Intervals and Durations are unremarkable. ST Segments: no acute change  No significant change from prior EKG dated 1/27/21          [MP]      ED Course User Index  [MP] Anya Sherry         57-year-old female presents emergency room for chest pain. Nonischemic EKG. Negative troponin. Heart score 6. Negative D-dimer, low concern for PE. Low concern for aortic dissection, esophageal rupture, or other emergent etiology. X-ray without evidence of pneumonia, no fevers or cough concerning for this diagnosis. Pacemaker was interrogated, as noted the patient has had 5 firings of her pacemaker including as recently as March 9 for ventricular tachycardia. Strong believe patient warrants admission for further chest pain work-up. Believe transfer to [facility] is most appropriate as they have both electrocardiology and Cath Lab capabilities. Discussed the case with hospitalist Dr. Jesse Webster at [facility], agreed to accept the patient for transfer. Transferred to via [address] ambulance. FINAL IMPRESSION      1.  Chest pain, unspecified type          DISPOSITION/PLAN DISPOSITION Decision To Transfer 03/11/2021 06:18:03 PM      PATIENT REFERREDTO:  No follow-up provider specified.     DISCHARGE MEDICATIONS:  Discharge Medication List as of 3/11/2021 10:33 PM          DISCONTINUED MEDICATIONS:  Discharge Medication List as of 3/11/2021 10:33 PM                 (Please note that portions of this note were completed with a voice recognition program.  Efforts were made to edit the dictations but occasionally words are mis-transcribed.)    THEO Schulz (electronically signed)         THEO Schulz  03/12/21 0000

## 2021-03-11 NOTE — ED NOTES
Call placed to [facility] and spoke with FLOYD MAO [facility] @ [address]  03/11/21 1817    [facility] returned the call to [facility] @ [address]  03/11/21 6087

## 2021-03-11 NOTE — PATIENT INSTRUCTIONS
sprays, and other feminine hygiene products that have deodorants. · After going to the bathroom, wipe from front to back. When should you call for help? Call your doctor now or seek immediate medical care if:    · Symptoms such as fever, chills, nausea, or vomiting get worse or appear for the first time.     · You have new pain in your back just below your rib cage. This is called flank pain.     · There is new blood or pus in your urine.     · You have any problems with your antibiotic medicine. Watch closely for changes in your health, and be sure to contact your doctor if:    · You are not getting better after taking an antibiotic for 2 days.     · Your symptoms go away but then come back. Where can you learn more? Go to https://Treatsie.Acrolinx. org and sign in to your [employer] account. Enter I066 in the Cloud[employer].net box to learn more about \"Urinary Tract Infection (UTI) in Women: Care Instructions. \"     If you do not have an account, please click on the \"Sign Up Now\" link. Current as of: June 29, 2020               Content Version: 12.8  © 2006-2021 Healthwise, [employer]. Care instructions adapted under license by [facility] ([facility]). If you have questions about a medical condition or this instruction, always ask your healthcare professional. [address] any warranty or liability for your use of this information.

## 2021-03-11 NOTE — TELEPHONE ENCOUNTER
Duplicate encounter concerning patient's symptoms. I will close the second encounter. Please see below. Sounds like her HR was 91 bpm with the symptoms, but an ECG should settle it. Documentation        Cynthia Matthews MD 41 minutes ago (2:09 PM)        Do you have any recommendations for the patient given her symptoms and findings? Masha Mcnamara MD  You 48 minutes ago (2:02 PM)        Behaves like VT and wavelet was not a match, but looks pretty close. ATP did not terminate it. Documentation        You routed conversation to Chip Mcfarlane MD; Stan Moe 57 minutes ago (1:53 PM)      You 57 minutes ago (1:53 PM)        Manual transmission of pacemaker and/or ICD, or implanted heart monitor shows normal cardiac device function. Patient's last device interrogation was on 1/27 (VT/VF zones set at this OV). Estimated device longevity is 1.7 years. Patient has a history of DCM, syncope, and CVA. Takes Toprol XL.      AP 4.4%   62.2%     Since last device interrogation, 30 AT/AF events recorded with overall burden measuring 44.4%. 5 VT events recorded - pace terminated 5/5 events. VT events appear to show dual tachycardia with consistent V-V intervals -V rates 194-214 bpm. Event #36 x 40 seconds. Optivol suggests fluid accumulation.     URI OLIVER to review.      Please see the Paceart report under the Cardiology tab for more detail.     JMB please review and advise. Per JMB patient needs an EKG.

## 2021-03-11 NOTE — TELEPHONE ENCOUNTER
Manual transmission of pacemaker and/or ICD, or implanted heart monitor shows normal cardiac device function. Patient's last device interrogation was on 1/27 (VT/VF zones set at this OV). Estimated device longevity is 1.7 years. Patient has a history of DCM, syncope, and CVA. Takes Toprol XL. AP 4.4%   62.2%    Since last device interrogation, 30 AT/AF events recorded with overall burden measuring 44.4%. 5 VT events recorded - pace terminated 5/5 events. VT events appear to show dual tachycardia with consistent V-V intervals -V rates 194-214 bpm. Event #36 x 40 seconds. Optivol suggests fluid accumulation. URI OLIVER to review. Please see the Paceart report under the Cardiology tab for more detail. JMB please review and advise.

## 2021-03-11 NOTE — TELEPHONE ENCOUNTER
Spoke to pt. Pt is having symptoms of SOB - sitting still and on exertion, palps, dizziness, CP- left upper side of chest, swelling, bilateral feet, legs,  abdomen, and fatigue. Pt was seen at PCP this morning and current /80, HR 91. Pt was started on Macrobid 100 mg for 7 days bid for possible UTI. Pt is going to send a remote transmission per [facility] [employer] as soon as she gets home. Pt wore a 24 hr kristy holter monitor 3/4/21. Pt's last OV 1/27/21 JMNORAH. VICENTE please advise. Pt can be reached at [phone].           TY

## 2021-03-12 ENCOUNTER — PROCEDURE VISIT (OUTPATIENT)
Dept: CARDIOLOGY CLINIC | Age: 61
End: 2021-03-12
Payer: MEDICARE

## 2021-03-12 ENCOUNTER — APPOINTMENT (OUTPATIENT)
Dept: NUCLEAR MEDICINE | Age: 61
DRG: 291 | End: 2021-03-12
Attending: INTERNAL MEDICINE
Payer: MEDICARE

## 2021-03-12 DIAGNOSIS — I42.0 DILATED CARDIOMYOPATHY (HCC): ICD-10-CM

## 2021-03-12 DIAGNOSIS — Z95.810 BIVENTRICULAR ICD (IMPLANTABLE CARDIOVERTER-DEFIBRILLATOR) IN PLACE: ICD-10-CM

## 2021-03-12 DIAGNOSIS — I50.9 CHRONIC CONGESTIVE HEART FAILURE, UNSPECIFIED HEART FAILURE TYPE (HCC): ICD-10-CM

## 2021-03-12 PROBLEM — I47.20 VT (VENTRICULAR TACHYCARDIA) (HCC): Status: ACTIVE | Noted: 2021-03-12

## 2021-03-12 PROBLEM — I48.92 ATRIAL FLUTTER (HCC): Status: ACTIVE | Noted: 2021-03-12

## 2021-03-12 LAB
EKG ATRIAL RATE: 271 BPM
EKG DIAGNOSIS: NORMAL
EKG P AXIS: -49 DEGREES
EKG Q-T INTERVAL: 456 MS
EKG QRS DURATION: 182 MS
EKG QTC CALCULATION (BAZETT): 529 MS
EKG R AXIS: 267 DEGREES
EKG T AXIS: -11 DEGREES
EKG VENTRICULAR RATE: 81 BPM
LV EF: 34 %
LVEF MODALITY: NORMAL
MAGNESIUM: 2.2 MG/DL (ref 1.8–2.4)
PRO-BNP: 1153 PG/ML (ref 0–124)
TROPONIN: <0.01 NG/ML
TROPONIN: <0.01 NG/ML
URINE CULTURE, ROUTINE: NORMAL

## 2021-03-12 PROCEDURE — A9502 TC99M TETROFOSMIN: HCPCS | Performed by: INTERNAL MEDICINE

## 2021-03-12 PROCEDURE — 99223 1ST HOSP IP/OBS HIGH 75: CPT | Performed by: INTERNAL MEDICINE

## 2021-03-12 PROCEDURE — 93284 PRGRMG EVAL IMPLANTABLE DFB: CPT | Performed by: INTERNAL MEDICINE

## 2021-03-12 PROCEDURE — 6370000000 HC RX 637 (ALT 250 FOR IP): Performed by: REGISTERED NURSE

## 2021-03-12 PROCEDURE — 36415 COLL VENOUS BLD VENIPUNCTURE: CPT

## 2021-03-12 PROCEDURE — 93010 ELECTROCARDIOGRAM REPORT: CPT | Performed by: INTERNAL MEDICINE

## 2021-03-12 PROCEDURE — 83735 ASSAY OF MAGNESIUM: CPT

## 2021-03-12 PROCEDURE — 78452 HT MUSCLE IMAGE SPECT MULT: CPT

## 2021-03-12 PROCEDURE — 2580000003 HC RX 258: Performed by: INTERNAL MEDICINE

## 2021-03-12 PROCEDURE — 93017 CV STRESS TEST TRACING ONLY: CPT

## 2021-03-12 PROCEDURE — 3430000000 HC RX DIAGNOSTIC RADIOPHARMACEUTICAL: Performed by: INTERNAL MEDICINE

## 2021-03-12 PROCEDURE — 93005 ELECTROCARDIOGRAM TRACING: CPT | Performed by: INTERNAL MEDICINE

## 2021-03-12 PROCEDURE — 6360000002 HC RX W HCPCS: Performed by: INTERNAL MEDICINE

## 2021-03-12 PROCEDURE — 6370000000 HC RX 637 (ALT 250 FOR IP): Performed by: INTERNAL MEDICINE

## 2021-03-12 PROCEDURE — 1200000000 HC SEMI PRIVATE

## 2021-03-12 PROCEDURE — 84484 ASSAY OF TROPONIN QUANT: CPT

## 2021-03-12 PROCEDURE — 99222 1ST HOSP IP/OBS MODERATE 55: CPT | Performed by: INTERNAL MEDICINE

## 2021-03-12 PROCEDURE — 83880 ASSAY OF NATRIURETIC PEPTIDE: CPT

## 2021-03-12 RX ORDER — ATORVASTATIN CALCIUM 10 MG/1
20 TABLET, FILM COATED ORAL NIGHTLY
Status: DISCONTINUED | OUTPATIENT
Start: 2021-03-12 | End: 2021-03-14 | Stop reason: HOSPADM

## 2021-03-12 RX ORDER — NITROGLYCERIN 0.4 MG/1
0.4 TABLET SUBLINGUAL EVERY 5 MIN PRN
Status: DISCONTINUED | OUTPATIENT
Start: 2021-03-12 | End: 2021-03-14 | Stop reason: HOSPADM

## 2021-03-12 RX ORDER — POTASSIUM CHLORIDE 7.45 MG/ML
10 INJECTION INTRAVENOUS PRN
Status: DISCONTINUED | OUTPATIENT
Start: 2021-03-12 | End: 2021-03-14

## 2021-03-12 RX ORDER — OXYCODONE HYDROCHLORIDE 5 MG/1
5 TABLET ORAL EVERY 4 HOURS PRN
Status: DISCONTINUED | OUTPATIENT
Start: 2021-03-12 | End: 2021-03-14

## 2021-03-12 RX ORDER — OXYCODONE HYDROCHLORIDE 5 MG/1
10 TABLET ORAL EVERY 4 HOURS PRN
Status: DISCONTINUED | OUTPATIENT
Start: 2021-03-12 | End: 2021-03-14 | Stop reason: HOSPADM

## 2021-03-12 RX ORDER — FAMOTIDINE 20 MG/1
20 TABLET, FILM COATED ORAL DAILY PRN
Status: DISCONTINUED | OUTPATIENT
Start: 2021-03-12 | End: 2021-03-14 | Stop reason: HOSPADM

## 2021-03-12 RX ORDER — PROMETHAZINE HYDROCHLORIDE 25 MG/1
12.5 TABLET ORAL EVERY 6 HOURS PRN
Status: DISCONTINUED | OUTPATIENT
Start: 2021-03-12 | End: 2021-03-14 | Stop reason: HOSPADM

## 2021-03-12 RX ORDER — NITROFURANTOIN 25; 75 MG/1; MG/1
100 CAPSULE ORAL EVERY 12 HOURS SCHEDULED
Status: DISCONTINUED | OUTPATIENT
Start: 2021-03-12 | End: 2021-03-12

## 2021-03-12 RX ORDER — LEVOTHYROXINE SODIUM 0.1 MG/1
100 TABLET ORAL DAILY
Status: DISCONTINUED | OUTPATIENT
Start: 2021-03-13 | End: 2021-03-14 | Stop reason: HOSPADM

## 2021-03-12 RX ORDER — LEVOTHYROXINE SODIUM 0.15 MG/1
150 TABLET ORAL DAILY
Status: DISCONTINUED | OUTPATIENT
Start: 2021-03-12 | End: 2021-03-12

## 2021-03-12 RX ORDER — LISINOPRIL 5 MG/1
5 TABLET ORAL DAILY
Status: DISCONTINUED | OUTPATIENT
Start: 2021-03-12 | End: 2021-03-14 | Stop reason: HOSPADM

## 2021-03-12 RX ORDER — HEPARIN SODIUM 5000 [USP'U]/ML
5000 INJECTION, SOLUTION INTRAVENOUS; SUBCUTANEOUS EVERY 8 HOURS SCHEDULED
Status: DISCONTINUED | OUTPATIENT
Start: 2021-03-13 | End: 2021-03-14 | Stop reason: HOSPADM

## 2021-03-12 RX ORDER — ACETAMINOPHEN 650 MG/1
650 SUPPOSITORY RECTAL EVERY 6 HOURS PRN
Status: DISCONTINUED | OUTPATIENT
Start: 2021-03-12 | End: 2021-03-14 | Stop reason: HOSPADM

## 2021-03-12 RX ORDER — METOPROLOL SUCCINATE 50 MG/1
50 TABLET, EXTENDED RELEASE ORAL DAILY
Status: DISCONTINUED | OUTPATIENT
Start: 2021-03-12 | End: 2021-03-12

## 2021-03-12 RX ORDER — MAGNESIUM SULFATE IN WATER 40 MG/ML
2000 INJECTION, SOLUTION INTRAVENOUS PRN
Status: DISCONTINUED | OUTPATIENT
Start: 2021-03-12 | End: 2021-03-14

## 2021-03-12 RX ORDER — ONDANSETRON 2 MG/ML
4 INJECTION INTRAMUSCULAR; INTRAVENOUS EVERY 6 HOURS PRN
Status: DISCONTINUED | OUTPATIENT
Start: 2021-03-12 | End: 2021-03-12

## 2021-03-12 RX ORDER — FUROSEMIDE 10 MG/ML
40 INJECTION INTRAMUSCULAR; INTRAVENOUS 2 TIMES DAILY
Status: DISCONTINUED | OUTPATIENT
Start: 2021-03-12 | End: 2021-03-13

## 2021-03-12 RX ORDER — CETIRIZINE HYDROCHLORIDE 10 MG/1
10 TABLET ORAL DAILY
Status: DISCONTINUED | OUTPATIENT
Start: 2021-03-12 | End: 2021-03-14 | Stop reason: HOSPADM

## 2021-03-12 RX ORDER — ASPIRIN 81 MG/1
81 TABLET ORAL DAILY
Status: DISCONTINUED | OUTPATIENT
Start: 2021-03-12 | End: 2021-03-14 | Stop reason: HOSPADM

## 2021-03-12 RX ORDER — SODIUM CHLORIDE 0.9 % (FLUSH) 0.9 %
10 SYRINGE (ML) INJECTION EVERY 12 HOURS SCHEDULED
Status: DISCONTINUED | OUTPATIENT
Start: 2021-03-12 | End: 2021-03-14 | Stop reason: HOSPADM

## 2021-03-12 RX ORDER — METOPROLOL SUCCINATE 50 MG/1
100 TABLET, EXTENDED RELEASE ORAL DAILY
Status: DISCONTINUED | OUTPATIENT
Start: 2021-03-13 | End: 2021-03-14 | Stop reason: HOSPADM

## 2021-03-12 RX ORDER — ACETAMINOPHEN 325 MG/1
650 TABLET ORAL EVERY 6 HOURS PRN
Status: DISCONTINUED | OUTPATIENT
Start: 2021-03-12 | End: 2021-03-14 | Stop reason: HOSPADM

## 2021-03-12 RX ORDER — SODIUM CHLORIDE 0.9 % (FLUSH) 0.9 %
10 SYRINGE (ML) INJECTION PRN
Status: DISCONTINUED | OUTPATIENT
Start: 2021-03-12 | End: 2021-03-14 | Stop reason: HOSPADM

## 2021-03-12 RX ADMIN — ACETAMINOPHEN 650 MG: 325 TABLET ORAL at 04:09

## 2021-03-12 RX ADMIN — LAMOTRIGINE 150 MG: 25 TABLET ORAL at 20:19

## 2021-03-12 RX ADMIN — SODIUM CHLORIDE, PRESERVATIVE FREE 10 ML: 5 INJECTION INTRAVENOUS at 20:31

## 2021-03-12 RX ADMIN — SODIUM CHLORIDE, PRESERVATIVE FREE 10 ML: 5 INJECTION INTRAVENOUS at 09:00

## 2021-03-12 RX ADMIN — FUROSEMIDE 40 MG: 10 INJECTION, SOLUTION INTRAMUSCULAR; INTRAVENOUS at 14:09

## 2021-03-12 RX ADMIN — CETIRIZINE HYDROCHLORIDE 10 MG: 10 TABLET, FILM COATED ORAL at 08:51

## 2021-03-12 RX ADMIN — LAMOTRIGINE 150 MG: 25 TABLET ORAL at 01:29

## 2021-03-12 RX ADMIN — REGADENOSON 0.4 MG: 0.08 INJECTION, SOLUTION INTRAVENOUS at 12:20

## 2021-03-12 RX ADMIN — LEVOTHYROXINE SODIUM 150 MCG: 0.15 TABLET ORAL at 05:58

## 2021-03-12 RX ADMIN — METOPROLOL SUCCINATE 50 MG: 50 TABLET, EXTENDED RELEASE ORAL at 14:08

## 2021-03-12 RX ADMIN — LISINOPRIL 5 MG: 5 TABLET ORAL at 08:51

## 2021-03-12 RX ADMIN — ACETAMINOPHEN 650 MG: 325 TABLET ORAL at 17:28

## 2021-03-12 RX ADMIN — NITROGLYCERIN 0.4 MG: 0.4 TABLET, ORALLY DISINTEGRATING SUBLINGUAL at 09:50

## 2021-03-12 RX ADMIN — ATORVASTATIN CALCIUM 20 MG: 10 TABLET, FILM COATED ORAL at 01:29

## 2021-03-12 RX ADMIN — TETROFOSMIN 30.7 MILLICURIE: 1.38 INJECTION, POWDER, LYOPHILIZED, FOR SOLUTION INTRAVENOUS at 12:20

## 2021-03-12 RX ADMIN — LAMOTRIGINE 150 MG: 25 TABLET ORAL at 08:50

## 2021-03-12 RX ADMIN — NITROFURANTOIN (MONOHYDRATE/MACROCRYSTALS) 100 MG: 75; 25 CAPSULE ORAL at 14:08

## 2021-03-12 RX ADMIN — FUROSEMIDE 40 MG: 10 INJECTION, SOLUTION INTRAMUSCULAR; INTRAVENOUS at 20:19

## 2021-03-12 RX ADMIN — ASPIRIN 81 MG: 81 TABLET, COATED ORAL at 08:50

## 2021-03-12 RX ADMIN — ATORVASTATIN CALCIUM 20 MG: 10 TABLET, FILM COATED ORAL at 20:19

## 2021-03-12 ASSESSMENT — ENCOUNTER SYMPTOMS
VOMITING: 0
NAUSEA: 0
SHORTNESS OF BREATH: 1
ABDOMINAL PAIN: 0
BACK PAIN: 0
SORE THROAT: 0
COUGH: 0
EYE PAIN: 0

## 2021-03-12 ASSESSMENT — PAIN DESCRIPTION - LOCATION: LOCATION: BACK;KNEE

## 2021-03-12 ASSESSMENT — PAIN SCALES - GENERAL
PAINLEVEL_OUTOF10: 3
PAINLEVEL_OUTOF10: 6

## 2021-03-12 ASSESSMENT — PAIN DESCRIPTION - PAIN TYPE: TYPE: CHRONIC PAIN

## 2021-03-12 ASSESSMENT — PAIN DESCRIPTION - FREQUENCY: FREQUENCY: INTERMITTENT

## 2021-03-12 ASSESSMENT — PAIN DESCRIPTION - PROGRESSION: CLINICAL_PROGRESSION: NOT CHANGED

## 2021-03-12 NOTE — PROGRESS NOTES
4 Eyes Skin Assessment     NAME:  Josee Whalen  YOB: 1960  MEDICAL RECORD NUMBER:  [id number]    The patient is being assess for  Admission    I agree that 2 RN's have performed a thorough Head to Toe Skin Assessment on the patient. ALL assessment sites listed below have been assessed. Areas assessed by both nurses:    Head, Face, Ears, Shoulders, Back, Chest and Arms, Elbows, Hands        Does the Patient have a Wound?  No noted wound(s)       Jonny Prevention initiated:  NA   Wound Care Orders initiated:  NA    Pressure Injury (Stage 3,4, Unstageable, DTI, NWPT, and Complex wounds) if present place consult order under [de-identified] NA    New and Established Ostomies if present place consult order under : NA      Nurse 1 eSignature: Electronically signed by Doroteo Lucero RN on 3/11/21 at 11:38 PM EST    **SHARE this note so that the co-signing nurse is able to place an eSignature**    Nurse 2 eSignature: {Esignature:[id number]}

## 2021-03-12 NOTE — ED NOTES
Patient going to bed 353 at [facility]. Call report to [phone]. [facility].      Tammie Colon  03/11/21 2014

## 2021-03-12 NOTE — H&P
Hospital Medicine History & Physical      PCP: Alfred Woodwrad MD    Date of Admission: 3/11/2021    Date of Service: Pt seen/examined on 3/12/2021 and Admitted to Inpatient with expected LOS greater than two midnights due to medical therapy. Chief Complaint:  Chest pain, dyspnea, palpitations    History Of Present Illness:     56 y.o. female with PMH of non-ischemic cardiomyopathy (normal coronary arteries cath 6958), systolic CHF, LBBB, s/p BiV ICD, VT, HTN, HLD, CVA, autoimmune hemolytic anemia, bipolar disorder, obesity s/p gastric bypass who presented to [facility] with complaints of chest pain, dyspnea and palpitations. Pt reports progressively worsening SOB since this past Monday the 8th. SOB is worse with mild exertion including walking very short distances which is not her baseline. She denies cough/sputum production. No fever/chills. She also reports chest pain/pressure in her left anterior chest with associated palpitations that have been ongoing for the last 1-2 weeks. She saw her PCP in the office on Wednesday the 10th for UTI, she was started on Macrobid. Pt was noted to be very SOB. She was advised to call her Cardiologist who recommended for her to go home and transmit her pacemaker/ICD. She was noted to have Vtach. It was then advised for her to go the ED. Pt has trace lower extremity swelling. She reports gaining approx 80 lbs since the summer. She attributes this to overeating after her  passed away. She reports feeling tightness in her abdomen. No N/V/D. She is compliant with her medications. She does not smoke or drink alcohol. ED course: EKG non-acute. Serial troponin trend negative. Pt was transferred to [facility] for Cardiology evaluation.        Past Medical History:          Diagnosis Date    Anesthesia complication     hypotension post op    Autoimmune hemolytic anemia     during uterine cancer    Bipolar disorder ([facility] [address]     managed by Jayson Cantu  Bundle branch block     Cardiomyopathy ([facility] [address] [facility]     Chronic systolic CHF (congestive heart failure) ([facility] [address] [facility] 7/9/2015    Depression     Family history of early CAD     Family history of ovarian cancer     mother    GERD (gastroesophageal reflux disease)     Gout     Hypertension     Hypothyroid     Osteoarthritis, knee     Shybut    Pneumonia     history of pneumonia    S/P gastric bypass 1/7/05    Unspecified cerebral artery occlusion with cerebral infarction     Uterine cancer ([facility] [address]     endometrial adenocarcinoma    Vitamin B 12 deficiency 7/09       Past Surgical History:          Procedure Laterality Date    CARDIAC CATHETERIZATION  7/10/2015    Normal Coronary Arteries    CHOLECYSTECTOMY      COLONOSCOPY  03/06/2019    NL    COLONOSCOPY N/A 3/6/2019    EGD AND COLONOSCOPY WITH ANESTHESIA performed by Kierra Connell MD at [address] [place] [address]      4 times    ERCP      FINGER SURGERY      gout X 2    GASTRIC BYPASS SURGERY  1/7/05    HERNIA REPAIR  10/20/2016    lap ventral hernia    LIPECTOMY      gangrenous    OTHER SURGICAL HISTORY      radium implants into uterus for uterine surgery X 2    OTHER SURGICAL HISTORY  4/1/16    exp. lap lysis of adhesion    OTHER SURGICAL HISTORY      biventricular pacer and defibrillator    PACEMAKER PLACEMENT  09/2016    TRANSESOPHAGEAL ECHOCARDIOGRAM  7/13/2015    UPPER GASTROINTESTINAL ENDOSCOPY  4/23/13    UPPER GASTROINTESTINAL ENDOSCOPY  03/06/2019    NL    UPPER GASTROINTESTINAL ENDOSCOPY N/A 3/6/2019    EGD AND COLONOSCOPY WITH ANESTHESIA performed by Kierra Connell MD at [address]       Medications Prior to Admission:      Prior to Admission medications    Medication Sig Start Date End Date Taking?  Authorizing Provider   nitrofurantoin, macrocrystal-monohydrate, (MACROBID) 100 MG capsule Take 1 capsule by mouth 2 times daily for 7 days 3/11/21 3/18/21 Yes ALICIA Moreno - CNP   methocarbamol (ROBAXIN) 500 MG tablet Take 1 tablet by mouth 3 times daily as needed (pain) 1/20/21  Yes Jt Sigala MD   metoprolol succinate (TOPROL XL) 50 MG extended release tablet Take 1 tablet by mouth daily 12/28/20  Yes ALICIA Molina CNP   allopurinol (ZYLOPRIM) 300 MG tablet TAKE 1 TABLET EVERY DAY 12/28/20  Yes Jt Sigala MD   lisinopril (PRINIVIL;ZESTRIL) 5 MG tablet TAKE 1 TABLET EVERY DAY 12/7/20  Yes ALICIA Kraft CNP   simvastatin (ZOCOR) 20 MG tablet TAKE 1 TABLET EVERY NIGHT 11/19/20  Yes Jt Sigala MD   lamoTRIgine (LAMICTAL) 150 MG tablet TAKE 1 TABLET TWICE DAILY OR AS OTHERWISE DIRECTED 11/19/20  Yes Jt Sigala MD   montelukast (SINGULAIR) 10 MG tablet Take 1 tablet by mouth nightly 10/14/20  Yes Jt Sigala MD   diclofenac sodium (VOLTAREN) 1 % GEL Apply 2 g topically 2 times daily 9/4/20  Yes Jt Sigala MD   levothyroxine (SYNTHROID) 150 MCG tablet TAKE 1 TABLET EVERY DAY EXCEPT FOR SUNDAY TAKE 2 TABLETS AS DIRECTED FOR A TOTAL OF 8 TABLETS/WEEK 7/10/20  Yes Jt Sigala MD   torsemide (DEMADEX) 20 MG tablet Three times a week  Patient taking differently: 10 mg 10 mg Three times a week 12/30/19  Yes ALICIA Molina CNP   BIOTIN PO Take by mouth daily   Yes Historical Provider, MD   acetaminophen (TYLENOL) 325 MG tablet Take 650 mg by mouth every 6 hours as needed for Pain TAKES EVERY DAY   Yes Historical Provider, MD   aspirin 81 MG EC tablet Take 1 tablet by mouth daily 10/17/17  Yes Kaylan Shah MD   cetirizine (ZYRTEC) 10 MG tablet Take 10 mg by mouth daily   Yes Historical Provider, MD       Allergies:  Dilaudid [hydromorphone hcl], Lavender oil, Penicillins, Seroquel [quetiapine fumarate], Adhesive tape, Hydromorphone hcl, Lithium, and Tetanus toxoids    Social History:      The patient currently lives at home. TOBACCO:   reports that she quit smoking about 36 years ago. Her smoking use included cigarettes.  She has a 6.00 pack-year smoking history. She has never used smokeless tobacco.  ETOH:   reports no history of alcohol use. E-Cigarettes/Vaping Use     Questions Responses    E-Cigarette/Vaping Use Never User    Start Date     Passive Exposure     Quit Date     Counseling Given     Comments           Family History:      Reviewed in detail. Positive as follows:        Problem Relation Age of Onset    Cancer Mother     Ovarian Cancer Mother 45        Technically peritoneal cancer    Depression Mother         bipolar    Arthritis Father     Rheum Arthritis Father     Arrhythmia Father     Other Father         gout    Heart Disease Father     Depression Brother         depression    Other Brother         gout    Obesity Brother     Other Brother         gout    Obesity Brother     Depression Brother     Cancer Maternal Grandmother         Breast       REVIEW OF SYSTEMS:   Pertinent positives as noted in the HPI. All other systems reviewed and negative. PHYSICAL EXAM PERFORMED:    BP (!) 146/84   Pulse 92   Temp 98 °F (36.7 °C) (Oral)   Resp 16   Ht 5' 3\" (1.6 m)   Wt (!) 316 lb 1.6 oz (143.4 kg)   SpO2 96%   BMI 55.99 kg/m²     General appearance:  Obese female in no apparent distress, appears stated age and cooperative. HEENT:  Normal cephalic, atraumatic without obvious deformity. Pupils equal, round, and reactive to light. Extra ocular muscles intact. Conjunctivae/corneas clear. Neck: Supple, with full range of motion. No jugular venous distention. Trachea midline. Respiratory:  Normal respiratory effort. Clear to auscultation, bilaterally without Rales/Wheezes/Rhonchi. Cardiovascular:  Regular rate and rhythm with normal S1/S2 without murmurs, rubs or gallops. Abdomen: Soft, non-tender, non-distended with normal bowel sounds. Musculoskeletal:  No clubbing, cyanosis or edema bilaterally. Full range of motion without deformity.   Skin: Skin color, texture, turgor normal.  No rashes or lesions. Neurologic:  Neurovascularly intact without any focal sensory/motor deficits. Cranial nerves: II-XII intact, grossly non-focal.  Psychiatric:  Alert and oriented, thought content appropriate, normal insight  Capillary Refill: Brisk,< 3 seconds   Peripheral Pulses: +2 palpable, equal bilaterally       Labs:     Recent Labs     03/11/21  1602   WBC 7.0   HGB 12.6   HCT 39.3        Recent Labs     03/11/21  1602      K 4.5      CO2 22   BUN 19   CREATININE 1.2   CALCIUM 9.6     Recent Labs     03/11/21  1602   AST 21   ALT 11   BILITOT 0.7   ALKPHOS 147*     Recent Labs     03/11/21  2228 03/12/21  0107 03/12/21  0601   TROPONINI <0.01 <0.01 <0.01       Urinalysis:      Lab Results   Component Value Date    NITRU Negative 03/11/2021    WBCUA >100 03/11/2021    BACTERIA 3+ 07/05/2016    RBCUA 21-50 03/11/2021    BLOODU MODERATE 03/11/2021    SPECGRAV 1.015 03/11/2021    GLUCOSEU Negative 03/11/2021    GLUCOSEU NEGATIVE 06/04/2012       Radiology:       NM Cardiac Stress Test Nuclear Imaging    (Results Pending)       ASSESSMENT:    Active Hospital Problems    Diagnosis Date Noted    Chest pain [R07.9] 03/11/2021       Acute on chronic systolic CHF, NSVT with BiV ICD in situ:  - Pt presented with chest pain, progressively worsening TAPIA and palpitations. - EKG non-acute in the ED. Serial troponins negative. ACS ruled out.   - Cardiology and EP consulted. Plan for stress test and ECHO. Pt started on IV diuresis with lasix 40 mg BID.   - Monitor daily weights, close I's and O's, daily BMP. - Monitor pt on telemetry. - Continue aspirin, statin, metoprolol, lisinopril. Hypertension:  - Suboptimal control. Continue her home medications. Hyperlipidemia:  - Continue statin. History of CVA:  - Continue aspirin and statin. Hypothyroidism:  - Continue levothyroxine. TSH is low with normal FT4. Bipolar disorder:  - Mood stable. Continue her home medication regimen.      Morbid obesity, history of gastric bypass:  - With Body mass index is 55.99 kg/m². Complicating assessment and treatment. Placing patient at risk for multiple co-morbidities as well as early death and contributing to the patient's presentation. Counseled on weight loss. UTI, ruled out:  - UA concerning for infection. Urine cx <50,000 CFU/ml mixed skin/urogenital andreina. No further workup. No need for further abx. DVT Prophylaxis: SQ heparin   Diet: Diet NPO Effective Now Exceptions are: Ice Chips, Sips with Meds  Diet NPO, After Midnight Exceptions are: [employer], Sips with Meds  Code Status: Full Code    PT/OT Eval Status: Not indicated, pt is ambulatory     Dispo - > 2 days        Daykary Masterson, APRN - CNP    Thank you Wei Vela MD for the opportunity to be involved in this patient's care. If you have any questions or concerns please feel free to contact me at [phone].

## 2021-03-12 NOTE — PROGRESS NOTES
Pt is resting in room. Has racing feeling in chest at times. Some shortness of breath at times, and chest pain 2/10 at current.

## 2021-03-12 NOTE — PROGRESS NOTES
A Betzy stress test was completed on this patient as ordered. The patient tolerated the procedure well. Awaiting stress imaging at this time.

## 2021-03-12 NOTE — CONSULTS
[address] [address]                                  CONSULTATION    PATIENT NAME: Antonio Joseph                       :        1960  MED REC NO:   [id number]                          ROOM:       0235  ACCOUNT NO:   [de-identified]                           ADMIT DATE: 2021  PROVIDER:     Juan Carlos Nugent. Shell Cruz MD    CONSULT DATE:  2021    CARDIOLOGY CONSULTATION    REASON FOR CONSULTATION:  Chest pain. HISTORY OF PRESENT ILLNESS:  A 78-year-old female presents to the  hospital with complaints of chest pain. The pain started a couple of  weeks ago. She describes it as tightness on the left side of her chest.  It occasionally radiates into her left arm. She gets it both at rest  and with activity. She gets associated palpitation that usually just  last a few minutes. She gets associated shortness of breath. She also  notes that she has been overall more short of breath over the last few  days. PAST MEDICAL HISTORY:  1. Nonischemic cardiomyopathy. Her ejection fraction was 20% in 2015  with treatment it has improved to 40% to 45% by echocardiogram in 2019.  2.  Normal coronary arteries by cardiac catheterization in . 3.  Chronic systolic congestive heart failure. 4.  Left bundle-branch block. 5.  Status post biventricular ICD. 6.  Ventricular tachycardia. 7.  Moderate mitral regurgitation. 8.  Hypertension. 9.  Hyperlipidemia. 10.  History of CVA. 11.  Autoimmune hemolytic anemia. 12.  Bipolar disorder. 15.  Former smoker. SOCIAL HISTORY:  She used to smoke. FAMILY HISTORY:  Positive for heart disease. REVIEW OF SYSTEMS:  No fevers or chills. No cough. No focal neurologic  symptoms. No headache or visual changes. No recent GI or  bleeding. No recent or upcoming surgeries. All other systems are negative except  as present illness.     ALLERGIES:  See list in the chart,

## 2021-03-12 NOTE — PROGRESS NOTES
Device interrogation by company representative. 3/11/2021 - present (1 day)  [facility] [address]  See interrogation for further details. Last interrogation 3/11/2021. CRT-D -No new arrhythmias. See PACEART report under Cardiology tab.

## 2021-03-12 NOTE — CONSULTS
[id number]    Chest pain  SOB  Palps  NICMP, EF 20% 2015, 40-45% 2019  Normal coronary arteries cath 2015  A/C sCHF  LBBB  S/P BiV ICD  Vtach  Moderate MR  HTN  HLD  H/O CVA  Autoimmune hemolytic anemia  Bipolar disorder  Former smoker    Betzy aundrea  Echo  EP  Diuresis

## 2021-03-12 NOTE — PROGRESS NOTES
Np notified:  I saw pt has had low magnesium in the past. Is it ok to put in for a mag check.  Last one was in 2018    Np replied: Katrin Vallejo that's fine

## 2021-03-12 NOTE — PROGRESS NOTES
NP notified: Pt admitted for Chest pain. Cardiology consult not seen. Pt npo for potential stress test. Should pt have morning med? Metoprolol, lisinopril, and lamotrigine ordered. Pt has chest pain 2/10 Bp 132/69 HR 86. NP replied to hold BB. Metoprolol held.

## 2021-03-12 NOTE — PROGRESS NOTES
Pt had nausea and chest pain relieved by 1 dose of SL nitro. Pt is has sob with ambulation. Has runs of Vtach with movement.

## 2021-03-12 NOTE — CARE COORDINATION
Case Management/Follow up:    Chart reviewed for length of stay. Hospital day #  1     Unit:  B-3    Diagnosis and current status as per MD progress:Chest pain- stress test part two tomorrow 3/13, Echo. CHF- IV lasix for diuresis. Anticipated d/c date: possibly tomorrow pending cardiology clearance and stress test results. Expected plan for discharge: home    Potential barriers: none identified    Precert required/date initiated:n/a at this time    Comments: do not anticipate any needs. Please consult if needs arise.

## 2021-03-13 LAB
A/G RATIO: 1.7 (ref 1.1–2.2)
ALBUMIN SERPL-MCNC: 4 G/DL (ref 3.4–5)
ALP BLD-CCNC: 132 U/L (ref 40–129)
ALT SERPL-CCNC: 9 U/L (ref 10–40)
ANION GAP SERPL CALCULATED.3IONS-SCNC: 8 MMOL/L (ref 3–16)
AST SERPL-CCNC: 16 U/L (ref 15–37)
BASOPHILS ABSOLUTE: 0.1 K/UL (ref 0–0.2)
BASOPHILS RELATIVE PERCENT: 1 %
BILIRUB SERPL-MCNC: 0.7 MG/DL (ref 0–1)
BUN BLDV-MCNC: 23 MG/DL (ref 7–20)
CALCIUM SERPL-MCNC: 9.4 MG/DL (ref 8.3–10.6)
CHLORIDE BLD-SCNC: 101 MMOL/L (ref 99–110)
CO2: 28 MMOL/L (ref 21–32)
CREAT SERPL-MCNC: 1.4 MG/DL (ref 0.6–1.2)
EOSINOPHILS ABSOLUTE: 0 K/UL (ref 0–0.6)
EOSINOPHILS RELATIVE PERCENT: 0 %
GFR AFRICAN AMERICAN: 46
GFR NON-AFRICAN AMERICAN: 38
GLOBULIN: 2.3 G/DL
GLUCOSE BLD-MCNC: 91 MG/DL (ref 70–99)
HCT VFR BLD CALC: 36.6 % (ref 36–48)
HEMOGLOBIN: 11.6 G/DL (ref 12–16)
LV EF: 38 %
LVEF MODALITY: NORMAL
LYMPHOCYTES ABSOLUTE: 2 K/UL (ref 1–5.1)
LYMPHOCYTES RELATIVE PERCENT: 32.2 %
MCH RBC QN AUTO: 28.7 PG (ref 26–34)
MCHC RBC AUTO-ENTMCNC: 31.6 G/DL (ref 31–36)
MCV RBC AUTO: 90.7 FL (ref 80–100)
MONOCYTES ABSOLUTE: 0.7 K/UL (ref 0–1.3)
MONOCYTES RELATIVE PERCENT: 11.9 %
NEUTROPHILS ABSOLUTE: 3.4 K/UL (ref 1.7–7.7)
NEUTROPHILS RELATIVE PERCENT: 54.9 %
ORGANISM: ABNORMAL
PDW BLD-RTO: 15.4 % (ref 12.4–15.4)
PLATELET # BLD: 171 K/UL (ref 135–450)
PMV BLD AUTO: 10.1 FL (ref 5–10.5)
POTASSIUM REFLEX MAGNESIUM: 4.3 MMOL/L (ref 3.5–5.1)
POTASSIUM SERPL-SCNC: 4.3 MMOL/L (ref 3.5–5.1)
RBC # BLD: 4.03 M/UL (ref 4–5.2)
SODIUM BLD-SCNC: 137 MMOL/L (ref 136–145)
TOTAL PROTEIN: 6.3 G/DL (ref 6.4–8.2)
URINE CULTURE, ROUTINE: ABNORMAL
WBC # BLD: 6.2 K/UL (ref 4–11)

## 2021-03-13 PROCEDURE — 99233 SBSQ HOSP IP/OBS HIGH 50: CPT | Performed by: INTERNAL MEDICINE

## 2021-03-13 PROCEDURE — A9502 TC99M TETROFOSMIN: HCPCS | Performed by: INTERNAL MEDICINE

## 2021-03-13 PROCEDURE — 80053 COMPREHEN METABOLIC PANEL: CPT

## 2021-03-13 PROCEDURE — 1200000000 HC SEMI PRIVATE

## 2021-03-13 PROCEDURE — 6370000000 HC RX 637 (ALT 250 FOR IP): Performed by: INTERNAL MEDICINE

## 2021-03-13 PROCEDURE — 36415 COLL VENOUS BLD VENIPUNCTURE: CPT

## 2021-03-13 PROCEDURE — 2580000003 HC RX 258: Performed by: INTERNAL MEDICINE

## 2021-03-13 PROCEDURE — 6360000004 HC RX CONTRAST MEDICATION: Performed by: REGISTERED NURSE

## 2021-03-13 PROCEDURE — C8929 TTE W OR WO FOL WCON,DOPPLER: HCPCS

## 2021-03-13 PROCEDURE — 85025 COMPLETE CBC W/AUTO DIFF WBC: CPT

## 2021-03-13 PROCEDURE — 6360000002 HC RX W HCPCS: Performed by: REGISTERED NURSE

## 2021-03-13 PROCEDURE — 3430000000 HC RX DIAGNOSTIC RADIOPHARMACEUTICAL: Performed by: INTERNAL MEDICINE

## 2021-03-13 RX ORDER — TORSEMIDE 20 MG/1
10 TABLET ORAL DAILY
Status: DISCONTINUED | OUTPATIENT
Start: 2021-03-13 | End: 2021-03-14 | Stop reason: HOSPADM

## 2021-03-13 RX ADMIN — LEVOTHYROXINE SODIUM 100 MCG: 0.1 TABLET ORAL at 05:37

## 2021-03-13 RX ADMIN — ASPIRIN 81 MG: 81 TABLET, COATED ORAL at 09:16

## 2021-03-13 RX ADMIN — LAMOTRIGINE 150 MG: 25 TABLET ORAL at 20:37

## 2021-03-13 RX ADMIN — CETIRIZINE HYDROCHLORIDE 10 MG: 10 TABLET, FILM COATED ORAL at 09:16

## 2021-03-13 RX ADMIN — LAMOTRIGINE 150 MG: 25 TABLET ORAL at 09:16

## 2021-03-13 RX ADMIN — ATORVASTATIN CALCIUM 20 MG: 10 TABLET, FILM COATED ORAL at 20:36

## 2021-03-13 RX ADMIN — HEPARIN SODIUM 5000 UNITS: 5000 INJECTION INTRAVENOUS; SUBCUTANEOUS at 05:37

## 2021-03-13 RX ADMIN — LISINOPRIL 5 MG: 5 TABLET ORAL at 09:16

## 2021-03-13 RX ADMIN — TETROFOSMIN 33 MILLICURIE: 1.38 INJECTION, POWDER, LYOPHILIZED, FOR SOLUTION INTRAVENOUS at 07:09

## 2021-03-13 RX ADMIN — PERFLUTREN 1.65 MG: 6.52 INJECTION, SUSPENSION INTRAVENOUS at 10:03

## 2021-03-13 RX ADMIN — HEPARIN SODIUM 5000 UNITS: 5000 INJECTION INTRAVENOUS; SUBCUTANEOUS at 15:18

## 2021-03-13 RX ADMIN — METOPROLOL SUCCINATE 100 MG: 50 TABLET, EXTENDED RELEASE ORAL at 09:16

## 2021-03-13 RX ADMIN — SODIUM CHLORIDE, PRESERVATIVE FREE 10 ML: 5 INJECTION INTRAVENOUS at 20:37

## 2021-03-13 RX ADMIN — SODIUM CHLORIDE, PRESERVATIVE FREE 10 ML: 5 INJECTION INTRAVENOUS at 09:16

## 2021-03-13 RX ADMIN — HEPARIN SODIUM 5000 UNITS: 5000 INJECTION INTRAVENOUS; SUBCUTANEOUS at 23:33

## 2021-03-13 RX ADMIN — ACETAMINOPHEN 650 MG: 325 TABLET ORAL at 16:56

## 2021-03-13 RX ADMIN — OXYCODONE 10 MG: 5 TABLET ORAL at 20:36

## 2021-03-13 RX ADMIN — TORSEMIDE 10 MG: 20 TABLET ORAL at 11:24

## 2021-03-13 ASSESSMENT — PAIN SCALES - GENERAL: PAINLEVEL_OUTOF10: 0

## 2021-03-13 NOTE — CONSULTS
[address] [address]                                  CONSULTATION    PATIENT NAME: Colette Truong                       :        1960  MED REC NO:   [id number]                          ROOM:       1609  ACCOUNT NO:   [de-identified]                           ADMIT DATE: 2021  PROVIDER:     Cathleen Sanchez MD    CONSULT DATE:  2021    REASON FOR CONSULTATION:  Arrhythmia. HISTORY OF PRESENT ILLNESS:  The patient is a 38-year-old woman with  history of nonischemic cardiomyopathy which is longstanding. She had  ejection fraction in the 20% range, then underwent biventricular ICD  placement in . Following that, her ejection fraction has improved  to 40% to 45%. She has had occasional episodes of ventricular  tachycardia which have been pace terminated. These were noticed in  2020 and 2019. But the past several days, the patient reports a  history of chest discomfort and shortness of breath. Device  interrogation demonstrates persistent atrial arrhythmia since  2021. She has also had episodes of tachycardia which resulted in  antitachycardia pacing. It is not clear whether this was rapid  conduction of atrial impulses or ventricular tachycardia. The  intracardiac electrocardiograms looked quite similar to the conducted  beats, though the wavelet feature of the device did not identify them as  identical.  In addition, EGMs from previous episodes of ventricular  tachycardia from  and  appeared similar to the current episodes. Finally, the heart rate of the ventricular tachycardia is not a multiple  of the atrial rate, suggesting that this likely is ventricular in  origin. Her most recent echocardiogram from 2019 demonstrates  ejection fraction of 40% to 45% with anteroseptal hypokinesis. Perfusion type stress testing from 2021 is pending.   A 12-lead ECG  demonstrates typical appearing atrial flutter with intermittent  ventricular pacing. PAST MEDICAL HISTORY:  1. Nonischemic cardiomyopathy. 2.  Left bundle-branch block. 3.  Status post implantation of biventricular ICD. 4.  Ventricular tachycardia. 5.  Hypertension. 6.  Hyperlipidemia. 7.  Remote CVA. MEDICATIONS:  At the time of admission include allopurinol 300 mg p.o.  daily, aspirin 81 mg p.o. daily, Lamictal 150 mg p.o. b.i.d., Synthroid  150 mcg p.o. daily with two on Sunday, lisinopril 5 mg p.o. daily,  Robaxin 500 mg p.o. t.i.d. as needed, Toprol XL 50 mg p.o. daily,  Singulair 10 mg p.o. at night, Macrobid 100 mg p.o. b.i.d., Zocor 20 mg  p.o. at night, torsemide 10 mg three times per week. ALLERGIES:  Include DILAUDID, LAVENDER OIL, PENICILLIN, ADHESIVE TAPE,  HYDROMORPHONE,  TETANUS TOXOID, SEROQUEL and LITHIUM. SOCIAL HISTORY:  The patient is a former smoker having stopped smoking  about 3 to 5 years ago. She does not consume alcohol at this time. FAMILY HISTORY:  Remarkable for heart disease and ill-defined arrhythmia  in the father. There is history of obesity and depression in multiple  family members. REVIEW OF SYSTEMS:  Demonstrates no recent change in appetite or change  in weight. The patient has not had recent fever or chills. She does  not describe palpitations or near syncope. She describes the  above-mentioned chest discomfort and some shortness of breath. Her  functional status is limited. All other components of the 14-system  review are negative. PHYSICAL EXAMINATION:  VITAL SIGNS:  Blood pressure is 131/67, heart rate is 94 beats per  minute and irregular. The patient is currently afebrile. GENERAL:  She is awake, alert, oriented, and in no discomfort. HEENT:  Exam demonstrates normocephalic, atraumatic head. There is no  scleral icterus. Pupils are round and reactive. NECK:  Supple without thyromegaly.   There is no cervical  lymphadenopathy. LUNGS:  Clear to auscultation. CARDIOVASCULAR:  Exam reveals an irregular rhythm. The apical impulse  is discrete. S1 and S2 are normal.  There is an apical systolic murmur. The jugular venous pressure is difficult to assess. ABDOMEN:  Very obese, soft, nontender. EXTREMITIES:  Demonstrate no pitting pretibial dependent edema. There  is no cyanosis. There is no evidence for chronic venous stasis. SKIN:  Otherwise, warm and dry without skin rash. DIAGNOSTIC DATA:  A 12-lead ECG from 03/12/2021 demonstrates atrial  flutter with a ventricular pacing at 81 beats per minute. There is a  right axis deviation consistent with biventricular pacing. IMPRESSION:  1. Recurrent ventricular tachycardia. 2.  New-onset atrial flutter. 3.  Nonischemic cardiomyopathy. 4.  Mild renal insufficiency. 5.  Systolic and diastolic congestive heart failure. The patient presents with a chest discomfort. She is found to have  episodes of tachycardia over the past several days prior to admission. The etiology of the tachycardia is somewhat controversial as she is in  atrial flutter at the time. The intracardiac EGM does resemble  conducted complexes though may not be completely identical.  The  behavior of the arrhythmia particularly the heart rate relative to the  atrial rate would be most consistent with ventricular tachycardia. In  addition, the EGM does resemble the episodes of ventricular tachycardia  which were documented in 06/2019 and 12/2020. Her laboratory evaluation  also suggests hyperthyroidism. I would be inclined to increase the  metoprolol succinate to 100 mg per day. We will likely reduce the  Synthroid dose to 100 mcg per day.   She will require a full dose  anticoagulation for atrial flutter after 3 to 4 weeks of therapeutic  anticoagulation, sinus rhythm can be restored, ED with overdrive right  atrial pacing or with direct current cardioversion. RECOMMENDATIONS:  1. Increase metoprolol succinate to 100 mg p.o. daily. 2.  Reduce Synthroid to 100 mcg p.o. daily. 3.  Would recommend restoration of sinus rhythm after 3 to 4 weeks of  therapeutic anticoagulation. 4.  Apixaban 5 mg p.o. q.12h. Thanks for the opportunity to assist in the care of the patient. Please  contact me if you have any questions regarding her evaluation.         Mayank Malcolm MD    D: 03/12/2021 16:03:06       T: 03/12/2021 16:13:19     [email or web address]  Job#: [id number]     Doc#: [id number]    CC:

## 2021-03-13 NOTE — ED PROVIDER NOTES
I independently interviewed, examined and evaluated Charity Jara. In brief, this is a 70-year-old female with a past medical history of systolic CHF with EF 46%, dual chamber pacemaker and ICD, and hypertension who presents with chest pain and shortness of breath. The patient describes the pain as squeezing sensation in her left chest with radiation down her left arm. She states she has felt what she thinks are shocks recently, however none today. She states she has had periods of dizziness and palpitations as well. She describes dyspnea on exertion as well as lower extremity edema. On exam, the patient is nontoxic-appearing. She does not appear to be in acute distress. Heart is regular rate and rhythm without murmurs. Lungs are clear to auscultation diffusely. She does have 1+ pitting edema in bilateral lower extremities. The Ekg interpreted by me shows  normal pacemaker rhythm with a rate of 90  Axis is   Left axis deviation  QTc is  within an acceptable range  Intervals and Durations are unremarkable. ST Segments: no acute change  No significant change from prior EKG dated 1/27/21      ED course: The patient is a 70-year-old female presenting with chest pain and shortness of breath. Her vital signs are within normal limits here. She does have a dual-chamber pacemaker, it was interrogated here in the emergency department and there have been periods of ventricular tachycardia and also additional atrial tachycardias. Patient was contacted outpatient by her EP office regarding their concern for the dysrhythmias she has been having. Her troponin is negative. EKG is without obvious ischemia. She is given aspirin for concern that her chest pain could related to ACS. Otherwise her electrolytes are within normal limits here. She is noted to have a UTI, is given Rocephin. She will require transfer to [facility] for further chest pain and EP evaluation.   LYNNETTE spoke with [facility] hospitalist.    All diagnostic, treatment, and disposition decisions were made by myself in conjunction with the advanced practice provider. For all further details of the patient's emergency department visit, please see the advanced practice provider's documentation. Comment: Please note this report has been produced using speech recognition software and may contain errors related to that system including errors in grammar, punctuation, and spelling, as well as words and phrases that may be inappropriate. If there are any questions or concerns please feel free to contact the dictating provider for clarification.          Juan Carlos [facility]  03/12/21 0675

## 2021-03-13 NOTE — PROGRESS NOTES
Hospitalist Progress Note      PCP: Yolanda Burden MD    Date of Admission: 3/11/2021    Chief Complaint: Chest pain, dyspnea, palpitations    Hospital Course:   56 y.o. female with PMH of non-ischemic cardiomyopathy (normal coronary arteries cath 9697), systolic CHF, LBBB, s/p BiV ICD, VT, HTN, HLD, CVA, autoimmune hemolytic anemia, bipolar disorder, obesity s/p gastric bypass who presented to [facility] with complaints of chest pain, dyspnea and palpitations. Pt reports progressively worsening SOB since this past Monday the 8th. SOB is worse with mild exertion including walking very short distances which is not her baseline. She denies cough/sputum production. No fever/chills. She also reports chest pain/pressure in her left anterior chest with associated palpitations that have been ongoing for the last 1-2 weeks. She saw her PCP in the office on Wednesday the 10th for UTI, she was started on Macrobid. Pt was noted to be very SOB. She was advised to call her Cardiologist who recommended for her to go home and transmit her pacemaker/ICD. She was noted to have Vtach. It was then advised for her to go the ED. Pt has trace lower extremity swelling. She reports gaining approx 80 lbs since the summer. She attributes this to overeating after her  passed away. She reports feeling tightness in her abdomen. No N/V/D. She is compliant with her medications. She does not smoke or drink alcohol. ED course: EKG non-acute. Serial troponin trend negative. Pt was transferred to [facility] for Cardiology evaluation. Subjective:   Pt is on RA. Afebrile. VSS. Dyspnea is much improved today. No chest pain. No N/V/D. Pt is anxious to go home.      Medications:  Reviewed    Infusion Medications   Scheduled Medications    torsemide  10 mg Oral Daily    sodium chloride flush  10 mL Intravenous 2 times per day    aspirin  81 mg Oral Daily    cetirizine  10 mg Oral Daily    lamoTRIgine  150 mg Oral BID    lisinopril  5 mg Oral Daily    atorvastatin  20 mg Oral Nightly    heparin (porcine)  5,000 Units Subcutaneous 3 times per day    levothyroxine  100 mcg Oral Daily    metoprolol succinate  100 mg Oral Daily     PRN Meds: oxyCODONE, oxyCODONE, sodium chloride flush, potassium chloride, magnesium sulfate, promethazine **OR** [DISCONTINUED] ondansetron, famotidine, acetaminophen **OR** acetaminophen, nitroGLYCERIN      Intake/Output Summary (Last 24 hours) at 3/13/2021 1139  Last data filed at 3/13/2021 0910  Gross per 24 hour   Intake 660 ml   Output 2850 ml   Net -2190 ml       Physical Exam Performed:    /71   Pulse 92   Temp 98 °F (36.7 °C) (Oral)   Resp 16   Ht 5' 3\" (1.6 m)   Wt (!) 309 lb 12.8 oz (140.5 kg)   SpO2 98%   BMI 54.88 kg/m²     General appearance: Obese female in no apparent distress, appears stated age and cooperative. HEENT: Pupils equal, round, and reactive to light. Conjunctivae/corneas clear. Neck: Supple, with full range of motion. No jugular venous distention. Trachea midline. Respiratory:  Normal respiratory effort. Clear to auscultation, bilaterally without Rales/Wheezes/Rhonchi. Cardiovascular: Regular rate and rhythm with normal S1/S2 without murmurs, rubs or gallops. Abdomen: Soft, non-tender, non-distended with normal bowel sounds. Musculoskeletal: No clubbing, cyanosis or edema bilaterally. Full range of motion without deformity. Skin: Skin color, texture, turgor normal.  No rashes or lesions. Neurologic:  Neurovascularly intact without any focal sensory/motor deficits.  Cranial nerves: II-XII intact, grossly non-focal.  Psychiatric: Alert and oriented, thought content appropriate, normal insight  Capillary Refill: Brisk,< 3 seconds   Peripheral Pulses: +2 palpable, equal bilaterally       Labs:   Recent Labs     03/11/21  1602 03/13/21  1048   WBC 7.0 6.2   HGB 12.6 11.6*   HCT 39.3 36.6    171     Recent Labs     03/11/21  1602 03/13/21  1048    137   K 4.5 4.3  4.3    101   CO2 22 28   BUN 19 23*   CREATININE 1.2 1.4*   CALCIUM 9.6 9.4     Recent Labs     03/11/21  1602 03/13/21  1048   AST 21 16   ALT 11 9*   BILITOT 0.7 0.7   ALKPHOS 147* 132*     Recent Labs     03/11/21  2228 03/12/21  0107 03/12/21  0601   TROPONINI <0.01 <0.01 <0.01       Urinalysis:      Lab Results   Component Value Date    NITRU Negative 03/11/2021    WBCUA >100 03/11/2021    BACTERIA 3+ 07/05/2016    RBCUA 21-50 03/11/2021    BLOODU MODERATE 03/11/2021    SPECGRAV 1.015 03/11/2021    GLUCOSEU Negative 03/11/2021    GLUCOSEU NEGATIVE 06/04/2012       Radiology:  NM Cardiac Stress Test Nuclear Imaging    (Results Pending)       Assessment/Plan:    Active Hospital Problems    Diagnosis    VT (ventricular tachycardia) ([facility] [address] [I47.2]    Atrial flutter (HCC) [I48.92]    Chest pain [R07.9]    Biventricular ICD (implantable cardioverter-defibrillator) in place [Z95.810]    Chronic systolic congestive heart failure (HCC) [I50.22]    Dilated cardiomyopathy ([facility] [address] [I42.0]       Acute on chronic systolic CHF, NSVT with BiV ICD in situ:  - Pt presented with chest pain, progressively worsening TAPIA and palpitations. - EKG non-acute in the ED. Serial troponins negative. ACS ruled out.   - Cardiology and EP consulted. Plan for stress test and ECHO -- pending.   - Pt was given a few IV doses of lasix per Cards. Now on PO torsemide. - Monitor daily weights, close I's and O's, daily BMP. - Monitor pt on telemetry. - Continue aspirin, statin, metoprolol (dose increased per EP), lisinopril.      Hypertension:  - Controlled. Continue the aforementioned medications. Monitor.      Hyperlipidemia:  - Continue statin.      History of CVA:  - Continue aspirin and statin.      Hypothyroidism:  - TSH is low with normal FT4. EP reduced her levothyroxine dose to 100 mcg daily (previously 150). She will need repeat TSH testing in 1 month with PCP.      Bipolar disorder:  - Mood stable.

## 2021-03-13 NOTE — PROGRESS NOTES
[address]   Progress Note  Cardiology    CC: sob    HPI: feels better. Less sob. No cp/palps     Past Medical History   has a past medical history of Anesthesia complication, Autoimmune hemolytic anemia, Bipolar disorder ([facility] [address] Bundle branch block, Cardiomyopathy ([facility] [address] Chronic systolic CHF (congestive heart failure) ([facility] [address] Depression, Family history of early CAD, Family history of ovarian cancer, GERD (gastroesophageal reflux disease), Gout, Hypertension, Hypothyroid, Osteoarthritis, knee, Pneumonia, S/P gastric bypass, Unspecified cerebral artery occlusion with cerebral infarction, Uterine cancer ([facility] [address] and Vitamin B 12 deficiency. Past Surgical History   has a past surgical history that includes Gastric bypass surgery (1/7/05); Finger surgery; Dilation and curettage of uterus; lipectomy; Cholecystectomy; other surgical history; Upper gastrointestinal endoscopy (4/23/13); Cardiac catheterization (7/10/2015); transesophageal echocardiogram (7/13/2015); other surgical history (4/1/16); pacemaker placement (09/2016); hernia repair (10/20/2016); ERCP; other surgical history; Upper gastrointestinal endoscopy (03/06/2019); Colonoscopy (03/06/2019); Upper gastrointestinal endoscopy (N/A, 3/6/2019); and Colonoscopy (N/A, 3/6/2019). Social History   reports that she quit smoking about 36 years ago. Her smoking use included cigarettes. She has a 6.00 pack-year smoking history. She has never used smokeless tobacco. She reports that she does not drink alcohol or use drugs. Family History  family history includes Arrhythmia in her father; Arthritis in her father; Cancer in her maternal grandmother and mother; Depression in her brother, brother, and mother; Heart Disease in her father; Obesity in her brother and brother; Other in her brother, brother, and father; Ovarian Cancer (age of onset: 45) in her mother; Rheum Arthritis in her father.     Medications  Prior to Admission medications    Medication Sig Start Date End Date Taking?  Authorizing Provider   nitrofurantoin, macrocrystal-monohydrate, (MACROBID) 100 MG capsule Take 1 capsule by mouth 2 times daily for 7 days 3/11/21 3/18/21 Yes ALICIA Hernandez CNP   methocarbamol (ROBAXIN) 500 MG tablet Take 1 tablet by mouth 3 times daily as needed (pain) 1/20/21  Yes Johnella Dakin, MD   metoprolol succinate (TOPROL XL) 50 MG extended release tablet Take 1 tablet by mouth daily 12/28/20  Yes ALICIA Cramer CNP   allopurinol (ZYLOPRIM) 300 MG tablet TAKE 1 TABLET EVERY DAY 12/28/20  Yes Johnella Dakin, MD   lisinopril (PRINIVIL;ZESTRIL) 5 MG tablet TAKE 1 TABLET EVERY DAY 12/7/20  Yes ALICIA Metz CNP   simvastatin (ZOCOR) 20 MG tablet TAKE 1 TABLET EVERY NIGHT 11/19/20  Yes Johnella Dakin, MD   lamoTRIgine (LAMICTAL) 150 MG tablet TAKE 1 TABLET TWICE DAILY OR AS OTHERWISE DIRECTED 11/19/20  Yes Johnella Dakin, MD   montelukast (SINGULAIR) 10 MG tablet Take 1 tablet by mouth nightly 10/14/20  Yes Johnella Dakin, MD   diclofenac sodium (VOLTAREN) 1 % GEL Apply 2 g topically 2 times daily 9/4/20  Yes Johnella Dakin, MD   levothyroxine (SYNTHROID) 150 MCG tablet TAKE 1 TABLET EVERY DAY EXCEPT FOR SUNDAY TAKE 2 TABLETS AS DIRECTED FOR A TOTAL OF 8 TABLETS/WEEK 7/10/20  Yes Johnella Dakin, MD   torsemide (DEMADEX) 20 MG tablet Three times a week  Patient taking differently: 10 mg 10 mg Three times a week 12/30/19  Yes ALICIA Cramer CNP   BIOTIN PO Take by mouth daily   Yes Historical Provider, MD   acetaminophen (TYLENOL) 325 MG tablet Take 650 mg by mouth every 6 hours as needed for Pain TAKES EVERY DAY   Yes Historical Provider, MD   aspirin 81 MG EC tablet Take 1 tablet by mouth daily 10/17/17  Yes Yaquelin Gunn MD   cetirizine (ZYRTEC) 10 MG tablet Take 10 mg by mouth daily   Yes Historical Provider, MD       Allergies  Dilaudid [hydromorphone hcl], Lavender oil, Penicillins, Seroquel [quetiapine fumarate], Adhesive tape, Hydromorphone hcl, Lithium, and Tetanus toxoids    Review of Systems:   Reviewed. No changes except as noted in HPI and A/P      Lab Results   Component Value Date    WBC 7.0 03/11/2021    HGB 12.6 03/11/2021    HCT 39.3 03/11/2021    MCV 91.3 03/11/2021     03/11/2021     Lab Results   Component Value Date    CREATININE 1.2 03/11/2021    BUN 19 03/11/2021     03/11/2021    K 4.5 03/11/2021     03/11/2021    CO2 22 03/11/2021     Lab Results   Component Value Date    INR 1.03 10/15/2017    PROTIME 11.6 10/15/2017       I reviewed EKGs and radiology imaging. Pertinent findings and changes as described in assessment below.       Physical Examination:    /68   Pulse 93   Temp 97.5 °F (36.4 °C) (Oral)   Resp 16   Ht 5' 3\" (1.6 m)   Wt (!) 309 lb 12.8 oz (140.5 kg)   SpO2 97%   BMI 54.88 kg/m²      General Appearance:  Alert, no distress, appears stated age   Head:  Normocephalic, without obvious abnormality, atraumatic   Eyes:  PERRL, conjunctiva/corneas clear         Nose: Nares normal, no drainage or sinus tenderness   Throat: Lips, mucosa, and tongue normal   Neck: Supple, symmetrical, trachea midline, no adenopathy         Lungs:   Clear to auscultation bilaterally    Chest Wall:  No tenderness or deformity   Heart:  Regular rate and rhythm, S1, S2 normal, no murmur, rub or gallop   Abdomen:   Soft, non tender, normal bowel sounds                Extremities: No cyanosis, trace edema   Pulses: 2+ and symmetric   Skin: Skin color, texture, turgor normal, no rashes    Pysch: Normal mood and affect   Neurologic: Normal gross motor and sensory exam.        Assessment:    Chest pain - resolved  SOB - improved  Palps - decreased  NICMP, EF 20% 2015, 40-45% 2019. stable  Normal coronary arteries cath 2015  A/C sCHF - good response to diuresis   LBBB  S/P BiV ICD  Vtach  Moderate MR  HTN - controlled  HLD  H/O CVA  Autoimmune hemolytic anemia  Bipolar disorder  Former smoker  Echo reviewed, EF stable     Plan  Betzy aundrea pending, wiil follow up results  Change IV lasix to oral torsemide     Gilberto Del Cid MD, 3/13/2021 10:09 AM

## 2021-03-14 VITALS
BODY MASS INDEX: 51.91 KG/M2 | SYSTOLIC BLOOD PRESSURE: 125 MMHG | WEIGHT: 293 LBS | RESPIRATION RATE: 14 BRPM | TEMPERATURE: 97.7 F | HEART RATE: 88 BPM | OXYGEN SATURATION: 97 % | HEIGHT: 63 IN | DIASTOLIC BLOOD PRESSURE: 55 MMHG

## 2021-03-14 LAB
A/G RATIO: 1.7 (ref 1.1–2.2)
ALBUMIN SERPL-MCNC: 4 G/DL (ref 3.4–5)
ALP BLD-CCNC: 133 U/L (ref 40–129)
ALT SERPL-CCNC: 10 U/L (ref 10–40)
ANION GAP SERPL CALCULATED.3IONS-SCNC: 9 MMOL/L (ref 3–16)
AST SERPL-CCNC: 16 U/L (ref 15–37)
BASOPHILS ABSOLUTE: 0.1 K/UL (ref 0–0.2)
BASOPHILS RELATIVE PERCENT: 1 %
BILIRUB SERPL-MCNC: 0.7 MG/DL (ref 0–1)
BILIRUBIN URINE: NEGATIVE
BLOOD, URINE: NEGATIVE
BUN BLDV-MCNC: 28 MG/DL (ref 7–20)
CALCIUM SERPL-MCNC: 9.3 MG/DL (ref 8.3–10.6)
CHLORIDE BLD-SCNC: 103 MMOL/L (ref 99–110)
CLARITY: CLEAR
CO2: 28 MMOL/L (ref 21–32)
COLOR: YELLOW
CREAT SERPL-MCNC: 1.5 MG/DL (ref 0.6–1.2)
EOSINOPHILS ABSOLUTE: 0 K/UL (ref 0–0.6)
EOSINOPHILS RELATIVE PERCENT: 0 %
GFR AFRICAN AMERICAN: 42
GFR NON-AFRICAN AMERICAN: 35
GLOBULIN: 2.4 G/DL
GLUCOSE BLD-MCNC: 96 MG/DL (ref 70–99)
GLUCOSE URINE: NEGATIVE MG/DL
HCT VFR BLD CALC: 37.7 % (ref 36–48)
HEMOGLOBIN: 12.1 G/DL (ref 12–16)
KETONES, URINE: NEGATIVE MG/DL
LEUKOCYTE ESTERASE, URINE: NEGATIVE
LYMPHOCYTES ABSOLUTE: 2.5 K/UL (ref 1–5.1)
LYMPHOCYTES RELATIVE PERCENT: 44.9 %
MCH RBC QN AUTO: 29.2 PG (ref 26–34)
MCHC RBC AUTO-ENTMCNC: 32 G/DL (ref 31–36)
MCV RBC AUTO: 91.1 FL (ref 80–100)
MICROSCOPIC EXAMINATION: NORMAL
MONOCYTES ABSOLUTE: 0.5 K/UL (ref 0–1.3)
MONOCYTES RELATIVE PERCENT: 8.4 %
NEUTROPHILS ABSOLUTE: 2.5 K/UL (ref 1.7–7.7)
NEUTROPHILS RELATIVE PERCENT: 45.7 %
NITRITE, URINE: NEGATIVE
PDW BLD-RTO: 15.3 % (ref 12.4–15.4)
PH UA: 5.5 (ref 5–8)
PLATELET # BLD: 153 K/UL (ref 135–450)
PMV BLD AUTO: 10.4 FL (ref 5–10.5)
POTASSIUM REFLEX MAGNESIUM: 4.4 MMOL/L (ref 3.5–5.1)
POTASSIUM SERPL-SCNC: 4.4 MMOL/L (ref 3.5–5.1)
PROTEIN UA: NEGATIVE MG/DL
RBC # BLD: 4.13 M/UL (ref 4–5.2)
SODIUM BLD-SCNC: 140 MMOL/L (ref 136–145)
SPECIFIC GRAVITY UA: 1.01 (ref 1–1.03)
TOTAL PROTEIN: 6.4 G/DL (ref 6.4–8.2)
URINE REFLEX TO CULTURE: NORMAL
URINE TYPE: NORMAL
UROBILINOGEN, URINE: 0.2 E.U./DL
WBC # BLD: 5.5 K/UL (ref 4–11)

## 2021-03-14 PROCEDURE — 99232 SBSQ HOSP IP/OBS MODERATE 35: CPT | Performed by: INTERNAL MEDICINE

## 2021-03-14 PROCEDURE — 81003 URINALYSIS AUTO W/O SCOPE: CPT

## 2021-03-14 PROCEDURE — 36415 COLL VENOUS BLD VENIPUNCTURE: CPT

## 2021-03-14 PROCEDURE — 6360000002 HC RX W HCPCS: Performed by: REGISTERED NURSE

## 2021-03-14 PROCEDURE — 85025 COMPLETE CBC W/AUTO DIFF WBC: CPT

## 2021-03-14 PROCEDURE — 6370000000 HC RX 637 (ALT 250 FOR IP): Performed by: INTERNAL MEDICINE

## 2021-03-14 PROCEDURE — 80053 COMPREHEN METABOLIC PANEL: CPT

## 2021-03-14 RX ORDER — TORSEMIDE 10 MG/1
10 TABLET ORAL DAILY
Qty: 30 TABLET | Refills: 3 | Status: SHIPPED | OUTPATIENT
Start: 2021-03-15 | End: 2021-03-24 | Stop reason: SDUPTHER

## 2021-03-14 RX ORDER — METOPROLOL SUCCINATE 100 MG/1
100 TABLET, EXTENDED RELEASE ORAL DAILY
Qty: 30 TABLET | Refills: 3 | Status: SHIPPED | OUTPATIENT
Start: 2021-03-15 | End: 2021-04-13 | Stop reason: SDUPTHER

## 2021-03-14 RX ORDER — LEVOTHYROXINE SODIUM 0.1 MG/1
100 TABLET ORAL DAILY
Qty: 30 TABLET | Refills: 3 | Status: SHIPPED | OUTPATIENT
Start: 2021-03-15 | End: 2021-04-13 | Stop reason: SDUPTHER

## 2021-03-14 RX ADMIN — ACETAMINOPHEN 650 MG: 325 TABLET ORAL at 16:45

## 2021-03-14 RX ADMIN — METOPROLOL SUCCINATE 100 MG: 50 TABLET, EXTENDED RELEASE ORAL at 08:51

## 2021-03-14 RX ADMIN — LAMOTRIGINE 150 MG: 25 TABLET ORAL at 08:51

## 2021-03-14 RX ADMIN — HEPARIN SODIUM 5000 UNITS: 5000 INJECTION INTRAVENOUS; SUBCUTANEOUS at 13:32

## 2021-03-14 RX ADMIN — ASPIRIN 81 MG: 81 TABLET, COATED ORAL at 08:50

## 2021-03-14 RX ADMIN — ACETAMINOPHEN 650 MG: 325 TABLET ORAL at 05:05

## 2021-03-14 RX ADMIN — TORSEMIDE 10 MG: 20 TABLET ORAL at 08:51

## 2021-03-14 RX ADMIN — CETIRIZINE HYDROCHLORIDE 10 MG: 10 TABLET, FILM COATED ORAL at 08:51

## 2021-03-14 RX ADMIN — HEPARIN SODIUM 5000 UNITS: 5000 INJECTION INTRAVENOUS; SUBCUTANEOUS at 08:56

## 2021-03-14 RX ADMIN — LEVOTHYROXINE SODIUM 100 MCG: 0.1 TABLET ORAL at 05:06

## 2021-03-14 RX ADMIN — LISINOPRIL 5 MG: 5 TABLET ORAL at 08:51

## 2021-03-14 ASSESSMENT — PAIN SCALES - GENERAL
PAINLEVEL_OUTOF10: 3
PAINLEVEL_OUTOF10: 4
PAINLEVEL_OUTOF10: 3

## 2021-03-14 NOTE — PROGRESS NOTES
Due to patient and physician scheduling constraints, hospital follow-up appointment is scheduled more than seven days post discharge.

## 2021-03-14 NOTE — PROGRESS NOTES
Pt d/c'd 3/1/21. IV access removed with no complications. Notified [facility] and removed tele box. Reviewed d/c instructions, home meds, and f/u information utilizing teach-back method. Scripts for levothyroxine, metoprolol succinate, torsemide called to patient's preferred pharmacy. Patient verbalized understanding. Patient assisted to lobby in wheelchair and left with all documented belongings.

## 2021-03-14 NOTE — DISCHARGE SUMMARY
Hospital Medicine Discharge Summary    Patient ID: Parish Christine      Patient's PCP: Carey Moore MD    Admit Date: 3/11/2021     Discharge Date: 3/14/2021  3/14/2021    Admitting Physician: Calderon Landers MD     Discharge Physician: ALICIA Patel - CNP     Discharge Diagnoses: Active Hospital Problems    Diagnosis    VT (ventricular tachycardia) ([address] [I47.2]    Atrial flutter (HCC) [I48.92]    Chest pain [R07.9]    Biventricular ICD (implantable cardioverter-defibrillator) in place [Z95.810]    Chronic systolic congestive heart failure ([address] [I50.22]    Dilated cardiomyopathy ([address] [I42.0]       The patient was seen and examined on day of discharge and this discharge summary is in conjunction with any daily progress note from day of discharge. Hospital Course:   56 y. o. female with PMH of non-ischemic cardiomyopathy (normal coronary arteries cath 8263), systolic CHF, LBBB, s/p BiV ICD, VT, HTN, HLD, CVA, autoimmune hemolytic anemia, bipolar disorder, obesity s/p gastric bypass who presented to [facility] with complaints of chest pain, dyspnea and palpitations. Pt reports progressively worsening SOB since this past Monday the 8th. SOB is worse with mild exertion including walking very short distances which is not her baseline. She denies cough/sputum production. No fever/chills. She also reports chest pain/pressure in her left anterior chest with associated palpitations that have been ongoing for the last 1-2 weeks. She saw her PCP in the office on Wednesday the 10th for UTI, she was started on Macrobid. Pt was noted to be very SOB. She was advised to call her Cardiologist who recommended for her to go home and transmit her pacemaker/ICD. She was noted to have Vtach. It was then advised for her to go the ED. Pt has trace lower extremity swelling. She reports gaining approx 80 lbs since the summer. She attributes this to overeating after her  passed away.  She reports feeling tightness in her abdomen. No N/V/D. She is compliant with her medications. She does not smoke or drink alcohol. ED course: EKG non-acute. Serial troponin trend negative. Pt was transferred to [facility] for Cardiology evaluation. Acute on chronic systolic CHF, NSVT, new-onset atrial flutter with BiV ICD in situ (clinically improved):  - Pt presented with chest pain, progressively worsening TAPIA and palpitations.   - EKG non-acute in the ED. Serial troponins negative. ACS ruled out. - Stress test obtained and showed large sized inferior, fixed defect of severe intensity consistent with infarction in the territory of the RCA, LVEF of 34%. Per Cardiology, no ischemia or angina, recommending medical management.   - ECHO showed reduced LVEF of 35-40%, +WMAs.  - Pt was given a few IV doses of lasix per Cards. Now on PO torsemide 10 mg daily per Cards. - Continue aspirin, statin, metoprolol (dose increased per EP) and lisinopril. - She is to follow-up with CHF clinic next week. - Per EP, recommending Eliquis 5 mg BID. Potential plan for cardioversion in 3-4 weeks.     Mild MADELEINE:  - Suspect due to diuresis. Baseline Cr ~1.2. Currently 1.5.   - Repeat BMP in 2 days. - Avoid nephrotoxics if able.      Hypertension:  - Controlled. Continue the aforementioned medications.      Hyperlipidemia:  - Continue statin.      History of CVA:  - Continue aspirin and statin.      Hypothyroidism:  - TSH is low with normal FT4. EP reduced her levothyroxine dose to 100 mcg daily (previously 150). She will need repeat TSH testing in 1 month with PCP.      Bipolar disorder:  - Mood stable. Continue her home medication regimen.     Morbid obesity, history of gastric bypass:  - With Body mass index is 92.09 kg/m². Complicating assessment and treatment. Placing patient at risk for multiple co-morbidities as well as early death and contributing to the patient's presentation.  Counseled on weight loss.      UTI, ruled out:  - UA concerning for infection. There are 2 urine cultures in from the 11th. One with E. Coli and one with <50,000 CFU/ml mixed skin/urogenital andreina? !  - Repeated UA on 3/14 which was negative for infection. Physical Exam Performed:     BP (!) 125/55   Pulse 88   Temp 97.7 °F (36.5 °C) (Oral)   Resp 14   Ht 5' 3\" (1.6 m)   Wt (!) 311 lb 1.6 oz (141.1 kg)   SpO2 97%   BMI 55.11 kg/m²       General appearance:  No apparent distress, appears stated age and cooperative. HEENT:  Normal cephalic, atraumatic without obvious deformity. Pupils equal, round, and reactive to light. Extra ocular muscles intact. Conjunctivae/corneas clear. Neck: Supple, with full range of motion. No jugular venous distention. Trachea midline. Respiratory:  Normal respiratory effort. Clear to auscultation, bilaterally without Rales/Wheezes/Rhonchi. Cardiovascular:  Regular rate and rhythm with normal S1/S2 without murmurs, rubs or gallops. Abdomen: Soft, non-tender, non-distended with normal bowel sounds. Musculoskeletal:  No clubbing, cyanosis or edema bilaterally. Full range of motion without deformity. Skin: Skin color, texture, turgor normal.  No rashes or lesions. Neurologic:  Neurovascularly intact without any focal sensory/motor deficits. Cranial nerves: II-XII intact, grossly non-focal.  Psychiatric:  Alert and oriented, thought content appropriate, normal insight  Capillary Refill: Brisk,< 3 seconds   Peripheral Pulses: +2 palpable, equal bilaterally       Labs:  For convenience and continuity at follow-up the following most recent labs are provided:      CBC:    Lab Results   Component Value Date    WBC 5.5 03/14/2021    HGB 12.1 03/14/2021    HCT 37.7 03/14/2021     03/14/2021       Renal:    Lab Results   Component Value Date     03/14/2021    K 4.4 03/14/2021    K 4.4 03/14/2021     03/14/2021    CO2 28 03/14/2021    BUN 28 03/14/2021    CREATININE 1.5 03/14/2021    CALCIUM 9.3 03/14/2021 PHOS 2.8 07/30/2018         Significant Diagnostic Studies    Radiology:   NM Cardiac Stress Test Nuclear Imaging   Final Result             Consults:     IP CONSULT TO CARDIOLOGY  IP CONSULT TO CARDIOLOGY    Disposition:  Home     Condition at Discharge: Stable     Discharge Instructions/Follow-up:  Follow-up with Cardiology/EP    Code Status:  Full Code     Activity: activity as tolerated    Diet: Cardiac diet       Discharge Medications:     Discharge Medication List as of 3/14/2021  5:46 PM           Details   metoprolol succinate (TOPROL XL) 100 MG extended release tablet Take 1 tablet by mouth daily, Disp-30 tablet, R-3Normal      torsemide (DEMADEX) 10 MG tablet Take 1 tablet by mouth daily, Disp-30 tablet, R-3Normal      levothyroxine (SYNTHROID) 100 MCG tablet Take 1 tablet by mouth Daily, Disp-30 tablet, R-3Normal              Details   methocarbamol (ROBAXIN) 500 MG tablet Take 1 tablet by mouth 3 times daily as needed (pain), Disp-90 tablet, R-1Normal      allopurinol (ZYLOPRIM) 300 MG tablet TAKE 1 TABLET EVERY DAY, Disp-90 tablet, R-1Normal      lisinopril (PRINIVIL;ZESTRIL) 5 MG tablet TAKE 1 TABLET EVERY DAY, Disp-90 tablet, R-1Normal      simvastatin (ZOCOR) 20 MG tablet TAKE 1 TABLET EVERY NIGHT, Disp-90 tablet, R-1Normal      lamoTRIgine (LAMICTAL) 150 MG tablet TAKE 1 TABLET TWICE DAILY OR AS OTHERWISE DIRECTED, Disp-180 tablet, R-1Normal      montelukast (SINGULAIR) 10 MG tablet Take 1 tablet by mouth nightly, Disp-90 tablet,R-1Normal      diclofenac sodium (VOLTAREN) 1 % GEL Apply 2 g topically 2 times daily, Topical, 2 TIMES DAILY Starting Fri 9/4/2020, Disp-100 g,R-2, Normal      BIOTIN PO Take by mouth dailyHistorical Med      acetaminophen (TYLENOL) 325 MG tablet Take 650 mg by mouth every 6 hours as needed for Pain TAKES EVERY DAYHistorical Med      aspirin 81 MG EC tablet Take 1 tablet by mouth daily, Disp-30 tablet, R-3Print      cetirizine (ZYRTEC) 10 MG tablet Take 10 mg by mouth daily           Eliquis 5 mg twice daily (called into pt's [facility] pharmacy in [place], [place])    Time Spent on discharge is more than 45 minutes in the examination, evaluation, counseling and review of medications and discharge plan. Signed:    ALICIA Reid - CNP   3/14/2021      Thank you Henri Peterson MD for the opportunity to be involved in this patient's care. If you have any questions or concerns please feel free to contact me at [phone].

## 2021-03-14 NOTE — PROGRESS NOTES
[facility]   Progress Note  Cardiology    CC: sob    HPI: feels better. no sob. No cp/palps     Past Medical History   has a past medical history of Anesthesia complication, Autoimmune hemolytic anemia, Bipolar disorder ([facility] [facility] Bundle branch block, Cardiomyopathy ([facility] [address] [facility] Chronic systolic CHF (congestive heart failure) ([facility] [address] Depression, Family history of early CAD, Family history of ovarian cancer, GERD (gastroesophageal reflux disease), Gout, Hypertension, Hypothyroid, Osteoarthritis, knee, Pneumonia, S/P gastric bypass, Unspecified cerebral artery occlusion with cerebral infarction, Uterine cancer ([facility] [address] and Vitamin B 12 deficiency. Past Surgical History   has a past surgical history that includes Gastric bypass surgery (1/7/05); Finger surgery; Dilation and curettage of uterus; lipectomy; Cholecystectomy; other surgical history; Upper gastrointestinal endoscopy (4/23/13); Cardiac catheterization (7/10/2015); transesophageal echocardiogram (7/13/2015); other surgical history (4/1/16); pacemaker placement (09/2016); hernia repair (10/20/2016); ERCP; other surgical history; Upper gastrointestinal endoscopy (03/06/2019); Colonoscopy (03/06/2019); Upper gastrointestinal endoscopy (N/A, 3/6/2019); and Colonoscopy (N/A, 3/6/2019). Social History   reports that she quit smoking about 36 years ago. Her smoking use included cigarettes. She has a 6.00 pack-year smoking history. She has never used smokeless tobacco. She reports that she does not drink alcohol or use drugs. Family History  family history includes Arrhythmia in her father; Arthritis in her father; Cancer in her maternal grandmother and mother; Depression in her brother, brother, and mother; Heart Disease in her father; Obesity in her brother and brother; Other in her brother, brother, and father; Ovarian Cancer (age of onset: 45) in her mother; Rheum Arthritis in her father.     Medications  Prior to Admission medications    Medication Sig Start Date End Date Taking?  Authorizing Provider   nitrofurantoin, macrocrystal-monohydrate, (MACROBID) 100 MG capsule Take 1 capsule by mouth 2 times daily for 7 days 3/11/21 3/18/21 Yes Leopoldo Bias, APRN - CNP   methocarbamol (ROBAXIN) 500 MG tablet Take 1 tablet by mouth 3 times daily as needed (pain) 1/20/21  Yes Sam Zamora MD   metoprolol succinate (TOPROL XL) 50 MG extended release tablet Take 1 tablet by mouth daily 12/28/20  Yes ALICIA Jordan CNP   allopurinol (ZYLOPRIM) 300 MG tablet TAKE 1 TABLET EVERY DAY 12/28/20  Yes Sam Zamora MD   lisinopril (PRINIVIL;ZESTRIL) 5 MG tablet TAKE 1 TABLET EVERY DAY 12/7/20  Yes ALICIA Norman CNP   simvastatin (ZOCOR) 20 MG tablet TAKE 1 TABLET EVERY NIGHT 11/19/20  Yes Sam Zamora MD   lamoTRIgine (LAMICTAL) 150 MG tablet TAKE 1 TABLET TWICE DAILY OR AS OTHERWISE DIRECTED 11/19/20  Yes Sam Zamora MD   montelukast (SINGULAIR) 10 MG tablet Take 1 tablet by mouth nightly 10/14/20  Yes Sam Zamora MD   diclofenac sodium (VOLTAREN) 1 % GEL Apply 2 g topically 2 times daily 9/4/20  Yes Sam Zamora MD   levothyroxine (SYNTHROID) 150 MCG tablet TAKE 1 TABLET EVERY DAY EXCEPT FOR SUNDAY TAKE 2 TABLETS AS DIRECTED FOR A TOTAL OF 8 TABLETS/WEEK 7/10/20  Yes Sam Zamora MD   torsemide (DEMADEX) 20 MG tablet Three times a week  Patient taking differently: 10 mg 10 mg Three times a week 12/30/19  Yes ALICIA Jordan CNP   BIOTIN PO Take by mouth daily   Yes Historical Provider, MD   acetaminophen (TYLENOL) 325 MG tablet Take 650 mg by mouth every 6 hours as needed for Pain TAKES EVERY DAY   Yes Historical Provider, MD   aspirin 81 MG EC tablet Take 1 tablet by mouth daily 10/17/17  Yes Jennifer Santos MD   cetirizine (ZYRTEC) 10 MG tablet Take 10 mg by mouth daily   Yes Historical Provider, MD       Allergies  Dilaudid [hydromorphone hcl], Lavender oil, Penicillins, Seroquel [quetiapine fumarate], Adhesive tape, disorder  Former smoker  Echo reviewed, EF stable       Reviewed stress test and discussed w/ pt. No ischemia. Persistent reduced LVEF. No angina. Will not proceed w/ cardiac cath. Continue med mgmt.      Plan  Ok discharge  Torsemide increase to 10 mg daily  Follow up CHF clinic next week     Anand Hall MD, 3/14/2021 2:18 PM

## 2021-03-14 NOTE — PROGRESS NOTES
Hospitalist Progress Note      PCP: Blaine Mathur MD    Date of Admission: 3/11/2021    Chief Complaint: Chest pain, dyspnea, palpitations    Hospital Course:   56 y.o. female with PMH of non-ischemic cardiomyopathy (normal coronary arteries cath 3133), systolic CHF, LBBB, s/p BiV ICD, VT, HTN, HLD, CVA, autoimmune hemolytic anemia, bipolar disorder, obesity s/p gastric bypass who presented to [facility] with complaints of chest pain, dyspnea and palpitations. Pt reports progressively worsening SOB since this past Monday the 8th. SOB is worse with mild exertion including walking very short distances which is not her baseline. She denies cough/sputum production. No fever/chills. She also reports chest pain/pressure in her left anterior chest with associated palpitations that have been ongoing for the last 1-2 weeks. She saw her PCP in the office on Wednesday the 10th for UTI, she was started on Macrobid. Pt was noted to be very SOB. She was advised to call her Cardiologist who recommended for her to go home and transmit her pacemaker/ICD. She was noted to have Vtach. It was then advised for her to go the ED. Pt has trace lower extremity swelling. She reports gaining approx 80 lbs since the summer. She attributes this to overeating after her  passed away. She reports feeling tightness in her abdomen. No N/V/D. She is compliant with her medications. She does not smoke or drink alcohol. ED course: EKG non-acute. Serial troponin trend negative. Pt was transferred to [facility] for Cardiology evaluation. Subjective:   Pt is on RA. Afebrile. VSS. No dyspnea or chest pain. No N/V/D.      Medications:  Reviewed    Infusion Medications   Scheduled Medications    torsemide  10 mg Oral Daily    sodium chloride flush  10 mL Intravenous 2 times per day    aspirin  81 mg Oral Daily    cetirizine  10 mg Oral Daily    lamoTRIgine  150 mg Oral BID    lisinopril  5 mg Oral Daily    atorvastatin  20 mg Oral Nightly    heparin (porcine)  5,000 Units Subcutaneous 3 times per day    levothyroxine  100 mcg Oral Daily    metoprolol succinate  100 mg Oral Daily     PRN Meds: oxyCODONE, oxyCODONE, sodium chloride flush, potassium chloride, magnesium sulfate, promethazine **OR** [DISCONTINUED] ondansetron, famotidine, acetaminophen **OR** acetaminophen, nitroGLYCERIN      Intake/Output Summary (Last 24 hours) at 3/14/2021 1311  Last data filed at 3/14/2021 1233  Gross per 24 hour   Intake 540 ml   Output 1950 ml   Net -1410 ml       Physical Exam Performed:    BP (!) 122/49   Pulse 87   Temp 97.6 °F (36.4 °C) (Oral)   Resp 16   Ht 5' 3\" (1.6 m)   Wt (!) 311 lb 1.6 oz (141.1 kg)   SpO2 93%   BMI 55.11 kg/m²     General appearance: Obese female in no apparent distress, appears stated age and cooperative. HEENT: Pupils equal, round, and reactive to light. Conjunctivae/corneas clear. Neck: Supple, with full range of motion. No jugular venous distention. Trachea midline. Respiratory:  Normal respiratory effort. Clear to auscultation, bilaterally without Rales/Wheezes/Rhonchi. Cardiovascular: Regular rate and rhythm with normal S1/S2 without murmurs, rubs or gallops. Abdomen: Soft, non-tender, non-distended with normal bowel sounds. Musculoskeletal: No clubbing, cyanosis or edema bilaterally. Full range of motion without deformity. Skin: Skin color, texture, turgor normal.  No rashes or lesions. Neurologic:  Neurovascularly intact without any focal sensory/motor deficits.  Cranial nerves: II-XII intact, grossly non-focal.  Psychiatric: Alert and oriented, thought content appropriate, normal insight  Capillary Refill: Brisk,< 3 seconds   Peripheral Pulses: +2 palpable, equal bilaterally       Labs:   Recent Labs     03/11/21  1602 03/13/21  1048 03/14/21  0813   WBC 7.0 6.2 5.5   HGB 12.6 11.6* 12.1   HCT 39.3 36.6 37.7    171 153     Recent Labs     03/11/21  1602 03/13/21  1048 03/14/21  0813   NA 141 137 140   K 4.5 4.3  4.3 4.4  4.4    101 103   CO2 22 28 28   BUN 19 23* 28*   CREATININE 1.2 1.4* 1.5*   CALCIUM 9.6 9.4 9.3     Recent Labs     03/11/21  1602 03/13/21  1048 03/14/21  0813   AST 21 16 16   ALT 11 9* 10   BILITOT 0.7 0.7 0.7   ALKPHOS 147* 132* 133*     Recent Labs     03/11/21  2228 03/12/21  0107 03/12/21  0601   TROPONINI <0.01 <0.01 <0.01       Urinalysis:      Lab Results   Component Value Date    NITRU Negative 03/11/2021    WBCUA >100 03/11/2021    BACTERIA 3+ 07/05/2016    RBCUA 21-50 03/11/2021    BLOODU MODERATE 03/11/2021    SPECGRAV 1.015 03/11/2021    GLUCOSEU Negative 03/11/2021    GLUCOSEU NEGATIVE 06/04/2012       Radiology:  NM Cardiac Stress Test Nuclear Imaging   Final Result          Assessment/Plan:    Active Hospital Problems    Diagnosis    VT (ventricular tachycardia) ([facility]) [I47.2]    Atrial flutter ([facility]) [I48.92]    Chest pain [R07.9]    Biventricular ICD (implantable cardioverter-defibrillator) in place [Z95.810]    Chronic systolic congestive heart failure (HCC) [I50.22]    Dilated cardiomyopathy ([address] [I42.0]       Acute on chronic systolic CHF, NSVT with BiV ICD in situ:  - Pt presented with chest pain, progressively worsening TAPIA and palpitations. - EKG non-acute in the ED. Serial troponins negative. ACS ruled out. - Stress test obtained and showed large sized inferior, fixed defect of severe intensity consistent with infarction in the territory of the RCA, LVEF of 34%. - ECHO showed reduced LVEF of 35-40%, +WMAs.  - Further plans pending Cardiology/EP recommendations, possible cath?  - Pt was given a few IV doses of lasix per Cards. Now on PO torsemide. - Monitor daily weights, close I's and O's, daily BMP. - Monitor pt on telemetry. - Continue aspirin, statin, metoprolol (dose increased per EP) and lisinopril. Mild MADELEINE:  - Suspect due to diuresis. Baseline Cr ~1.2.  Currently 1.5.   - Would recommend holding further diuresis - defer to Cards. - Closely monitor BMP. - Avoid nephrotoxics if able.      Hypertension:  - Controlled. Continue the aforementioned medications. Monitor.      Hyperlipidemia:  - Continue statin.      History of CVA:  - Continue aspirin and statin.      Hypothyroidism:  - TSH is low with normal FT4. EP reduced her levothyroxine dose to 100 mcg daily (previously 150). She will need repeat TSH testing in 1 month with PCP.      Bipolar disorder:  - Mood stable. Continue her home medication regimen.      Morbid obesity, history of gastric bypass:  - With Body mass index is 55.99 kg/m². Complicating assessment and treatment. Placing patient at risk for multiple co-morbidities as well as early death and contributing to the patient's presentation. Counseled on weight loss.      UTI, ruled out:  - UA concerning for infection. There are 2 urine cultures in from the 11th. One with E. Coli and one with <50,000 CFU/ml mixed skin/urogenital andreina? !  - Resend UA/culture today 3/14, discussed with pt's nurse. DVT Prophylaxis: SQ heparin   Diet: DIET CARDIAC;  Code Status: Full Code    PT/OT Eval Status: Not indicated, pt is ambulatory     Dispo - Pending Cardiac workup/recommendations.      [address], APRN - CNP

## 2021-03-15 ENCOUNTER — FOLLOWUP TELEPHONE ENCOUNTER (OUTPATIENT)
Dept: MEDSURG UNIT | Age: 61
End: 2021-03-15

## 2021-03-15 ENCOUNTER — CARE COORDINATION (OUTPATIENT)
Dept: CASE MANAGEMENT | Age: 61
End: 2021-03-15

## 2021-03-15 NOTE — CARE COORDINATION
[facility] Transitions Initial Follow Up Call    Call within 2 business days of discharge: Yes    Patient: Africa Goodman Patient : 1960   MRN: [id number]  Reason for Admission: chest pain  Discharge Date: 3/14/21 RARS: Readmission Risk Score: 13      Last Discharge [address]       Complaint Diagnosis Description Type Department Provider    3/11/21  MADELEINE (acute kidney injury) [facility]) Admission (Discharged) [address] Mamie Alas MD    3/11/21 Shortness of Breath; Chest Pain Chest pain, unspecified type . .. ED (TRANSFER) [address] [facility] ED Cong Salvador DO           Spoke with: [address]: A    Challenges to be reviewed by the provider   Additional needs identified to be addressed with provider Yes  none             Method of communication with provider : phone    Advance Care Planning:   Does patient have an Advance Directive:  not on file. Was this a readmission? No  Patients top risk factors for readmission: medical condition    Care Transition Nurse (CTN) contacted the patient by telephone to perform post hospital discharge assessment. Verified name and  with patient as identifiers. Provided introduction to self, and explanation of the CTN role. CTN reviewed discharge instructions, medical action plan and red flags with patient who verbalized understanding. Patient given an opportunity to ask questions and does not have any further questions or concerns at this time. Were discharge instructions available to patient? Yes. Reviewed appropriate site of care based on symptoms and resources available to patient including: PCP and Specialist. The patient agrees to contact the PCP office for questions related to their healthcare. Medication reconciliation was performed with patient, who verbalizes understanding of administration of home medications. Advised obtaining a 90-day supply of all daily and as-needed medications.      Covid Risk Education    Patient has following risk factors of: heart failure. Education provided regarding infection prevention, and signs and symptoms of COVID-19 and when to seek medical attention with patient who verbalized understanding. Discussed exposure protocols and quarantine From CDC: Are you at higher risk for severe illness?   and given an opportunity for questions and concerns. The patient agrees to contact the COVID-19 hotline [phone] or PCP office for questions related to COVID-19. For more information on steps you can take to protect yourself, see CDC's How to Protect Yourself         Discussed follow-up appointments. If no appointment was previously scheduled, appointment scheduling offered: Yes. Is follow up appointment scheduled within 7 days of discharge? Yes    Plan for follow-up call in 7-10 days based on severity of symptoms and risk factors. CTN left message on patient's voicemail and patient called CTN back. No covid test collected. Patient pleasant and verified . Patient discussed recent hospitalization and agreeable to transition calls. Patient feeling better and happy to be back home. New and changed medications reviewed, but patient declined full medication reconciliation. Patient taking all medication as prescribed. Patient scheduled follow up appts earlier today. Denies any acute needs at present time. Agreeable to f/u calls. Educated on the use of urgent care or physicians 24 hr access line if assistance is needed after hours. CTN provided contact information for future needs.           Care Transitions 24 Hour Call    Do you have any ongoing symptoms?: No  Do you have a copy of your discharge instructions?: Yes  Do you have all of your prescriptions and are they filled?: Yes  Have you been contacted by a [facility] Pharmacist?: No  Have you scheduled your follow up appointment?: Yes  How are you going to get to your appointment?: Car - family or friend to transport  Were you discharged with any Home Care or Post Acute

## 2021-03-15 NOTE — TELEPHONE ENCOUNTER
1st Attempt; No Answer- Left HIPAA compliant voicemail with Non-Urgent Heart Failure Resource Line number for call back.

## 2021-03-18 PROCEDURE — 93227 XTRNL ECG REC<48 HR R&I: CPT | Performed by: INTERNAL MEDICINE

## 2021-03-19 ENCOUNTER — TELEPHONE (OUTPATIENT)
Dept: CARDIOLOGY CLINIC | Age: 61
End: 2021-03-19

## 2021-03-19 DIAGNOSIS — R00.2 PALPITATION: ICD-10-CM

## 2021-03-19 DIAGNOSIS — I50.9 CHRONIC CONGESTIVE HEART FAILURE, UNSPECIFIED HEART FAILURE TYPE (HCC): Primary | ICD-10-CM

## 2021-03-19 NOTE — TELEPHONE ENCOUNTER
Patient reports she is up 4 pounds overnight (baseline since hospitalization 311, 315 today). She is taking torsemide as prescribed. She says she is slightly SOB, tightness in abdomen, denies edema in extremities.

## 2021-03-19 NOTE — TELEPHONE ENCOUNTER
Informed patient of cardiac event monitor results. 0.1% PVC burden, %. Rates well controlled. Patient taking all meds as prescribed.      V/u.

## 2021-03-23 ENCOUNTER — CARE COORDINATION (OUTPATIENT)
Dept: CASE MANAGEMENT | Age: 61
End: 2021-03-23

## 2021-03-23 DIAGNOSIS — N17.9 AKI (ACUTE KIDNEY INJURY) (HCC): ICD-10-CM

## 2021-03-23 DIAGNOSIS — I50.9 CHRONIC CONGESTIVE HEART FAILURE, UNSPECIFIED HEART FAILURE TYPE (HCC): ICD-10-CM

## 2021-03-23 LAB
ANION GAP SERPL CALCULATED.3IONS-SCNC: 11 MMOL/L (ref 3–16)
BUN BLDV-MCNC: 31 MG/DL (ref 7–20)
CALCIUM SERPL-MCNC: 9.5 MG/DL (ref 8.3–10.6)
CHLORIDE BLD-SCNC: 103 MMOL/L (ref 99–110)
CO2: 29 MMOL/L (ref 21–32)
CREAT SERPL-MCNC: 1.5 MG/DL (ref 0.6–1.2)
GFR AFRICAN AMERICAN: 42
GFR NON-AFRICAN AMERICAN: 35
GLUCOSE BLD-MCNC: 114 MG/DL (ref 70–99)
POTASSIUM SERPL-SCNC: 4.5 MMOL/L (ref 3.5–5.1)
PRO-BNP: 541 PG/ML (ref 0–124)
SODIUM BLD-SCNC: 143 MMOL/L (ref 136–145)

## 2021-03-25 ENCOUNTER — OFFICE VISIT (OUTPATIENT)
Dept: FAMILY MEDICINE CLINIC | Age: 61
End: 2021-03-25
Payer: MEDICARE

## 2021-03-25 VITALS
SYSTOLIC BLOOD PRESSURE: 120 MMHG | TEMPERATURE: 97.3 F | BODY MASS INDEX: 51.91 KG/M2 | RESPIRATION RATE: 16 BRPM | OXYGEN SATURATION: 98 % | HEART RATE: 87 BPM | HEIGHT: 63 IN | WEIGHT: 293 LBS | DIASTOLIC BLOOD PRESSURE: 60 MMHG

## 2021-03-25 DIAGNOSIS — E03.9 HYPOTHYROIDISM, UNSPECIFIED TYPE: ICD-10-CM

## 2021-03-25 DIAGNOSIS — Z09 HOSPITAL DISCHARGE FOLLOW-UP: Primary | ICD-10-CM

## 2021-03-25 LAB
T4 FREE: 1.4 NG/DL (ref 0.9–1.8)
TSH SERPL DL<=0.05 MIU/L-ACNC: 0.64 UIU/ML (ref 0.27–4.2)

## 2021-03-25 PROCEDURE — 99495 TRANSJ CARE MGMT MOD F2F 14D: CPT | Performed by: NURSE PRACTITIONER

## 2021-03-25 PROCEDURE — 1111F DSCHRG MED/CURRENT MED MERGE: CPT | Performed by: NURSE PRACTITIONER

## 2021-03-25 ASSESSMENT — ENCOUNTER SYMPTOMS: RESPIRATORY NEGATIVE: 1

## 2021-03-25 NOTE — PROGRESS NOTES
admitted with Chest pain and SOB. Pt noted to have been in V Tach just   prior to admission and has acute on chronic systolic CHF. If possible, please   document in progress notes and discharge summary the relationship, if any,   between presenting symptoms of CP and SOB and CHF and/or V-Tach. The medical record reflects the following:  Risk Factors: Recent V-tach, Acute on chronic systolic CHF, HTN  Clinical Indicators: CP and SOB w/palpitations on presentation  Treatment: Cardiology consult, stress test, ECHO, Lasix IV diuresis  Options provided:  -- CP and SOB likely due to acute on chronic systolic CHF  -- CP and SOB likely due to NSVT  -- CP and SOB likely due to NSVT and CHF  -- CP and SOB unrelated to NSVT and CHF  -- Other - I will add my own diagnosis  -- Disagree - Not applicable / Not valid  -- Disagree - Clinically unable to determine / Unknown  -- Refer to Clinical Documentation Reviewer    PROVIDER RESPONSE TEXT:    This patient has CP and SOB likely due to acute on chronic systolic CHF.     Query created by: Tracy Keith on 3/24/2021 10:56 AM      Electronically signed by:  Andres Mora CNP 3/25/2021 1:28 PM

## 2021-03-25 NOTE — PROGRESS NOTES
Post-Discharge Transitional Care Management Services or Hospital Follow Up      Rasheed Ann   YOB: 1960    Date of Office Visit:  3/25/2021  Date of Hospital Admission: 3/11/21  Date of Hospital Discharge: 3/14/21  Readmission Risk Score(high >=14%.  Medium >=10%):Readmission Risk Score: 13      Care management risk score Rising risk (score 2-5) and Complex Care (Scores >=6): 9     Non face to face  following discharge, date last encounter closed (first attempt may have been earlier): 3/15/2021  2:47 PM 3/15/2021  2:47 PM    Call initiated 2 business days of discharge: Yes     Patient Active Problem List   Diagnosis    HTN (hypertension)    Bipolar disorder    S/P gastric bypass    Gout    Localized osteoarthrosis, lower leg    CHF (congestive heart failure) (HCC)    LBBB (left bundle branch block)    Mitral regurgitation    Morbid obesity ([address]    Hypothyroidism    Dilated cardiomyopathy ([address]    Small bowel obstruction ([address]    Sepsis ([address]    Syncope    Chronic systolic congestive heart failure ([address]    Incisional hernia, without obstruction or gangrene    Biventricular ICD (implantable cardioverter-defibrillator) in place    CVA (cerebral vascular accident) ([address]    Hemiparesis ([address]    Facial asymmetry    Dysphasia    Visual field constriction, bilateral    Arterial ischemic stroke, ICA, right, acute ([address]    Dysthymia    Mixed hyperlipidemia    Choledocholithiasis    Esophagitis determined by endoscopy    History of Eloy-en-Y gastric bypass    Left lower quadrant abdominal mass    Localized osteoarthritis of both knees    Pain of left calf    Chest pain    VT (ventricular tachycardia) (HCC)    Atrial flutter (HCC)       Allergies   Allergen Reactions    Dilaudid [Hydromorphone Hcl] Other (See Comments)     Palpitations and orthostatic hypotension    Lavender Oil Shortness Of Breath and Rash    Penicillins Shortness Of Breath    Seroquel [employer] (Office Visit) with ALICIA Moreno - CNP   Medication Sig Dispense Refill    torsemide (DEMADEX) 10 MG tablet 2 tabs every other day alternating with 1 tab the other days 180 tablet 3    metoprolol succinate (TOPROL XL) 100 MG extended release tablet Take 1 tablet by mouth daily 30 tablet 3    levothyroxine (SYNTHROID) 100 MCG tablet Take 1 tablet by mouth Daily 30 tablet 3    apixaban (ELIQUIS) 5 MG TABS tablet Take 1 tablet by mouth 2 times daily 60 tablet 0    methocarbamol (ROBAXIN) 500 MG tablet Take 1 tablet by mouth 3 times daily as needed (pain) 90 tablet 1    allopurinol (ZYLOPRIM) 300 MG tablet TAKE 1 TABLET EVERY DAY 90 tablet 1    lisinopril (PRINIVIL;ZESTRIL) 5 MG tablet TAKE 1 TABLET EVERY DAY 90 tablet 1    simvastatin (ZOCOR) 20 MG tablet TAKE 1 TABLET EVERY NIGHT 90 tablet 1    lamoTRIgine (LAMICTAL) 150 MG tablet TAKE 1 TABLET TWICE DAILY OR AS OTHERWISE DIRECTED 180 tablet 1    montelukast (SINGULAIR) 10 MG tablet Take 1 tablet by mouth nightly 90 tablet 1    diclofenac sodium (VOLTAREN) 1 % GEL Apply 2 g topically 2 times daily 100 g 2    BIOTIN PO Take by mouth daily      acetaminophen (TYLENOL) 325 MG tablet Take 650 mg by mouth every 6 hours as needed for Pain TAKES EVERY DAY      aspirin 81 MG EC tablet Take 1 tablet by mouth daily 30 tablet 3    cetirizine (ZYRTEC) 10 MG tablet Take 10 mg by mouth daily          Medications patient taking as of now reconciled against medications ordered at time of hospital discharge: Yes    Chief Complaint   Patient presents with    Follow-Up from Hospital       HPI    Inpatient course: Discharge summary reviewed- see chart. Interval history/Current status: Pt states that she is feeling much better and has a f/u appointment with her Cardiologist on 4/9/2021. Did f/u with CHF clinic - torsemide increased to 20 mg daily. Repeat BMP was normal (mild MADELEINE while hospitalized). Taking Eliquis and ASA as prescribed.   HTN - 0.14, PCP did not adjust dose.   Will recheck TSH and T4 today.      - TSH without Reflex  - T4, Free        Medical Decision Making: moderate complexity

## 2021-03-26 NOTE — PROGRESS NOTES
Physician Progress Note      PATIENT:               Joe Ruiz  CSN #:                  [id number]  :                       1960  ADMIT DATE:       3/11/2021 11:10 PM  [address] [place] DATE:        3/14/2021 6:29 PM  RESPONDING  PROVIDER #:        Robert Mora CNP          QUERY TEXT:    Patient admitted 3/11 with chest pain and CHF. Noted in 3/4 visit to PCP   office: \"CKD (chronic kidney disease) stage 3, GFR 30-59 ml/min ([address] Discussed Dx in detail. Elaborated on 5 stages of CKD and classification of   each. Discussed need for baseline lab work and ultrasounds of kidneys to be   updated, if not already. Discussed possibility of a future referral to a   nephrologist if deemed necessary. Discussed importance of BP and glucose   control, hydration status, limited salt/protein intake in the diet. Instructed   to avoid nephrotoxic medications as much as possible. \"  In    The medical record reflects the following:  Risk Factors: HTN, CHF  Clinical Indicators: GFR range 35 - 46  Treatment: Monitor daily labs  Options provided:  -- CKD Stage 3 GFR 30 - 59  -- CKD Stage 3a GFR 45-59  -- CKD Stage 3b GFR 30-44  -- Other - I will add my own diagnosis  -- Disagree - Not applicable / Not valid  -- Disagree - Clinically unable to determine / Unknown  -- Refer to Clinical Documentation Reviewer    PROVIDER RESPONSE TEXT:    This patient has CKD Stage 3a.     Query created by: Musa Javier on 3/25/2021 9:51 PM      Electronically signed by:  Robert Mora CNP 3/26/2021 8:55 AM

## 2021-03-30 ENCOUNTER — TELEPHONE (OUTPATIENT)
Dept: CARDIOLOGY CLINIC | Age: 61
End: 2021-03-30

## 2021-03-30 NOTE — TELEPHONE ENCOUNTER
Date of Interrogation: 30-Mar-2021 14:53:42-egm shows AFL (270ms) w/ V rate of 360ms. On going atrial arrhythmia since 29-Mar-2021 21:28    29-Mar-2021 14:31-ATP x 2 given for AFL-RVR-ATP slowed to below rate detection. See PACEART report under Cardiology tab.

## 2021-03-30 NOTE — TELEPHONE ENCOUNTER
Patient states she feels her heart racing. Feels light headed and nausea. Asked that she send a transmission.

## 2021-04-01 ENCOUNTER — HOSPITAL ENCOUNTER (OUTPATIENT)
Dept: CARDIAC CATH/INVASIVE PROCEDURES | Age: 61
Discharge: HOME | End: 2021-04-01
Attending: INTERNAL MEDICINE | Admitting: INTERNAL MEDICINE
Payer: MEDICARE

## 2021-04-01 VITALS — HEIGHT: 63 IN | WEIGHT: 293 LBS | BODY MASS INDEX: 51.91 KG/M2

## 2021-04-01 LAB
ANION GAP SERPL CALCULATED.3IONS-SCNC: 11 MMOL/L (ref 3–16)
BUN BLDV-MCNC: 38 MG/DL (ref 7–20)
CALCIUM SERPL-MCNC: 9.6 MG/DL (ref 8.3–10.6)
CHLORIDE BLD-SCNC: 103 MMOL/L (ref 99–110)
CHOLESTEROL, TOTAL: 148 MG/DL (ref 0–199)
CO2: 23 MMOL/L (ref 21–32)
CREAT SERPL-MCNC: 2 MG/DL (ref 0.6–1.2)
EKG ATRIAL RATE: 66 BPM
EKG ATRIAL RATE: 83 BPM
EKG DIAGNOSIS: NORMAL
EKG DIAGNOSIS: NORMAL
EKG P AXIS: 59 DEGREES
EKG P AXIS: 63 DEGREES
EKG P-R INTERVAL: 164 MS
EKG P-R INTERVAL: 232 MS
EKG Q-T INTERVAL: 488 MS
EKG Q-T INTERVAL: 524 MS
EKG QRS DURATION: 174 MS
EKG QRS DURATION: 200 MS
EKG QTC CALCULATION (BAZETT): 549 MS
EKG QTC CALCULATION (BAZETT): 573 MS
EKG R AXIS: -72 DEGREES
EKG R AXIS: 264 DEGREES
EKG T AXIS: 20 DEGREES
EKG T AXIS: 72 DEGREES
EKG VENTRICULAR RATE: 66 BPM
EKG VENTRICULAR RATE: 83 BPM
GFR AFRICAN AMERICAN: 30
GFR NON-AFRICAN AMERICAN: 25
GLUCOSE BLD-MCNC: 108 MG/DL (ref 70–99)
HCT VFR BLD CALC: 38.9 % (ref 36–48)
HDLC SERPL-MCNC: 62 MG/DL (ref 40–60)
HEMOGLOBIN: 12.3 G/DL (ref 12–16)
INR BLD: 1.54 (ref 0.86–1.14)
LDL CHOLESTEROL CALCULATED: 61 MG/DL
MCH RBC QN AUTO: 28.6 PG (ref 26–34)
MCHC RBC AUTO-ENTMCNC: 31.7 G/DL (ref 31–36)
MCV RBC AUTO: 90.1 FL (ref 80–100)
PDW BLD-RTO: 15 % (ref 12.4–15.4)
PLATELET # BLD: 189 K/UL (ref 135–450)
PMV BLD AUTO: 9.9 FL (ref 5–10.5)
POTASSIUM SERPL-SCNC: 4.4 MMOL/L (ref 3.5–5.1)
PROTHROMBIN TIME: 18 SEC (ref 10–13.2)
RBC # BLD: 4.31 M/UL (ref 4–5.2)
SODIUM BLD-SCNC: 137 MMOL/L (ref 136–145)
TRIGL SERPL-MCNC: 124 MG/DL (ref 0–150)
VLDLC SERPL CALC-MCNC: 25 MG/DL
WBC # BLD: 7.6 K/UL (ref 4–11)

## 2021-04-01 PROCEDURE — 6360000002 HC RX W HCPCS

## 2021-04-01 PROCEDURE — 80048 BASIC METABOLIC PNL TOTAL CA: CPT

## 2021-04-01 PROCEDURE — 93010 ELECTROCARDIOGRAM REPORT: CPT | Performed by: INTERNAL MEDICINE

## 2021-04-01 PROCEDURE — 93799 UNLISTED CV SVC/PROCEDURE: CPT

## 2021-04-01 PROCEDURE — 85027 COMPLETE CBC AUTOMATED: CPT

## 2021-04-01 PROCEDURE — 2500000003 HC RX 250 WO HCPCS

## 2021-04-01 PROCEDURE — 93005 ELECTROCARDIOGRAM TRACING: CPT | Performed by: INTERNAL MEDICINE

## 2021-04-01 PROCEDURE — 85610 PROTHROMBIN TIME: CPT

## 2021-04-01 PROCEDURE — 80061 LIPID PANEL: CPT

## 2021-04-01 RX ORDER — SODIUM CHLORIDE 9 MG/ML
1000 INJECTION, SOLUTION INTRAVENOUS CONTINUOUS
Status: DISCONTINUED | OUTPATIENT
Start: 2021-04-01 | End: 2021-04-01 | Stop reason: HOSPADM

## 2021-04-01 NOTE — PROGRESS NOTES
After informed consent was obtained, the patient was brought to the cardiac electrophysiology laboratory in fasting state on 4/1/2021. Her device was interrogated and she was found to have typical appearing atrial flutter. Manual overdrive pacing was successful at terminating the atrial flutter. The device was reprogrammed to activate atrial ATP. The patient tolerated the procedure well and was discharged in good condition. She did not require cardioversion and did not receive sedation.

## 2021-04-01 NOTE — TELEPHONE ENCOUNTER
Spoke with patient. She had not missed any doses of her Eliquis. Patient is scheduled with Dr. Zachariah Sandoval for Cardioversion on 4/1/21 (today) at [facility], arrival time of 9:30am to the Cath Lab. Please have patient arrive to the main entrance of [facility] and check in with the registration desk. Remind patient to be NPO after midnight (8 hours prior). Do not apply lotions/creams on skin the day of procedure.

## 2021-04-01 NOTE — TELEPHONE ENCOUNTER
Erika Joe MD          Rapidly conducted AT.  If she has not missed any Eliquis doses, we could do CV tomorrow 4/1

## 2021-04-09 ENCOUNTER — OFFICE VISIT (OUTPATIENT)
Dept: CARDIOLOGY CLINIC | Age: 61
End: 2021-04-09
Payer: MEDICARE

## 2021-04-09 ENCOUNTER — HOSPITAL ENCOUNTER (OUTPATIENT)
Age: 61
Discharge: HOME | End: 2021-04-09
Payer: MEDICARE

## 2021-04-09 ENCOUNTER — NURSE ONLY (OUTPATIENT)
Dept: CARDIOLOGY CLINIC | Age: 61
End: 2021-04-09
Payer: MEDICARE

## 2021-04-09 VITALS
SYSTOLIC BLOOD PRESSURE: 120 MMHG | HEART RATE: 71 BPM | HEIGHT: 63 IN | TEMPERATURE: 97.3 F | WEIGHT: 293 LBS | OXYGEN SATURATION: 98 % | BODY MASS INDEX: 51.91 KG/M2 | DIASTOLIC BLOOD PRESSURE: 74 MMHG

## 2021-04-09 DIAGNOSIS — I50.22 CHRONIC SYSTOLIC CONGESTIVE HEART FAILURE (HCC): ICD-10-CM

## 2021-04-09 DIAGNOSIS — I42.0 DILATED CARDIOMYOPATHY (HCC): ICD-10-CM

## 2021-04-09 DIAGNOSIS — Z95.810 BIVENTRICULAR ICD (IMPLANTABLE CARDIOVERTER-DEFIBRILLATOR) IN PLACE: ICD-10-CM

## 2021-04-09 DIAGNOSIS — R06.81 WITNESSED EPISODE OF APNEA: ICD-10-CM

## 2021-04-09 DIAGNOSIS — E66.01 MORBID OBESITY (HCC): ICD-10-CM

## 2021-04-09 DIAGNOSIS — I50.9 CHRONIC CONGESTIVE HEART FAILURE, UNSPECIFIED HEART FAILURE TYPE (HCC): ICD-10-CM

## 2021-04-09 DIAGNOSIS — I50.22 CHRONIC SYSTOLIC CONGESTIVE HEART FAILURE (HCC): Primary | ICD-10-CM

## 2021-04-09 LAB
ANION GAP SERPL CALCULATED.3IONS-SCNC: 13 MMOL/L (ref 3–16)
BUN BLDV-MCNC: 22 MG/DL (ref 7–20)
CALCIUM SERPL-MCNC: 9.3 MG/DL (ref 8.3–10.6)
CHLORIDE BLD-SCNC: 106 MMOL/L (ref 99–110)
CO2: 22 MMOL/L (ref 21–32)
CREAT SERPL-MCNC: 1.2 MG/DL (ref 0.6–1.2)
GFR AFRICAN AMERICAN: 55
GFR NON-AFRICAN AMERICAN: 45
GLUCOSE BLD-MCNC: 99 MG/DL (ref 70–99)
POTASSIUM SERPL-SCNC: 4.9 MMOL/L (ref 3.5–5.1)
PRO-BNP: 634 PG/ML (ref 0–124)
SODIUM BLD-SCNC: 141 MMOL/L (ref 136–145)

## 2021-04-09 PROCEDURE — 1111F DSCHRG MED/CURRENT MED MERGE: CPT | Performed by: NURSE PRACTITIONER

## 2021-04-09 PROCEDURE — 1036F TOBACCO NON-USER: CPT | Performed by: NURSE PRACTITIONER

## 2021-04-09 PROCEDURE — 80048 BASIC METABOLIC PNL TOTAL CA: CPT

## 2021-04-09 PROCEDURE — 36415 COLL VENOUS BLD VENIPUNCTURE: CPT

## 2021-04-09 PROCEDURE — G8417 CALC BMI ABV UP PARAM F/U: HCPCS | Performed by: NURSE PRACTITIONER

## 2021-04-09 PROCEDURE — G8427 DOCREV CUR MEDS BY ELIG CLIN: HCPCS | Performed by: NURSE PRACTITIONER

## 2021-04-09 PROCEDURE — 99214 OFFICE O/P EST MOD 30 MIN: CPT | Performed by: NURSE PRACTITIONER

## 2021-04-09 PROCEDURE — 93290 INTERROG DEV EVAL ICPMS IP: CPT | Performed by: NURSE PRACTITIONER

## 2021-04-09 PROCEDURE — 3017F COLORECTAL CA SCREEN DOC REV: CPT | Performed by: NURSE PRACTITIONER

## 2021-04-09 PROCEDURE — 83880 ASSAY OF NATRIURETIC PEPTIDE: CPT

## 2021-04-09 NOTE — PATIENT INSTRUCTIONS
1. Check labs BMP and BNP today and again weekly   2. Continue lisinopril   3. Continue Toprol Xl   4. Continue torsemide for now and may need to adjust   5. Check daily weights and record them  6.  Follow up 4 weeks medication adjustment

## 2021-04-09 NOTE — PROGRESS NOTES
[facility]   Cardiac Follow-up    Primary Care Doctor:  Issac Carey MD    Chief Complaint   Patient presents with    Follow-up     S/P CV    Congestive Heart Failure    Other     Patient feels good, no new complaints        History of Present Illness:   I had the pleasure of seeing Kelly Silva in follow up for cardiomyopathy. Hx of CHF, non-ischemic dilated cardiomyopathy, S/P  BiV-AICD 2016, pulmonary HTN, LBBB, HTN. Since last visit, admitted to the hospital 3/11/2021-3/14/2021 with chest pain, MADELEINE, shortness of breath, device check showed NSVT, A. fib with RVR. On 4/1/21 cardioversion was canceled, as her interrogated device showed typical a flutter, and device changes were made and converted to NSR. She feels much better since discharge. She has a few episodes of the palpitations at times but not lasting very long. She is watching her fluid intake and keeping it less than 64 ounces she is also watching her diet and plans to start back on her weight loss diet that she previously did with gastric bypass. She has been having issues with sleeping, wakes up with dry mouth, has woken herself up from sleep due to apneas. Most recent lab work reviewed showed mild MADELEINE n.p.o. for her last procedure. No repeat lab work to review at this time. She continues to take the torsemide 2 tablets every other day alternating with 1 tablet on the other days. Like to increase her activity, and plans to start a program called body groove, which is a dance workouts that she can do in the comfort of her own home. Is encouraged to lose weight as well because she is also feeling better. Denies any bleeding. 310lbs this am- forgot to take water pill yesterday. Home weight 308lbs    Kelly Silva describes symptoms including palpitations, edema but denies chest pain, syncope.      Past Medical History:   has a past medical history of Anesthesia complication, Autoimmune hemolytic anemia, Bipolar disorder ([facility] [address] [address] Bundle branch block, Cardiomyopathy ([facility] [address] Chronic systolic CHF (congestive heart failure) ([facility] [address] Depression, Family history of early CAD, Family history of ovarian cancer, GERD (gastroesophageal reflux disease), Gout, Hypertension, Hypothyroid, Osteoarthritis, knee, Pneumonia, S/P gastric bypass, Unspecified cerebral artery occlusion with cerebral infarction, Uterine cancer ([facility] [address] and Vitamin B 12 deficiency. Surgical History:   has a past surgical history that includes Gastric bypass surgery (1/7/05); Finger surgery; Dilation and curettage of uterus; lipectomy; Cholecystectomy; other surgical history; Upper gastrointestinal endoscopy (4/23/13); Cardiac catheterization (7/10/2015); transesophageal echocardiogram (7/13/2015); other surgical history (4/1/16); pacemaker placement (09/2016); hernia repair (10/20/2016); ERCP; other surgical history; Upper gastrointestinal endoscopy (03/06/2019); Colonoscopy (03/06/2019); Upper gastrointestinal endoscopy (N/A, 3/6/2019); and Colonoscopy (N/A, 3/6/2019). Social History:   reports that she quit smoking about 36 years ago. Her smoking use included cigarettes. She has a 6.00 pack-year smoking history. She has never used smokeless tobacco. She reports that she does not drink alcohol or use drugs. Family History:   Family History   Problem Relation Age of Onset    Cancer Mother     Ovarian Cancer Mother 45        Technically peritoneal cancer    Depression Mother         bipolar    Arthritis Father     Rheum Arthritis Father     Arrhythmia Father     Other Father         gout    Heart Disease Father     Depression Brother         depression    Other Brother         gout    Obesity Brother     Other Brother         gout    Obesity Brother     Depression Brother     Cancer Maternal Grandmother         Breast       Home Medications:  Prior to Admission medications    Medication Sig Start Date End Date Taking?  Authorizing Provider   torsemide Height: 5' 3\" (1.6 m)        Constitutional and General Appearance: no apparent distress, obese, appears tired and pale  HEENT: non-icteric sclera, mask in place  Neck: JVP less than 8 cm H20  Respiratory:  · No use of accessory muscles  · Clear breath sounds throughout, no wheezing, no crackles, no rhonchi  Cardiovascular:  · The apical impulses not displaced  ·  no murmur/rub/gallop  · Regular rate and rhythm, S1,S2 normal  · Radial pulses 2+ and equal bilaterally  · + Trace BLE edema  · Pedal Pulses: 2+ and equal   Abdomen:  · No masses or tenderness  · Liver: No Abnormalities Noted  Musculoskeletal/Skin:  · Exhibits normal gait balance and coordination  · There is no clubbing, cyanosis of the extremities  · Skin is warm and dry  · Moves all extremities well  Neurological/Psychiatric:  · Alert and oriented in all spheres, tangential speech  · No abnormalities of affect, memory, mentation, or behavior are noted    Lab Data:  CBC:   Lab Results   Component Value Date    WBC 7.6 04/01/2021    WBC 5.5 03/14/2021    WBC 6.2 03/13/2021    RBC 4.31 04/01/2021    RBC 4.13 03/14/2021    RBC 4.03 03/13/2021    HGB 12.3 04/01/2021    HGB 12.1 03/14/2021    HGB 11.6 03/13/2021    HCT 38.9 04/01/2021    HCT 37.7 03/14/2021    HCT 36.6 03/13/2021    MCV 90.1 04/01/2021    MCV 91.1 03/14/2021    MCV 90.7 03/13/2021    RDW 15.0 04/01/2021    RDW 15.3 03/14/2021    RDW 15.4 03/13/2021     04/01/2021     03/14/2021     03/13/2021     Iron:   Lab Results   Component Value Date    IRON 64 03/04/2020    TIBC 357 03/04/2020    FERRITIN 25.1 03/04/2020     BMP:   Lab Results   Component Value Date     04/01/2021     03/23/2021     03/14/2021    K 4.4 04/01/2021    K 4.5 03/23/2021    K 4.4 03/14/2021    K 4.4 03/14/2021    K 4.3 03/13/2021    K 4.5 03/11/2021     04/01/2021     03/23/2021     03/14/2021    CO2 23 04/01/2021    CO2 29 03/23/2021    CO2 28 03/14/2021    PHOS 2.8 10/16/17  Normal size left ventricle with mild concentric left ventricular  hypertrophy. Systolic function appears mildly reduced with an estimated ejection fraction  of 40-45 %. There is hypokinesis of the basal,mid inferoseptal wall, inferior wall and  anteroseptal wall. Elevated left ventricle diastolic filling pressure ( IVRT - 53 /ms ). Compared to last echo on 3/23/2017 the left ventricle systolic function has  improved. Mild thickening of leaflets of mitral valve. Mild mitral regurgitation. A bubble study was performed and shows evidence of right to left shunting  consistent with a patent foramen ovale or atrial septal defect. No  intracardiac mass vegetations or thrombi. Echo 11/1/2019  Summary   The left ventricular systolic function is mildly reduced with an ejection   fraction of 40-45 %. Anteroseptal wall hypokinesis. There is mild concentric left ventricular hypertrophy. Left ventricular cavity size is mildly dilated. Grade I diastolic dysfunction with normal left ventricular filling pressure. Mild posterior mitral annular calcification. Mild thickening of the anterior leaflet of mitral valve. Moderate mitral regurgitation. The left atrium is mildly dilated. Frequent premature beats makes it difficult to identify exact wall motion   abnormality. STress 3/13/21  Summary    Large sized inferior, fixed defect of severe intensity consistent with    infarction in the territory of the RCA. No evidence of myocardium at risk    for significant reversible ischemia.        LV function is severely reduced with and ejection fraction of 34 %.        echo 3/13/21   Summary   Patient tachy during study. The left ventricular systolic function is moderately reduced with an   ejection fraction of 35 - 40 %. There is hypokinesis of the apex, apical lateral, inferoseptum, apical   anterior and anteroseptum walls. Septal bounce noted.    Normal left ventricular size with mild concentric left ventricular   hypertrophy. Left ventricular diastolic filling pressure is elevated lateral E/e\" is 14 . Changes noted from previous echo on 11-1-2019 in left ventricular function. Moderate mitral regurgitation. The right ventricle is mildly enlarged. Right ventricular systolic function is mildly reduced . Systolic pulmonic artery pressure (SPAP) is normal estimated at 26 mmHg (Right atrial pressure of 3 mmHg). The right atrium is mildly dilated. NYHA:   I-II  ACC/ AHA Stage:    C    Pertinent Problems:  - Non-ischemic dilated cardiomyopathy, variable EF, most recent 35 to 40%  - s/p BiV-AICD  - Pulmonary HTN due to CHF & intrinsic pulmonary disease   - LBBB  -PAF on Eliquis  - status post gastric bypass    - Hypertension  - Hyperlipidemia    Visit Diagnosis:    1. Chronic systolic congestive heart failure ([address]    2. Morbid obesity ([address]    3. Biventricular ICD (implantable cardioverter-defibrillator) in place    4. Dilated cardiomyopathy ([address]    5. Witnessed episode of apnea      Plan:     1. Check labs BMP and BNP today and again weekly   2. Continue lisinopril   3. Continue Toprol Xl   4. Continue torsemide for now and may need to adjust depending on blood work results  5. Check daily weights and record them-she plans to keep a log of her weights along with any dietary changes  6. Sleep apnea evaluation referral  7. Discussed exercise and diet at length today. Encouraged patient to stay as active as possible. 8. Follow up 4 weeks medication adjustment        I appreciate the opportunity for caring for this patient.      Johnny Puckett CNP, 4/9/2021, 3:26 PM

## 2021-04-12 ENCOUNTER — TELEPHONE (OUTPATIENT)
Dept: CARDIOLOGY CLINIC | Age: 61
End: 2021-04-12

## 2021-04-12 NOTE — TELEPHONE ENCOUNTER
Created telephone encounter. Spoke with Blaire Denson relayed message per NPRB regarding Labs. Pt verbalized understanding.

## 2021-04-12 NOTE — RESULT ENCOUNTER NOTE
Kidney labs are improved. Continue current regimen. Repeat labs next week as planned with the start of the diet.    Thanks

## 2021-04-12 NOTE — PROGRESS NOTES
ISIDRA from [facility] transmission of pacemaker and/or ICD, or implanted heart monitor shows normal cardiac device function. Patient's last device interrogation was on 1/27. Estimated device longevity is 19 months. Patient has a history of DCM, syncope, and CVA. Takes Toprol XL. Patient was seen 4/1 in the Cath Lab - patient underwent successful overdrive pacing of A Flutter. AP 23%   94.6%    Since last device interrogation, no arrhythmias recorded. URI MD to review. Patient is to follow up in 3 months either remotely or in clinic. Please see the Paceart report under the Cardiology tab for more detail.

## 2021-04-12 NOTE — TELEPHONE ENCOUNTER
----- Message from ALICIA Grant CNP sent at 4/12/2021  8:31 AM EDT -----  Kidney labs are improved. Continue current regimen. Repeat labs next week as planned with the start of the diet.    Thanks

## 2021-04-13 ENCOUNTER — PATIENT MESSAGE (OUTPATIENT)
Dept: FAMILY MEDICINE CLINIC | Age: 61
End: 2021-04-13

## 2021-04-13 RX ORDER — METOPROLOL SUCCINATE 100 MG/1
100 TABLET, EXTENDED RELEASE ORAL DAILY
Qty: 90 TABLET | Refills: 0 | Status: SHIPPED | OUTPATIENT
Start: 2021-04-13 | End: 2021-07-14 | Stop reason: SDUPTHER

## 2021-04-13 RX ORDER — LEVOTHYROXINE SODIUM 0.1 MG/1
100 TABLET ORAL DAILY
Qty: 90 TABLET | Refills: 0 | Status: SHIPPED | OUTPATIENT
Start: 2021-04-13 | End: 2021-07-14 | Stop reason: SDUPTHER

## 2021-04-13 NOTE — TELEPHONE ENCOUNTER
From: Delaney Mueller  To: ALICIA Jang CNP  Sent: 4/13/2021 2:37 AM EDT  Subject: Prescription Question    My request for 90 refills for metropolol 100 mg and levothyroxine was sent to the hospitalist at [facility]. I need these refills sent to [facility] mail in. I am almost out of both.  Thank you

## 2021-04-13 NOTE — TELEPHONE ENCOUNTER
Last office visit 3/25/2021     Last written 3- To local Pt. Needs sent to mail order pharmacy.     Next office visit scheduled 7/1/2021    Requested Prescriptions     Pending Prescriptions Disp Refills    metoprolol succinate (TOPROL XL) 100 MG extended release tablet 90 tablet 0     Sig: Take 1 tablet by mouth daily    levothyroxine (SYNTHROID) 100 MCG tablet 90 tablet 0     Sig: Take 1 tablet by mouth Daily

## 2021-04-15 NOTE — TELEPHONE ENCOUNTER
Last office visit 3/25/2021     Next office visit scheduled 7/1/2021    Requested Prescriptions     Pending Prescriptions Disp Refills    diclofenac sodium (VOLTAREN) 1 %  g 2     Sig: Apply topically 2 times daily

## 2021-04-20 DIAGNOSIS — M79.662 PAIN OF LEFT CALF: ICD-10-CM

## 2021-04-20 RX ORDER — METHOCARBAMOL 500 MG/1
500 TABLET, FILM COATED ORAL 3 TIMES DAILY PRN
Qty: 90 TABLET | Refills: 1 | Status: SHIPPED | OUTPATIENT
Start: 2021-04-20 | End: 2021-07-23

## 2021-04-20 NOTE — TELEPHONE ENCOUNTER
Refill Request     Last Seen: 9/21/2020    Last Written: 1/20/2021    Next Appointment:   Future Appointments   Date Time Provider Sarah Najera   4/26/2021  7:35 AM SCHEDULE, [address] [place] [address] Araceli [facility]   5/3/2021  2:15 PM SCHEDULE, [facility] [place]   5/3/2021  2:15 PM Asha Trinh APRN - SANT[facility]   5/10/2021  1:30 PM Claudine Jaimes APRN - CNP Sumeet Car [facility]   5/17/2021  3:20 PM John Hinojosa APRN - CNP CLERM PUL[facility]   7/1/2021 11:00 AM MD OSIRIS Ferrell [facility]   8/3/2021 10:30 AM SCHEDULE, [facility]   8/3/2021 10:30 AM Tamika Abraham APRN - SANT[facility]       Future appointment scheduled      Requested Prescriptions     Pending Prescriptions Disp Refills    methocarbamol (ROBAXIN) 500 MG tablet 90 tablet 1     Sig: Take 1 tablet by mouth 3 times daily as needed (pain)

## 2021-04-22 DIAGNOSIS — E78.5 HYPERLIPIDEMIA, UNSPECIFIED HYPERLIPIDEMIA TYPE: ICD-10-CM

## 2021-04-22 RX ORDER — SIMVASTATIN 20 MG
TABLET ORAL
Qty: 90 TABLET | Refills: 1 | Status: SHIPPED | OUTPATIENT
Start: 2021-04-22 | End: 2021-09-21

## 2021-04-22 RX ORDER — MONTELUKAST SODIUM 10 MG/1
10 TABLET ORAL NIGHTLY
Qty: 90 TABLET | Refills: 1 | Status: SHIPPED | OUTPATIENT
Start: 2021-04-22 | End: 2021-09-21

## 2021-04-22 NOTE — TELEPHONE ENCOUNTER
Refill Request     Last Seen: 9/21/2020    Simvastatin Last Written: 11/19/2020    Montelukast Last Written: 10/14/2020    Next Appointment:   Future Appointments   Date Time Provider Sarah Najera   4/26/2021  7:35 AM SCHEDULE, Madelin Galeana MITZI Lona De La Rosa [facility]   5/3/2021  2:15 PM SCHEDULE, [facility] [employer] [facility]   5/3/2021  2:15 PM Asha [address] [facility], APRN - CNP [employer] [facility]   5/10/2021  1:30 PM Henry Lopez, APRN - CNP Sumeet Car [facility]   5/17/2021  3:20 PM Aylin Tay APRN - CNP CLERM PULM [facility]   5/26/2021 10:45 AM MHC MAMMO RM 1 MHCZ MAMMO [facility]   7/1/2021 11:00 AM MD OSIRIS Jones [facility]   8/3/2021 10:30 AM SCHEDULE, [facility] [employer]   8/3/2021 10:30 AM Gaurav Del Toro APRN - [facility]       Future appointment scheduled      Requested Prescriptions     Pending Prescriptions Disp Refills    simvastatin (ZOCOR) 20 MG tablet 90 tablet 1     Sig: TAKE 1 TABLET EVERY NIGHT    montelukast (SINGULAIR) 10 MG tablet 90 tablet 1     Sig: Take 1 tablet by mouth nightly

## 2021-04-23 DIAGNOSIS — F31.77 BIPOLAR DISORDER, IN PARTIAL REMISSION, MOST RECENT EPISODE MIXED (HCC): Chronic | ICD-10-CM

## 2021-04-23 RX ORDER — LAMOTRIGINE 150 MG/1
TABLET ORAL
Qty: 180 TABLET | Refills: 1 | Status: SHIPPED | OUTPATIENT
Start: 2021-04-23 | End: 2021-09-21

## 2021-04-23 NOTE — TELEPHONE ENCOUNTER
Refill Request     Last Seen: 9/21/2020    Last Written: 11/19/2020    Next Appointment:   Future Appointments   Date Time Provider Sarah Najera   4/26/2021  7:35 AM [facility]   5/3/2021  2:15 PM SCHEDULE, [facility] [facility]   5/3/2021  2:15 PM Asha Shaw, APRN - CNP [employer] [facility]   5/10/2021  1:30 PM Ruma Little, APRN - CNP Sumeet Car [facility]   5/17/2021  3:20 PM Alanis Abdullahi APRN - CNP CLERM PULM [facility]   5/26/2021 10:45 AM MHC MAMMO RM 1 MHCZ MAMMO [facility]   7/1/2021 11:00 AM MD OSIRIS Torres [facility]   8/3/2021 10:30 AM SCHEDULE, [facility]   8/3/2021 10:30 AM Radha Rios, APRN - CNP [employer] [facility]       Future appointment scheduled      Requested Prescriptions     Pending Prescriptions Disp Refills    lamoTRIgine (LAMICTAL) 150 MG tablet 180 tablet 1

## 2021-04-26 ENCOUNTER — NURSE ONLY (OUTPATIENT)
Dept: CARDIOLOGY CLINIC | Age: 61
End: 2021-04-26

## 2021-04-26 DIAGNOSIS — Z95.810 BIVENTRICULAR ICD (IMPLANTABLE CARDIOVERTER-DEFIBRILLATOR) IN PLACE: ICD-10-CM

## 2021-04-27 NOTE — PROGRESS NOTES
We received remote transmission from patient's monitor at home. Transmission shows normal sensing and pacing function. EP physician will review. See interrogation under cardiology tab in the [address] [facility] [address] field for more details. Last interrogation 4/9/21. 29-Mar-2021 14:31-ATP x 2 given for AFL-RVR-ATP slowed to below rate detection. Last ongoing atrial arrhythmia 3/30-rapidly conducted AT. [facility] 4/1/21. Total * 96.4% (MVP Off). Thoracic impedance trend stable. MELISA @ 19 months. Follow up in 3 months via carelink.

## 2021-04-30 DIAGNOSIS — M1A.9XX0 CHRONIC GOUT WITHOUT TOPHUS, UNSPECIFIED CAUSE, UNSPECIFIED SITE: ICD-10-CM

## 2021-04-30 RX ORDER — ALLOPURINOL 300 MG/1
TABLET ORAL
Qty: 90 TABLET | Refills: 1 | Status: SHIPPED | OUTPATIENT
Start: 2021-04-30 | End: 2021-12-22 | Stop reason: SDUPTHER

## 2021-04-30 NOTE — TELEPHONE ENCOUNTER
Refill Request     Last Seen: Last Seen Department: 3/25/2021  Last Seen by PCP: 9/21/2020    Last Written: 12/8/2020    Next Appointment:   Future Appointments   Date Time Provider Sarah Najera   5/3/2021  2:15 PM SCHEDULE, [facility]   5/3/2021  2:15 PM Asha Trinh APRN - [facility]   5/10/2021  1:30 PM Claudine Jaimes APRN - CNP Sumeet Car [facility]   5/17/2021  3:20 PM John Hinojosa APRN - CNP CLERM PULM [facility]   5/26/2021 10:45 AM [facility] [address] [facility]   7/1/2021 11:00 AM MD OSIRIS Ferrell [facility]   8/3/2021 10:30 AM SCHEDULE, [facility]   8/3/2021 10:30 AM ALICIA Barajas - [facility]   11/1/2021  8:30 AM SCHEDULE, [facility] REMOTE TRANSMISSION [facility]         Future appointment scheduled.      Requested Prescriptions     Pending Prescriptions Disp Refills    allopurinol (ZYLOPRIM) 300 MG tablet 90 tablet 1     Sig: TAKE 1 TABLET EVERY DAY

## 2021-05-02 NOTE — PROGRESS NOTES
[address]   Electrophysiology Outpatient Note              Date:  May 3, 2021  Patient name: Mayco Shah  YOB: 1960    Primary Care physician: Israel Boyer MD    HISTORY OF PRESENT ILLNESS: The patient is a 56 y.o.  female with a history of NICM, AFL, ventricular tachycardia, LBBB, AFL, HTN, HLD and remote CVA. In 2015 she had a LHC that showed no coronary disease. In 2016, she had a BiV ICD implanted for EF 20%. She was admitted to the hospital 3/11/2021 for complaints of SOB and chest discomfort. Device interrogation showed persistent atrial arrhythmia, some episodes resulted in ATP. Echo showed EF 35-40%. On 4/1/2021, she was brought to the EP lab and manual overdrive pacing was used to terminate atrial flutter and ATP was activated. Today she is being seen for AFL and NICM. Patient complains of occasional SOB with exercise but recovers quickly. Denies chest pain, palpitations, and dizziness. Has started exercising with dance aerobics. She overall feels well.        Device check today shows:   Brand: ScaleMP MRI CR-D  Mode: DDDR  Normal function   19.6% AP  79.2%  95.9% effective    Arrhythmias: none  Battery life 19 months   RA impedance 399 ohms   RV impedance 380 ohms   LV impedance 608 ohms  RA threshold 0.75 V @ 0.40 ms  RV threshold 0.50 V @ 0.40 ms  LV threshold 2.50  V @ 0.40 ms  RA sensitivity 0.30 mV  RV sensitivity 0.30 mV  Optivol: OFF    Past Medical History:   has a past medical history of Anesthesia complication, Autoimmune hemolytic anemia, Bipolar disorder ([address] Bundle branch block, Cardiomyopathy ([address] Chronic systolic CHF (congestive heart failure) ([address] Depression, Family history of early CAD, Family history of ovarian cancer, GERD (gastroesophageal reflux disease), Gout, Hypertension, Hypothyroid, Osteoarthritis, knee, Pneumonia, S/P gastric bypass, Unspecified cerebral artery occlusion with cerebral infarction, Uterine cancer ([facility] and Vitamin B 12 deficiency. Past Surgical History:   has a past surgical history that includes Gastric bypass surgery (1/7/05); Finger surgery; Dilation and curettage of uterus; lipectomy; Cholecystectomy; other surgical history; Upper gastrointestinal endoscopy (4/23/13); Cardiac catheterization (7/10/2015); transesophageal echocardiogram (7/13/2015); other surgical history (4/1/16); pacemaker placement (09/2016); hernia repair (10/20/2016); ERCP; other surgical history; Upper gastrointestinal endoscopy (03/06/2019); Colonoscopy (03/06/2019); Upper gastrointestinal endoscopy (N/A, 3/6/2019); and Colonoscopy (N/A, 3/6/2019). Home Medications:    Prior to Admission medications    Medication Sig Start Date End Date Taking?  Authorizing Provider   allopurinol (ZYLOPRIM) 300 MG tablet TAKE 1 TABLET EVERY DAY 4/30/21  Yes ALICIA Denney CNP   lamoTRIgine (LAMICTAL) 150 MG tablet TAKE 1 TABLET TWICE DAILY OR AS OTHERWISE DIRECTED 4/23/21  Yes ALICIA Denney CNP   simvastatin (ZOCOR) 20 MG tablet TAKE 1 TABLET EVERY NIGHT 4/22/21  Yes ALICIA Catherine CNP   montelukast (SINGULAIR) 10 MG tablet Take 1 tablet by mouth nightly 4/22/21  Yes ALICIA Catherine CNP   methocarbamol (ROBAXIN) 500 MG tablet Take 1 tablet by mouth 3 times daily as needed (pain) 4/20/21  Yes ALICIA Catherine CNP   diclofenac sodium (VOLTAREN) 1 % GEL Apply topically 2 times daily 4/15/21  Yes ALICIA Catherine CNP   metoprolol succinate (TOPROL XL) 100 MG extended release tablet Take 1 tablet by mouth daily 4/13/21  Yes ALICIA Catherine CNP   levothyroxine (SYNTHROID) 100 MCG tablet Take 1 tablet by mouth Daily 4/13/21  Yes ALICIA Catherine CNP   apixaban (ELIQUIS) 5 MG TABS tablet Take 1 tablet by mouth 2 times daily 4/9/21  Yes ALICIA Grant CNP   torsemide (DEMADEX) 10 MG tablet 2 tabs every other day alternating with 1 tab the other days 3/25/21  Yes Sumeet Trivedi, APRN - CNP   lisinopril (PRINIVIL;ZESTRIL) 5 MG tablet TAKE 1 TABLET EVERY DAY 12/7/20  Yes ALICIA Ritter CNP   BIOTIN PO Take by mouth daily   Yes Historical Provider, MD   acetaminophen (TYLENOL) 325 MG tablet Take 650 mg by mouth every 6 hours as needed for Pain TAKES EVERY DAY   Yes Historical Provider, MD   cetirizine (ZYRTEC) 10 MG tablet Take 10 mg by mouth daily   Yes Historical Provider, MD       Allergies:  Dilaudid [hydromorphone hcl], Lavender oil, Penicillins, Seroquel [quetiapine fumarate], Adhesive tape, Hydromorphone hcl, Lithium, and Tetanus toxoids    Social History:   reports that she quit smoking about 36 years ago. Her smoking use included cigarettes. She has a 6.00 pack-year smoking history. She has never used smokeless tobacco. She reports that she does not drink alcohol or use drugs. Family History: family history includes Arrhythmia in her father; Arthritis in her father; Cancer in her maternal grandmother and mother; Depression in her brother, brother, and mother; Heart Disease in her father; Obesity in her brother and brother; Other in her brother, brother, and father; Ovarian Cancer (age of onset: 45) in her mother; Rheum Arthritis in her father. All 14 point review of systems are completed and pertinent positives are mentioned in the history of present illness. Other systems are reviewed and are negative. PHYSICAL EXAM:    Vital signs:    /80   Pulse 74   Ht 5' 3\" (1.6 m)   Wt (!) 310 lb 8 oz (140.8 kg)   SpO2 97%   BMI 55.00 kg/m²      Constitutional and general appearance: alert, cooperative, no distress and appears stated age  HEENT: PERRL, no cervical lymphadenopathy. No masses palpable.  Normal oral mucosa  Respiratory:  · Normal excursion and expansion without use of accessory muscles  · Resp auscultation: Normal breath sounds without wheezing, rhonchi, and rales  Cardiovascular:  · The apical impulse is not displaced  · Heart tones are crisp and normal. regular S1 and S2.  · Jugular venous pulsation Normal  · The carotid upstroke is normal in amplitude and contour without delay or bruit  · Peripheral pulses are symmetrical and full   Abdomen:  · No masses or tenderness  · Bowel sounds present  Extremities:  ·  No cyanosis or clubbing  ·  No lower extremity edema  ·  Skin: warm and dry  Neurological:  · Alert and oriented  · Moves all extremities well  · No abnormalities of mood, affect, memory, mentation, or behavior are noted    DATA:    ECG 4/1/2021:  AS  66 bpm    Echo 3/13/2021:   Patient tachy during study. The left ventricular systolic function is moderately reduced with an   ejection fraction of 35 - 40 %. There is hypokinesis of the apex, apical lateral, inferoseptum, apical   anterior and anteroseptum walls. Septal bounce noted. Normal left ventricular size with mild concentric left ventricular   hypertrophy. Left ventricular diastolic filling pressure is elevated lateral E/e\" is 14 . Changes noted from previous echo on 11-1-2019 in left ventricular function. Moderate mitral regurgitation. The right ventricle is mildly enlarged. Right ventricular systolic function is mildly reduced . Systolic pulmonic artery pressure (SPAP) is normal estimated at 26 mmHg   (Right atrial pressure of 3 mmHg). The right atrium is mildly dilated. Echo 11/1/2019: The left ventricular systolic function is mildly reduced with an ejection   fraction of 40-45 %. Anteroseptal wall hypokinesis. There is mild concentric left ventricular hypertrophy. Left ventricular cavity size is mildly dilated. Grade I diastolic dysfunction with normal left ventricular filling pressure. Mild posterior mitral annular calcification. Mild thickening of the anterior leaflet of mitral valve. Moderate mitral regurgitation. The left atrium is mildly dilated. Frequent premature beats makes it difficult to identify exact wall motion   abnormality.     [facility] and Toprol    Yearly labs due (CBC, BMP) 4/2022  Remaining battery of device is 19 months.  Will need to plan for generator change sometime in the next 6-12 months  Continue remote device transmissions    Follow up in 3 months      MARISOL Diaz, APRN-CNP  [employer]  [phone]

## 2021-05-03 ENCOUNTER — OFFICE VISIT (OUTPATIENT)
Dept: CARDIOLOGY CLINIC | Age: 61
End: 2021-05-03
Payer: MEDICARE

## 2021-05-03 ENCOUNTER — TELEPHONE (OUTPATIENT)
Dept: FAMILY MEDICINE CLINIC | Age: 61
End: 2021-05-03

## 2021-05-03 ENCOUNTER — NURSE ONLY (OUTPATIENT)
Dept: CARDIOLOGY CLINIC | Age: 61
End: 2021-05-03
Payer: MEDICARE

## 2021-05-03 VITALS
HEIGHT: 63 IN | OXYGEN SATURATION: 97 % | HEART RATE: 74 BPM | WEIGHT: 293 LBS | DIASTOLIC BLOOD PRESSURE: 80 MMHG | BODY MASS INDEX: 51.91 KG/M2 | SYSTOLIC BLOOD PRESSURE: 100 MMHG

## 2021-05-03 DIAGNOSIS — I47.20 VT (VENTRICULAR TACHYCARDIA): ICD-10-CM

## 2021-05-03 DIAGNOSIS — I63.9 CEREBROVASCULAR ACCIDENT (CVA), UNSPECIFIED MECHANISM (HCC): ICD-10-CM

## 2021-05-03 DIAGNOSIS — I42.8 NON-ISCHEMIC CARDIOMYOPATHY (HCC): Primary | ICD-10-CM

## 2021-05-03 DIAGNOSIS — I48.3 TYPICAL ATRIAL FLUTTER (HCC): ICD-10-CM

## 2021-05-03 DIAGNOSIS — Z95.810 BIVENTRICULAR ICD (IMPLANTABLE CARDIOVERTER-DEFIBRILLATOR) IN PLACE: ICD-10-CM

## 2021-05-03 DIAGNOSIS — I48.92 PAROXYSMAL ATRIAL FLUTTER (HCC): ICD-10-CM

## 2021-05-03 DIAGNOSIS — R55 SYNCOPE, UNSPECIFIED SYNCOPE TYPE: ICD-10-CM

## 2021-05-03 DIAGNOSIS — I42.0 DILATED CARDIOMYOPATHY (HCC): ICD-10-CM

## 2021-05-03 DIAGNOSIS — I63.231 ARTERIAL ISCHEMIC STROKE, ICA, RIGHT, ACUTE (HCC): ICD-10-CM

## 2021-05-03 PROCEDURE — 99213 OFFICE O/P EST LOW 20 MIN: CPT | Performed by: NURSE PRACTITIONER

## 2021-05-03 NOTE — PROGRESS NOTES
Patient presents to the device clinic today for a programming evaluation for her defibrillator. Patient has a history of DCM, syncope, CVA, A Flutter, and VT. Takes Eliquis and Toprol XL. Last device interrogation was on 4/27. Since then, no arrhythmias recorded. AP 19.6%  95.9%    All sensing and pacing parameters are within normal range. No changes need to be made at this time. Patient education was provided about device functionality, in home monitoring, and any other patient questions and/or concerns were addressed. Patient voices understanding. Please see interrogation for more detail. Patient will see MALENA Cisse in office today. Patient will follow up in 3 months in office or remotely. See Paceart report under the Cardiology tab.

## 2021-05-03 NOTE — LETTER
[facility] ([facility] and Vitamin B 12 deficiency. Past Surgical History:   has a past surgical history that includes Gastric bypass surgery (1/7/05); Finger surgery; Dilation and curettage of uterus; lipectomy; Cholecystectomy; other surgical history; Upper gastrointestinal endoscopy (4/23/13); Cardiac catheterization (7/10/2015); transesophageal echocardiogram (7/13/2015); other surgical history (4/1/16); pacemaker placement (09/2016); hernia repair (10/20/2016); ERCP; other surgical history; Upper gastrointestinal endoscopy (03/06/2019); Colonoscopy (03/06/2019); Upper gastrointestinal endoscopy (N/A, 3/6/2019); and Colonoscopy (N/A, 3/6/2019). Home Medications:    Prior to Admission medications    Medication Sig Start Date End Date Taking?  Authorizing Provider   allopurinol (ZYLOPRIM) 300 MG tablet TAKE 1 TABLET EVERY DAY 4/30/21  Yes ALICIA Grubbs CNP   lamoTRIgine (LAMICTAL) 150 MG tablet TAKE 1 TABLET TWICE DAILY OR AS OTHERWISE DIRECTED 4/23/21  Yes ALICIA Grubbs CNP   simvastatin (ZOCOR) 20 MG tablet TAKE 1 TABLET EVERY NIGHT 4/22/21  Yes ALICIA Govea CNP   montelukast (SINGULAIR) 10 MG tablet Take 1 tablet by mouth nightly 4/22/21  Yes ALICIA Govea CNP   methocarbamol (ROBAXIN) 500 MG tablet Take 1 tablet by mouth 3 times daily as needed (pain) 4/20/21  Yes ALICIA Govea CNP   diclofenac sodium (VOLTAREN) 1 % GEL Apply topically 2 times daily 4/15/21  Yes ALICIA Govea CNP   metoprolol succinate (TOPROL XL) 100 MG extended release tablet Take 1 tablet by mouth daily 4/13/21  Yes ALICIA Govea CNP   levothyroxine (SYNTHROID) 100 MCG tablet Take 1 tablet by mouth Daily 4/13/21  Yes ALICIA Govea CNP   apixaban (ELIQUIS) 5 MG TABS tablet Take 1 tablet by mouth 2 times daily 4/9/21  Yes ALICIA Nye CNP   torsemide (DEMADEX) 10 MG tablet 2 tabs every other day alternating with 1 tab the other days 3/25/21  Yes [place] Deash, APRN - CNP   lisinopril (PRINIVIL;ZESTRIL) 5 MG tablet TAKE 1 TABLET EVERY DAY 12/7/20  Yes ALICIA Restrepo - CNP   BIOTIN PO Take by mouth daily   Yes Historical Provider, MD   acetaminophen (TYLENOL) 325 MG tablet Take 650 mg by mouth every 6 hours as needed for Pain TAKES EVERY DAY   Yes Historical Provider, MD   cetirizine (ZYRTEC) 10 MG tablet Take 10 mg by mouth daily   Yes Historical Provider, MD       Allergies:  Dilaudid [hydromorphone hcl], Lavender oil, Penicillins, Seroquel [quetiapine fumarate], Adhesive tape, Hydromorphone hcl, Lithium, and Tetanus toxoids    Social History:   reports that she quit smoking about 36 years ago. Her smoking use included cigarettes. She has a 6.00 pack-year smoking history. She has never used smokeless tobacco. She reports that she does not drink alcohol or use drugs. Family History: family history includes Arrhythmia in her father; Arthritis in her father; Cancer in her maternal grandmother and mother; Depression in her brother, brother, and mother; Heart Disease in her father; Obesity in her brother and brother; Other in her brother, brother, and father; Ovarian Cancer (age of onset: 45) in her mother; Rheum Arthritis in her father. All 14 point review of systems are completed and pertinent positives are mentioned in the history of present illness. Other systems are reviewed and are negative. PHYSICAL EXAM:    Vital signs:    /80   Pulse 74   Ht 5' 3\" (1.6 m)   Wt (!) 310 lb 8 oz (140.8 kg)   SpO2 97%   BMI 55.00 kg/m²      Constitutional and general appearance: alert, cooperative, no distress and appears stated age  HEENT: PERRL, no cervical lymphadenopathy. No masses palpable.  Normal oral mucosa  Respiratory:  · Normal excursion and expansion without use of accessory muscles  · Resp auscultation: Normal breath sounds without wheezing, rhonchi, and rales  Cardiovascular:  · The apical impulse is not displaced  · Heart tones are crisp and normal. regular S1 and S2.  · Jugular venous pulsation Normal  · The carotid upstroke is normal in amplitude and contour without delay or bruit  · Peripheral pulses are symmetrical and full   Abdomen:  · No masses or tenderness  · Bowel sounds present  Extremities:  ·  No cyanosis or clubbing  ·  No lower extremity edema  ·  Skin: warm and dry  Neurological:  · Alert and oriented  · Moves all extremities well  · No abnormalities of mood, affect, memory, mentation, or behavior are noted    DATA:    ECG 4/1/2021:  AS  66 bpm    Echo 3/13/2021:   Patient tachy during study. The left ventricular systolic function is moderately reduced with an   ejection fraction of 35 - 40 %. There is hypokinesis of the apex, apical lateral, inferoseptum, apical   anterior and anteroseptum walls. Septal bounce noted. Normal left ventricular size with mild concentric left ventricular   hypertrophy. Left ventricular diastolic filling pressure is elevated lateral E/e\" is 14 . Changes noted from previous echo on 11-1-2019 in left ventricular function. Moderate mitral regurgitation. The right ventricle is mildly enlarged. Right ventricular systolic function is mildly reduced . Systolic pulmonic artery pressure (SPAP) is normal estimated at 26 mmHg   (Right atrial pressure of 3 mmHg). The right atrium is mildly dilated. Echo 11/1/2019: The left ventricular systolic function is mildly reduced with an ejection   fraction of 40-45 %. Anteroseptal wall hypokinesis. There is mild concentric left ventricular hypertrophy. Left ventricular cavity size is mildly dilated. Grade I diastolic dysfunction with normal left ventricular filling pressure. Mild posterior mitral annular calcification. Mild thickening of the anterior leaflet of mitral valve. Moderate mitral regurgitation. The left atrium is mildly dilated. Frequent premature beats makes it difficult to identify exact wall motion   abnormality.     [facility] 7/2015:  IMPRESSION:  1. Angiographically normal coronary arteries. 2.  Severe pulmonary hypertension 66/37 mean 49 mmHg. 3.  Markedly elevated pulmonary capillary wedge pressure 32 mmHg, and left  ventricular end diastolic pressure 36 mmHg. 4.  Transpulmonary gradient is 17 mmHg, which suggested a left-sided as well  intrinsic pulmonary etiology of the severe pulmonary hypertension. 5.  Mildly reduced cardiac index by Giovanni of 2.36 L/min/m2.        RECOMMENDATION:  The patient has no epicardial disease. Her chest pain is  from the heart failure. She is noted to have moderate to severe mitral  regurgitation on the echocardiogram, and will schedule for SANGEETHA to further  evaluate the mitral regurgitation. In the meantime, we will also start her   on heart failure therapy that will include a beta-blocker and ACE inhibitor   as well as diuretic therapy    All labs and testing reviewed. CARDIOLOGY LABS:   CBC: No results for input(s): WBC, HGB, HCT, PLT in the last 72 hours. BMP: No results for input(s): NA, K, CO2, BUN, CREATININE, LABGLOM, GLUCOSE in the last 72 hours. PT/INR: No results for input(s): PROTIME, INR in the last 72 hours. APTT:No results for input(s): APTT in the last 72 hours. FASTING LIPID PANEL:  Lab Results   Component Value Date    HDL 62 04/01/2021    HDL 65 07/05/2011    LDLCALC 61 04/01/2021    TRIG 124 04/01/2021     LIVER PROFILE:No results for input(s): AST, ALT, ALB in the last 72 hours.     IMPRESSION:      Assessment:   Paroxsymal atrial flutter: stable   -terminated by manual overdrive pacing 1/9/7815, device reprogrammed to activate ATP    -device interrogation per HPI  Non-ischemic dilated cardiomyopathy: recurrent     -EF 40-45% 11/2019, EF 35-40% 3/2021   -s/p BiV ICD implant 2/7708  Chronic systolic CHF: euvolemic on exam   Ventricular tachycardia: stable   LBBB  HTN: controlled   HLD  Remote CVA  Hypothyroid  Obesity   History of gastric bypass     Plan:   Continue Eliquis and Toprol    Yearly labs due (CBC, BMP) 4/2022  Remaining battery of device is 19 months.  Will need to plan for generator change sometime in the next 6-12 months  Continue remote device transmissions    Follow up in 3 months      MARISOL Diaz, ALICIA-CNP  [address]  [phone]

## 2021-05-03 NOTE — TELEPHONE ENCOUNTER
[facility]    Introductions given. Does patient use MyChart? Yes  Has patient used Virtual Visits? Yes  Does patient have smartphone? No   Comfort level with smartphone - 1    Specialists seen  - Cardiologist - Dr. Ju Castro    Medication Review Completed by LPN - Yes  Understands Medications - Yes  Does someone help you with meds? - Yes    Transportation barriers - Yes -     Do you currently monitor? If so, who do you report these numbers to? BP - No - Does not monitor  Weight - No - Does not monitor  Pulse Ox - No - Does not monitor  BG - No - Does not monitor      Appt scheduled LPN Visit for Monitoring Education - No: Not interested at this time. Willing to join Monitoring Program - No: Not interested at this time.

## 2021-05-06 PROCEDURE — 93284 PRGRMG EVAL IMPLANTABLE DFB: CPT | Performed by: INTERNAL MEDICINE

## 2021-05-10 ENCOUNTER — PROCEDURE VISIT (OUTPATIENT)
Dept: CARDIOLOGY CLINIC | Age: 61
End: 2021-05-10

## 2021-05-10 ENCOUNTER — HOSPITAL ENCOUNTER (OUTPATIENT)
Dept: GENERAL RADIOLOGY | Age: 61
Discharge: HOME | End: 2021-05-10
Payer: MEDICARE

## 2021-05-10 ENCOUNTER — HOSPITAL ENCOUNTER (OUTPATIENT)
Age: 61
Discharge: HOME | End: 2021-05-10
Payer: MEDICARE

## 2021-05-10 ENCOUNTER — OFFICE VISIT (OUTPATIENT)
Dept: CARDIOLOGY CLINIC | Age: 61
End: 2021-05-10
Payer: MEDICARE

## 2021-05-10 ENCOUNTER — PROCEDURE VISIT (OUTPATIENT)
Dept: CARDIOLOGY CLINIC | Age: 61
End: 2021-05-10
Payer: MEDICARE

## 2021-05-10 VITALS
HEART RATE: 76 BPM | WEIGHT: 293 LBS | BODY MASS INDEX: 51.91 KG/M2 | DIASTOLIC BLOOD PRESSURE: 64 MMHG | SYSTOLIC BLOOD PRESSURE: 102 MMHG | OXYGEN SATURATION: 98 % | HEIGHT: 63 IN

## 2021-05-10 DIAGNOSIS — Z95.0 PACEMAKER: ICD-10-CM

## 2021-05-10 DIAGNOSIS — I42.0 DILATED CARDIOMYOPATHY (HCC): Primary | ICD-10-CM

## 2021-05-10 DIAGNOSIS — Z95.810 BIVENTRICULAR ICD (IMPLANTABLE CARDIOVERTER-DEFIBRILLATOR) IN PLACE: ICD-10-CM

## 2021-05-10 DIAGNOSIS — E55.9 VITAMIN D DEFICIENCY, UNSPECIFIED: ICD-10-CM

## 2021-05-10 DIAGNOSIS — Z98.84 S/P GASTRIC BYPASS: ICD-10-CM

## 2021-05-10 DIAGNOSIS — I50.22 CHRONIC SYSTOLIC CONGESTIVE HEART FAILURE (HCC): ICD-10-CM

## 2021-05-10 DIAGNOSIS — E66.01 MORBID OBESITY (HCC): ICD-10-CM

## 2021-05-10 DIAGNOSIS — I42.0 DILATED CARDIOMYOPATHY (HCC): ICD-10-CM

## 2021-05-10 LAB
ANION GAP SERPL CALCULATED.3IONS-SCNC: 11 MMOL/L (ref 3–16)
BUN BLDV-MCNC: 23 MG/DL (ref 7–20)
CALCIUM SERPL-MCNC: 9.2 MG/DL (ref 8.3–10.6)
CHLORIDE BLD-SCNC: 106 MMOL/L (ref 99–110)
CO2: 24 MMOL/L (ref 21–32)
CREAT SERPL-MCNC: 1.2 MG/DL (ref 0.6–1.2)
FOLATE: 18.48 NG/ML (ref 4.78–24.2)
GFR AFRICAN AMERICAN: 55
GFR NON-AFRICAN AMERICAN: 45
GLUCOSE BLD-MCNC: 88 MG/DL (ref 70–99)
POTASSIUM SERPL-SCNC: 4.7 MMOL/L (ref 3.5–5.1)
PRO-BNP: 586 PG/ML (ref 0–124)
SODIUM BLD-SCNC: 141 MMOL/L (ref 136–145)
VITAMIN B-12: 554 PG/ML (ref 211–911)
VITAMIN D 25-HYDROXY: 20.3 NG/ML

## 2021-05-10 PROCEDURE — 82746 ASSAY OF FOLIC ACID SERUM: CPT

## 2021-05-10 PROCEDURE — G8417 CALC BMI ABV UP PARAM F/U: HCPCS | Performed by: NURSE PRACTITIONER

## 2021-05-10 PROCEDURE — 93290 INTERROG DEV EVAL ICPMS IP: CPT | Performed by: NURSE PRACTITIONER

## 2021-05-10 PROCEDURE — 83880 ASSAY OF NATRIURETIC PEPTIDE: CPT

## 2021-05-10 PROCEDURE — 80048 BASIC METABOLIC PNL TOTAL CA: CPT

## 2021-05-10 PROCEDURE — 36415 COLL VENOUS BLD VENIPUNCTURE: CPT

## 2021-05-10 PROCEDURE — 99214 OFFICE O/P EST MOD 30 MIN: CPT | Performed by: NURSE PRACTITIONER

## 2021-05-10 PROCEDURE — 71046 X-RAY EXAM CHEST 2 VIEWS: CPT

## 2021-05-10 PROCEDURE — 82306 VITAMIN D 25 HYDROXY: CPT

## 2021-05-10 PROCEDURE — 1036F TOBACCO NON-USER: CPT | Performed by: NURSE PRACTITIONER

## 2021-05-10 PROCEDURE — G8427 DOCREV CUR MEDS BY ELIG CLIN: HCPCS | Performed by: NURSE PRACTITIONER

## 2021-05-10 PROCEDURE — 3017F COLORECTAL CA SCREEN DOC REV: CPT | Performed by: NURSE PRACTITIONER

## 2021-05-10 PROCEDURE — 82607 VITAMIN B-12: CPT

## 2021-05-10 NOTE — PROGRESS NOTES
ISIDRA from [address] for Optivol check w/ OV NPRB    BiV ICD check   Clinical status since 3 May 2021     DEVICE ASSESSMENT   Available daily battery/lead measurements within expected range. Lead trends stable. ARRYTHMIA SUMMARY  No new episodes detected since last interrogation. OBSERVATIONS   Device appears to be currently functioning as programmed. See Cardiac Compass for patient data trending. Possible intrathoracic fluid accumulation based on Optivol data. Ventricular sensing episodes noted. See Episode List for date/time and Max A/V rates with device classified duration. Most recent 10 May 2021. See Paceart report under the Cardiology tab.

## 2021-05-10 NOTE — LETTER
deficiency. Surgical History:   has a past surgical history that includes Gastric bypass surgery (1/7/05); Finger surgery; Dilation and curettage of uterus; lipectomy; Cholecystectomy; other surgical history; Upper gastrointestinal endoscopy (4/23/13); Cardiac catheterization (7/10/2015); transesophageal echocardiogram (7/13/2015); other surgical history (4/1/16); pacemaker placement (09/2016); hernia repair (10/20/2016); ERCP; other surgical history; Upper gastrointestinal endoscopy (03/06/2019); Colonoscopy (03/06/2019); Upper gastrointestinal endoscopy (N/A, 3/6/2019); and Colonoscopy (N/A, 3/6/2019). Social History:   reports that she quit smoking about 36 years ago. Her smoking use included cigarettes. She has a 6.00 pack-year smoking history. She has never used smokeless tobacco. She reports that she does not drink alcohol or use drugs. Family History:   Family History   Problem Relation Age of Onset    Cancer Mother     Ovarian Cancer Mother 45        Technically peritoneal cancer    Depression Mother         bipolar    Arthritis Father     Rheum Arthritis Father     Arrhythmia Father     Other Father         gout    Heart Disease Father     Depression Brother         depression    Other Brother         gout    Obesity Brother     Other Brother         gout    Obesity Brother     Depression Brother     Cancer Maternal Grandmother         Breast       Home Medications:  Prior to Admission medications    Medication Sig Start Date End Date Taking?  Authorizing Provider   allopurinol (ZYLOPRIM) 300 MG tablet TAKE 1 TABLET EVERY DAY 4/30/21  Yes ALICIA Ramirez CNP   lamoTRIgine (LAMICTAL) 150 MG tablet TAKE 1 TABLET TWICE DAILY OR AS OTHERWISE DIRECTED 4/23/21  Yes ALICIA Ramirez CNP   simvastatin (ZOCOR) 20 MG tablet TAKE 1 TABLET EVERY NIGHT 4/22/21  Yes Carry ALICIA Benítez CNP   montelukast (SINGULAIR) 10 MG tablet Take 1 tablet by mouth nightly 4/22/21  Yes Carry Jeyson and rhythm, S1,S2 normal  · Radial pulses 2+ and equal bilaterally  · + Trace BLE edema  · Pedal Pulses: 2+ and equal   Abdomen:  · No masses or tenderness  · Liver: No Abnormalities Noted  Musculoskeletal/Skin:  · Exhibits normal gait balance and coordination  · There is no clubbing, cyanosis of the extremities  · Skin is warm and dry  · Moves all extremities well  Neurological/Psychiatric:  · Alert and oriented in all spheres, tangential speech  · No abnormalities of affect, memory, mentation, or behavior are noted    Lab Data:  CBC:   Lab Results   Component Value Date    WBC 7.6 04/01/2021    WBC 5.5 03/14/2021    WBC 6.2 03/13/2021    RBC 4.31 04/01/2021    RBC 4.13 03/14/2021    RBC 4.03 03/13/2021    HGB 12.3 04/01/2021    HGB 12.1 03/14/2021    HGB 11.6 03/13/2021    HCT 38.9 04/01/2021    HCT 37.7 03/14/2021    HCT 36.6 03/13/2021    MCV 90.1 04/01/2021    MCV 91.1 03/14/2021    MCV 90.7 03/13/2021    RDW 15.0 04/01/2021    RDW 15.3 03/14/2021    RDW 15.4 03/13/2021     04/01/2021     03/14/2021     03/13/2021     Iron:   Lab Results   Component Value Date    IRON 64 03/04/2020    TIBC 357 03/04/2020    FERRITIN 25.1 03/04/2020     BMP:   Lab Results   Component Value Date     04/09/2021     04/01/2021     03/23/2021    K 4.9 04/09/2021    K 4.4 04/01/2021    K 4.5 03/23/2021    K 4.4 03/14/2021    K 4.3 03/13/2021    K 4.5 03/11/2021     04/09/2021     04/01/2021     03/23/2021    CO2 22 04/09/2021    CO2 23 04/01/2021    CO2 29 03/23/2021    PHOS 2.8 07/30/2018    PHOS 2.6 07/08/2016    PHOS 2.9 06/04/2012    BUN 22 04/09/2021    BUN 38 04/01/2021    BUN 31 03/23/2021    CREATININE 1.2 04/09/2021    CREATININE 2.0 04/01/2021    CREATININE 1.5 03/23/2021     BNP:   Lab Results   Component Value Date    PROBNP 634 04/09/2021    PROBNP 541 03/23/2021    PROBNP 1,153 03/12/2021     Lipids:   Lab Results   Component Value Date    CHOL 148 04/01/2021 3 mmHg). The right atrium is mildly dilated. NYHA:   I-II  ACC/ AHA Stage:    C    Pertinent Problems:  - Non-ischemic dilated cardiomyopathy, variable EF, most recent 35 to 40%  - s/p BiV-AICD  - Pulmonary HTN due to CHF & intrinsic pulmonary disease   - LBBB  -PAF on Eliquis  - status post gastric bypass    - Hypertension  - Hyperlipidemia    Visit Diagnosis:    1. Dilated cardiomyopathy ([facility]    2. Pacemaker    3. Biventricular ICD (implantable cardioverter-defibrillator) in place    4. S/P gastric bypass    5. Morbid obesity ([address]    6. Vitamin D deficiency, unspecified       Plan:   Tenderness noted to the left side of the pacer, which appears to be lymphadenopathy. Patient did have recent covid vaccine. Recommend CXR to evaluate device positioning as she has reports of the device migrating/moving at times. She is scheduled for her mammogram later this month. Recommend follow up with primary care if not improving. 1. Check labs to monitor the kidney labs, and the vit D levels  2. CXR to evaluate device positioning  3. No changes to medications  4. Continue lisinopril   5. Continue Toprol Xl   6. Continue torsemide  7. Follow up with EP as planned  8. Follow up in 8 weeks with CHF team    I appreciate the opportunity for caring for this patient.      Ruma Little CNP, 5/10/2021, 5:38 PM

## 2021-05-10 NOTE — PATIENT INSTRUCTIONS
1. Check labs to monitor the kidney labs, and the vit D levels  2. CXR to evaluate device positioning  3. No changes to medications  4. Continue lisinopril   5. Continue Toprol Xl   6. Continue torsemide  7. Follow up with EP as planned  8.  Follow up in 8 weeks with CHF team

## 2021-05-10 NOTE — PROGRESS NOTES
deficiency. Surgical History:   has a past surgical history that includes Gastric bypass surgery (1/7/05); Finger surgery; Dilation and curettage of uterus; lipectomy; Cholecystectomy; other surgical history; Upper gastrointestinal endoscopy (4/23/13); Cardiac catheterization (7/10/2015); transesophageal echocardiogram (7/13/2015); other surgical history (4/1/16); pacemaker placement (09/2016); hernia repair (10/20/2016); ERCP; other surgical history; Upper gastrointestinal endoscopy (03/06/2019); Colonoscopy (03/06/2019); Upper gastrointestinal endoscopy (N/A, 3/6/2019); and Colonoscopy (N/A, 3/6/2019). Social History:   reports that she quit smoking about 36 years ago. Her smoking use included cigarettes. She has a 6.00 pack-year smoking history. She has never used smokeless tobacco. She reports that she does not drink alcohol or use drugs. Family History:   Family History   Problem Relation Age of Onset    Cancer Mother     Ovarian Cancer Mother 45        Technically peritoneal cancer    Depression Mother         bipolar    Arthritis Father     Rheum Arthritis Father     Arrhythmia Father     Other Father         gout    Heart Disease Father     Depression Brother         depression    Other Brother         gout    Obesity Brother     Other Brother         gout    Obesity Brother     Depression Brother     Cancer Maternal Grandmother         Breast       Home Medications:  Prior to Admission medications    Medication Sig Start Date End Date Taking?  Authorizing Provider   allopurinol (ZYLOPRIM) 300 MG tablet TAKE 1 TABLET EVERY DAY 4/30/21  Yes ALICIA Jones CNP   lamoTRIgine (LAMICTAL) 150 MG tablet TAKE 1 TABLET TWICE DAILY OR AS OTHERWISE DIRECTED 4/23/21  Yes ALICIA Jones CNP   simvastatin (ZOCOR) 20 MG tablet TAKE 1 TABLET EVERY NIGHT 4/22/21  Yes ALICIA Murray CNP   montelukast (SINGULAIR) 10 MG tablet Take 1 tablet by mouth nightly 4/22/21  Yes Olam Child, APRN - CNP   methocarbamol (ROBAXIN) 500 MG tablet Take 1 tablet by mouth 3 times daily as needed (pain) 4/20/21  Yes ALICIA Enamorado CNP   diclofenac sodium (VOLTAREN) 1 % GEL Apply topically 2 times daily 4/15/21  Yes ALICIA Enamorado CNP   metoprolol succinate (TOPROL XL) 100 MG extended release tablet Take 1 tablet by mouth daily 4/13/21  Yes ALICIA Enamorado CNP   levothyroxine (SYNTHROID) 100 MCG tablet Take 1 tablet by mouth Daily 4/13/21  Yes ALICIA Enamorado CNP   apixaban (ELIQUIS) 5 MG TABS tablet Take 1 tablet by mouth 2 times daily 4/9/21  Yes Alis Colder, ALICIA Mora CNP   torsemide (DEMADEX) 10 MG tablet 2 tabs every other day alternating with 1 tab the other days 3/25/21  Yes Alis Colder, ALICIA Mora CNP   lisinopril (PRINIVIL;ZESTRIL) 5 MG tablet TAKE 1 TABLET EVERY DAY 12/7/20  Yes Alis Colder, ALICIA Mora CNP   acetaminophen (TYLENOL) 325 MG tablet Take 650 mg by mouth every 6 hours as needed for Pain TAKES EVERY DAY   Yes Historical Provider, MD   cetirizine (ZYRTEC) 10 MG tablet Take 10 mg by mouth daily   Yes Historical Provider, MD   BIOTIN PO Take by mouth daily    Historical Provider, MD        Allergies:  Dilaudid [hydromorphone hcl], Lavender oil, Penicillins, Seroquel [quetiapine fumarate], Adhesive tape, Hydromorphone hcl, Lithium, and Tetanus toxoids     Physical Examination:    Vitals:    05/10/21 1323   BP: 102/64   Pulse: 76   SpO2: 98%   Weight: (!) 314 lb 8 oz (142.7 kg)   Height: 5' 3\" (1.6 m)        Constitutional and General Appearance: no apparent distress, obese, appears tired and pale  HEENT: non-icteric sclera, mask in place  Neck: JVP less than 8 cm H20  Respiratory:  · No use of accessory muscles  · Clear breath sounds throughout, no wheezing, no crackles, no rhonchi  Cardiovascular: left pacer site healed, left lateral edge of pacer tender with small lymph node palpable   · The apical impulses not displaced  ·  no murmur/rub/gallop  · Regular rate and rhythm, S1,S2 normal  · Radial pulses 2+ and equal bilaterally  · + Trace BLE edema  · Pedal Pulses: 2+ and equal   Abdomen:  · No masses or tenderness  · Liver: No Abnormalities Noted  Musculoskeletal/Skin:  · Exhibits normal gait balance and coordination  · There is no clubbing, cyanosis of the extremities  · Skin is warm and dry  · Moves all extremities well  Neurological/Psychiatric:  · Alert and oriented in all spheres, tangential speech  · No abnormalities of affect, memory, mentation, or behavior are noted    Lab Data:  CBC:   Lab Results   Component Value Date    WBC 7.6 04/01/2021    WBC 5.5 03/14/2021    WBC 6.2 03/13/2021    RBC 4.31 04/01/2021    RBC 4.13 03/14/2021    RBC 4.03 03/13/2021    HGB 12.3 04/01/2021    HGB 12.1 03/14/2021    HGB 11.6 03/13/2021    HCT 38.9 04/01/2021    HCT 37.7 03/14/2021    HCT 36.6 03/13/2021    MCV 90.1 04/01/2021    MCV 91.1 03/14/2021    MCV 90.7 03/13/2021    RDW 15.0 04/01/2021    RDW 15.3 03/14/2021    RDW 15.4 03/13/2021     04/01/2021     03/14/2021     03/13/2021     Iron:   Lab Results   Component Value Date    IRON 64 03/04/2020    TIBC 357 03/04/2020    FERRITIN 25.1 03/04/2020     BMP:   Lab Results   Component Value Date     04/09/2021     04/01/2021     03/23/2021    K 4.9 04/09/2021    K 4.4 04/01/2021    K 4.5 03/23/2021    K 4.4 03/14/2021    K 4.3 03/13/2021    K 4.5 03/11/2021     04/09/2021     04/01/2021     03/23/2021    CO2 22 04/09/2021    CO2 23 04/01/2021    CO2 29 03/23/2021    PHOS 2.8 07/30/2018    PHOS 2.6 07/08/2016    PHOS 2.9 06/04/2012    BUN 22 04/09/2021    BUN 38 04/01/2021    BUN 31 03/23/2021    CREATININE 1.2 04/09/2021    CREATININE 2.0 04/01/2021    CREATININE 1.5 03/23/2021     BNP:   Lab Results   Component Value Date    PROBNP 634 04/09/2021    PROBNP 541 03/23/2021    PROBNP 1,153 03/12/2021     Lipids:   Lab Results   Component Value Date    CHOL 148 04/01/2021 Lab Results   Component Value Date    TRIG 124 04/01/2021        Lab Results   Component Value Date    HDL 62 (H) 04/01/2021        Lab Results   Component Value Date    LDLCALC 61 04/01/2021        Lab Results   Component Value Date    LABVLDL 25 04/01/2021      No results found for: CHOLHDLRATIO    EF:   Lab Results   Component Value Date    LVEF 38 03/13/2021       Recent Testing:  SANGEETHA: 07/13/15  Dilated cardiomyopathy, EF 20%. There is severe global hypokinesis. Moderate mitral regurgitation. Mild tricuspid regurgitation. Systolic pulmonary artery pressure is estimated at 42 mmHg consistent   with mild pulmonary hypertension.     Cardiac cath: 07/13/15  1.  Angiographically normal coronary arteries. 2.  Severe pulmonary hypertension 66/37 mean 49 mmHg. 3.  Markedly elevated pulmonary capillary wedge pressure 32 mmHg, and left  ventricular end diastolic pressure 36 mmHg.    4.  Transpulmonary gradient is 17 mmHg, which suggested a left-sided as well  intrinsic pulmonary etiology of the severe pulmonary hypertension.    5.  Mildly reduced cardiac index by Giovanni of 2.36 L/min/m2.     RECOMMENDATION:  The patient has no epicardial disease.  Her chest pain is from the heart failure.  She is noted to have moderate to severe mitral  regurgitation on the echocardiogram, and will schedule for SANGEETHA to further  evaluate the mitral regurgitation.  In the meantime, we will also start her    on heart failure therapy that will include a beta-blocker and ACE inhibitor    as well as diuretic therapy.      Echo 10/16/17  Normal size left ventricle with mild concentric left ventricular  hypertrophy. Systolic function appears mildly reduced with an estimated ejection fraction  of 40-45 %. There is hypokinesis of the basal,mid inferoseptal wall, inferior wall and  anteroseptal wall. Elevated left ventricle diastolic filling pressure ( IVRT - 53 /ms ).   Compared to last echo on 3/23/2017 the left ventricle systolic pressure of 3 mmHg). The right atrium is mildly dilated. NYHA:   I-II  ACC/ AHA Stage:    C    Pertinent Problems:  - Non-ischemic dilated cardiomyopathy, variable EF, most recent 35 to 40%  - s/p BiV-AICD  - Pulmonary HTN due to CHF & intrinsic pulmonary disease   - LBBB  -PAF on Eliquis  - status post gastric bypass    - Hypertension  - Hyperlipidemia    Visit Diagnosis:    1. Dilated cardiomyopathy ([facility] [address]    2. Pacemaker    3. Biventricular ICD (implantable cardioverter-defibrillator) in place    4. S/P gastric bypass    5. Morbid obesity ([address]    6. Vitamin D deficiency, unspecified       Plan:   Tenderness noted to the left side of the pacer, which appears to be lymphadenopathy. Patient did have recent covid vaccine. Recommend CXR to evaluate device positioning as she has reports of the device migrating/moving at times. She is scheduled for her mammogram later this month. Recommend follow up with primary care if not improving. 1. Check labs to monitor the kidney labs, and the vit D levels  2. CXR to evaluate device positioning  3. No changes to medications  4. Continue lisinopril   5. Continue Toprol Xl   6. Continue torsemide  7. Follow up with EP as planned  8. Follow up in 8 weeks with CHF team    I appreciate the opportunity for caring for this patient.      Jace Mcdermott CNP, 5/10/2021, 5:38 PM

## 2021-05-11 ENCOUNTER — TELEPHONE (OUTPATIENT)
Dept: CARDIOLOGY CLINIC | Age: 61
End: 2021-05-11

## 2021-05-11 NOTE — RESULT ENCOUNTER NOTE
Chest x-ray looks good. Advice appears to be in stable position similar to prior. No other abnormalities around the pacemaker were noted. Keep follow-up with mammogram and primary care regarding the tenderness.

## 2021-05-11 NOTE — TELEPHONE ENCOUNTER
----- Message from ALICIA Arthur CNP sent at 5/11/2021 11:14 AM EDT -----  Chest x-ray looks good. Advice appears to be in stable position similar to prior. No other abnormalities around the pacemaker were noted. Keep follow-up with mammogram and primary care regarding the tenderness.

## 2021-05-11 NOTE — RESULT ENCOUNTER NOTE
Please call patient. Vitamin B12 level is normal.  Vitamin D level is low. I recommend we start her on a daily vitamin D supplement 2000 IUs daily. proBNP your heart numbers improved. Kidney function is stable.

## 2021-05-12 PROCEDURE — 93289 INTERROG DEVICE EVAL HEART: CPT | Performed by: INTERNAL MEDICINE

## 2021-05-13 RX ORDER — LISINOPRIL 5 MG/1
TABLET ORAL
Qty: 90 TABLET | Refills: 1 | Status: SHIPPED | OUTPATIENT
Start: 2021-05-13 | End: 2021-10-15

## 2021-05-17 ENCOUNTER — TELEPHONE (OUTPATIENT)
Dept: PULMONOLOGY | Age: 61
End: 2021-05-17

## 2021-05-17 ENCOUNTER — VIRTUAL VISIT (OUTPATIENT)
Dept: PULMONOLOGY | Age: 61
End: 2021-05-17
Payer: MEDICARE

## 2021-05-17 DIAGNOSIS — R06.83 SNORING: Primary | ICD-10-CM

## 2021-05-17 DIAGNOSIS — R53.83 OTHER FATIGUE: ICD-10-CM

## 2021-05-17 DIAGNOSIS — G47.10 HYPERSOMNIA: ICD-10-CM

## 2021-05-17 DIAGNOSIS — Z72.821 POOR SLEEP HYGIENE: ICD-10-CM

## 2021-05-17 DIAGNOSIS — I10 ESSENTIAL HYPERTENSION: Chronic | ICD-10-CM

## 2021-05-17 DIAGNOSIS — I50.9 CHRONIC CONGESTIVE HEART FAILURE, UNSPECIFIED HEART FAILURE TYPE (HCC): ICD-10-CM

## 2021-05-17 DIAGNOSIS — F51.04 PSYCHOPHYSIOLOGICAL INSOMNIA: ICD-10-CM

## 2021-05-17 DIAGNOSIS — R51.9 MORNING HEADACHE: ICD-10-CM

## 2021-05-17 PROCEDURE — 99214 OFFICE O/P EST MOD 30 MIN: CPT | Performed by: NURSE PRACTITIONER

## 2021-05-17 PROCEDURE — G8427 DOCREV CUR MEDS BY ELIG CLIN: HCPCS | Performed by: NURSE PRACTITIONER

## 2021-05-17 ASSESSMENT — SLEEP AND FATIGUE QUESTIONNAIRES
HOW LIKELY ARE YOU TO NOD OFF OR FALL ASLEEP WHILE SITTING AND TALKING TO SOMEONE: 1
HOW LIKELY ARE YOU TO NOD OFF OR FALL ASLEEP WHILE WATCHING TV: 1
HOW LIKELY ARE YOU TO NOD OFF OR FALL ASLEEP IN A CAR, WHILE STOPPED FOR A FEW MINUTES IN TRAFFIC: 0
HOW LIKELY ARE YOU TO NOD OFF OR FALL ASLEEP WHILE SITTING INACTIVE IN A PUBLIC PLACE: 1

## 2021-05-17 NOTE — PROGRESS NOTES
Patient ID: Mayco Shah is a 56 y.o. female who is being seen today for   Chief Complaint   Patient presents with    New Patient     Sleep ref by Asha Zafar     Referring: ALICIA Santamaria-SANTO    HPI:   Mayco Shah is a 56 y.o. female for televideo appointment via video and audio doxy. me virtual visit for witnessed apnea evaluation. Patient reports snoring at night for the past unknown years. Worse in supine position. Wakes self snoring. Unsure if witnessed apnea. Wakes up at night choking or gasping for air. No restorative sleep. +dry mouth upon awakening. Fatigue and tiredness during the day. No history of sleep apnea. Bedtime 11 pm-3am  and rise time is 730-830 am. It takes 45 minutes to fall asleep- lays there, sometimes might watch TV or look at phone. 2 -3nocturia. Wakes up 2-3 times at night. It takes 10-unknown minutes to fall back a sleep. Takes 1-3 nap during the day ( minutes). +headache in am. No car wrecks or near wrecks because of the sleepiness. No nodding off while driving. Gained 60 pounds in the past 1-2 years. +forgetfulness and decreased concentration. Drinks 1-2 caffinated beverages per day. No significant alcohol. Sometimes restless feelings in legs at night.  +teeth grinding. No nightmares. No sleep walking. +night time panic attacks. No narcotics. No drug abuse. +history of depression, +history of anxiety, + history of bipolar disorder. +history of atrial fibrillation. No history of DM. +history of HTN. No history of ischemic heart disease. +history of stroke, TIAx3. ESS is 10. No smoking. No known FH for  narcolepsy.  +FH for MARCY and RLS- brothers    Sleep Medicine 5/17/2021   Sitting and reading 2   Watching TV 1   Sitting, inactive in a public place (e.g. a theatre or a meeting) 1   As a passenger in a car for an hour without a break 1   Lying down to rest in the afternoon when circumstances permit 2   Sitting and talking to someone 1   Sitting quietly after a lunch MD [facility] at [facility]:  is allergic to dilaudid [hydromorphone hcl], lavender oil, penicillins, seroquel [quetiapine fumarate], adhesive tape, hydromorphone hcl, lithium, and tetanus toxoids. Social History:    TOBACCO:   reports that she quit smoking about 36 years ago. Her smoking use included cigarettes. She has a 6.00 pack-year smoking history. She has never used smokeless tobacco.  ETOH:   reports no history of alcohol use.     Family History:       Problem Relation Age of Onset    Cancer Mother     Ovarian Cancer Mother 45        Technically peritoneal cancer    Depression Mother         bipolar    Arthritis Father     Rheum Arthritis Father     Arrhythmia Father     Other Father         gout    Heart Disease Father     Depression Brother         depression    Other Brother         gout    Obesity Brother     Other Brother         gout    Obesity Brother     Depression Brother     Cancer Maternal Grandmother         Breast       Current Medications:    Current Outpatient Medications:     lisinopril (PRINIVIL;ZESTRIL) 5 MG tablet, TAKE 1 TABLET EVERY DAY, Disp: 90 tablet, Rfl: 1    allopurinol (ZYLOPRIM) 300 MG tablet, TAKE 1 TABLET EVERY DAY, Disp: 90 tablet, Rfl: 1    lamoTRIgine (LAMICTAL) 150 MG tablet, TAKE 1 TABLET TWICE DAILY OR AS OTHERWISE DIRECTED, Disp: 180 tablet, Rfl: 1    simvastatin (ZOCOR) 20 MG tablet, TAKE 1 TABLET EVERY NIGHT, Disp: 90 tablet, Rfl: 1    montelukast (SINGULAIR) 10 MG tablet, Take 1 tablet by mouth nightly, Disp: 90 tablet, Rfl: 1    methocarbamol (ROBAXIN) 500 MG tablet, Take 1 tablet by mouth 3 times daily as needed (pain), Disp: 90 tablet, Rfl: 1    diclofenac sodium (VOLTAREN) 1 % GEL, Apply topically 2 times daily, Disp: 150 g, Rfl: 2    metoprolol succinate (TOPROL XL) 100 MG extended release tablet, Take 1 tablet by mouth daily, Disp: 90 tablet, Rfl: 0    levothyroxine (SYNTHROID) 100 MCG tablet, Take 1 tablet by mouth Daily, Disp: 90 tablet, Rfl: 0    apixaban (ELIQUIS) 5 MG TABS tablet, Take 1 tablet by mouth 2 times daily, Disp: 180 tablet, Rfl: 3    torsemide (DEMADEX) 10 MG tablet, 2 tabs every other day alternating with 1 tab the other days, Disp: 180 tablet, Rfl: 3    acetaminophen (TYLENOL) 325 MG tablet, Take 650 mg by mouth every 6 hours as needed for Pain TAKES EVERY DAY, Disp: , Rfl:     cetirizine (ZYRTEC) 10 MG tablet, Take 10 mg by mouth daily, Disp: , Rfl:     BIOTIN PO, Take by mouth daily (Patient not taking: Reported on 5/17/2021), Disp: , Rfl:       REVIEW OF SYSTEMS:  Review of Systems    Constitutional: Negative for fever  HENT: Negative for sore throat  Eyes: Negative for redness   Respiratory: Negative for dyspnea, cough  Cardiovascular: Negative for chest pain  Gastrointestinal: Negative for vomiting, diarrhea   Genitourinary: Negative for hematuria   Musculoskeletal: +arthralgias   Skin: Negative for rash  Neurological: Negative for syncope  Hematological: Negative for adenopathy  Psychiatric/Behavorial: Negative for anxiety      Objective:   PHYSICAL EXAM:  There were no vitals taken for this visit. Physical Exam  Exam:  Gen: No acute distress, does not appear to be in pain. Appears well developed and nourished. HENT: Head is normocephalic and atraumatic. Normal appearing nose. External Ears normal.   Neck: No visualized mass. Trachea is midline   Eyes: EOM intact. No visible discharge. Resp:No visualized signs of difficulty breathing or respiratory distress, speaking in full sentences. Respiratory effort normal.  Neuro: Awake. Alert. Able to follow commands. No facial asymmetry. Skin: No significant lesions or discoloration noted on facial skin    Musculoskeletal: Normal range of motion of the neck. Psych: Oriented x 3. No anxiety. Normal affect.           DATA:   3/13/2021 Echo EF 35-40%    Assessment:       · Snoring  · Hypersomnia   · Fatigue  · Morning headache  · Morbid obesity  · Sleep onset and maintenance insomniapsychophysiological hyperarousal, poor sleep hygiene, comorbid conditions, Probable MARCY likely contributing  · Cardiomyopathy, CHF, hypertension, AICD, arrhythmia, PAFfollowed by cardiology        Plan:      · PSG to evaluate forsleep related breathing disorder. · Treatment options were discussed with patient if PSG reveals MARCY, including CPAP therapy, oral appliances and upper airway surgery. · Sleep hygiene  · Cognitive behavioral therapy was discussed with patient including stimulus control and sleep restriction  · Recommend set bed/wake times, no naps in the daytime  · Avoid sedatives, alcohol and caffeinated drinks at bed time. · No driving motorized vehicles or operating heavy machinery while fatigue, drowsy or sleepy. · Weight loss is also recommended as a long-term intervention. · Complications of MARCY if not treated were discussed with patient patient to include systemic hypertension, pulmonary hypertension, cardiovascular morbidities, car accidents and all cause mortality. Discussed pathophysiology of MARCY with patient today. Patient education handout provided regarding sleep tips      Consent for telehealth visit was obtained and is noted in chart      THIS VISIT WAS COMPLETED VIRTUALLY USING DOXY. Batool Mckeon is a 56 y.o. female being evaluated by a Virtual Visit (video visit) encounter to address concerns as mentioned above. A caregiver was present when appropriate. Due to this being a TeleHealth encounter (During [address] emergency), evaluation of the following organ systems was limited: Vitals/Constitutional/EENT/Resp/CV/GI//MS/Neuro/Skin/Heme-Lymph-Imm.   Pursuant to the emergency declaration under the [address] [place] [address], [address] [place] [address] authority and the [employer] Act, this Virtual Visit was conducted with patient's (and/or legal guardian's) consent, to reduce the patient's risk of exposure to COVID-19 and provide necessary medical care. The patient (and/or legal guardian) has also been advised to contact this office for worsening conditions or problems, and seek emergency medical treatment and/or call 911 if deemed necessary. Patient identification was verified at the start of the visit: Yes    Total time spent for this encounter: Not billed by time    Services were provided through a video synchronous discussion virtually to substitute for in-person clinic visit. Patient and provider were located at their individual homes. --Goldie Ganser, APRN - CNP on 5/17/2021 at 5:04 PM    An electronic signature was used to authenticate this note.

## 2021-05-17 NOTE — LETTER
[facility] Pulmonary, Critical Care, and Sleep  [address] [address]  Phone: [phone]  Fax: [phone]           Goldie Ganser, APRN - CNP      May 17, 2021     Patient: Mohan Scott   MR Number: [id number]   YOB: 1960   Date of Visit: 5/17/2021       Dear Luciana Stacy: Thank you for referring Teddy Stoner to me for evaluation/treatment. Below are the relevant portions of my assessment and plan of care. Assessment:       · Snoring  · Hypersomnia   · Fatigue  · Morning headache  · Morbid obesity  · Sleep onset and maintenance insomniapsychophysiological hyperarousal, poor sleep hygiene, comorbid conditions, Probable MARCY likely contributing  · Cardiomyopathy, CHF, hypertension, AICD, arrhythmia, PAFfollowed by cardiology        Plan:      · PSG to evaluate forsleep related breathing disorder. · Treatment options were discussed with patient if PSG reveals MARCY, including CPAP therapy, oral appliances and upper airway surgery. · Sleep hygiene  · Cognitive behavioral therapy was discussed with patient including stimulus control and sleep restriction  · Recommend set bed/wake times, no naps in the daytime  · Avoid sedatives, alcohol and caffeinated drinks at bed time. · No driving motorized vehicles or operating heavy machinery while fatigue, drowsy or sleepy. · Weight loss is also recommended as a long-term intervention. · Complications of MARCY if not treated were discussed with patient patient to include systemic hypertension, pulmonary hypertension, cardiovascular morbidities, car accidents and all cause mortality. Discussed pathophysiology of MARCY with patient today. Patient education handout provided regarding sleep tips       If you have questions, please do not hesitate to call me. I look forward to following Virginie Stanton along with you.     Sincerely,        Pamela Ganser, APRN - CNP    CC providers:  ALICIA Beverly CNP  [address] [facility] [facility]

## 2021-05-17 NOTE — TELEPHONE ENCOUNTER
Within this Telehealth Consent, the terms you and yours refer to the person using the Telehealth Service (Service), or in the case of a use of the Service by or on behalf of a minor, you and yours refer to and include (i) the parent or legal guardian who provides consent to the use of the Service by such minor or uses the Service on behalf of such minor, and (ii) the minor for whom consent is being provided or on whose behalf the Service is being utilized. When using Service, you will be consulting with your health care providers via the use of Telehealth.   Telehealth involves the delivery of healthcare services using electronic communications, information technology or other means between a healthcare provider and a patient who are not in the same physical location. Telehealth may be used for diagnosis, treatment, follow-up and/or patient education, and may include, but is not limited to, one or more of the following:    Electronic transmission of medical records, photo images, personal health information or other data between a patient and a healthcare provider    Interactions between a patient and healthcare provider via audio, video and/or data communications    Use of output data from medical devices, sound and video files    Anticipated Benefits   The use of Telehealth by your Provider(s) through the Service may have the following possible benefits:    Making it easier and more efficient for you to access medical care and treatment for the conditions treated by such Provider(s) utilizing the Service    Allowing you to obtain medical care and treatment by Provider(s) at times that are convenient for you    Enabling you to interact with Provider(s) without the necessity of an in-office appointment     Possible Risks   While the use of Telehealth can provide potential benefits for you, there are also potential risks associated with the use of Telehealth.  These risks include, but may not be limited to the following:    Your Provider(s) may not able to provide medical treatment for your particular condition and you may be required to seek alternative healthcare or emergency care services.  The electronic systems or other security protocols or safeguards used in the Service could fail, causing a breach of privacy of your medical or other information.  Given regulatory requirements in certain jurisdictions, your Provider(s) diagnosis and/or treatment options, especially pertaining to certain prescriptions, may be limited. Acceptance   1. You understand that Services will be provided via Telehealth. This process involves the use of HIPAA compliant and secure, real-time audio-visual interfacing with a qualified and appropriately trained provider located at [facility]. 2. You understand that, under no circumstances, will this session be recorded. 3. You understand that the Provider(s) at [facility] and other clinical participants will be party to the information obtained during the Telehealth session in accordance with best medical practices. 4. You understand that the information obtained during the Telehealth session will be used to help determine the most appropriate treatment options. 5. You understand that You have the right to revoke this consent at any point in time. 6. You understand that Telehealth is voluntary, and that continued treatment is not dependent upon consent. 7. You understand that, in the event of non-consent to Telehealth services and/or technical difficulties, you will obtain services as typically provided in the absence of Telehealth technology. 8. You understand that this consent will be kept in Your medical record. 9. No potential benefits from the use of Telehealth or specific results can be guaranteed. Your condition may not be cured or improved and, in some cases, may get worse.    10. There are limitations in the provision of medical care and treatment via Telehealth and the Service and you may not be able to receive diagnosis and/or treatment through the Service for every condition for which you seek diagnosis and/or treatment. 11. There are potential risks to the use of Telehealth, including but not limited to the risks described in this Telehealth Consent. 12. Your Provider(s) have discussed the use of Telehealth and the Service with you, including the benefits and risks of such and you have provided oral consent to your Provider(s) for the use of Telehealth and the Service. 15. You understand that it is your duty to provide your Provider(s) truthful, accurate and complete information, including all relevant information regarding care that you may have received or may be receiving from other healthcare providers outside of the Service. 14. You understand that each of your Provider(s) may determine in his or sole discretion that your condition is not suitable for diagnosis and/or treatment using the Service, and that you may need to seek medical care and treatment a specialist or other healthcare provider, outside of the Service. 15. You understand that you are fully responsible for payment for all services provided by Provider(s) or through use of the Service and that you may not be able to use third-party insurance. 16. You represent that (a) you have read this Telehealth Consent carefully, (b) you understand the risks and benefits of the Service and the use of Telehealth in the medical care and treatment provided to you by Provider(s) using the Service, and (c) you have the legal capacity and authority to provide this consent for yourself and/or the minor for which you are consenting under applicable federal and state laws, including laws relating to the age of [de-identified] and/or parental/guardian consent.    17. You give your informed consent to the use of Telehealth by Provider(s) using the Service under the terms described in the Terms of Service and this Telehealth Consent. The patient was read the following statement and has consented to the visit as of 5/17/21. The patient has been scheduled for their first telehealth visit on 5/17/21 with Layla Sanchez.

## 2021-05-17 NOTE — PATIENT INSTRUCTIONS
Sleep Hygiene. .. Tips for better sleep. .. Avoid naps. This will ensure you are sleepy at bedtime. If you have to take a nap, sleep less than 1 hour, before 3 pm.  Sleep only when sleepy; this reduces the time you are awake in bed. Regular exercise is recommended to help you deepen your sleep, but not within 4-6 hours of your bedtime. Timing of exercise is important, aim to exercise early in the morning or early afternoon. A light snack may help you fall asleep. Warm milk and foods high in the amino acid tryptophan, such as bananas, may help you to sleep  Be sure to avoid heavy, spicy or sugary foods 4-6 hours before bedtime and avoid at snack time. Stay away from stimulants such as caffeine and nicotine for at least 4-6 hours before bed. Stimulants can interfere with your ability to fall asleep. Caffeine is found in tea, cola, coffee, cocoa and chocolate and is best avoided at bedtime. Nicotine is found in tobacco products. Avoid alcohol 4-6 hours before bedtime. Alcohol has an immediate sleep-inducing effect, after a few hours when alcohol levels fall there is a stimulant or wake-up effect and will cause fragmented sleep. Sleep rituals are important. Give your body clues it is time to slow down and sleep. Examples include; yoga, deep breathing, listen to relaxing music, a hot bath or a few minutes of reading. Have a fixed bedtime and awakening time, Even on weekends! You will feel better keeping a regular sleep cycle, even if you are retired or not working. Get into your favorite sleep position. If not asleep in 30 minutes, get up and do something boring until you feel sleepy. Remember not to expose yourself to bright lights such as TV, phone or tablet screens. Only use your bed for sleeping. Do not use your bed as an office, workroom or recreation room. Use comfortable bedding. Uncomfortable bedding can prevent good sleep. Ensure your bedroom is quiet and comfortable.  A cooler room along with enough blankets to stay warm is recommended. If your room is too noisy, try a white noise machine. If too bright, try black out shades or an eye mask. Dont take worries to bed. Leave worries about work, school etc. behind you when you go to bed. Some people find it helpful to assign a worry period in the evening or late afternoon to write down your worries and get them out of your system. The Sleep Center at [address] [facility], [address] [place] [address]                      Phone: [phone] Fax: [phone]      Please arrive at the [facility] at the time indicated. On Saturday and Sunday night a sleep tech will come down to let you in the building and escort you upstairs to the sleep center; please call from the parking lot if no one is at the door when you arrive. PLEASE DO NOT ARRIVE TO THE SLEEP CENTER ANY EARLIER THAN 8:30PM AS THERE IS NO STAFF ON DUTY AND THE DOORS WILL BE LOCKED    IMPORTANT: We ask that you please phone the [facility] Patient Pre-Services ([phone]) at least 3-5 days prior to your sleep study to pre-register. Failing to pre-register may ultimately cause your insurance to not pay for this procedure. Please be aware that [facility] is a non-smoking facility. There is no smoking allowed anywhere on [facility] property at any time. Each patient room is a private room with cable television, WiFi and a private bathroom with shower facilities. The test itself consists of electrodes and sensors attached to specific areas of your scalp, face, chest and legs. We will also monitor respiratory effort, nasal and oral airflow and oxygen levels. The test will not hurt; it is completely painless and not invasive in any way. Please bring with you:  ? Appropriate nightclothes (pajamas, sweats, etc.), slippers and robe  ?  All medications you need during your stay, including breathing medications, nebulizers and metered dose inhalers, as well as a complete list of all medications you are currently taking. ? Your own toiletries and hairdryer if you wish to shower before you leave  ? Current Photo I.D. and insurance card  ? We do not allow any pillows or bed linens from home due to health regulations  ? We recommend that you do not bring valuables with you to the Sleep Center as we cannot be responsible for any lost or damaged personal items. Please refrain from or reduce the use of caffeine and/or alcohol prior to your sleep study and avoid napping the day of your study as these will affect the accuracy of your test results. If you are ill the day of your test (cold, upper respiratory infection, flu, etc.) please call to reschedule your test as the test will not be accurate if you are ill. If you should need to cancel or reschedule your appointment, please call the Sleep Center at [phone] as soon as possible. Please also call the Sleep Center directly to let us know if you have any special needs, dietary needs or for any further questions.

## 2021-05-26 ENCOUNTER — TELEPHONE (OUTPATIENT)
Dept: FAMILY MEDICINE CLINIC | Age: 61
End: 2021-05-26

## 2021-05-26 NOTE — TELEPHONE ENCOUNTER
----- Message from Sherren Graven sent at 5/25/2021  9:26 AM EDT -----  Subject: Appointment Request    Reason for Call: Routine Medicare AWV    QUESTIONS  Type of Appointment? Established Patient  Reason for appointment request? Available appointments did not meet   patient need  Additional Information for Provider? pt needed to reschedule her   appointment for her AWV that's scheduled for 7/1/2021 to the week after   ---------------------------------------------------------------------------  --------------  CALL BACK INFO  What is the best way for the office to contact you? OK to leave message on   voicemail  Preferred Call Back Phone Number? [phone]  ---------------------------------------------------------------------------  --------------  SCRIPT ANSWERS  Relationship to Patient? Self  Have your symptoms changed? No  Appointment reason? Well Care/Follow Ups  Select a Well Care/Follow Ups appointment reason? Adult Physical Exam   [Medicare Annual Wellness, AWV, PAP, Pelvic]  (If the patient has Medicare as their primary insurance coverage ask this   question) Are you requesting a Medicare Annual Wellness Visit? Yes   (Is the patient requesting a pap smear with their physical exam?)? No  (Is the patient requesting their annual physical and does not need PAP or   AWV per above?)? No  Have you been diagnosed with, tested for, or told that you are suspected   of having COVID-19 (Coronavirus)? No  Have you had a fever or taken medication to treat a fever within the past   3 days? No  Have you had a cough, shortness of breath or flu-like symptoms within the   past 3 days? No  Do you currently have flu-like symptoms including fever or chills, cough,   shortness of breath, or difficulty breathing, or new loss of taste or   smell? No  (Service Expert  click yes below to proceed with [facility] Clothing As Usual   Scheduling)?  Yes

## 2021-06-02 ENCOUNTER — CLINICAL DOCUMENTATION (OUTPATIENT)
Dept: OTHER | Age: 61
End: 2021-06-02

## 2021-07-08 ENCOUNTER — HOSPITAL ENCOUNTER (OUTPATIENT)
Dept: SLEEP CENTER | Age: 61
Discharge: HOME | End: 2021-07-10
Payer: MEDICARE

## 2021-07-08 DIAGNOSIS — Z72.821 POOR SLEEP HYGIENE: ICD-10-CM

## 2021-07-08 DIAGNOSIS — G47.10 HYPERSOMNIA: ICD-10-CM

## 2021-07-08 DIAGNOSIS — R51.9 MORNING HEADACHE: ICD-10-CM

## 2021-07-08 DIAGNOSIS — R53.83 OTHER FATIGUE: ICD-10-CM

## 2021-07-08 DIAGNOSIS — F51.04 PSYCHOPHYSIOLOGICAL INSOMNIA: ICD-10-CM

## 2021-07-08 DIAGNOSIS — I50.9 CHRONIC CONGESTIVE HEART FAILURE, UNSPECIFIED HEART FAILURE TYPE (HCC): ICD-10-CM

## 2021-07-08 DIAGNOSIS — I10 ESSENTIAL HYPERTENSION: Chronic | ICD-10-CM

## 2021-07-08 DIAGNOSIS — R06.83 SNORING: ICD-10-CM

## 2021-07-08 PROCEDURE — 95810 POLYSOM 6/> YRS 4/> PARAM: CPT

## 2021-07-09 ENCOUNTER — NURSE ONLY (OUTPATIENT)
Dept: CARDIOLOGY CLINIC | Age: 61
End: 2021-07-09
Payer: MEDICARE

## 2021-07-09 ENCOUNTER — OFFICE VISIT (OUTPATIENT)
Dept: CARDIOLOGY CLINIC | Age: 61
End: 2021-07-09
Payer: MEDICARE

## 2021-07-09 VITALS
HEART RATE: 72 BPM | WEIGHT: 293 LBS | HEIGHT: 63 IN | BODY MASS INDEX: 51.91 KG/M2 | TEMPERATURE: 98.1 F | DIASTOLIC BLOOD PRESSURE: 68 MMHG | OXYGEN SATURATION: 98 % | SYSTOLIC BLOOD PRESSURE: 102 MMHG

## 2021-07-09 DIAGNOSIS — I50.22 CHRONIC SYSTOLIC CONGESTIVE HEART FAILURE (HCC): ICD-10-CM

## 2021-07-09 DIAGNOSIS — I42.0 DILATED CARDIOMYOPATHY (HCC): ICD-10-CM

## 2021-07-09 DIAGNOSIS — I50.42 CHRONIC COMBINED SYSTOLIC AND DIASTOLIC CONGESTIVE HEART FAILURE (HCC): ICD-10-CM

## 2021-07-09 DIAGNOSIS — Z95.810 BIVENTRICULAR ICD (IMPLANTABLE CARDIOVERTER-DEFIBRILLATOR) IN PLACE: ICD-10-CM

## 2021-07-09 DIAGNOSIS — Z95.810 BIVENTRICULAR ICD (IMPLANTABLE CARDIOVERTER-DEFIBRILLATOR) IN PLACE: Primary | ICD-10-CM

## 2021-07-09 PROCEDURE — 99214 OFFICE O/P EST MOD 30 MIN: CPT | Performed by: NURSE PRACTITIONER

## 2021-07-09 PROCEDURE — G8427 DOCREV CUR MEDS BY ELIG CLIN: HCPCS | Performed by: NURSE PRACTITIONER

## 2021-07-09 PROCEDURE — 93290 INTERROG DEV EVAL ICPMS IP: CPT | Performed by: NURSE PRACTITIONER

## 2021-07-09 PROCEDURE — 3017F COLORECTAL CA SCREEN DOC REV: CPT | Performed by: NURSE PRACTITIONER

## 2021-07-09 PROCEDURE — 1036F TOBACCO NON-USER: CPT | Performed by: NURSE PRACTITIONER

## 2021-07-09 PROCEDURE — G8417 CALC BMI ABV UP PARAM F/U: HCPCS | Performed by: NURSE PRACTITIONER

## 2021-07-09 RX ORDER — ERGOCALCIFEROL 1.25 MG/1
50000 CAPSULE ORAL WEEKLY
Qty: 4 CAPSULE | Refills: 0 | Status: SHIPPED | OUTPATIENT
Start: 2021-07-09 | End: 2021-07-14 | Stop reason: SDUPTHER

## 2021-07-09 NOTE — PATIENT INSTRUCTIONS
1. Check labs, BMP, mag level, BNP   2. Start Vit D supplement for the 4 weeks  3. Continue lisinopril   4. Continue Toprol Xl   5. Continue torsemide  6. Device check is pending   7.  Follow up in 4 months

## 2021-07-09 NOTE — PROGRESS NOTES
OPTIVOL/TI per SUSHMA. BRADFORD @ [facility]. [facility] [employer] transmission. Transmission shows normal sensing and pacing function. EP physician will review. See interrogation under the cardiology tab in the [facility] [address] field for more details. Will continue to monitor remotely.

## 2021-07-09 NOTE — PROGRESS NOTES
[address]   Cardiac Follow-up    Primary Care Doctor:  Aris Nguyen MD    Chief Complaint   Patient presents with    3 Month Follow-Up    Congestive Heart Failure    Other     More than usual shortness of breath, depression        History of Present Illness:   I had the pleasure of seeing Alayna Perdomo in follow up for cardiomyopathy. Hx of CHF, non-ischemic dilated cardiomyopathy, S/P  BiV-AICD 2016, pulmonary HTN, LBBB, HTN.     admitted to the hospital 3/11/2021-3/14/2021 with chest pain, MADELEINE, shortness of breath, device check showed NSVT, A. fib with RVR. On 4/1/21 cardioversion was canceled, as her interrogated device showed typical a flutter, and device changes were made and converted to NSR. Since last visit, hasn't started the vit D. Labs showed Vit D level at 20. She continues to have depressed mood. Not eating well. Having shortness of breath with increase activity. Gets fatigued and tired with activity. Doesn't sleep well. She has been having palpitations at times when she gets upset. The sensation lasts a few seconds compared to few minutes. Tenderness of the device site has improved. She continues to take her torsemide 2 tabs every other day and 1 tab the other days. She has good UOP when she takes the 2 tabs. Alayna Perdomo describes symptoms including dyspnea, palpitations, fatigue but denies syncope. Past Medical History:   has a past medical history of Anesthesia complication, Autoimmune hemolytic anemia ([address] Bipolar disorder ([address] Bundle branch block, Cardiomyopathy ([address] Chronic systolic CHF (congestive heart failure) ([address] Depression, Family history of early CAD, Family history of ovarian cancer, GERD (gastroesophageal reflux disease), Gout, Hypertension, Hypothyroid, Osteoarthritis, knee, Pneumonia, S/P gastric bypass, Unspecified cerebral artery occlusion with cerebral infarction, Uterine cancer ([address] and Vitamin B 12 deficiency.   Surgical History:   has a past surgical history that includes Gastric bypass surgery (1/7/05); Finger surgery; Dilation and curettage of uterus; lipectomy; Cholecystectomy; other surgical history; Upper gastrointestinal endoscopy (4/23/13); Cardiac catheterization (7/10/2015); transesophageal echocardiogram (7/13/2015); other surgical history (4/1/16); pacemaker placement (09/2016); hernia repair (10/20/2016); ERCP; other surgical history; Upper gastrointestinal endoscopy (03/06/2019); Colonoscopy (03/06/2019); Upper gastrointestinal endoscopy (N/A, 3/6/2019); and Colonoscopy (N/A, 3/6/2019). Social History:   reports that she quit smoking about 36 years ago. Her smoking use included cigarettes. She has a 6.00 pack-year smoking history. She has never used smokeless tobacco. She reports that she does not drink alcohol and does not use drugs. Family History:   Family History   Problem Relation Age of Onset    Cancer Mother     Ovarian Cancer Mother 45        Technically peritoneal cancer    Depression Mother         bipolar    Arthritis Father     Rheum Arthritis Father     Arrhythmia Father     Other Father         gout    Heart Disease Father     Depression Brother         depression    Other Brother         gout    Obesity Brother     Other Brother         gout    Obesity Brother     Depression Brother     Cancer Maternal Grandmother         Breast       Home Medications:  Prior to Admission medications    Medication Sig Start Date End Date Taking?  Authorizing Provider   lisinopril (PRINIVIL;ZESTRIL) 5 MG tablet TAKE 1 TABLET EVERY DAY 5/13/21   Arlene Kehr, APRN - CNP   allopurinol (ZYLOPRIM) 300 MG tablet TAKE 1 TABLET EVERY DAY 4/30/21   ALICIA Cali CNP   lamoTRIgine (LAMICTAL) 150 MG tablet TAKE 1 TABLET TWICE DAILY OR AS OTHERWISE DIRECTED 4/23/21   ALICIA Cali CNP   simvastatin (ZOCOR) 20 MG tablet TAKE 1 TABLET EVERY NIGHT 4/22/21   ALICIA Cook CNP   montelukast (SINGULAIR) 10 MG tablet Take 1 tablet by mouth nightly 4/22/21   ALICIA Mon CNP   methocarbamol (ROBAXIN) 500 MG tablet Take 1 tablet by mouth 3 times daily as needed (pain) 4/20/21   ALICIA Mon CNP   diclofenac sodium (VOLTAREN) 1 % GEL Apply topically 2 times daily 4/15/21   ALICIA Mon CNP   metoprolol succinate (TOPROL XL) 100 MG extended release tablet Take 1 tablet by mouth daily 4/13/21   ALICIA Mon CNP   levothyroxine (SYNTHROID) 100 MCG tablet Take 1 tablet by mouth Daily 4/13/21   ALICIA Mon CNP   apixaban (ELIQUIS) 5 MG TABS tablet Take 1 tablet by mouth 2 times daily 4/9/21   ALICIA Pedroza CNP   torsemide (DEMADEX) 10 MG tablet 2 tabs every other day alternating with 1 tab the other days 3/25/21   ALICIA Pedroza CNP   BIOTIN PO Take by mouth daily  Patient not taking: Reported on 5/17/2021    Historical Provider, MD   acetaminophen (TYLENOL) 325 MG tablet Take 650 mg by mouth every 6 hours as needed for Pain TAKES EVERY DAY    Historical Provider, MD   cetirizine (ZYRTEC) 10 MG tablet Take 10 mg by mouth daily    Historical Provider, MD        Allergies:  Dilaudid [hydromorphone hcl], Lavender oil, Penicillins, Seroquel [quetiapine fumarate], Adhesive tape, Hydromorphone hcl, Lithium, and Tetanus toxoids     Physical Examination:    Vitals:    07/09/21 1033   BP: 102/68   Pulse: 72   Temp: 98.1 °F (36.7 °C)   SpO2: 98%   Weight: (!) 318 lb 12.8 oz (144.6 kg)   Height: 5' 3\" (1.6 m)        Constitutional and General Appearance: no apparent distress, obese  HEENT: non-icteric sclera, mask in place  Neck: JVP less than 8 cm H20  Respiratory:  · No use of accessory muscles  · Clear breath sounds throughout, no wheezing, no crackles, no rhonchi  Cardiovascular:   · The apical impulses not displaced  ·  no murmur/rub/gallop  · Regular rate and rhythm, S1,S2 normal  · Radial pulses 2+ and equal bilaterally  · No BLE edema  · Pedal Pulses: 2+ and equal   Abdomen:  · No masses or tenderness  · Liver: No Abnormalities Noted  Musculoskeletal/Skin:  · Exhibits normal gait balance and coordination  · There is no clubbing, cyanosis of the extremities  · Skin is warm and dry  · Moves all extremities well  Neurological/Psychiatric:  · Alert and oriented in all spheres, tangential speech  · No abnormalities of affect, memory, mentation, or behavior are noted    Lab Data:  CBC:   Lab Results   Component Value Date    WBC 7.6 04/01/2021    WBC 5.5 03/14/2021    WBC 6.2 03/13/2021    RBC 4.31 04/01/2021    RBC 4.13 03/14/2021    RBC 4.03 03/13/2021    HGB 12.3 04/01/2021    HGB 12.1 03/14/2021    HGB 11.6 03/13/2021    HCT 38.9 04/01/2021    HCT 37.7 03/14/2021    HCT 36.6 03/13/2021    MCV 90.1 04/01/2021    MCV 91.1 03/14/2021    MCV 90.7 03/13/2021    RDW 15.0 04/01/2021    RDW 15.3 03/14/2021    RDW 15.4 03/13/2021     04/01/2021     03/14/2021     03/13/2021     Iron:   Lab Results   Component Value Date    IRON 64 03/04/2020    TIBC 357 03/04/2020    FERRITIN 25.1 03/04/2020     BMP:   Lab Results   Component Value Date     05/10/2021     04/09/2021     04/01/2021    K 4.7 05/10/2021    K 4.9 04/09/2021    K 4.4 04/01/2021    K 4.4 03/14/2021    K 4.3 03/13/2021    K 4.5 03/11/2021     05/10/2021     04/09/2021     04/01/2021    CO2 24 05/10/2021    CO2 22 04/09/2021    CO2 23 04/01/2021    PHOS 2.8 07/30/2018    PHOS 2.6 07/08/2016    PHOS 2.9 06/04/2012    BUN 23 05/10/2021    BUN 22 04/09/2021    BUN 38 04/01/2021    CREATININE 1.2 05/10/2021    CREATININE 1.2 04/09/2021    CREATININE 2.0 04/01/2021     BNP:   Lab Results   Component Value Date    PROBNP 586 05/10/2021    PROBNP 634 04/09/2021    PROBNP 541 03/23/2021     Lipids:   Lab Results   Component Value Date    CHOL 148 04/01/2021        Lab Results   Component Value Date    TRIG 124 04/01/2021        Lab Results   Component Value Date    HDL 62 (H) 04/01/2021        Lab Results   Component Value Date    LDLCALC 61 04/01/2021        Lab Results   Component Value Date    LABVLDL 25 04/01/2021      No results found for: CHOLHDLRATIO    EF:   Lab Results   Component Value Date    LVEF 38 03/13/2021       Recent Testing:  SANGEETHA: 07/13/15  Dilated cardiomyopathy, EF 20%. There is severe global hypokinesis. Moderate mitral regurgitation. Mild tricuspid regurgitation. Systolic pulmonary artery pressure is estimated at 42 mmHg consistent   with mild pulmonary hypertension.     Cardiac cath: 07/13/15  1.  Angiographically normal coronary arteries. 2.  Severe pulmonary hypertension 66/37 mean 49 mmHg. 3.  Markedly elevated pulmonary capillary wedge pressure 32 mmHg, and left  ventricular end diastolic pressure 36 mmHg.    4.  Transpulmonary gradient is 17 mmHg, which suggested a left-sided as well  intrinsic pulmonary etiology of the severe pulmonary hypertension.    5.  Mildly reduced cardiac index by Giovanni of 2.36 L/min/m2.     RECOMMENDATION:  The patient has no epicardial disease.  Her chest pain is from the heart failure.  She is noted to have moderate to severe mitral  regurgitation on the echocardiogram, and will schedule for SANGEETHA to further  evaluate the mitral regurgitation.  In the meantime, we will also start her    on heart failure therapy that will include a beta-blocker and ACE inhibitor    as well as diuretic therapy.      Echo 10/16/17  Normal size left ventricle with mild concentric left ventricular  hypertrophy. Systolic function appears mildly reduced with an estimated ejection fraction  of 40-45 %. There is hypokinesis of the basal,mid inferoseptal wall, inferior wall and  anteroseptal wall. Elevated left ventricle diastolic filling pressure ( IVRT - 53 /ms ). Compared to last echo on 3/23/2017 the left ventricle systolic function has  improved. Mild thickening of leaflets of mitral valve. Mild mitral regurgitation.   A bubble study was performed and shows evidence of right to left shunting  consistent with a patent foramen ovale or atrial septal defect. No  intracardiac mass vegetations or thrombi. Echo 11/1/2019  Summary   The left ventricular systolic function is mildly reduced with an ejection   fraction of 40-45 %. Anteroseptal wall hypokinesis. There is mild concentric left ventricular hypertrophy. Left ventricular cavity size is mildly dilated. Grade I diastolic dysfunction with normal left ventricular filling pressure. Mild posterior mitral annular calcification. Mild thickening of the anterior leaflet of mitral valve. Moderate mitral regurgitation. The left atrium is mildly dilated. Frequent premature beats makes it difficult to identify exact wall motion   abnormality. STress 3/13/21  Summary    Large sized inferior, fixed defect of severe intensity consistent with    infarction in the territory of the RCA. No evidence of myocardium at risk    for significant reversible ischemia.        LV function is severely reduced with and ejection fraction of 34 %.        echo 3/13/21   Summary   Patient tachy during study. The left ventricular systolic function is moderately reduced with an   ejection fraction of 35 - 40 %. There is hypokinesis of the apex, apical lateral, inferoseptum, apical   anterior and anteroseptum walls. Septal bounce noted. Normal left ventricular size with mild concentric left ventricular   hypertrophy. Left ventricular diastolic filling pressure is elevated lateral E/e\" is 14 . Changes noted from previous echo on 11-1-2019 in left ventricular function. Moderate mitral regurgitation. The right ventricle is mildly enlarged. Right ventricular systolic function is mildly reduced . Systolic pulmonic artery pressure (SPAP) is normal estimated at 26 mmHg (Right atrial pressure of 3 mmHg). The right atrium is mildly dilated.     NYHA:   I-II  ACC/ AHA Stage: C    Pertinent Problems:  - Non-ischemic dilated cardiomyopathy, variable EF, most recent 35 to 40%  - s/p BiV-AICD  - Pulmonary HTN due to CHF & intrinsic pulmonary disease   - LBBB  -PAF on Eliquis  - status post gastric bypass    - Hypertension  - Hyperlipidemia    Visit Diagnosis:    1. Biventricular ICD (implantable cardioverter-defibrillator) in place    2. Chronic systolic congestive heart failure ([facility]    3. Dilated cardiomyopathy ([facility] [address]      Plan:     1. Check labs, BMP, mag level, BNP   2. Start Vit D supplement for the 4 weeks  3. No changes to medications  4. Continue lisinopril   5. Continue Toprol Xl   6. Continue torsemide  7. Device check is pending   8. Follow up in 4 months     I appreciate the opportunity for caring for this patient.      ALICIA Pearl - CNP, CNP, 7/9/2021, 11:34 AM

## 2021-07-12 ENCOUNTER — TELEPHONE (OUTPATIENT)
Dept: CARDIOLOGY | Age: 61
End: 2021-07-12

## 2021-07-12 DIAGNOSIS — Z95.810 BIVENTRICULAR ICD (IMPLANTABLE CARDIOVERTER-DEFIBRILLATOR) IN PLACE: ICD-10-CM

## 2021-07-12 DIAGNOSIS — I50.22 CHRONIC SYSTOLIC CONGESTIVE HEART FAILURE (HCC): ICD-10-CM

## 2021-07-12 DIAGNOSIS — I42.0 DILATED CARDIOMYOPATHY (HCC): ICD-10-CM

## 2021-07-12 LAB
ANION GAP SERPL CALCULATED.3IONS-SCNC: 14 MMOL/L (ref 3–16)
BUN BLDV-MCNC: 20 MG/DL (ref 7–20)
CALCIUM SERPL-MCNC: 9.1 MG/DL (ref 8.3–10.6)
CHLORIDE BLD-SCNC: 104 MMOL/L (ref 99–110)
CO2: 24 MMOL/L (ref 21–32)
CREAT SERPL-MCNC: 1.4 MG/DL (ref 0.6–1.2)
GFR AFRICAN AMERICAN: 46
GFR NON-AFRICAN AMERICAN: 38
GLUCOSE BLD-MCNC: 92 MG/DL (ref 70–99)
MAGNESIUM: 1.8 MG/DL (ref 1.8–2.4)
POTASSIUM SERPL-SCNC: 4.8 MMOL/L (ref 3.5–5.1)
PRO-BNP: 424 PG/ML (ref 0–124)
SODIUM BLD-SCNC: 142 MMOL/L (ref 136–145)

## 2021-07-12 NOTE — TELEPHONE ENCOUNTER
Please call patient and let her know her device check showed a few seconds of arrhythmia on July 6 and July 7.

## 2021-07-13 ENCOUNTER — TELEPHONE (OUTPATIENT)
Dept: MAMMOGRAPHY | Age: 61
End: 2021-07-13

## 2021-07-13 ENCOUNTER — TELEPHONE (OUTPATIENT)
Dept: CARDIOLOGY CLINIC | Age: 61
End: 2021-07-13

## 2021-07-13 ENCOUNTER — HOSPITAL ENCOUNTER (OUTPATIENT)
Dept: MAMMOGRAPHY | Age: 61
Discharge: HOME | End: 2021-07-13
Payer: MEDICARE

## 2021-07-13 DIAGNOSIS — Z12.31 SCREENING MAMMOGRAM, ENCOUNTER FOR: ICD-10-CM

## 2021-07-13 PROCEDURE — 77067 SCR MAMMO BI INCL CAD: CPT

## 2021-07-13 NOTE — TELEPHONE ENCOUNTER
----- Message from ALICIA Maldonado CNP sent at 7/13/2021 10:09 AM EDT -----  Please notify patient results. Kidney function is stable. Electrolytes are stable. Pro-BNP (heart failure number) is improved. Continue current regimen.

## 2021-07-14 ENCOUNTER — OFFICE VISIT (OUTPATIENT)
Dept: FAMILY MEDICINE CLINIC | Age: 61
End: 2021-07-14
Payer: MEDICARE

## 2021-07-14 VITALS
SYSTOLIC BLOOD PRESSURE: 120 MMHG | HEART RATE: 70 BPM | BODY MASS INDEX: 55.32 KG/M2 | WEIGHT: 293 LBS | DIASTOLIC BLOOD PRESSURE: 72 MMHG | OXYGEN SATURATION: 98 % | HEIGHT: 61 IN

## 2021-07-14 DIAGNOSIS — F33.2 SEVERE EPISODE OF RECURRENT MAJOR DEPRESSIVE DISORDER, WITHOUT PSYCHOTIC FEATURES (HCC): ICD-10-CM

## 2021-07-14 DIAGNOSIS — E55.9 VITAMIN D DEFICIENCY: ICD-10-CM

## 2021-07-14 DIAGNOSIS — Z00.00 ROUTINE GENERAL MEDICAL EXAMINATION AT A HEALTH CARE FACILITY: ICD-10-CM

## 2021-07-14 PROCEDURE — G0439 PPPS, SUBSEQ VISIT: HCPCS | Performed by: FAMILY MEDICINE

## 2021-07-14 PROCEDURE — 3017F COLORECTAL CA SCREEN DOC REV: CPT | Performed by: FAMILY MEDICINE

## 2021-07-14 PROCEDURE — G8431 POS CLIN DEPRES SCRN F/U DOC: HCPCS | Performed by: FAMILY MEDICINE

## 2021-07-14 RX ORDER — LEVOTHYROXINE SODIUM 0.1 MG/1
100 TABLET ORAL DAILY
Qty: 90 TABLET | Refills: 1 | Status: SHIPPED | OUTPATIENT
Start: 2021-07-14 | End: 2022-01-06

## 2021-07-14 RX ORDER — METOPROLOL SUCCINATE 100 MG/1
100 TABLET, EXTENDED RELEASE ORAL DAILY
Qty: 90 TABLET | Refills: 1 | Status: SHIPPED | OUTPATIENT
Start: 2021-07-14 | End: 2022-01-28

## 2021-07-14 RX ORDER — BUPROPION HYDROCHLORIDE 150 MG/1
150 TABLET ORAL EVERY MORNING
Qty: 30 TABLET | Refills: 1 | Status: SHIPPED | OUTPATIENT
Start: 2021-07-14 | End: 2021-08-23 | Stop reason: SDUPTHER

## 2021-07-14 RX ORDER — ERGOCALCIFEROL 1.25 MG/1
50000 CAPSULE ORAL WEEKLY
Qty: 8 CAPSULE | Refills: 0 | Status: SHIPPED | OUTPATIENT
Start: 2021-07-14 | End: 2021-09-21

## 2021-07-14 ASSESSMENT — PATIENT HEALTH QUESTIONNAIRE - PHQ9
4. FEELING TIRED OR HAVING LITTLE ENERGY: 2
3. TROUBLE FALLING OR STAYING ASLEEP: 3
SUM OF ALL RESPONSES TO PHQ QUESTIONS 1-9: 3
SUM OF ALL RESPONSES TO PHQ QUESTIONS 1-9: 13
7. TROUBLE CONCENTRATING ON THINGS, SUCH AS READING THE NEWSPAPER OR WATCHING TELEVISION: 2
2. FEELING DOWN, DEPRESSED OR HOPELESS: 2
SUM OF ALL RESPONSES TO PHQ QUESTIONS 1-9: 14
SUM OF ALL RESPONSES TO PHQ9 QUESTIONS 1 & 2: 3
10. IF YOU CHECKED OFF ANY PROBLEMS, HOW DIFFICULT HAVE THESE PROBLEMS MADE IT FOR YOU TO DO YOUR WORK, TAKE CARE OF THINGS AT HOME, OR GET ALONG WITH OTHER PEOPLE: 1
8. MOVING OR SPEAKING SO SLOWLY THAT OTHER PEOPLE COULD HAVE NOTICED. OR THE OPPOSITE, BEING SO FIGETY OR RESTLESS THAT YOU HAVE BEEN MOVING AROUND A LOT MORE THAN USUAL: 2
5. POOR APPETITE OR OVEREATING: 2
6. FEELING BAD ABOUT YOURSELF - OR THAT YOU ARE A FAILURE OR HAVE LET YOURSELF OR YOUR FAMILY DOWN: 2
SUM OF ALL RESPONSES TO PHQ QUESTIONS 1-9: 14
9. THOUGHTS THAT YOU WOULD BE BETTER OFF DEAD, OR OF HURTING YOURSELF: 1
SUM OF ALL RESPONSES TO PHQ QUESTIONS 1-9: 3
1. LITTLE INTEREST OR PLEASURE IN DOING THINGS: 1
SUM OF ALL RESPONSES TO PHQ QUESTIONS 1-9: 3

## 2021-07-14 ASSESSMENT — ANXIETY QUESTIONNAIRES
GAD7 TOTAL SCORE: 14
7. FEELING AFRAID AS IF SOMETHING AWFUL MIGHT HAPPEN: 2
4. TROUBLE RELAXING: 2
6. BECOMING EASILY ANNOYED OR IRRITABLE: 2
IF YOU CHECKED OFF ANY PROBLEMS ON THIS QUESTIONNAIRE, HOW DIFFICULT HAVE THESE PROBLEMS MADE IT FOR YOU TO DO YOUR WORK, TAKE CARE OF THINGS AT HOME, OR GET ALONG WITH OTHER PEOPLE: SOMEWHAT DIFFICULT
1. FEELING NERVOUS, ANXIOUS, OR ON EDGE: 2
2. NOT BEING ABLE TO STOP OR CONTROL WORRYING: 2
5. BEING SO RESTLESS THAT IT IS HARD TO SIT STILL: 2
3. WORRYING TOO MUCH ABOUT DIFFERENT THINGS: 2

## 2021-07-14 NOTE — PATIENT INSTRUCTIONS
Personalized Preventive Plan for Dilan Herrera - 7/14/2021  Medicare offers a range of preventive health benefits. Some of the tests and screenings are paid in full while other may be subject to a deductible, co-insurance, and/or copay. Some of these benefits include a comprehensive review of your medical history including lifestyle, illnesses that may run in your family, and various assessments and screenings as appropriate. After reviewing your medical record and screening and assessments performed today your provider may have ordered immunizations, labs, imaging, and/or referrals for you. A list of these orders (if applicable) as well as your Preventive Care list are included within your After Visit Summary for your review. Other Preventive Recommendations:    · A preventive eye exam performed by an eye specialist is recommended every 1-2 years to screen for glaucoma; cataracts, macular degeneration, and other eye disorders. · A preventive dental visit is recommended every 6 months. · Try to get at least 150 minutes of exercise per week or 10,000 steps per day on a pedometer . · Order or download the FREE \"Exercise & Physical Activity: Your Everyday Guide\" from [employer]. Call 3-573.192.2865 or search [employer] online. · You need 5543-2370 mg of calcium and 9630-0291 IU of vitamin D per day. It is possible to meet your calcium requirement with diet alone, but a vitamin D supplement is usually necessary to meet this goal.  · When exposed to the sun, use a sunscreen that protects against both UVA and UVB radiation with an SPF of 30 or greater. Reapply every 2 to 3 hours or after sweating, drying off with a towel, or swimming. · Always wear a seat belt when traveling in a car. Always wear a helmet when riding a bicycle or motorcycle.

## 2021-07-14 NOTE — PROGRESS NOTES
Medicare Annual Wellness Visit  Name: Marycarmen Prior Date: 2021   MRN: [id number] Sex: Female   Age: 56 y.o. Ethnicity: Non- / Non    : 1960 Race: White (non-)      Isha Cota is here for Medicare AWV, Depression (bipolar and depression ), and Other (pt would like rx for wheeled walker)    Screenings for behavioral, psychosocial and functional/safety risks, and cognitive dysfunction are all negative except as indicated below. These results, as well as other patient data from the [address] [facility] form, are documented in Flowsheets linked to this Encounter. Allergies   Allergen Reactions    Dilaudid [Hydromorphone Hcl] Other (See Comments)     Palpitations and orthostatic hypotension    Lavender Oil Shortness Of Breath and Rash    Penicillins Shortness Of Breath    Seroquel [Quetiapine Fumarate] Other (See Comments)     Very lethargic/almost not funtioning at all (per patient).  Adhesive Tape Other (See Comments)     BLISTERS, paper tape okay    Hydromorphone Hcl      Other reaction(s): Other (See Comments)  Rapid heart rate     Lithium Other (See Comments)     Diarrhea, vomiting, nausea, headaches,     Tetanus Toxoids Swelling         Prior to Visit Medications    Medication Sig Taking?  Authorizing Provider   vitamin D (ERGOCALCIFEROL) 1.25 MG (33557 UT) CAPS capsule Take 1 capsule by mouth once a week Yes Yadi Berg MD   buPROPion (WELLBUTRIN XL) 150 MG extended release tablet Take 1 tablet by mouth every morning Yes Yadi Berg MD   levothyroxine (SYNTHROID) 100 MCG tablet Take 1 tablet by mouth Daily Yes Yadi Berg MD   metoprolol succinate (TOPROL XL) 100 MG extended release tablet Take 1 tablet by mouth daily Yes Yadi Berg MD   lisinopril (PRINIVIL;ZESTRIL) 5 MG tablet TAKE 1 TABLET EVERY DAY Yes ALICIA Coleman CNP   allopurinol (ZYLOPRIM) 300 MG tablet TAKE 1 TABLET EVERY DAY Yes ALICIA Rasmussen CNP   lamoTRIgine (LAMICTAL) 150 MG tablet TAKE 1 TABLET TWICE DAILY OR AS OTHERWISE DIRECTED Yes Jhonathan Taylor, ALICIA Mora CNP   simvastatin (ZOCOR) 20 MG tablet TAKE 1 TABLET EVERY NIGHT Yes [place] Mood, ALICIA Mora CNP   montelukast (SINGULAIR) 10 MG tablet Take 1 tablet by mouth nightly Yes [place] Mood, ALICIA - CNP   diclofenac sodium (VOLTAREN) 1 % GEL Apply topically 2 times daily Yes [place] Mood, ALICIA Mora CNP   apixaban (ELIQUIS) 5 MG TABS tablet Take 1 tablet by mouth 2 times daily Yes Garfield Kocher, APRN - CNP   torsemide (DEMADEX) 10 MG tablet 2 tabs every other day alternating with 1 tab the other days Yes Garfield Kocher, APRN - CNP   acetaminophen (TYLENOL) 325 MG tablet Take 650 mg by mouth every 6 hours as needed for Pain TAKES EVERY DAY Yes Historical Provider, MD   cetirizine (ZYRTEC) 10 MG tablet Take 10 mg by mouth daily Yes Historical Provider, MD   methocarbamol (ROBAXIN) 500 MG tablet TAKE 1 TABLET THREE TIMES DAILY AS NEEDED FOR PAIN  Jasen Lerma MD   BIOTIN PO Take by mouth daily   Patient not taking: Reported on 7/9/2021  Historical Provider, MD         Past Medical History:   Diagnosis Date    Anesthesia complication     hypotension post op    Autoimmune hemolytic anemia ([facility] [address]     during uterine cancer    Bipolar disorder ([facility] [address]     managed by Miriam Robb Part)    Bundle branch block     Cardiomyopathy ([facility] [address]     Chronic systolic CHF (congestive heart failure) ([facility] [address] 7/9/2015    Depression     Family history of early CAD     Family history of ovarian cancer     mother    GERD (gastroesophageal reflux disease)     Gout     Hypertension     Hypothyroid     Osteoarthritis, knee     Shybut    Pneumonia     history of pneumonia    S/P gastric bypass 1/7/05    Unspecified cerebral artery occlusion with cerebral infarction     Uterine cancer [facility])     endometrial adenocarcinoma    Vitamin B 12 deficiency 7/09       Past Surgical History:   Procedure Laterality Date    CARDIAC CATHETERIZATION  7/10/2015    Normal Coronary Arteries    CHOLECYSTECTOMY      COLONOSCOPY  03/06/2019    NL    COLONOSCOPY N/A 3/6/2019    EGD AND COLONOSCOPY WITH ANESTHESIA performed by Ciro Poole MD at [address] [place] [address]      4 times    ERCP      FINGER SURGERY      gout X 2    GASTRIC BYPASS SURGERY  1/7/05    HERNIA REPAIR  10/20/2016    lap ventral hernia    LIPECTOMY      gangrenous    OTHER SURGICAL HISTORY      radium implants into uterus for uterine surgery X 2    OTHER SURGICAL HISTORY  4/1/16    exp.  lap lysis of adhesion    OTHER SURGICAL HISTORY      biventricular pacer and defibrillator    PACEMAKER PLACEMENT  09/2016    TRANSESOPHAGEAL ECHOCARDIOGRAM  7/13/2015    UPPER GASTROINTESTINAL ENDOSCOPY  4/23/13    UPPER GASTROINTESTINAL ENDOSCOPY  03/06/2019    NL    UPPER GASTROINTESTINAL ENDOSCOPY N/A 3/6/2019    EGD AND COLONOSCOPY WITH ANESTHESIA performed by Ciro Poole MD at [facility] [address] History   Problem Relation Age of Onset    Cancer Mother     Ovarian Cancer Mother 45        Technically peritoneal cancer    Depression Mother         bipolar    Arthritis Father    Oniel Doan Rheum Arthritis Father     Arrhythmia Father     Other Father         gout    Heart Disease Father     Depression Brother         depression    Other Brother         gout    Obesity Brother     Other Brother         gout    Obesity Brother     Depression Brother     Cancer Maternal Grandmother         Breast    Breast Cancer Maternal Grandmother        CareTeam (Including outside providers/suppliers regularly involved in providing care):   Patient Care Team:  Kelsey Celaya MD as PCP - General (Family Medicine)  Kelsey Celaya MD as PCP - [facility] Empaneled Provider  Makayla Ching MD as Surgeon (General Surgery)  ALICIA Garcia CNP as Nurse Practitioner (Family Nurse Practitioner)    Wt Readings from Last 3 Encounters:   07/14/21 (!) 312 lb 3.2 oz (141.6 kg)   07/09/21 (!) 318 lb 12.8 oz (144.6 kg)   05/10/21 (!) 314 lb 8 oz (142.7 kg)     Vitals:    07/14/21 1130   BP: 120/72   Site: Left Upper Arm   Position: Sitting   Cuff Size: Large Adult   Pulse: 70   SpO2: 98%   Weight: (!) 312 lb 3.2 oz (141.6 kg)   Height: 5' 1.42\" (1.56 m)     Body mass index is 58.19 kg/m². Based upon direct observation of the patient, evaluation of cognition reveals recent and remote memory intact. General Appearance: alert and oriented to person, place and time, well developed and well- nourished, in no acute distress  Skin: warm and dry, no rash or erythema  Head: normocephalic and atraumatic  Neck: supple and non-tender without mass, no thyromegaly or thyroid nodules, no cervical lymphadenopathy  Pulmonary/Chest: clear to auscultation bilaterally- no wheezes, rales or rhonchi, normal air movement, no respiratory distress  Cardiovascular: normal rate, regular rhythm, normal S1 and S2, no murmurs, rubs, clicks, or gallops, distal pulses intact, no carotid bruits  Abdomen: soft, non-tender, non-distended, normal bowel sounds, no masses or organomegaly  Extremities: no cyanosis, clubbing or edema  Musculoskeletal: normal range of motion, no joint swelling, deformity or tenderness    Patient's complete Health Risk Assessment and screening values have been reviewed and are found in Flowsheets. The following problems were reviewed today and where indicated follow up appointments were made and/or referrals ordered.     Positive Risk Factor Screenings with Interventions:       Depression:  PHQ-2 Score: 3  PHQ-9 Total Score: 14    Severity:1-4 = minimal depression, 5-9 = mild depression, 10-14 = moderate depression, 15-19 = moderately severe depression, 20-27 = severe depression  Depression Interventions:  · plan per orders        General Health and ACP:     Advance Directives     Power of  Living Will ACP-Advance Directive ACP-Power of     Not on File Not on File Not on File Not on File      General Health Risk Interventions:  · documented today      BrotherRyann  DNI, DNR  ICD        Health Habits/Nutrition:     Body mass index: (!) 58.18  Health Habits/Nutrition Interventions:  · Inadequate physical activity:  patient agrees to exercise for at least 150 minutes/week       Personalized Preventive Plan   Current Health Maintenance Status  Immunization History   Administered Date(s) Administered    COVID-19, Moderna, PF, 100mcg/0.5mL 03/18/2021, 04/22/2021    Influenza Virus Vaccine 01/28/2012, 10/17/2012, 11/10/2014, 10/22/2015    Influenza Whole 11/10/2014    Influenza, Intradermal, Preservative free 09/24/2013    Influenza, MDCK Quadv, IM, PF (Flucelvax 4 yrs and older) 10/17/2017    Influenza, Maryan Finner, IM, PF (6 mo and older Fluzone, Flulaval, Fluarix, and 3 yrs and older Afluria) 12/01/2016, 01/04/2019, 10/07/2019, 09/21/2020    Pneumococcal Polysaccharide (Qalkgodbp40) 01/28/2012    Tetanus 03/01/1988        Health Maintenance   Topic Date Due    HIV screen  Never done    Shingles Vaccine (1 of 2) Never done    Cervical cancer screen  08/29/2016    Annual Wellness Visit (AWV)  Never done    Flu vaccine (1) 09/01/2021    TSH testing  03/25/2022    Lipid screen  04/01/2022    Potassium monitoring  07/12/2022    Creatinine monitoring  07/12/2022    Breast cancer screen  07/13/2023    Pneumococcal 0-64 years Vaccine (2 of 2 - PPSV23) 08/16/2023    Colon cancer screen colonoscopy  03/06/2029    COVID-19 Vaccine  Completed    Hepatitis C screen  Completed    Hepatitis A vaccine  Aged Out    Hepatitis B vaccine  Aged Out    Hib vaccine  Aged Out    Meningococcal (ACWY) vaccine  Aged Out     Recommendations for [employer] Due: see orders and patient instructions/AVS.  .   Recommended screening schedule for the next 5-10 years is provided to the patient in written form: see Patient Instructions/AVS.    Alexander Haas was seen today for medicare awv, depression and other. Diagnoses and all orders for this visit:    Positive depression screening  -     Positive Screen for Clinical Depression with a Documented Follow-up Plan     Routine general medical examination at a health care facility    Severe episode of recurrent major depressive disorder, without psychotic features ([facility] [address]  -     vitamin D (ERGOCALCIFEROL) 1.25 MG (20211 UT) CAPS capsule; Take 1 capsule by mouth once a week  -     buPROPion (WELLBUTRIN XL) 150 MG extended release tablet; Take 1 tablet by mouth every morning    Vitamin D deficiency  -     vitamin D (ERGOCALCIFEROL) 1.25 MG (50087 UT) CAPS capsule; Take 1 capsule by mouth once a week  -     VITAMIN D 25 HYDROXY; Future    Other orders  -     levothyroxine (SYNTHROID) 100 MCG tablet; Take 1 tablet by mouth Daily  -     metoprolol succinate (TOPROL XL) 100 MG extended release tablet;  Take 1 tablet by mouth daily

## 2021-07-23 DIAGNOSIS — M79.662 PAIN OF LEFT CALF: ICD-10-CM

## 2021-07-23 RX ORDER — METHOCARBAMOL 500 MG/1
TABLET, FILM COATED ORAL
Qty: 90 TABLET | Refills: 1 | Status: SHIPPED | OUTPATIENT
Start: 2021-07-23 | End: 2021-10-27 | Stop reason: SDUPTHER

## 2021-07-23 NOTE — TELEPHONE ENCOUNTER
Refill Request     Last Seen: Last Seen Department: 7/14/2021  Last Seen by PCP: 7/14/2021    Last Written: 04/20/21    Next Appointment:   Future Appointments   Date Time Provider Sarah Najera   8/3/2021 10:30 AM [place], PSYD [place] [facility]   8/6/2021  2:00 PM ALICIA Chapman - CNP [place] [facility]   8/9/2021  1:00 PM SCHEDULE, [facility]   8/9/2021  1:00 PM ALICIA Corea - SANTO [place] [facility]   8/23/2021 10:30 AM MD OSIRIS Fernandez - HEATHER   11/1/2021  8:30 AM [facility]   7/14/2022 11:00 AM MD OSIRIS Fernandez  [place] - DYJAVY       Future appointment scheduled      Requested Prescriptions     Pending Prescriptions Disp Refills    methocarbamol (ROBAXIN) 500 MG tablet [Pharmacy Med Name: METHOCARBAMOL 500 MG Tablet] 90 tablet 1     Sig: TAKE 1 TABLET THREE TIMES DAILY AS NEEDED FOR PAIN

## 2021-07-29 ENCOUNTER — TELEPHONE (OUTPATIENT)
Dept: PULMONOLOGY | Age: 61
End: 2021-07-29

## 2021-07-29 DIAGNOSIS — G47.33 OSA (OBSTRUCTIVE SLEEP APNEA): Primary | ICD-10-CM

## 2021-08-03 ENCOUNTER — OFFICE VISIT (OUTPATIENT)
Dept: PSYCHOLOGY | Age: 61
End: 2021-08-03
Payer: MEDICARE

## 2021-08-03 DIAGNOSIS — F31.9 BIPOLAR AFFECTIVE DISORDER, REMISSION STATUS UNSPECIFIED (HCC): Primary | ICD-10-CM

## 2021-08-03 DIAGNOSIS — F43.21 GRIEF: ICD-10-CM

## 2021-08-03 PROCEDURE — 90791 PSYCH DIAGNOSTIC EVALUATION: CPT | Performed by: PSYCHOLOGIST

## 2021-08-03 PROCEDURE — 1036F TOBACCO NON-USER: CPT | Performed by: PSYCHOLOGIST

## 2021-08-03 ASSESSMENT — ANXIETY QUESTIONNAIRES
GAD7 TOTAL SCORE: 13
2. NOT BEING ABLE TO STOP OR CONTROL WORRYING: 2
6. BECOMING EASILY ANNOYED OR IRRITABLE: 2
3. WORRYING TOO MUCH ABOUT DIFFERENT THINGS: 2
1. FEELING NERVOUS, ANXIOUS, OR ON EDGE: 2
5. BEING SO RESTLESS THAT IT IS HARD TO SIT STILL: 2
4. TROUBLE RELAXING: 2
7. FEELING AFRAID AS IF SOMETHING AWFUL MIGHT HAPPEN: 1

## 2021-08-03 ASSESSMENT — PATIENT HEALTH QUESTIONNAIRE - PHQ9
3. TROUBLE FALLING OR STAYING ASLEEP: 2
SUM OF ALL RESPONSES TO PHQ QUESTIONS 1-9: 17
4. FEELING TIRED OR HAVING LITTLE ENERGY: 2
8. MOVING OR SPEAKING SO SLOWLY THAT OTHER PEOPLE COULD HAVE NOTICED. OR THE OPPOSITE, BEING SO FIGETY OR RESTLESS THAT YOU HAVE BEEN MOVING AROUND A LOT MORE THAN USUAL: 2
7. TROUBLE CONCENTRATING ON THINGS, SUCH AS READING THE NEWSPAPER OR WATCHING TELEVISION: 2
5. POOR APPETITE OR OVEREATING: 2
1. LITTLE INTEREST OR PLEASURE IN DOING THINGS: 2
SUM OF ALL RESPONSES TO PHQ QUESTIONS 1-9: 17
6. FEELING BAD ABOUT YOURSELF - OR THAT YOU ARE A FAILURE OR HAVE LET YOURSELF OR YOUR FAMILY DOWN: 2
SUM OF ALL RESPONSES TO PHQ9 QUESTIONS 1 & 2: 4
2. FEELING DOWN, DEPRESSED OR HOPELESS: 2
9. THOUGHTS THAT YOU WOULD BE BETTER OFF DEAD, OR OF HURTING YOURSELF: 1
SUM OF ALL RESPONSES TO PHQ QUESTIONS 1-9: 16

## 2021-08-03 NOTE — PROGRESS NOTES
Behavioral Health Consultation  [address] Rader PsyD  Psychologist  8/3/2021   10:37 AM      Time spent with Patient: 30 minutes  This is patient's first [facility] appointment. Reason for Consult:    Chief Complaint   Patient presents with    Depression    Other     grief     Referring Provider: Romina Mays MD     Pt provided informed consent for the behavioral health program. Discussed with patient model of service to include the limits of confidentiality (i.e. abuse reporting, suicide intervention, etc.) and short-term intervention focused approach. Pt indicated understanding. Feedback given to PCP. S:  Pt reports she has had depression on and off throughout her life. Recently was the one year anniversary of 's death - Kayla 3. Friend \"dropped dead\" unexpectedly too a couple days before the anniversary of her 's passing. Busted open her grief. Been on Lamictal for a long time and finds it helpful - has dx of BAD. Has hx of 3 psych hospilazations - most recently 5 years ago. Past SA by OD x2. Past therapy has been helpful. Likes to do crafts, read books. Hasn't been able to when she has been feeling bad. Lives with brother. Worried about spiraling and going back to suicidal ideation with her depression. Doesn't want to get there and recognizes what she is currently doing isn't helping so trying something new. Only here today because PCP recommended it. Hard to motivate herself.      O:  MSE:    Appearance: good hygiene   Attitude: cooperative and friendly  Consciousness: alert  Orientation: oriented to person, place, time, general circumstance  Memory: recent and remote memory intact  Attention/Concentration: intact during session  Psychomotor Activity:normal  Eye Contact: normal  Speech: normal rate and volume, well-articulated  Mood: \"ok\"  Affect: euthymic , congruent  Perception: within normal limits  Thought Content: within normal limits  Thought Process: logical, coherent and goal-directed  Insight: good  Judgment: intact  Ability to understand instructions: Yes  Ability to respond meaningfully: Yes  Morbid Ideation: no   Suicide Assessment: no suicidal ideation, plan, or intent  Homicidal Ideation: no      History:    Medications:   Current Outpatient Medications   Medication Sig Dispense Refill    methocarbamol (ROBAXIN) 500 MG tablet TAKE 1 TABLET THREE TIMES DAILY AS NEEDED FOR PAIN 90 tablet 1    vitamin D (ERGOCALCIFEROL) 1.25 MG (13856 UT) CAPS capsule Take 1 capsule by mouth once a week 8 capsule 0    buPROPion (WELLBUTRIN XL) 150 MG extended release tablet Take 1 tablet by mouth every morning 30 tablet 1    levothyroxine (SYNTHROID) 100 MCG tablet Take 1 tablet by mouth Daily 90 tablet 1    metoprolol succinate (TOPROL XL) 100 MG extended release tablet Take 1 tablet by mouth daily 90 tablet 1    lisinopril (PRINIVIL;ZESTRIL) 5 MG tablet TAKE 1 TABLET EVERY DAY 90 tablet 1    allopurinol (ZYLOPRIM) 300 MG tablet TAKE 1 TABLET EVERY DAY 90 tablet 1    lamoTRIgine (LAMICTAL) 150 MG tablet TAKE 1 TABLET TWICE DAILY OR AS OTHERWISE DIRECTED 180 tablet 1    simvastatin (ZOCOR) 20 MG tablet TAKE 1 TABLET EVERY NIGHT 90 tablet 1    montelukast (SINGULAIR) 10 MG tablet Take 1 tablet by mouth nightly 90 tablet 1    diclofenac sodium (VOLTAREN) 1 % GEL Apply topically 2 times daily 150 g 2    apixaban (ELIQUIS) 5 MG TABS tablet Take 1 tablet by mouth 2 times daily 180 tablet 3    torsemide (DEMADEX) 10 MG tablet 2 tabs every other day alternating with 1 tab the other days 180 tablet 3    BIOTIN PO Take by mouth daily  (Patient not taking: Reported on 7/9/2021)      acetaminophen (TYLENOL) 325 MG tablet Take 650 mg by mouth every 6 hours as needed for Pain TAKES EVERY DAY      cetirizine (ZYRTEC) 10 MG tablet Take 10 mg by mouth daily       No current facility-administered medications for this visit.        Social History:   Social History     Socioeconomic History    Marital status:      Spouse name: Not on file    Number of children: Not on file    Years of education: Not on file    Highest education level: Not on file   Occupational History    Not on file   Tobacco Use    Smoking status: Former Smoker     Packs/day: 3.00     Years: 2.00     Pack years: 6.00     Types: Cigarettes     Quit date: 1985     Years since quittin.6    Smokeless tobacco: Never Used   Vaping Use    Vaping Use: Never used   Substance and Sexual Activity    Alcohol use: No    Drug use: No    Sexual activity: Not Currently   Other Topics Concern    Not on file   Social History Narrative    Not on file     Social Determinants of Health     Financial Resource Strain:     Difficulty of Paying Living Expenses:    Food Insecurity:     Worried About Running Out of Food in the Last Year:     [address] in the Last Year:    Transportation Needs:     Lack of Transportation (Medical):  Lack of Transportation (Non-Medical):    Physical Activity:     Days of Exercise per Week:     Minutes of Exercise per Session:    Stress:     Feeling of Stress :    Social Connections:     Frequency of Communication with Friends and Family:     Frequency of Social Gatherings with Friends and Family:     Attends [employer] Services:     Active Member of Clubs or Organizations:     Attends Club or Organization Meetings:     Marital Status:    Intimate Partner Violence:     Fear of Current or Ex-Partner:     Emotionally Abused:     Physically Abused:     Sexually Abused:        TOBACCO:   reports that she quit smoking about 36 years ago. Her smoking use included cigarettes. She has a 6.00 pack-year smoking history. She has never used smokeless tobacco.  ETOH:   reports no history of alcohol use.     Family History:   Family History   Problem Relation Age of Onset   [place] Self Cancer Mother     Ovarian Cancer Mother 45        Technically peritoneal cancer    Depression Mother bipolar    Arthritis Father     Rheum Arthritis Father     Arrhythmia Father     Other Father         gout    Heart Disease Father     Depression Brother         depression    Other Brother         gout    Obesity Brother     Other Brother         gout    Obesity Brother     Depression Brother     Cancer Maternal Grandmother         Breast    Breast Cancer Maternal Grandmother          A:  Ms. Fiona Petersen has a longstanding hx of Bipolar Disorder. Her mood has been exacerbated by grief of a friend recently, as well as her  one year ago. She has been engaging in avoidant coping strategies and is interested in learning to more effectively cope. She was talkative and engaged and responded positively to behavioral interventions. BREANN 7 SCORE 7/14/2021   BREANN-7 Total Score 14     Interpretation of BREANN-7 score: 5-9 = mild anxiety, 10-14 = moderate anxiety, 15+ = severe anxiety. Recommend referral to behavioral health for scores 10 or greater. PHQ Scores 8/3/2021 7/14/2021 7/14/2021 3/13/2020 4/12/2017 3/29/2017   PHQ2 Score 4 - 3 4 6 4   PHQ9 Score 17 14 3 16 25 22   2  Interpretation of Total Score Depression Severity: 1-4 = Minimal depression, 5-9 = Mild depression, 10-14 = Moderate depression, 15-19 = Moderately severe depression, 20-27 = Severe depression      Diagnosis:    1. Bipolar affective disorder, remission status unspecified ([facility] [address]    2.  Grief           Plan:  Pt interventions:    Established rapport, Discussed [facility] model of care vs specialty mental health, Conducted functional assessment, [place]-setting to identify pt's primary goals for [facility] visit / overall health, Supportive techniques, Provided psychoeducation re: downward spiral of depression, Discussed potential treatments for  depression, Provided education on the use of medication to treat  depression, Discussed various factors related to the development and maintenance of  depression (including biological, cognitive, behavioral, and environmental factors), reinforced pt's skill use, ACT interventions and treatment planning    Pt Behavioral Change Plan:  Pt set goals to 1) create a list of activities you used to enjoy 2) Return in about 1 week (around 8/10/2021).

## 2021-08-10 ENCOUNTER — OFFICE VISIT (OUTPATIENT)
Dept: PSYCHOLOGY | Age: 61
End: 2021-08-10
Payer: MEDICARE

## 2021-08-10 DIAGNOSIS — F31.32 BIPOLAR AFFECTIVE DISORDER, CURRENTLY DEPRESSED, MODERATE (HCC): Primary | ICD-10-CM

## 2021-08-10 PROCEDURE — 1036F TOBACCO NON-USER: CPT | Performed by: PSYCHOLOGIST

## 2021-08-10 PROCEDURE — 90832 PSYTX W PT 30 MINUTES: CPT | Performed by: PSYCHOLOGIST

## 2021-08-10 ASSESSMENT — ANXIETY QUESTIONNAIRES
IF YOU CHECKED OFF ANY PROBLEMS ON THIS QUESTIONNAIRE, HOW DIFFICULT HAVE THESE PROBLEMS MADE IT FOR YOU TO DO YOUR WORK, TAKE CARE OF THINGS AT HOME, OR GET ALONG WITH OTHER PEOPLE: SOMEWHAT DIFFICULT
5. BEING SO RESTLESS THAT IT IS HARD TO SIT STILL: 1
7. FEELING AFRAID AS IF SOMETHING AWFUL MIGHT HAPPEN: 1
2. NOT BEING ABLE TO STOP OR CONTROL WORRYING: 1
1. FEELING NERVOUS, ANXIOUS, OR ON EDGE: 1
GAD7 TOTAL SCORE: 7
6. BECOMING EASILY ANNOYED OR IRRITABLE: 1
3. WORRYING TOO MUCH ABOUT DIFFERENT THINGS: 1
4. TROUBLE RELAXING: 1

## 2021-08-10 ASSESSMENT — PATIENT HEALTH QUESTIONNAIRE - PHQ9
6. FEELING BAD ABOUT YOURSELF - OR THAT YOU ARE A FAILURE OR HAVE LET YOURSELF OR YOUR FAMILY DOWN: 2
1. LITTLE INTEREST OR PLEASURE IN DOING THINGS: 2
9. THOUGHTS THAT YOU WOULD BE BETTER OFF DEAD, OR OF HURTING YOURSELF: 0
4. FEELING TIRED OR HAVING LITTLE ENERGY: 2
8. MOVING OR SPEAKING SO SLOWLY THAT OTHER PEOPLE COULD HAVE NOTICED. OR THE OPPOSITE, BEING SO FIGETY OR RESTLESS THAT YOU HAVE BEEN MOVING AROUND A LOT MORE THAN USUAL: 2
5. POOR APPETITE OR OVEREATING: 2
7. TROUBLE CONCENTRATING ON THINGS, SUCH AS READING THE NEWSPAPER OR WATCHING TELEVISION: 2
SUM OF ALL RESPONSES TO PHQ QUESTIONS 1-9: 16
SUM OF ALL RESPONSES TO PHQ QUESTIONS 1-9: 16
2. FEELING DOWN, DEPRESSED OR HOPELESS: 2
SUM OF ALL RESPONSES TO PHQ9 QUESTIONS 1 & 2: 4
SUM OF ALL RESPONSES TO PHQ QUESTIONS 1-9: 16
3. TROUBLE FALLING OR STAYING ASLEEP: 2

## 2021-08-13 NOTE — PROGRESS NOTES
Medicare Annual Wellness Visit  Name: Marycarmen Prior Date: 2021   MRN: [id number] Sex: Female   Age: 56 y.o. Ethnicity: Non- / Non    : 1960 Race: White (non-)      Isha Cota is here for Medicare AWV, Depression (bipolar and depression ), and Other (pt would like rx for wheeled walker)    Screenings for behavioral, psychosocial and functional/safety risks, and cognitive dysfunction are all negative except as indicated below. These results, as well as other patient data from the [address] form, are documented in Flowsheets linked to this Encounter. Allergies   Allergen Reactions    Dilaudid [Hydromorphone Hcl] Other (See Comments)     Palpitations and orthostatic hypotension    Lavender Oil Shortness Of Breath and Rash    Penicillins Shortness Of Breath    Seroquel [Quetiapine Fumarate] Other (See Comments)     Very lethargic/almost not funtioning at all (per patient).  Adhesive Tape Other (See Comments)     BLISTERS, paper tape okay    Hydromorphone Hcl      Other reaction(s): Other (See Comments)  Rapid heart rate     Lithium Other (See Comments)     Diarrhea, vomiting, nausea, headaches,     Tetanus Toxoids Swelling       Prior to Visit Medications    Medication Sig Taking?  Authorizing Provider   vitamin D (ERGOCALCIFEROL) 1.25 MG (76454 UT) CAPS capsule Take 1 capsule by mouth once a week Yes Yadi Berg MD   buPROPion (WELLBUTRIN XL) 150 MG extended release tablet Take 1 tablet by mouth every morning Yes Yadi Berg MD   levothyroxine (SYNTHROID) 100 MCG tablet Take 1 tablet by mouth Daily Yes Yadi Berg MD   metoprolol succinate (TOPROL XL) 100 MG extended release tablet Take 1 tablet by mouth daily Yes Yadi Berg MD   lisinopril (PRINIVIL;ZESTRIL) 5 MG tablet TAKE 1 TABLET EVERY DAY Yes ALICIA Coleman CNP   allopurinol (ZYLOPRIM) 300 MG tablet TAKE 1 TABLET EVERY DAY Yes ALICIA Rasmussen CNP   lamoTRIgine CATHETERIZATION  7/10/2015    Normal Coronary Arteries    CHOLECYSTECTOMY      COLONOSCOPY  03/06/2019    NL    COLONOSCOPY N/A 3/6/2019    EGD AND COLONOSCOPY WITH ANESTHESIA performed by Terri Hernandez MD at [address] [place] [address]      4 times    ERCP      FINGER SURGERY      gout X 2    GASTRIC BYPASS SURGERY  1/7/05    HERNIA REPAIR  10/20/2016    lap ventral hernia    LIPECTOMY      gangrenous    OTHER SURGICAL HISTORY      radium implants into uterus for uterine surgery X 2    OTHER SURGICAL HISTORY  4/1/16    exp.  lap lysis of adhesion    OTHER SURGICAL HISTORY      biventricular pacer and defibrillator    PACEMAKER PLACEMENT  09/2016    TRANSESOPHAGEAL ECHOCARDIOGRAM  7/13/2015    UPPER GASTROINTESTINAL ENDOSCOPY  4/23/13    UPPER GASTROINTESTINAL ENDOSCOPY  03/06/2019    NL    UPPER GASTROINTESTINAL ENDOSCOPY N/A 3/6/2019    EGD AND COLONOSCOPY WITH ANESTHESIA performed by Terri Hernandez MD at [address] History   Problem Relation Age of Onset    Cancer Mother     Ovarian Cancer Mother 45        Technically peritoneal cancer    Depression Mother         bipolar    Arthritis Father    [facility] Rheum Arthritis Father     Arrhythmia Father     Other Father         gout    Heart Disease Father     Depression Brother         depression    Other Brother         gout    Obesity Brother     Other Brother         gout    Obesity Brother     Depression Brother     Cancer Maternal Grandmother         Breast    Breast Cancer Maternal Grandmother        CareTeam (Including outside providers/suppliers regularly involved in providing care):   Patient Care Team:  Tigist Gr MD as PCP - General (Family Medicine)  Tigist Gr MD as PCP - [facility] Empaneled Provider  Negro Bridges MD as Surgeon (General Surgery)  ALICIA Harper - CNP as Nurse Practitioner (Family Nurse Practitioner)    Wt Readings from Last 3 Encounters:   07/14/21 (!) 312 lb 3.2 oz (141.6 kg)   07/09/21 (!) 318 lb 12.8 oz (144.6 kg)   05/10/21 (!) 314 lb 8 oz (142.7 kg)     Vitals:    07/14/21 1130   BP: 120/72   Site: Left Upper Arm   Position: Sitting   Cuff Size: Large Adult   Pulse: 70   SpO2: 98%   Weight: (!) 312 lb 3.2 oz (141.6 kg)   Height: 5' 1.42\" (1.56 m)     Body mass index is 58.19 kg/m². Based upon direct observation of the patient, evaluation of cognition reveals recent and remote memory intact. General Appearance: alert and oriented to person, place and time, well developed and well- nourished, in no acute distress  Skin: warm and dry, no rash or erythema  Head: normocephalic and atraumatic  Eyes: pupils equal, round, and reactive to light, extraocular eye movements intact, conjunctivae normal  Neck: supple and non-tender without mass, no thyromegaly or thyroid nodules, no cervical lymphadenopathy  Pulmonary/Chest: clear to auscultation bilaterally- no wheezes, rales or rhonchi, normal air movement, no respiratory distress  Cardiovascular: normal rate, regular rhythm, normal S1 and S2, no murmurs, rubs, clicks, or gallops, distal pulses intact, no carotid bruits  Abdomen: soft, non-tender, non-distended, normal bowel sounds, no masses or organomegaly  Extremities: no cyanosis, clubbing or edema  Musculoskeletal: normal range of motion, no joint swelling, deformity or tenderness    Patient's complete Health Risk Assessment and screening values have been reviewed and are found in Flowsheets. The following problems were reviewed today and where indicated follow up appointments were made and/or referrals ordered.     Positive Risk Factor Screenings with Interventions:       Depression:  PHQ-2 Score: 3  PHQ-9 Total Score: 14    Severity:1-4 = minimal depression, 5-9 = mild depression, 10-14 = moderate depression, 15-19 = moderately severe depression, 20-27 = severe depression  Depression Interventions:  · Relaxation techniques discussed      General Health and ACP:     Advance Directives     Power of  Living Will ACP-Advance Directive ACP-Power of     Not on File Not on File Not on File Not on File      General Health Risk Interventions:  · No Living Will: Advance Care Planning addressed with patient today    Health Habits/Nutrition:     Body mass index: (!) 58.18  Health Habits/Nutrition Interventions:  · Inadequate physical activity:  patient agrees to exercise for at least 150 minutes/week       Personalized Preventive Plan   Current Health Maintenance Status  Immunization History   Administered Date(s) Administered    COVID-19, Moderna, PF, 100mcg/0.5mL 03/18/2021, 04/22/2021    Influenza Virus Vaccine 01/28/2012, 10/17/2012, 11/10/2014, 10/22/2015    Influenza Whole 11/10/2014    Influenza, Intradermal, Preservative free 09/24/2013    Influenza, MDCK Quadv, IM, PF (Flucelvax 4 yrs and older) 10/17/2017    Influenza, Holguin Pulse, IM, PF (6 mo and older Fluzone, Flulaval, Fluarix, and 3 yrs and older Afluria) 12/01/2016, 01/04/2019, 10/07/2019, 09/21/2020    Pneumococcal Polysaccharide ([id number]) 01/28/2012    Tetanus 03/01/1988        Health Maintenance   Topic Date Due    HIV screen  Never done    Shingles Vaccine (1 of 2) Never done    Cervical cancer screen  08/29/2016    Annual Wellness Visit (AWV)  Never done    Flu vaccine (1) 09/01/2021    TSH testing  03/25/2022    Lipid screen  04/01/2022    Potassium monitoring  07/12/2022    Creatinine monitoring  07/12/2022    Breast cancer screen  07/13/2023    Pneumococcal 0-64 years Vaccine (2 of 2 - PPSV23) 08/16/2023    Colon cancer screen colonoscopy  03/06/2029    COVID-19 Vaccine  Completed    Hepatitis C screen  Completed    Hepatitis A vaccine  Aged Out    Hepatitis B vaccine  Aged Out    Hib vaccine  Aged Out    Meningococcal (ACWY) vaccine  Aged Out     Recommendations for [employer] Due: see orders and patient instructions/AVS.  . Recommended screening schedule for the next 5-10 years is provided to the patient in written form: see Patient Shane Dang was seen today for medicare awv, depression and other. Diagnoses and all orders for this visit:    Positive depression screening  -     Positive Screen for Clinical Depression with a Documented Follow-up Plan     Routine general medical examination at a [facility]    Severe episode of recurrent major depressive disorder, without psychotic features ([facility] [address]  -     vitamin D (ERGOCALCIFEROL) 1.25 MG (49600 UT) CAPS capsule; Take 1 capsule by mouth once a week  -     buPROPion (WELLBUTRIN XL) 150 MG extended release tablet; Take 1 tablet by mouth every morning    Vitamin D deficiency  -     vitamin D (ERGOCALCIFEROL) 1.25 MG (46371 UT) CAPS capsule; Take 1 capsule by mouth once a week  -     VITAMIN D 25 HYDROXY; Future    Other orders  -     levothyroxine (SYNTHROID) 100 MCG tablet; Take 1 tablet by mouth Daily  -     metoprolol succinate (TOPROL XL) 100 MG extended release tablet;  Take 1 tablet by mouth daily

## 2021-08-13 NOTE — PROGRESS NOTES
Medicare Annual Wellness Visit  Name: Eleonora Mao Date: 2021   MRN: [id number] Sex: Female   Age: 56 y.o. Ethnicity: Non- / Non    : 1960 Race: White (non-)      Amanda Duarte is here for Medicare AWV, Depression (bipolar and depression ), and Other (pt would like rx for wheeled walker)    Screenings for behavioral, psychosocial and functional/safety risks, and cognitive dysfunction are all negative except as indicated below. These results, as well as other patient data from the [address] form, are documented in Flowsheets linked to this Encounter. Allergies   Allergen Reactions    Dilaudid [Hydromorphone Hcl] Other (See Comments)     Palpitations and orthostatic hypotension    Lavender Oil Shortness Of Breath and Rash    Penicillins Shortness Of Breath    Seroquel [Quetiapine Fumarate] Other (See Comments)     Very lethargic/almost not funtioning at all (per patient).  Adhesive Tape Other (See Comments)     BLISTERS, paper tape okay    Hydromorphone Hcl      Other reaction(s): Other (See Comments)  Rapid heart rate     Lithium Other (See Comments)     Diarrhea, vomiting, nausea, headaches,     Tetanus Toxoids Swelling         Prior to Visit Medications    Medication Sig Taking?  Authorizing Provider   vitamin D (ERGOCALCIFEROL) 1.25 MG (29885 UT) CAPS capsule Take 1 capsule by mouth once a week Yes Lianne Vásquez MD   buPROPion (WELLBUTRIN XL) 150 MG extended release tablet Take 1 tablet by mouth every morning Yes Lianne Vásquez MD   levothyroxine (SYNTHROID) 100 MCG tablet Take 1 tablet by mouth Daily Yes Lianne Vásquez MD   metoprolol succinate (TOPROL XL) 100 MG extended release tablet Take 1 tablet by mouth daily Yes Lianne Vásquez MD   lisinopril (PRINIVIL;ZESTRIL) 5 MG tablet TAKE 1 TABLET EVERY DAY Yes ALICIA Hall CNP   allopurinol (ZYLOPRIM) 300 MG tablet TAKE 1 TABLET EVERY DAY Yes ALICIA Lynn CNP   lamoTRIgine (LAMICTAL) 150 MG tablet TAKE 1 TABLET TWICE DAILY OR AS OTHERWISE DIRECTED Yes ALICIA Izquierdo CNP   simvastatin (ZOCOR) 20 MG tablet TAKE 1 TABLET EVERY NIGHT Yes ALICIA Ramos CNP   montelukast (SINGULAIR) 10 MG tablet Take 1 tablet by mouth nightly Yes ALICIA Ramos CNP   diclofenac sodium (VOLTAREN) 1 % GEL Apply topically 2 times daily Yes ALICIA Ramos CNP   apixaban (ELIQUIS) 5 MG TABS tablet Take 1 tablet by mouth 2 times daily Yes ALICIA Rivera CNP   torsemide (DEMADEX) 10 MG tablet 2 tabs every other day alternating with 1 tab the other days Yes ALICIA Rivera CNP   acetaminophen (TYLENOL) 325 MG tablet Take 650 mg by mouth every 6 hours as needed for Pain TAKES EVERY DAY Yes Historical Provider, MD   cetirizine (ZYRTEC) 10 MG tablet Take 10 mg by mouth daily Yes Historical Provider, MD   methocarbamol (ROBAXIN) 500 MG tablet TAKE 1 TABLET THREE TIMES DAILY AS NEEDED FOR PAIN  Antonio Spring MD   BIOTIN PO Take by mouth daily   Patient not taking: Reported on 7/9/2021  Historical Provider, MD         Past Medical History:   Diagnosis Date    Anesthesia complication     hypotension post op    Autoimmune hemolytic anemia ([facility] [address]     during uterine cancer    Bipolar disorder ([facility] [address]     [facility] by [facility])    Bundle branch block     Cardiomyopathy ([facility] [address]     Chronic systolic CHF (congestive heart failure) ([facility] [address] 7/9/2015    Depression     Family history of early CAD     Family history of ovarian cancer     mother    GERD (gastroesophageal reflux disease)     Gout     Hypertension     Hypothyroid     Osteoarthritis, knee     Shybut    Pneumonia     history of pneumonia    S/P gastric bypass 1/7/05    Unspecified cerebral artery occlusion with cerebral infarction     Uterine cancer [facility])     endometrial adenocarcinoma    Vitamin B 12 deficiency 7/09       Past Surgical History:   Procedure Laterality Date    CARDIAC CATHETERIZATION  7/10/2015    Normal Coronary Arteries    CHOLECYSTECTOMY      COLONOSCOPY  03/06/2019    NL    COLONOSCOPY N/A 3/6/2019    EGD AND COLONOSCOPY WITH ANESTHESIA performed by Karol Foster MD at [address] [place] [address]      4 times    ERCP      FINGER SURGERY      gout X 2    GASTRIC BYPASS SURGERY  1/7/05    HERNIA REPAIR  10/20/2016    lap ventral hernia    LIPECTOMY      gangrenous    OTHER SURGICAL HISTORY      radium implants into uterus for uterine surgery X 2    OTHER SURGICAL HISTORY  4/1/16    exp.  lap lysis of adhesion    OTHER SURGICAL HISTORY      biventricular pacer and defibrillator    PACEMAKER PLACEMENT  09/2016    TRANSESOPHAGEAL ECHOCARDIOGRAM  7/13/2015    UPPER GASTROINTESTINAL ENDOSCOPY  4/23/13    UPPER GASTROINTESTINAL ENDOSCOPY  03/06/2019    NL    UPPER GASTROINTESTINAL ENDOSCOPY N/A 3/6/2019    EGD AND COLONOSCOPY WITH ANESTHESIA performed by Karol Foster MD at [address] [facility] History   Problem Relation Age of Onset    Cancer Mother     Ovarian Cancer Mother 45        Technically peritoneal cancer    Depression Mother         bipolar    Arthritis Father    [facility] J[facility] Rheum Arthritis Father     Arrhythmia Father     Other Father         gout    Heart Disease Father     Depression Brother         depression    Other Brother         gout    Obesity Brother     Other Brother         gout    Obesity Brother     Depression Brother     Cancer Maternal Grandmother         Breast    Breast Cancer Maternal Grandmother        CareTeam (Including outside providers/suppliers regularly involved in providing care):   Patient Care Team:  Ricardo Membreno MD as PCP - General (Family Medicine)  Ricardo Membreno MD as PCP - [facility] Empaneled Provider  Kristan Fields MD as Surgeon (General Surgery)  Duane David, APRN - CNP as Nurse Practitioner (Family Nurse Practitioner)    Wt Readings from Last 3 Encounters:   07/14/21 (!) 312 lb 3.2 oz (141.6 kg)   07/09/21 (!) 318 lb 12.8 oz (144.6 kg)   05/10/21 (!) 314 lb 8 oz (142.7 kg)     Vitals:    07/14/21 1130   BP: 120/72   Site: Left Upper Arm   Position: Sitting   Cuff Size: Large Adult   Pulse: 70   SpO2: 98%   Weight: (!) 312 lb 3.2 oz (141.6 kg)   Height: 5' 1.42\" (1.56 m)     Body mass index is 58.19 kg/m². Based upon direct observation of the patient, evaluation of cognition reveals recent and remote memory intact. General Appearance: alert and oriented to person, place and time, well developed and well- nourished, in no acute distress  Skin: warm and dry, no rash or erythema  Head: normocephalic and atraumatic  Neck: supple and non-tender without mass, no thyromegaly or thyroid nodules, no cervical lymphadenopathy  Pulmonary/Chest: clear to auscultation bilaterally- no wheezes, rales or rhonchi, normal air movement, no respiratory distress  Cardiovascular: normal rate, regular rhythm, normal S1 and S2, no murmurs, rubs, clicks, or gallops, distal pulses intact, no carotid bruits  Abdomen: soft, non-tender, non-distended, normal bowel sounds, no masses or organomegaly  Extremities: no cyanosis, clubbing or edema  Musculoskeletal: normal range of motion, no joint swelling, deformity or tenderness    Patient's complete Health Risk Assessment and screening values have been reviewed and are found in Flowsheets. The following problems were reviewed today and where indicated follow up appointments were made and/or referrals ordered.     Positive Risk Factor Screenings with Interventions:       Depression:  PHQ-2 Score: 3  PHQ-9 Total Score: 14    Severity:1-4 = minimal depression, 5-9 = mild depression, 10-14 = moderate depression, 15-19 = moderately severe depression, 20-27 = severe depression  Depression Interventions:  · plan per orders        General Health and ACP:     Advance Directives     Power of  Living Will ACP-Advance Directive ACP-Power of     Not on File Not on File Not on File Not on File      General Health Risk Interventions:  · documented today      BrotherKaylee  DNI, DNR  ICD        Health Habits/Nutrition:     Body mass index: (!) 58.18  Health Habits/Nutrition Interventions:  · Inadequate physical activity:  patient agrees to exercise for at least 150 minutes/week       Personalized Preventive Plan   Current Health Maintenance Status  Immunization History   Administered Date(s) Administered    COVID-19, Moderna, PF, 100mcg/0.5mL 03/18/2021, 04/22/2021    Influenza Virus Vaccine 01/28/2012, 10/17/2012, 11/10/2014, 10/22/2015    Influenza Whole 11/10/2014    Influenza, Intradermal, Preservative free 09/24/2013    Influenza, MDCK Quadv, IM, PF (Flucelvax 4 yrs and older) 10/17/2017    Influenza, Eugena Tobi, IM, PF (6 mo and older Fluzone, Flulaval, Fluarix, and 3 yrs and older Afluria) 12/01/2016, 01/04/2019, 10/07/2019, 09/21/2020    Pneumococcal Polysaccharide ([id number]) 01/28/2012    Tetanus 03/01/1988        Health Maintenance   Topic Date Due    HIV screen  Never done    Shingles Vaccine (1 of 2) Never done    Cervical cancer screen  08/29/2016    Annual Wellness Visit (AWV)  Never done    Flu vaccine (1) 09/01/2021    TSH testing  03/25/2022    Lipid screen  04/01/2022    Potassium monitoring  07/12/2022    Creatinine monitoring  07/12/2022    Breast cancer screen  07/13/2023    Pneumococcal 0-64 years Vaccine (2 of 2 - PPSV23) 08/16/2023    Colon cancer screen colonoscopy  03/06/2029    COVID-19 Vaccine  Completed    Hepatitis C screen  Completed    Hepatitis A vaccine  Aged Out    Hepatitis B vaccine  Aged Out    Hib vaccine  Aged Out    Meningococcal (ACWY) vaccine  Aged Out     Recommendations for [employer] Due: see orders and patient instructions/AVS.  .   Recommended screening schedule for the next 5-10 years is provided to the patient in written form: see Patient Instructions/AVS.    Sofi Grimaldo was seen today for medicare awv, depression and other. Diagnoses and all orders for this visit:    Positive depression screening  -     Positive Screen for Clinical Depression with a Documented Follow-up Plan     Routine general medical examination at a health care facility    Severe episode of recurrent major depressive disorder, without psychotic features ([facility] [address]  -     vitamin D (ERGOCALCIFEROL) 1.25 MG (89402 UT) CAPS capsule; Take 1 capsule by mouth once a week  -     buPROPion (WELLBUTRIN XL) 150 MG extended release tablet; Take 1 tablet by mouth every morning    Vitamin D deficiency  -     vitamin D (ERGOCALCIFEROL) 1.25 MG (26354 UT) CAPS capsule; Take 1 capsule by mouth once a week  -     VITAMIN D 25 HYDROXY; Future    Other orders  -     levothyroxine (SYNTHROID) 100 MCG tablet; Take 1 tablet by mouth Daily  -     metoprolol succinate (TOPROL XL) 100 MG extended release tablet;  Take 1 tablet by mouth daily

## 2021-08-19 ENCOUNTER — HOSPITAL ENCOUNTER (OUTPATIENT)
Dept: SLEEP CENTER | Age: 61
Discharge: HOME | End: 2021-08-21
Payer: MEDICARE

## 2021-08-19 DIAGNOSIS — G47.33 OSA (OBSTRUCTIVE SLEEP APNEA): ICD-10-CM

## 2021-08-19 PROCEDURE — 95811 POLYSOM 6/>YRS CPAP 4/> PARM: CPT

## 2021-08-22 PROCEDURE — 95811 POLYSOM 6/>YRS CPAP 4/> PARM: CPT | Performed by: INTERNAL MEDICINE

## 2021-08-23 ENCOUNTER — TELEPHONE (OUTPATIENT)
Dept: PULMONOLOGY | Age: 61
End: 2021-08-23

## 2021-08-23 ENCOUNTER — VIRTUAL VISIT (OUTPATIENT)
Dept: FAMILY MEDICINE CLINIC | Age: 61
End: 2021-08-23
Payer: MEDICARE

## 2021-08-23 DIAGNOSIS — M79.672 FOOT PAIN, LEFT: ICD-10-CM

## 2021-08-23 DIAGNOSIS — G47.33 OSA (OBSTRUCTIVE SLEEP APNEA): Primary | ICD-10-CM

## 2021-08-23 DIAGNOSIS — F33.2 SEVERE EPISODE OF RECURRENT MAJOR DEPRESSIVE DISORDER, WITHOUT PSYCHOTIC FEATURES (HCC): Primary | ICD-10-CM

## 2021-08-23 DIAGNOSIS — I69.354 HEMIPLEGIA AND HEMIPARESIS FOLLOWING CEREBRAL INFARCTION AFFECTING LEFT NON-DOMINANT SIDE (HCC): ICD-10-CM

## 2021-08-23 PROCEDURE — G8417 CALC BMI ABV UP PARAM F/U: HCPCS | Performed by: FAMILY MEDICINE

## 2021-08-23 PROCEDURE — G8427 DOCREV CUR MEDS BY ELIG CLIN: HCPCS | Performed by: FAMILY MEDICINE

## 2021-08-23 PROCEDURE — 99214 OFFICE O/P EST MOD 30 MIN: CPT | Performed by: FAMILY MEDICINE

## 2021-08-23 PROCEDURE — 1036F TOBACCO NON-USER: CPT | Performed by: FAMILY MEDICINE

## 2021-08-23 PROCEDURE — 3017F COLORECTAL CA SCREEN DOC REV: CPT | Performed by: FAMILY MEDICINE

## 2021-08-23 RX ORDER — FLUTICASONE PROPIONATE 50 MCG
2 SPRAY, SUSPENSION (ML) NASAL DAILY
Qty: 1 BOTTLE | Refills: 5 | Status: ON HOLD | OUTPATIENT
Start: 2021-08-23 | End: 2022-05-09

## 2021-08-23 RX ORDER — BUPROPION HYDROCHLORIDE 150 MG/1
150 TABLET ORAL EVERY MORNING
Qty: 90 TABLET | Refills: 1 | Status: SHIPPED | OUTPATIENT
Start: 2021-08-23 | End: 2021-11-29

## 2021-08-23 SDOH — ECONOMIC STABILITY: FOOD INSECURITY: WITHIN THE PAST 12 MONTHS, YOU WORRIED THAT YOUR FOOD WOULD RUN OUT BEFORE YOU GOT MONEY TO BUY MORE.: NEVER TRUE

## 2021-08-23 SDOH — ECONOMIC STABILITY: TRANSPORTATION INSECURITY
IN THE PAST 12 MONTHS, HAS THE LACK OF TRANSPORTATION KEPT YOU FROM MEDICAL APPOINTMENTS OR FROM GETTING MEDICATIONS?: NO

## 2021-08-23 SDOH — ECONOMIC STABILITY: FOOD INSECURITY: WITHIN THE PAST 12 MONTHS, THE FOOD YOU BOUGHT JUST DIDN'T LAST AND YOU DIDN'T HAVE MONEY TO GET MORE.: NEVER TRUE

## 2021-08-23 SDOH — ECONOMIC STABILITY: TRANSPORTATION INSECURITY
IN THE PAST 12 MONTHS, HAS LACK OF TRANSPORTATION KEPT YOU FROM MEETINGS, WORK, OR FROM GETTING THINGS NEEDED FOR DAILY LIVING?: NO

## 2021-08-23 ASSESSMENT — ENCOUNTER SYMPTOMS
NAUSEA: 0
ABDOMINAL PAIN: 0
SHORTNESS OF BREATH: 0
CHEST TIGHTNESS: 0
VOMITING: 0
BACK PAIN: 0
COLOR CHANGE: 0

## 2021-08-23 ASSESSMENT — SOCIAL DETERMINANTS OF HEALTH (SDOH): HOW HARD IS IT FOR YOU TO PAY FOR THE VERY BASICS LIKE FOOD, HOUSING, MEDICAL CARE, AND HEATING?: NOT HARD AT ALL

## 2021-08-23 NOTE — TELEPHONE ENCOUNTER
PAP orders. Need faxed, with testing/OV note/demographics/insurance, once signed, to [address].

## 2021-08-23 NOTE — PROGRESS NOTES
2021    TELEHEALTH EVALUATION -- Audio/Visual (During IWSOW-37 public health emergency)    HPI:    Nevin Villa (:  1960) has requested an audio/video evaluation for the following concern(s):      F/u moods   was in a very bad state at our last visit, but feels the addition of the Wellbutrin was very beneficial  Wakes daily and can now say she will have a good day, where as before she didn't even want to get out of bed  Working with Dr. Torrie Blair, feels this will be a very beneficial relationship  Understands that things \"can't always be fixed, but can be manageable\"  Her goal is to have a happier life  Denies any side effects to the Wellbutrin  Sleeping less, which is a positive     Wants to walk, lose weight  + L foot pain, asks if she needs a specialist  Feels like there is a lump in the arch of her foot, \"like her arch collapsed\"   Can obtain membership at the [employer], likes the indoor pool     Review of Systems   Constitutional: Negative for activity change, appetite change, fatigue and unexpected weight change. Respiratory: Negative for chest tightness and shortness of breath. Cardiovascular: Negative for chest pain and palpitations. Gastrointestinal: Negative for abdominal pain, nausea and vomiting. Musculoskeletal: Positive for arthralgias, gait problem and myalgias. Negative for back pain. Skin: Negative for color change. Neurological: Negative for dizziness, tremors, seizures, weakness, light-headedness, numbness and headaches. Psychiatric/Behavioral: Positive for dysphoric mood (improved). Negative for agitation, behavioral problems, confusion, decreased concentration, hallucinations, self-injury, sleep disturbance and suicidal ideas. The patient is not nervous/anxious and is not hyperactive. Prior to Visit Medications    Medication Sig Taking?  Authorizing Provider   fluticasone (FLONASE) 50 MCG/ACT nasal spray 2 sprays by Each Nostril route daily Yes Aylin Chester MD buPROPion (WELLBUTRIN XL) 150 MG extended release tablet Take 1 tablet by mouth every morning Yes Ricky Mejia MD   methocarbamol (ROBAXIN) 500 MG tablet TAKE 1 TABLET THREE TIMES DAILY AS NEEDED FOR PAIN Yes Ricky Mejia MD   vitamin D (ERGOCALCIFEROL) 1.25 MG (13175 UT) CAPS capsule Take 1 capsule by mouth once a week Yes Ricky Mejia MD   levothyroxine (SYNTHROID) 100 MCG tablet Take 1 tablet by mouth Daily Yes Ricky Mejia MD   metoprolol succinate (TOPROL XL) 100 MG extended release tablet Take 1 tablet by mouth daily Yes Ricky Mejia MD   lisinopril (PRINIVIL;ZESTRIL) 5 MG tablet TAKE 1 TABLET EVERY DAY Yes ALICIA Haskins CNP   allopurinol (ZYLOPRIM) 300 MG tablet TAKE 1 TABLET EVERY DAY Yes ALICIA Carney CNP   lamoTRIgine (LAMICTAL) 150 MG tablet TAKE 1 TABLET TWICE DAILY OR AS OTHERWISE DIRECTED Yes ALICIA Carney CNP   simvastatin (ZOCOR) 20 MG tablet TAKE 1 TABLET EVERY NIGHT Yes ALICIA Etienne CNP   montelukast (SINGULAIR) 10 MG tablet Take 1 tablet by mouth nightly Yes ALICIA Etienne CNP   diclofenac sodium (VOLTAREN) 1 % GEL Apply topically 2 times daily Yes ALICIA Etienne CNP   apixaban (ELIQUIS) 5 MG TABS tablet Take 1 tablet by mouth 2 times daily Yes ALICIA Ramirez CNP   torsemide (DEMADEX) 10 MG tablet 2 tabs every other day alternating with 1 tab the other days Yes ALICIA Ramirez CNP   acetaminophen (TYLENOL) 325 MG tablet Take 650 mg by mouth every 6 hours as needed for Pain TAKES EVERY DAY Yes Historical Provider, MD   cetirizine (ZYRTEC) 10 MG tablet Take 10 mg by mouth daily Yes Historical Provider, MD       Social History     Tobacco Use    Smoking status: Former Smoker     Packs/day: 3.00     Years: 2.00     Pack years: 6.00     Types: Cigarettes     Quit date: 1985     Years since quittin.6    Smokeless tobacco: Never Used   Vaping Use    Vaping Use: Never used   Substance Use Topics    Alcohol use: No    Drug use: No        Allergies   Allergen Reactions    Dilaudid [Hydromorphone Hcl] Other (See Comments)     Palpitations and orthostatic hypotension    Lavender Oil Shortness Of Breath and Rash    Penicillins Shortness Of Breath    Seroquel [Quetiapine Fumarate] Other (See Comments)     Very lethargic/almost not funtioning at all (per patient).  Adhesive Tape Other (See Comments)     BLISTERS, paper tape okay    Hydromorphone Hcl      Other reaction(s):  Other (See Comments)  Rapid heart rate     Lithium Other (See Comments)     Diarrhea, vomiting, nausea, headaches,     Tetanus Toxoids Swelling   ,   Past Medical History:   Diagnosis Date    Anesthesia complication     hypotension post op    Autoimmune hemolytic anemia (HCC)     during uterine cancer    Bipolar disorder ([facility]     managed by Melia Samuel    Bundle branch block     Cardiomyopathy ([facility]     Chronic systolic CHF (congestive heart failure) ([facility] 7/9/2015    Depression     Family history of early CAD     Family history of ovarian cancer     mother    GERD (gastroesophageal reflux disease)     Gout     Hypertension     Hypothyroid     Osteoarthritis, knee     Shybut    Pneumonia     history of pneumonia    S/P gastric bypass 1/7/05    Unspecified cerebral artery occlusion with cerebral infarction     Uterine cancer ([facility]     endometrial adenocarcinoma    Vitamin B 12 deficiency 7/09   ,   Past Surgical History:   Procedure Laterality Date    CARDIAC CATHETERIZATION  7/10/2015    Normal Coronary Arteries    CHOLECYSTECTOMY      COLONOSCOPY  03/06/2019    NL    COLONOSCOPY N/A 3/6/2019    EGD AND COLONOSCOPY WITH ANESTHESIA performed by Beth Givens [facility]      4 times    ERCP      FINGER SURGERY      gout X 2    GASTRIC BYPASS SURGERY  1/7/05    HERNIA REPAIR  10/20/2016    lap ventral hernia    LIPECTOMY      gangrenous    OTHER SURGICAL HISTORY      radium implants into uterus for uterine surgery X 2    OTHER SURGICAL HISTORY  16    exp.  lap lysis of adhesion    OTHER SURGICAL HISTORY      biventricular pacer and defibrillator    PACEMAKER PLACEMENT  2016    TRANSESOPHAGEAL ECHOCARDIOGRAM  2015    UPPER GASTROINTESTINAL ENDOSCOPY  13    UPPER GASTROINTESTINAL ENDOSCOPY  2019    NL    UPPER GASTROINTESTINAL ENDOSCOPY N/A 3/6/2019    EGD AND COLONOSCOPY WITH ANESTHESIA performed by Nata Swartz MD at [address] [place] [address]   ,   Social History     Tobacco Use    Smoking status: Former Smoker     Packs/day: 3.00     Years: 2.00     Pack years: 6.00     Types: Cigarettes     Quit date: 1985     Years since quittin.6    Smokeless tobacco: Never Used   Vaping Use    Vaping Use: Never used   Substance Use Topics    Alcohol use: No    Drug use: No   ,   Family History   Problem Relation Age of Onset    Cancer Mother     Ovarian Cancer Mother 45        Technically peritoneal cancer    Depression Mother         bipolar    Arthritis Father     Rheum Arthritis Father     Arrhythmia Father     Other Father         gout    Heart Disease Father     Depression Brother         depression    Other Brother         gout    Obesity Brother     Other Brother         gout    Obesity Brother     Depression Brother     Cancer Maternal Grandmother         Breast    Breast Cancer Maternal Grandmother    ,   Immunization History   Administered Date(s) Administered    COVID-19, Moderna, PF, 100mcg/0.5mL 2021, 2021    Influenza Virus Vaccine 2012, 10/17/2012, 11/10/2014, 10/22/2015    Influenza Whole 11/10/2014    Influenza, Intradermal, Preservative free 2013    Influenza, MDCK Quadv, IM, PF (Flucelvax 4 yrs and older) 10/17/2017    Influenza, Quadv, IM, PF (6 mo and older Fluzone, Flulaval, Fluarix, and 3 yrs and older Afluria) 2016, 2019, 10/07/2019, 09/21/2020    Pneumococcal Polysaccharide ([id number]) 01/28/2012    Tetanus 03/01/1988   ,   Health Maintenance   Topic Date Due    HIV screen  Never done    Shingles Vaccine (1 of 2) Never done    Cervical cancer screen  08/29/2016    Flu vaccine (1) 09/01/2021    TSH testing  03/25/2022    Lipid screen  04/01/2022    Potassium monitoring  07/12/2022    Creatinine monitoring  07/12/2022    Annual Wellness Visit (AWV)  07/15/2022    Breast cancer screen  07/13/2023    Pneumococcal 0-64 years Vaccine (2 of 2 - PPSV23) 08/16/2023    Colon cancer screen colonoscopy  03/06/2029    COVID-19 Vaccine  Completed    Hepatitis C screen  Completed    Hepatitis A vaccine  Aged Out    Hepatitis B vaccine  Aged Out    Hib vaccine  Aged Out    Meningococcal (ACWY) vaccine  Aged Out       PHYSICAL EXAMINATION:  [ INSTRUCTIONS:  \"[x]\" Indicates a positive item  \"[]\" Indicates a negative item  -- DELETE ALL ITEMS NOT EXAMINED]  Vital Signs: (As obtained by patient/caregiver or practitioner observation)    Blood pressure-  Heart rate-    Respiratory rate-    Temperature-  Pulse oximetry-     Constitutional: [x] Appears well-developed and well-nourished [x] No apparent distress      [] Abnormal-   Mental status  [x] Alert and awake  [] Oriented to person/place/time [x]Able to follow commands      Eyes:  EOM    [x]  Normal  [] Abnormal-  Sclera  [x]  Normal  [] Abnormal -         Discharge []  None visible  [] Abnormal -    HENT:   [x] Normocephalic, atraumatic.   [x] Abnormal   [] Mouth/Throat: Mucous membranes are moist.     External Ears [x] Normal  [] Abnormal-     Neck: [x] No visualized mass     Pulmonary/Chest: [x] Respiratory effort normal.  [x] No visualized signs of difficulty breathing or respiratory distress        [] Abnormal-      Musculoskeletal:   [] Normal gait with no signs of ataxia         [x] Normal range of motion of neck        [] Abnormal-       Neurological:        [x] No Facial Asymmetry (Cranial nerve 7 motor function) (limited exam to video visit)          [] No gaze palsy        [] Abnormal-         Skin:        [x] No significant exanthematous lesions or discoloration noted on facial skin         [] Abnormal-            Psychiatric:       [x] Normal Affect [] No Hallucinations        [] Abnormal-     Other pertinent observable physical exam findings-     ASSESSMENT/PLAN:  1. Severe episode of recurrent major depressive disorder, without psychotic features ([facility]  Much improved  Will continue seeing Dr. Edith Masterson as well  - buPROPion (WELLBUTRIN XL) 150 MG extended release tablet; Take 1 tablet by mouth every morning  Dispense: 90 tablet; Refill: 1    2. Foot pain, left  - [facility] Cristela Lo MD, Orthopedic Surgery, [facility]    3. Hemiplegia and hemiparesis following cerebral infarction affecting left non-dominant side (HCC)  No new concerns       Return if symptoms worsen or fail to improve. Kristi Olivarez is a 59 y.o. female being evaluated by a Virtual Visit (video visit) encounter to address concerns as mentioned above. A caregiver was present when appropriate. Due to this being a TeleHealth encounter (During [facility]- public health emergency), evaluation of the following organ systems was limited: Vitals/Constitutional/EENT/Resp/CV/GI//MS/Neuro/Skin/Heme-Lymph-Imm. Pursuant to the emergency declaration under the [facility], [address] [place] [address] authority and the [employer] Act, this Virtual Visit was conducted with patient's (and/or legal guardian's) consent, to reduce the patient's risk of exposure to COVID-19 and provide necessary medical care. The patient (and/or legal guardian) has also been advised to contact this office for worsening conditions or problems, and seek emergency medical treatment and/or call 911 if deemed necessary.      Services were provided through a video synchronous discussion virtually to substitute for in-person clinic visit. Patient and provider were located at their individual homes. --Radha Brian MD on 8/23/2021 at 10:58 AM    An electronic signature was used to authenticate this note.

## 2021-08-25 ENCOUNTER — OFFICE VISIT (OUTPATIENT)
Dept: ORTHOPEDIC SURGERY | Age: 61
End: 2021-08-25
Payer: MEDICARE

## 2021-08-25 VITALS — HEIGHT: 61 IN | WEIGHT: 293 LBS | BODY MASS INDEX: 55.32 KG/M2

## 2021-08-25 DIAGNOSIS — M19.079 ARTHRITIS OF MIDFOOT: Primary | ICD-10-CM

## 2021-08-25 PROCEDURE — G8417 CALC BMI ABV UP PARAM F/U: HCPCS | Performed by: ORTHOPAEDIC SURGERY

## 2021-08-25 PROCEDURE — G8427 DOCREV CUR MEDS BY ELIG CLIN: HCPCS | Performed by: ORTHOPAEDIC SURGERY

## 2021-08-25 PROCEDURE — 99213 OFFICE O/P EST LOW 20 MIN: CPT | Performed by: ORTHOPAEDIC SURGERY

## 2021-08-25 NOTE — PROGRESS NOTES
[facility]  Outpatient Progress Note  Viji Ag MD    Patient Name: Pia Hughes MRN: [id number]   Age: 59 y.o. YOB: 1960   Sex: female      [address] Complaint   Patient presents with   [facility] Patient     left foot pain lump medial arch no recent imaging         HISTORY OF PRESENT ILLNESS   Pia Hughes is a 59 y.o. female referred by Dr. Yaya Sethi for orthopedic consultation regarding left foot discomfort. The patient has noticed pain and swelling in her left midfoot over the years and now notices a prominence in the plantar medial arch which is uncomfortable with weightbearing. She has been able to manage with accommodative shoes over the years. There has been no recent injury. She has recently been diagnosed with cardiomyopathy and congestive heart failure and wishes to increase her walking to try and lose some weight and control her other comorbidities but the foot is causing discomfort. She reports her current weight at 312 pounds with a BMI of 58.95.     Pain Assessment  Location of Pain: Foot  Location Modifiers: Left  Severity of Pain: 7  Quality of Pain: Aching, Sharp  Duration of Pain: Persistent  Frequency of Pain: Constant  Aggravating Factors: Walking, Stairs, Standing  Limiting Behavior: Yes  Result of Injury: No  Work-Related Injury: No  Are there other pain locations you wish to document?: No    PAST MEDICAL HISTORY      Past Medical History:   Diagnosis Date    Anesthesia complication     hypotension post op    Autoimmune hemolytic anemia (HCC)     during uterine cancer    Bipolar disorder ([facility]     managed by Gavi Crow    Bundle branch block     Cardiomyopathy ([facility]     Chronic systolic CHF (congestive heart failure) ([facility] 7/9/2015    Depression     Family history of early CAD     Family history of ovarian cancer     mother    GERD (gastroesophageal reflux disease)     Gout     Hypertension     Hypothyroid  Osteoarthritis, knee     Shybut    Pneumonia     history of pneumonia    S/P gastric bypass 1/7/05    Unspecified cerebral artery occlusion with cerebral infarction     Uterine cancer ([facility]     endometrial adenocarcinoma    Vitamin B 12 deficiency 7/09       PAST SURGICAL HISTORY     Past Surgical History:   Procedure Laterality Date    CARDIAC CATHETERIZATION  7/10/2015    Normal Coronary Arteries    CHOLECYSTECTOMY      COLONOSCOPY  03/06/2019    NL    COLONOSCOPY N/A 3/6/2019    EGD AND COLONOSCOPY WITH ANESTHESIA performed by Piyush Mccarty MD at [address] [place] [address]      4 times    ERCP      FINGER SURGERY      gout X 2    GASTRIC BYPASS SURGERY  1/7/05    HERNIA REPAIR  10/20/2016    lap ventral hernia    LIPECTOMY      gangrenous    OTHER SURGICAL HISTORY      radium implants into uterus for uterine surgery X 2    OTHER SURGICAL HISTORY  4/1/16    exp.  lap lysis of adhesion    OTHER SURGICAL HISTORY      biventricular pacer and defibrillator    PACEMAKER PLACEMENT  09/2016    TRANSESOPHAGEAL ECHOCARDIOGRAM  7/13/2015    UPPER GASTROINTESTINAL ENDOSCOPY  4/23/13    UPPER GASTROINTESTINAL ENDOSCOPY  03/06/2019    NL    UPPER GASTROINTESTINAL ENDOSCOPY N/A 3/6/2019    EGD AND COLONOSCOPY WITH ANESTHESIA performed by Piyush Mccarty MD at [address] [place] [address]     Current Outpatient Medications   Medication Sig Dispense Refill    fluticasone (FLONASE) 50 MCG/ACT nasal spray 2 sprays by Each Nostril route daily 1 Bottle 5    buPROPion (WELLBUTRIN XL) 150 MG extended release tablet Take 1 tablet by mouth every morning 90 tablet 1    methocarbamol (ROBAXIN) 500 MG tablet TAKE 1 TABLET THREE TIMES DAILY AS NEEDED FOR PAIN 90 tablet 1    vitamin D (ERGOCALCIFEROL) 1.25 MG (87521 UT) CAPS capsule Take 1 capsule by mouth once a week 8 capsule 0    levothyroxine (SYNTHROID) 100 MCG tablet Take 1 tablet by mouth Daily 90 tablet 1    metoprolol succinate (TOPROL XL) 100 MG extended release tablet Take 1 tablet by mouth daily 90 tablet 1    lisinopril (PRINIVIL;ZESTRIL) 5 MG tablet TAKE 1 TABLET EVERY DAY 90 tablet 1    allopurinol (ZYLOPRIM) 300 MG tablet TAKE 1 TABLET EVERY DAY 90 tablet 1    lamoTRIgine (LAMICTAL) 150 MG tablet TAKE 1 TABLET TWICE DAILY OR AS OTHERWISE DIRECTED 180 tablet 1    simvastatin (ZOCOR) 20 MG tablet TAKE 1 TABLET EVERY NIGHT 90 tablet 1    montelukast (SINGULAIR) 10 MG tablet Take 1 tablet by mouth nightly 90 tablet 1    diclofenac sodium (VOLTAREN) 1 % GEL Apply topically 2 times daily 150 g 2    apixaban (ELIQUIS) 5 MG TABS tablet Take 1 tablet by mouth 2 times daily 180 tablet 3    torsemide (DEMADEX) 10 MG tablet 2 tabs every other day alternating with 1 tab the other days 180 tablet 3    acetaminophen (TYLENOL) 325 MG tablet Take 650 mg by mouth every 6 hours as needed for Pain TAKES EVERY DAY      cetirizine (ZYRTEC) 10 MG tablet Take 10 mg by mouth daily       No current facility-administered medications for this visit. ALLERGIES     Allergies   Allergen Reactions    Dilaudid [Hydromorphone Hcl] Other (See Comments)     Palpitations and orthostatic hypotension    Lavender Oil Shortness Of Breath and Rash    Penicillins Shortness Of Breath    Seroquel [Quetiapine Fumarate] Other (See Comments)     Very lethargic/almost not funtioning at all (per patient).  Adhesive Tape Other (See Comments)     BLISTERS, paper tape okay    Hydromorphone Hcl      Other reaction(s):  Other (See Comments)  Rapid heart rate     Lithium Other (See Comments)     Diarrhea, vomiting, nausea, headaches,     Tetanus Toxoids Swelling       FAMILY HISTORY     Family History   Problem Relation Age of Onset    Cancer Mother     Ovarian Cancer Mother 45        Technically peritoneal cancer    Depression Mother         bipolar    Arthritis Father     Rheum Arthritis Father     Arrhythmia Father    Elsy Me regarding diagnosis and treatment options and recommendations. At this time I have recommended shoewear modification and a custom orthotic insert. She may wish to try topical diclofenac to control pain symptoms. At this time she is not a surgical candidate. FOLLOWUP     Return if symptoms worsen or fail to improve. Orders Placed This Encounter   Procedures    XR FOOT LEFT (MIN 3 VIEWS)     Standing Status:   Future     Number of Occurrences:   1     Standing Expiration Date:   8/25/2022     Order Specific Question:   Reason for exam:     Answer:   pain      No orders of the defined types were placed in this encounter.       Patient was instructed on appropriate use of braces, participation in home exercise programs, healthy lifestyle choices and weight loss as appropriate     Graeme Piña MD

## 2021-08-27 ENCOUNTER — OFFICE VISIT (OUTPATIENT)
Dept: PSYCHOLOGY | Age: 61
End: 2021-08-27
Payer: MEDICARE

## 2021-08-27 DIAGNOSIS — F31.32 BIPOLAR AFFECTIVE DISORDER, CURRENTLY DEPRESSED, MODERATE (HCC): Primary | Chronic | ICD-10-CM

## 2021-08-27 PROCEDURE — 90832 PSYTX W PT 30 MINUTES: CPT | Performed by: PSYCHOLOGIST

## 2021-08-27 PROCEDURE — 1036F TOBACCO NON-USER: CPT | Performed by: PSYCHOLOGIST

## 2021-08-27 NOTE — PROGRESS NOTES
Behavioral Health Consultation  [address] Zita Rader PsyD  Psychologist  8/27/2021   11:30 AM      Time spent with Patient: 30 minutes  This is patient's third  [facility] appointment. Reason for Consult:    Chief Complaint   Patient presents with    Depression     Referring Provider: J Luis Amaya MD       S:  During the last visit patient pt set goals to 1) practice diffusion of thoughts and feelings exercise 2) write two cards to friends 3) practice daily gratitue  by identifying one thing you are grateful for each day 4) create a list of activities you used to enjoy    Pt reports she is \"ok\" Feels medication has made some things easier. Is a little better than she was during the last visit. Did write and send out 2 cards and found it very difficult. Found she wanted to be just right and caught herself from throwing it out and finding excuses not to send it. Force yourself to do it. Has been keeping a gratitude journal and this is also been hard but is forcing herself to do it even if she doesn't think of something until later in the evening. Has engaged in more pleasurable activities and has enjoyed them. Was able to work on [employer] and actually felt excitement which was surprising to her. Has been practicing diffusion exercise but finds that she has to think about it somewhat differently by deconstructing her thoughts his sentences into words. Notices that she has a lot of all or nothing thinking.       O:  MSE:    Appearance: good hygiene   Attitude: cooperative and friendly  Consciousness: alert  Orientation: oriented to person, place, time, general circumstance  Memory: recent and remote memory intact  Attention/Concentration: intact during session  Psychomotor Activity:normal  Eye Contact: normal  Speech: normal rate and volume, well-articulated  Mood: \"ok\"  Affect: euthymic , congruent  Perception: within normal limits  Thought Content: within normal limits  Thought Process: logical, coherent and goal-directed  Insight: good  Judgment: intact  Ability to understand instructions: Yes  Ability to respond meaningfully: Yes  Morbid Ideation: no   Suicide Assessment: no suicidal ideation, plan, or intent  Homicidal Ideation: no      History:    Medications:   Current Outpatient Medications   Medication Sig Dispense Refill    fluticasone (FLONASE) 50 MCG/ACT nasal spray 2 sprays by Each Nostril route daily 1 Bottle 5    buPROPion (WELLBUTRIN XL) 150 MG extended release tablet Take 1 tablet by mouth every morning 90 tablet 1    methocarbamol (ROBAXIN) 500 MG tablet TAKE 1 TABLET THREE TIMES DAILY AS NEEDED FOR PAIN 90 tablet 1    vitamin D (ERGOCALCIFEROL) 1.25 MG (67839 UT) CAPS capsule Take 1 capsule by mouth once a week 8 capsule 0    levothyroxine (SYNTHROID) 100 MCG tablet Take 1 tablet by mouth Daily 90 tablet 1    metoprolol succinate (TOPROL XL) 100 MG extended release tablet Take 1 tablet by mouth daily 90 tablet 1    lisinopril (PRINIVIL;ZESTRIL) 5 MG tablet TAKE 1 TABLET EVERY DAY 90 tablet 1    allopurinol (ZYLOPRIM) 300 MG tablet TAKE 1 TABLET EVERY DAY 90 tablet 1    lamoTRIgine (LAMICTAL) 150 MG tablet TAKE 1 TABLET TWICE DAILY OR AS OTHERWISE DIRECTED 180 tablet 1    simvastatin (ZOCOR) 20 MG tablet TAKE 1 TABLET EVERY NIGHT 90 tablet 1    montelukast (SINGULAIR) 10 MG tablet Take 1 tablet by mouth nightly 90 tablet 1    diclofenac sodium (VOLTAREN) 1 % GEL Apply topically 2 times daily 150 g 2    apixaban (ELIQUIS) 5 MG TABS tablet Take 1 tablet by mouth 2 times daily 180 tablet 3    torsemide (DEMADEX) 10 MG tablet 2 tabs every other day alternating with 1 tab the other days 180 tablet 3    acetaminophen (TYLENOL) 325 MG tablet Take 650 mg by mouth every 6 hours as needed for Pain TAKES EVERY DAY      cetirizine (ZYRTEC) 10 MG tablet Take 10 mg by mouth daily       No current facility-administered medications for this visit.        Social History:   Social History Socioeconomic History    Marital status:      Spouse name: Not on file    Number of children: Not on file    Years of education: Not on file    Highest education level: Not on file   Occupational History    Not on file   Tobacco Use    Smoking status: Former Smoker     Packs/day: 3.00     Years: 2.00     Pack years: 6.00     Types: Cigarettes     Quit date: 1985     Years since quittin.6    Smokeless tobacco: Never Used   Vaping Use    Vaping Use: Never used   Substance and Sexual Activity    Alcohol use: No    Drug use: No    Sexual activity: Not Currently   Other Topics Concern    Not on file   Social History Narrative    Not on file     Social Determinants of Health     Financial Resource Strain: Low Risk     Difficulty of Paying Living Expenses: Not hard at all   Food Insecurity: No Food Insecurity    Worried About [address] in the Last Year: Never true    [facility] [employer] in the Last Year: Never true   Transportation Needs: No Transportation Needs    Lack of Transportation (Medical): No    Lack of Transportation (Non-Medical): No   Physical Activity:     Days of Exercise per Week:     Minutes of Exercise per Session:    Stress:     Feeling of Stress :    Social Connections:     Frequency of Communication with Friends and Family:     Frequency of Social Gatherings with Friends and Family:     Attends Rastafari Services:     Active Member of Clubs or Organizations:     Attends Club or Organization Meetings:     Marital Status:    Intimate Partner Violence:     Fear of Current or Ex-Partner:     Emotionally Abused:     Physically Abused:     Sexually Abused:        TOBACCO:   reports that she quit smoking about 36 years ago. Her smoking use included cigarettes. She has a 6.00 pack-year smoking history. She has never used smokeless tobacco.  ETOH:   reports no history of alcohol use.     Family History:   Family History   Problem Relation Age of Onset    Cancer Mother     Ovarian Cancer Mother 45        Technically peritoneal cancer    Depression Mother         bipolar    Arthritis Father     Rheum Arthritis Father     Arrhythmia Father     Other Father         gout    Heart Disease Father     Depression Brother         depression    Other Brother         gout    Obesity Brother     Other Brother         gout    Obesity Brother     Depression Brother     Cancer Maternal Grandmother         Breast    Breast Cancer Maternal Grandmother          A:  Ms. Nila Ortiz has continues to struggle with depressed mood although has been doing better than she realized she has been doing. Has been much more engaged in pleasurable activities and mindset shift has been more noticeable. She has actively worked on goals set during the visit and continues to be active and engaged and responds positively to behavioral interventions. Diagnosis:    1. Bipolar affective disorder, currently depressed, moderate ([address]           Plan:  Pt interventions:    [place]-setting to identify pt's primary goals for [facility] visit / overall health, Supportive techniques, reinforced pt's skill use, behavioral activation interventions, ACT interventions, CBT interventions and treatment planning    Pt Behavioral Change Plan:  Pt set goals to 1) practice diffusion of thoughts and feelings exercise 2) write two cards to friends 3) continue to practice daily gratitue 4) Return in about 2 weeks (around 9/10/2021).

## 2021-08-31 NOTE — TELEPHONE ENCOUNTER
Confirmed with [employer] that orders are rec'd. Will continue to f/u to make sure pt gets set up and schedule appropriate 31-90 day appt.

## 2021-09-08 ENCOUNTER — TELEMEDICINE (OUTPATIENT)
Dept: PSYCHOLOGY | Age: 61
End: 2021-09-08
Payer: MEDICARE

## 2021-09-08 DIAGNOSIS — F31.32 BIPOLAR AFFECTIVE DISORDER, CURRENTLY DEPRESSED, MODERATE (HCC): Primary | ICD-10-CM

## 2021-09-08 DIAGNOSIS — F43.21 GRIEF: ICD-10-CM

## 2021-09-08 PROCEDURE — 90832 PSYTX W PT 30 MINUTES: CPT | Performed by: PSYCHOLOGIST

## 2021-09-08 NOTE — PROGRESS NOTES
Behavioral Health Consultation  900 Zita Rader PsyD  Psychologist  9/8/2021   11:38 AM      Time spent with Patient: 30 minutes  This is patient's fourth  [facility] appointment. Reason for Consult:    Chief Complaint   Patient presents with    Depression    Insomnia     Referring Provider: Maxx Smith MD       S:  During the last visit patient Pt set goals to 1) practice diffusion of thoughts and feelings exercise 2) write two cards to friends 3) continue to practice daily gratitue     Pt reports she has had some tired, depressed days. Having a hard time falling asleep and staying asleep. Jerks awake- waiting on CPAP to come in. Was told she stops breathing 36 times in an hour. Goes to bed around 10pm-2am. Gives self 1.5 hours to fall asleep. Has a difficult time with falling and staying asleep. No real schedule and often decides the time based on what she is watching. Wait time is pretty consistent. Has been practicing skills taught in previous visits. Was able to write one card to a friend but couldn't bring herself to do the second. His practicing daily gratitude keeping other types of journals. More aware of her thoughts and how she relates to them.       O:  MSE:    Appearance: good hygiene   Attitude: cooperative and friendly  Consciousness: alert  Orientation: oriented to person, place, time, general circumstance  Memory: recent and remote memory intact  Attention/Concentration: intact during session  Psychomotor Activity:normal  Eye Contact: normal  Speech: normal rate and volume, well-articulated  Mood: \"tired\"  Affect: euthymic , congruent  Perception: within normal limits  Thought Content: within normal limits  Thought Process: logical, coherent and goal-directed  Insight: good  Judgment: intact  Ability to understand instructions: Yes  Ability to respond meaningfully: Yes  Morbid Ideation: no   Suicide Assessment: no suicidal ideation, plan, or intent  Homicidal Ideation: no      History:    Medications:   Current Outpatient Medications   Medication Sig Dispense Refill    fluticasone (FLONASE) 50 MCG/ACT nasal spray 2 sprays by Each Nostril route daily 1 Bottle 5    buPROPion (WELLBUTRIN XL) 150 MG extended release tablet Take 1 tablet by mouth every morning 90 tablet 1    methocarbamol (ROBAXIN) 500 MG tablet TAKE 1 TABLET THREE TIMES DAILY AS NEEDED FOR PAIN 90 tablet 1    vitamin D (ERGOCALCIFEROL) 1.25 MG (62366 UT) CAPS capsule Take 1 capsule by mouth once a week 8 capsule 0    levothyroxine (SYNTHROID) 100 MCG tablet Take 1 tablet by mouth Daily 90 tablet 1    metoprolol succinate (TOPROL XL) 100 MG extended release tablet Take 1 tablet by mouth daily 90 tablet 1    lisinopril (PRINIVIL;ZESTRIL) 5 MG tablet TAKE 1 TABLET EVERY DAY 90 tablet 1    allopurinol (ZYLOPRIM) 300 MG tablet TAKE 1 TABLET EVERY DAY 90 tablet 1    lamoTRIgine (LAMICTAL) 150 MG tablet TAKE 1 TABLET TWICE DAILY OR AS OTHERWISE DIRECTED 180 tablet 1    simvastatin (ZOCOR) 20 MG tablet TAKE 1 TABLET EVERY NIGHT 90 tablet 1    montelukast (SINGULAIR) 10 MG tablet Take 1 tablet by mouth nightly 90 tablet 1    diclofenac sodium (VOLTAREN) 1 % GEL Apply topically 2 times daily 150 g 2    apixaban (ELIQUIS) 5 MG TABS tablet Take 1 tablet by mouth 2 times daily 180 tablet 3    torsemide (DEMADEX) 10 MG tablet 2 tabs every other day alternating with 1 tab the other days 180 tablet 3    acetaminophen (TYLENOL) 325 MG tablet Take 650 mg by mouth every 6 hours as needed for Pain TAKES EVERY DAY      cetirizine (ZYRTEC) 10 MG tablet Take 10 mg by mouth daily       No current facility-administered medications for this visit. Social History:   Social History     Socioeconomic History    Marital status:       Spouse name: Not on file    Number of children: Not on file    Years of education: Not on file    Highest education level: Not on file   Occupational History    Not on file   Tobacco Use    Smoking status: Former Smoker     Packs/day: 3.00     Years: 2.00     Pack years: 6.00     Types: Cigarettes     Quit date: 1985     Years since quittin.7    Smokeless tobacco: Never Used   Vaping Use    Vaping Use: Never used   Substance and Sexual Activity    Alcohol use: No    Drug use: No    Sexual activity: Not Currently   Other Topics Concern    Not on file   Social History Narrative    Not on file     Social Determinants of Health     Financial Resource Strain: Low Risk     Difficulty of Paying Living Expenses: Not hard at all   Food Insecurity: No Food Insecurity    Worried About Running Out of Food in the Last Year: Never true    [employer] in the Last Year: Never true   Transportation Needs: No Transportation Needs    Lack of Transportation (Medical): No    Lack of Transportation (Non-Medical): No   Physical Activity:     Days of Exercise per Week:     Minutes of Exercise per Session:    Stress:     Feeling of Stress :    Social Connections:     Frequency of Communication with Friends and Family:     Frequency of Social Gatherings with Friends and Family:     Attends [employer] Services:     Active Member of Clubs or Organizations:     Attends Club or Organization Meetings:     Marital Status:    Intimate Partner Violence:     Fear of Current or Ex-Partner:     Emotionally Abused:     Physically Abused:     Sexually Abused:        TOBACCO:   reports that she quit smoking about 36 years ago. Her smoking use included cigarettes. She has a 6.00 pack-year smoking history. She has never used smokeless tobacco.  ETOH:   reports no history of alcohol use.     Family History:   Family History   Problem Relation Age of Onset   [facility] Cancer Mother     Ovarian Cancer Mother 45        Technically peritoneal cancer    Depression Mother         bipolar    Arthritis Father     Rheum Arthritis Father     Arrhythmia Father     Other Father         gout    Heart Disease Father  Depression Brother         depression    Other Brother         gout    Obesity Brother     Other Brother         gout    Obesity Brother     Depression Brother     Cancer Maternal Grandmother         Breast    Breast Cancer Maternal Grandmother          A:  Ms. Oz Parekh has continues to struggle with depressed mood and has also been struggling with insomnia and sleep apnea which have exacerbated her depression. She continues to actively work on goals set during visits and continues to be active and engaged. She responds positively to behavioral interventions. Diagnosis:    1. Bipolar affective disorder, currently depressed, moderate ([address]    2. Grief           Plan:  Pt interventions:    [place]-setting to identify pt's primary goals for [facility] / overall health, Supportive techniques, Provided psychoeducation re: Relationship between sleep mood, provided handout and discussed ways to improve sleep through behavior change, reinforced pt's skill use, behavioral activation interventions, ACT interventions, CBT interventions and treatment planning    Pt Behavioral Change Plan:  Pt set goals to 1) implement identified behavior changes to improve sleep including practicing stimulus control  2) practice counting one exercise when trying to fall asleep at night 3) implement relaxation wine downtime before bed and be sure all the chores are done before wideout begins 4) Return in about 3 weeks (around 9/29/2021).

## 2021-09-08 NOTE — PATIENT INSTRUCTIONS
To reduce racing thoughts at night when trying to fall asleep:    Close your eyes, take slow breaths. Each time you exhale, repeat to yourself (in your head) \"one. \" You are not counting, just repeating \"one\" each time. Improving Sleep Through Behavior Change     Stimulus Control Procedures     Go to Bed Only When You Are Sleepy   The longer you are in bed awake, the more the bed is associated with a place to be awake instead of asleep. Delay bedtime until sleepy. Don't get into bed just because it's \"bedtime. \"    Get Out of Bed If You Cant Fall Asleep after 30 minutes OR  Get out of bed if you awaken during the night and can't fall back asleep after 20 minutes   Don't lie in bed trying to sleep. Instead, get out of bed and engage in a relaxing, quiet activity (e.g. Reading) until you feel DROWSY  then return to bed to attempt to sleep again. Use the Bed for Sleep and Sex Only   Do not watch TV, listen to the radio, eat, or read in your bed or bedroom. Sleep Hygiene Guidelines   Caffeine   Avoid caffeine 6 to 8 hours before bedtime. Caffeine disturbs sleep. Thus, drinking caffeinated beverages should be avoided near bedtime. Nicotine   Avoid nicotine before bedtime. Nicotine can keep you awake. Avoid tobacco near bedtime and during the night. Alcohol   Avoid alcohol after dinner. Alcohol often promotes the onset of sleep, but interrupts your natural sleep pattern. Do not consume it any closer than 4 hours before going to bed. Sleeping Pills   Sleep medications are effective only temporarily. Sleep medications lose their effectiveness in about 2 to 4 weeks when taken regularly. Over time, sleeping pills actually can make sleep problems worse; withdrawal from the medication can lead to an insomnia rebound. Keep use of sleeping pills infrequent, but dont worry if you need to use one on an occasional basis. Regular Exercise   Do not exercise within 3 hours of bedtime.  Exercise within 3 hours of sleep may interfere with your ability to fall asleep due to body temperature changes. Baths/Showers  Baths can be a helpful relaxation activity. However, take the bath about 2 hours of bedtime. This, too, can interfere with sleep due to body temperature changes. Bedroom Environment   Your bedroom should have a moderate temperature and be quiet and dark. Noises can be masked with background white noise (e.g., the noise of a fan) or with earplugs. Bedrooms may be darkened with blackout shades, or sleep masks can be worn. Eating   A light bedtime snack, such a glass of warm milk, cheese, or a bowl of cereal can promote sleep. Avoid snacks in the middle of the night because awakening may become associated with hunger. Avoid Naps   The sleep you obtain during the day takes away from the amount of sleep you need that night. Naps can, however, be helpful (for your mood) under certain conditions. Limit the nap to 45 minutes and don't nap after 4:00 p.m. Allow Yourself at Least an Hour Before Bedtime to Unwind   Find what works for you to wind down. Engage in calming, relaxing activities. Don't engage in an activity that will promote drowsiness before it's time for bed. Regular Sleep Schedule   Keep a regular time each day, 7 days a week, to get out of bed. Keeping a regular waking time helps set your circadian rhythm so that your body learns to sleep at the desired time. Set a Reasonable Bedtime and Arising Time and Stick to Them   Set the alarm clock and get out of bed at the same time each morning, weekdays and weekends, regardless of your bedtime or the amount of sleep you obtained on the previous night.

## 2021-09-09 ENCOUNTER — NURSE ONLY (OUTPATIENT)
Dept: CARDIOLOGY CLINIC | Age: 61
End: 2021-09-09
Payer: MEDICARE

## 2021-09-09 ENCOUNTER — OFFICE VISIT (OUTPATIENT)
Dept: CARDIOLOGY CLINIC | Age: 61
End: 2021-09-09
Payer: MEDICARE

## 2021-09-09 VITALS
DIASTOLIC BLOOD PRESSURE: 78 MMHG | OXYGEN SATURATION: 99 % | BODY MASS INDEX: 55.32 KG/M2 | WEIGHT: 293 LBS | HEART RATE: 56 BPM | HEIGHT: 61 IN | SYSTOLIC BLOOD PRESSURE: 138 MMHG

## 2021-09-09 DIAGNOSIS — I48.92 PAROXYSMAL ATRIAL FLUTTER (HCC): Primary | ICD-10-CM

## 2021-09-09 DIAGNOSIS — I48.3 TYPICAL ATRIAL FLUTTER (HCC): ICD-10-CM

## 2021-09-09 DIAGNOSIS — R55 SYNCOPE, UNSPECIFIED SYNCOPE TYPE: ICD-10-CM

## 2021-09-09 DIAGNOSIS — Z95.810 BIVENTRICULAR ICD (IMPLANTABLE CARDIOVERTER-DEFIBRILLATOR) IN PLACE: ICD-10-CM

## 2021-09-09 DIAGNOSIS — I47.20 VT (VENTRICULAR TACHYCARDIA): ICD-10-CM

## 2021-09-09 DIAGNOSIS — I63.9 CEREBROVASCULAR ACCIDENT (CVA), UNSPECIFIED MECHANISM (HCC): ICD-10-CM

## 2021-09-09 DIAGNOSIS — I63.231 ARTERIAL ISCHEMIC STROKE, ICA, RIGHT, ACUTE (HCC): ICD-10-CM

## 2021-09-09 DIAGNOSIS — I42.0 DILATED CARDIOMYOPATHY (HCC): ICD-10-CM

## 2021-09-09 DIAGNOSIS — I50.22 CHRONIC SYSTOLIC CONGESTIVE HEART FAILURE (HCC): ICD-10-CM

## 2021-09-09 DIAGNOSIS — I50.42 CHRONIC COMBINED SYSTOLIC AND DIASTOLIC CONGESTIVE HEART FAILURE (HCC): ICD-10-CM

## 2021-09-09 PROCEDURE — 93290 INTERROG DEV EVAL ICPMS IP: CPT | Performed by: NURSE PRACTITIONER

## 2021-09-09 PROCEDURE — 99214 OFFICE O/P EST MOD 30 MIN: CPT | Performed by: NURSE PRACTITIONER

## 2021-09-09 NOTE — LETTER
Beatrice Lam  1960         [address]   Electrophysiology Outpatient Note              Date:  September 9, 2021  Patient name: Beatrice Lam  YOB: 1960    Primary Care physician: Yoly Atkins MD    HISTORY OF PRESENT ILLNESS: The patient is a 59 y.o.  female with a history of NICM, AFL, ventricular tachycardia, LBBB, AFL, HTN, HLD and remote CVA. In 2015 she had a LHC that showed no coronary disease. In 2016, she had a BiV ICD implanted for EF 20%. She was admitted to the hospital 3/11/2021 for complaints of SOB and chest discomfort. Device interrogation showed persistent atrial arrhythmia, some episodes resulted in ATP. Echo showed EF 35-40%. On 4/1/2021, she was brought to the EP lab and manual overdrive pacing was used to terminate atrial flutter and ATP was activated. Today she is being seen for AFL and NICM. Patient reports she has been depressed. She is currently seeing a psychologist who has been adjusting her medications. Denies chest pain, palpitations, shortness of breath and dizziness.      Device check today shows:   Brand: [employer] MRI CR-D  Mode: DDDR  Normal function   30.6% AP  68.4%  97% effective    Arrhythmias: none  Battery life 19 months   RA impedance 399 ohms   RV impedance 380 ohms   LV impedance 646 ohms  RA threshold 0.75 V @ 0.40 ms  RV threshold 0.50 V @ 0.40 ms  LV threshold 4.00  V @ 0.40 ms  RA sensitivity 0.30 mV  RV sensitivity 0.30 mV  Optivol: TI at baseline     Past Medical History:   has a past medical history of Anesthesia complication, Autoimmune hemolytic anemia ([address] Bipolar disorder ([address] Bundle branch block, Cardiomyopathy ([address] Chronic systolic CHF (congestive heart failure) ([address] Depression, Family history of early CAD, Family history of ovarian cancer, GERD (gastroesophageal reflux disease), Gout, Hypertension, Hypothyroid, Osteoarthritis, knee, Pneumonia, S/P gastric bypass, Unspecified cerebral artery occlusion with cerebral infarction, Uterine cancer ([facility] and Vitamin B 12 deficiency. Past Surgical History:   has a past surgical history that includes Gastric bypass surgery (1/7/05); Finger surgery; Dilation and curettage of uterus; lipectomy; Cholecystectomy; other surgical history; Upper gastrointestinal endoscopy (4/23/13); Cardiac catheterization (7/10/2015); transesophageal echocardiogram (7/13/2015); other surgical history (4/1/16); pacemaker placement (09/2016); hernia repair (10/20/2016); ERCP; other surgical history; Upper gastrointestinal endoscopy (03/06/2019); Colonoscopy (03/06/2019); Upper gastrointestinal endoscopy (N/A, 3/6/2019); and Colonoscopy (N/A, 3/6/2019). Home Medications:    Prior to Admission medications    Medication Sig Start Date End Date Taking?  Authorizing Provider   fluticasone (FLONASE) 50 MCG/ACT nasal spray 2 sprays by Each Nostril route daily 8/23/21  Yes Damion Canada MD   buPROPion (WELLBUTRIN XL) 150 MG extended release tablet Take 1 tablet by mouth every morning 8/23/21  Yes Damion Canada MD   methocarbamol (ROBAXIN) 500 MG tablet TAKE 1 TABLET THREE TIMES DAILY AS NEEDED FOR PAIN 7/23/21  Yes Damion Canada MD   vitamin D (ERGOCALCIFEROL) 1.25 MG (69707 UT) CAPS capsule Take 1 capsule by mouth once a week 7/14/21  Yes Damion Canada MD   levothyroxine (SYNTHROID) 100 MCG tablet Take 1 tablet by mouth Daily 7/14/21  Yes Damion Canada MD   metoprolol succinate (TOPROL XL) 100 MG extended release tablet Take 1 tablet by mouth daily 7/14/21  Yes Damion Canada MD   lisinopril (PRINIVIL;ZESTRIL) 5 MG tablet TAKE 1 TABLET EVERY DAY 5/13/21  Yes ALICIA Fletcher CNP   allopurinol (ZYLOPRIM) 300 MG tablet TAKE 1 TABLET EVERY DAY 4/30/21  Yes ALICIA Araiza CNP   lamoTRIgine (LAMICTAL) 150 MG tablet TAKE 1 TABLET TWICE DAILY OR AS OTHERWISE DIRECTED 4/23/21  Yes Karron Sow, APRN - CNP   simvastatin (ZOCOR) 20 MG tablet TAKE 1 TABLET EVERY NIGHT 4/22/21  Yes ALICIA Ramos CNP   montelukast (SINGULAIR) 10 MG tablet Take 1 tablet by mouth nightly 4/22/21  Yes ALICIA Ramos CNP   diclofenac sodium (VOLTAREN) 1 % GEL Apply topically 2 times daily 4/15/21  Yes ALICIA Ramos CNP   apixaban (ELIQUIS) 5 MG TABS tablet Take 1 tablet by mouth 2 times daily 4/9/21  Yes ALICIA Rivera CNP   torsemide (DEMADEX) 10 MG tablet 2 tabs every other day alternating with 1 tab the other days 3/25/21  Yes ALICIA Rivera CNP   acetaminophen (TYLENOL) 325 MG tablet Take 650 mg by mouth every 6 hours as needed for Pain TAKES EVERY DAY   Yes Historical Provider, MD   cetirizine (ZYRTEC) 10 MG tablet Take 10 mg by mouth daily   Yes Historical Provider, MD       Allergies:  Dilaudid [hydromorphone hcl], Lavender oil, Penicillins, Seroquel [quetiapine fumarate], Adhesive tape, Hydromorphone hcl, Lithium, and Tetanus toxoids    Social History:   reports that she quit smoking about 36 years ago. Her smoking use included cigarettes. She has a 6.00 pack-year smoking history. She has never used smokeless tobacco. She reports that she does not drink alcohol and does not use drugs. Family History: family history includes Arrhythmia in her father; Arthritis in her father; Breast Cancer in her maternal grandmother; Cancer in her maternal grandmother and mother; Depression in her brother, brother, and mother; Heart Disease in her father; Obesity in her brother and brother; Other in her brother, brother, and father; Ovarian Cancer (age of onset: 45) in her mother; Rheum Arthritis in her father. All 14 point review of systems are completed and pertinent positives are mentioned in the history of present illness. Other systems are reviewed and are negative.      PHYSICAL EXAM:    Vital signs:    /78   Pulse 56   Ht 5' 1\" (1.549 m)   Wt (!) 320 lb (145.2 kg)   SpO2 99%   BMI 60.46 kg/m²      Constitutional and general appearance: alert, cooperative, no distress and appears stated age  [de-identified]: PERRL, no cervical lymphadenopathy. No masses palpable. Normal oral mucosa  Respiratory:  · Normal excursion and expansion without use of accessory muscles  · Resp auscultation: Normal breath sounds without wheezing, rhonchi, and rales  Cardiovascular:  · The apical impulse is not displaced  · Heart tones are crisp and normal. regular S1 and S2.  · Jugular venous pulsation Normal  · The carotid upstroke is normal in amplitude and contour without delay or bruit  · Peripheral pulses are symmetrical and full   Abdomen:  · No masses or tenderness  · Bowel sounds present  Extremities:  ·  No cyanosis or clubbing  ·  No lower extremity edema  ·  Skin: warm and dry  Neurological:  · Alert and oriented  · Moves all extremities well  · No abnormalities of mood, affect, memory, mentation, or behavior are noted    DATA:    ECG 4/1/2021:  AS  66 bpm    Echo 3/13/2021:   Patient tachy during study. The left ventricular systolic function is moderately reduced with an   ejection fraction of 35 - 40 %. There is hypokinesis of the apex, apical lateral, inferoseptum, apical   anterior and anteroseptum walls. Septal bounce noted. Normal left ventricular size with mild concentric left ventricular   hypertrophy. Left ventricular diastolic filling pressure is elevated lateral E/e\" is 14 . Changes noted from previous echo on 11-1-2019 in left ventricular function. Moderate mitral regurgitation. The right ventricle is mildly enlarged. Right ventricular systolic function is mildly reduced . Systolic pulmonic artery pressure (SPAP) is normal estimated at 26 mmHg   (Right atrial pressure of 3 mmHg). The right atrium is mildly dilated. Echo 11/1/2019: The left ventricular systolic function is mildly reduced with an ejection   fraction of 40-45 %. Anteroseptal wall hypokinesis. There is mild concentric left ventricular hypertrophy.    Left ventricular device reprogrammed to activate ATP    -device interrogation per HPI  Non-ischemic dilated cardiomyopathy: recurrent     -EF 40-45% 11/2019, EF 35-40% 3/2021   -s/p BiV ICD implant 0/4564  Chronic systolic CHF: euvolemic on exam   Ventricular tachycardia: stable   LBBB  HTN: controlled   HLD  Remote CVA  Hypothyroid  Obesity   History of gastric bypass   MARCY: waiting on CPAP to be delivered     Plan:   Continue Eliquis and Toprol    Yearly labs due (CBC, BMP) 7/2022  Remaining battery of device is 18 months.  Will need to plan for generator change sometime in the next 6-12 months  Continue remote device transmissions every three months     Follow up in 6 months      [facility] clover Diaz, APRN-CNP  [employer]  [phone]

## 2021-09-09 NOTE — PROGRESS NOTES
[facility]   Electrophysiology Outpatient Note              Date:  September 9, 2021  Patient name: Polo Ruelas  YOB: 1960    Primary Care physician: Jarett Hussein MD    HISTORY OF PRESENT ILLNESS: The patient is a 59 y.o.  female with a history of NICM, AFL, ventricular tachycardia, LBBB, AFL, HTN, HLD and remote CVA. In 2015 she had a LHC that showed no coronary disease. In 2016, she had a BiV ICD implanted for EF 20%. She was admitted to the hospital 3/11/2021 for complaints of SOB and chest discomfort. Device interrogation showed persistent atrial arrhythmia, some episodes resulted in ATP. Echo showed EF 35-40%. On 4/1/2021, she was brought to the EP lab and manual overdrive pacing was used to terminate atrial flutter and ATP was activated. Today she is being seen for AFL and NICM. Patient reports she has been depressed. She is currently seeing a psychologist who has been adjusting her medications. Denies chest pain, palpitations, shortness of breath and dizziness.      Device check today shows:   Brand: [employer] MRI CR-D  Mode: DDDR  Normal function   30.6% AP  68.4%  97% effective    Arrhythmias: none  Battery life 19 months   RA impedance 399 ohms   RV impedance 380 ohms   LV impedance 646 ohms  RA threshold 0.75 V @ 0.40 ms  RV threshold 0.50 V @ 0.40 ms  LV threshold 4.00  V @ 0.40 ms  RA sensitivity 0.30 mV  RV sensitivity 0.30 mV  Optivol: TI at baseline     Past Medical History:   has a past medical history of Anesthesia complication, Autoimmune hemolytic anemia ([address] Bipolar disorder ([address] Bundle branch block, Cardiomyopathy ([address] Chronic systolic CHF (congestive heart failure) ([address] Depression, Family history of early CAD, Family history of ovarian cancer, GERD (gastroesophageal reflux disease), Gout, Hypertension, Hypothyroid, Osteoarthritis, knee, Pneumonia, S/P gastric bypass, Unspecified cerebral artery occlusion with cerebral infarction, Uterine cancer ([facility] [address] and Vitamin B 12 deficiency. Past Surgical History:   has a past surgical history that includes Gastric bypass surgery (1/7/05); Finger surgery; Dilation and curettage of uterus; lipectomy; Cholecystectomy; other surgical history; Upper gastrointestinal endoscopy (4/23/13); Cardiac catheterization (7/10/2015); transesophageal echocardiogram (7/13/2015); other surgical history (4/1/16); pacemaker placement (09/2016); hernia repair (10/20/2016); ERCP; other surgical history; Upper gastrointestinal endoscopy (03/06/2019); Colonoscopy (03/06/2019); Upper gastrointestinal endoscopy (N/A, 3/6/2019); and Colonoscopy (N/A, 3/6/2019). Home Medications:    Prior to Admission medications    Medication Sig Start Date End Date Taking?  Authorizing Provider   fluticasone (FLONASE) 50 MCG/ACT nasal spray 2 sprays by Each Nostril route daily 8/23/21  Yes Chanda Polanco MD   buPROPion (WELLBUTRIN XL) 150 MG extended release tablet Take 1 tablet by mouth every morning 8/23/21  Yes Chanda Polanco MD   methocarbamol (ROBAXIN) 500 MG tablet TAKE 1 TABLET THREE TIMES DAILY AS NEEDED FOR PAIN 7/23/21  Yes Chanda Polanco MD   vitamin D (ERGOCALCIFEROL) 1.25 MG (28723 UT) CAPS capsule Take 1 capsule by mouth once a week 7/14/21  Yes Chanda Polanco MD   levothyroxine (SYNTHROID) 100 MCG tablet Take 1 tablet by mouth Daily 7/14/21  Yes Chanda Polanco MD   metoprolol succinate (TOPROL XL) 100 MG extended release tablet Take 1 tablet by mouth daily 7/14/21  Yes Chanda Polanco MD   lisinopril (PRINIVIL;ZESTRIL) 5 MG tablet TAKE 1 TABLET EVERY DAY 5/13/21  Yes ALICIA Degroot CNP   allopurinol (ZYLOPRIM) 300 MG tablet TAKE 1 TABLET EVERY DAY 4/30/21  Yes ALICIA Barrera CNP   lamoTRIgine (LAMICTAL) 150 MG tablet TAKE 1 TABLET TWICE DAILY OR AS OTHERWISE DIRECTED 4/23/21  Yes Get Sid, APRN - CNP   simvastatin (ZOCOR) 20 MG tablet TAKE 1 TABLET EVERY NIGHT 4/22/21  Yes Melanee Dimitrios L Luiz, APRN - CNP   montelukast (SINGULAIR) 10 MG tablet Take 1 tablet by mouth nightly 4/22/21  Yes ALICIA Glynn CNP   diclofenac sodium (VOLTAREN) 1 % GEL Apply topically 2 times daily 4/15/21  Yes ALICIA Glynn CNP   apixaban (ELIQUIS) 5 MG TABS tablet Take 1 tablet by mouth 2 times daily 4/9/21  Yes ALICIA Pelayo CNP   torsemide (DEMADEX) 10 MG tablet 2 tabs every other day alternating with 1 tab the other days 3/25/21  Yes ALICIA Pelayo CNP   acetaminophen (TYLENOL) 325 MG tablet Take 650 mg by mouth every 6 hours as needed for Pain TAKES EVERY DAY   Yes Historical Provider, MD   cetirizine (ZYRTEC) 10 MG tablet Take 10 mg by mouth daily   Yes Historical Provider, MD       Allergies:  Dilaudid [hydromorphone hcl], Lavender oil, Penicillins, Seroquel [quetiapine fumarate], Adhesive tape, Hydromorphone hcl, Lithium, and Tetanus toxoids    Social History:   reports that she quit smoking about 36 years ago. Her smoking use included cigarettes. She has a 6.00 pack-year smoking history. She has never used smokeless tobacco. She reports that she does not drink alcohol and does not use drugs. Family History: family history includes Arrhythmia in her father; Arthritis in her father; Breast Cancer in her maternal grandmother; Cancer in her maternal grandmother and mother; Depression in her brother, brother, and mother; Heart Disease in her father; Obesity in her brother and brother; Other in her brother, brother, and father; Ovarian Cancer (age of onset: 45) in her mother; Rheum Arthritis in her father. All 14 point review of systems are completed and pertinent positives are mentioned in the history of present illness. Other systems are reviewed and are negative.      PHYSICAL EXAM:    Vital signs:    /78   Pulse 56   Ht 5' 1\" (1.549 m)   Wt (!) 320 lb (145.2 kg)   SpO2 99%   BMI 60.46 kg/m²      Constitutional and general appearance: alert, cooperative, no distress and appears stated age  [de-identified]: PERRL, no cervical lymphadenopathy. No masses palpable. Normal oral mucosa  Respiratory:  · Normal excursion and expansion without use of accessory muscles  · Resp auscultation: Normal breath sounds without wheezing, rhonchi, and rales  Cardiovascular:  · The apical impulse is not displaced  · Heart tones are crisp and normal. regular S1 and S2.  · Jugular venous pulsation Normal  · The carotid upstroke is normal in amplitude and contour without delay or bruit  · Peripheral pulses are symmetrical and full   Abdomen:  · No masses or tenderness  · Bowel sounds present  Extremities:  ·  No cyanosis or clubbing  ·  No lower extremity edema  ·  Skin: warm and dry  Neurological:  · Alert and oriented  · Moves all extremities well  · No abnormalities of mood, affect, memory, mentation, or behavior are noted    DATA:    ECG 4/1/2021:  AS  66 bpm    Echo 3/13/2021:   Patient tachy during study. The left ventricular systolic function is moderately reduced with an   ejection fraction of 35 - 40 %. There is hypokinesis of the apex, apical lateral, inferoseptum, apical   anterior and anteroseptum walls. Septal bounce noted. Normal left ventricular size with mild concentric left ventricular   hypertrophy. Left ventricular diastolic filling pressure is elevated lateral E/e\" is 14 . Changes noted from previous echo on 11-1-2019 in left ventricular function. Moderate mitral regurgitation. The right ventricle is mildly enlarged. Right ventricular systolic function is mildly reduced . Systolic pulmonic artery pressure (SPAP) is normal estimated at 26 mmHg   (Right atrial pressure of 3 mmHg). The right atrium is mildly dilated. Echo 11/1/2019: The left ventricular systolic function is mildly reduced with an ejection   fraction of 40-45 %. Anteroseptal wall hypokinesis. There is mild concentric left ventricular hypertrophy.    Left ventricular cavity size is mildly dilated. Grade I diastolic dysfunction with normal left ventricular filling pressure. Mild posterior mitral annular calcification. Mild thickening of the anterior leaflet of mitral valve. Moderate mitral regurgitation. The left atrium is mildly dilated. Frequent premature beats makes it difficult to identify exact wall motion   abnormality. [facility] 7/2015:  IMPRESSION:  1. Angiographically normal coronary arteries. 2.  Severe pulmonary hypertension 66/37 mean 49 mmHg. 3.  Markedly elevated pulmonary capillary wedge pressure 32 mmHg, and left  ventricular end diastolic pressure 36 mmHg. 4.  Transpulmonary gradient is 17 mmHg, which suggested a left-sided as well  intrinsic pulmonary etiology of the severe pulmonary hypertension. 5.  Mildly reduced cardiac index by Giovanni of 2.36 L/min/m2.        RECOMMENDATION:  The patient has no epicardial disease. Her chest pain is  from the heart failure. She is noted to have moderate to severe mitral  regurgitation on the echocardiogram, and will schedule for SANGEETHA to further  evaluate the mitral regurgitation. In the meantime, we will also start her   on heart failure therapy that will include a beta-blocker and ACE inhibitor   as well as diuretic therapy    All labs and testing reviewed. CARDIOLOGY LABS:   CBC: No results for input(s): WBC, HGB, HCT, PLT in the last 72 hours. BMP: No results for input(s): NA, K, CO2, BUN, CREATININE, LABGLOM, GLUCOSE in the last 72 hours. PT/INR: No results for input(s): PROTIME, INR in the last 72 hours. APTT:No results for input(s): APTT in the last 72 hours. FASTING LIPID PANEL:  Lab Results   Component Value Date    HDL 62 04/01/2021    HDL 65 07/05/2011    LDLCALC 61 04/01/2021    TRIG 124 04/01/2021     LIVER PROFILE:No results for input(s): AST, ALT, ALB in the last 72 hours.     IMPRESSION:      Assessment:   Paroxsymal atrial flutter: stable   -terminated by manual overdrive pacing 4/1/3518, device reprogrammed to activate ATP    -device interrogation per HPI  Non-ischemic dilated cardiomyopathy: recurrent     -EF 40-45% 11/2019, EF 35-40% 3/2021   -s/p BiV ICD implant 3/7786  Chronic systolic CHF: euvolemic on exam   Ventricular tachycardia: stable   LBBB  HTN: controlled   HLD  Remote CVA  Hypothyroid  Obesity   History of gastric bypass   MARCY: waiting on CPAP to be delivered     Plan:   Continue Eliquis and Toprol    Yearly labs due (CBC, BMP) 7/2022  Remaining battery of device is 18 months.  Will need to plan for generator change sometime in the next 6-12 months  Continue remote device transmissions every three months     Follow up in 6 months      [facility] clover Diaz, APRN-CNP  [address]  [phone]

## 2021-09-10 PROCEDURE — 93284 PRGRMG EVAL IMPLANTABLE DFB: CPT | Performed by: INTERNAL MEDICINE

## 2021-09-13 ENCOUNTER — TELEPHONE (OUTPATIENT)
Dept: CARDIOLOGY CLINIC | Age: 61
End: 2021-09-13

## 2021-09-13 NOTE — TELEPHONE ENCOUNTER
Per VICENTE from last in office device interrogation w OV NP [place]:      Isaac Hurd MD   9/10/2021  3:21 PM EDT       Can we arrange an OV with me to look at elevated LV threshold? Please call and schedule OV w/ VICENTE and device clinic concerning high measurement found on patient's 3rd wire of her device. Potential programming changes needed. Thanks!

## 2021-09-14 NOTE — TELEPHONE ENCOUNTER
Called spoke with Oniel Bowie at [facility] for 9/15/21 for setup. Will contact patient end of the week to brent 60-30d.

## 2021-09-17 NOTE — TELEPHONE ENCOUNTER
AHI is 2.8 on CPAP 13 cm H2O over the past 2 days. Can try pressure change to CPAP 12 cm H2O for tolerance if needed. Would recommend patient sit with CPAP on while watching TV or resting to try to get used to breathing with CPAP. Can also order chinstrap and/or full facemask if needed if patient is having oral leak.   Patient should call back if she continues with issues with CPAP

## 2021-09-17 NOTE — TELEPHONE ENCOUNTER
Patient was set up with pap machine. Patient said that the pressure is to high on the machine and it is blowing her mouth open. Report scanned for review.      31-90 scheduled for 11/2/21

## 2021-09-20 DIAGNOSIS — F31.77 BIPOLAR DISORDER, IN PARTIAL REMISSION, MOST RECENT EPISODE MIXED (HCC): Chronic | ICD-10-CM

## 2021-09-20 DIAGNOSIS — M79.662 PAIN OF LEFT CALF: ICD-10-CM

## 2021-09-20 DIAGNOSIS — F33.2 SEVERE EPISODE OF RECURRENT MAJOR DEPRESSIVE DISORDER, WITHOUT PSYCHOTIC FEATURES (HCC): ICD-10-CM

## 2021-09-20 DIAGNOSIS — Z00.00 ROUTINE GENERAL MEDICAL EXAMINATION AT A HEALTH CARE FACILITY: ICD-10-CM

## 2021-09-20 DIAGNOSIS — E78.5 HYPERLIPIDEMIA, UNSPECIFIED HYPERLIPIDEMIA TYPE: ICD-10-CM

## 2021-09-20 DIAGNOSIS — E55.9 VITAMIN D DEFICIENCY: ICD-10-CM

## 2021-09-20 NOTE — TELEPHONE ENCOUNTER
Spoke to patient and she said that she has a chinstrap that she was sent home with from the sleep lab. Patient will try to use it tonight, states she likes the mask that she is using. patient to call back with any further concerns. Order pended for pressure decrease.

## 2021-09-21 RX ORDER — LAMOTRIGINE 150 MG/1
TABLET ORAL
Qty: 180 TABLET | Refills: 1 | Status: SHIPPED | OUTPATIENT
Start: 2021-09-21 | End: 2022-03-30 | Stop reason: SDUPTHER

## 2021-09-21 RX ORDER — SIMVASTATIN 20 MG
TABLET ORAL
Qty: 90 TABLET | Refills: 1 | Status: SHIPPED | OUTPATIENT
Start: 2021-09-21 | End: 2022-05-29 | Stop reason: SDUPTHER

## 2021-09-21 RX ORDER — MONTELUKAST SODIUM 10 MG/1
TABLET ORAL
Qty: 90 TABLET | Refills: 1 | Status: SHIPPED | OUTPATIENT
Start: 2021-09-21 | End: 2022-07-16 | Stop reason: SDUPTHER

## 2021-09-21 RX ORDER — METHOCARBAMOL 500 MG/1
TABLET, FILM COATED ORAL
Qty: 90 TABLET | Refills: 1 | OUTPATIENT
Start: 2021-09-21

## 2021-09-21 RX ORDER — ERGOCALCIFEROL 1.25 MG/1
CAPSULE ORAL
Qty: 8 CAPSULE | Refills: 0 | Status: ON HOLD | OUTPATIENT
Start: 2021-09-21 | End: 2022-05-09

## 2021-09-21 NOTE — TELEPHONE ENCOUNTER
Refill Request     Last Seen: Last Seen Department: 8/23/2021  Last Seen by PCP: 3/25/2021    Last Written: 4/23/2021  Next Appointment:   Future Appointments   Date Time Provider Sarah Najera   9/27/2021 10:30 AM SIL Cortes [facility]   11/1/2021  8:30 AM SCHEDULE, [facility]   11/2/2021  2:00 PM ALICIA Edgar CNP [facility]   7/14/2022 11:00 AM MD OSIRIS Pandey  [place] [facility]       Future appointment scheduled      Requested Prescriptions     Pending Prescriptions Disp Refills    lamoTRIgine (LAMICTAL) 150 MG tablet [Pharmacy Med Name: LAMOTRIGINE 150 MG Tablet] 180 tablet 1     Sig: TAKE 1 TABLET TWICE DAILY OR AS OTHERWISE DIRECTED

## 2021-09-21 NOTE — TELEPHONE ENCOUNTER
Refill Request     Last Seen: Last Seen Department: 8/23/2021  Last Seen by PCP: 8/23/2021    Last Written:  7/14/21 8 capsule 0 refill                          7/23/21 90 tablet 1 refill                              Next Appointment: 9/27/21     Future Appointments   Date Time Provider Sarah Dania   9/27/2021 10:30 AM [address] Neelima Ferrari, SIL NEWELL [facility]   11/1/2021  8:30 AM SCHEDULE, [facility] REMOTE TRANSMISSION Vicki Mom [facility]   11/2/2021  2:00 PM Vikki Masterson APRN - CNP [facility]   7/14/2022 11:00 AM MD OSIRIS Sue  [place] [facility]       Future appointment scheduled      Requested Prescriptions     Pending Prescriptions Disp Refills    vitamin D (ERGOCALCIFEROL) 1.25 MG (13465 UT) CAPS capsule [Pharmacy Med Name: VITAMIN D 1.25 MG (58971 UT) Capsule] 8 capsule 0     Sig: TAKE 1 CAPSULE ONE TIME WEEKLY    methocarbamol (ROBAXIN) 500 MG tablet [Pharmacy Med Name: METHOCARBAMOL 500 MG Tablet] 90 tablet 1     Sig: TAKE 1 TABLET THREE TIMES DAILY AS NEEDED FOR PAIN

## 2021-09-27 ENCOUNTER — TELEMEDICINE (OUTPATIENT)
Dept: PSYCHOLOGY | Age: 61
End: 2021-09-27
Payer: MEDICARE

## 2021-09-27 DIAGNOSIS — F31.9 BIPOLAR AFFECTIVE DISORDER, REMISSION STATUS UNSPECIFIED (HCC): Primary | ICD-10-CM

## 2021-09-27 DIAGNOSIS — F41.9 ANXIETY: ICD-10-CM

## 2021-09-27 PROCEDURE — 90832 PSYTX W PT 30 MINUTES: CPT | Performed by: PSYCHOLOGIST

## 2021-09-27 NOTE — PROGRESS NOTES
Behavioral Health Consultation  Main Duran PsyD  Psychologist  9/27/2021   10:30 AM      Time spent with Patient: 30 minutes  This is patient's fifth  [facility] appointment. Reason for Consult:    Chief Complaint   Patient presents with    Depression    Anxiety     Referring Provider: Khalif Vallejo MD       S:  During the last visit patientPt set goals to 1) implement identified behavior changes to improve sleep including practicing stimulus control  2) practice counting one exercise when trying to fall asleep at night 3) implement relaxation wind downtime before bed and be sure all the chores are done before wind down begins        Pt reports she is doing ok. Has been sticking to a regular bed time. Getting a few hours of sleep. Started using new CPAP and getting used to it. Has been more irritable. Friends have noticed she has been talking faster. Either goes from shutting down to feeling overwhelmed, confused, and easily agitated. Not sure where that is coming from. Used to have panic attacks- not having those but does feel very anxious. Tries to practice grounding when anxious-finds something attached to the ground and imagines she is part of it. Hasn't been helpful at times of high anxiety.      O:  MSE:    Appearance: good hygiene   Attitude: cooperative and friendly  Consciousness: alert  Orientation: oriented to person, place, time, general circumstance  Memory: recent and remote memory intact  Attention/Concentration: intact during session  Psychomotor Activity:normal  Eye Contact: normal  Speech: normal rate and volume, well-articulated  Mood: \"ok\"  Affect: euthymic , congruent  Perception: within normal limits  Thought Content: within normal limits  Thought Process: logical, coherent and goal-directed  Insight: good  Judgment: intact  Ability to understand instructions: Yes  Ability to respond meaningfully: Yes  Morbid Ideation: no   Suicide Assessment: no suicidal ideation, plan, or intent  Homicidal Ideation: no      History:    Medications:   Current Outpatient Medications   Medication Sig Dispense Refill    vitamin D (ERGOCALCIFEROL) 1.25 MG (01291 UT) CAPS capsule TAKE 1 CAPSULE ONE TIME WEEKLY 8 capsule 0    montelukast (SINGULAIR) 10 MG tablet TAKE 1 TABLET EVERY NIGHT 90 tablet 1    simvastatin (ZOCOR) 20 MG tablet TAKE 1 TABLET EVERY NIGHT 90 tablet 1    lamoTRIgine (LAMICTAL) 150 MG tablet TAKE 1 TABLET TWICE DAILY OR AS OTHERWISE DIRECTED 180 tablet 1    fluticasone (FLONASE) 50 MCG/ACT nasal spray 2 sprays by Each Nostril route daily 1 Bottle 5    buPROPion (WELLBUTRIN XL) 150 MG extended release tablet Take 1 tablet by mouth every morning 90 tablet 1    methocarbamol (ROBAXIN) 500 MG tablet TAKE 1 TABLET THREE TIMES DAILY AS NEEDED FOR PAIN 90 tablet 1    levothyroxine (SYNTHROID) 100 MCG tablet Take 1 tablet by mouth Daily 90 tablet 1    metoprolol succinate (TOPROL XL) 100 MG extended release tablet Take 1 tablet by mouth daily 90 tablet 1    lisinopril (PRINIVIL;ZESTRIL) 5 MG tablet TAKE 1 TABLET EVERY DAY 90 tablet 1    allopurinol (ZYLOPRIM) 300 MG tablet TAKE 1 TABLET EVERY DAY 90 tablet 1    diclofenac sodium (VOLTAREN) 1 % GEL Apply topically 2 times daily 150 g 2    apixaban (ELIQUIS) 5 MG TABS tablet Take 1 tablet by mouth 2 times daily 180 tablet 3    torsemide (DEMADEX) 10 MG tablet 2 tabs every other day alternating with 1 tab the other days 180 tablet 3    acetaminophen (TYLENOL) 325 MG tablet Take 650 mg by mouth every 6 hours as needed for Pain TAKES EVERY DAY      cetirizine (ZYRTEC) 10 MG tablet Take 10 mg by mouth daily       No current facility-administered medications for this visit. Social History:   Social History     Socioeconomic History    Marital status:       Spouse name: Not on file    Number of children: Not on file    Years of education: Not on file    Highest education level: Not on file   Occupational History    Not on file   Tobacco Use    Smoking status: Former Smoker     Packs/day: 3.00     Years: 2.00     Pack years: 6.00     Types: Cigarettes     Quit date: 1985     Years since quittin.7    Smokeless tobacco: Never Used   Vaping Use    Vaping Use: Never used   Substance and Sexual Activity    Alcohol use: No    Drug use: No    Sexual activity: Not Currently   Other Topics Concern    Not on file   Social History Narrative    Not on file     Social Determinants of Health     Financial Resource Strain: Low Risk     Difficulty of Paying Living Expenses: Not hard at all   Food Insecurity: No Food Insecurity    Worried About Running Out of Food in the Last Year: Never true    Alexi of Food in the Last Year: Never true   Transportation Needs: No Transportation Needs    Lack of Transportation (Medical): No    Lack of Transportation (Non-Medical): No   Physical Activity:     Days of Exercise per Week:     Minutes of Exercise per Session:    Stress:     Feeling of Stress :    Social Connections:     Frequency of Communication with Friends and Family:     Frequency of Social Gatherings with Friends and Family:     Attends Gnosticist Services:     Active Member of Clubs or Organizations:     Attends Club or Organization Meetings:     Marital Status:    Intimate Partner Violence:     Fear of Current or Ex-Partner:     Emotionally Abused:     Physically Abused:     Sexually Abused:        TOBACCO:   reports that she quit smoking about 36 years ago. Her smoking use included cigarettes. She has a 6.00 pack-year smoking history. She has never used smokeless tobacco.  ETOH:   reports no history of alcohol use.     Family History:   Family History   Problem Relation Age of Onset   Dossie Damari Cancer Mother     Ovarian Cancer Mother 45        Technically peritoneal cancer    Depression Mother         bipolar    Arthritis Father     Rheum Arthritis Father     Arrhythmia Father     Other Father         gout    Heart Disease Father     Depression Brother         depression    Other Brother         gout    Obesity Brother     Other Brother         gout    Obesity Brother     Depression Brother     Cancer Maternal Grandmother         Breast    Breast Cancer Maternal Grandmother          A:  Ms. Lynsey Terrell has been struggling more with anxiety rather than depression since the last visit. She recently started on hurst CPAP and continues to work on making behavior changes to improve her overall sleep. She continues to be active and engaged and responds positively to behavioral interventions. Diagnosis:    1. Bipolar affective disorder, remission status unspecified ([facility]    2. Anxiety           Plan:  Pt interventions:    [place]-setting to identify pt's primary goals [facility] / overall health, Supportive techniques, Provided psychoeducation re: Relationship between sleep mood, provided handout and discussed ways to improve sleep through behavior change, reinforced pt's skill use, behavioral activation interventions, ACT interventions, CBT interventions and treatment planning    Pt Behavioral Change Plan:  Pt set goals to 1) practice grounding exercises daily  2) continue to work on implementing identified behavior changes to improve sleep including practicing stimulus control  3) Return in about 2 weeks (around 10/11/2021).

## 2021-09-27 NOTE — PATIENT INSTRUCTIONS
Notice:    5 things you can see  4 things you can feel  3 things you can hear  2 things you can smell  1 thing you can taste    ---------------    Practice the Hearing-Seeing-Feeling-Breathing skill at least three times a day for 40 seconds. · Sit comfortably and lower your gaze and rest your eyes on something (eyes open). · First, focus on what you can hear for 10 seconds. · Then focus on what you can see for 10 seconds. · Third, focus on what you can feel (in your body and on your skin) for 10 seconds. · Last, pay attention to your breathing for 10 seconds. · Repeat if desired. You can also spend more than 10 seconds on each one. If your mind wanders or if you get distracted, notice that and then bring your attention back to fully focusing on just one sensation at a time.

## 2021-10-04 ENCOUNTER — NURSE ONLY (OUTPATIENT)
Dept: CARDIOLOGY CLINIC | Age: 61
End: 2021-10-04
Payer: MEDICARE

## 2021-10-04 ENCOUNTER — OFFICE VISIT (OUTPATIENT)
Dept: CARDIOLOGY CLINIC | Age: 61
End: 2021-10-04
Payer: MEDICARE

## 2021-10-04 VITALS
BODY MASS INDEX: 55.32 KG/M2 | HEART RATE: 61 BPM | DIASTOLIC BLOOD PRESSURE: 70 MMHG | OXYGEN SATURATION: 98 % | HEIGHT: 61 IN | SYSTOLIC BLOOD PRESSURE: 132 MMHG | WEIGHT: 293 LBS

## 2021-10-04 DIAGNOSIS — I48.3 TYPICAL ATRIAL FLUTTER (HCC): ICD-10-CM

## 2021-10-04 DIAGNOSIS — I63.9 CEREBROVASCULAR ACCIDENT (CVA), UNSPECIFIED MECHANISM (HCC): ICD-10-CM

## 2021-10-04 DIAGNOSIS — R55 SYNCOPE, UNSPECIFIED SYNCOPE TYPE: ICD-10-CM

## 2021-10-04 DIAGNOSIS — I44.7 LBBB (LEFT BUNDLE BRANCH BLOCK): Primary | ICD-10-CM

## 2021-10-04 DIAGNOSIS — I63.231 ARTERIAL ISCHEMIC STROKE, ICA, RIGHT, ACUTE (HCC): ICD-10-CM

## 2021-10-04 DIAGNOSIS — Z95.810 BIVENTRICULAR ICD (IMPLANTABLE CARDIOVERTER-DEFIBRILLATOR) IN PLACE: ICD-10-CM

## 2021-10-04 DIAGNOSIS — E78.2 MIXED HYPERLIPIDEMIA: ICD-10-CM

## 2021-10-04 DIAGNOSIS — I42.0 DILATED CARDIOMYOPATHY (HCC): ICD-10-CM

## 2021-10-04 DIAGNOSIS — I47.20 VT (VENTRICULAR TACHYCARDIA): ICD-10-CM

## 2021-10-04 DIAGNOSIS — I10 PRIMARY HYPERTENSION: ICD-10-CM

## 2021-10-04 DIAGNOSIS — I50.42 CHRONIC COMBINED SYSTOLIC AND DIASTOLIC CONGESTIVE HEART FAILURE (HCC): ICD-10-CM

## 2021-10-04 PROCEDURE — 3017F COLORECTAL CA SCREEN DOC REV: CPT | Performed by: INTERNAL MEDICINE

## 2021-10-04 PROCEDURE — G8417 CALC BMI ABV UP PARAM F/U: HCPCS | Performed by: INTERNAL MEDICINE

## 2021-10-04 PROCEDURE — G8427 DOCREV CUR MEDS BY ELIG CLIN: HCPCS | Performed by: INTERNAL MEDICINE

## 2021-10-04 PROCEDURE — G8484 FLU IMMUNIZE NO ADMIN: HCPCS | Performed by: INTERNAL MEDICINE

## 2021-10-04 PROCEDURE — 99214 OFFICE O/P EST MOD 30 MIN: CPT | Performed by: INTERNAL MEDICINE

## 2021-10-04 PROCEDURE — 93284 PRGRMG EVAL IMPLANTABLE DFB: CPT | Performed by: INTERNAL MEDICINE

## 2021-10-04 PROCEDURE — 1036F TOBACCO NON-USER: CPT | Performed by: INTERNAL MEDICINE

## 2021-10-04 NOTE — PROGRESS NOTES
[address]   Cardiac Followup    Date: 10/4/21  Patient Name: Carlos A Walls  YOB: 1960    Primary Care Physician: Lauren Devries MD    CHIEF COMPLAINT:   Chief Complaint   Patient presents with    Follow-up    Shortness of Breath    Dizziness    Congestive Heart Failure    Other     LBBB    Cardiomyopathy    Hypertension    Other     CVA    Hyperlipidemia    Atrial Flutter    Tachycardia     VT     HPI:  Carlos A Walls is a 59 y.o. female who presents for cardiomyopathy and review of device diagnostics. She had cardiomyopathy identified 7/15 with LVEF 20% by echo. Subsequent cath showed normal coronaries. She has been treated with beta blocker and ACE inhibitor, but the ACE was subsequently discontinued related to worsening renal function and hypotension. Repeat echo 7/6/16 showed LVEF . 20. She reports easy fatiguability, TAPIA with routine workloads. She did have a recent episode of syncope. ECG's have demonstrated consistent LBBB. She underwent CRT-D placement on 07/29/16. At her office visit in 3/2018, she reported she had been feeling better since the device was implanted. She underwent an echocardiogram in 11/2019 that showed an EF of 40-45%. At office visit 1/27/2021, patient's device demonstrated AP 11%,  92%,  VT on 12/16/2020 (340 ms) which was terminated with antitachycardia pacing, no AF events, battery life 1.7 years remaining. She reported she feels much better since having her device placed in 2016. On December 16th, 2020 at 10AM, patient had VT recorded on her device. Patient reported she felt very poorly the day that she had VT. She reported she felt her heart being paced out of VT and felt much better. On exam, patient has pronounced veins in her left shoulder, but she is unaware of how long the veins have been pronounced. She reported she occasionally feels skipped beats.  At the conclusion of 1/2021 visit, VT zone successfully added to patient's device settings. Patient wore a 24 hour scott monitor that demonstrated persistent AFL with an average HR of 84 () BPM, 0.1% PVCs. Patient presented to ED 3/2021 with complaints of chest pain, dyspnea and palpitations. Pt reports progressively worsening SOB over the last several days. SOB is worse with mild exertion including walking very short distances which is not her baseline. She denied cough/sputum production. No fever/chills. She also reported chest pain/pressure in her left anterior chest with associated palpitations that have been ongoing for the last 1-2 weeks. She saw her PCP in the office several days prior to ED visit for a UTI, she was started on Macrobid. Pt was noted to be very SOB. She was advised to call her Cardiologist who recommended for her to go home and transmit her pacemaker/ICD. She was noted to have VT. It was then advised for her to go the ED. Pt has trace lower extremity swelling. She reported gaining approx 80 lbs since the summer. She attributes this to overeating after her  passed away. She reported feeling tightness in her abdomen. No N/V/D. Serial troponin trend negative. Patient was found to be in acute on chronic systolic CHF. NSVT. New onset AFL. Stress test 3/2021 showed large sized inferior, fixed defect of severe intensity consistent with infarction in the territory of the RCA, LVEF of 34%. No ischemia or angina, recommending medical management. Echo 3/2021 showed reduced LVEF of 35-40%, +WMAs. Treated with IV doses of lasix. Transitioned to PO torsemide 10 mg daily. Metoprolol increased 4/1/2021. Eliquis 5 mg BID started. Patient's device was interrogated 4/1/2021 and she was found to have atypical appearing atrial flutter. Manual overdrive pacing was successful at terminating the atrial flutter. The device was reprogrammed to activate atrial ATP. The patient tolerated the procedure well and was discharged in good condition.  She did not require cardioversion and did not receive sedation. Today, patient's device interrogation demonstrates AP 31.6%,  97.7%, no new events, 16 months until RRT. She presents today with a walker. She continues to have intermittent dizziness and TAPIA, which is unchanged. She reports she is taking all acrdiac medications as prescribed and tolerates them well. Denies recent issues with bleeding or bruising. Patient denies current chest pain, palpitations, or syncope. Past Medical History:   has a past medical history of Anesthesia complication, Autoimmune hemolytic anemia ([address] Bipolar disorder ([address] Bundle branch block, Cardiomyopathy ([address] Chronic systolic CHF (congestive heart failure) ([address] Depression, Family history of early CAD, Family history of ovarian cancer, GERD (gastroesophageal reflux disease), Gout, Hypertension, Hypothyroid, Osteoarthritis, knee, Pneumonia, S/P gastric bypass, Unspecified cerebral artery occlusion with cerebral infarction, Uterine cancer ([facility] and Vitamin B 12 deficiency. Surgical History:   has a past surgical history that includes Gastric bypass surgery (1/7/05); Finger surgery; Dilation and curettage of uterus; lipectomy; Cholecystectomy; other surgical history; Upper gastrointestinal endoscopy (4/23/13); Cardiac catheterization (7/10/2015); transesophageal echocardiogram (7/13/2015); other surgical history (4/1/16); pacemaker placement (09/2016); hernia repair (10/20/2016); ERCP; other surgical history; Upper gastrointestinal endoscopy (03/06/2019); Colonoscopy (03/06/2019); Upper gastrointestinal endoscopy (N/A, 3/6/2019); and Colonoscopy (N/A, 3/6/2019). Social History:   reports that she quit smoking about 36 years ago. Her smoking use included cigarettes. She has a 6.00 pack-year smoking history. She has never used smokeless tobacco. She reports that she does not drink alcohol and does not use drugs.      Family History:  family history includes Arrhythmia in her father; Arthritis in her father; Breast Cancer in her maternal grandmother; Cancer in her maternal grandmother and mother; Depression in her brother, brother, and mother; Heart Disease in her father; Obesity in her brother and brother; Other in her brother, brother, and father; Ovarian Cancer (age of onset: 45) in her mother; Rheum Arthritis in her father.      Home Medications:  Outpatient Encounter Medications as of 10/4/2021   Medication Sig Dispense Refill    montelukast (SINGULAIR) 10 MG tablet TAKE 1 TABLET EVERY NIGHT 90 tablet 1    simvastatin (ZOCOR) 20 MG tablet TAKE 1 TABLET EVERY NIGHT 90 tablet 1    lamoTRIgine (LAMICTAL) 150 MG tablet TAKE 1 TABLET TWICE DAILY OR AS OTHERWISE DIRECTED 180 tablet 1    vitamin D (ERGOCALCIFEROL) 1.25 MG (46493 UT) CAPS capsule TAKE 1 CAPSULE ONE TIME WEEKLY 8 capsule 0    fluticasone (FLONASE) 50 MCG/ACT nasal spray 2 sprays by Each Nostril route daily 1 Bottle 5    buPROPion (WELLBUTRIN XL) 150 MG extended release tablet Take 1 tablet by mouth every morning 90 tablet 1    methocarbamol (ROBAXIN) 500 MG tablet TAKE 1 TABLET THREE TIMES DAILY AS NEEDED FOR PAIN 90 tablet 1    levothyroxine (SYNTHROID) 100 MCG tablet Take 1 tablet by mouth Daily 90 tablet 1    metoprolol succinate (TOPROL XL) 100 MG extended release tablet Take 1 tablet by mouth daily 90 tablet 1    lisinopril (PRINIVIL;ZESTRIL) 5 MG tablet TAKE 1 TABLET EVERY DAY 90 tablet 1    allopurinol (ZYLOPRIM) 300 MG tablet TAKE 1 TABLET EVERY DAY 90 tablet 1    diclofenac sodium (VOLTAREN) 1 % GEL Apply topically 2 times daily 150 g 2    apixaban (ELIQUIS) 5 MG TABS tablet Take 1 tablet by mouth 2 times daily 180 tablet 3    torsemide (DEMADEX) 10 MG tablet 2 tabs every other day alternating with 1 tab the other days 180 tablet 3    acetaminophen (TYLENOL) 325 MG tablet Take 650 mg by mouth every 6 hours as needed for Pain TAKES EVERY DAY      cetirizine (ZYRTEC) 10 MG tablet Take 10 mg by mouth daily No facility-administered encounter medications on file as of 10/4/2021. Allergies:  Dilaudid [hydromorphone hcl], Lavender oil, Penicillins, Seroquel [quetiapine fumarate], Adhesive tape, Hydromorphone hcl, Lithium, and Tetanus toxoids     Review of Systems   Constitutional: Negative. HENT: Negative. Eyes: Negative. Respiratory: Negative. Cardiovascular: Negative. Gastrointestinal: Negative. Genitourinary: Negative. Musculoskeletal: Negative. Skin: Negative. Neurological: Negative. Hematological: Negative. Psychiatric/Behavioral: Negative. /70   Pulse 61   Ht 5' 1\" (1.549 m)   Wt (!) 325 lb (147.4 kg)   SpO2 98%   BMI 61.41 kg/m²     Data:    ECG 10/4/21: Personally reviewed. All current cardiac medications reviewed and adjusted accordingly  10/4/21    Device interrogation reviewed and adjusted accordingly 10/4/21     ECHO 3/2021:       Summary   Patient tachy during study. The left ventricular systolic function is moderately reduced with an   ejection fraction of 35 - 40 %. There is hypokinesis of the apex, apical lateral, inferoseptum, apical   anterior and anteroseptum walls. Septal bounce noted. Normal left ventricular size with mild concentric left ventricular   hypertrophy. Left ventricular diastolic filling pressure is elevated lateral E/e\" is 14 . Changes noted from previous echo on 11-1-2019 in left ventricular function. Moderate mitral regurgitation. The right ventricle is mildly enlarged. Right ventricular systolic function is mildly reduced . Systolic pulmonic artery pressure (SPAP) is normal estimated at 26 mmHg   (Right atrial pressure of 3 mmHg). The right atrium is mildly dilated. STRESS TEST 3/2021:   Summary Large sized inferior, fixed defect of severe intensity consistent with  infarction in the territory of the RCA. No evidence of myocardium at risk  for significant reversible ischemia.    LV function is severely reduced with and ejection fraction of 34 %. Overall findings represent a high risk scan. ECHO 11/1/2019:   Summary   The left ventricular systolic function is mildly reduced with an ejection   fraction of 40-45 %. Anteroseptal wall hypokinesis. There is mild concentric left ventricular hypertrophy. Left ventricular cavity size is mildly dilated. Grade I diastolic dysfunction with normal left ventricular filling pressure. Mild posterior mitral annular calcification. Mild thickening of the anterior leaflet of mitral valve. Moderate mitral regurgitation. The left atrium is mildly dilated. Frequent premature beats makes it difficult to identify exact wall motion   abnormality. Objective:  Physical Exam   Constitutional: She is oriented to person, place, and time. She appears well-developed and well-nourished. HENT:   Head: Normocephalic and atraumatic. Eyes: Pupils are equal, round, and reactive to light. Neck: Normal range of motion. Cardiovascular: Normal rate, regular rhythm and normal heart sounds. Pulmonary/Chest: Effort normal and breath sounds normal.   Abdominal: Soft. No tenderness. Musculoskeletal: Normal range of motion. She exhibits no edema. Neurological: She is alert and oriented to person, place, and time. Skin: Skin is warm and dry. Psychiatric: She has a normal mood and affect. Assessment:  1. CM- echo 3/2021 demonstrated an LVEF of 35-40%. 2. AT- no recurrence per device interrogations since 4/2021.   3. VT-  VT on 12/16/2020 (340 ms) per device check which was terminated with antitachycardia pacing. VT zone successfully added to device settings 1/27/2021. No recurrence. 4. S/P CRT-D- she has had remarkable symptomatic improvement with CRT. Her device is functioning normally and her ECG is markedly improved. Plan:  1. Remote device checks every 3 months. 2. Continue cardiac medications as prescribed. 3. Encourage activity as tolerated.    4. Follow up with EP NP in 6 months. QUALITY MEASURES  1. Tobacco Cessation Counseling: NA  2. Retake of BP if >140/90:   NA  3. Documentation to PCP/referring for new patient:  Sent to PCP at close of office visit  4. CAD patient on anti-platelet: NA  5. CAD patient on STATIN therapy:  NA  6. Patient with CHF and aFib on anticoagulation:  NA      This note has been scribed in the presence of Maria Del Carmen Green MD, by Nohemi Rea RN.     I, Dr. Maria Del Carmen Green, personally performed the services described in this documentation as scribed by Nohemi Rea RN  in my presence, and it is both accurate and complete.       Maria Del Carmen Green M.D.

## 2021-10-04 NOTE — PROGRESS NOTES
Patient presents to the device clinic today for a programming evaluation for her defibrillator. Patient has a history of DCM, CVA, A Flutter, and VT. Takes Eliquis and Toprol XL. Last device interrogation was on 9/9. Since then, no arrhythmias recorded. Optivol elevated. AP 31.6%  97.7%    All sensing and pacing parameters are within normal range. No changes need to be made at this time. Patient education was provided about device functionality, in home monitoring, and any other patient questions and/or concerns were addressed. Patient voices understanding. Please see interrogation for more detail. Patient will see Dr. Denise Arreaga today in office. Patient will follow up in 3 months in office or remotely. See Paceart report under the Cardiology tab.

## 2021-10-04 NOTE — LETTER
Theresa Summers  1960           [address]   Cardiac Followup    Date: 10/4/21  Patient Name: Theresa Summers  YOB: 1960    Primary Care Physician: Cris Marquez MD    CHIEF COMPLAINT:   Chief Complaint   Patient presents with    Follow-up    Shortness of Breath    Dizziness    Congestive Heart Failure    Other     LBBB    Cardiomyopathy    Hypertension    Other     CVA    Hyperlipidemia    Atrial Flutter    Tachycardia     VT     HPI:  Theresa Summers is a 59 y.o. female who presents for cardiomyopathy and review of device diagnostics. She had cardiomyopathy identified 7/15 with LVEF 20% by echo. Subsequent cath showed normal coronaries. She has been treated with beta blocker and ACE inhibitor, but the ACE was subsequently discontinued related to worsening renal function and hypotension. Repeat echo 7/6/16 showed LVEF . 20. She reports easy fatiguability, TAPIA with routine workloads. She did have a recent episode of syncope. ECG's have demonstrated consistent LBBB. She underwent CRT-D placement on 07/29/16. At her office visit in 3/2018, she reported she had been feeling better since the device was implanted. She underwent an echocardiogram in 11/2019 that showed an EF of 40-45%. At office visit 1/27/2021, patient's device demonstrated AP 11%,  92%,  VT on 12/16/2020 (340 ms) which was terminated with antitachycardia pacing, no AF events, battery life 1.7 years remaining. She reported she feels much better since having her device placed in 2016. On December 16th, 2020 at 10AM, patient had VT recorded on her device. Patient reported she felt very poorly the day that she had VT. She reported she felt her heart being paced out of VT and felt much better. On exam, patient has pronounced veins in her left shoulder, but she is unaware of how long the veins have been pronounced. She reported she occasionally feels skipped beats.  At the conclusion of 1/2021 visit, VT zone successfully added to patient's device settings. Patient wore a 24 hour scott monitor that demonstrated persistent AFL with an average HR of 84 () BPM, 0.1% PVCs. Patient presented to ED 3/2021 with complaints of chest pain, dyspnea and palpitations. Pt reports progressively worsening SOB over the last several days. SOB is worse with mild exertion including walking very short distances which is not her baseline. She denied cough/sputum production. No fever/chills. She also reported chest pain/pressure in her left anterior chest with associated palpitations that have been ongoing for the last 1-2 weeks. She saw her PCP in the office several days prior to ED visit for a UTI, she was started on Macrobid. Pt was noted to be very SOB. She was advised to call her Cardiologist who recommended for her to go home and transmit her pacemaker/ICD. She was noted to have VT. It was then advised for her to go the ED. Pt has trace lower extremity swelling. She reported gaining approx 80 lbs since the summer. She attributes this to overeating after her  passed away. She reported feeling tightness in her abdomen. No N/V/D. Serial troponin trend negative. Patient was found to be in acute on chronic systolic CHF. NSVT. New onset AFL. Stress test 3/2021 showed large sized inferior, fixed defect of severe intensity consistent with infarction in the territory of the RCA, LVEF of 34%. No ischemia or angina, recommending medical management. Echo 3/2021 showed reduced LVEF of 35-40%, +WMAs. Treated with IV doses of lasix. Transitioned to PO torsemide 10 mg daily. Metoprolol increased 4/1/2021. Eliquis 5 mg BID started. Patient's device was interrogated 4/1/2021 and she was found to have atypical appearing atrial flutter. Manual overdrive pacing was successful at terminating the atrial flutter. The device was reprogrammed to activate atrial ATP.  The patient tolerated the procedure well and was discharged in good condition. She did not require cardioversion and did not receive sedation. Today, patient's device interrogation demonstrates AP 31.6%,  97.7%, no new events, 16 months until RRT. She presents today with a walker. She continues to have intermittent dizziness and TAPIA, which is unchanged. She reports she is taking all acrdiac medications as prescribed and tolerates them well. Denies recent issues with bleeding or bruising. Patient denies current chest pain, palpitations, or syncope. Past Medical History:   has a past medical history of Anesthesia complication, Autoimmune hemolytic anemia ([address] Bipolar disorder ([address] Bundle branch block, Cardiomyopathy ([address] Chronic systolic CHF (congestive heart failure) ([address] Depression, Family history of early CAD, Family history of ovarian cancer, GERD (gastroesophageal reflux disease), Gout, Hypertension, Hypothyroid, Osteoarthritis, knee, Pneumonia, S/P gastric bypass, Unspecified cerebral artery occlusion with cerebral infarction, Uterine cancer ([facility] and Vitamin B 12 deficiency. Surgical History:   has a past surgical history that includes Gastric bypass surgery (1/7/05); Finger surgery; Dilation and curettage of uterus; lipectomy; Cholecystectomy; other surgical history; Upper gastrointestinal endoscopy (4/23/13); Cardiac catheterization (7/10/2015); transesophageal echocardiogram (7/13/2015); other surgical history (4/1/16); pacemaker placement (09/2016); hernia repair (10/20/2016); ERCP; other surgical history; Upper gastrointestinal endoscopy (03/06/2019); Colonoscopy (03/06/2019); Upper gastrointestinal endoscopy (N/A, 3/6/2019); and Colonoscopy (N/A, 3/6/2019). Social History:   reports that she quit smoking about 36 years ago. Her smoking use included cigarettes. She has a 6.00 pack-year smoking history. She has never used smokeless tobacco. She reports that she does not drink alcohol and does not use drugs.      Family History:  family history includes Arrhythmia in her father; Arthritis in her father; Breast Cancer in her maternal grandmother; Cancer in her maternal grandmother and mother; Depression in her brother, brother, and mother; Heart Disease in her father; Obesity in her brother and brother; Other in her brother, brother, and father; Ovarian Cancer (age of onset: 45) in her mother; Rheum Arthritis in her father.      Home Medications:  Outpatient Encounter Medications as of 10/4/2021   Medication Sig Dispense Refill    montelukast (SINGULAIR) 10 MG tablet TAKE 1 TABLET EVERY NIGHT 90 tablet 1    simvastatin (ZOCOR) 20 MG tablet TAKE 1 TABLET EVERY NIGHT 90 tablet 1    lamoTRIgine (LAMICTAL) 150 MG tablet TAKE 1 TABLET TWICE DAILY OR AS OTHERWISE DIRECTED 180 tablet 1    vitamin D (ERGOCALCIFEROL) 1.25 MG (33744 UT) CAPS capsule TAKE 1 CAPSULE ONE TIME WEEKLY 8 capsule 0    fluticasone (FLONASE) 50 MCG/ACT nasal spray 2 sprays by Each Nostril route daily 1 Bottle 5    buPROPion (WELLBUTRIN XL) 150 MG extended release tablet Take 1 tablet by mouth every morning 90 tablet 1    methocarbamol (ROBAXIN) 500 MG tablet TAKE 1 TABLET THREE TIMES DAILY AS NEEDED FOR PAIN 90 tablet 1    levothyroxine (SYNTHROID) 100 MCG tablet Take 1 tablet by mouth Daily 90 tablet 1    metoprolol succinate (TOPROL XL) 100 MG extended release tablet Take 1 tablet by mouth daily 90 tablet 1    lisinopril (PRINIVIL;ZESTRIL) 5 MG tablet TAKE 1 TABLET EVERY DAY 90 tablet 1    allopurinol (ZYLOPRIM) 300 MG tablet TAKE 1 TABLET EVERY DAY 90 tablet 1    diclofenac sodium (VOLTAREN) 1 % GEL Apply topically 2 times daily 150 g 2    apixaban (ELIQUIS) 5 MG TABS tablet Take 1 tablet by mouth 2 times daily 180 tablet 3    torsemide (DEMADEX) 10 MG tablet 2 tabs every other day alternating with 1 tab the other days 180 tablet 3    acetaminophen (TYLENOL) 325 MG tablet Take 650 mg by mouth every 6 hours as needed for Pain TAKES EVERY DAY      cetirizine (ZYRTEC) 10 MG tablet Take 10 mg by mouth daily       No facility-administered encounter medications on file as of 10/4/2021. Allergies:  Dilaudid [hydromorphone hcl], Lavender oil, Penicillins, Seroquel [quetiapine fumarate], Adhesive tape, Hydromorphone hcl, Lithium, and Tetanus toxoids     Review of Systems   Constitutional: Negative. HENT: Negative. Eyes: Negative. Respiratory: Negative. Cardiovascular: Negative. Gastrointestinal: Negative. Genitourinary: Negative. Musculoskeletal: Negative. Skin: Negative. Neurological: Negative. Hematological: Negative. Psychiatric/Behavioral: Negative. /70   Pulse 61   Ht 5' 1\" (1.549 m)   Wt (!) 325 lb (147.4 kg)   SpO2 98%   BMI 61.41 kg/m²     Data:    ECG 10/4/21: Personally reviewed. All current cardiac medications reviewed and adjusted accordingly  10/4/21    Device interrogation reviewed and adjusted accordingly 10/4/21     ECHO 3/2021:       Summary   Patient tachy during study. The left ventricular systolic function is moderately reduced with an   ejection fraction of 35 - 40 %. There is hypokinesis of the apex, apical lateral, inferoseptum, apical   anterior and anteroseptum walls. Septal bounce noted. Normal left ventricular size with mild concentric left ventricular   hypertrophy. Left ventricular diastolic filling pressure is elevated lateral E/e\" is 14 . Changes noted from previous echo on 11-1-2019 in left ventricular function. Moderate mitral regurgitation. The right ventricle is mildly enlarged. Right ventricular systolic function is mildly reduced . Systolic pulmonic artery pressure (SPAP) is normal estimated at 26 mmHg   (Right atrial pressure of 3 mmHg). The right atrium is mildly dilated. STRESS TEST 3/2021:   Summary Large sized inferior, fixed defect of severe intensity consistent with  infarction in the territory of the RCA.  No evidence of myocardium at risk  for significant reversible ischemia. LV function is severely reduced with and ejection fraction of 34 %. Overall findings represent a high risk scan. ECHO 11/1/2019:   Summary   The left ventricular systolic function is mildly reduced with an ejection   fraction of 40-45 %. Anteroseptal wall hypokinesis. There is mild concentric left ventricular hypertrophy. Left ventricular cavity size is mildly dilated. Grade I diastolic dysfunction with normal left ventricular filling pressure. Mild posterior mitral annular calcification. Mild thickening of the anterior leaflet of mitral valve. Moderate mitral regurgitation. The left atrium is mildly dilated. Frequent premature beats makes it difficult to identify exact wall motion   abnormality. Objective:  Physical Exam   Constitutional: She is oriented to person, place, and time. She appears well-developed and well-nourished. HENT:   Head: Normocephalic and atraumatic. Eyes: Pupils are equal, round, and reactive to light. Neck: Normal range of motion. Cardiovascular: Normal rate, regular rhythm and normal heart sounds. Pulmonary/Chest: Effort normal and breath sounds normal.   Abdominal: Soft. No tenderness. Musculoskeletal: Normal range of motion. She exhibits no edema. Neurological: She is alert and oriented to person, place, and time. Skin: Skin is warm and dry. Psychiatric: She has a normal mood and affect. Assessment:  1. CM- echo 3/2021 demonstrated an LVEF of 35-40%. 2. AT- no recurrence per device interrogations since 4/2021.   3. VT-  VT on 12/16/2020 (340 ms) per device check which was terminated with antitachycardia pacing. VT zone successfully added to device settings 1/27/2021. No recurrence. 4. S/P CRT-D- she has had remarkable symptomatic improvement with CRT. Her device is functioning normally and her ECG is markedly improved. Plan:  1. Remote device checks every 3 months. 2. Continue cardiac medications as prescribed.    3. Encourage activity as tolerated. 4. Follow up with EP NP in 6 months. QUALITY MEASURES  1. Tobacco Cessation Counseling: NA  2. Retake of BP if >140/90:   NA  3. Documentation to PCP/referring for new patient:  Sent to PCP at close of office visit  4. CAD patient on anti-platelet: NA  5. CAD patient on STATIN therapy:  NA  6. Patient with CHF and aFib on anticoagulation:  NA      This note has been scribed in the presence of Manuelito Velásquez MD, by Siena Ramsey RN.     I, Dr. Manuelito Velásquez, personally performed the services described in this documentation as scribed by Siena Ramsey RN  in my presence, and it is both accurate and complete.       Manuelito Velásquez M.D.

## 2021-10-04 NOTE — PATIENT INSTRUCTIONS
Plan:  1. Remote device checks every 3 months. 2. Continue cardiac medications as prescribed. 3. Encourage activity as tolerated. 4. Follow up with EP NP in 6 months.

## 2021-10-08 ENCOUNTER — TELEPHONE (OUTPATIENT)
Dept: FAMILY MEDICINE CLINIC | Age: 61
End: 2021-10-08

## 2021-10-08 ENCOUNTER — TELEPHONE (OUTPATIENT)
Dept: FAMILY MEDICINE CLINIC | Age: 61
End: 2021-10-08
Payer: MEDICARE

## 2021-10-08 ENCOUNTER — NURSE ONLY (OUTPATIENT)
Dept: FAMILY MEDICINE CLINIC | Age: 61
End: 2021-10-08

## 2021-10-08 DIAGNOSIS — R30.0 DYSURIA: Primary | ICD-10-CM

## 2021-10-08 LAB
BILIRUBIN, POC: NEGATIVE
BLOOD URINE, POC: NEGATIVE
CLARITY, POC: CLEAR
COLOR, POC: YELLOW
GLUCOSE URINE, POC: NEGATIVE
KETONES, POC: NEGATIVE
LEUKOCYTE EST, POC: NORMAL
NITRITE, POC: NEGATIVE
PH, POC: 5.5
PROTEIN, POC: NEGATIVE
SPECIFIC GRAVITY, POC: 1.01
UROBILINOGEN, POC: 0.2

## 2021-10-08 PROCEDURE — 81002 URINALYSIS NONAUTO W/O SCOPE: CPT | Performed by: FAMILY MEDICINE

## 2021-10-08 RX ORDER — CIPROFLOXACIN 500 MG/1
500 TABLET, FILM COATED ORAL 2 TIMES DAILY
Qty: 10 TABLET | Refills: 0 | Status: SHIPPED | OUTPATIENT
Start: 2021-10-08 | End: 2021-10-13

## 2021-10-08 NOTE — PROGRESS NOTES
Patient came in to office today to leave urine sample for testing. Both test were collected. One sent to lab, and POCT routed to PCP.

## 2021-10-08 NOTE — TELEPHONE ENCOUNTER
----- Message from Babs Eid sent at 10/8/2021 12:41 PM EDT -----  Subject: Message to Provider    QUESTIONS  Information for Provider? Pt completing e-visit & then come in to provide   urine sample. Pt has been struggling to complete the E-Visit online. She   said while some answers weren't sufficient for her symptoms, regardless   she completed but is unable to get it submitted. At the end, when   attempted to submit, it declined, stating Pt must be be seen in-person. She needs help getting past this error so she can submit. Office is out   for lunch, if can please call Pt back today. Pt is in pain & experiencing   pain in back starting now.  ---------------------------------------------------------------------------  --------------  CALL BACK INFO  What is the best way for the office to contact you? OK to leave message on   voicemail  Preferred Call Back Phone Number?  [phone]  ---------------------------------------------------------------------------  --------------  SCRIPT ANSWERS  undefined

## 2021-10-08 NOTE — TELEPHONE ENCOUNTER
Pt. Experiencing painful urination, frequency with dark urine. Symptoms x 1 week but today has started with nausea. Pt. Requesting an appt. But schedules are full today.   Please advise

## 2021-10-11 LAB
ORGANISM: ABNORMAL
URINE CULTURE, ROUTINE: ABNORMAL
URINE CULTURE, ROUTINE: ABNORMAL

## 2021-10-15 RX ORDER — LISINOPRIL 5 MG/1
TABLET ORAL
Qty: 90 TABLET | Refills: 2 | Status: SHIPPED | OUTPATIENT
Start: 2021-10-15 | End: 2022-08-27 | Stop reason: SDUPTHER

## 2021-10-27 ENCOUNTER — TELEMEDICINE (OUTPATIENT)
Dept: PSYCHOLOGY | Age: 61
End: 2021-10-27
Payer: MEDICARE

## 2021-10-27 DIAGNOSIS — F31.9 BIPOLAR AFFECTIVE DISORDER, REMISSION STATUS UNSPECIFIED (HCC): Primary | ICD-10-CM

## 2021-10-27 DIAGNOSIS — M79.662 PAIN OF LEFT CALF: ICD-10-CM

## 2021-10-27 DIAGNOSIS — F41.9 ANXIETY: ICD-10-CM

## 2021-10-27 DIAGNOSIS — F43.21 GRIEF: ICD-10-CM

## 2021-10-27 PROCEDURE — 90832 PSYTX W PT 30 MINUTES: CPT | Performed by: PSYCHOLOGIST

## 2021-10-27 RX ORDER — METHOCARBAMOL 500 MG/1
TABLET, FILM COATED ORAL
Qty: 270 TABLET | Refills: 0 | Status: SHIPPED | OUTPATIENT
Start: 2021-10-27 | End: 2022-02-25

## 2021-10-27 NOTE — TELEPHONE ENCOUNTER
Last office visit 8/23/2021     Last written 7- #90 x 1 refill    Next office visit scheduled not scheduled    Requested Prescriptions     Pending Prescriptions Disp Refills    methocarbamol (ROBAXIN) 500 MG tablet 270 tablet 0     Sig: TAKE 1 TABLET THREE TIMES DAILY AS NEEDED FOR PAIN

## 2021-10-27 NOTE — PROGRESS NOTES
Behavioral Health Consultation  [address] Rader PsyD  Psychologist  10/27/2021   10:53 AM      Time spent with Patient: 35 minutes  This is patient's sixth  [facility] appointment. Reason for Consult:    Chief Complaint   Patient presents with    Anxiety    Depression     Referring Provider: Asa Choudhury MD       S:  During the last visit patient Pt set goals to 1) practice grounding exercises daily  2) continue to work on implementing identified behavior changes to improve sleep including practicing stimulus control      Pt reports she is \"pretty good. \" Has been crafting more lately and enjoys it. Has also ready more. Getting 4-6 hours of sleep with the CPAP. Sleep better than at last visit and not as tired, but does still feel like wet blanket at times. Today would have been father's bday. Was [de-identified] when he  in  . Mother  in  and still struggles.   and still feels guilty and like its her fault. Gets stuck in what she could have done differently and better. Is not as depressed right now so is able to recognize these thoughts are not being as reasonable but when more depressed hard to pull herself out of. Didn't regularly practice grounding- couldn't find directions. Knows self talk is critical and harsher than if she were to tell a friend. Has different expectation for self vs others.        O:  MSE:    Appearance: good hygiene   Attitude: cooperative and friendly  Consciousness: alert  Orientation: oriented to person, place, time, general circumstance  Memory: recent and remote memory intact  Attention/Concentration: intact during session  Psychomotor Activity:normal  Eye Contact: normal  Speech: normal rate and volume, well-articulated  Mood: \"pretty good\"  Affect: euthymic , congruent  Perception: within normal limits  Thought Content: within normal limits  Thought Process: logical, coherent and goal-directed  Insight: good  Judgment: intact  Ability to understand instructions: Yes  Ability to respond meaningfully: Yes  Morbid Ideation: no   Suicide Assessment: no suicidal ideation, plan, or intent  Homicidal Ideation: no      History:    Medications:   Current Outpatient Medications   Medication Sig Dispense Refill    lisinopril (PRINIVIL;ZESTRIL) 5 MG tablet TAKE 1 TABLET EVERY DAY 90 tablet 2    montelukast (SINGULAIR) 10 MG tablet TAKE 1 TABLET EVERY NIGHT 90 tablet 1    simvastatin (ZOCOR) 20 MG tablet TAKE 1 TABLET EVERY NIGHT 90 tablet 1    lamoTRIgine (LAMICTAL) 150 MG tablet TAKE 1 TABLET TWICE DAILY OR AS OTHERWISE DIRECTED 180 tablet 1    vitamin D (ERGOCALCIFEROL) 1.25 MG (52916 UT) CAPS capsule TAKE 1 CAPSULE ONE TIME WEEKLY 8 capsule 0    fluticasone (FLONASE) 50 MCG/ACT nasal spray 2 sprays by Each Nostril route daily 1 Bottle 5    buPROPion (WELLBUTRIN XL) 150 MG extended release tablet Take 1 tablet by mouth every morning 90 tablet 1    methocarbamol (ROBAXIN) 500 MG tablet TAKE 1 TABLET THREE TIMES DAILY AS NEEDED FOR PAIN 90 tablet 1    levothyroxine (SYNTHROID) 100 MCG tablet Take 1 tablet by mouth Daily 90 tablet 1    metoprolol succinate (TOPROL XL) 100 MG extended release tablet Take 1 tablet by mouth daily 90 tablet 1    allopurinol (ZYLOPRIM) 300 MG tablet TAKE 1 TABLET EVERY DAY 90 tablet 1    diclofenac sodium (VOLTAREN) 1 % GEL Apply topically 2 times daily 150 g 2    apixaban (ELIQUIS) 5 MG TABS tablet Take 1 tablet by mouth 2 times daily 180 tablet 3    torsemide (DEMADEX) 10 MG tablet 2 tabs every other day alternating with 1 tab the other days 180 tablet 3    acetaminophen (TYLENOL) 325 MG tablet Take 650 mg by mouth every 6 hours as needed for Pain TAKES EVERY DAY      cetirizine (ZYRTEC) 10 MG tablet Take 10 mg by mouth daily       No current facility-administered medications for this visit. Social History:   Social History     Socioeconomic History    Marital status:       Spouse name: Not on file    Number of children: Not on file    Years of education: Not on file    Highest education level: Not on file   Occupational History    Not on file   Tobacco Use    Smoking status: Former Smoker     Packs/day: 3.00     Years: 2.00     Pack years: 6.00     Types: Cigarettes     Quit date: 1985     Years since quittin.8    Smokeless tobacco: Never Used   Vaping Use    Vaping Use: Never used   Substance and Sexual Activity    Alcohol use: No    Drug use: No    Sexual activity: Not Currently   Other Topics Concern    Not on file   Social History Narrative    Not on file     Social Determinants of Health     Financial Resource Strain: Low Risk     Difficulty of Paying Living Expenses: Not hard at all   Food Insecurity: No Food Insecurity    Worried About [address] in the Last Year: Never true    [address] [employer] in the Last Year: Never true   Transportation Needs: No Transportation Needs    Lack of Transportation (Medical): No    Lack of Transportation (Non-Medical): No   Physical Activity:     Days of Exercise per Week:     Minutes of Exercise per Session:    Stress:     Feeling of Stress :    Social Connections:     Frequency of Communication with Friends and Family:     Frequency of Social Gatherings with Friends and Family:     Attends [employer] Services:     Active Member of Clubs or Organizations:     Attends Club or Organization Meetings:     Marital Status:    Intimate Partner Violence:     Fear of Current or Ex-Partner:     Emotionally Abused:     Physically Abused:     Sexually Abused:        TOBACCO:   reports that she quit smoking about 36 years ago. Her smoking use included cigarettes. She has a 6.00 pack-year smoking history. She has never used smokeless tobacco.  ETOH:   reports no history of alcohol use.     Family History:   Family History   Problem Relation Age of Onset    Cancer Mother     Ovarian Cancer Mother 45        Technically peritoneal cancer    Depression Mother         bipolar    Arthritis Father     Rheum Arthritis Father     Arrhythmia Father     Other Father         gout    Heart Disease Father     Depression Brother         depression    Other Brother         gout    Obesity Brother     Other Brother         gout    Obesity Brother     Depression Brother     Cancer Maternal Grandmother         Breast    Breast Cancer Maternal Grandmother          A:  Ms. Julieth Marques continues to struggle with anxiety and depression although both have improved since the last visit. She has been actively practicing skills taught her to manage her mood more effectively. Sleep is improved somewhat since the last visit as well. She continues to be active and engaged and responds positively to behavioral interventions. Diagnosis:    1. Bipolar affective disorder, remission status unspecified ([facility] [address]    2. Anxiety    3. Grief           Plan:  Pt interventions:    [place]-setting to identify pt's primary goals for [facility] visit / overall health, Supportive techniques, provided handout and discussed ways to improve sleep through behavior change, reinforced pt's skill use, ACT interventions, CBT interventions and treatment planning    Pt Behavioral Change Plan:  Pt set goals to 1) practice diffusion of thoughts and feelings (eg I am not my depression) 2) put postdates up to remind herself so when you are struggling more in the forefront of your mind 3) practice grounding exercises daily  4) Return in about 2 weeks (around 11/10/2021).

## 2021-11-01 ENCOUNTER — NURSE ONLY (OUTPATIENT)
Dept: CARDIOLOGY CLINIC | Age: 61
End: 2021-11-01
Payer: MEDICARE

## 2021-11-01 DIAGNOSIS — I50.22 CHRONIC SYSTOLIC CONGESTIVE HEART FAILURE (HCC): ICD-10-CM

## 2021-11-01 DIAGNOSIS — I47.20 VT (VENTRICULAR TACHYCARDIA): ICD-10-CM

## 2021-11-01 DIAGNOSIS — I42.0 DILATED CARDIOMYOPATHY (HCC): ICD-10-CM

## 2021-11-01 DIAGNOSIS — Z95.810 BIVENTRICULAR ICD (IMPLANTABLE CARDIOVERTER-DEFIBRILLATOR) IN PLACE: ICD-10-CM

## 2021-11-01 PROCEDURE — 93296 REM INTERROG EVL PM/IDS: CPT | Performed by: INTERNAL MEDICINE

## 2021-11-01 PROCEDURE — 93297 REM INTERROG DEV EVAL ICPMS: CPT | Performed by: INTERNAL MEDICINE

## 2021-11-01 PROCEDURE — 93295 DEV INTERROG REMOTE 1/2/MLT: CPT | Performed by: INTERNAL MEDICINE

## 2021-11-02 ENCOUNTER — VIRTUAL VISIT (OUTPATIENT)
Dept: PULMONOLOGY | Age: 61
End: 2021-11-02
Payer: MEDICARE

## 2021-11-02 DIAGNOSIS — R53.83 OTHER FATIGUE: ICD-10-CM

## 2021-11-02 DIAGNOSIS — Z72.821 POOR SLEEP HYGIENE: ICD-10-CM

## 2021-11-02 DIAGNOSIS — Z71.89 CPAP USE COUNSELING: ICD-10-CM

## 2021-11-02 DIAGNOSIS — F51.04 PSYCHOPHYSIOLOGICAL INSOMNIA: ICD-10-CM

## 2021-11-02 DIAGNOSIS — E66.01 MORBID OBESITY WITH BMI OF 60.0-69.9, ADULT (HCC): ICD-10-CM

## 2021-11-02 DIAGNOSIS — G47.33 SEVERE OBSTRUCTIVE SLEEP APNEA: Primary | ICD-10-CM

## 2021-11-02 PROCEDURE — G8427 DOCREV CUR MEDS BY ELIG CLIN: HCPCS | Performed by: NURSE PRACTITIONER

## 2021-11-02 PROCEDURE — 3017F COLORECTAL CA SCREEN DOC REV: CPT | Performed by: NURSE PRACTITIONER

## 2021-11-02 PROCEDURE — 99214 OFFICE O/P EST MOD 30 MIN: CPT | Performed by: NURSE PRACTITIONER

## 2021-11-02 ASSESSMENT — SLEEP AND FATIGUE QUESTIONNAIRES
HOW LIKELY ARE YOU TO NOD OFF OR FALL ASLEEP WHILE SITTING INACTIVE IN A PUBLIC PLACE: 0
ESS TOTAL SCORE: 6
HOW LIKELY ARE YOU TO NOD OFF OR FALL ASLEEP WHILE SITTING QUIETLY AFTER LUNCH WITHOUT ALCOHOL: 1
HOW LIKELY ARE YOU TO NOD OFF OR FALL ASLEEP WHILE LYING DOWN TO REST IN THE AFTERNOON WHEN CIRCUMSTANCES PERMIT: 2
HOW LIKELY ARE YOU TO NOD OFF OR FALL ASLEEP IN A CAR, WHILE STOPPED FOR A FEW MINUTES IN TRAFFIC: 0
HOW LIKELY ARE YOU TO NOD OFF OR FALL ASLEEP WHILE SITTING AND READING: 2
HOW LIKELY ARE YOU TO NOD OFF OR FALL ASLEEP WHILE SITTING AND TALKING TO SOMEONE: 0
HOW LIKELY ARE YOU TO NOD OFF OR FALL ASLEEP WHILE WATCHING TV: 1
HOW LIKELY ARE YOU TO NOD OFF OR FALL ASLEEP WHEN YOU ARE A PASSENGER IN A CAR FOR AN HOUR WITHOUT A BREAK: 0

## 2021-11-02 NOTE — PATIENT INSTRUCTIONS

## 2021-11-02 NOTE — PROGRESS NOTES
Patient ID: María Archuleta is a 59 y.o. female who is being seen today for   Chief Complaint   Patient presents with    Sleep Apnea     31-90d     Referring: ALICIA Tapia-SANTO    HPI:   María Archuleta is a 59 y.o. female for televideo appointment via video and audio doxy. me virtual visit for MARCY follow up. PSG and CPAP titration were reviewed by me and noted below. PSG showed severe MARCY and patient started CPAP after testing. Results were dicussed with patient and multiple good questions were answered. States she is doing okay with CPAP, states it has been better with pressure decrease. States she is less tired since using CPAP no longer taking 2-3 hour naps. She continues to work on her sleep habits    Patient is using CPAP 4-6 hrs/night. Using humidifier. No snoring on CPAP. The pressure is well tolerated. The mask is comfortable- nasal mask. No mask leak. No significant daytime sleepiness. No nodding off when driving. No dry nose or throat. No fatigue. Bedtime is 10 pm- 2 am and rise time is 9-10 am. Sleep onset is usually 20-30 minutes. Wakes up 0-2 times at night total. 0-2 nocturia. It takes usually few minutes to fall back a sleep. Rare naps during the day. No headache in am. No weight gain. 1-2 caffienated beverages during the day. No alcohol. ESS is 6. Initial HPI 5/17/21  María Archuleta is a 59 y.o. female for televideo appointment via video and audio doxy. me virtual visit for witnessed apnea evaluation. Patient reports snoring at night for the past unknown years. Worse in supine position. Wakes self snoring. Unsure if witnessed apnea. Wakes up at night choking or gasping for air. No restorative sleep. +dry mouth upon awakening. Fatigue and tiredness during the day. No history of sleep apnea. Bedtime 11 pm-3am  and rise time is 730-830 am. It takes 45 minutes to fall asleep- lays there, sometimes might watch TV or look at phone. 2 -3nocturia. Wakes up 2-3 times at night.  It takes 10-unknown minutes to fall back a sleep. Takes 1-3 nap during the day ( minutes). +headache in am. No car wrecks or near wrecks because of the sleepiness. No nodding off while driving. Gained 60 pounds in the past 1-2 years. +forgetfulness and decreased concentration. Drinks 1-2 caffinated beverages per day. No significant alcohol. Sometimes restless feelings in legs at night.  +teeth grinding. No nightmares. No sleep walking. +night time panic attacks. No narcotics. No drug abuse. +history of depression, +history of anxiety, + history of bipolar disorder. +history of atrial fibrillation. No history of DM. +history of HTN. No history of ischemic heart disease. +history of stroke, TIAx3. ESS is 10. No smoking. No known FH for  narcolepsy.  +FH for MARCY and RLS- brothers    Sleep Medicine 11/2/2021 5/17/2021   Sitting and reading 2 2   Watching TV 1 1   Sitting, inactive in a public place (e.g. a theatre or a meeting) 0 1   As a passenger in a car for an hour without a break 0 1   Lying down to rest in the afternoon when circumstances permit 2 2   Sitting and talking to someone 0 1   Sitting quietly after a lunch without alcohol 1 2   In a car, while stopped for a few minutes in traffic 0 0   Total score 6 10       Past Medical History:  Past Medical History:   Diagnosis Date    Anesthesia complication     hypotension post op    Autoimmune hemolytic anemia ([facility] [address]     during uterine cancer    Bipolar disorder ([facility] [address] [facility]     managed by Era Khan Viktoria Severin)    Bundle branch block     Cardiomyopathy ([facility]     Chronic systolic CHF (congestive heart failure) ([facility] [address] 7/9/2015    Depression     Family history of early CAD     Family history of ovarian cancer     mother    GERD (gastroesophageal reflux disease)     Gout     Hypertension     Hypothyroid     Osteoarthritis, knee     Shybut    Pneumonia     history of pneumonia    S/P gastric bypass 1/7/05    Unspecified cerebral artery occlusion with cerebral infarction     Uterine cancer [facility])     endometrial adenocarcinoma    Vitamin B 12 deficiency 7/09       Past Surgical History:        Procedure Laterality Date    CARDIAC CATHETERIZATION  7/10/2015    Normal Coronary Arteries    CHOLECYSTECTOMY      COLONOSCOPY  03/06/2019    NL    COLONOSCOPY N/A 3/6/2019    EGD AND COLONOSCOPY WITH ANESTHESIA performed by Ty Hankins MD at [address] [place] [address]      4 times    ERCP      FINGER SURGERY      gout X 2    GASTRIC BYPASS SURGERY  1/7/05    HERNIA REPAIR  10/20/2016    lap ventral hernia    LIPECTOMY      gangrenous    OTHER SURGICAL HISTORY      radium implants into uterus for uterine surgery X 2    OTHER SURGICAL HISTORY  4/1/16    exp. lap lysis of adhesion    OTHER SURGICAL HISTORY      biventricular pacer and defibrillator    PACEMAKER PLACEMENT  09/2016    TRANSESOPHAGEAL ECHOCARDIOGRAM  7/13/2015    UPPER GASTROINTESTINAL ENDOSCOPY  4/23/13    UPPER GASTROINTESTINAL ENDOSCOPY  03/06/2019    NL    UPPER GASTROINTESTINAL ENDOSCOPY N/A 3/6/2019    EGD AND COLONOSCOPY WITH ANESTHESIA performed by Ty Hankins MD at [facility]:  is allergic to dilaudid [hydromorphone hcl], lavender oil, penicillins, seroquel [quetiapine fumarate], adhesive tape, hydromorphone hcl, lithium, and tetanus toxoids. Social History:    TOBACCO:   reports that she quit smoking about 36 years ago. Her smoking use included cigarettes. She has a 6.00 pack-year smoking history. She has never used smokeless tobacco.  ETOH:   reports no history of alcohol use.     Family History:       Problem Relation Age of Onset   [de-identified] Cancer Mother     Ovarian Cancer Mother 45        Technically peritoneal cancer    Depression Mother         bipolar    Arthritis Father     Rheum Arthritis Father     Arrhythmia Father     Other Father         gout    Heart Disease Father     Depression Brother depression    Other Brother         gout    Obesity Brother     Other Brother         gout    Obesity Brother     Depression Brother     Cancer Maternal Grandmother         Breast    Breast Cancer Maternal Grandmother        Current Medications:    Current Outpatient Medications:     methocarbamol (ROBAXIN) 500 MG tablet, TAKE 1 TABLET THREE TIMES DAILY AS NEEDED FOR PAIN, Disp: 270 tablet, Rfl: 0    lisinopril (PRINIVIL;ZESTRIL) 5 MG tablet, TAKE 1 TABLET EVERY DAY, Disp: 90 tablet, Rfl: 2    montelukast (SINGULAIR) 10 MG tablet, TAKE 1 TABLET EVERY NIGHT, Disp: 90 tablet, Rfl: 1    simvastatin (ZOCOR) 20 MG tablet, TAKE 1 TABLET EVERY NIGHT, Disp: 90 tablet, Rfl: 1    lamoTRIgine (LAMICTAL) 150 MG tablet, TAKE 1 TABLET TWICE DAILY OR AS OTHERWISE DIRECTED, Disp: 180 tablet, Rfl: 1    vitamin D (ERGOCALCIFEROL) 1.25 MG (33401 UT) CAPS capsule, TAKE 1 CAPSULE ONE TIME WEEKLY, Disp: 8 capsule, Rfl: 0    fluticasone (FLONASE) 50 MCG/ACT nasal spray, 2 sprays by Each Nostril route daily, Disp: 1 Bottle, Rfl: 5    buPROPion (WELLBUTRIN XL) 150 MG extended release tablet, Take 1 tablet by mouth every morning, Disp: 90 tablet, Rfl: 1    levothyroxine (SYNTHROID) 100 MCG tablet, Take 1 tablet by mouth Daily, Disp: 90 tablet, Rfl: 1    metoprolol succinate (TOPROL XL) 100 MG extended release tablet, Take 1 tablet by mouth daily, Disp: 90 tablet, Rfl: 1    allopurinol (ZYLOPRIM) 300 MG tablet, TAKE 1 TABLET EVERY DAY, Disp: 90 tablet, Rfl: 1    diclofenac sodium (VOLTAREN) 1 % GEL, Apply topically 2 times daily, Disp: 150 g, Rfl: 2    apixaban (ELIQUIS) 5 MG TABS tablet, Take 1 tablet by mouth 2 times daily, Disp: 180 tablet, Rfl: 3    torsemide (DEMADEX) 10 MG tablet, 2 tabs every other day alternating with 1 tab the other days, Disp: 180 tablet, Rfl: 3    acetaminophen (TYLENOL) 325 MG tablet, Take 650 mg by mouth every 6 hours as needed for Pain TAKES EVERY DAY, Disp: , Rfl:     cetirizine (ZYRTEC) 10 MG tablet, Take 10 mg by mouth daily, Disp: , Rfl:       REVIEW OF SYSTEMS:  Review of Systems      Objective:   PHYSICAL EXAM:  There were no vitals taken for this visit. Physical Exam  Exam:  Gen: No acute distress, does not appear to be in pain. Appears well developed and nourished. HENT: Head is normocephalic and atraumatic. Normal appearing nose. External Ears normal.   Neck: No visualized mass. Trachea is midline   Eyes: EOM intact. No visible discharge. Resp:No visualized signs of difficulty breathing or respiratory distress, speaking in full sentences. Respiratory effort normal.  Neuro: Awake. Alert. Able to follow commands. No facial asymmetry. Skin: No significant lesions or discoloration noted on facial skin    Musculoskeletal: Normal range of motion of the neck. Psych: Oriented x 3. No anxiety. Normal affect. DATA:   3/13/2021 Echo EF 35-40%  7/8/2021 PSG AHI 37.8/REM AHI 70, low SPO2 87%, limited REM sleep  8/19/2021 titration improved MARCY with CPAP, treatment emergent central sleep apnea, recommendation CPAP 13 cm H2O    CPAP download data:  Compliance download report from 9/27/21 to 10/26/21 reviewed today by me and showed patient is using machine 4:30 hrs/night with 80% compliance and AHI 2.6 within this time frame. 24/30days with greater than 4 hours of machine use. CPAP12 cm H20       Assessment:       · Severe MARCY. CPAP 12 cm H2O.   Optimal compliance and efficacy on review today  · Snoring-resolved on CPAP  · Hypersomnia -improving  · Fatigue-improving  · Morning headache-improved/resolved  · Morbid obesity  · Sleep onset and maintenance insomniapsychophysiological hyperarousal, poor sleep hygiene, comorbid conditions, MARCY likely contributing-some improvement, patient continues to work on sleep habits  · Cardiomyopathy, CHF, hypertension, AICD, arrhythmia, PAFfollowed by cardiology        Plan:      · Continue CPAP 12 cm H2O  · Discussed severity of sleep apnea and importance of treatment  Advised to use CPAP 6-8 hrs at night and during naps-continue to increase time used each night. Replacement of mask, tubing, head straps every 3-6 months or sooner if damaged. Patient instructed to contact [employer] for any mask, tubing or machine trouble shooting if problems arise. · Sleep hygiene  · Cognitive behavioral therapy was discussed with patient including stimulus control and sleep restriction  · Recommend set bed/wake times, no naps in the daytime, decrease time in bed. Continue to work with psychiatrist/psychologist  · Avoid sedatives, alcohol and caffeinated drinks at bed time. · No driving motorized vehicles or operating heavy machinery while fatigue, drowsy or sleepy. · Weight loss is also recommended as a long-term intervention. · Complications of MARCY if not treated were discussed with patient patient to include systemic hypertension, pulmonary hypertension, cardiovascular morbidities, car accidents and all cause mortality. Discussed pathophysiology of MARCY with patient today. Patient education handout provided regarding sleep tips      Consent for telehealth visit was obtained and is noted in chart      THIS VISIT WAS COMPLETED VIRTUALLY USING DOXY. Zena Lantigua is a 59 y.o. female being evaluated by a Virtual Visit (video visit) encounter to address concerns as mentioned above. A caregiver was present when appropriate. Due to this being a TeleHealth encounter (During EUPEQ-58 public health emergency), evaluation of the following organ systems was limited: Vitals/Constitutional/EENT/Resp/CV/GI//MS/Neuro/Skin/Heme-Lymph-Imm.   Pursuant to the emergency declaration under the [address] [place] [address] authority and the [employer] and [employer] Act, this Virtual Visit was conducted with patient's (and/or legal guardian's) consent, to reduce the patient's risk of exposure to COVID-19 and provide necessary medical care. The patient (and/or legal guardian) has also been advised to contact this office for worsening conditions or problems, and seek emergency medical treatment and/or call 911 if deemed necessary. Patient identification was verified at the start of the visit: Yes    Total time spent for this encounter: Not billed by time    Services were provided through a video synchronous discussion virtually to substitute for in-person clinic visit. Patient and provider were located at their individual homes. --Sergio Bourne, ALICIA Mora CNP on 11/2/2021 at 2:00 PM    An electronic signature was used to authenticate this note.

## 2021-11-10 ENCOUNTER — TELEPHONE (OUTPATIENT)
Dept: FAMILY MEDICINE CLINIC | Age: 61
End: 2021-11-10

## 2021-11-10 NOTE — TELEPHONE ENCOUNTER
----- Message from Aleks Novak sent at 11/10/2021 12:59 PM EST -----  Subject: Message to Provider    QUESTIONS  Information for Provider? Pt was on the virtual call and it was at 12:30. She was just making sure she didn't do anything wrong.  ---------------------------------------------------------------------------  --------------  CALL BACK INFO  What is the best way for the office to contact you? OK to leave message on   voicemail  Preferred Call Back Phone Number? [phone]  ---------------------------------------------------------------------------  --------------  SCRIPT ANSWERS  Relationship to Patient?  Self

## 2021-11-10 NOTE — PROGRESS NOTES
[address]   Cardiac Follow-up    Primary Care Doctor:  Zakia Urena MD    Chief Complaint   Patient presents with    Follow-up    Cardiomyopathy        History of Present Illness:   I had the pleasure of seeing Nilsa Beebe in follow up for cardiomyopathy. Hx of CHF, non-ischemic dilated cardiomyopathy, S/P  BiV-AICD 2016, pulmonary HTN, LBBB, HTN.     admitted to the hospital 3/11/2021-3/14/2021 with chest pain, MADELEINE, shortness of breath, device check showed NSVT, A. fib with RVR. On 4/1/21 cardioversion was canceled, as her interrogated device showed typical a flutter, and device changes were made and converted to NSR. Since last visit, she was referred to sleep medicine, she was diagnosed with severe sleep apnea. Doing better on cpap; breathing is improved. She feels better overall. Depression is improved. He is undergoing counseling. Taking torsemide alternating day dosing. She will skip torsemide about 1 time a week. Usually takes at least 1 tab every day. Her lower extremity edema is improved. She does not feel bloated. She has had weight gain since last visit, however weight is attributed to diet. Not having the palpitations like she was in the past.  Continues on vitamin D. Nilsa Beebe  denies chest pain, dyspnea, palpitations, edema, syncope. Home weights: 326 lbs  Fluid intake: stays the same daily     Past Medical History:   has a past medical history of Anesthesia complication, Autoimmune hemolytic anemia ([address] Bipolar disorder ([address] Bundle branch block, Cardiomyopathy ([address] Chronic systolic CHF (congestive heart failure) ([address] Depression, Family history of early CAD, Family history of ovarian cancer, GERD (gastroesophageal reflux disease), Gout, Hypertension, Hypothyroid, Osteoarthritis, knee, Pneumonia, S/P gastric bypass, Unspecified cerebral artery occlusion with cerebral infarction, Uterine cancer ([address] and Vitamin B 12 deficiency.   Surgical History:   has a past surgical history that includes Gastric bypass surgery (1/7/05); Finger surgery; Dilation and curettage of uterus; lipectomy; Cholecystectomy; other surgical history; Upper gastrointestinal endoscopy (4/23/13); Cardiac catheterization (7/10/2015); transesophageal echocardiogram (7/13/2015); other surgical history (4/1/16); pacemaker placement (09/2016); hernia repair (10/20/2016); ERCP; other surgical history; Upper gastrointestinal endoscopy (03/06/2019); Colonoscopy (03/06/2019); Upper gastrointestinal endoscopy (N/A, 3/6/2019); and Colonoscopy (N/A, 3/6/2019). Social History:   reports that she quit smoking about 36 years ago. Her smoking use included cigarettes. She has a 6.00 pack-year smoking history. She has never used smokeless tobacco. She reports that she does not drink alcohol and does not use drugs. Family History:   Family History   Problem Relation Age of Onset    Cancer Mother     Ovarian Cancer Mother 45        Technically peritoneal cancer    Depression Mother         bipolar    Arthritis Father     Rheum Arthritis Father     Arrhythmia Father     Other Father         gout    Heart Disease Father     Depression Brother         depression    Other Brother         gout    Obesity Brother     Other Brother         gout    Obesity Brother     Depression Brother     Cancer Maternal Grandmother         Breast    Breast Cancer Maternal Grandmother        Home Medications:  Prior to Admission medications    Medication Sig Start Date End Date Taking?  Authorizing Provider   CPAP Machine MISC by Does not apply route   Yes Historical Provider, MD   methocarbamol (ROBAXIN) 500 MG tablet TAKE 1 TABLET THREE TIMES DAILY AS NEEDED FOR PAIN  Patient taking differently: TAKE 1 TABLET TWICE TIMES DAILY AS NEEDED FOR PAIN 10/27/21  Yes Jax Krause MD   lisinopril (PRINIVIL;ZESTRIL) 5 MG tablet TAKE 1 TABLET EVERY DAY 10/15/21  Yes Lev Tabares, APRN - CNP   montelukast (SINGULAIR) 10 MG tablet TAKE 1 TABLET EVERY NIGHT 9/21/21  Yes ALICIA Shelley CNP   simvastatin (ZOCOR) 20 MG tablet TAKE 1 TABLET EVERY NIGHT 9/21/21  Yes ALICIA Shelley CNP   lamoTRIgine (LAMICTAL) 150 MG tablet TAKE 1 TABLET TWICE DAILY OR AS OTHERWISE DIRECTED 9/21/21  Yes ALICIA Shelley CNP   vitamin D (ERGOCALCIFEROL) 1.25 MG (80023 UT) CAPS capsule TAKE 1 CAPSULE ONE TIME WEEKLY 9/21/21  Yes ALICIA Shelley CNP   fluticasone (FLONASE) 50 MCG/ACT nasal spray 2 sprays by Each Nostril route daily 8/23/21  Yes Monica Stanford MD   buPROPion (WELLBUTRIN XL) 150 MG extended release tablet Take 1 tablet by mouth every morning 8/23/21  Yes Monica Stanford MD   levothyroxine (SYNTHROID) 100 MCG tablet Take 1 tablet by mouth Daily 7/14/21  Yes Monica Stanford MD   metoprolol succinate (TOPROL XL) 100 MG extended release tablet Take 1 tablet by mouth daily 7/14/21  Yes Monica Stanford MD   allopurinol (ZYLOPRIM) 300 MG tablet TAKE 1 TABLET EVERY DAY 4/30/21  Yes ALICIA Kim CNP   diclofenac sodium (VOLTAREN) 1 % GEL Apply topically 2 times daily 4/15/21  Yes ALICIA Shelely CNP   apixaban (ELIQUIS) 5 MG TABS tablet Take 1 tablet by mouth 2 times daily 4/9/21  Yes ALICIA Garner CNP   torsemide (DEMADEX) 10 MG tablet 2 tabs every other day alternating with 1 tab the other days 3/25/21  Yes ALICIA Garner CNP   acetaminophen (TYLENOL) 325 MG tablet Take 650 mg by mouth every 6 hours as needed for Pain TAKES EVERY DAY   Yes Historical Provider, MD   cetirizine (ZYRTEC) 10 MG tablet Take 10 mg by mouth daily   Yes Historical Provider, MD        Allergies:  Dilaudid [hydromorphone hcl], Lavender oil, Penicillins, Seroquel [quetiapine fumarate], Adhesive tape, Hydromorphone hcl, Lithium, and Tetanus toxoids     Physical Examination:    Vitals:    11/11/21 1329   BP: 116/68   Pulse: 59   SpO2: 100%   Weight: (!) 326 lb (147.9 kg)   Height: 5' 1\" (1.549 m) Constitutional and General Appearance: no apparent distress, obese  HEENT: non-icteric sclera, mask in place  Neck: JVP less than 8 cm H20  Respiratory:  · No use of accessory muscles  · Clear breath sounds throughout, no wheezing, no crackles, no rhonchi  Cardiovascular:   · The apical impulses not displaced  ·  no murmur/rub/gallop  · Regular rate and rhythm, S1,S2 normal  · Radial pulses 2+ and equal bilaterally  · No BLE edema  · Pedal Pulses: 2+ and equal   Abdomen:  · No masses or tenderness  · Liver: No Abnormalities Noted  Musculoskeletal/Skin:  · Exhibits normal gait balance and coordination  · There is no clubbing, cyanosis of the extremities  · Skin is warm and dry  · Moves all extremities well  Neurological/Psychiatric:  · Alert and oriented in all spheres, tangential speech  · No abnormalities of affect, memory, mentation, or behavior are noted    Lab Data:  CBC:   Lab Results   Component Value Date    WBC 7.6 04/01/2021    WBC 5.5 03/14/2021    WBC 6.2 03/13/2021    RBC 4.31 04/01/2021    RBC 4.13 03/14/2021    RBC 4.03 03/13/2021    HGB 12.3 04/01/2021    HGB 12.1 03/14/2021    HGB 11.6 03/13/2021    HCT 38.9 04/01/2021    HCT 37.7 03/14/2021    HCT 36.6 03/13/2021    MCV 90.1 04/01/2021    MCV 91.1 03/14/2021    MCV 90.7 03/13/2021    RDW 15.0 04/01/2021    RDW 15.3 03/14/2021    RDW 15.4 03/13/2021     04/01/2021     03/14/2021     03/13/2021     Iron:   Lab Results   Component Value Date    IRON 64 03/04/2020    TIBC 357 03/04/2020    FERRITIN 25.1 03/04/2020     BMP:   Lab Results   Component Value Date     07/12/2021     05/10/2021     04/09/2021    K 4.8 07/12/2021    K 4.7 05/10/2021    K 4.9 04/09/2021    K 4.4 03/14/2021    K 4.3 03/13/2021    K 4.5 03/11/2021     07/12/2021     05/10/2021     04/09/2021    CO2 24 07/12/2021    CO2 24 05/10/2021    CO2 22 04/09/2021    PHOS 2.8 07/30/2018    PHOS 2.6 07/08/2016    PHOS 2.9 06/04/2012    BUN 20 07/12/2021    BUN 23 05/10/2021    BUN 22 04/09/2021    CREATININE 1.4 07/12/2021    CREATININE 1.2 05/10/2021    CREATININE 1.2 04/09/2021     BNP:   Lab Results   Component Value Date    PROBNP 424 07/12/2021    PROBNP 586 05/10/2021    PROBNP 634 04/09/2021     Lipids:   Lab Results   Component Value Date    CHOL 148 04/01/2021        Lab Results   Component Value Date    TRIG 124 04/01/2021        Lab Results   Component Value Date    HDL 62 (H) 04/01/2021        Lab Results   Component Value Date    LDLCALC 61 04/01/2021        Lab Results   Component Value Date    LABVLDL 25 04/01/2021      No results found for: CHOLHDLRATIO    EF:   Lab Results   Component Value Date    LVEF 38 03/13/2021       Recent Testing:  SANGEETHA: 07/13/15  Dilated cardiomyopathy, EF 20%. There is severe global hypokinesis. Moderate mitral regurgitation. Mild tricuspid regurgitation. Systolic pulmonary artery pressure is estimated at 42 mmHg consistent   with mild pulmonary hypertension.     Cardiac cath: 07/13/15  1.  Angiographically normal coronary arteries. 2.  Severe pulmonary hypertension 66/37 mean 49 mmHg.   3.  Markedly elevated pulmonary capillary wedge pressure 32 mmHg, and left  ventricular end diastolic pressure 36 mmHg.    4.  Transpulmonary gradient is 17 mmHg, which suggested a left-sided as well  intrinsic pulmonary etiology of the severe pulmonary hypertension.    5.  Mildly reduced cardiac index by Giovanni of 2.36 L/min/m2.     RECOMMENDATION:  The patient has no epicardial disease.  Her chest pain is from the heart failure.  She is noted to have moderate to severe mitral  regurgitation on the echocardiogram, and will schedule for SANGEETHA to further  evaluate the mitral regurgitation.  In the meantime, we will also start her    on heart failure therapy that will include a beta-blocker and ACE inhibitor    as well as diuretic therapy.      Echo 10/16/17  Normal size left ventricle with mild concentric left ventricular  hypertrophy. Systolic function appears mildly reduced with an estimated ejection fraction  of 40-45 %. There is hypokinesis of the basal,mid inferoseptal wall, inferior wall and  anteroseptal wall. Elevated left ventricle diastolic filling pressure ( IVRT - 53 /ms ). Compared to last echo on 3/23/2017 the left ventricle systolic function has  improved. Mild thickening of leaflets of mitral valve. Mild mitral regurgitation. A bubble study was performed and shows evidence of right to left shunting  consistent with a patent foramen ovale or atrial septal defect. No  intracardiac mass vegetations or thrombi. Echo 11/1/2019  Summary   The left ventricular systolic function is mildly reduced with an ejection   fraction of 40-45 %. Anteroseptal wall hypokinesis. There is mild concentric left ventricular hypertrophy. Left ventricular cavity size is mildly dilated. Grade I diastolic dysfunction with normal left ventricular filling pressure. Mild posterior mitral annular calcification. Mild thickening of the anterior leaflet of mitral valve. Moderate mitral regurgitation. The left atrium is mildly dilated. Frequent premature beats makes it difficult to identify exact wall motion   abnormality. STress 3/13/21  Summary    Large sized inferior, fixed defect of severe intensity consistent with    infarction in the territory of the RCA. No evidence of myocardium at risk    for significant reversible ischemia.        LV function is severely reduced with and ejection fraction of 34 %.        echo 3/13/21   Summary   Patient tachy during study. The left ventricular systolic function is moderately reduced with an   ejection fraction of 35 - 40 %. There is hypokinesis of the apex, apical lateral, inferoseptum, apical   anterior and anteroseptum walls. Septal bounce noted. Normal left ventricular size with mild concentric left ventricular   hypertrophy.    Left ventricular diastolic filling pressure is elevated lateral E/e\" is 14 . Changes noted from previous echo on 11-1-2019 in left ventricular function. Moderate mitral regurgitation. The right ventricle is mildly enlarged. Right ventricular systolic function is mildly reduced . Systolic pulmonic artery pressure (SPAP) is normal estimated at 26 mmHg (Right atrial pressure of 3 mmHg). The right atrium is mildly dilated. NYHA:   I-II  ACC/ AHA Stage:    C          Pertinent Problems:  - Non-ischemic dilated cardiomyopathy, variable EF, most recent 35 to 40%  - s/p BiV-AICD  - Pulmonary HTN due to CHF & intrinsic pulmonary disease   - LBBB  -PAF on Eliquis  - status post gastric bypass    - Hypertension  - Hyperlipidemia  Severe MARCY    Visit Diagnosis:    1. Chronic systolic heart failure ([address]    2. Chronic systolic congestive heart failure ([address]    3. Biventricular ICD (implantable cardioverter-defibrillator) in place    4. Dilated cardiomyopathy ([address]      Plan:     1. Check labs next month, BMp and BNP   2. No changes to medications- you are doing well on current meds  3. Continue lisinopril   4. Continue Toprol Xl   5. Continue torsemide as you are taking   6. Plan to repeat echo next March   7. Follow up after your echo     I appreciate the opportunity for caring for this patient.      ALICIA Tarango - CNP, CNP, 11/11/2021

## 2021-11-11 ENCOUNTER — NURSE ONLY (OUTPATIENT)
Dept: CARDIOLOGY CLINIC | Age: 61
End: 2021-11-11

## 2021-11-11 ENCOUNTER — OFFICE VISIT (OUTPATIENT)
Dept: CARDIOLOGY CLINIC | Age: 61
End: 2021-11-11
Payer: MEDICARE

## 2021-11-11 VITALS
OXYGEN SATURATION: 100 % | HEIGHT: 61 IN | HEART RATE: 59 BPM | SYSTOLIC BLOOD PRESSURE: 116 MMHG | WEIGHT: 293 LBS | BODY MASS INDEX: 55.32 KG/M2 | DIASTOLIC BLOOD PRESSURE: 68 MMHG

## 2021-11-11 DIAGNOSIS — I42.0 DILATED CARDIOMYOPATHY (HCC): ICD-10-CM

## 2021-11-11 DIAGNOSIS — Z95.810 BIVENTRICULAR ICD (IMPLANTABLE CARDIOVERTER-DEFIBRILLATOR) IN PLACE: ICD-10-CM

## 2021-11-11 DIAGNOSIS — I50.22 CHRONIC SYSTOLIC CONGESTIVE HEART FAILURE (HCC): ICD-10-CM

## 2021-11-11 DIAGNOSIS — I50.22 CHRONIC SYSTOLIC HEART FAILURE (HCC): Primary | ICD-10-CM

## 2021-11-11 PROCEDURE — 99214 OFFICE O/P EST MOD 30 MIN: CPT | Performed by: NURSE PRACTITIONER

## 2021-11-11 PROCEDURE — 3017F COLORECTAL CA SCREEN DOC REV: CPT | Performed by: NURSE PRACTITIONER

## 2021-11-11 PROCEDURE — 1036F TOBACCO NON-USER: CPT | Performed by: NURSE PRACTITIONER

## 2021-11-11 PROCEDURE — G8484 FLU IMMUNIZE NO ADMIN: HCPCS | Performed by: NURSE PRACTITIONER

## 2021-11-11 PROCEDURE — G8427 DOCREV CUR MEDS BY ELIG CLIN: HCPCS | Performed by: NURSE PRACTITIONER

## 2021-11-11 PROCEDURE — G8417 CALC BMI ABV UP PARAM F/U: HCPCS | Performed by: NURSE PRACTITIONER

## 2021-11-11 NOTE — LETTER
[address]   Cardiac Follow-up    Primary Care Doctor:  Rosangela Mccarthy MD    Chief Complaint   Patient presents with    Follow-up    Cardiomyopathy        History of Present Illness:   I had the pleasure of seeing Devendra Parra in follow up for cardiomyopathy. Hx of CHF, non-ischemic dilated cardiomyopathy, S/P  BiV-AICD 2016, pulmonary HTN, LBBB, HTN.     admitted to the hospital 3/11/2021-3/14/2021 with chest pain, MADELEINE, shortness of breath, device check showed NSVT, A. fib with RVR. On 4/1/21 cardioversion was canceled, as her interrogated device showed typical a flutter, and device changes were made and converted to NSR. Since last visit, she was referred to sleep medicine, she was diagnosed with severe sleep apnea. Doing better on cpap; breathing is improved. She feels better overall. Depression is improved. He is undergoing counseling. Taking torsemide alternating day dosing. She will skip torsemide about 1 time a week. Usually takes at least 1 tab every day. Her lower extremity edema is improved. She does not feel bloated. She has had weight gain since last visit, however weight is attributed to diet. Not having the palpitations like she was in the past.  Continues on vitamin D. Devendra Parra  denies chest pain, dyspnea, palpitations, edema, syncope. Home weights: 326 lbs  Fluid intake: stays the same daily     Past Medical History:   has a past medical history of Anesthesia complication, Autoimmune hemolytic anemia ([address] Bipolar disorder ([address] Bundle branch block, Cardiomyopathy ([address] Chronic systolic CHF (congestive heart failure) ([address] Depression, Family history of early CAD, Family history of ovarian cancer, GERD (gastroesophageal reflux disease), Gout, Hypertension, Hypothyroid, Osteoarthritis, knee, Pneumonia, S/P gastric bypass, Unspecified cerebral artery occlusion with cerebral infarction, Uterine cancer ([address] and Vitamin B 12 deficiency.   Surgical History:   has a past surgical history that includes Gastric bypass surgery (1/7/05); Finger surgery; Dilation and curettage of uterus; lipectomy; Cholecystectomy; other surgical history; Upper gastrointestinal endoscopy (4/23/13); Cardiac catheterization (7/10/2015); transesophageal echocardiogram (7/13/2015); other surgical history (4/1/16); pacemaker placement (09/2016); hernia repair (10/20/2016); ERCP; other surgical history; Upper gastrointestinal endoscopy (03/06/2019); Colonoscopy (03/06/2019); Upper gastrointestinal endoscopy (N/A, 3/6/2019); and Colonoscopy (N/A, 3/6/2019). Social History:   reports that she quit smoking about 36 years ago. Her smoking use included cigarettes. She has a 6.00 pack-year smoking history. She has never used smokeless tobacco. She reports that she does not drink alcohol and does not use drugs. Family History:   Family History   Problem Relation Age of Onset    Cancer Mother     Ovarian Cancer Mother 45        Technically peritoneal cancer    Depression Mother         bipolar    Arthritis Father     Rheum Arthritis Father     Arrhythmia Father     Other Father         gout    Heart Disease Father     Depression Brother         depression    Other Brother         gout    Obesity Brother     Other Brother         gout    Obesity Brother     Depression Brother     Cancer Maternal Grandmother         Breast    Breast Cancer Maternal Grandmother        Home Medications:  Prior to Admission medications    Medication Sig Start Date End Date Taking?  Authorizing Provider   CPAP Machine MISC by Does not apply route   Yes Historical Provider, MD   methocarbamol (ROBAXIN) 500 MG tablet TAKE 1 TABLET THREE TIMES DAILY AS NEEDED FOR PAIN  Patient taking differently: TAKE 1 TABLET TWICE TIMES DAILY AS NEEDED FOR PAIN 10/27/21  Yes Joel Borjas MD   lisinopril (PRINIVIL;ZESTRIL) 5 MG tablet TAKE 1 TABLET EVERY DAY 10/15/21  Yes ALICIA Benz CNP   montelizzy (SINGULAIR) 10 MG tablet TAKE 1 TABLET EVERY NIGHT 9/21/21  Yes ALICIA Solis CNP   simvastatin (ZOCOR) 20 MG tablet TAKE 1 TABLET EVERY NIGHT 9/21/21  Yes ALICIA Solis CNP   lamoTRIgine (LAMICTAL) 150 MG tablet TAKE 1 TABLET TWICE DAILY OR AS OTHERWISE DIRECTED 9/21/21  Yes ALICIA Solis CNP   vitamin D (ERGOCALCIFEROL) 1.25 MG (82263 UT) CAPS capsule TAKE 1 CAPSULE ONE TIME WEEKLY 9/21/21  Yes ALICIA Solis CNP   fluticasone (FLONASE) 50 MCG/ACT nasal spray 2 sprays by Each Nostril route daily 8/23/21  Yes Guillermina Patrick MD   buPROPion (WELLBUTRIN XL) 150 MG extended release tablet Take 1 tablet by mouth every morning 8/23/21  Yes Guillermina Patrick MD   levothyroxine (SYNTHROID) 100 MCG tablet Take 1 tablet by mouth Daily 7/14/21  Yes Guillermina Patrick MD   metoprolol succinate (TOPROL XL) 100 MG extended release tablet Take 1 tablet by mouth daily 7/14/21  Yes Guillermina Patrick MD   allopurinol (ZYLOPRIM) 300 MG tablet TAKE 1 TABLET EVERY DAY 4/30/21  Yes ALICIA Sanchez CNP   diclofenac sodium (VOLTAREN) 1 % GEL Apply topically 2 times daily 4/15/21  Yes ALICIA Solis CNP   apixaban (ELIQUIS) 5 MG TABS tablet Take 1 tablet by mouth 2 times daily 4/9/21  Yes ALICIA Cooper CNP   torsemide (DEMADEX) 10 MG tablet 2 tabs every other day alternating with 1 tab the other days 3/25/21  Yes ALICIA Cooper CNP   acetaminophen (TYLENOL) 325 MG tablet Take 650 mg by mouth every 6 hours as needed for Pain TAKES EVERY DAY   Yes Historical Provider, MD   cetirizine (ZYRTEC) 10 MG tablet Take 10 mg by mouth daily   Yes Historical Provider, MD        Allergies:  Dilaudid [hydromorphone hcl], Lavender oil, Penicillins, Seroquel [quetiapine fumarate], Adhesive tape, Hydromorphone hcl, Lithium, and Tetanus toxoids     Physical Examination:    Vitals:    11/11/21 1329   BP: 116/68   Pulse: 59   SpO2: 100%   Weight: (!) 326 lb (147.9 kg)   Height: 5' 1\" (1.549 m) Constitutional and General Appearance: no apparent distress, obese  HEENT: non-icteric sclera, mask in place  Neck: JVP less than 8 cm H20  Respiratory:  · No use of accessory muscles  · Clear breath sounds throughout, no wheezing, no crackles, no rhonchi  Cardiovascular:   · The apical impulses not displaced  ·  no murmur/rub/gallop  · Regular rate and rhythm, S1,S2 normal  · Radial pulses 2+ and equal bilaterally  · No BLE edema  · Pedal Pulses: 2+ and equal   Abdomen:  · No masses or tenderness  · Liver: No Abnormalities Noted  Musculoskeletal/Skin:  · Exhibits normal gait balance and coordination  · There is no clubbing, cyanosis of the extremities  · Skin is warm and dry  · Moves all extremities well  Neurological/Psychiatric:  · Alert and oriented in all spheres, tangential speech  · No abnormalities of affect, memory, mentation, or behavior are noted    Lab Data:  CBC:   Lab Results   Component Value Date    WBC 7.6 04/01/2021    WBC 5.5 03/14/2021    WBC 6.2 03/13/2021    RBC 4.31 04/01/2021    RBC 4.13 03/14/2021    RBC 4.03 03/13/2021    HGB 12.3 04/01/2021    HGB 12.1 03/14/2021    HGB 11.6 03/13/2021    HCT 38.9 04/01/2021    HCT 37.7 03/14/2021    HCT 36.6 03/13/2021    MCV 90.1 04/01/2021    MCV 91.1 03/14/2021    MCV 90.7 03/13/2021    RDW 15.0 04/01/2021    RDW 15.3 03/14/2021    RDW 15.4 03/13/2021     04/01/2021     03/14/2021     03/13/2021     Iron:   Lab Results   Component Value Date    IRON 64 03/04/2020    TIBC 357 03/04/2020    FERRITIN 25.1 03/04/2020     BMP:   Lab Results   Component Value Date     07/12/2021     05/10/2021     04/09/2021    K 4.8 07/12/2021    K 4.7 05/10/2021    K 4.9 04/09/2021    K 4.4 03/14/2021    K 4.3 03/13/2021    K 4.5 03/11/2021     07/12/2021     05/10/2021     04/09/2021    CO2 24 07/12/2021    CO2 24 05/10/2021    CO2 22 04/09/2021    PHOS 2.8 07/30/2018    PHOS 2.6 07/08/2016    PHOS 2.9 06/04/2012    BUN 20 07/12/2021    BUN 23 05/10/2021    BUN 22 04/09/2021    CREATININE 1.4 07/12/2021    CREATININE 1.2 05/10/2021    CREATININE 1.2 04/09/2021     BNP:   Lab Results   Component Value Date    PROBNP 424 07/12/2021    PROBNP 586 05/10/2021    PROBNP 634 04/09/2021     Lipids:   Lab Results   Component Value Date    CHOL 148 04/01/2021        Lab Results   Component Value Date    TRIG 124 04/01/2021        Lab Results   Component Value Date    HDL 62 (H) 04/01/2021        Lab Results   Component Value Date    LDLCALC 61 04/01/2021        Lab Results   Component Value Date    LABVLDL 25 04/01/2021      No results found for: CHOLHDLRATIO    EF:   Lab Results   Component Value Date    LVEF 38 03/13/2021       Recent Testing:  SANGEETHA: 07/13/15  Dilated cardiomyopathy, EF 20%. There is severe global hypokinesis. Moderate mitral regurgitation. Mild tricuspid regurgitation. Systolic pulmonary artery pressure is estimated at 42 mmHg consistent   with mild pulmonary hypertension.     Cardiac cath: 07/13/15  1.  Angiographically normal coronary arteries. 2.  Severe pulmonary hypertension 66/37 mean 49 mmHg.   3.  Markedly elevated pulmonary capillary wedge pressure 32 mmHg, and left  ventricular end diastolic pressure 36 mmHg.    4.  Transpulmonary gradient is 17 mmHg, which suggested a left-sided as well  intrinsic pulmonary etiology of the severe pulmonary hypertension.    5.  Mildly reduced cardiac index by Giovanni of 2.36 L/min/m2.     RECOMMENDATION:  The patient has no epicardial disease.  Her chest pain is from the heart failure.  She is noted to have moderate to severe mitral  regurgitation on the echocardiogram, and will schedule for SANGEETHA to further  evaluate the mitral regurgitation.  In the meantime, we will also start her    on heart failure therapy that will include a beta-blocker and ACE inhibitor    as well as diuretic therapy.      Echo 10/16/17  Normal size left ventricle with mild concentric left ventricular  hypertrophy. Systolic function appears mildly reduced with an estimated ejection fraction  of 40-45 %. There is hypokinesis of the basal,mid inferoseptal wall, inferior wall and  anteroseptal wall. Elevated left ventricle diastolic filling pressure ( IVRT - 53 /ms ). Compared to last echo on 3/23/2017 the left ventricle systolic function has  improved. Mild thickening of leaflets of mitral valve. Mild mitral regurgitation. A bubble study was performed and shows evidence of right to left shunting  consistent with a patent foramen ovale or atrial septal defect. No  intracardiac mass vegetations or thrombi. Echo 11/1/2019  Summary   The left ventricular systolic function is mildly reduced with an ejection   fraction of 40-45 %. Anteroseptal wall hypokinesis. There is mild concentric left ventricular hypertrophy. Left ventricular cavity size is mildly dilated. Grade I diastolic dysfunction with normal left ventricular filling pressure. Mild posterior mitral annular calcification. Mild thickening of the anterior leaflet of mitral valve. Moderate mitral regurgitation. The left atrium is mildly dilated. Frequent premature beats makes it difficult to identify exact wall motion   abnormality. STress 3/13/21  Summary    Large sized inferior, fixed defect of severe intensity consistent with    infarction in the territory of the RCA. No evidence of myocardium at risk    for significant reversible ischemia.        LV function is severely reduced with and ejection fraction of 34 %.        echo 3/13/21   Summary   Patient tachy during study. The left ventricular systolic function is moderately reduced with an   ejection fraction of 35 - 40 %. There is hypokinesis of the apex, apical lateral, inferoseptum, apical   anterior and anteroseptum walls. Septal bounce noted. Normal left ventricular size with mild concentric left ventricular   hypertrophy.    Left ventricular diastolic filling pressure is elevated lateral E/e\" is 14 . Changes noted from previous echo on 11-1-2019 in left ventricular function. Moderate mitral regurgitation. The right ventricle is mildly enlarged. Right ventricular systolic function is mildly reduced . Systolic pulmonic artery pressure (SPAP) is normal estimated at 26 mmHg (Right atrial pressure of 3 mmHg). The right atrium is mildly dilated. NYHA:   I-II  ACC/ AHA Stage:    C          Pertinent Problems:  - Non-ischemic dilated cardiomyopathy, variable EF, most recent 35 to 40%  - s/p BiV-AICD  - Pulmonary HTN due to CHF & intrinsic pulmonary disease   - LBBB  -PAF on Eliquis  - status post gastric bypass    - Hypertension  - Hyperlipidemia  Severe MARCY    Visit Diagnosis:    1. Chronic systolic heart failure ([address]    2. Chronic systolic congestive heart failure ([address]    3. Biventricular ICD (implantable cardioverter-defibrillator) in place    4. Dilated cardiomyopathy ([address]      Plan:     1. Check labs next month, BMp and BNP   2. No changes to medications- you are doing well on current meds  3. Continue lisinopril   4. Continue Toprol Xl   5. Continue torsemide as you are taking   6. Plan to repeat echo next March   7. Follow up after your echo     I appreciate the opportunity for caring for this patient.      ALICIA Gil - CNP, CNP, 11/11/2021

## 2021-11-11 NOTE — PROGRESS NOTES
OV NPRB 11/11. Last interrogation 11/1. [facility] transmission. Transmission shows normal sensing and pacing function. EP physician will review. See interrogation under the cardiology tab in the [address] [place] [address] [address] field for more details. Will continue to monitor remotely. Device Status (Implanted: 29-Jul-2016)  Remaining Longevity 15 months (11-Nov-2021)  Pacing (% of Time Since 04-Oct-2021)  Total * 97.3% (MVP Off)  Thoracic impedance trend stable. Subjective:     Patient ID: Keya Dukes is a 33 y o  female  [facility] Clinical Progress Notes      Specialized Services Documentation  Therapist must complete separate progress note for each specific clinical activity in which the individual participated during the day  Group Psychotherapy [id number] Tx Plan Objective: 1 1,1 2, Therapist:  Minor Kate RN   Efrain Narvaez participated in wellness group focused on how positive affirmations work cognitively and behaviorally to help achieve healthy goals  Handout was shared providing education on how to compose, and practice, an individualized positive affirmation  Margary Narvaez was attentive but did not share her own personal affirmation  She was listening to peer discussion and made mild progress was made toward goals  Continue group to provide education and practice using positive affirmations to change negative to positive cognitions and to strengthen goal motivation and achievement

## 2021-11-18 ENCOUNTER — TELEMEDICINE (OUTPATIENT)
Dept: PSYCHOLOGY | Age: 61
End: 2021-11-18
Payer: MEDICARE

## 2021-11-18 DIAGNOSIS — F43.21 GRIEF: Primary | ICD-10-CM

## 2021-11-18 DIAGNOSIS — F31.9 BIPOLAR AFFECTIVE DISORDER, REMISSION STATUS UNSPECIFIED (HCC): ICD-10-CM

## 2021-11-18 PROCEDURE — 99999 PR OFFICE/OUTPT VISIT,PROCEDURE ONLY: CPT | Performed by: PSYCHOLOGIST

## 2021-11-18 PROCEDURE — 90832 PSYTX W PT 30 MINUTES: CPT | Performed by: PSYCHOLOGIST

## 2021-11-18 NOTE — PROGRESS NOTES
Behavioral Health Consultation  [address] Zita Rader PsyD  Psychologist  11/18/2021   9:35 AM      Time spent with Patient: 30 minutes  This is patient's seventh  [facility] appointment. Reason for Consult:    Chief Complaint   Patient presents with    Depression     Referring Provider: Betty Andrade MD       S:  During the last visit patient Pt set goals to 1) practice diffusion of thoughts and feelings (eg I am not my depression) 2) put postdates up to remind herself so when you are struggling more in the forefront of your mind 3) practice grounding exercises daily    Pt reports she is \"ok. \" Is getting more sleep. Still experiencing depression but is more manageable. Has made some signs to remind self about dffusion and grounding. 19905 has been anxiety producing. Feels task orented rather than experiencing it. Hasn't had any anxiety attacks except when trying to practice 68196 formally. Grief is harder this year than last year. Automatic thinking- has been able to do opposite when value based. Recently lost good friend unexpectedly so has been grieving that.          O:  MSE:    Appearance: good hygiene   Attitude: cooperative and friendly  Consciousness: alert  Orientation: oriented to person, place, time, general circumstance  Memory: recent and remote memory intact  Attention/Concentration: intact during session  Psychomotor Activity:normal  Eye Contact: normal  Speech: normal rate and volume, well-articulated  Mood: \"ok\"  Affect: euthymic , congruent  Perception: within normal limits  Thought Content: within normal limits  Thought Process: logical, coherent and goal-directed  Insight: good  Judgment: intact  Ability to understand instructions: Yes  Ability to respond meaningfully: Yes  Morbid Ideation: no   Suicide Assessment: no suicidal ideation, plan, or intent  Homicidal Ideation: no      History:    Medications:   Current Outpatient Medications   Medication Sig Dispense Refill    CPAP Machine MISC by Does not apply route      methocarbamol (ROBAXIN) 500 MG tablet TAKE 1 TABLET THREE TIMES DAILY AS NEEDED FOR PAIN (Patient taking differently: TAKE 1 TABLET TWICE TIMES DAILY AS NEEDED FOR PAIN) 270 tablet 0    lisinopril (PRINIVIL;ZESTRIL) 5 MG tablet TAKE 1 TABLET EVERY DAY 90 tablet 2    montelukast (SINGULAIR) 10 MG tablet TAKE 1 TABLET EVERY NIGHT 90 tablet 1    simvastatin (ZOCOR) 20 MG tablet TAKE 1 TABLET EVERY NIGHT 90 tablet 1    lamoTRIgine (LAMICTAL) 150 MG tablet TAKE 1 TABLET TWICE DAILY OR AS OTHERWISE DIRECTED 180 tablet 1    vitamin D (ERGOCALCIFEROL) 1.25 MG (47754 UT) CAPS capsule TAKE 1 CAPSULE ONE TIME WEEKLY 8 capsule 0    fluticasone (FLONASE) 50 MCG/ACT nasal spray 2 sprays by Each Nostril route daily 1 Bottle 5    buPROPion (WELLBUTRIN XL) 150 MG extended release tablet Take 1 tablet by mouth every morning 90 tablet 1    levothyroxine (SYNTHROID) 100 MCG tablet Take 1 tablet by mouth Daily 90 tablet 1    metoprolol succinate (TOPROL XL) 100 MG extended release tablet Take 1 tablet by mouth daily 90 tablet 1    allopurinol (ZYLOPRIM) 300 MG tablet TAKE 1 TABLET EVERY DAY 90 tablet 1    diclofenac sodium (VOLTAREN) 1 % GEL Apply topically 2 times daily 150 g 2    apixaban (ELIQUIS) 5 MG TABS tablet Take 1 tablet by mouth 2 times daily 180 tablet 3    torsemide (DEMADEX) 10 MG tablet 2 tabs every other day alternating with 1 tab the other days 180 tablet 3    acetaminophen (TYLENOL) 325 MG tablet Take 650 mg by mouth every 6 hours as needed for Pain TAKES EVERY DAY      cetirizine (ZYRTEC) 10 MG tablet Take 10 mg by mouth daily       No current facility-administered medications for this visit. Social History:   Social History     Socioeconomic History    Marital status:       Spouse name: Not on file    Number of children: Not on file    Years of education: Not on file    Highest education level: Not on file   Occupational History    Not on file   Tobacco Use    Smoking status: Former Smoker     Packs/day: 3.00     Years: 2.00     Pack years: 6.00     Types: Cigarettes     Quit date: 1985     Years since quittin.9    Smokeless tobacco: Never Used   Vaping Use    Vaping Use: Never used   Substance and Sexual Activity    Alcohol use: No    Drug use: No    Sexual activity: Not Currently   Other Topics Concern    Not on file   Social History Narrative    Not on file     Social Determinants of Health     Financial Resource Strain: Low Risk     Difficulty of Paying Living Expenses: Not hard at all   Food Insecurity: No Food Insecurity    Worried About Running Out of Food in the Last Year: Never true    Alexi of Food in the Last Year: Never true   Transportation Needs: No Transportation Needs    Lack of Transportation (Medical): No    Lack of Transportation (Non-Medical): No   Physical Activity:     Days of Exercise per Week: Not on file    Minutes of Exercise per Session: Not on file   Stress:     Feeling of Stress : Not on file   Social Connections:     Frequency of Communication with Friends and Family: Not on file    Frequency of Social Gatherings with Friends and Family: Not on file    Attends Denominational Services: Not on file    Active Member of [address] [place] [address] or Organizations: Not on file    Attends Club or Organization Meetings: Not on file    Marital Status: Not on file   Intimate Partner Violence:     Fear of Current or Ex-Partner: Not on file    Emotionally Abused: Not on file    Physically Abused: Not on file    Sexually Abused: Not on file   Housing Stability:     Unable to Pay for Housing in the Last Year: Not on file    Number of [place] in the Last Year: Not on file    Unstable Housing in the Last Year: Not on file       TOBACCO:   reports that she quit smoking about 36 years ago. Her smoking use included cigarettes. She has a 6.00 pack-year smoking history.  She has never used smokeless tobacco.  ETOH:   reports no history of alcohol use. Family History:   Family History   Problem Relation Age of Onset    Cancer Mother     Ovarian Cancer Mother 45        Technically peritoneal cancer    Depression Mother         bipolar    Arthritis Father     Rheum Arthritis Father     Arrhythmia Father     Other Father         gout    Heart Disease Father     Depression Brother         depression    Other Brother         gout    Obesity Brother     Other Brother         gout    Obesity Brother     Depression Brother     Cancer Maternal Grandmother         Breast    Breast Cancer Maternal Grandmother          A:  Ms. Meagan Capone continues to struggle with anxiety and depression although both have continued to improve. She continues to be active and engaged and responds positively to behavioral interventions. Diagnosis:    1. Grief    2. Bipolar affective disorder, remission status unspecified ([facility]           Plan:  Pt interventions:    [place]-setting to identify pt's primary goals [facility] / overall health, Supportive techniques, reinforced pt's skill use, behavioral activation interventions, ACT interventions, CBT interventions and treatment planning    Pt Behavioral Change Plan:  Pt set goals to 1) continue to practice diffusion of thoughts and feelings 2) Return in about 2 weeks (around 12/2/2021).  to begin working on value based goals

## 2021-11-29 DIAGNOSIS — F33.2 SEVERE EPISODE OF RECURRENT MAJOR DEPRESSIVE DISORDER, WITHOUT PSYCHOTIC FEATURES (HCC): ICD-10-CM

## 2021-11-29 RX ORDER — BUPROPION HYDROCHLORIDE 150 MG/1
TABLET ORAL
Qty: 90 TABLET | Refills: 1 | Status: ON HOLD | OUTPATIENT
Start: 2021-11-29 | End: 2021-12-05 | Stop reason: HOSPADM

## 2021-11-29 NOTE — TELEPHONE ENCOUNTER
.  Refill Request     Last Seen: Last Seen Department: 8/23/2021  Last Seen by PCP: 8/23/2021    Last Written: 8-23-21 90 with 1     Next Appointment:   Future Appointments   Date Time Provider Sarah Najera   12/2/2021  9:30 AM [address] [place] OSIRIS DUFFY [facility]   1/31/2022  8:30 AM SCHEDULE, [address] [facility] Yuli Cousins [facility]   5/2/2022  8:30 AM SCHEDULE, [address] [facility] TRANSMISSION Gene Qualia [facility]   7/14/2022 11:00 AM MD OSIRIS [place]  [place] - DYD   10/31/2022  1:00 PM Jayson Congers, APRN - CNP CLERM PULM [facility]       Future appointment scheduled      Requested Prescriptions     Pending Prescriptions Disp Refills    buPROPion (WELLBUTRIN XL) 150 MG extended release tablet [Pharmacy Med Name: BUPROPION HYDROCHLORIDE ER (XL) 150 MG Tablet Extended Release 24 Hour] 90 tablet 1     Sig: TAKE 1 TABLET EVERY MORNING

## 2021-12-02 ENCOUNTER — HOSPITAL ENCOUNTER (INPATIENT)
Age: 61
LOS: 3 days | Discharge: HOME | DRG: 885 | End: 2021-12-05
Attending: PSYCHIATRY & NEUROLOGY | Admitting: PSYCHIATRY & NEUROLOGY
Payer: MEDICARE

## 2021-12-02 ENCOUNTER — TELEMEDICINE (OUTPATIENT)
Dept: PSYCHOLOGY | Age: 61
End: 2021-12-02
Payer: MEDICARE

## 2021-12-02 DIAGNOSIS — Z00.00 ROUTINE GENERAL MEDICAL EXAMINATION AT A HEALTH CARE FACILITY: ICD-10-CM

## 2021-12-02 DIAGNOSIS — F33.2 SEVERE EPISODE OF RECURRENT MAJOR DEPRESSIVE DISORDER, WITHOUT PSYCHOTIC FEATURES (HCC): Primary | ICD-10-CM

## 2021-12-02 DIAGNOSIS — F43.21 GRIEF: ICD-10-CM

## 2021-12-02 PROBLEM — F31.30 BIPOLAR I DISORDER, MOST RECENT EPISODE (OR CURRENT) DEPRESSED (HCC): Status: ACTIVE | Noted: 2021-12-02

## 2021-12-02 LAB
A/G RATIO: 1.5 (ref 1.1–2.2)
ALBUMIN SERPL-MCNC: 4 G/DL (ref 3.4–5)
ALP BLD-CCNC: 141 U/L (ref 40–129)
ALT SERPL-CCNC: 8 U/L (ref 10–40)
ANION GAP SERPL CALCULATED.3IONS-SCNC: 10 MMOL/L (ref 3–16)
AST SERPL-CCNC: 16 U/L (ref 15–37)
BILIRUB SERPL-MCNC: 0.4 MG/DL (ref 0–1)
BUN BLDV-MCNC: 24 MG/DL (ref 7–20)
CALCIUM SERPL-MCNC: 9.6 MG/DL (ref 8.3–10.6)
CHLORIDE BLD-SCNC: 107 MMOL/L (ref 99–110)
CO2: 25 MMOL/L (ref 21–32)
CREAT SERPL-MCNC: 1.4 MG/DL (ref 0.6–1.2)
GFR AFRICAN AMERICAN: 46
GFR NON-AFRICAN AMERICAN: 38
GLUCOSE BLD-MCNC: 102 MG/DL (ref 70–99)
HCT VFR BLD CALC: 38.8 % (ref 36–48)
HEMOGLOBIN: 12.5 G/DL (ref 12–16)
INFLUENZA A: NOT DETECTED
INFLUENZA B: NOT DETECTED
MCH RBC QN AUTO: 30.6 PG (ref 26–34)
MCHC RBC AUTO-ENTMCNC: 32.1 G/DL (ref 31–36)
MCV RBC AUTO: 95.4 FL (ref 80–100)
PDW BLD-RTO: 16.1 % (ref 12.4–15.4)
PLATELET # BLD: 161 K/UL (ref 135–450)
PMV BLD AUTO: 10.1 FL (ref 5–10.5)
POTASSIUM SERPL-SCNC: 4.6 MMOL/L (ref 3.5–5.1)
RBC # BLD: 4.07 M/UL (ref 4–5.2)
SARS-COV-2 RNA, RT PCR: NOT DETECTED
SODIUM BLD-SCNC: 142 MMOL/L (ref 136–145)
TOTAL PROTEIN: 6.7 G/DL (ref 6.4–8.2)
TSH REFLEX: 14.11 UIU/ML (ref 0.27–4.2)
WBC # BLD: 5.5 K/UL (ref 4–11)

## 2021-12-02 PROCEDURE — 1240000000 HC EMOTIONAL WELLNESS R&B

## 2021-12-02 PROCEDURE — 87636 SARSCOV2 & INF A&B AMP PRB: CPT

## 2021-12-02 PROCEDURE — 6370000000 HC RX 637 (ALT 250 FOR IP): Performed by: PSYCHIATRY & NEUROLOGY

## 2021-12-02 PROCEDURE — 80053 COMPREHEN METABOLIC PANEL: CPT

## 2021-12-02 PROCEDURE — 36415 COLL VENOUS BLD VENIPUNCTURE: CPT

## 2021-12-02 PROCEDURE — 90837 PSYTX W PT 60 MINUTES: CPT | Performed by: PSYCHOLOGIST

## 2021-12-02 PROCEDURE — 84439 ASSAY OF FREE THYROXINE: CPT

## 2021-12-02 PROCEDURE — 99221 1ST HOSP IP/OBS SF/LOW 40: CPT | Performed by: NURSE PRACTITIONER

## 2021-12-02 PROCEDURE — 85027 COMPLETE CBC AUTOMATED: CPT

## 2021-12-02 PROCEDURE — 84443 ASSAY THYROID STIM HORMONE: CPT

## 2021-12-02 PROCEDURE — 6370000000 HC RX 637 (ALT 250 FOR IP): Performed by: NURSE PRACTITIONER

## 2021-12-02 RX ORDER — TRAZODONE HYDROCHLORIDE 50 MG/1
50 TABLET ORAL NIGHTLY PRN
Status: DISCONTINUED | OUTPATIENT
Start: 2021-12-02 | End: 2021-12-05 | Stop reason: HOSPADM

## 2021-12-02 RX ORDER — MONTELUKAST SODIUM 10 MG/1
10 TABLET ORAL NIGHTLY
Status: DISCONTINUED | OUTPATIENT
Start: 2021-12-02 | End: 2021-12-05 | Stop reason: HOSPADM

## 2021-12-02 RX ORDER — LEVOTHYROXINE SODIUM 0.1 MG/1
100 TABLET ORAL DAILY
Status: DISCONTINUED | OUTPATIENT
Start: 2021-12-02 | End: 2021-12-05 | Stop reason: HOSPADM

## 2021-12-02 RX ORDER — HYDROXYZINE PAMOATE 25 MG/1
25 CAPSULE ORAL 3 TIMES DAILY PRN
Status: DISCONTINUED | OUTPATIENT
Start: 2021-12-02 | End: 2021-12-05 | Stop reason: HOSPADM

## 2021-12-02 RX ORDER — FLUTICASONE PROPIONATE 50 MCG
2 SPRAY, SUSPENSION (ML) NASAL DAILY
Status: DISCONTINUED | OUTPATIENT
Start: 2021-12-02 | End: 2021-12-03

## 2021-12-02 RX ORDER — CETIRIZINE HYDROCHLORIDE 10 MG/1
10 TABLET ORAL DAILY
Status: DISCONTINUED | OUTPATIENT
Start: 2021-12-02 | End: 2021-12-05 | Stop reason: HOSPADM

## 2021-12-02 RX ORDER — METHOCARBAMOL 500 MG/1
500 TABLET, FILM COATED ORAL 2 TIMES DAILY PRN
Status: DISCONTINUED | OUTPATIENT
Start: 2021-12-02 | End: 2021-12-05 | Stop reason: HOSPADM

## 2021-12-02 RX ORDER — TORSEMIDE 20 MG/1
20 TABLET ORAL EVERY OTHER DAY
Status: DISCONTINUED | OUTPATIENT
Start: 2021-12-02 | End: 2021-12-03

## 2021-12-02 RX ORDER — TORSEMIDE 20 MG/1
10 TABLET ORAL EVERY OTHER DAY
Status: DISCONTINUED | OUTPATIENT
Start: 2021-12-02 | End: 2021-12-03 | Stop reason: DRUGHIGH

## 2021-12-02 RX ORDER — ACETAMINOPHEN 325 MG/1
650 TABLET ORAL EVERY 4 HOURS PRN
Status: DISCONTINUED | OUTPATIENT
Start: 2021-12-02 | End: 2021-12-05 | Stop reason: HOSPADM

## 2021-12-02 RX ORDER — ALLOPURINOL 300 MG/1
300 TABLET ORAL DAILY
Status: DISCONTINUED | OUTPATIENT
Start: 2021-12-02 | End: 2021-12-05 | Stop reason: HOSPADM

## 2021-12-02 RX ORDER — METOPROLOL SUCCINATE 50 MG/1
100 TABLET, EXTENDED RELEASE ORAL DAILY
Status: DISCONTINUED | OUTPATIENT
Start: 2021-12-02 | End: 2021-12-05 | Stop reason: HOSPADM

## 2021-12-02 RX ORDER — LISINOPRIL 5 MG/1
5 TABLET ORAL DAILY
Status: DISCONTINUED | OUTPATIENT
Start: 2021-12-02 | End: 2021-12-05 | Stop reason: HOSPADM

## 2021-12-02 RX ORDER — ATORVASTATIN CALCIUM 10 MG/1
10 TABLET, FILM COATED ORAL DAILY
Refills: 1 | Status: DISCONTINUED | OUTPATIENT
Start: 2021-12-02 | End: 2021-12-05 | Stop reason: HOSPADM

## 2021-12-02 RX ADMIN — MONTELUKAST SODIUM 10 MG: 10 TABLET, COATED ORAL at 20:50

## 2021-12-02 RX ADMIN — APIXABAN 5 MG: 5 TABLET, FILM COATED ORAL at 20:50

## 2021-12-02 RX ADMIN — METHOCARBAMOL TABLETS 500 MG: 500 TABLET, COATED ORAL at 20:53

## 2021-12-02 RX ADMIN — ACETAMINOPHEN 650 MG: 325 TABLET ORAL at 16:22

## 2021-12-02 RX ADMIN — DICLOFENAC SODIUM 2 G: 10 GEL TOPICAL at 17:12

## 2021-12-02 ASSESSMENT — SLEEP AND FATIGUE QUESTIONNAIRES
SLEEP PATTERN: DIFFICULTY FALLING ASLEEP;DISTURBED/INTERRUPTED SLEEP;RESTLESSNESS
DIFFICULTY STAYING ASLEEP: YES
DO YOU USE A SLEEP AID: NO
RESTFUL SLEEP: NO
DO YOU HAVE DIFFICULTY SLEEPING: YES
AVERAGE NUMBER OF SLEEP HOURS: 9
DO YOU HAVE DIFFICULTY SLEEPING: NO
DO YOU USE A SLEEP AID: NO
DIFFICULTY ARISING: YES
DIFFICULTY FALLING ASLEEP: YES
AVERAGE NUMBER OF SLEEP HOURS: 9

## 2021-12-02 ASSESSMENT — PAIN DESCRIPTION - FREQUENCY: FREQUENCY: CONTINUOUS

## 2021-12-02 ASSESSMENT — LIFESTYLE VARIABLES
HISTORY_ALCOHOL_USE: NO
HISTORY_ALCOHOL_USE: NO

## 2021-12-02 ASSESSMENT — PATIENT HEALTH QUESTIONNAIRE - PHQ9
SUM OF ALL RESPONSES TO PHQ QUESTIONS 1-9: 12
SUM OF ALL RESPONSES TO PHQ QUESTIONS 1-9: 13

## 2021-12-02 ASSESSMENT — PAIN DESCRIPTION - DESCRIPTORS: DESCRIPTORS: ACHING

## 2021-12-02 ASSESSMENT — PAIN DESCRIPTION - LOCATION: LOCATION: KNEE

## 2021-12-02 ASSESSMENT — PAIN DESCRIPTION - PROGRESSION: CLINICAL_PROGRESSION: GRADUALLY WORSENING

## 2021-12-02 ASSESSMENT — PAIN SCALES - GENERAL
PAINLEVEL_OUTOF10: 8
PAINLEVEL_OUTOF10: 4

## 2021-12-02 ASSESSMENT — PAIN DESCRIPTION - PAIN TYPE: TYPE: CHRONIC PAIN

## 2021-12-02 ASSESSMENT — PAIN - FUNCTIONAL ASSESSMENT: PAIN_FUNCTIONAL_ASSESSMENT: ACTIVITIES ARE NOT PREVENTED

## 2021-12-02 ASSESSMENT — PAIN DESCRIPTION - ORIENTATION: ORIENTATION: LEFT

## 2021-12-02 ASSESSMENT — PAIN DESCRIPTION - ONSET: ONSET: ON-GOING

## 2021-12-02 NOTE — PROGRESS NOTES
Behavioral Health Consultation  [address] Zita Rader PsyD  Psychologist  12/2/2021   9:31 AM      Time spent with Patient: 60 minutes  This is patient's eighth  [facility] appointment. Reason for Consult:    Chief Complaint   Patient presents with    Suicidal    Depression              Depression     Referring Provider: Kiarra Yen MD       S:  During the last visit patient Pt set goals to 1) continue to practice diffusion of thoughts and feelings       Pt reports 2 weeks ago had to put cat down, Bootsie. Struggling a lot. Feels tried and weary. Very difficutlt exerience as cat was screaming. Was living for cat. Doesn't feel safe. Has no reason to live. Thought about overdosing on her medication. Hasn't felt this depressed in many years. Doesn't believe she is worth living or deserve to get better. Not sure what is stopping her from harming herself at this point. Thinks maybe some of her stubbornness. Regrets telling this writer her thought. Has a hard time  thoughts and feelings from facts or lies. Doesn't see anything getting better. Current grief triggering al other losses. Lost other cat in June, which was year anniversary of her 's passing. Then lost friend right after that. Feels overwhelmed. Wants to go to the \"dark place\" and just rest. Knows it may not be a good solution to a potentially short term problem, but sees now other way out. Very hopeless.       O:  MSE:    Appearance: good hygiene   Attitude: cooperative and friendly  Consciousness: alert  Orientation: oriented to person, place, time, general circumstance  Memory: recent and remote memory intact  Attention/Concentration: intact during session  Psychomotor Activity:normal  Eye Contact: minimal   Speech: normal rate and volume, well-articulated  Mood: \"depressed\"  Affect: depressed  Perception: within normal limits  Thought Content: within normal limits  Thought Process: logical, coherent and goal-directed  Insight: good  Judgment: intact  Ability to understand instructions: Yes  Ability to respond meaningfully: Yes  Morbid Ideation: no   Suicide Assessment: suicidal with plan to overdose, unable to contract for safety  Homicidal Ideation: no      History:    Medications:   Current Outpatient Medications   Medication Sig Dispense Refill    buPROPion (WELLBUTRIN XL) 150 MG extended release tablet TAKE 1 TABLET EVERY MORNING 90 tablet 1    CPAP Machine MISC by Does not apply route      methocarbamol (ROBAXIN) 500 MG tablet TAKE 1 TABLET THREE TIMES DAILY AS NEEDED FOR PAIN (Patient taking differently: TAKE 1 TABLET TWICE TIMES DAILY AS NEEDED FOR PAIN) 270 tablet 0    lisinopril (PRINIVIL;ZESTRIL) 5 MG tablet TAKE 1 TABLET EVERY DAY 90 tablet 2    montelukast (SINGULAIR) 10 MG tablet TAKE 1 TABLET EVERY NIGHT 90 tablet 1    simvastatin (ZOCOR) 20 MG tablet TAKE 1 TABLET EVERY NIGHT 90 tablet 1    lamoTRIgine (LAMICTAL) 150 MG tablet TAKE 1 TABLET TWICE DAILY OR AS OTHERWISE DIRECTED 180 tablet 1    vitamin D (ERGOCALCIFEROL) 1.25 MG (68447 UT) CAPS capsule TAKE 1 CAPSULE ONE TIME WEEKLY 8 capsule 0    fluticasone (FLONASE) 50 MCG/ACT nasal spray 2 sprays by Each Nostril route daily 1 Bottle 5    levothyroxine (SYNTHROID) 100 MCG tablet Take 1 tablet by mouth Daily 90 tablet 1    metoprolol succinate (TOPROL XL) 100 MG extended release tablet Take 1 tablet by mouth daily 90 tablet 1    allopurinol (ZYLOPRIM) 300 MG tablet TAKE 1 TABLET EVERY DAY 90 tablet 1    diclofenac sodium (VOLTAREN) 1 % GEL Apply topically 2 times daily 150 g 2    apixaban (ELIQUIS) 5 MG TABS tablet Take 1 tablet by mouth 2 times daily 180 tablet 3    torsemide (DEMADEX) 10 MG tablet 2 tabs every other day alternating with 1 tab the other days 180 tablet 3    acetaminophen (TYLENOL) 325 MG tablet Take 650 mg by mouth every 6 hours as needed for Pain TAKES EVERY DAY      cetirizine (ZYRTEC) 10 MG tablet Take 10 mg by mouth daily       No current facility-administered medications for this visit. Social History:   Social History     Socioeconomic History    Marital status:      Spouse name: Not on file    Number of children: Not on file    Years of education: Not on file    Highest education level: Not on file   Occupational History    Not on file   Tobacco Use    Smoking status: Former Smoker     Packs/day: 3.00     Years: 2.00     Pack years: 6.00     Types: Cigarettes     Quit date: 1985     Years since quittin.9    Smokeless tobacco: Never Used   Vaping Use    Vaping Use: Never used   Substance and Sexual Activity    Alcohol use: No    Drug use: No    Sexual activity: Not Currently   Other Topics Concern    Not on file   Social History Narrative    Not on file     Social Determinants of Health     Financial Resource Strain: Low Risk     Difficulty of Paying Living Expenses: Not hard at all   Food Insecurity: No Food Insecurity    Worried About [address] [email or web address] in the Last Year: Never true    [facility] in the Last Year: Never true   Transportation Needs: No Transportation Needs    Lack of Transportation (Medical): No    Lack of Transportation (Non-Medical):  No   Physical Activity:     Days of Exercise per Week: Not on file    Minutes of Exercise per Session: Not on file   Stress:     Feeling of Stress : Not on file   Social Connections:     Frequency of Communication with Friends and Family: Not on file    Frequency of Social Gatherings with Friends and Family: Not on file    Attends Denominational Services: Not on file    Active Member of Clubs or Organizations: Not on file    Attends Club or Organization Meetings: Not on file    Marital Status: Not on file   Intimate Partner Violence:     Fear of Current or Ex-Partner: Not on file    Emotionally Abused: Not on file    Physically Abused: Not on file    Sexually Abused: Not on file   Housing Stability:     Unable to Pay for Housing in the Last Year: Not on file    Number of Places Lived in the Last Year: Not on file    Unstable Housing in the Last Year: Not on file       TOBACCO:   reports that she quit smoking about 36 years ago. Her smoking use included cigarettes. She has a 6.00 pack-year smoking history. She has never used smokeless tobacco.  ETOH:   reports no history of alcohol use. Family History:   Family History   Problem Relation Age of Onset    Cancer Mother     Ovarian Cancer Mother 45        Technically peritoneal cancer    Depression Mother         bipolar    Arthritis Father     Rheum Arthritis Father     Arrhythmia Father     Other Father         gout    Heart Disease Father     Depression Brother         depression    Other Brother         gout    Obesity Brother     Other Brother         gout    Obesity Brother     Depression Brother     Cancer Maternal Grandmother         Breast    Breast Cancer Maternal Grandmother          A:  Ms. Tobin's depression has worsened since the last visit as she lost her cat under very difficult circumstances. Grief has exacerbated her depression signfnicantly. Has been experiencing suicidal ideation with plans to overdose on medications. Is unable to identify reasons not to attempt suicide. Identifies feeling unsafe. Due to pt's risk of self harm and inability to make safety plan, hospitalization was discussed. Pt was initially refusing the need for additional treatment, but after extended discussion was in agreement that she is unsafe and does not trust herself. Agreeable to assessment and potential hospitalization. This writer discussed pt's safety with pt's brother who resides with the patient.       Diagnosis:      Visit Diagnoses       Codes    Severe episode of recurrent major depressive disorder, without psychotic features ([facility] [address]    -  Primary F33.2    Grief     F43.21              Plan:  Pt interventions:    [place]-setting to identify pt's primary goals for [facility] [place] visit / overall health, Supportive techniques, reinforced pt's skill use, safety planning, CBT interventions,   and treatment planning    Pt Behavioral Change Plan  1) Pt's brother will transport pt to [email or web address]  2) return for f/u after [facility]

## 2021-12-02 NOTE — H&P
Hospital Medicine History & Physical      PCP: Varinder Salgado MD    Date of Admission: 12/2/2021    Date of Service: Pt seen/examined on 12/2/2021     Chief Complaint:  Depression, SI    History Of Present Illness: The patient is a 59 y.o. female with Gout, hypertension, hyperlipidemia, h/o CVA, systolic CHF/cardiomyopathy, a-fib/a-flutter, hypothyroidism, h/o uterine cancer, bipolar disorder and depression who presented to [facility] with complaint of Depression and SI. Patient was seen and evaluated in the ED by the ED medical provider, patient was medically cleared for admission to [facility] at [facility]. This note serves as an admission medical H&P.    PCP: Varinder Salgado MD  Tobacco use: former  ETOH use: denies  Illicit drug use: denies    Patient denies any symptoms/complaints.     Past Medical History:        Diagnosis Date    Anesthesia complication     hypotension post op    Autoimmune hemolytic anemia (HCC)     during uterine cancer    Bipolar disorder ([facility] [address]     managed by Jeremiah Blevins)    Bundle branch block     Cardiomyopathy ([facility] [address]     Chronic systolic CHF (congestive heart failure) ([facility] [address] 7/9/2015    Depression     Family history of early CAD     Family history of ovarian cancer     mother    GERD (gastroesophageal reflux disease)     Gout     Hypertension     Hypothyroid     Osteoarthritis, knee     Shybut    Pneumonia     history of pneumonia    S/P gastric bypass 1/7/05    Unspecified cerebral artery occlusion with cerebral infarction     Uterine cancer ([facility] [address]     endometrial adenocarcinoma    Vitamin B 12 deficiency 7/09       Past Surgical History:        Procedure Laterality Date    CARDIAC CATHETERIZATION  7/10/2015    Normal Coronary Arteries    CHOLECYSTECTOMY      COLONOSCOPY  03/06/2019    NL    COLONOSCOPY N/A 3/6/2019    EGD AND COLONOSCOPY WITH ANESTHESIA performed by Leslie Euceda MD at [address] [place] [address] times    ERCP      FINGER SURGERY      gout X 2    GASTRIC BYPASS SURGERY  1/7/05    HERNIA REPAIR  10/20/2016    lap ventral hernia    LIPECTOMY      gangrenous    OTHER SURGICAL HISTORY      radium implants into uterus for uterine surgery X 2    OTHER SURGICAL HISTORY  4/1/16    exp. lap lysis of adhesion    OTHER SURGICAL HISTORY      biventricular pacer and defibrillator    PACEMAKER PLACEMENT  09/2016    TRANSESOPHAGEAL ECHOCARDIOGRAM  7/13/2015    UPPER GASTROINTESTINAL ENDOSCOPY  4/23/13    UPPER GASTROINTESTINAL ENDOSCOPY  03/06/2019    NL    UPPER GASTROINTESTINAL ENDOSCOPY N/A 3/6/2019    EGD AND COLONOSCOPY WITH ANESTHESIA performed by Broderick Krishna MD at [address] [place] [address]       Medications Prior to Admission:    Prior to Admission medications    Medication Sig Start Date End Date Taking?  Authorizing Provider   buPROPion (WELLBUTRIN XL) 150 MG extended release tablet TAKE 1 TABLET EVERY MORNING 11/29/21   Silvia Montero MD   CPAP Machine MISC by Does not apply route    Historical Provider, MD   methocarbamol (ROBAXIN) 500 MG tablet TAKE 1 TABLET THREE TIMES DAILY AS NEEDED FOR PAIN  Patient taking differently: TAKE 1 TABLET TWICE TIMES DAILY AS NEEDED FOR PAIN 10/27/21   Silvia Montero MD   lisinopril (PRINIVIL;ZESTRIL) 5 MG tablet TAKE 1 TABLET EVERY DAY 10/15/21   Arely China, APRN - CNP   montelukast (SINGULAIR) 10 MG tablet TAKE 1 TABLET EVERY NIGHT 9/21/21   Maybelle Brightly, APRN - CNP   simvastatin (ZOCOR) 20 MG tablet TAKE 1 TABLET EVERY NIGHT 9/21/21   Maybelle Brightly, APRN - CNP   lamoTRIgine (LAMICTAL) 150 MG tablet TAKE 1 TABLET TWICE DAILY OR AS OTHERWISE DIRECTED 9/21/21   Maybelle Brightly, APRN - CNP   vitamin D (ERGOCALCIFEROL) 1.25 MG (06229 UT) CAPS capsule TAKE 1 CAPSULE ONE TIME WEEKLY 9/21/21   Maybelle Brightly, APRN - CNP   fluticasone (FLONASE) 50 MCG/ACT nasal spray 2 sprays by Each Nostril route daily 8/23/21   Silvia Montero MD   levothyroxine (SYNTHROID) HENT: Negative for sore throat   Respiratory: Negative  for dyspnea, cough   Cardiovascular: Negative for chest pain   Gastrointestinal: Negative for vomiting, diarrhea   Genitourinary: Negative for dysuria  Musculoskeletal: Negative for arthralgias   Skin: Negative for rash   Neurological: Negative for syncope   Psychiatric/Behavorial: per psychiatric evaluation    PHYSICAL EXAM:    There were no vitals taken for this visit. Gen: No distress. Alert. Eyes: PERRL. No sclera icterus. No conjunctival injection. ENT: No discharge. Pharynx clear. Neck:  Trachea midline. Resp: No accessory muscle use. No crackles. No wheezes. No rhonchi. CV: Regular rate. Regular rhythm. No murmur. No rub. No edema. GI: Non-tender. Non-distended. Normal bowel sounds. Skin: Warm and dry. No nodule on exposed extremities. No rash on exposed extremities. M/S: No cyanosis. No joint deformity. No clubbing. Neuro: Awake. No focal neurologic deficit on exam.  Cranial nerves II through XII intact. Patient is able to ambulate without difficulty. Psych: Per psychiatry team evaluation       CBC: No results for input(s): WBC, HGB, HCT, MCV, PLT in the last 72 hours. BMP: No results for input(s): NA, K, CL, CO2, PHOS, BUN, CREATININE in the last 72 hours. Invalid input(s): CA  LIVER PROFILE: No results for input(s): AST, ALT, LIPASE, BILIDIR, BILITOT, ALKPHOS in the last 72 hours. Invalid input(s): AMYLASE,  ALB  PT/INR: No results for input(s): PROTIME, INR in the last 72 hours. APTT: No results for input(s): APTT in the last 72 hours. UA:No results for input(s): NITRITE, COLORU, PHUR, LABCAST, WBCUA, RBCUA, MUCUS, TRICHOMONAS, YEAST, BACTERIA, CLARITYU, SPECGRAV, LEUKOCYTESUR, UROBILINOGEN, BILIRUBINUR, BLOODU, GLUCOSEU, AMORPHOUS in the last 72 hours. Invalid input(s): KETONESU         CARDIAC ENZYMES  No results for input(s): CKTOTAL, CKMB, CKMBINDEX, TROPONINI in the last 72 hours.     U/A:    Lab Results Component Value Date    NITRITE negative 10/08/2021    COLORU yellow 10/08/2021    COLORU Yellow 03/14/2021    WBCUA >100 03/11/2021    RBCUA 21-50 03/11/2021    MUCUS Rare 03/30/2016    BACTERIA 3+ 07/05/2016    CLARITYU clear 10/08/2021    CLARITYU Clear 03/14/2021    SPECGRAV 1.010 10/08/2021    SPECGRAV 1.015 03/14/2021    LEUKOCYTESUR small 10/08/2021    LEUKOCYTESUR Negative 03/14/2021    BLOODU negative 10/08/2021    BLOODU Negative 03/14/2021    GLUCOSEU negative 10/08/2021    GLUCOSEU Negative 03/14/2021    GLUCOSEU NEGATIVE 06/04/2012       CULTURES  COVID/Influenza: not detected    Echo 3/13/21   Summary   Patient tachy during study. The left ventricular systolic function is moderately reduced with an   ejection fraction of 35 - 40 %. There is hypokinesis of the apex, apical lateral, inferoseptum, apical   anterior and anteroseptum walls. Septal bounce noted. Normal left ventricular size with mild concentric left ventricular   hypertrophy. Left ventricular diastolic filling pressure is elevated lateral E/e\" is 14 . Changes noted from previous echo on 11-1-2019 in left ventricular function. Moderate mitral regurgitation. The right ventricle is mildly enlarged. Right ventricular systolic function is mildly reduced . Systolic pulmonic artery pressure (SPAP) is normal estimated at 26 mmHg   (Right atrial pressure of 3 mmHg). The right atrium is mildly dilated. ASSESSMENT/PLAN:  Chronic systolic CHF  Cardiomyopathy  - stable. No s/s decompensation  - continue Demadex 10 mg alternating with 20 mg.   - continue Toprol-XL, Lisinopril. H/o atrial tachycardia  H/o Ventricular tachcyardia  - has AICD  - continue Toprol-XL  - AC: Eliquis    Hypertension  - BP stable. Continue Toprol-XL, Lisinopril. Gout  - stable. No acute flare  - on Allopurinol. Hyperlipidemia  - on Atorvastatin.      Hypothyroidism  - home dose of synthroid 100 mcg     H/o Uterine cancer  - s/p radium implants. H/o CVA  - on statin, Eliquis. Allergic rhinitis  - continue Singulair, Cetirizine. Bipolar disorder  Depression, SI  - management per psychiatry. Morbid Obesity  - BMI 61  - Complicating assessment and treatment. Placing patient at risk for multiple co-morbidities as well as early death and contributing to the patient's presentation.   - Counseled on weight loss. Pt has no medical complaints at this time. They were informed that should a medical concern arise during their admission they may have [facility] contact us.     Blanca TRAVISP-C  12/2/2021 12:50 PM
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
## 2021-12-02 NOTE — PROGRESS NOTES
[facility]  Admission Note     Admission Type:   Admission Type: Voluntary    Reason for admission:  Reason for Admission: Suicidal Ideation    PATIENT STRENGTHS:  Strengths: Communication, Social Skills, Medication Compliance    Patient Strengths and Limitations:  Limitations: Hopeless about future, General negative or hopeless attitude about future/recovery    Addictive Behavior:   Addictive Behavior  In the past 3 months, have you felt or has someone told you that you have a problem with:  : None  Do you have a history of Chemical Use?: No  Do you have a history of Alcohol Use?: No  Do you have a history of Street Drug Abuse?: No  Histroy of Prescripton Drug Abuse?: No    Medical Problems:   Past Medical History:   Diagnosis Date    Anesthesia complication     hypotension post op    Autoimmune hemolytic anemia (HCC)     during uterine cancer    Bipolar disorder ([facility]     managed by [facility])    Bundle branch block     Cardiomyopathy ([facility]     Chronic systolic CHF (congestive heart failure) ([facility] 7/9/2015    Depression     Family history of early CAD     Family history of ovarian cancer     mother    GERD (gastroesophageal reflux disease)     Gout     Hypertension     Hypothyroid     Osteoarthritis, knee     Shybut    Pneumonia     history of pneumonia    S/P gastric bypass 1/7/05    Unspecified cerebral artery occlusion with cerebral infarction     Uterine cancer ([facility]     endometrial adenocarcinoma    Vitamin B 12 deficiency 7/09       Status EXAM:  Status and Exam  Normal: No  Facial Expression: Worried  Affect: Appropriate  Level of Consciousness: Alert  Mood:Normal: No  Mood: Depressed, Anxious, Worthless, low self-esteem, Anhedonia  Motor Activity:Normal: No (psychomotor retardation)  Motor Activity: Decreased  Interview Behavior: Cooperative  Preception: Artie to Person, Artie to Time, Artie to Place, Artie to Situation  Attention:Normal: Yes  Attention:  (NA)  Thought Processes: Circumstantial  Thought Content:Normal: Yes  Thought Content:  (WNL)  Hallucinations: None  Delusions: No  Memory:Normal: Yes  Insight and Judgment: No (poor)  Insight and Judgment: Poor Judgment, Poor Insight  Present Suicidal Ideation: Yes (contracts for safety on the unit)  Present Homicidal Ideation: No    Tobacco Screening:  Practical Counseling, on admission, tiffani X, if applicable and completed (first 3 are required if patient doesn't refuse):            ( )  Recognizing danger situations (included triggers and roadblocks)                    ( )  Coping skills (new ways to manage stress, exercise, relaxation techniques, changing routine, distraction)                                                           ( )  Basic information about quitting (benefits of quitting, techniques in how to quit, available resources  ( ) Referral for counseling faxed to [place]                                           ( ) Patient refused counseling  (x ) Patient has not smoked in the last 30 days    Metabolic Screening:    Lab Results   Component Value Date    LABA1C 5.3 10/07/2019       Lab Results   Component Value Date    CHOL 148 04/01/2021    CHOL 143 10/07/2019    CHOL 135 07/26/2018    CHOL 122 01/25/2018    CHOL 150 10/15/2017    CHOL 154 07/29/2016    CHOL 118 07/07/2016    CHOL 192 10/22/2015    CHOL 130 07/10/2015    CHOL 164 08/29/2013    CHOL 151 07/05/2011    CHOL 164 06/28/2011     Lab Results   Component Value Date    TRIG 124 04/01/2021    TRIG 64 10/07/2019    TRIG 89 07/26/2018    TRIG 72 01/25/2018    TRIG 65 10/15/2017    TRIG 71 07/29/2016    TRIG 86 07/07/2016    TRIG 74 10/22/2015    TRIG 56 07/10/2015    TRIG 65 08/29/2013    TRIG 94 07/05/2011    TRIG 129 06/28/2011     Lab Results   Component Value Date    HDL 62 (H) 04/01/2021    HDL 90 (H) 10/07/2019    HDL 77 (H) 07/26/2018    HDL 69 (H) 01/25/2018    HDL 64 (H) 10/15/2017    HDL 77 (H) 07/29/2016    HDL 49 07/07/2016    HDL 95 (H) 10/22/2015    HDL 64 (H) 07/10/2015    HDL 73 (H) 08/29/2013    HDL 65 (H) 07/05/2011    HDL 61 (H) 06/28/2011     No components found for: [facility]  Lab Results   Component Value Date    LABVLDL 25 04/01/2021    LABVLDL 13 10/07/2019    LABVLDL 18 07/26/2018    LABVLDL 14 01/25/2018    LABVLDL 13 10/15/2017    LABVLDL 14 07/29/2016    LABVLDL 17 07/07/2016    LABVLDL 15 10/22/2015    LABVLDL 11 07/10/2015    LABVLDL 13 08/29/2013    LABVLDL 19 07/05/2011    LABVLDL 26 06/28/2011         Body mass index is 60.84 kg/m². BP Readings from Last 2 Encounters:   12/02/21 115/65   11/11/21 116/68           Pt admitted with followings belongings:  Dentures: None  Vision - Corrective Lenses: Glasses  Hearing Aid: None  Jewelry: Ring (2 rings pt wearing and 1 pair of eaarings)  Body Piercings Removed: N/A  Clothing: Pants, Shirt, Socks, Footwear  Were All Patient Medications Collected?: Not Applicable  Other Valuables: Cell phone (cell phone in safe)     Patient's home medications were verified. Patient oriented to surroundings and program expectations and copy of patient rights given. Received admission packet:   Consents reviewed, signed. Patient verbalize understanding:  Patient education on precautions:     Patient arrived to unit with outpatient staff member. She was calm and cooperative with admission process.               Braden Rodriguez RN

## 2021-12-03 ENCOUNTER — TELEPHONE (OUTPATIENT)
Dept: FAMILY MEDICINE CLINIC | Age: 61
End: 2021-12-03

## 2021-12-03 LAB
A/G RATIO: 1.5 (ref 1.1–2.2)
ALBUMIN SERPL-MCNC: 4.4 G/DL (ref 3.4–5)
ALP BLD-CCNC: 152 U/L (ref 40–129)
ALT SERPL-CCNC: 9 U/L (ref 10–40)
ANION GAP SERPL CALCULATED.3IONS-SCNC: 14 MMOL/L (ref 3–16)
AST SERPL-CCNC: 16 U/L (ref 15–37)
BILIRUB SERPL-MCNC: 0.5 MG/DL (ref 0–1)
BUN BLDV-MCNC: 22 MG/DL (ref 7–20)
CALCIUM SERPL-MCNC: 9.7 MG/DL (ref 8.3–10.6)
CHLORIDE BLD-SCNC: 102 MMOL/L (ref 99–110)
CO2: 21 MMOL/L (ref 21–32)
CREAT SERPL-MCNC: 1.4 MG/DL (ref 0.6–1.2)
GFR AFRICAN AMERICAN: 46
GFR NON-AFRICAN AMERICAN: 38
GLUCOSE BLD-MCNC: 150 MG/DL (ref 70–99)
POTASSIUM SERPL-SCNC: 4.4 MMOL/L (ref 3.5–5.1)
SODIUM BLD-SCNC: 137 MMOL/L (ref 136–145)
T4 FREE: 1.1 NG/DL (ref 0.9–1.8)
TOTAL PROTEIN: 7.3 G/DL (ref 6.4–8.2)

## 2021-12-03 PROCEDURE — 6370000000 HC RX 637 (ALT 250 FOR IP): Performed by: PSYCHIATRY & NEUROLOGY

## 2021-12-03 PROCEDURE — 6370000000 HC RX 637 (ALT 250 FOR IP): Performed by: NURSE PRACTITIONER

## 2021-12-03 PROCEDURE — 99223 1ST HOSP IP/OBS HIGH 75: CPT | Performed by: PSYCHIATRY & NEUROLOGY

## 2021-12-03 PROCEDURE — 1240000000 HC EMOTIONAL WELLNESS R&B

## 2021-12-03 PROCEDURE — 36415 COLL VENOUS BLD VENIPUNCTURE: CPT

## 2021-12-03 PROCEDURE — 80053 COMPREHEN METABOLIC PANEL: CPT

## 2021-12-03 RX ORDER — TORSEMIDE 20 MG/1
10 TABLET ORAL EVERY OTHER DAY
Status: DISCONTINUED | OUTPATIENT
Start: 2021-12-04 | End: 2021-12-05 | Stop reason: HOSPADM

## 2021-12-03 RX ORDER — TORSEMIDE 20 MG/1
20 TABLET ORAL EVERY OTHER DAY
Status: DISCONTINUED | OUTPATIENT
Start: 2021-12-03 | End: 2021-12-05 | Stop reason: HOSPADM

## 2021-12-03 RX ORDER — FLUTICASONE PROPIONATE 50 MCG
2 SPRAY, SUSPENSION (ML) NASAL NIGHTLY
Status: DISCONTINUED | OUTPATIENT
Start: 2021-12-03 | End: 2021-12-05 | Stop reason: HOSPADM

## 2021-12-03 RX ORDER — BUPROPION HYDROCHLORIDE 150 MG/1
150 TABLET ORAL DAILY
Status: DISCONTINUED | OUTPATIENT
Start: 2021-12-03 | End: 2021-12-03

## 2021-12-03 RX ADMIN — METHOCARBAMOL TABLETS 500 MG: 500 TABLET, COATED ORAL at 14:19

## 2021-12-03 RX ADMIN — ALLOPURINOL 300 MG: 300 TABLET ORAL at 09:34

## 2021-12-03 RX ADMIN — MONTELUKAST SODIUM 10 MG: 10 TABLET, COATED ORAL at 20:51

## 2021-12-03 RX ADMIN — ATORVASTATIN CALCIUM 10 MG: 10 TABLET, FILM COATED ORAL at 09:35

## 2021-12-03 RX ADMIN — BUPROPION HYDROCHLORIDE 150 MG: 150 TABLET, EXTENDED RELEASE ORAL at 09:34

## 2021-12-03 RX ADMIN — DICLOFENAC SODIUM 2 G: 10 GEL TOPICAL at 09:46

## 2021-12-03 RX ADMIN — LAMOTRIGINE 150 MG: 100 TABLET ORAL at 20:50

## 2021-12-03 RX ADMIN — DICLOFENAC SODIUM 2 G: 10 GEL TOPICAL at 20:57

## 2021-12-03 RX ADMIN — CETIRIZINE HYDROCHLORIDE 10 MG: 10 TABLET ORAL at 09:35

## 2021-12-03 RX ADMIN — LURASIDONE HYDROCHLORIDE 20 MG: 40 TABLET, FILM COATED ORAL at 14:19

## 2021-12-03 RX ADMIN — LEVOTHYROXINE SODIUM 100 MCG: 100 TABLET ORAL at 05:26

## 2021-12-03 RX ADMIN — LAMOTRIGINE 150 MG: 100 TABLET ORAL at 09:34

## 2021-12-03 RX ADMIN — ACETAMINOPHEN 650 MG: 325 TABLET ORAL at 05:26

## 2021-12-03 RX ADMIN — METOPROLOL SUCCINATE 100 MG: 50 TABLET, EXTENDED RELEASE ORAL at 09:36

## 2021-12-03 RX ADMIN — APIXABAN 5 MG: 5 TABLET, FILM COATED ORAL at 09:35

## 2021-12-03 RX ADMIN — TORSEMIDE 20 MG: 20 TABLET ORAL at 09:47

## 2021-12-03 RX ADMIN — APIXABAN 5 MG: 5 TABLET, FILM COATED ORAL at 20:51

## 2021-12-03 RX ADMIN — LISINOPRIL 5 MG: 5 TABLET ORAL at 09:35

## 2021-12-03 RX ADMIN — METHOCARBAMOL TABLETS 500 MG: 500 TABLET, COATED ORAL at 20:57

## 2021-12-03 ASSESSMENT — PAIN SCALES - GENERAL: PAINLEVEL_OUTOF10: 7

## 2021-12-03 NOTE — TELEPHONE ENCOUNTER
Ashlie Chua from [facility] calling requesting to set up a hospital follow up for patient scheduled to discharge next week.  Her number is [phone]

## 2021-12-03 NOTE — PROGRESS NOTES
Purposeful Rounding    Patient Location: Patient room    Patient willing to engage in conversation: No patient currently sleeping. Presentation/behavior: Other Sleeping*    Affect: Neutral/Euthymic(normal)    Concerns reported: No concerns reported at this time. PRN medications given: No PRN medications administered at this time.     Environmental assessment: Room free from clutter, Clear path to bathroom  and Adequate lighting    Fall prevention interventions in place: Lighting appropriate, Room free of clutter and Clear path to bathroom    Daily Marcell Fall Risk Score: 57    Daily Kimble Fall Risk Score: 15      Electronically signed by Israel Rodriguez RN on 12/3/21 at 12:02 AM EST

## 2021-12-03 NOTE — PLAN OF CARE
Problem: Depressive Behavior With or Without Suicide Precautions:  Goal: Able to verbalize and/or display a decrease in depressive symptoms  Description: Able to verbalize and/or display a decrease in depressive symptoms  Outcome: Ongoing     Problem: Depressive Behavior With or Without Suicide Precautions:  Goal: Ability to disclose and discuss suicidal ideas will improve  Description: Ability to disclose and discuss suicidal ideas will improve  Outcome: Ongoing   Patient has been absent for self harm. Patient appears brighter and is social with peers on milieu. Patient was started on latuda today and expresses feeling hopeful about medication. Patient also currently denies SI/HI/AVH. Will continue to monitor for safety.

## 2021-12-03 NOTE — GROUP NOTE
Group Therapy Note    Date: 12/3/2021    Group Start Time: 1000  Group End Time: 3497  Group Topic: Psychoeducation    [address] [facility], [facility]        Group Therapy Note    Topic: Stress & Anxiety - The Body's Response, Fight or Flight Responses    Education: Deep Breathing, Excerpting built-up energy    Attendees: 8         Notes:  Pt present at introduction of group session, taken out of group to meet with psychiatrist shortly after beginning group process. Pt did not return across remainder of session.     Status After Intervention:  Improved    Participation Level: Minimal    Participation Quality: Appropriate, Sharing and Supportive      Speech:  normal      Thought Process/Content: Logical      Affective Functioning: Congruent      Mood: depressed      Level of consciousness:  Alert and Attentive      Response to Learning: Progressing to goal      Endings: None Reported    Modes of Intervention: Education, Socialization, Exploration, Clarifying and Media      Discipline Responsible: Psychoeducational Specialist      Signature:  Allyson Young MM, MT-BC

## 2021-12-03 NOTE — PLAN OF CARE
Problem: Suicide risk  Goal: Provide patient with safe environment  Description: Provide patient with safe environment  Outcome: Ongoing     Problem: Depressive Behavior With or Without Suicide Precautions:  Goal: Ability to disclose and discuss suicidal ideas will improve  Description: Ability to disclose and discuss suicidal ideas will improve  Outcome: Ongoing     Problem: Depressive Behavior With or Without Suicide Precautions:  Goal: Absence of self-harm  Description: Absence of self-harm  Outcome: Ongoing     Patient provided with safe environment. Patient denies suicidal ideations. Patient contracts for safety, and remains free from self-harm at this time.
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
## 2021-12-03 NOTE — GROUP NOTE
Group Therapy Note    Date: 12/2/2021    Group Start Time: 2000  Group End Time: 2025  Group Topic: Wrap-Up    [facility]        Group Therapy Note    Attendees: Goals and importance of goal setting discussed. Night time milieu activities discussed. Patient's Goal:  Get here to get help    Notes:  Successful     Status After Intervention:  Improved    Participation Level:  Active Listener and Interactive    Participation Quality: Appropriate and Attentive      Speech:  normal      Thought Process/Content: Logical  Linear      Affective Functioning: Congruent      Mood: euthymic      Level of consciousness:  Alert and Oriented x4      Response to Learning: Progressing to goal      Endings: None Reported    Modes of Intervention: Support      Discipline Responsible: Behavorial Health Tech      Signature:  Akshat Eng

## 2021-12-03 NOTE — PROGRESS NOTES
Behavioral Services  Medicare Certification Upon Admission    I certify that this patient's inpatient psychiatric hospital admission is medically necessary for:    [x] (1) Treatment which could reasonably be expected to improve this patient's condition,       [x] (2) Or for diagnostic study;     AND     [x](2) The inpatient psychiatric services are provided while the individual is under the care of a physician and are included in the individualized plan of care.     Estimated length of stay/service 5-8 days    Plan for post-hospital care incomplete     Electronically signed by Mira Gonzalez MD on 12/3/2021 at 12:41 PM

## 2021-12-04 PROCEDURE — 1240000000 HC EMOTIONAL WELLNESS R&B

## 2021-12-04 PROCEDURE — 6370000000 HC RX 637 (ALT 250 FOR IP): Performed by: NURSE PRACTITIONER

## 2021-12-04 PROCEDURE — 99233 SBSQ HOSP IP/OBS HIGH 50: CPT | Performed by: PSYCHIATRY & NEUROLOGY

## 2021-12-04 PROCEDURE — 6370000000 HC RX 637 (ALT 250 FOR IP): Performed by: PSYCHIATRY & NEUROLOGY

## 2021-12-04 RX ADMIN — HYDROXYZINE PAMOATE 25 MG: 25 CAPSULE ORAL at 20:59

## 2021-12-04 RX ADMIN — APIXABAN 5 MG: 5 TABLET, FILM COATED ORAL at 08:57

## 2021-12-04 RX ADMIN — CETIRIZINE HYDROCHLORIDE 10 MG: 10 TABLET ORAL at 08:56

## 2021-12-04 RX ADMIN — DICLOFENAC SODIUM 2 G: 10 GEL TOPICAL at 08:48

## 2021-12-04 RX ADMIN — APIXABAN 5 MG: 5 TABLET, FILM COATED ORAL at 20:52

## 2021-12-04 RX ADMIN — TORSEMIDE 10 MG: 20 TABLET ORAL at 08:52

## 2021-12-04 RX ADMIN — LEVOTHYROXINE SODIUM 100 MCG: 100 TABLET ORAL at 06:19

## 2021-12-04 RX ADMIN — MONTELUKAST SODIUM 10 MG: 10 TABLET, COATED ORAL at 20:52

## 2021-12-04 RX ADMIN — ACETAMINOPHEN 650 MG: 325 TABLET ORAL at 12:11

## 2021-12-04 RX ADMIN — ACETAMINOPHEN 650 MG: 325 TABLET ORAL at 20:58

## 2021-12-04 RX ADMIN — METHOCARBAMOL TABLETS 500 MG: 500 TABLET, COATED ORAL at 19:00

## 2021-12-04 RX ADMIN — METOPROLOL SUCCINATE 100 MG: 50 TABLET, EXTENDED RELEASE ORAL at 16:46

## 2021-12-04 RX ADMIN — LAMOTRIGINE 150 MG: 100 TABLET ORAL at 20:52

## 2021-12-04 RX ADMIN — LURASIDONE HYDROCHLORIDE 20 MG: 40 TABLET, FILM COATED ORAL at 14:16

## 2021-12-04 RX ADMIN — LURASIDONE HYDROCHLORIDE 20 MG: 40 TABLET, FILM COATED ORAL at 08:51

## 2021-12-04 RX ADMIN — LAMOTRIGINE 150 MG: 100 TABLET ORAL at 08:57

## 2021-12-04 RX ADMIN — ALLOPURINOL 300 MG: 300 TABLET ORAL at 08:57

## 2021-12-04 RX ADMIN — HYDROXYZINE PAMOATE 25 MG: 25 CAPSULE ORAL at 00:22

## 2021-12-04 RX ADMIN — ATORVASTATIN CALCIUM 10 MG: 10 TABLET, FILM COATED ORAL at 08:57

## 2021-12-04 RX ADMIN — ACETAMINOPHEN 650 MG: 325 TABLET ORAL at 00:21

## 2021-12-04 RX ADMIN — LISINOPRIL 5 MG: 5 TABLET ORAL at 08:57

## 2021-12-04 RX ADMIN — DICLOFENAC SODIUM 2 G: 10 GEL TOPICAL at 19:00

## 2021-12-04 ASSESSMENT — PAIN SCALES - GENERAL
PAINLEVEL_OUTOF10: 6
PAINLEVEL_OUTOF10: 7
PAINLEVEL_OUTOF10: 4

## 2021-12-04 NOTE — PLAN OF CARE
Problem: Depressive Behavior With or Without Suicide Precautions:  Goal: Able to verbalize and/or display a decrease in depressive symptoms  Description: Able to verbalize and/or display a decrease in depressive symptoms  12/3/2021 2230 by Kt Foss RN  Outcome: Ongoing     Problem: Depressive Behavior With or Without Suicide Precautions:  Goal: Ability to disclose and discuss suicidal ideas will improve  Description: Ability to disclose and discuss suicidal ideas will improve  12/3/2021 2230 by Kt Foss RN  Outcome: Ongoing   Jonas Branham has been out in the day room and social with select peers this shift. Patient denies current SI and CFS. Patient denies HI, denies A/V/H. Patient rates her anxiety a 4/10 and rates her depression 6/10. Patient reports she is hopeful the new medication (latuda) will help with her depression. Medicated with PRN Robaxin for c/o  muscle spasms. Medication effective.

## 2021-12-04 NOTE — PROGRESS NOTES
Department of Psychiatry  Progress Note    Patient's chart was reviewed. Discussed with treatment team. Met with patient. SUBJECTIVE:      \"I'm feeling much better. \"    Groups have been helpful; says she's had a couple of epiphanies here. \"I need to concentrate on myself, I can't fix others. \"     Glorine Gouty well so far. Agrees to increased dose. Says SI has resolved. Is future oriented.      ROS:   Patient has new complaints: no  Sleeping adequately:  Yes   Appetite adequate: Yes  Engaged in programming: Yes    OBJECTIVE:  VITALS:  /65   Pulse 65   Temp 97.7 °F (36.5 °C) (Temporal)   Resp 16   Ht 5' 1\" (1.549 m)   Wt (!) 322 lb (146.1 kg)   SpO2 95%   BMI 60.84 kg/m²     Mental Status Examination:    Appearance: good grooming and hygiene  Behavior/Attitude toward examiner:  cooperative, attentive and good eye contact  Speech: Normal rate, volume, amount  Mood:  \"better\"  Affect:  mood congruent   Thought processes:  Goal directed, linear, no BERTHA or gross disorganization  Thought Content: no SI, no HI, no delusions voiced, no obsessions  Perceptions: no AVH  Attention: attention span and concentration were intact to interview   Abstraction: intact  Cognition:  Alert and oriented to person, place, time, and situation, recall intact  Insight: intact  Judgment: improving      Medication:  Scheduled:   lamoTRIgine  150 mg Oral BID    fluticasone  2 spray Each Nostril Nightly    torsemide  20 mg Oral Every Other Day    torsemide  10 mg Oral Every Other Day    lurasidone  20 mg Oral Daily    allopurinol  300 mg Oral Daily    apixaban  5 mg Oral BID    cetirizine  10 mg Oral Daily    diclofenac sodium  2 g Topical BID    levothyroxine  100 mcg Oral Daily    lisinopril  5 mg Oral Daily    metoprolol succinate  100 mg Oral Daily    montelukast  10 mg Oral Nightly    atorvastatin  10 mg Oral Daily    influenza virus vaccine  0.5 mL IntraMUSCular Prior to discharge PRN:  acetaminophen, hydrOXYzine, traZODone, methocarbamol     FORMULATION:  This is a domiciled, , disabled 27-year-old with a  history of bipolar 1 disorder, who was directly admitted through her  primary care office with worsening symptoms of depression and thoughts  of suicide. The patient presents with bipolar depression. Given the  severity of her symptoms, thoughts of suicide, and past suicide  attempts, she requires inpatient stabilization and treatment. Principal Problem:    Bipolar I disorder, most recent episode (or current) depressed ([address]  Active Problems:    Hypertension, essential    Chronic systolic CHF (congestive heart failure) (HCC)    Hyperlipidemia, mixed    Atrial fibrillation ([address]  Resolved Problems:    * No resolved hospital problems. *     PLAN:  1. Admitted to inpatient psychiatry for stabilization and treatment. 2.  On admission, continued Lamictal 150 mg twice daily. Discontinued Wellbutrin. Started Nori Sharif for further mood stabilization. q. 15-minute checks for safety, programming, and p.r.n. medication for anxiety, agitation, and insomnia. 12/4/2021 - increase Latuda to 40mg daily. 4.  Internal Medicine consult for admission. Cardiomyopathy  - stable. No s/s decompensation  - continue Demadex 10 mg alternating with 20 mg.   - continue Toprol-XL, Lisinopril.      H/o atrial tachycardia  H/o Ventricular tachcyardia  - has AICD  - continue Toprol-XL  - AC: Eliquis     Hypertension  - BP stable. Continue Toprol-XL, Lisinopril.      Gout  - stable. No acute flare  - on Allopurinol.      Hyperlipidemia  - on Atorvastatin.      Hypothyroidism  - home dose of synthroid 100 mcg      H/o Uterine cancer  - s/p radium implants.      H/o CVA  - on statin, Eliquis.      Allergic rhinitis  - continue Singulair, Cetirizine. 5.  Collateral information from outpatient providers obtained. 6.  Estimated length of stay, 5-8 days for stabilization. Voluntary. Total time with patient was 35 minutes and more than 50 % of that time was spent counseling the patient on their symptoms, treatment, and expected goals.        Guido Apple MD

## 2021-12-04 NOTE — BH NOTE
Group Therapy Note    Date: 12/3/2021  Start Time: 20:00  End Time:  21:00  Number of Participants: 7    Type of Group: Recreational  Wrap up    Salomon Sánchez Information  Module Name:  /  Session Number:  /    Patient's Goal:  Find triggers    Notes:  Continuing to work on goal    Status After Intervention:  Unchanged    Participation Level:  Active Listener and Interactive    Participation Quality: Appropriate, Attentive and Sharing      Speech:  normal      Thought Process/Content: Logical      Affective Functioning: Blunted      Mood: anxious      Level of consciousness:  Alert, Oriented x4 and Attentive      Response to Learning: Able to change behavior      Endings: None Reported    Modes of Intervention: Socialization and Problem-solving      Discipline Responsible: Behavorial Health Tech      Signature:  Macey Rodriguez

## 2021-12-04 NOTE — PROGRESS NOTES
Purposeful Rounding    Patient Location: Patient room    Patient willing to engage in conversation: No    Presentation/behavior: Other sleeping*    Affect: Neutral/Euthymic(normal)    Concerns reported: none    PRN medications given: none    Environmental assessment: No safety hazards noted    Fall prevention interventions in place: Room free of clutter    Daily [place] Fall Risk Score: 55    Daily [place] Fall Risk Score: low      Electronically signed by Hill Becerra RN on 12/4/21 at 1:30 AM EST

## 2021-12-04 NOTE — PROGRESS NOTES
Purposeful Rounding    Patient Location: Day room    Patient willing to engage in conversation: Yes    Presentation/behavior: Cooperative    Affect: Neutral/Euthymic(normal)    Concerns reported: none    PRN medications given: none    Environmental assessment: No safety hazards noted    Fall prevention interventions in place: Lighting appropriate, Room free of clutter and Clear path to bathroom    Daily [place] Fall Risk Score: 55    Daily [place] Fall Risk Score: low      Electronically signed by Joy Seals RN on 12/3/21 at 7:57 PM EST

## 2021-12-04 NOTE — PROGRESS NOTES
Purposeful Rounding     Patient Location: Patient room     Patient willing to engage in conversation: No     Presentation/behavior: Other sleeping*     Affect: Neutral/Euthymic(normal)     Concerns reported: none     PRN medications given: none     Environmental assessment: No safety hazards noted     Fall prevention interventions in place: Room free of clutter     Daily [place] Fall Risk Score: 55     Daily Kim[place] Fall Risk Score: low

## 2021-12-04 NOTE — GROUP NOTE
Group Therapy Note    Date: 12/4/2021    Group Start Time: 1330  Group End Time: 2113  Group Topic: Cognitive Skills    [id number] [facility]        Group Therapy Note    Attendees: 7       Patient's Goal:  Patients will process the movie Click and discuss how they can choose between conflicts in their lives    Sumi Cevallos attended the group and actively participated in the discussion. She shared how she prioritized her career in the past. Sumi Cevallos discussed her current priorities and how to find a balance in her life    Status After Intervention:  Improved    Participation Level:  Active Listener and Interactive    Participation Quality: Appropriate, Attentive, Sharing and Supportive      Speech:  normal      Thought Process/Content: Logical      Affective Functioning: Congruent      Mood: anxious and depressed      Level of consciousness:  Alert, Oriented x4 and Attentive      Response to Learning: Able to verbalize current knowledge/experience, Capable of insight, Able to change behavior and Progressing to goal      Endings: None Reported    Modes of Intervention: Education      Discipline Responsible: /Counselor      Signature:  GUILLERMO Woodard

## 2021-12-05 VITALS
DIASTOLIC BLOOD PRESSURE: 72 MMHG | HEART RATE: 63 BPM | OXYGEN SATURATION: 98 % | WEIGHT: 293 LBS | HEIGHT: 61 IN | TEMPERATURE: 97.5 F | SYSTOLIC BLOOD PRESSURE: 130 MMHG | RESPIRATION RATE: 18 BRPM | BODY MASS INDEX: 55.32 KG/M2

## 2021-12-05 PROCEDURE — 99239 HOSP IP/OBS DSCHRG MGMT >30: CPT | Performed by: PSYCHIATRY & NEUROLOGY

## 2021-12-05 PROCEDURE — 6370000000 HC RX 637 (ALT 250 FOR IP): Performed by: PSYCHIATRY & NEUROLOGY

## 2021-12-05 PROCEDURE — 5130000000 HC BRIDGE APPOINTMENT

## 2021-12-05 PROCEDURE — 6370000000 HC RX 637 (ALT 250 FOR IP): Performed by: NURSE PRACTITIONER

## 2021-12-05 PROCEDURE — 6360000002 HC RX W HCPCS: Performed by: PSYCHIATRY & NEUROLOGY

## 2021-12-05 PROCEDURE — G0008 ADMIN INFLUENZA VIRUS VAC: HCPCS | Performed by: PSYCHIATRY & NEUROLOGY

## 2021-12-05 PROCEDURE — 90686 IIV4 VACC NO PRSV 0.5 ML IM: CPT | Performed by: PSYCHIATRY & NEUROLOGY

## 2021-12-05 RX ADMIN — METOPROLOL SUCCINATE 100 MG: 50 TABLET, EXTENDED RELEASE ORAL at 08:33

## 2021-12-05 RX ADMIN — LISINOPRIL 5 MG: 5 TABLET ORAL at 08:33

## 2021-12-05 RX ADMIN — TORSEMIDE 20 MG: 20 TABLET ORAL at 08:33

## 2021-12-05 RX ADMIN — LURASIDONE HYDROCHLORIDE 40 MG: 40 TABLET, FILM COATED ORAL at 08:34

## 2021-12-05 RX ADMIN — LAMOTRIGINE 150 MG: 100 TABLET ORAL at 08:33

## 2021-12-05 RX ADMIN — CETIRIZINE HYDROCHLORIDE 10 MG: 10 TABLET ORAL at 08:32

## 2021-12-05 RX ADMIN — APIXABAN 5 MG: 5 TABLET, FILM COATED ORAL at 08:33

## 2021-12-05 RX ADMIN — ACETAMINOPHEN 650 MG: 325 TABLET ORAL at 06:09

## 2021-12-05 RX ADMIN — INFLUENZA A VIRUS A/VICTORIA/2570/2019 IVR-215 (H1N1) ANTIGEN (PROPIOLACTONE INACTIVATED), INFLUENZA A VIRUS A/CAMBODIA/E0826360/2020 IVR-224 (H3N2) ANTIGEN (PROPIOLACTONE INACTIVATED), INFLUENZA B VIRUS B/VICTORIA/705/2018 BVR-11 ANTIGEN (PROPIOLACTONE INACTIVATED), INFLUENZA B VIRUS B/PHUKET/3073/2013 BVR-1B ANTIGEN (PROPIOLACTONE INACTIVATED) 0.5 ML: 15; 15; 15; 15 INJECTION, SUSPENSION INTRAMUSCULAR at 11:51

## 2021-12-05 RX ADMIN — DICLOFENAC SODIUM 2 G: 10 GEL TOPICAL at 10:28

## 2021-12-05 RX ADMIN — ALLOPURINOL 300 MG: 300 TABLET ORAL at 08:33

## 2021-12-05 RX ADMIN — LEVOTHYROXINE SODIUM 100 MCG: 100 TABLET ORAL at 06:02

## 2021-12-05 RX ADMIN — ATORVASTATIN CALCIUM 10 MG: 10 TABLET, FILM COATED ORAL at 08:34

## 2021-12-05 RX ADMIN — METHOCARBAMOL TABLETS 500 MG: 500 TABLET, COATED ORAL at 06:10

## 2021-12-05 ASSESSMENT — PAIN SCALES - GENERAL: PAINLEVEL_OUTOF10: 8

## 2021-12-05 NOTE — PROGRESS NOTES
[facility]  Discharge Note    Pt discharged with followings belongings:   Dentures: None  Vision - Corrective Lenses: Glasses  Hearing Aid: None  Jewelry: Ring (2 rings pt wearing and 1 pair of eaarings)  Body Piercings Removed: N/A  Clothing: Pants, Shirt, Socks, Footwear  Were All Patient Medications Collected?: Not Applicable  Other Valuables: Cell phone (cell phone in safe)   Valuables returned to patient. Patient education on aftercare instructions: yes  Information faxed to N/A by N/A  at 12:45 PM .Patient verbalize understanding of AVS:  yes.     Status EXAM upon discharge:  Status and Exam  Normal: Yes  Facial Expression: Brightened  Affect: Appropriate  Level of Consciousness: Alert  Mood:Normal: Yes  Mood: Other (Comment) (calm)  Motor Activity:Normal: Yes  Motor Activity: Increased  Interview Behavior: Cooperative  Preception: [place] to Person, [place] to Time, [place] to Place, [place] to Situation  Attention:Normal: Yes  Attention:  (NA)  Thought Processes: Circumstantial  Thought Content:Normal: Yes  Thought Content:  (linear)  Hallucinations: None  Delusions: No  Memory:Normal: Yes  Insight and Judgment: Yes  Insight and Judgment: Poor Judgment, Poor Insight  Present Suicidal Ideation: No  Present Homicidal Ideation: No      Metabolic Screening:    Lab Results   Component Value Date    LABA1C 5.3 10/07/2019       Lab Results   Component Value Date    CHOL 148 04/01/2021    CHOL 143 10/07/2019    CHOL 135 07/26/2018    CHOL 122 01/25/2018    CHOL 150 10/15/2017    CHOL 154 07/29/2016    CHOL 118 07/07/2016    CHOL 192 10/22/2015    CHOL 130 07/10/2015    CHOL 164 08/29/2013    CHOL 151 07/05/2011    CHOL 164 06/28/2011     Lab Results   Component Value Date    TRIG 124 04/01/2021    TRIG 64 10/07/2019    TRIG 89 07/26/2018    TRIG 72 01/25/2018    TRIG 65 10/15/2017    TRIG 71 07/29/2016    TRIG 86 07/07/2016    TRIG 74 10/22/2015    TRIG 56 07/10/2015    TRIG 65 08/29/2013    TRIG 94 07/05/2011    TRIG 129 06/28/2011     Lab Results   Component Value Date    HDL 62 (H) 04/01/2021    HDL 90 (H) 10/07/2019    HDL 77 (H) 07/26/2018    HDL 69 (H) 01/25/2018    HDL 64 (H) 10/15/2017    HDL 77 (H) 07/29/2016    HDL 49 07/07/2016    HDL 95 (H) 10/22/2015    HDL 64 (H) 07/10/2015    HDL 73 (H) 08/29/2013    HDL 65 (H) 07/05/2011    HDL 61 (H) 06/28/2011     No components found for: LDLCAL  Lab Results   Component Value Date    LABVLDL 25 04/01/2021    LABVLDL 13 10/07/2019    LABVLDL 18 07/26/2018    LABVLDL 14 01/25/2018    LABVLDL 13 10/15/2017    LABVLDL 14 07/29/2016    LABVLDL 17 07/07/2016    LABVLDL 15 10/22/2015    LABVLDL 11 07/10/2015    LABVLDL 13 08/29/2013    LABVLDL 19 07/05/2011    LABVLDL 26 06/28/2011       Erin Hernandez RN    Bridge Appointment completed: Reviewed Discharge Instructions with patient. Patient verbalizes understanding and agreement with the discharge plan using the teachback method.      Referral for Outpatient Tobacco Cessation Counseling, upon discharge (tiffani X if applicable and completed):    ( )  Hospital staff assisted patient to call Quit Line or faxed referral                                   during hospitalization                  ( )  Recognizing danger situations (included triggers and roadblocks), if not completed on admission                    ( )  Coping skills (new ways to manage stress, exercise, relaxation techniques, changing routine, distraction), if not completed on admission                                                           ( )  Basic information about quitting (benefits of quitting, techniques in how to quit, available resources, if not completed on admission  ( ) Referral for counseling faxed to [facility]   ( x) Patient refused referral  (x ) Patient refused counseling  ( ) Patient refused smoking cessation medication upon discharge    Vaccinations (tiffani X if applicable and completed):  ( ) Patient states already received

## 2021-12-05 NOTE — PLAN OF CARE
Problem: Depressive Behavior With or Without Suicide Precautions:  Goal: Able to verbalize and/or display a decrease in depressive symptoms  Description: Able to verbalize and/or display a decrease in depressive symptoms  Outcome: Ongoing     Problem: Depressive Behavior With or Without Suicide Precautions:  Goal: Absence of self-harm  Description: Absence of self-harm  Outcome: Ongoing   Ruby Mckeon has been out in the day room and social with select peers this shift. Patient denies current SI/HI, denies A/V/H and remains absent of self harm. Medication compliant. Reports she feels less agitated and feels like the [employer] [facility] is working for her. States she is hoping to go home soon.

## 2021-12-05 NOTE — CARE COORDINATION
[facility]  Treatment Team Note  Review Date & Time: 12/4/21 0900     Patient was not in treatment team        Status EXAM:   Status and Exam  Normal: No  Facial Expression: Elevated  Affect: Congruent  Level of Consciousness: Alert  Mood:Normal: No  Mood: Anxious, Angry  Motor Activity:Normal: Yes  Motor Activity: WNL  Interview Behavior: Cooperative, Irritable  Preception: [place] to Person, Sarath Medicus to Time, [place] to Place, [place] to Situation  Attention:Normal: Yes  Thought Processes: Circumstantial  Thought Content:Normal: Yes  Hallucinations: None  Delusions: No  Memory:Normal: Yes  Memory: Other(See comment) (WNL)  Insight and Judgment: No  Insight and Judgment: Poor Insight, Poor Judgment  Present Suicidal Ideation: No  Present Homicidal Ideation: No        Suicide Risk CSSR-S:  1) Within the past month, have you wished you were dead or wished you could go to sleep and not wake up? : Yes  2) Have you actually had any thoughts of killing yourself? : Yes  3) Have you been thinking about how you might kill yourself? : No  5) Have you started to work out or worked out the details of how to kill yourself? Do you intend to carry out this plan? : Yes  6) Have you ever done anything, started to do anything, or prepared to do anything to end your life?: Yes        PLAN/TREATMENT RECOMMENDATIONS UPDATE: Patient will take medication as prescribed, eat 75% of meals, attend groups, participate in milieu activities, participate in treatment team and care planning for discharge and follow up.

## 2021-12-06 NOTE — TELEPHONE ENCOUNTER
Do we know specific day of anticipated discharge? I would recommend calling closer to d/c and we can assess if I am able to fit in at that time.

## 2021-12-08 NOTE — TELEPHONE ENCOUNTER
Never received call back from hospital. Patient has been DC according to chart. She has f/u scheduled with Dr Armani Shelley on 12/10. Do you still want to see her? If so, where should we schedule and I will call patient to let her know.

## 2021-12-09 NOTE — TELEPHONE ENCOUNTER
[facility] Transitions Initial Follow Up Call    Outreach made within 2 business days of discharge: Yes    Patient: Tasneem Correa Patient : 1960   MRN: [id number]  Reason for Admission: There are no discharge diagnoses documented for the most recent discharge. Discharge Date: 21       Spoke with: Patient, did not wish to schedule with pcp at this time. Discharge department/facility: [address] [place] [address] Interactive Patient Contact:  Was patient able to fill all prescriptions: Yes  Was patient instructed to bring all medications to the follow-up visit: Yes  Is patient taking all medications as directed in the discharge summary?  Yes  Does patient understand their discharge instructions: Yes  Does patient have questions or concerns that need addressed prior to 7-14 day follow up office visit: no    Scheduled appointment with PCP within 7-14 days    Follow Up  Future Appointments   Date Time Provider Sarah Najera   12/10/2021 10:00 AM [place], [place] OSIRIS BUTLER   2022  8:30 AM SCHEDULE, [place] REMOTE TRANSMISSION Luis BUTLER   2022  8:30 AM SCHEDULE, [place] REMOTE TRANSMISSION Luis Lisa [facility]   2022 11:00 AM MD OSIRIS Choi  [place] - DYD   10/31/2022  1:00 PM Aloma Kam, APRN - CNP CLERM PULM [facility]       Juan Carlos Oar

## 2021-12-10 ENCOUNTER — OFFICE VISIT (OUTPATIENT)
Dept: PSYCHOLOGY | Age: 61
End: 2021-12-10
Payer: MEDICARE

## 2021-12-10 DIAGNOSIS — F31.30 BIPOLAR I DISORDER, MOST RECENT EPISODE (OR CURRENT) DEPRESSED (HCC): Primary | ICD-10-CM

## 2021-12-10 PROCEDURE — 90832 PSYTX W PT 30 MINUTES: CPT | Performed by: PSYCHOLOGIST

## 2021-12-10 PROCEDURE — 1036F TOBACCO NON-USER: CPT | Performed by: PSYCHOLOGIST

## 2021-12-10 ASSESSMENT — PATIENT HEALTH QUESTIONNAIRE - PHQ9
4. FEELING TIRED OR HAVING LITTLE ENERGY: 1
2. FEELING DOWN, DEPRESSED OR HOPELESS: 1
SUM OF ALL RESPONSES TO PHQ QUESTIONS 1-9: 9
5. POOR APPETITE OR OVEREATING: 1
6. FEELING BAD ABOUT YOURSELF - OR THAT YOU ARE A FAILURE OR HAVE LET YOURSELF OR YOUR FAMILY DOWN: 1
3. TROUBLE FALLING OR STAYING ASLEEP: 1
SUM OF ALL RESPONSES TO PHQ QUESTIONS 1-9: 9
9. THOUGHTS THAT YOU WOULD BE BETTER OFF DEAD, OR OF HURTING YOURSELF: 1
8. MOVING OR SPEAKING SO SLOWLY THAT OTHER PEOPLE COULD HAVE NOTICED. OR THE OPPOSITE, BEING SO FIGETY OR RESTLESS THAT YOU HAVE BEEN MOVING AROUND A LOT MORE THAN USUAL: 1
1. LITTLE INTEREST OR PLEASURE IN DOING THINGS: 1
SUM OF ALL RESPONSES TO PHQ QUESTIONS 1-9: 8
SUM OF ALL RESPONSES TO PHQ9 QUESTIONS 1 & 2: 2
7. TROUBLE CONCENTRATING ON THINGS, SUCH AS READING THE NEWSPAPER OR WATCHING TELEVISION: 1

## 2021-12-10 ASSESSMENT — ANXIETY QUESTIONNAIRES
5. BEING SO RESTLESS THAT IT IS HARD TO SIT STILL: 1
4. TROUBLE RELAXING: 1
7. FEELING AFRAID AS IF SOMETHING AWFUL MIGHT HAPPEN: 1
1. FEELING NERVOUS, ANXIOUS, OR ON EDGE: 1
GAD7 TOTAL SCORE: 7
3. WORRYING TOO MUCH ABOUT DIFFERENT THINGS: 1
2. NOT BEING ABLE TO STOP OR CONTROL WORRYING: 1
6. BECOMING EASILY ANNOYED OR IRRITABLE: 1

## 2021-12-10 NOTE — Clinical Note
She is doing much much better. She is off Wellbutrin and now taking Latuda with positive benefit. She is grateful she went to the hospital and is not experiencing suicidal ideation.

## 2021-12-10 NOTE — PROGRESS NOTES
Behavioral Health Consultation  [address] Zita Rader PsyD  Psychologist  12/2/2021   9:31 AM      Time spent with Patient: 35 minutes  This is patient's ninth [facility] appointment. Reason for Consult:                 Depression     Referring Provider: Angus Patel MD       S:  During the last visit patient was experiencing SI and had a plan. She was sent to the hospital and admitted to [facility].. Pt was discharged a few days ago and reports feeling signficantly better. She was taken off Wellbutrin and started on latuda which she reports has been beneficial. Pt reports agitation has disappeared. Loud negative self-talk has also improved. Has been enggaging in more social activities and pleasurable activities at home. Going to [employer] again and crafting a lot. States her niece has commented about her acting like herself again for the first time in many, many months. While admitted to the hospital her brother threw out all of her old medications that she had laid out to attempt OD on. 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 Back to wearing make up and jewelry which is how she used to take care of herself. More energetic. No longer feeling hopeless. Is feeling grateful she went to the hospital and is alive.        O:  MSE:    Appearance: good hygiene   Attitude: cooperative and friendly  Consciousness: alert  Orientation: oriented to person, place, time, general circumstance  Memory: recent and remote memory intact  Attention/Concentration: intact during session  Psychomotor Activity:normal  Eye Contact: minimal   Speech: normal rate and volume, well-articulated  Mood: \"good\"  Affect: congruent  Perception: within normal limits  Thought Content: within normal limits  Thought Process: logical, coherent and goal-directed  Insight: good  Judgment: intact  Ability to understand instructions: Yes  Ability to respond meaningfully: Yes  Morbid Ideation: no   Suicide Assessment: Denies suicidal ideation  Homicidal Ideation: no      History:    Medications:   Current Outpatient Medications   Medication Sig Dispense Refill    buPROPion (WELLBUTRIN XL) 150 MG extended release tablet TAKE 1 TABLET EVERY MORNING 90 tablet 1    CPAP Machine MISC by Does not apply route      methocarbamol (ROBAXIN) 500 MG tablet TAKE 1 TABLET THREE TIMES DAILY AS NEEDED FOR PAIN (Patient taking differently: TAKE 1 TABLET TWICE TIMES DAILY AS NEEDED FOR PAIN) 270 tablet 0    lisinopril (PRINIVIL;ZESTRIL) 5 MG tablet TAKE 1 TABLET EVERY DAY 90 tablet 2    montelukast (SINGULAIR) 10 MG tablet TAKE 1 TABLET EVERY NIGHT 90 tablet 1    simvastatin (ZOCOR) 20 MG tablet TAKE 1 TABLET EVERY NIGHT 90 tablet 1    lamoTRIgine (LAMICTAL) 150 MG tablet TAKE 1 TABLET TWICE DAILY OR AS OTHERWISE DIRECTED 180 tablet 1    vitamin D (ERGOCALCIFEROL) 1.25 MG (35917 UT) CAPS capsule TAKE 1 CAPSULE ONE TIME WEEKLY 8 capsule 0    fluticasone (FLONASE) 50 MCG/ACT nasal spray 2 sprays by Each Nostril route daily 1 Bottle 5    levothyroxine (SYNTHROID) 100 MCG tablet Take 1 tablet by mouth Daily 90 tablet 1    metoprolol succinate (TOPROL XL) 100 MG extended release tablet Take 1 tablet by mouth daily 90 tablet 1    allopurinol (ZYLOPRIM) 300 MG tablet TAKE 1 TABLET EVERY DAY 90 tablet 1    diclofenac sodium (VOLTAREN) 1 % GEL Apply topically 2 times daily 150 g 2    apixaban (ELIQUIS) 5 MG TABS tablet Take 1 tablet by mouth 2 times daily 180 tablet 3    torsemide (DEMADEX) 10 MG tablet 2 tabs every other day alternating with 1 tab the other days 180 tablet 3    acetaminophen (TYLENOL) 325 MG tablet Take 650 mg by mouth every 6 hours as needed for Pain TAKES EVERY DAY      cetirizine (ZYRTEC) 10 MG tablet Take 10 mg by mouth daily       No current facility-administered medications for this visit. Social History:   Social History     Socioeconomic History    Marital status:       Spouse name: Not on file    Number of children: Not on file    Years of education: Not on file    Highest education level: Not on file   Occupational History    Not on file   Tobacco Use    Smoking status: Former Smoker     Packs/day: 3.00     Years: 2.00     Pack years: 6.00     Types: Cigarettes     Quit date: 1985     Years since quittin.9    Smokeless tobacco: Never Used   Vaping Use    Vaping Use: Never used   Substance and Sexual Activity    Alcohol use: No    Drug use: No    Sexual activity: Not Currently   Other Topics Concern    Not on file   Social History Narrative    Not on file     Social Determinants of Health     Financial Resource Strain: Low Risk     Difficulty of Paying Living Expenses: Not hard at all   Food Insecurity: No Food Insecurity    Worried About [address] [employer] in the Last Year: Never true    [facility] [employer] in the Last Year: Never true   Transportation Needs: No Transportation Needs    Lack of Transportation (Medical): No    Lack of Transportation (Non-Medical):  No   Physical Activity:     Days of Exercise per Week: Not on file    Minutes of Exercise per Session: Not on file   Stress:     Feeling of Stress : Not on file   Social Connections:     Frequency of Communication with Friends and Family: Not on file    Frequency of Social Gatherings with Friends and Family: Not on file    Attends Spiritism Services: Not on file    Active Member of [address] [place] [address] or Organizations: Not on file    Attends Club or Organization Meetings: Not on file    Marital Status: Not on file   Intimate Partner Violence:     Fear of Current or Ex-Partner: Not on file    Emotionally Abused: Not on file    Physically Abused: Not on file    Sexually Abused: Not on file   Housing Stability:     Unable to Pay for Housing in the Last Year: Not on file    Number of [place] in the Last Year: Not on file    Unstable Housing in the Last Year: Not on file       TOBACCO:   reports that she quit smoking about 36 years ago. Her smoking use included cigarettes. She has a 6.00 pack-year smoking history. She has never used smokeless tobacco.  ETOH:   reports no history of alcohol use. Family History:   Family History   Problem Relation Age of Onset   [place] Cancer Mother     Ovarian Cancer Mother 45        Technically peritoneal cancer    Depression Mother         bipolar    Arthritis Father     Rheum Arthritis Father     Arrhythmia Father     Other Father         gout    Heart Disease Father     Depression Brother         depression    Other Brother         gout    Obesity Brother     Other Brother         gout    Obesity Brother     Depression Brother     Cancer Maternal Grandmother         Breast    Breast Cancer Maternal Grandmother          A:  Ms. Ayoubs depression has improved since the last visit as she was admitted to [address]. Medication changes were made patient reports feeling significantly better. She no longer feels hopeful and is not experiencing suicidal ideation. She has more energy and has been able to think more optimistically. She has been engaging in more social activities and pleasurable activities at home. She denies any adverse side effects from [place]. She reports noticeable positive impact after discontinuing Wellbutrin as she immediately felt less agitated and irritable. Ms. Hosea Good was active and engaged and responds positively to behavioral interventions.       Diagnosis:       BAD I, Most recent episode depressed      Plan:  Pt interventions:    [place]-setting to identify pt's primary goals for [facility] visit / overall health, Supportive techniques, reinforced pt's skill use, CBT interventions, ACT interventions  and treatment planning    Pt Behavioral Change Plan  Patient set goals to:   1) continue to monitor mood   2) continue to engage in social and pleasurable activities  3) return for f/u in 1 week

## 2021-12-16 ENCOUNTER — TELEMEDICINE (OUTPATIENT)
Dept: PSYCHOLOGY | Age: 61
End: 2021-12-16
Payer: MEDICARE

## 2021-12-16 DIAGNOSIS — F31.30 BIPOLAR I DISORDER, MOST RECENT EPISODE (OR CURRENT) DEPRESSED (HCC): Primary | ICD-10-CM

## 2021-12-16 PROCEDURE — 90832 PSYTX W PT 30 MINUTES: CPT | Performed by: PSYCHOLOGIST

## 2021-12-16 NOTE — PATIENT INSTRUCTIONS
[facility] [address], [place], [address]  Phone: [phone]      Yolie Finn  [email or web address]    Aleda Soulier @ [facility]  [phone]    EMDR (Eye Movement Desensitization and Reprocessing) is a psychotherapy that enables people to heal from the symptoms and emotional distress that are the result of disturbing life experiences. Repeated studies show that by using EMDR therapy people can experience the benefits of psychotherapy that once took years to make a difference. It is widely assumed that severe emotional pain requires a long time to heal.  EMDR therapy shows that the mind can in fact heal from psychological trauma much as the body recovers from physical trauma. When you cut your hand, your body works to close the wound. If a foreign object or repeated injury irritates the wound, it festers and causes pain. Once the block is removed, healing resumes. EMDR therapy demonstrates that a similar sequence of events occurs with mental processes. The brains information processing system naturally moves toward mental health. If the system is blocked or imbalanced by the impact of a disturbing event, the emotional wound festers and can cause intense suffering. Once the block is removed, healing resumes. Using the detailed protocols and procedures learned in EMDR therapy training sessions, clinicians help clients activate their natural healing processes. More than 30 positive controlled outcome studies have been done on EMDR therapy. Some of the studies show that 84%-90% of single-trauma victims no longer have post-traumatic stress disorder after only three 90-minute sessions. Another study, funded by the [facility], found that 100% of the single-trauma victims and 77% of multiple trauma victims no longer were diagnosed with PTSD after only six 50-minute sessions.  In another study, 77% of combat veterans were free of PTSD in 12 sessions. There has been so much research on EMDR therapy that it is now recognized as an effective form of treatment for trauma and other disturbing experiences by organizations such as the American Psychiatric Association, the [place] [address]. Given the worldwide recognition as an effective treatment of trauma, you can easily see how EMDR therapy would be effective in treating the [facility] memories that are the reason people have low self-esteem, feelings of powerlessness, and all the myriad problems that bring them in for therapy. Over 100,000 clinicians throughout the world use the therapy. Millions of people have been treated successfully over the past 25 years. EMDR therapy is an eight-phase treatment. Eye movements (or other bilateral stimulation) are used during one part of the session. After the clinician has determined which memory to target first, he asks the client to hold different aspects of that event or thought in mind and to use his eyes to track the therapists hand as it moves back and forth across the clients field of vision. As this happens, for reasons believed by a [place] researcher to be connected with the biological mechanisms involved in [address] (REM) sleep, internal associations arise and the clients begin to process the memory and disturbing feelings. In successful EMDR therapy, the meaning of painful events is transformed on an emotional level. For instance, a rape victim shifts from feeling horror and self-disgust to holding the firm belief that, I survived it and I am strong.   Unlike talk therapy, the insights clients gain in EMDR therapy result not so much from clinician interpretation, but from the clients own accelerated intellectual and emotional processes. The net effect is that clients conclude EMDR therapy feeling empowered by the very experiences that once debased them.   Their wounds have not just closed, they have transformed. As a natural outcome of the EMDR therapeutic process, the clients thoughts, feelings and behavior are all robust indicators of emotional health and resolution--all without speaking in detail or doing homework used in other therapies.
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
## 2021-12-22 ENCOUNTER — OFFICE VISIT (OUTPATIENT)
Dept: FAMILY MEDICINE CLINIC | Age: 61
End: 2021-12-22
Payer: MEDICARE

## 2021-12-22 VITALS
HEART RATE: 67 BPM | RESPIRATION RATE: 16 BRPM | DIASTOLIC BLOOD PRESSURE: 84 MMHG | WEIGHT: 293 LBS | SYSTOLIC BLOOD PRESSURE: 126 MMHG | BODY MASS INDEX: 61.67 KG/M2 | OXYGEN SATURATION: 99 %

## 2021-12-22 DIAGNOSIS — R68.89 FORGETFULNESS: ICD-10-CM

## 2021-12-22 DIAGNOSIS — R79.9 ABNORMAL FINDING OF BLOOD CHEMISTRY, UNSPECIFIED: ICD-10-CM

## 2021-12-22 DIAGNOSIS — G89.29 CHRONIC PAIN OF BOTH SHOULDERS: ICD-10-CM

## 2021-12-22 DIAGNOSIS — M75.22 BICEPS TENDINITIS OF BOTH SHOULDERS: ICD-10-CM

## 2021-12-22 DIAGNOSIS — M1A.9XX0 CHRONIC GOUT WITHOUT TOPHUS, UNSPECIFIED CAUSE, UNSPECIFIED SITE: ICD-10-CM

## 2021-12-22 DIAGNOSIS — M25.512 CHRONIC PAIN OF BOTH SHOULDERS: ICD-10-CM

## 2021-12-22 DIAGNOSIS — M25.511 CHRONIC PAIN OF BOTH SHOULDERS: ICD-10-CM

## 2021-12-22 DIAGNOSIS — M75.21 BICEPS TENDINITIS OF BOTH SHOULDERS: ICD-10-CM

## 2021-12-22 DIAGNOSIS — I63.231 ARTERIAL ISCHEMIC STROKE, ICA, RIGHT, ACUTE (HCC): ICD-10-CM

## 2021-12-22 DIAGNOSIS — S09.90XD TRAUMATIC INJURY OF HEAD, SUBSEQUENT ENCOUNTER: ICD-10-CM

## 2021-12-22 DIAGNOSIS — E66.01 CLASS 3 SEVERE OBESITY DUE TO EXCESS CALORIES WITH SERIOUS COMORBIDITY AND BODY MASS INDEX (BMI) OF 60.0 TO 69.9 IN ADULT (HCC): ICD-10-CM

## 2021-12-22 DIAGNOSIS — E03.9 ACQUIRED HYPOTHYROIDISM: ICD-10-CM

## 2021-12-22 DIAGNOSIS — R74.8 ELEVATED ALKALINE PHOSPHATASE LEVEL: ICD-10-CM

## 2021-12-22 DIAGNOSIS — R29.898 TRANSIENT LEFT LEG WEAKNESS: Primary | ICD-10-CM

## 2021-12-22 DIAGNOSIS — H53.9 VISION CHANGES: ICD-10-CM

## 2021-12-22 LAB
C-REACTIVE PROTEIN: 18.1 MG/L (ref 0–5.1)
GAMMA GLUTAMYL TRANSFERASE: 15 U/L (ref 5–36)
RHEUMATOID FACTOR: 14 IU/ML
SEDIMENTATION RATE, ERYTHROCYTE: 44 MM/HR (ref 0–30)
T4 FREE: 1.1 NG/DL (ref 0.9–1.8)
TSH REFLEX: 18.81 UIU/ML (ref 0.27–4.2)

## 2021-12-22 PROCEDURE — G8482 FLU IMMUNIZE ORDER/ADMIN: HCPCS | Performed by: FAMILY MEDICINE

## 2021-12-22 PROCEDURE — 99214 OFFICE O/P EST MOD 30 MIN: CPT | Performed by: FAMILY MEDICINE

## 2021-12-22 PROCEDURE — 1036F TOBACCO NON-USER: CPT | Performed by: FAMILY MEDICINE

## 2021-12-22 PROCEDURE — G8427 DOCREV CUR MEDS BY ELIG CLIN: HCPCS | Performed by: FAMILY MEDICINE

## 2021-12-22 PROCEDURE — 1111F DSCHRG MED/CURRENT MED MERGE: CPT | Performed by: FAMILY MEDICINE

## 2021-12-22 PROCEDURE — 3017F COLORECTAL CA SCREEN DOC REV: CPT | Performed by: FAMILY MEDICINE

## 2021-12-22 PROCEDURE — G8417 CALC BMI ABV UP PARAM F/U: HCPCS | Performed by: FAMILY MEDICINE

## 2021-12-22 RX ORDER — ALLOPURINOL 300 MG/1
TABLET ORAL
Qty: 90 TABLET | Refills: 1 | Status: SHIPPED | OUTPATIENT
Start: 2021-12-22 | End: 2022-06-21

## 2021-12-22 ASSESSMENT — ENCOUNTER SYMPTOMS
SHORTNESS OF BREATH: 0
BACK PAIN: 0
VOMITING: 0
NAUSEA: 0
COLOR CHANGE: 0
TROUBLE SWALLOWING: 0
BLOOD IN STOOL: 0
COUGH: 0
ABDOMINAL PAIN: 0
DIARRHEA: 0
CONSTIPATION: 0

## 2021-12-22 NOTE — PROGRESS NOTES
12/22/2021    This is a 59 y.o. female who presents for  Chief Complaint   Patient presents with   Josef Valentine     has been having alot of falls lately and would like to discuss getting a rollator walker.  Arm Pain     both arms if she moves them a certain way then they hurt really bad it feels like something is ripping       HPI:     Recurrent falls, becoming more frequent  + L leg \"giving out\"  The calf muscle feels like rubber bands and then just snap  Unable to sit in time and the walker she has currently does not allow for her to fit to sit down  + safety hazard as she has to try to find a location to sit     Falls:  10/16, hit the ground fall, skinned knees, weak leg  10/30: stumble forward, bumped shoulders  11/18, hit the ground    11/21, stumble backwards, stepped back   12/9 12/13  Yesterday   Monday   Not a clumsy person, doesn't fall     No LOC but has had several falls where she has hit her head  No new HA, vomiting   + vision changes,  Plans on updating her eye exam soon   + forgetfulness.  Will start one task, move onto another, and forget how she transitioned between the two     + b/l arm pain  Some shoulder pain  + weakness  Diclofenac gel, morning, helps       Past Medical History:   Diagnosis Date    Anesthesia complication     hypotension post op    Autoimmune hemolytic anemia (HCC)     during uterine cancer    Bipolar disorder ([facility]     managed by Jose Schwartz)    Bundle branch block     Cardiomyopathy ([facility]     Chronic systolic CHF (congestive heart failure) ([facility] 7/9/2015    Depression     Family history of early CAD     Family history of ovarian cancer     mother    GERD (gastroesophageal reflux disease)     Gout     Hypertension     Hypothyroid     Osteoarthritis, knee     Shybut    Pneumonia     history of pneumonia    S/P gastric bypass 1/7/05    Unspecified cerebral artery occlusion with cerebral infarction     Uterine cancer [facility])     endometrial adenocarcinoma  Vitamin B 12 deficiency        Past Surgical History:   Procedure Laterality Date    CARDIAC CATHETERIZATION  7/10/2015    Normal Coronary Arteries    CHOLECYSTECTOMY      COLONOSCOPY  2019    NL    COLONOSCOPY N/A 3/6/2019    EGD AND COLONOSCOPY WITH ANESTHESIA performed by Mikhail Bolanos MD at [address] [place] [address]      4 times    ERCP      FINGER SURGERY      gout X 2    GASTRIC BYPASS SURGERY  05    HERNIA REPAIR  10/20/2016    lap ventral hernia    LIPECTOMY      gangrenous    OTHER SURGICAL HISTORY      radium implants into uterus for uterine surgery X 2    OTHER SURGICAL HISTORY  16    exp. lap lysis of adhesion    OTHER SURGICAL HISTORY      biventricular pacer and defibrillator    PACEMAKER PLACEMENT  2016    TRANSESOPHAGEAL ECHOCARDIOGRAM  2015    UPPER GASTROINTESTINAL ENDOSCOPY  13    UPPER GASTROINTESTINAL ENDOSCOPY  2019    NL    UPPER GASTROINTESTINAL ENDOSCOPY N/A 3/6/2019    EGD AND COLONOSCOPY WITH ANESTHESIA performed by Mikhail Bolanos MD at [address] [facility] Marital status:       Spouse name: Not on file    Number of children: 0    Years of education: Not on file    Highest education level: Not on file   Occupational History    Not on file   Tobacco Use    Smoking status: Former Smoker     Packs/day: 3.00     Years: 2.00     Pack years: 6.00     Types: Cigarettes     Quit date: 1985     Years since quittin.9    Smokeless tobacco: Never Used   Vaping Use    Vaping Use: Never used   Substance and Sexual Activity    Alcohol use: No    Drug use: No    Sexual activity: Not Currently   Other Topics Concern    Not on file   Social History Narrative    Not on file     Social Determinants of Health     Financial Resource Strain: Low Risk     Difficulty of Paying Living Expenses: Not hard at all   Food Insecurity: No Food Insecurity    Worried About Running Out of Food in the Last Year: Never true    Alexi of Food in the Last Year: Never true   Transportation Needs: No Transportation Needs    Lack of Transportation (Medical): No    Lack of Transportation (Non-Medical):  No   Physical Activity:     Days of Exercise per Week: Not on file    Minutes of Exercise per Session: Not on file   Stress:     Feeling of Stress : Not on file   Social Connections:     Frequency of Communication with Friends and Family: Not on file    Frequency of Social Gatherings with Friends and Family: Not on file    Attends Bahai Services: Not on file    Active Member of [employer] or Organizations: Not on file    Attends Club or Organization Meetings: Not on file    Marital Status: Not on file   Intimate Partner Violence:     Fear of Current or Ex-Partner: Not on file    Emotionally Abused: Not on file    Physically Abused: Not on file    Sexually Abused: Not on file   Housing Stability:     Unable to Pay for Housing in the Last Year: Not on file    Number of [place] in the Last Year: Not on file    Unstable Housing in the Last Year: Not on file       Family History   Problem Relation Age of Onset    Cancer Mother     Ovarian Cancer Mother 45        Technically peritoneal cancer    Depression Mother         bipolar    Arthritis Father     Rheum Arthritis Father     Arrhythmia Father     Other Father         gout    Heart Disease Father     Depression Brother         depression    Other Brother         gout    Obesity Brother     Other Brother         gout    Obesity Brother     Depression Brother     Cancer Maternal Grandmother         Breast    Breast Cancer Maternal Grandmother        Current Outpatient Medications   Medication Sig Dispense Refill    lurasidone (LATUDA) 40 MG TABS tablet Take 1 tablet by mouth daily 90 tablet 0    allopurinol (ZYLOPRIM) 300 MG tablet TAKE 1 TABLET EVERY DAY 90 tablet 1    CPAP Machine MISC by Does not apply route      methocarbamol (ROBAXIN) 500 MG tablet TAKE 1 TABLET THREE TIMES DAILY AS NEEDED FOR PAIN (Patient taking differently: TAKE 1 TABLET TWICE TIMES DAILY AS NEEDED FOR PAIN) 270 tablet 0    lisinopril (PRINIVIL;ZESTRIL) 5 MG tablet TAKE 1 TABLET EVERY DAY 90 tablet 2    montelukast (SINGULAIR) 10 MG tablet TAKE 1 TABLET EVERY NIGHT 90 tablet 1    simvastatin (ZOCOR) 20 MG tablet TAKE 1 TABLET EVERY NIGHT 90 tablet 1    lamoTRIgine (LAMICTAL) 150 MG tablet TAKE 1 TABLET TWICE DAILY OR AS OTHERWISE DIRECTED 180 tablet 1    vitamin D (ERGOCALCIFEROL) 1.25 MG (85643 UT) CAPS capsule TAKE 1 CAPSULE ONE TIME WEEKLY 8 capsule 0    fluticasone (FLONASE) 50 MCG/ACT nasal spray 2 sprays by Each Nostril route daily 1 Bottle 5    levothyroxine (SYNTHROID) 100 MCG tablet Take 1 tablet by mouth Daily 90 tablet 1    metoprolol succinate (TOPROL XL) 100 MG extended release tablet Take 1 tablet by mouth daily 90 tablet 1    diclofenac sodium (VOLTAREN) 1 % GEL Apply topically 2 times daily 150 g 2    apixaban (ELIQUIS) 5 MG TABS tablet Take 1 tablet by mouth 2 times daily 180 tablet 3    torsemide (DEMADEX) 10 MG tablet 2 tabs every other day alternating with 1 tab the other days 180 tablet 3    acetaminophen (TYLENOL) 325 MG tablet Take 650 mg by mouth every 6 hours as needed for Pain TAKES EVERY DAY      cetirizine (ZYRTEC) 10 MG tablet Take 10 mg by mouth daily       No current facility-administered medications for this visit.        Immunization History   Administered Date(s) Administered    COVID-19, Moderna, Primary or Immunocompromised, PF, 100mcg/0.5mL 03/18/2021, 04/22/2021    Influenza Virus Vaccine 01/28/2012, 10/17/2012, 11/10/2014, 10/22/2015    Influenza Whole 11/10/2014    Influenza, Intradermal, Preservative free 09/24/2013    Influenza, MDCK Quadv, IM, PF (Flucelvax 2 yrs and older) 10/17/2017    Influenza, Quadv, IM, PF (6 mo and older Fluzone, Flulaval, Fluarix, and 3 yrs and older Afluria) 12/01/2016, 01/04/2019, 10/07/2019, 09/21/2020, 12/05/2021    Pneumococcal Polysaccharide ([id number]) 01/28/2012    Tetanus 03/01/1988       Allergies   Allergen Reactions    Dilaudid [Hydromorphone Hcl] Other (See Comments)     Palpitations and orthostatic hypotension    Lavender Oil Shortness Of Breath and Rash    Penicillins Shortness Of Breath    Seroquel [Quetiapine Fumarate] Other (See Comments)     Very lethargic/almost not funtioning at all (per patient).  Adhesive Tape Other (See Comments)     BLISTERS, paper tape okay    Hydromorphone Hcl      Other reaction(s): Other (See Comments)  Rapid heart rate     Lithium Other (See Comments)     Diarrhea, vomiting, nausea, headaches,     Tetanus Toxoids Swelling       Review of Systems   Constitutional: Positive for activity change and fatigue. Negative for appetite change, chills, diaphoresis, fever and unexpected weight change. HENT: Negative for ear pain, hearing loss and trouble swallowing. Eyes: Negative for visual disturbance. Respiratory: Negative for cough and shortness of breath. Cardiovascular: Negative for chest pain, palpitations and leg swelling. Gastrointestinal: Negative for abdominal pain, blood in stool, constipation, diarrhea, nausea and vomiting. Genitourinary: Negative for decreased urine volume, difficulty urinating, dysuria, flank pain, hematuria and urgency. Musculoskeletal: Positive for arthralgias, gait problem and myalgias. Negative for back pain and joint swelling. Skin: Negative for color change and rash. Neurological: Positive for weakness and headaches. Negative for dizziness, tremors, seizures, syncope, facial asymmetry, speech difficulty, light-headedness and numbness. Psychiatric/Behavioral: Negative for dysphoric mood and sleep disturbance. The patient is nervous/anxious.         /84 (Site: Left Upper Arm, Position: Sitting, Cuff Size: Large Adult) Pulse 67   Resp 16   Wt (!) 326 lb 6.4 oz (148.1 kg)   SpO2 99%   BMI 61.67 kg/m²     Physical Exam  Vitals and nursing note reviewed. Constitutional:       General: She is not in acute distress. Appearance: She is well-developed. She is not diaphoretic. HENT:      Head: Normocephalic and atraumatic. Eyes:      Conjunctiva/sclera: Conjunctivae normal.      Pupils: Pupils are equal, round, and reactive to light. Neck:      Vascular: No JVD. Cardiovascular:      Rate and Rhythm: Normal rate and regular rhythm. Heart sounds: Normal heart sounds. No murmur heard. No friction rub. No gallop. Pulmonary:      Effort: Pulmonary effort is normal. No respiratory distress. Breath sounds: Normal breath sounds. No wheezing or rales. Chest:      Chest wall: No tenderness. Abdominal:      Palpations: Abdomen is soft. Tenderness: There is no abdominal tenderness. Musculoskeletal:         General: Tenderness present. No swelling, deformity or signs of injury. Normal range of motion. Arms:       Cervical back: Normal range of motion and neck supple. Skin:     General: Skin is warm and dry. Capillary Refill: Capillary refill takes 2 to 3 seconds. Coloration: Skin is not pale. Findings: No erythema or rash. Neurological:      Mental Status: She is alert and oriented to person, place, and time. Cranial Nerves: No cranial nerve deficit. Sensory: No sensory deficit. Motor: Weakness present. Coordination: Coordination abnormal.      Gait: Gait abnormal.      Deep Tendon Reflexes: Reflexes abnormal.   Psychiatric:         Mood and Affect: Mood normal.         Behavior: Behavior normal.         Thought Content: Thought content normal.         Judgment: Judgment normal.         Plan  1. Transient left leg weakness  - MRI BRAIN WO CONTRAST; Future  - [facility]  -  aquatic therapy; Future  2.  Traumatic injury of head, subsequent encounter  - MRI BRAIN WO CONTRAST; Future  3. Forgetfulness  - MRI BRAIN WO CONTRAST; Future  4. Vision changes  - MRI BRAIN WO CONTRAST; Future  5. Arterial ischemic stroke, ICA, right, acute ([address]      6. Chronic gout without tophus, unspecified cause, unspecified site  - allopurinol (ZYLOPRIM) 300 MG tablet; TAKE 1 TABLET EVERY DAY  Dispense: 90 tablet; Refill: 1    7. Chronic pain of both shoulders  - [facility]  - PT aquatic therapy; Future  - PAULINA; Future  - RHEUMATOID FACTOR; Future  - C-REACTIVE PROTEIN; Future  - Sedimentation Rate; Future  8. Biceps tendinitis of both shoulders  - [facility]  - PT aquatic therapy; Future  - PAULINA; Future  - RHEUMATOID FACTOR; Future  - C-REACTIVE PROTEIN; Future  - Sedimentation Rate; Future    9. Elevated alkaline phosphatase level  - ALKALINE PHOSPHATASE, ISOENZYMES; Future  - GAMMA GT; Future    10. Acquired hypothyroidism  - TSH with Reflex; Future    11. Class 3 severe obesity due to excess calories with serious comorbidity and body mass index (BMI) of 60.0 to 69.9 in adult (HCC)  - Hemoglobin A1C; Future      While assessing care for this patient, I have reviewed all pertinent lab work/imaging/ specialist notes and care in reference to those problems addressed above in detail. Appropriate medical decision making was based on this. Please note that portions of this note may have been completed with a voice recognition program. Efforts were made to edit the dictations but occasionally words are mis-transcribed. Return if symptoms worsen or fail to improve.

## 2021-12-23 LAB
ANTI-NUCLEAR ANTIBODY (ANA): POSITIVE
ESTIMATED AVERAGE GLUCOSE: 108.3 MG/DL
HBA1C MFR BLD: 5.4 %

## 2021-12-25 LAB
ALK PHOS OTHER CALC: 0 U/L
ALK PHOSPHATASE: 143 U/L (ref 40–120)
ALKALINE PHOSPHATASE BONE FRACTION: 49 U/L (ref 0–55)
ALKALINE PHOSPHATASE LIVER FRACTION: 94 U/L (ref 0–94)

## 2021-12-27 LAB
ANTINUCLEAR AB INTERPRETIVE COMMENT: NORMAL
ANTINUCLEAR ANTIBODY, HEP-2, IGG: NORMAL

## 2021-12-30 ENCOUNTER — HOSPITAL ENCOUNTER (OUTPATIENT)
Dept: MRI IMAGING | Age: 61
Discharge: HOME | End: 2021-12-30
Payer: MEDICARE

## 2021-12-30 DIAGNOSIS — S09.90XD TRAUMATIC INJURY OF HEAD, SUBSEQUENT ENCOUNTER: ICD-10-CM

## 2021-12-30 DIAGNOSIS — H53.9 VISION CHANGES: ICD-10-CM

## 2021-12-30 DIAGNOSIS — R68.89 FORGETFULNESS: ICD-10-CM

## 2021-12-30 DIAGNOSIS — R29.898 TRANSIENT LEFT LEG WEAKNESS: ICD-10-CM

## 2021-12-30 PROCEDURE — 70551 MRI BRAIN STEM W/O DYE: CPT
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
## 2022-01-02 ENCOUNTER — PATIENT MESSAGE (OUTPATIENT)
Dept: FAMILY MEDICINE CLINIC | Age: 62
End: 2022-01-02

## 2022-01-02 DIAGNOSIS — M25.50 ARTHRALGIA, UNSPECIFIED JOINT: ICD-10-CM

## 2022-01-02 DIAGNOSIS — R93.0 ABNORMAL MRI OF THE HEAD: ICD-10-CM

## 2022-01-02 DIAGNOSIS — R89.9 ABNORMAL LABORATORY TEST RESULT: ICD-10-CM

## 2022-01-02 DIAGNOSIS — R76.8 POSITIVE ANA (ANTINUCLEAR ANTIBODY): ICD-10-CM

## 2022-01-02 DIAGNOSIS — G62.9 NEUROPATHY: Primary | ICD-10-CM

## 2022-01-02 NOTE — TELEPHONE ENCOUNTER
From: Jailene Jones  To: Dr. Fang Hoffman: 2022 9:02 AM EST  Subject: Update    I've been having an all over burning sensation in my joints and muscles. Its not every day. When it happens I feel like I'm on fire and it is very difficult to walk around. Nothing eases the pain. The areas on my upper arms hurt all the time and have rock hard spots. [facility]

## 2022-01-04 ENCOUNTER — HOSPITAL ENCOUNTER (OUTPATIENT)
Dept: PHYSICAL THERAPY | Age: 62
Setting detail: THERAPIES SERIES
Discharge: HOME | End: 2022-01-04
Payer: MEDICARE

## 2022-01-04 PROCEDURE — 97110 THERAPEUTIC EXERCISES: CPT

## 2022-01-04 PROCEDURE — 97140 MANUAL THERAPY 1/> REGIONS: CPT

## 2022-01-04 PROCEDURE — 97162 PT EVAL MOD COMPLEX 30 MIN: CPT

## 2022-01-04 NOTE — PLAN OF CARE
[facility], [address] [place] [address] [place], [address]  Phone: [phone]   Fax:     [phone]                                                       Physical Therapy Certification    Dear Referring Practitioner: Drake Rodriguez MD,    We had the pleasure of evaluating the following patient for physical therapy services at [address] [place] [address]. A summary of our findings can be found in the initial assessment below. This includes our plan of care. If you have any questions or concerns regarding these findings, please do not hesitate to contact me at the office phone number checked above. Thank you for the referral.       Physician Signature:_______________________________Date:__________________  By signing above (or electronic signature), therapists plan is approved by physician      Patient: Oumar Ramsey   : 1960   MRN: [id number]  Referring Physician: Referring Practitioner: Drake Rodriguez MD      Evaluation Date: 2022      Medical Diagnosis Information:  Diagnosis: M25.511, G89.29, M25.512 (ICD-10-CM) - Chronic pain of both shoulders   Treatment Diagnosis: Bilateral shoulder pain, decreased ROM and strength                                         Insurance information: PT Insurance Information: [employer]- needs auth $40 CP    Precautions/ Contra-indications: Pacemaker    Latex Allergy:  [x]NO      []YES    Preferred Language for Healthcare:   [x]English       []other:    SUBJECTIVE: Patient stated complaint: Pt states a couple months ago started having some right shoulder pain and then shortly after began having left shoulder pain. Points to deltoid area. Difficulty raising arm and lifting objects, lifting behind back and sleeping. Does report several falls over the past year thinks possibly that is what injured5 shoulders.      Relevant Medical History: No previous shoulder surgeries. Pain Scale: Current: 5/10; Max: 10/10; Best: 0/10    Easing factors: Rest    Provocative factors: Movement     Type: []Constant   [x]Intermittent  []Radiating []Localized []other:     Numbness/Tingling: Into fingers    Occupation/School: Disabled. Living Status/Prior Level of Function: Independent with ADLs and IADLs. C-SSRS Triggered by Intake questionnaire (Past 2 wk assessment):   [x] No, Questionnaire did not trigger screening.   [] Yes, Patient intake triggered further evaluation      [] C-SSRS Screening completed  [] PCP notified via Plan of Care  [] Emergency services notified     OBJECTIVE:     CERVICAL ROM RIGHT LEFT   Cervical Flexion     Cervical Extension     Cervical Lateral Flexion     Cervical Rotation          ROM RIGHT LEFT   Shoulder Flexion 120 95   Shoulder Abduction 85 60   Shoulder External Rotation 25 15   Shoulder Internal Rotation Posterior buttock Sacrum                  Strength  RIGHT LEFT   Shoulder Flexion     Shoulder Abduction     Shoulder External Rotation     Shoulder Internal Rotation          Elbow flexion     Wrist Extension     Elbow extension     5th digi abduction            Reflexes/Sensation:    [x]Dermatomes/Myotomes intact    []Reflexes equal and normal bilaterally     []Other:    Joint mobility:  Empty end feel   []Normal    []Hypo   []Hyper    Palpation: grossly ttp to bilateral shoulders    Functional Mobility/Transfers: Mod I    Posture: Poor- rounded shoulders and increase kyphosis posturing. Bandages/Dressings/Incisions: None    Gait: (include devices/WB status): WNL- using rollator. Orthopedic Special Tests: None                       [x] Patient history, allergies, meds reviewed. Medical chart reviewed. See intake form. Review Of Systems (ROS):  [x]Performed Review of systems (Integumentary, CardioPulmonary, Neurological) by intake and observation. Intake form has been scanned into medical record.  Patient has been instructed to contact their primary care physician regarding ROS issues if not already being addressed at this time. Co-morbidities/Complexities (which will affect course of rehabilitation):   []None           Arthritic conditions   []Rheumatoid arthritis (M05.9)  [x]Osteoarthritis (M19.91)   Cardiovascular conditions   [x]Hypertension (I10)  []Hyperlipidemia (E78.5)  []Angina pectoris (I20)  []Atherosclerosis (I70)   Musculoskeletal conditions   []Disc pathology   []Congenital spine pathologies   []Prior surgical intervention  []Osteoporosis (M81.8)  []Osteopenia (M85.8)   Endocrine conditions   []Hypothyroid (E03.9)  []Hyperthyroid Gastrointestinal conditions   []Constipation (I24.82)   Metabolic conditions   [x]Morbid obesity (E66.01)  []Diabetes type 1(E10.65) or 2 (E11.65)   []Neuropathy (G60.9)     Pulmonary conditions   []Asthma (J45)  []Coughing   []COPD (J44.9)   Psychological Disorders  [x]Anxiety (F41.9)  [x]Depression (F32.9)   []Other:   []Other:          Barriers to/and or personal factors that will affect rehab potential:              []Age  []Sex              []Motivation/Lack of Motivation                        []Co-Morbidities              []Cognitive Function, education/learning barriers              []Environmental, home barriers              []profession/work barriers  []past PT/medical experience  []other:  Justification:      Falls Risk Assessment (30 days):   [x] Falls Risk assessed and no intervention required. [] Falls Risk assessed and Patient requires intervention due to being higher risk   TUG score (>12s at risk):     [] Falls education provided, including         Functional Questionnaire: Quick Dash 82%    ASSESSMENT: Candido Mg is a 59 y.o. female reporting to OP PT with c/c of bilateral shoulder pain which has been occurring for a couple months without known TIFFANIE. Pt is noted to have limited AROM and PROM with yoanna yes.  Her PROM did start to improve some through session and was improved compared to AROM. Posture is poor. Strength testing deferred secondary to pain. Functional Impairments   [x]Noted spinal or UE joint hypomobility/ empt end feels   []Noted spinal or UE joint hypermobility   [x]Decreased UE functional ROM   [x]Decreased UE functional strength   []Abnormal reflexes/sensation/myotomal/dermatomal deficits   [x]Decreased RC/scapular/core strength and neuromuscular control   []other:      Functional Activity Limitations (from functional questionnaire and intake)   []Reduced ability to tolerate prolonged functional positions   []Reduced ability or difficulty with changes of positions or transfers between positions   [x]Reduced ability to maintain good posture and demonstrate good body mechanics with sitting, bending, and lifting   [] Reduced ability or tolerance with driving and/or computer work   [x]Reduced ability to sleep   [x]Reduced ability to perform lifting, reaching, carrying tasks   [x]Reduced ability to tolerate impact through UE   [x]Reduced ability to reach behind back   [x]Reduced ability to  or hold objects   []Reduced ability to throw or toss an object   []other:    Participation Restrictions   [x]Reduced participation in self care activities   [x]Reduced participation in home management activities   []Reduced participation in work activities   [x]Reduced participation in social activities. []Reduced participation in sport/recreation activities. Classification:   []Signs/symptoms consistent with post-surgical status including decreased ROM, strength and function.   []Signs/symptoms consistent with joint sprain/strain   []Signs/symptoms consistent with shoulder impingement   [x]Signs/symptoms consistent with shoulder tendinopathy   []Signs/symptoms consistent with Rotator cuff tear   []Signs/symptoms consistent with labral tear   [x]Signs/symptoms consistent with postural dysfunction    []Signs/symptoms consistent with Glenohumeral IR Deficit - <45 degrees   []Signs/symptoms consistent with facet dysfunction of cervical/thoracic spine    []Signs/symptoms consistent with pathology which may benefit from Dry needling     []other:     Prognosis/Rehab Potential:      []Excellent   [x]Good    []Fair   []Poor    Tolerance of evaluation/treatment:    []Excellent   [x]Good    []Fair   []Poor    Physical Therapy Evaluation Complexity Justification  [x] A history of present problem with:  [] no personal factors and/or comorbidities that impact the plan of care;  []1-2 personal factors and/or comorbidities that impact the plan of care  [x]3 personal factors and/or comorbidities that impact the plan of care  [x] An examination of body systems using standardized tests and measures addressing any of the following: body structures and functions (impairments), activity limitations, and/or participation restrictions;:  [] a total of 1-2 or more elements   [x] a total of 3 or more elements   [] a total of 4 or more elements   [x] A clinical presentation with:  [x] stable and/or uncomplicated characteristics   [] evolving clinical presentation with changing characteristics  [] unstable and unpredictable characteristics;   [x] Clinical decision making of [] low, [] moderate, [] high complexity using standardized patient assessment instrument and/or measurable assessment of functional outcome. [] EVAL (LOW) 92566 (typically 20 minutes face-to-face)  [x] EVAL (MOD) 17869 (typically 30 minutes face-to-face)  [] EVAL (HIGH) 63805 (typically 45 minutes face-to-face)  [] RE-EVAL     PLAN:  Frequency/Duration:  2 days per week for 8 Weeks:  INTERVENTIONS:  [x] Therapeutic exercise including: strength training, ROM, for Upper extremity and core   [x]  NMR activation and proprioception for UE, scap and Core   [x] Manual therapy as indicated for shoulder, scapula and spine to include: Dry Needling/IASTM, STM, PROM, Gr I-IV mobilizations, manipulation.    [x] Modalities as needed that may Concern for new onset of dark stools although patient has had nasopharyngeal bleeding x 1-2 months cannot exclude gi bleed and will get iv, labs, cxr, pt/inr, t&s, and obs   Will follow up on labs, analgesia, reassess and disposition as clinically indicated. include: thermal agents, E-stim, Biofeedback, US, iontophoresis as indicated  [x] Patient education on joint protection, postural re-education, activity modification, progression of HEP. HEP instruction: [id number](see scanned forms)    GOALS:  Patient stated goal: Reduce pain, improve motion. Therapist goals for Patient:   Short Term Goals: To be achieved in: 2 weeks  1. Independent in HEP and progression per patient tolerance, in order to prevent re-injury. [] Progressing: [] Met: [] Not Met: [] Adjusted     2. Patient will have a decrease in pain to facilitate improvement in movement, function, and ADLs as indicated by Functional Deficits. [] Progressing: [] Met: [] Not Met: [] Adjusted     Long Term Goals: To be achieved in: 8 weeks  1. Disability index score of 60 % or less for the DASH to assist with reaching prior level of function. [] Progressing: [] Met: [] Not Met: [] Adjusted     2. Patient will demonstrate increased AROM flex/abd/ER/IR to 140/140/40/Sacrum bilaterally to allow for proper joint functioning as indicated by patients Functional Deficits. [] Progressing: [] Met: [] Not Met: [] Adjusted     3. Patient will demonstrate an increase in Strength of bilateral shoulders to >/=4+/5 to allow for proper functional mobility as indicated by patients Functional Deficits. [] Progressing: [] Met: [] Not Met: [] Adjusted     4. Patient will return to donning/doffing clothes while maintaining pain </= 2/10increased symptoms or restriction. [] Progressing: [] Met: [] Not Met: [] Adjusted     5.  Pt will be able to sleep 5 hours without waking due to pain. (patient specific functional goal)    [] Progressing: [] Met: [] Not Met: [] Adjusted      Electronically signed by:  Gisell Warner, PT

## 2022-01-04 NOTE — PROGRESS NOTES
[address] [place] [address] [facility], [address], [address] [place] [address] [address]  Phone: [phone]   Fax: [phone]    Date: 2022          Patient Name; :  Oumar Ramsey; 1960   Dx: Diagnosis: M25.511, G89.29, M25.512 (ICD-10-CM) - Chronic pain of both shoulders      Physician: Referring Practitioner: Drake Rodriguez MD        Total PT Visits:      Measures Previous Current   Pain (0-10)     Disability %           Assessment:        Prognosis for POC: [] Good [] Fair  [] Poor      Patient requires continued skilled intervention: [] Yes  [] No        Plan & Recommendations:  [] Continue rehabilitation due to objective improvement and continued functional deficits with frequency and duration:   [] Progress toward  []GAP, []Work Conditioning, []Independent HEP   [] Discharge due to   [] All goals achieved, [] Maximized \"medical necessity\" [] No subjective or objective improvements      Electronically signed by:  Damari Lancaster, PT  Therapy Plan of Care Re-Certification  This patient has been re-evaluated for physical therapy services and for therapy to continue, Medicare, Medicaid and other insurances require periodic physician review of the treatment plan. Please review the above re-evaluation and verify that you agree with plan of care as established above by signing the attached document and return it to our office or note changes to established plan below  [] Follow treatment plan as above [] Discontinue physical therapy  [] Change plan to:                                 __________________________________________________    Physician Signature:____________________________________ Date:____________  By signing above, therapists plan is approved by physician    If you have any questions or concerns, please don't hesitate to call.   Thank you for your referral.

## 2022-01-04 NOTE — FLOWSHEET NOTE
[facility], [address] [place] [address] [address]  Phone: [phone]   Fax:     [phone]      Physical Therapy Treatment Note/ Progress Report:           Date:  2022    Patient Name:  Suman Odom    :  1960  MRN: [id number]  Restrictions/Precautions:    Medical/Treatment Diagnosis Information:  · Diagnosis: M25.511, G89.29, M25.512 (ICD-10-CM) - Chronic pain of both shoulders  · Treatment Diagnosis: Bilateral shoulder pain, decreased ROm and strength  Insurance/Certification information:  PT Insurance Information: [employer]- needs auth $40 CP  Physician Information:  Referring Practitioner: Jolene Hernandez MD  Has the plan of care been signed (Y/N):        []  Yes  [x]  No     Date of Patient follow up with Physician:     Assessment Summary: Lili Ramires is a 59 y.o. female reporting to OP PT with c/c of bilateral shoulder pain which has been occurring for a couple months without known TIFFANIE. Pt is noted to have limited AROM and PROM with yoanna end feels. Her PROM did start to improve some through session and was improved compared to AROM. Posture is poor. Strength testing deferred secondary to pain.        Is this a Progress Report:     []  Yes  [x]  No        If Yes:  Date Range for reporting period:  Beginnin/4/22  Endin/4/22    Progress report will be due (10 Rx or 30 days whichever is less): 64       Recertification will be due (POC Duration  / 90 days whichever is less): 3/4/22          Visit # Insurance Allowable Auth Required   In Person  auth []  Yes     []  No    Tele Health   []  Yes     []  No    Total 1         Functional Scale: Quick Dash 82%   Date assessed:     Latex Allergy:  [x]NO      []YES  Preferred Language for Healthcare:   [x]English       []other:      Pain level:  5/10         SUBJECTIVE:  See eval        OBJECTIVE: See eval          RESTRICTIONS/PRECAUTIONS: pacemaker    Exercises/Interventions: HEP code: [id number]  Therapeutic Ex (82422) HEP 1/4/22     Warm-up       Pulleys       UBE              TABLE       Supine cane flexion x x20                                                      SEATED       Cane ER x x15 B     Cervical lateral flexion x x15 B     Scap squeeze x x20     Shoulder depression x x20            STANDING                                                          Manual: PROM into shoulder flexion, abduction, ER and IR in neutral, 45° abduction 15'      Therapeutic Exercise and NMR EXR  [x] (56732) Provided verbal/tactile cueing for activities related to strengthening, flexibility, endurance, ROM  for improvements in scapular, scapulothoracic and UE control with self care, reaching, carrying, lifting, house/yardwork, driving/computer work.    [] (33143) Provided verbal/tactile cueing for activities related to improving balance, coordination, kinesthetic sense, posture, motor skill, proprioception  to assist with  scapular, scapulothoracic and UE control with self care, reaching, carrying, lifting, house/yardwork, driving/computer work. Therapeutic Activities:    [x] (69076 or 49628) Provided verbal/tactile cueing for activities related to improving balance, coordination, kinesthetic sense, posture, motor skill, proprioception and motor activation to allow for proper function of scapular, scapulothoracic and UE control with self care, carrying, lifting, driving/computer work.      Home Exercise Program:    [x] (33648) Reviewed/Progressed HEP activities related to strengthening, flexibility, endurance, ROM of scapular, scapulothoracic and UE control with self care, reaching, carrying, lifting, house/yardwork, driving/computer work  [] (92585) Reviewed/Progressed HEP activities related to improving balance, coordination, kinesthetic sense, posture, motor skill, proprioception of scapular, scapulothoracic and UE control with self care, reaching, carrying, lifting, house/yardwork, driving/computer work      Manual Treatments:  PROM / STM / Oscillations-Mobs:  G-I, II, III, IV (PA's, Inf., Post.)  [x] (91920) Provided manual therapy to mobilize soft tissue/joints of cervical/CT, scapular GHJ and UE for the purpose of modulating pain, promoting relaxation,  increasing ROM, reducing/eliminating soft tissue swelling/inflammation/restriction, improving soft tissue extensibility and allowing for proper ROM for normal function with self care, reaching, carrying, lifting, house/yardwork, driving/computer work    Modalities:     [] GAME READY (VASO)- for significant edema, swelling, pain control. Charges:  Timed Code Treatment Minutes: 25   Total Treatment Minutes:  40   BWC:  TE TIME:  NMR TIME:  MANUAL TIME:  TA  UNTIMED MINUTES:  Medicare Total:   10    15    15        [] EVAL (LOW) 33760 (typically 20 minutes face-to-face)  [x] EVAL (MOD) 56679 (typically 30 minutes face-to-face)  [] EVAL (HIGH) 20621 (typically 45 minutes face-to-face)  [] RE-EVAL     [x] RC(38451) x     [] IONTO  [] NMR (90833) x     [] VASO  [x] Manual (86184) x     [] Other:  [] TA x      [] Mech Traction (01989)  [] ES(attended) (32708)      [] ES (un) (85662):           GOALS:     Patient stated goal: Reduce pain, improve motion.      Therapist goals for Patient:   Short Term Goals: To be achieved in: 2 weeks  1. Independent in HEP and progression per patient tolerance, in order to prevent re-injury. []? Progressing: []? Met: []? Not Met: []? Adjusted      2. Patient will have a decrease in pain to facilitate improvement in movement, function, and ADLs as indicated by Functional Deficits. []? Progressing: []? Met: []? Not Met: []? Adjusted      Long Term Goals: To be achieved in: 8 weeks  1. Disability index score of 60 % or less for the DASH to assist with reaching prior level of function. []? Progressing: []? Met: []? Not Met: []? Adjusted      2.  Patient will demonstrate increased AROM flex/abd/ER/IR to 140/140/40/Sacrum bilaterally to allow for proper joint functioning as indicated by patients Functional Deficits. []? Progressing: []? Met: []? Not Met: []? Adjusted      3. Patient will demonstrate an increase in Strength of bilateral shoulders to >/=4+/5 to allow for proper functional mobility as indicated by patients Functional Deficits. []? Progressing: []? Met: []? Not Met: []? Adjusted      4. Patient will return to donning/doffing clothes while maintaining pain </= 2/10increased symptoms or restriction. []? Progressing: []? Met: []? Not Met: []? Adjusted      5. Pt will be able to sleep 5 hours without waking due to pain. (patient specific functional goal)    []? Progressing: []? Met: []? Not Met: []? Adjusted              ASSESSMENT:  See eval    Patient received education on their current pathology and how their condition effects them with their functional activities. Patient understood discussion and questions were answered. Patient understands their activity limitations and understands rational for treatment progression. Pt educated on plan of care and HEP, if worsening symptoms to d/c that exercise. PLAN: See eval  [x] Continue per plan of care [] Alter current plan (see comments above)  [] Plan of care initiated [] Hold pending MD visit [] Discharge      Electronically signed by:  Kirsten Bueno PT    Note: If patient does not return for scheduled/ recommended follow up visits, this note will serve as a discharge from care along with most recent update on progress.

## 2022-01-06 DIAGNOSIS — Z00.00 ROUTINE GENERAL MEDICAL EXAMINATION AT A HEALTH CARE FACILITY: ICD-10-CM

## 2022-01-06 DIAGNOSIS — E03.8 SUBCLINICAL HYPOTHYROIDISM: Primary | ICD-10-CM

## 2022-01-06 RX ORDER — LEVOTHYROXINE SODIUM 112 UG/1
112 TABLET ORAL DAILY
Qty: 30 TABLET | Refills: 1 | Status: SHIPPED | OUTPATIENT
Start: 2022-01-06 | End: 2022-02-28

## 2022-01-06 RX ORDER — METHYLPREDNISOLONE 4 MG/1
TABLET ORAL
Qty: 1 KIT | Refills: 0 | Status: SHIPPED | OUTPATIENT
Start: 2022-01-06 | End: 2022-01-12

## 2022-01-10 PROBLEM — R06.81 APNEA: Status: ACTIVE | Noted: 2022-01-10

## 2022-01-11 ENCOUNTER — TELEMEDICINE (OUTPATIENT)
Dept: PSYCHOLOGY | Age: 62
End: 2022-01-11
Payer: MEDICARE

## 2022-01-11 ENCOUNTER — TELEPHONE (OUTPATIENT)
Dept: FAMILY MEDICINE CLINIC | Age: 62
End: 2022-01-11

## 2022-01-11 DIAGNOSIS — M75.22 BICEPS TENDINITIS OF BOTH SHOULDERS: ICD-10-CM

## 2022-01-11 DIAGNOSIS — R29.898 TRANSIENT LEFT LEG WEAKNESS: Primary | ICD-10-CM

## 2022-01-11 DIAGNOSIS — E66.01 CLASS 3 SEVERE OBESITY DUE TO EXCESS CALORIES WITH SERIOUS COMORBIDITY AND BODY MASS INDEX (BMI) OF 60.0 TO 69.9 IN ADULT (HCC): ICD-10-CM

## 2022-01-11 DIAGNOSIS — M75.21 BICEPS TENDINITIS OF BOTH SHOULDERS: ICD-10-CM

## 2022-01-11 DIAGNOSIS — F31.30 BIPOLAR I DISORDER, MOST RECENT EPISODE (OR CURRENT) DEPRESSED (HCC): Primary | ICD-10-CM

## 2022-01-11 DIAGNOSIS — G89.29 CHRONIC PAIN OF BOTH SHOULDERS: ICD-10-CM

## 2022-01-11 DIAGNOSIS — M25.511 CHRONIC PAIN OF BOTH SHOULDERS: ICD-10-CM

## 2022-01-11 DIAGNOSIS — M25.512 CHRONIC PAIN OF BOTH SHOULDERS: ICD-10-CM

## 2022-01-11 DIAGNOSIS — S09.90XD TRAUMATIC INJURY OF HEAD, SUBSEQUENT ENCOUNTER: ICD-10-CM

## 2022-01-11 PROCEDURE — 99999 PR OFFICE/OUTPT VISIT,PROCEDURE ONLY: CPT | Performed by: PSYCHOLOGIST

## 2022-01-11 PROCEDURE — 90832 PSYTX W PT 30 MINUTES: CPT | Performed by: PSYCHOLOGIST

## 2022-01-11 NOTE — TELEPHONE ENCOUNTER
[facility] received order for walker. They are needing an order for a Bariatric Rollator with a seat. Please submit new order.   FAX:  [phone]

## 2022-01-11 NOTE — PROGRESS NOTES
Behavioral Health Consultation  [address] Zita Rader PsyD  Psychologist  12/2/2021   11:22 AM        Time spent with Patient: 30 minutes  This is patient's eleventh [facility] appointment. Reason for Consult:                 Depression     Referring Provider: Israel Boyer MD     TELEHEALTH VISIT -- Audio/Visual (During FNRLW-24 public health emergency)  }  Pursuant to the emergency declaration under the [address] [place] [address] [facility] authority and the [employer] Act, this Virtual Visit was conducted, with patient's consent, to reduce the patient's risk of exposure to COVID-19 and provide continuity of care for an established patient. Services were provided through a video synchronous discussion virtually to substitute for in-person clinic visit. Pt gave verbal informed consent to participate in telehealth services. Conducted a risk-benefit analysis and determined that the patient's presenting problems are consistent with the use of telepsychology. Determined that the patient has sufficient knowledge and skills in the use of technology enabling them to adequately benefit from telepsychology. It was determined that this patient was able to be properly treated without an in-person session. Patient verified that they were currently located at the [facility] address that was provided during registration. Verified the following information:  Patient's identification: Yes  Patient location: [address] [place] [address]  Patient's call back number: [phone]  Patient's emergency contact's name and number, as well as permission to contact them if needed: Extended Emergency Contact Information  Primary Emergency Contact: Boy Kc Phone: [phone]  Mobile Phone: [phone]  Relation: Brother/Sister  Preferred language: English   needed?  No  Secondary Emergency Contact: Tatyana Whitten [address] Phone: [phone]  Mobile Phone: [phone]  Relation: Niece/Nephew    Provider location: [place], [address]:  During the last visit pt set goals to:   1) follow-up with EMDR referrals  2) continue to engage in social and pleasurable activities  3) return for f/u in 3 weeks, sooner if needed    Patient reports she has been doing fairly well. Has had some down moments but has been able to work through them effectively without getting stuck. Has refrained many of her thoughts which has been helpful. Feels medication continues to be very helpful. Feels her brain is much clearer. Has been struggling with many physical issues and is now going to PT frequently. Has been busy with so many other appointments that she has not followed up with ENT referrals after her initial calls. initially felt guilty about this but has practiced more acceptance that she is focusing on other important issues and mood is stable.     O:  MSE:    Appearance: good hygiene   Attitude: cooperative and friendly  Consciousness: alert  Orientation: oriented to person, place, time, general circumstance  Memory: recent and remote memory intact  Attention/Concentration: intact during session  Psychomotor Activity:normal  Eye Contact: minimal   Speech: normal rate and volume, well-articulated  Mood: \"Good\"  Affect: Congruent  Perception: within normal limits  Thought Content: within normal limits  Thought Process: logical, coherent and goal-directed  Insight: good  Judgment: intact  Ability to understand instructions: Yes  Ability to respond meaningfully: Yes  Morbid Ideation: no   Suicide Assessment: no  Homicidal Ideation: no      History:    Medications:   Current Outpatient Medications   Medication Sig Dispense Refill    buPROPion (WELLBUTRIN XL) 150 MG extended release tablet TAKE 1 TABLET EVERY MORNING 90 tablet 1    CPAP Machine MISC by Does not apply route      methocarbamol (ROBAXIN) 500 MG tablet TAKE 1 TABLET THREE TIMES DAILY AS NEEDED FOR PAIN (Patient taking differently: TAKE 1 TABLET TWICE TIMES DAILY AS NEEDED FOR PAIN) 270 tablet 0    lisinopril (PRINIVIL;ZESTRIL) 5 MG tablet TAKE 1 TABLET EVERY DAY 90 tablet 2    montelukast (SINGULAIR) 10 MG tablet TAKE 1 TABLET EVERY NIGHT 90 tablet 1    simvastatin (ZOCOR) 20 MG tablet TAKE 1 TABLET EVERY NIGHT 90 tablet 1    lamoTRIgine (LAMICTAL) 150 MG tablet TAKE 1 TABLET TWICE DAILY OR AS OTHERWISE DIRECTED 180 tablet 1    vitamin D (ERGOCALCIFEROL) 1.25 MG (36224 UT) CAPS capsule TAKE 1 CAPSULE ONE TIME WEEKLY 8 capsule 0    fluticasone (FLONASE) 50 MCG/ACT nasal spray 2 sprays by Each Nostril route daily 1 Bottle 5    levothyroxine (SYNTHROID) 100 MCG tablet Take 1 tablet by mouth Daily 90 tablet 1    metoprolol succinate (TOPROL XL) 100 MG extended release tablet Take 1 tablet by mouth daily 90 tablet 1    allopurinol (ZYLOPRIM) 300 MG tablet TAKE 1 TABLET EVERY DAY 90 tablet 1    diclofenac sodium (VOLTAREN) 1 % GEL Apply topically 2 times daily 150 g 2    apixaban (ELIQUIS) 5 MG TABS tablet Take 1 tablet by mouth 2 times daily 180 tablet 3    torsemide (DEMADEX) 10 MG tablet 2 tabs every other day alternating with 1 tab the other days 180 tablet 3    acetaminophen (TYLENOL) 325 MG tablet Take 650 mg by mouth every 6 hours as needed for Pain TAKES EVERY DAY      cetirizine (ZYRTEC) 10 MG tablet Take 10 mg by mouth daily       No current facility-administered medications for this visit. Social History:   Social History     Socioeconomic History    Marital status:       Spouse name: Not on file    Number of children: Not on file    Years of education: Not on file    Highest education level: Not on file   Occupational History    Not on file   Tobacco Use    Smoking status: Former Smoker     Packs/day: 3.00     Years: 2.00     Pack years: 6.00     Types: Cigarettes     Quit date: 1985     Years since quittin.9    Smokeless tobacco: Never Used   Vaping Use    Vaping Use: Never used   Substance and Sexual Activity    Alcohol use: No    Drug use: No    Sexual activity: Not Currently   Other Topics Concern    Not on file   Social History Narrative    Not on file     Social Determinants of Health     Financial Resource Strain: Low Risk     Difficulty of Paying Living Expenses: Not hard at all   Food Insecurity: No Food Insecurity    Worried About Running Out of Food in the Last Year: Never true    Alexi of Food in the Last Year: Never true   Transportation Needs: No Transportation Needs    Lack of Transportation (Medical): No    Lack of Transportation (Non-Medical): No   Physical Activity:     Days of Exercise per Week: Not on file    Minutes of Exercise per Session: Not on file   Stress:     Feeling of Stress : Not on file   Social Connections:     Frequency of Communication with Friends and Family: Not on file    Frequency of Social Gatherings with Friends and Family: Not on file    Attends [employer] Services: Not on file    Active Member of [address] [place] [address] or Organizations: Not on file    Attends Club or Organization Meetings: Not on file    Marital Status: Not on file   Intimate Partner Violence:     Fear of Current or Ex-Partner: Not on file    Emotionally Abused: Not on file    Physically Abused: Not on file    Sexually Abused: Not on file   Housing Stability:     Unable to Pay for Housing in the Last Year: Not on file    Number of [place] in the Last Year: Not on file    Unstable Housing in the Last Year: Not on file       TOBACCO:   reports that she quit smoking about 36 years ago. Her smoking use included cigarettes. She has a 6.00 pack-year smoking history. She has never used smokeless tobacco.  ETOH:   reports no history of alcohol use.     Family History:   Family History   Problem Relation Age of Onset    Cancer Mother     Ovarian Cancer Mother 45        Technically peritoneal cancer    Depression Mother         bipolar    Arthritis Father     Rheum Arthritis Father     Arrhythmia Father     Other Father         gout    Heart Disease Father     Depression Brother         depression    Other Brother         gout    Obesity Brother     Other Brother         gout    Obesity Brother     Depression Brother     Cancer Maternal Grandmother         Breast    Breast Cancer Maternal Grandmother          A:  Ms. Ramirez depression has been stable since the last visit. She reports some down moments but has been coping effectively. She continues to be active and engaged and responds positively to behavioral interventions. She does plan to pursue EMDR in the future when physical conditions have improved.     Diagnosis:       BAD I, Most recent episode depressed      Plan:  Pt interventions:    [place]-setting to identify pt's primary goals for [facility] visit / overall health, Supportive techniques, reinforced pt's skill use, CBT interventions, ACT interventions  and treatment planning    Pt Behavioral Change Plan  Patient set goals to:   1) continue to engage in social and pleasurable activities  2) return for f/u in 2 weeks, sooner if needed

## 2022-01-13 ENCOUNTER — HOSPITAL ENCOUNTER (OUTPATIENT)
Dept: PHYSICAL THERAPY | Age: 62
Setting detail: THERAPIES SERIES
Discharge: HOME | End: 2022-01-13
Payer: MEDICARE

## 2022-01-13 PROCEDURE — 97140 MANUAL THERAPY 1/> REGIONS: CPT

## 2022-01-13 PROCEDURE — 97112 NEUROMUSCULAR REEDUCATION: CPT

## 2022-01-13 PROCEDURE — 97110 THERAPEUTIC EXERCISES: CPT

## 2022-01-13 NOTE — FLOWSHEET NOTE
[address] [place] [address] and Sports Rehabilitation[address], [address] [place] [address] [address]  Phone: [phone]   Fax:     [phone]      Physical Therapy Treatment Note/ Progress Report:           Date:  2022    Patient Name:  Chary Red    :  1960  MRN: [id number]  Restrictions/Precautions:    Medical/Treatment Diagnosis Information:  · Diagnosis: M25.511, G89.29, M25.512 (ICD-10-CM) - Chronic pain of both shoulders  · Treatment Diagnosis: Bilateral shoulder pain, decreased ROm and strength  Insurance/Certification information:  PT Insurance Information: [employer]- needs auth $40 CP  Physician Information:  Referring Practitioner: Leona Pastrana MD  Has the plan of care been signed (Y/N):        []  Yes  [x]  No     Date of Patient follow up with Physician:     Assessment Summary: Bill Sethi is a 59 y.o. female reporting to OP PT with c/c of bilateral shoulder pain which has been occurring for a couple months without known TIFFANIE. Pt is noted to have limited AROM and PROM with yoanna end feels. Her PROM did start to improve some through session and was improved compared to AROM. Posture is poor. Strength testing deferred secondary to pain. Is this a Progress Report:     []  Yes  [x]  No        If Yes:  Date Range for reporting period:  Beginnin/4/22  Endin/4/22    Progress report will be due (10 Rx or 30 days whichever is less): 3/9/92       Recertification will be due (POC Duration  / 90 days whichever is less): 3/4/22          Visit # Insurance Allowable Auth Required   In Person  auth []  Yes     []  No    Tele Health   []  Yes     []  No    Total 2 16 visits thru 3/8/22        Functional Scale: Quick Dash 82%   Date assessed:     Latex Allergy:  [x]NO      []YES  Preferred Language for Healthcare:   [x]English       []other:      Pain level:  1/10         SUBJECTIVE:  Pt states shoulders are feeling much better.  Has also been on steroid which has house/yardwork, driving/computer work  [] (12949) Reviewed/Progressed HEP activities related to improving balance, coordination, kinesthetic sense, posture, motor skill, proprioception of scapular, scapulothoracic and UE control with self care, reaching, carrying, lifting, house/yardwork, driving/computer work      Manual Treatments:  PROM / STM / Oscillations-Mobs:  G-I, II, III, IV (PA's, Inf., Post.)  [x] (70242) Provided manual therapy to mobilize soft tissue/joints of cervical/CT, scapular GHJ and UE for the purpose of modulating pain, promoting relaxation,  increasing ROM, reducing/eliminating soft tissue swelling/inflammation/restriction, improving soft tissue extensibility and allowing for proper ROM for normal function with self care, reaching, carrying, lifting, house/yardwork, driving/computer work    Modalities:     [] GAME READY (VASO)- for significant edema, swelling, pain control. Charges:  Timed Code Treatment Minutes: 40   Total Treatment Minutes:  40   BWC:  TE TIME:  NMR TIME:  MANUAL TIME:  TA  UNTIMED MINUTES:  Medicare Total:   15  10  15    15        [] EVAL (LOW) 19185 (typically 20 minutes face-to-face)  [] EVAL (MOD) 68433 (typically 30 minutes face-to-face)  [] EVAL (HIGH) 42630 (typically 45 minutes face-to-face)  [] RE-EVAL     [x] BE(69862) 1     [] IONTO  [x] NMR (49315) 1     [] VASO  [x] Manual (50186) 1    [] Other:  [] TA x      [] Mech Traction (83372)  [] ES(attended) (61070)      [] ES (un) (37675):           GOALS:     Patient stated goal: Reduce pain, improve motion.      Therapist goals for Patient:   Short Term Goals: To be achieved in: 2 weeks  1. Independent in HEP and progression per patient tolerance, in order to prevent re-injury. []? Progressing: []? Met: []? Not Met: []? Adjusted      2. Patient will have a decrease in pain to facilitate improvement in movement, function, and ADLs as indicated by Functional Deficits. []? Progressing: []? Met: []?  Not Met: []? Adjusted      Long Term Goals: To be achieved in: 8 weeks  1. Disability index score of 60 % or less for the DASH to assist with reaching prior level of function. []? Progressing: []? Met: []? Not Met: []? Adjusted      2. Patient will demonstrate increased AROM flex/abd/ER/IR to 140/140/40/Sacrum bilaterally to allow for proper joint functioning as indicated by patients Functional Deficits. []? Progressing: []? Met: []? Not Met: []? Adjusted      3. Patient will demonstrate an increase in Strength of bilateral shoulders to >/=4+/5 to allow for proper functional mobility as indicated by patients Functional Deficits. []? Progressing: []? Met: []? Not Met: []? Adjusted      4. Patient will return to donning/doffing clothes while maintaining pain </= 2/10increased symptoms or restriction. []? Progressing: []? Met: []? Not Met: []? Adjusted      5. Pt will be able to sleep 5 hours without waking due to pain. (patient specific functional goal)    []? Progressing: []? Met: []? Not Met: []? Adjusted              ASSESSMENT:  Pt gayle session well. There has been significant increase in AROM of shoulders which allowed progression into more scapular and rotator cuff strengthening. Patient received education on their current pathology and how their condition effects them with their functional activities. Patient understood discussion and questions were answered. Patient understands their activity limitations and understands rational for treatment progression. Pt educated on plan of care and HEP, if worsening symptoms to d/c that exercise.            PLAN: See eval  [x] Continue per plan of care [] Alter current plan (see comments above)  [] Plan of care initiated [] Hold pending MD visit [] Discharge      Electronically signed by:  Ronaldo Sanders PT    Note: If patient does not return for scheduled/ recommended follow up visits, this note will serve as a discharge from care along with most recent update on progress.

## 2022-01-18 ENCOUNTER — HOSPITAL ENCOUNTER (OUTPATIENT)
Dept: PHYSICAL THERAPY | Age: 62
Setting detail: THERAPIES SERIES
Discharge: HOME | End: 2022-01-18

## 2022-01-18 NOTE — PROGRESS NOTES
Physical Therapy    When confirming patient's new evaluation for aquatic therapy, pt was informed that her insurance does not cover aquatic PT. She decided to cancel her initial evaluation appointment.      Ciaran Ward, PT, DPT

## 2022-01-21 ENCOUNTER — HOSPITAL ENCOUNTER (OUTPATIENT)
Dept: PHYSICAL THERAPY | Age: 62
Setting detail: THERAPIES SERIES
Discharge: HOME | End: 2022-01-21
Payer: MEDICARE

## 2022-01-21 PROCEDURE — 97112 NEUROMUSCULAR REEDUCATION: CPT

## 2022-01-21 PROCEDURE — 97110 THERAPEUTIC EXERCISES: CPT

## 2022-01-21 PROCEDURE — 97140 MANUAL THERAPY 1/> REGIONS: CPT

## 2022-01-21 NOTE — FLOWSHEET NOTE
[address] [place] [address] and Sports Rehabilitation[address], [address] [place] [address] [address]  Phone: [phone]   Fax:     [phone]      Physical Therapy Treatment Note/ Progress Report:           Date:  2022    Patient Name:  Miriam Saleem    :  1960  MRN: [id number]  Restrictions/Precautions:    Medical/Treatment Diagnosis Information:  · Diagnosis: M25.511, G89.29, M25.512 (ICD-10-CM) - Chronic pain of both shoulders  · Treatment Diagnosis: Bilateral shoulder pain, decreased ROm and strength  Insurance/Certification information:  PT Insurance Information: [employer]- needs auth $40 CP  Physician Information:  Referring Practitioner: Yash Grayson MD  Has the plan of care been signed (Y/N):        []  Yes  [x]  No     Date of Patient follow up with Physician:     Assessment Summary: Brea Jacinto is a 59 y.o. female reporting to OP PT with c/c of bilateral shoulder pain which has been occurring for a couple months without known TIFFANIE. Pt is noted to have limited AROM and PROM with yoanna end feels. Her PROM did start to improve some through session and was improved compared to AROM. Posture is poor. Strength testing deferred secondary to pain. Is this a Progress Report:     []  Yes  [x]  No        If Yes:  Date Range for reporting period:  Beginnin/4/22  Endin/4/22    Progress report will be due (10 Rx or 30 days whichever is less): 6/4/15       Recertification will be due (POC Duration  / 90 days whichever is less): 3/4/22          Visit # Insurance Allowable Auth Required   In Person  auth []  Yes     []  No    Tele Health   []  Yes     []  No    Total 3 16 visits thru 3/8/22        Functional Scale: Quick Dash 82%   Date assessed:     Latex Allergy:  [x]NO      []YES  Preferred Language for Healthcare:   [x]English       []other:      Pain level:  6/10         SUBJECTIVE:  Pt states shoulders are sore today.          OBJECTIVE: RESTRICTIONS/PRECAUTIONS: pacemaker    Exercises/Interventions: HEP code: [id number]  Therapeutic Ex (67894) HEP 1/4/22 1/13/22 1/21/22   Warm-up       Pulleys    5'   UBE              TABLE       Supine cane flexion x x20  x10   Supine concentric cirlces   x30 ea x20 ea                                             SEATED       Cane ER x x15 B  x20 B   Cervical lateral flexion x x15 B     Scap squeeze x x20  x20   Shoulder depression x x20     ER isometric    30\"x2 B   IR isometric    30\"x2 B          STANDING       Rows   10x3 B, BTB    Extension   10x2 B, BTB    IR   10x2 B, BTB    ER   10x2 B, BTB    Wall weight shift    1'                   Manual: PROM into shoulder flexion, abduction, ER and IR in neutral, 45° abduction bilaterally 23'      Therapeutic Exercise and NMR EXR  [x] (43988) Provided verbal/tactile cueing for activities related to strengthening, flexibility, endurance, ROM  for improvements in scapular, scapulothoracic and UE control with self care, reaching, carrying, lifting, house/yardwork, driving/computer work.    [] (55715) Provided verbal/tactile cueing for activities related to improving balance, coordination, kinesthetic sense, posture, motor skill, proprioception  to assist with  scapular, scapulothoracic and UE control with self care, reaching, carrying, lifting, house/yardwork, driving/computer work. Therapeutic Activities:    [x] (14418 or 00150) Provided verbal/tactile cueing for activities related to improving balance, coordination, kinesthetic sense, posture, motor skill, proprioception and motor activation to allow for proper function of scapular, scapulothoracic and UE control with self care, carrying, lifting, driving/computer work.      Home Exercise Program:    [x] (83781) Reviewed/Progressed HEP activities related to strengthening, flexibility, endurance, ROM of scapular, scapulothoracic and UE control with self care, reaching, carrying, lifting, house/yardwork, driving/computer work  [] (92789) Reviewed/Progressed HEP activities related to improving balance, coordination, kinesthetic sense, posture, motor skill, proprioception of scapular, scapulothoracic and UE control with self care, reaching, carrying, lifting, house/yardwork, driving/computer work      Manual Treatments:  PROM / STM / Oscillations-Mobs:  G-I, II, III, IV (PA's, Inf., Post.)  [x] (47713) Provided manual therapy to mobilize soft tissue/joints of cervical/CT, scapular GHJ and UE for the purpose of modulating pain, promoting relaxation,  increasing ROM, reducing/eliminating soft tissue swelling/inflammation/restriction, improving soft tissue extensibility and allowing for proper ROM for normal function with self care, reaching, carrying, lifting, house/yardwork, driving/computer work    Modalities:     [] GAME READY (VASO)- for significant edema, swelling, pain control. Charges:  Timed Code Treatment Minutes: 53   Total Treatment Minutes:  53   BWC:  TE TIME:  NMR TIME:  MANUAL TIME:  TA  UNTIMED MINUTES:  Medicare Total:   15  15  23            [] EVAL (LOW) 13168 (typically 20 minutes face-to-face)  [] EVAL (MOD) 62490 (typically 30 minutes face-to-face)  [] EVAL (HIGH) 42200 (typically 45 minutes face-to-face)  [] RE-EVAL     [x] KI(39972) 1     [] IONTO  [x] NMR (62308) 1     [] VASO  [x] Manual (43200) 1    [] Other:  [] TA x      [] Mech Traction (62216)  [] ES(attended) (02428)      [] ES (un) (67764):           GOALS:     Patient stated goal: Reduce pain, improve motion.      Therapist goals for Patient:   Short Term Goals: To be achieved in: 2 weeks  1. Independent in HEP and progression per patient tolerance, in order to prevent re-injury. []? Progressing: []? Met: []? Not Met: []? Adjusted      2. Patient will have a decrease in pain to facilitate improvement in movement, function, and ADLs as indicated by Functional Deficits. []? Progressing: []? Met: []? Not Met: []?  Adjusted      Long Term Goals: To be achieved in: 8 weeks  1. Disability index score of 60 % or less for the DASH to assist with reaching prior level of function. []? Progressing: []? Met: []? Not Met: []? Adjusted      2. Patient will demonstrate increased AROM flex/abd/ER/IR to 140/140/40/Sacrum bilaterally to allow for proper joint functioning as indicated by patients Functional Deficits. []? Progressing: []? Met: []? Not Met: []? Adjusted      3. Patient will demonstrate an increase in Strength of bilateral shoulders to >/=4+/5 to allow for proper functional mobility as indicated by patients Functional Deficits. []? Progressing: []? Met: []? Not Met: []? Adjusted      4. Patient will return to donning/doffing clothes while maintaining pain </= 2/10increased symptoms or restriction. []? Progressing: []? Met: []? Not Met: []? Adjusted      5. Pt will be able to sleep 5 hours without waking due to pain. (patient specific functional goal)    []? Progressing: []? Met: []? Not Met: []? Adjusted              ASSESSMENT:  Pt gayle session well. Decreased PROM today secondary to pain. Patient received education on their current pathology and how their condition effects them with their functional activities. Patient understood discussion and questions were answered. Patient understands their activity limitations and understands rational for treatment progression. Pt educated on plan of care and HEP, if worsening symptoms to d/c that exercise. PLAN: See eval  [x] Continue per plan of care [] Alter current plan (see comments above)  [] Plan of care initiated [] Hold pending MD visit [] Discharge      Electronically signed by:  Ronaldo Sanders PT    Note: If patient does not return for scheduled/ recommended follow up visits, this note will serve as a discharge from care along with most recent update on progress.

## 2022-01-24 ENCOUNTER — OFFICE VISIT (OUTPATIENT)
Dept: RHEUMATOLOGY | Age: 62
End: 2022-01-24
Payer: MEDICARE

## 2022-01-24 VITALS
SYSTOLIC BLOOD PRESSURE: 130 MMHG | BODY MASS INDEX: 55.32 KG/M2 | HEIGHT: 61 IN | DIASTOLIC BLOOD PRESSURE: 78 MMHG | WEIGHT: 293 LBS

## 2022-01-24 DIAGNOSIS — Z79.52 LONG TERM SYSTEMIC STEROID USER: ICD-10-CM

## 2022-01-24 DIAGNOSIS — R89.4 SEROLOGIC ABNORMALITY: ICD-10-CM

## 2022-01-24 DIAGNOSIS — M35.3 PMR (POLYMYALGIA RHEUMATICA) (HCC): Primary | ICD-10-CM

## 2022-01-24 PROCEDURE — G8482 FLU IMMUNIZE ORDER/ADMIN: HCPCS | Performed by: INTERNAL MEDICINE

## 2022-01-24 PROCEDURE — 99205 OFFICE O/P NEW HI 60 MIN: CPT | Performed by: INTERNAL MEDICINE

## 2022-01-24 PROCEDURE — G8427 DOCREV CUR MEDS BY ELIG CLIN: HCPCS | Performed by: INTERNAL MEDICINE

## 2022-01-24 PROCEDURE — 3017F COLORECTAL CA SCREEN DOC REV: CPT | Performed by: INTERNAL MEDICINE

## 2022-01-24 PROCEDURE — 1036F TOBACCO NON-USER: CPT | Performed by: INTERNAL MEDICINE

## 2022-01-24 PROCEDURE — G8417 CALC BMI ABV UP PARAM F/U: HCPCS | Performed by: INTERNAL MEDICINE

## 2022-01-24 RX ORDER — PREDNISONE 1 MG/1
TABLET ORAL
Qty: 180 TABLET | Refills: 0 | Status: ON HOLD | OUTPATIENT
Start: 2022-01-24 | End: 2022-05-09

## 2022-01-24 NOTE — PATIENT INSTRUCTIONS
Polymyalgia Rheumatica        Polymyalgia rheumatica (sometimes referred to as PMR) is a common cause of widespread aching and stiffness in older adults. Because PMR does not often cause swollen joints, it may be hard to recognize. PMR may occur with another health problem, giant cell arteritis. Fast facts  PMR affects adults over the age of 48. Aching and stiffness in PMR affect the upper arms, neck, buttocks and thighs, and are most severe in the morning. These symptoms respond quickly and completely to low doses of corticosteroids. In PMR, the aching is located primarily around the shoulders and hips. What is polymyalgia rheumatica? The typical symptoms (what you feel) of PMR are aching and stiffness about the upper arms, neck, lower back and thighs. Symptoms tend to come on quickly, over a few days or weeks, and sometimes even overnight. Both sides of the body are equally affected. Involvement of the upper arms, with trouble raising them above the shoulders, is common. Sometimes, aching occurs at joints such as the hands and wrists. Achiness is always worse in the morning and improves as the day goes by. Yet inactivity, such as a long car ride or sitting too long in one position, may cause stiffness to return. Stiffness may be so severe that it causes any of these problems:  Disturbed sleep   Trouble getting dressed in the morning (for instance, putting on a jacket or bending over to pull on socks and shoes)   Problems getting up from a sofa or in and out of a car      What causes polymyalgia rheumatica? The cause of PMR is unknown. PMR does not result from side effects of medications. The abrupt onset of symptoms suggests the possibility of an infection but, so far, none has been found. \"Myalgia\" comes from the [place] word for \"muscle pain. \" However, specific tests of the muscles, such as a blood test for muscle enzymes or a muscle biopsy (surgical removal of a small piece of muscle for inspection under a microscope), are all normal.  Recent research suggests that inflammation in PMR involves the shoulder and hip joints themselves, and the bursae (or sacs) around these joints. So pains at the upper arms and thighs, in fact, start at the nearby shoulder and hip joints. This is what doctors call \"referred pain. \"  PMR should not be confused with fibromyalgia, a poorly understood health problem that affects mainly younger adults. Who gets polymyalgia rheumatica? PMR affects older adults over the age of 48. The average age at onset (start) of symptoms is 79, so people who have PMR may be in their [de-identified] or even older. The disease affects women somewhat more often than men. It is more frequent in whites than nonwhites, but all races can get PMR. How is polymyalgia rheumatica diagnosed? In PMR, results of blood tests to detect inflammation are most often abnormally high. One such test is the erythrocyte sedimentation rate, also called \"sed rate. \" Another test is the C-reactive protein, or CRP. Both tests may be very elevated in PMR but, in some patients, these tests may have normal or only slightly high results. PMR can be hard to diagnose. Your health care providers should rule out other health problems, such as rheumatoid arthritis. How is polymyalgia rheumatica treated? If your doctor strongly suspects PMR, you will receive a trial of low-dose corticosteroids. Often, the dose is 10-15 milligrams (shortened as mg) per day of prednisone (Deltasone, Orasone, etc.). If PMR is present, the medicine quickly relieves stiffness. The response to corticosteroids can be dramatic. Sometimes patients are better after only one dose. Improvement can be slower, though. But, if symptoms do not go away after 2 or 3 weeks of treatment, the diagnosis of PMR is not likely, and your doctor will consider other causes of your illness.   Nonsteroidal anti-inflammatory drugs (commonly called \"NSAIDs\"), such as ibuprofen (Advil, Motrin, etc.) and naproxen (Naprosyn, Aleve), are not effective in treating PMR. When your symptoms are under control, your doctor will slowly decrease (\"taper\") the dose of corticosteroid medicine. The goal is to find the lowest dose that keeps you comfortable. Some people can stop taking corticosteroids within a year. Others, though, will need a small amount of this medicine for 2-3 years, to keep aching and stiffness under control. Symptoms can recur. Because the symptoms of PMR are sensitive to even small changes in the dose of corticosteroids, your doctor should direct the gradual decrease of this medicine. Living with polymyalgia rheumatica  Once stiffness has gone away, you can resume all normal activities, including exercise. Even low doses of corticosteroids can cause side effects. These include higher blood sugar, weight gain, sleeplessness, osteoporosis (bone loss), cataracts, thinning of the skin and bruising. Checking for these problems, including bone density testing, is an important part of follow-up visits with your doctor. Older patients may need medicine to prevent osteoporosis. PMR can occur with a more serious condition, giant cell arteritis. Thus, see your doctor right away if you have PMR and you get symptoms of headache, changes in vision or fever. Points to remember  Once stiffness has gone away, you can resume normal activities. Aching and stiffness develop quickly in PMR, and are most common about the shoulders and upper arms. Symptoms are worse in the morning. Symptoms respond promptly to low doses of corticosteroids, but may recur as the dose is lowered. The rheumatologist's role in the treatment of polymyalgia rheumatica  PMR may be hard to diagnose. Because rheumatologists are experts in diseases of the joints, muscles and bones they can recognize the diagnosis of PMR, and expertly manage its treatment.   To find a rheumatologist  For a list of rheumatologists in your area, click here.  Learn more about rheumatologists and rheumatology health professionals. For more information  The [employer] of Rheumatology has compiled this list to give you a starting point for your own additional research. The ACR does not endorse or maintain these Web sites, and is not responsible for any information or claims provided on them. It is always best to talk with your rheumatologist for more information and before making any decisions about your care. The [place] [address]  www. arthritis. [facility]  www.nlm.nih.gov/medlineplus/polymyalgiarheumatica.  Company of Arthritis and Musculoskeletal and [address]  www.niams. nih.gov/Health_Info/Polymyalgia

## 2022-01-24 NOTE — PROGRESS NOTES
[address] [place], [place] [address]and Avenue, MD                                                           [address] [place] [address] [address]                                                             [phone] (P) [phone] (F)      Note is transcribed using voice recognition software. Inadvertent computerized transcription errors may be present. Patient identification: con Pickett : ,42 y.o. REASON FOR CONSULTATION:  Patient is being seen at the request of  Shirley Delgado MD / Javier Simon MD for evaluation and management of musculoskeletal discomfort. HISTORY OF PRESENT ILLNESS:  59 y.o. female with multiple medical problems including [id number], ICD pacer, gout, hypertension, chronic kidney disease, history of bariatric surgery, gout states that she is a big person, however she was able to walk around, do her daily activities without any problems couple of months ago. Since early December, she has been experiencing soreness in both arms, around shoulders, neck, lower back, thighs and back of the calves. Symptoms got worse over time, just feels achy, sore, stiff and weak to the point that she started falling and has been using walker lately outside her home. She does not have any pain, swelling or stiffness in small or intermediate joints. She was given a course of Medrol Dosepak that completely alleviated her symptoms right away. Lab work moderate elevation in sed rate, CRP. PAULINA low titer, RF 14. No rashes, photosensitivity, photosensitive eruptions, pleurisy, GI/ symptoms. Denies any symptoms of temporal arteritis. All other ROS are negative. PMH, PSH,Social history , Meds reviewed. FH-  no family history of autoimmune disorders.       PHYSICAL EXAM:    Vitals: /78   Ht 5' 1\" (1.549 m)   Wt (!) 326 lb (147.9 kg)   BMI 61.60 kg/m²   AA)x3 well nourished, and well groomed, normal judgement. MKS: Subjective discomfort in both arms, cervical paraspinal muscles, lower back, thighs and calves. Range of motion in both shoulders are nearly full but associated with discomfort at extreme degree of range of motion in all planes. Similarly, she has difficulty in getting up from the chair because of thigh pain. No appreciable tender swollen inflamed joints in upper or lower extremities, following all peripheral joints. Bony crepitus, bony swelling with mild valgus deformity noted in both knees. Skin: No rashes, no induration or skin thickening or nodules. HEENT: Normal temporal arteries without any tenderness or tortuosity. Normal lids, lacrimal glands and pupils. No oral or nasal ulcers. Salivary glands reveal no evidence of abnormality. External inspection of the ears and nose within normal limits. DATA:       ASSESSMENT AND PLAN:  Romelia King was seen today for establish care and abnormal test results. Diagnoses and all orders for this visit:    PMR (polymyalgia rheumatica) (HCC)  -     predniSONE (DELTASONE) 5 MG tablet; 4 tab po daily x 7 days. 3  tab po daily x 10 days. 2.5 tab po daily x 10 days, then 2 tab po daily therafter  -     Cyclic Citrul Peptide Antibody, IgG; Future  -     C-Reactive Protein; Future  -     Sedimentation Rate; Future    Long term systemic steroid user  -     DEXA BONE DENSITY 2 SITES; Future    Serologic abnormality      Clinical assessment and response to prednisone is suggestive of PMR-inflammatory arthritis. Low titer PAULINA, RF -likely unclear clinical significance but will check CCP antibody. Plan-  Explained diagnosis and management options and need for work-up as above. Recommend 20 mg of prednisone taper, directions explained and given to the patient.   Lab work to be checked a few days prior to her next visit in 4 weeks. Baseline DEXA scan because of anticipated steroid use. Continue calcium and vitamin D supplementation. Watch for any symptoms of temporal arteritis, advised patient to go to ER in case of any visual symptoms, call us with any headaches. #################################################################################################  Patient indicates understanding and agrees with the management plan. Total time >60 minutes that includes the following-  Preparing to see the patient such as reviewing patients records, pre-charting, preparing the visit on the same day, performing a medically appropriate history and physical examination, counseling and educating patient about diagnosis, management plan, ordering appropriate testings, prescriptions, communicating findings to other care providers, and documenting clinical information in electronic medical record. I thank you for giving me the opportunity to be involved in Freddy Tobin's care and I look forward following Freddy Finn along with you. If you have any questions or concerns please feel free to contact me at any time.   Sherly Galan MD 01/24/22

## 2022-01-26 ENCOUNTER — TELEMEDICINE (OUTPATIENT)
Dept: PSYCHOLOGY | Age: 62
End: 2022-01-26
Payer: MEDICARE

## 2022-01-26 DIAGNOSIS — F31.30 BIPOLAR I DISORDER, MOST RECENT EPISODE (OR CURRENT) DEPRESSED (HCC): Primary | ICD-10-CM

## 2022-01-26 PROCEDURE — 90832 PSYTX W PT 30 MINUTES: CPT | Performed by: PSYCHOLOGIST

## 2022-01-26 NOTE — PROGRESS NOTES
Behavioral Health Consultation  [address] Zita Rader PsyD  Psychologist  12/2/2021   11:03 AM        Time spent with Patient: 30 minutes  [facility]. Reason for Consult:                 Depression     Referring Provider: Richardson Eugene MD     TELEHEALTH VISIT -- Audio/Visual ([facility])  }  Pursuant to the emergency declaration under the [address] [place] [address] waiver authority and the [employer] Act, this Virtual Visit was conducted, with patient's consent, to reduce the patient's risk of exposure to COVID-19 and provide continuity of care for an established patient. Services were provided through a video synchronous discussion virtually to substitute for in-person clinic visit. Pt gave verbal informed consent to participate in telehealth services. Conducted a risk-benefit analysis and determined that the patient's presenting problems are consistent with the use of telepsychology. Determined that the patient has sufficient knowledge and skills in the use of technology enabling them to adequately benefit from telepsychology. It was determined that this patient was able to be properly treated without an in-person session. Patient verified that they were currently located at the [facility] address that was provided during registration. Verified the following information:  Patient's identification: Yes  Patient location: [address] [place] [address]  Patient's call back number: [phone]  Patient's emergency contact's name and number, as well as permission to contact them if needed: Extended Emergency Contact Information  Primary Emergency Contact: Boy Kong Phone: [phone]  Mobile Phone: [phone]  Relation: Brother/Sister  Preferred language: English   needed?  No  Secondary Emergency Contact: Tatyana Whitten (Patient taking differently: TAKE 1 TABLET TWICE TIMES DAILY AS NEEDED FOR PAIN) 270 tablet 0    lisinopril (PRINIVIL;ZESTRIL) 5 MG tablet TAKE 1 TABLET EVERY DAY 90 tablet 2    montelukast (SINGULAIR) 10 MG tablet TAKE 1 TABLET EVERY NIGHT 90 tablet 1    simvastatin (ZOCOR) 20 MG tablet TAKE 1 TABLET EVERY NIGHT 90 tablet 1    lamoTRIgine (LAMICTAL) 150 MG tablet TAKE 1 TABLET TWICE DAILY OR AS OTHERWISE DIRECTED 180 tablet 1    vitamin D (ERGOCALCIFEROL) 1.25 MG (13197 UT) CAPS capsule TAKE 1 CAPSULE ONE TIME WEEKLY 8 capsule 0    fluticasone (FLONASE) 50 MCG/ACT nasal spray 2 sprays by Each Nostril route daily 1 Bottle 5    levothyroxine (SYNTHROID) 100 MCG tablet Take 1 tablet by mouth Daily 90 tablet 1    metoprolol succinate (TOPROL XL) 100 MG extended release tablet Take 1 tablet by mouth daily 90 tablet 1    allopurinol (ZYLOPRIM) 300 MG tablet TAKE 1 TABLET EVERY DAY 90 tablet 1    diclofenac sodium (VOLTAREN) 1 % GEL Apply topically 2 times daily 150 g 2    apixaban (ELIQUIS) 5 MG TABS tablet Take 1 tablet by mouth 2 times daily 180 tablet 3    torsemide (DEMADEX) 10 MG tablet 2 tabs every other day alternating with 1 tab the other days 180 tablet 3    acetaminophen (TYLENOL) 325 MG tablet Take 650 mg by mouth every 6 hours as needed for Pain TAKES EVERY DAY      cetirizine (ZYRTEC) 10 MG tablet Take 10 mg by mouth daily       No current facility-administered medications for this visit. Social History:   Social History     Socioeconomic History    Marital status:       Spouse name: Not on file    Number of children: Not on file    Years of education: Not on file    Highest education level: Not on file   Occupational History    Not on file   Tobacco Use    Smoking status: Former Smoker     Packs/day: 3.00     Years: 2.00     Pack years: 6.00     Types: Cigarettes     Quit date: 1985     Years since quittin.9    Smokeless tobacco: Never Used   Vaping Use    Vaping Use: Never used   Substance and Sexual Activity    Alcohol use: No    Drug use: No    Sexual activity: Not Currently   Other Topics Concern    Not on file   Social History Narrative    Not on file     Social Determinants of Health     Financial Resource Strain: Low Risk     Difficulty of Paying Living Expenses: Not hard at all   Food Insecurity: No Food Insecurity    Worried About Running Out of Food in the Last Year: Never true    Alexi of Food in the Last Year: Never true   Transportation Needs: No Transportation Needs    Lack of Transportation (Medical): No    Lack of Transportation (Non-Medical): No   Physical Activity:     Days of Exercise per Week: Not on file    Minutes of Exercise per Session: Not on file   Stress:     Feeling of Stress : Not on file   Social Connections:     Frequency of Communication with Friends and Family: Not on file    Frequency of Social Gatherings with Friends and Family: Not on file    Attends Yazidism Services: Not on file    Active Member of [address] [place] [address] or Organizations: Not on file    Attends Club or Organization Meetings: Not on file    Marital Status: Not on file   Intimate Partner Violence:     Fear of Current or Ex-Partner: Not on file    Emotionally Abused: Not on file    Physically Abused: Not on file    Sexually Abused: Not on file   Housing Stability:     Unable to Pay for Housing in the Last Year: Not on file    Number of [place] in the Last Year: Not on file    Unstable Housing in the Last Year: Not on file       TOBACCO:   reports that she quit smoking about 36 years ago. Her smoking use included cigarettes. She has a 6.00 pack-year smoking history. She has never used smokeless tobacco.  ETOH:   reports no history of alcohol use.     Family History:   Family History   Problem Relation Age of Onset    Cancer Mother     Ovarian Cancer Mother 45        Technically peritoneal cancer    Depression Mother         bipolar    Arthritis Father  Rheum Arthritis Father     Arrhythmia Father     Other Father         gout    Heart Disease Father     Depression Brother         depression    Other Brother         gout    Obesity Brother     Other Brother         gout    Obesity Brother     Depression Brother     Cancer Maternal Grandmother         Breast    Breast Cancer Maternal Grandmother          A:  Ms. Ramirez depression has continued to improve patient actively practices more effective coping strategies. She continues to be active and engaged and responds positively to behavioral interventions. She continues to plan plan to pursue EMDR in the future when physical conditions have improved.     Diagnosis:       BAD I, Most recent episode depressed      Plan:  Pt interventions:    Harris-setting to identify pt's primary goals for [facility]JEREMIAH [facility] visit / overall health, Supportive techniques, reinforced pt's skill use, CBT interventions, ACT interventions, behavioral activation interventions, and treatment planning    Pt Behavioral Change Plan  Patient set goals to:   1) continue to engage in social and pleasurable activities  2) practice reframing of thoughts   3) continue to be mindful of \"good\" and \"bad\" language 4) return for f/u in 2 weeks, sooner if needed

## 2022-01-27 DIAGNOSIS — Z00.00 ROUTINE GENERAL MEDICAL EXAMINATION AT A HEALTH CARE FACILITY: ICD-10-CM

## 2022-01-27 NOTE — TELEPHONE ENCOUNTER
Refill Request     Last Seen: Last Seen Department: 12/22/2021  Last Seen by PCP: 12/22/2021    Last Written: 7/14/2021    Next Appointment:   Future Appointments   Date Time Provider Sarah Najera   1/28/2022 10:45 AM Dara Cifuentes, PT [facility] [place] [facility]   1/31/2022  8:30 AM SCHEDULE, Luz Maria Rico REMOTE Charolett Poisson [facility]   2/4/2022 10:45 AM Dara Cifuentes, PT [facility] [place] [facility]   2/9/2022 11:30 AM Gideon 169Anant Bray PSY [facility]   2/10/2022 12:45 PM Dara Cifuentes, PT [facility] [place] [facility]   2/18/2022 10:45 AM Dara Cifuentes, PT [facility] [place] [facility]   2/21/2022  9:45 AM Patricia Elizondo MD KNWD RHEUM [facility]   2/24/2022 12:45 PM Dara Cifuentes, PT [facility] [place] [facility]   3/23/2022 10:00 AM MD OSIRIS Gongora  [place] - [facility]   5/2/2022  8:30 AM SCHEDULE, Luz Maria CARTAGENA [facility] Poisson [facility]   7/14/2022 11:00 AM MD OSIRIS Gongora  [place] - DYD   10/31/2022  1:00 PM [facility]IA [facility]             Requested Prescriptions     Pending Prescriptions Disp Refills    metoprolol succinate (TOPROL XL) 100 MG extended release tablet [Pharmacy Med Name: METOPROLOL SUCCINATE  MG Tablet Extended Release 24 Hour] 90 tablet 1     Sig: TAKE 1 TABLET EVERY DAY

## 2022-01-28 ENCOUNTER — HOSPITAL ENCOUNTER (OUTPATIENT)
Dept: PHYSICAL THERAPY | Age: 62
Setting detail: THERAPIES SERIES
Discharge: HOME | End: 2022-01-28
Payer: MEDICARE

## 2022-01-28 RX ORDER — METOPROLOL SUCCINATE 100 MG/1
TABLET, EXTENDED RELEASE ORAL
Qty: 90 TABLET | Refills: 0 | Status: SHIPPED | OUTPATIENT
Start: 2022-01-28 | End: 2022-05-16 | Stop reason: SDUPTHER

## 2022-01-28 NOTE — FLOWSHEET NOTE
[facility], [address] [place] [address], [address] [place] [address] [address]  Phone: [phone]   Fax:     [phone]    Physical Therapy  Cancellation/No-show Note  Patient Name:  Gena Pineda  :  [id number]   Date:  2022    Cancelled visits to date: 1  No-shows to date: 0    For today's appointment patient:  [x]  Cancelled  []  Rescheduled appointment  []  No-show     Reason given by patient:  []  Patient ill  []  Conflicting appointment  []  No transportation    []  Conflict with work  []  No reason given  []  Other:     Comments: weather     Phone call information:   []  Phone call made today to patient at am/pm at the number provided:      []  Patient answered, conversation as follows:    []  Patient did not answer, message left as follows:  [x]  Phone call not needed - pt contacted us to cancel and provided reason for cancellation.      Electronically signed by:  Dalila Mcmanus, PT, PT

## 2022-01-31 ENCOUNTER — NURSE ONLY (OUTPATIENT)
Dept: CARDIOLOGY CLINIC | Age: 62
End: 2022-01-31
Payer: MEDICARE

## 2022-01-31 ENCOUNTER — TELEPHONE (OUTPATIENT)
Dept: CARDIOLOGY CLINIC | Age: 62
End: 2022-01-31

## 2022-01-31 DIAGNOSIS — I50.42 CHRONIC COMBINED SYSTOLIC AND DIASTOLIC CONGESTIVE HEART FAILURE (HCC): ICD-10-CM

## 2022-01-31 DIAGNOSIS — I47.20 VT (VENTRICULAR TACHYCARDIA): ICD-10-CM

## 2022-01-31 DIAGNOSIS — Z95.810 BIVENTRICULAR ICD (IMPLANTABLE CARDIOVERTER-DEFIBRILLATOR) IN PLACE: ICD-10-CM

## 2022-01-31 DIAGNOSIS — I42.0 DILATED CARDIOMYOPATHY (HCC): ICD-10-CM

## 2022-01-31 PROCEDURE — 93296 REM INTERROG EVL PM/IDS: CPT | Performed by: INTERNAL MEDICINE

## 2022-01-31 PROCEDURE — 93295 DEV INTERROG REMOTE 1/2/MLT: CPT | Performed by: INTERNAL MEDICINE

## 2022-01-31 PROCEDURE — 93297 REM INTERROG DEV EVAL ICPMS: CPT | Performed by: INTERNAL MEDICINE

## 2022-01-31 NOTE — PROGRESS NOTES
Remote transmission received for patients CRT-D. Transmission shows normal sensing and pacing function. EP physician will review. See interrogation under the cardiology tab in the [address] [place] [address] [address] field for more details. Will continue to monitor remotely. Hx PAF/AFL (oac, toprol xl). No sustained arrhythmias recorded. MELISA @ 13 months. Pacing (% of Time Since 30-Dec-2021)  Total * 95.5% (MVP Off)  High LV threshold on 06-Jan-2022. Lead trends show stable impedance and increase in threshold starting 4/2021 and remained elevated since 9/2021. Last Measured 4.500 V @ 0.40 ms  Measured On 31-Jan-2022. TI trending down since 1/28/22, consistent w/ increasing optival. Will call pt.

## 2022-02-01 NOTE — TELEPHONE ENCOUNTER
Please call pt and assess for s/s of chf and forward to NPRB for review. TI trending down since 1/28/22, consistent w/ increasing optivol. See PACEART report under Cardiology tab.

## 2022-02-02 ENCOUNTER — HOSPITAL ENCOUNTER (OUTPATIENT)
Dept: GENERAL RADIOLOGY | Age: 62
Discharge: HOME | End: 2022-02-02
Payer: MEDICARE

## 2022-02-02 DIAGNOSIS — Z79.52 LONG TERM SYSTEMIC STEROID USER: ICD-10-CM

## 2022-02-02 PROCEDURE — 77080 DXA BONE DENSITY AXIAL: CPT

## 2022-02-04 ENCOUNTER — TELEPHONE (OUTPATIENT)
Dept: CARDIOLOGY CLINIC | Age: 62
End: 2022-02-04

## 2022-02-04 NOTE — TELEPHONE ENCOUNTER
Recent carelink reviewed danuta ZHANG. Bianka Garrett MD   I think she needs another in office device evaluation.  Her LV threshold appears to be chronically elevated and she did have significant clinical improvement with CRT. Please schedule pt w/ JMB/device check in the next 6 weeks.

## 2022-02-04 NOTE — TELEPHONE ENCOUNTER
Spoke with Alfonso Tran she is having a little SOB, not horrible. She is having edema in her legs and arms. She says she started prednisone tapering for PMR. She does not know the dose. She takes torsemide 10 mg alternates with 20 mg daily.

## 2022-02-07 ENCOUNTER — TELEPHONE (OUTPATIENT)
Dept: CARDIOLOGY CLINIC | Age: 62
End: 2022-02-07

## 2022-02-07 NOTE — TELEPHONE ENCOUNTER
02/07-Pt stated swelling has gone down but still slightly notable. Pt also stated she has lost 5 lbs in the last few days.

## 2022-02-09 ENCOUNTER — TELEMEDICINE (OUTPATIENT)
Dept: PSYCHOLOGY | Age: 62
End: 2022-02-09
Payer: MEDICARE

## 2022-02-09 DIAGNOSIS — F31.30 BIPOLAR I DISORDER, MOST RECENT EPISODE (OR CURRENT) DEPRESSED (HCC): Primary | ICD-10-CM

## 2022-02-09 PROCEDURE — 90832 PSYTX W PT 30 MINUTES: CPT | Performed by: PSYCHOLOGIST

## 2022-02-09 PROCEDURE — 99999 PR OFFICE/OUTPT VISIT,PROCEDURE ONLY: CPT | Performed by: PSYCHOLOGIST

## 2022-02-09 NOTE — PROGRESS NOTES
Behavioral Health Consultation  900 Zita Rader PsyD  Psychologist  12/2/2021   11:31 AM        Time spent with Patient: 30 minutes  [facility] Reason for Consult:                 Depression     Referring Provider: Ye Humphreys MD     TELEHEALTH VISIT -- Audio/Visual (During OFFYL-24 public health emergency)  }  Pursuant to the emergency declaration under the [address] [place] [address] [facility] authority and the [employer] Act, this Virtual Visit was conducted, with patient's consent, to reduce the patient's risk of exposure to COVID-19 and provide continuity of care for an established patient. Services were provided through a video synchronous discussion virtually to substitute for in-person clinic visit. Pt gave verbal informed consent to participate in telehealth services. Conducted a risk-benefit analysis and determined that the patient's presenting problems are consistent with the use of telepsychology. Determined that the patient has sufficient knowledge and skills in the use of technology enabling them to adequately benefit from telepsychology. It was determined that this patient was able to be properly treated without an in-person session. Patient verified that they were currently located at the [facility] address that was provided during registration. Verified the following information:  Patient's identification: Yes  Patient location: [address] [place] [address]  Patient's call back number: [phone]  Patient's emergency contact's name and number, as well as permission to contact them if needed: Extended Emergency Contact Information  Primary Emergency Contact: Boy Medel Phone: [phone]  Mobile Phone: [phone]  Relation: Brother/Sister  Preferred language: English   needed?  No  Secondary Emergency Contact: Tatyana Whitten Madelin Pérez  [address] [facility] Phone: [phone]  Mobile Phone: [phone]  Relation: Niece/Nephew    Provider location: [place], [address]:  During the last visit 1) continue to engage in social and pleasurable activities  2) practice reframing of thoughts   3) continue to be mindful of \"good\" and \"bad\" language 4) return for f/u in 2 weeks, sooner if needed'    Pt reports she is doing a \"good ok. \" Yesterday was harder - was husbands birthday. DId ok and was able to experience some positivity and not let her feeling down ruin or take over her whole day. Pain has been under better control which has helped other parts of her life and her mood. Has been reframing thoughts and also focusing more on what she has control over.        O:  MSE:    Appearance: good hygiene   Attitude: cooperative and friendly  Consciousness: alert  Orientation: oriented to person, place, time, general circumstance  Memory: recent and remote memory intact  Attention/Concentration: intact during session  Psychomotor Activity:normal  Eye Contact: Normal  Speech: normal rate and volume, well-articulated  Mood: \"good\"  Affect: Congruent, euthymic  Perception: within normal limits  Thought Content: within normal limits  Thought Process: logical, coherent and goal-directed  Insight: good  Judgment: intact  Ability to understand instructions: Yes  Ability to respond meaningfully: Yes  Morbid Ideation: no   Suicide Assessment: no  Homicidal Ideation: no      History:    Medications:   Current Outpatient Medications   Medication Sig Dispense Refill    buPROPion (WELLBUTRIN XL) 150 MG extended release tablet TAKE 1 TABLET EVERY MORNING 90 tablet 1    CPAP Machine MISC by Does not apply route      methocarbamol (ROBAXIN) 500 MG tablet TAKE 1 TABLET THREE TIMES DAILY AS NEEDED FOR PAIN (Patient taking differently: TAKE 1 TABLET TWICE TIMES DAILY AS NEEDED FOR PAIN) 270 tablet 0    lisinopril (PRINIVIL;ZESTRIL) 5 MG tablet TAKE 1 TABLET EVERY DAY 90 tablet 2    montelukast (SINGULAIR) 10 MG tablet TAKE 1 TABLET EVERY NIGHT 90 tablet 1    simvastatin (ZOCOR) 20 MG tablet TAKE 1 TABLET EVERY NIGHT 90 tablet 1    lamoTRIgine (LAMICTAL) 150 MG tablet TAKE 1 TABLET TWICE DAILY OR AS OTHERWISE DIRECTED 180 tablet 1    vitamin D (ERGOCALCIFEROL) 1.25 MG (70444 UT) CAPS capsule TAKE 1 CAPSULE ONE TIME WEEKLY 8 capsule 0    fluticasone (FLONASE) 50 MCG/ACT nasal spray 2 sprays by Each Nostril route daily 1 Bottle 5    levothyroxine (SYNTHROID) 100 MCG tablet Take 1 tablet by mouth Daily 90 tablet 1    metoprolol succinate (TOPROL XL) 100 MG extended release tablet Take 1 tablet by mouth daily 90 tablet 1    allopurinol (ZYLOPRIM) 300 MG tablet TAKE 1 TABLET EVERY DAY 90 tablet 1    diclofenac sodium (VOLTAREN) 1 % GEL Apply topically 2 times daily 150 g 2    apixaban (ELIQUIS) 5 MG TABS tablet Take 1 tablet by mouth 2 times daily 180 tablet 3    torsemide (DEMADEX) 10 MG tablet 2 tabs every other day alternating with 1 tab the other days 180 tablet 3    acetaminophen (TYLENOL) 325 MG tablet Take 650 mg by mouth every 6 hours as needed for Pain TAKES EVERY DAY      cetirizine (ZYRTEC) 10 MG tablet Take 10 mg by mouth daily       No current facility-administered medications for this visit. Social History:   Social History     Socioeconomic History    Marital status:       Spouse name: Not on file    Number of children: Not on file    Years of education: Not on file    Highest education level: Not on file   Occupational History    Not on file   Tobacco Use    Smoking status: Former Smoker     Packs/day: 3.00     Years: 2.00     Pack years: 6.00     Types: Cigarettes     Quit date: 1985     Years since quittin.9    Smokeless tobacco: Never Used   Vaping Use    Vaping Use: Never used   Substance and Sexual Activity    Alcohol use: No    Drug use: No    Sexual activity: Not Currently   Other Topics Concern    Not on file   Social History Narrative    Not on file     Social Determinants of Health     Financial Resource Strain: Low Risk     Difficulty of Paying Living Expenses: Not hard at all   Food Insecurity: No Food Insecurity    Worried About Running Out of Food in the Last Year: Never true    [address] in the Last Year: Never true   Transportation Needs: No Transportation Needs    Lack of Transportation (Medical): No    Lack of Transportation (Non-Medical): No   Physical Activity:     Days of Exercise per Week: Not on file    Minutes of Exercise per Session: Not on file   Stress:     Feeling of Stress : Not on file   Social Connections:     Frequency of Communication with Friends and Family: Not on file    Frequency of Social Gatherings with Friends and Family: Not on file    Attends Judaism Services: Not on file    Active Member of [address] [place] [address] [employer] or Organizations: Not on file    Attends Club or Organization Meetings: Not on file    Marital Status: Not on file   Intimate Partner Violence:     Fear of Current or Ex-Partner: Not on file    Emotionally Abused: Not on file    Physically Abused: Not on file    Sexually Abused: Not on file   Housing Stability:     Unable to Pay for Housing in the Last Year: Not on file    Number of [place] in the Last Year: Not on file    Unstable Housing in the Last Year: Not on file       TOBACCO:   reports that she quit smoking about 36 years ago. Her smoking use included cigarettes. She has a 6.00 pack-year smoking history. She has never used smokeless tobacco.  ETOH:   reports no history of alcohol use.     Family History:   Family History   Problem Relation Age of Onset   [facility] Cancer Mother     Ovarian Cancer Mother 45        Technically peritoneal cancer    Depression Mother         bipolar    Arthritis Father     Rheum Arthritis Father     Arrhythmia Father     Other Father         gout    Heart Disease Father     Depression Brother         depression    Other Brother         gout    Obesity Brother     Other Brother         gout    Obesity Brother     Depression Brother     Cancer Maternal Grandmother         Breast    Breast Cancer Maternal Grandmother          A:  Ms. Tobin's depression has continued to be improved and stable. She continues to be active and engaged and responds positively to behavioral interventions.         Diagnosis:       BAD I, Most recent episode depressed      Plan:  Pt interventions:    [place]-setting to identify pt's primary goals for [facility] visit / overall health, Supportive techniques, reinforced pt's skill use, CBT interventions, ACT interventions, behavioral activation interventions, and treatment planning    Pt Behavioral Change Plan  Patient set goals to:   1) continue to engage in social and pleasurable activities  2) continue to practice reframing of thoughts   3) practice focusing on where you can work more easily with a problem rather than trying to focus on the parts that get you nowhere   4) return for f/u in 2 weeks, sooner if needed

## 2022-02-10 ENCOUNTER — HOSPITAL ENCOUNTER (OUTPATIENT)
Dept: PHYSICAL THERAPY | Age: 62
Setting detail: THERAPIES SERIES
Discharge: HOME | End: 2022-02-10
Payer: MEDICARE

## 2022-02-10 PROCEDURE — 97140 MANUAL THERAPY 1/> REGIONS: CPT

## 2022-02-10 PROCEDURE — 97112 NEUROMUSCULAR REEDUCATION: CPT

## 2022-02-10 PROCEDURE — 97110 THERAPEUTIC EXERCISES: CPT

## 2022-02-10 NOTE — FLOWSHEET NOTE
[facility], [address] [facility] [address]  Phone: [phone]   Fax:     [phone]      Physical Therapy Treatment Note/ Progress Report:           Date:  2/10/2022    Patient Name:  Jossie Amin    :  1960  MRN: [id number]  Restrictions/Precautions:    Medical/Treatment Diagnosis Information:  · Diagnosis: M25.511, G89.29, M25.512 (ICD-10-CM) - Chronic pain of both shoulders  · Treatment Diagnosis: Bilateral shoulder pain, decreased ROm and strength  Insurance/Certification information:  PT Insurance Information: [employer]- needs auth $40 CP  Physician Information:  Referring Practitioner: Harvinder Barrow MD  Has the plan of care been signed (Y/N):        []  Yes  [x]  No     Date of Patient follow up with Physician:     Assessment Summary: Seferino Siddiqi is a 59 y.o. female reporting to OP PT with c/c of bilateral shoulder pain which has been occurring for a couple months without known TIFFANIE. Pt is noted to have limited AROM and PROM with yoanna end feels. Her PROM did start to improve some through session and was improved compared to AROM. Posture is poor. Strength testing deferred secondary to pain. Is this a Progress Report:     []  Yes  [x]  No        If Yes:  Date Range for reporting period:  Beginnin/4/22  Endin/4/22    Progress report will be due (10 Rx or 30 days whichever is less): 27       Recertification will be due (POC Duration  / 90 days whichever is less): 3/4/22          Visit # Insurance Allowable Auth Required   In Person  auth []  Yes     []  No    Tele Health   []  Yes     []  No    Total 4 16 visits thru 3/8/22        Functional Scale: Quick Dash 82%, 20%   Date assessed:     Latex Allergy:  [x]NO      []YES  Preferred Language for Healthcare:   [x]English       []other:      Pain level:  2/10         SUBJECTIVE:  Pt states *shoulders feel great.  Doctor put her on steroids for PMR and has had significant pain reduction. OBJECTIVE:   AROM:  Flexion: 165 R; 170 L  Abduction: 140 R;  130 L  ER: 60 R; 50 L   IR: T10 R; T7 L              RESTRICTIONS/PRECAUTIONS: pacemaker    Exercises/Interventions: HEP code: [id number]  Therapeutic Ex (57988) HEP 1/4/22 1/13/22 1/21/22 2/10/22   Warm-up        Pulleys    5' --   UBE                TABLE        Supine cane flexion x x20  x10 X20, 3#   Supine concentric cirlces   x30 ea x20 ea x30 ea, 2#                                                   SEATED        Cane ER x x15 B  x20 B    Cervical lateral flexion x x15 B      Scap squeeze x x20  x20    Shoulder depression x x20      ER isometric    30\"x2 B    IR isometric    30\"x2 B            STANDING        Rows   10x3 B, BTB  10x3, BTB   Extension   10x2 B, BTB  10x2, BTB   IR   10x2 B, BTB  10x2, BTB   ER   10x2 B, BTB  10x2, BTB   Wall weight shift    1'    [place]     10x2, BTB   Bicep curls     10x3, 2#             Manual: PROM into shoulder flexion, abduction, ER and IR in neutral, 45° abduction bilaterally 10'      Therapeutic Exercise and NMR EXR  [x] (75622) Provided verbal/tactile cueing for activities related to strengthening, flexibility, endurance, ROM  for improvements in scapular, scapulothoracic and UE control with self care, reaching, carrying, lifting, house/yardwork, driving/computer work.    [] (99634) Provided verbal/tactile cueing for activities related to improving balance, coordination, kinesthetic sense, posture, motor skill, proprioception  to assist with  scapular, scapulothoracic and UE control with self care, reaching, carrying, lifting, house/yardwork, driving/computer work.     Therapeutic Activities:    [x] (04583 or 72254) Provided verbal/tactile cueing for activities related to improving balance, coordination, kinesthetic sense, posture, motor skill, proprioception and motor activation to allow for proper function of scapular, scapulothoracic and UE control with self care, carrying, lifting, driving/computer work. Home Exercise Program:    [x] (46922) Reviewed/Progressed HEP activities related to strengthening, flexibility, endurance, ROM of scapular, scapulothoracic and UE control with self care, reaching, carrying, lifting, house/yardwork, driving/computer work  [] (94158) Reviewed/Progressed HEP activities related to improving balance, coordination, kinesthetic sense, posture, motor skill, proprioception of scapular, scapulothoracic and UE control with self care, reaching, carrying, lifting, house/yardwork, driving/computer work      Manual Treatments:  PROM / STM / Oscillations-Mobs:  G-I, II, III, IV (PA's, Inf., Post.)  [x] (42240) Provided manual therapy to mobilize soft tissue/joints of cervical/CT, scapular GHJ and UE for the purpose of modulating pain, promoting relaxation,  increasing ROM, reducing/eliminating soft tissue swelling/inflammation/restriction, improving soft tissue extensibility and allowing for proper ROM for normal function with self care, reaching, carrying, lifting, house/yardwork, driving/computer work    Modalities:     [] GAME READY (VASO)- for significant edema, swelling, pain control. Charges:  Timed Code Treatment Minutes: 40   Total Treatment Minutes:  40   BWC:  TE TIME:  NMR TIME:  MANUAL TIME:  TA  UNTIMED MINUTES:  Medicare Total:   15  15  10            [] EVAL (LOW) 82033 (typically 20 minutes face-to-face)  [] EVAL (MOD) 17001 (typically 30 minutes face-to-face)  [] EVAL (HIGH) 93561 (typically 45 minutes face-to-face)  [] RE-EVAL     [x] GX(71274) 1     [] IONTO  [x] NMR (94989) 1     [] VASO  [x] Manual (42503) 1    [] Other:  [] TA x      [] Mech Traction (76507)  [] ES(attended) (42458)      [] ES (un) (77395):           GOALS:     Patient stated goal: Reduce pain, improve motion.      Therapist goals for Patient:   Short Term Goals: To be achieved in: 2 weeks  1.  Independent in HEP and progression per patient tolerance, in order to prevent re-injury. []? Progressing: [x]? Met: []? Not Met: []? Adjusted      2. Patient will have a decrease in pain to facilitate improvement in movement, function, and ADLs as indicated by Functional Deficits. []? Progressing: [x]? Met: []? Not Met: []? Adjusted      Long Term Goals: To be achieved in: 8 weeks  1. Disability index score of 60 % or less for the DASH to assist with reaching prior level of function. []? Progressing: [x]? Met: []? Not Met: []? Adjusted      2. Patient will demonstrate increased AROM flex/abd/ER/IR to 140/140/40/Sacrum bilaterally to allow for proper joint functioning as indicated by patients Functional Deficits. []? Progressing: [x]? Met: []? Not Met: []? Adjusted      3. Patient will demonstrate an increase in Strength of bilateral shoulders to >/=4+/5 to allow for proper functional mobility as indicated by patients Functional Deficits. [x]? Progressing: []? Met: []? Not Met: []? Adjusted      4. Patient will return to donning/doffing clothes while maintaining pain </= 2/10increased symptoms or restriction. []? Progressing: [x]? Met: []? Not Met: []? Adjusted      5. Pt will be able to sleep 5 hours without waking due to pain. (patient specific functional goal)    []? Progressing: [x]? Met: []? Not Met: []? Adjusted               ASSESSMENT:  Shaan Burton has been seen in PT for 4 visits for bilateral shoulder pain. Pt has responded well overall to PT session which have been addressing ROM and light strengthening activites. Since starting steroids she is reporting significant pain reduction. AROM and strength are significantly improved. Pt will continue to benefit from PT addressing strengthening and HEP in order to regain full function and improve QOL. Patient received education on their current pathology and how their condition effects them with their functional activities. Patient understood discussion and questions were answered.  Patient understands their activity limitations and understands rational for treatment progression. Pt educated on plan of care and HEP, if worsening symptoms to d/c that exercise. PLAN: See eval  [x] Continue per plan of care [] Alter current plan (see comments above)  [] Plan of care initiated [] Hold pending MD visit [] Discharge      Electronically signed by:  Rita Garcia, PT    Note: If patient does not return for scheduled/ recommended follow up visits, this note will serve as a discharge from care along with most recent update on progress.

## 2022-02-10 NOTE — PROGRESS NOTES
[facility], [address] [place] [address] [address]  Phone: [phone]   Fax: [phone]    Date: 2/10/2022          Patient Name; :  Erna Alamo; 1960   Dx: Diagnosis: M25.511, G89.29, M25.512 (ICD-10-CM) - Chronic pain of both shoulders      Physician: Referring Practitioner: Bianka Cook MD        Total PT Visits: 4     Measures Previous Current   Pain (0-10)  2   Disability % 82 20     AROM:  Flexion: 165 R; 170 L  Abduction: 140 R;  130 L  ER: 60 R; 50 L   IR: T10 R; T7 L    MMT:  Flexion: 4+  Abduction: 4  ER: 4  IR: 4+      Assessment:  Mckay Roberto has been seen in PT for 4 visits for bilateral shoulder pain. Pt has responded well overall to PT session which have been addressing ROM and light strengthening activites. Since starting steroids she is reporting significant pain reduction. AROM and strength are significantly improved. Pt will continue to benefit from PT addressing strengthening and HEP in order to regain full function and improve QOL. Prognosis for POC: [x] Good [] Fair  [] Poor      Patient requires continued skilled intervention: [x] Yes  [] No        Plan & Recommendations:  [x] Continue rehabilitation due to objective improvement and continued functional deficits with frequency and duration:   [] Progress toward  []GAP, []Work Conditioning, []Independent HEP   [] Discharge due to   [] All goals achieved, [] Maximized \"medical necessity\" [] No subjective or objective improvements      Electronically signed by:  Myah Brian PT  Therapy Plan of Care Re-Certification  This patient has been re-evaluated for physical therapy services and for therapy to continue, Medicare, Medicaid and other insurances require periodic physician review of the treatment plan.  Please review the above re-evaluation and verify that you agree with plan of care as established above by signing the attached document and return it to our office or note changes to established plan below  [] Follow treatment plan as above [] Discontinue physical therapy  [] Change plan to:                                 __________________________________________________    Physician Signature:____________________________________ Date:____________  By signing above, therapists plan is approved by physician    If you have any questions or concerns, please don't hesitate to call.   Thank you for your referral.

## 2022-02-11 ENCOUNTER — APPOINTMENT (OUTPATIENT)
Dept: PHYSICAL THERAPY | Age: 62
End: 2022-02-11
Payer: MEDICARE

## 2022-02-16 DIAGNOSIS — M35.3 PMR (POLYMYALGIA RHEUMATICA) (HCC): ICD-10-CM

## 2022-02-16 DIAGNOSIS — I50.22 CHRONIC SYSTOLIC HEART FAILURE (HCC): ICD-10-CM

## 2022-02-16 DIAGNOSIS — Z00.00 ROUTINE GENERAL MEDICAL EXAMINATION AT A HEALTH CARE FACILITY: ICD-10-CM

## 2022-02-16 DIAGNOSIS — I42.0 DILATED CARDIOMYOPATHY (HCC): ICD-10-CM

## 2022-02-16 DIAGNOSIS — I50.22 CHRONIC SYSTOLIC CONGESTIVE HEART FAILURE (HCC): ICD-10-CM

## 2022-02-16 DIAGNOSIS — Z95.810 BIVENTRICULAR ICD (IMPLANTABLE CARDIOVERTER-DEFIBRILLATOR) IN PLACE: ICD-10-CM

## 2022-02-16 DIAGNOSIS — E03.8 SUBCLINICAL HYPOTHYROIDISM: ICD-10-CM

## 2022-02-16 LAB
ANION GAP SERPL CALCULATED.3IONS-SCNC: 17 MMOL/L (ref 3–16)
BUN BLDV-MCNC: 21 MG/DL (ref 7–20)
C-REACTIVE PROTEIN: <3 MG/L (ref 0–5.1)
CALCIUM SERPL-MCNC: 9 MG/DL (ref 8.3–10.6)
CHLORIDE BLD-SCNC: 102 MMOL/L (ref 99–110)
CO2: 23 MMOL/L (ref 21–32)
CREAT SERPL-MCNC: 1.1 MG/DL (ref 0.6–1.2)
GFR AFRICAN AMERICAN: >60
GFR NON-AFRICAN AMERICAN: 50
GLUCOSE BLD-MCNC: 95 MG/DL (ref 70–99)
POTASSIUM SERPL-SCNC: 4.4 MMOL/L (ref 3.5–5.1)
PRO-BNP: 220 PG/ML (ref 0–124)
SEDIMENTATION RATE, ERYTHROCYTE: 36 MM/HR (ref 0–30)
SODIUM BLD-SCNC: 142 MMOL/L (ref 136–145)
T4 FREE: 1.3 NG/DL (ref 0.9–1.8)
TSH REFLEX: 21.25 UIU/ML (ref 0.27–4.2)

## 2022-02-17 ENCOUNTER — TELEPHONE (OUTPATIENT)
Dept: CARDIOLOGY CLINIC | Age: 62
End: 2022-02-17

## 2022-02-17 LAB — CYCLIC CITRULLINATED PEPTIDE ANTIBODY IGG: 1.7 U/ML (ref 0–2.9)

## 2022-02-17 NOTE — TELEPHONE ENCOUNTER
----- Message from ALICIA Kaur CNP sent at 2/17/2022 12:40 PM EST -----  Please notify patient results. Kidney function is stable. Electrolytes are stable. Pro-BNP (heart failure number) is improved  Labs look great!  No changes

## 2022-02-18 ENCOUNTER — HOSPITAL ENCOUNTER (OUTPATIENT)
Dept: PHYSICAL THERAPY | Age: 62
Setting detail: THERAPIES SERIES
Discharge: HOME | End: 2022-02-18
Payer: MEDICARE

## 2022-02-18 PROCEDURE — 97112 NEUROMUSCULAR REEDUCATION: CPT

## 2022-02-18 PROCEDURE — 97110 THERAPEUTIC EXERCISES: CPT

## 2022-02-18 NOTE — FLOWSHEET NOTE
[address] [place] [address] [facility] [address], [address] [place] [address] [address]  Phone: [phone]   Fax:     [phone]      Physical Therapy Treatment Note/ Progress Report:           Date:  2022    Patient Name:  Cecelia Vasquez    :  1960  MRN: [id number]  Restrictions/Precautions:    Medical/Treatment Diagnosis Information:  · Diagnosis: M25.511, G89.29, M25.512 (ICD-10-CM) - Chronic pain of both shoulders  · Treatment Diagnosis: Bilateral shoulder pain, decreased ROm and strength  Insurance/Certification information:  PT Insurance Information: [employer]- needs auth $40 CP  Physician Information:  Referring Practitioner: Balaji Pennington MD  Has the plan of care been signed (Y/N):        []  Yes  [x]  No     Date of Patient follow up with Physician:     Assessment Summary: Jaron Price is a 59 y.o. female reporting to OP PT with c/c of bilateral shoulder pain which has been occurring for a couple months without known TIFFANIE. Pt is noted to have limited AROM and PROM with yoanna end feels. Her PROM did start to improve some through session and was improved compared to AROM. Posture is poor. Strength testing deferred secondary to pain. Is this a Progress Report:     []  Yes  [x]  No        If Yes:  Date Range for reporting period:  Beginnin/4/22  Endin/4/22    Progress report will be due (10 Rx or 30 days whichever is less): 3/4/06       Recertification will be due (POC Duration  / 90 days whichever is less): 3/4/22          Visit # Insurance Allowable Auth Required   In Person  auth []  Yes     []  No    Tele Health   []  Yes     []  No    Total 5 16 visits thru 3/8/22        Functional Scale: Quick Dash 82%, 20%   Date assessed:     Latex Allergy:  [x]NO      []YES  Preferred Language for Healthcare:   [x]English       []other:      Pain level:  0/10         SUBJECTIVE:  Pt states shoulders feels good.          OBJECTIVE:   AROM:  Flexion: 165 R; 170 L  Abduction: 140 R;  130 L  ER: 60 R; 50 L   IR: T10 R; T7 L              RESTRICTIONS/PRECAUTIONS: pacemaker    Exercises/Interventions: HEP code: [id number]  Therapeutic Ex (03429) HEP 1/13/22 1/21/22 2/10/22 2/18/22   Warm-up        Pulleys   5' -- 5'   UBE                TABLE        Supine cane flexion x  x10 X20, 3#    Supine concentric cirlces  x30 ea x20 ea x30 ea, 2#                                                    SEATED        Cane ER x  x20 B     Cervical lateral flexion x       Scap squeeze x  x20     Shoulder depression x       ER isometric   30\"x2 B     IR isometric   30\"x2 B             STANDING        Rows  10x3 B, BTB  10x3, BTB 10x3, BTB   Extension  10x2 B, BTB  10x2, BTB 10x3, OTB   IR  10x2 B, BTB  10x2, BTB 10x2, GTB   ER  10x2 B, BTB  10x2, BTB 10x2, GTB   Wall weight shift x  1'  1'   Wall push up     x10   [place]    10x2, BTB    Bicep curls    10x3, 2# 10x3, 2#   Saw ball     10x2     Manual:       Therapeutic Exercise and NMR EXR  [x] (65171) Provided verbal/tactile cueing for activities related to strengthening, flexibility, endurance, ROM  for improvements in scapular, scapulothoracic and UE control with self care, reaching, carrying, lifting, house/yardwork, driving/computer work.    [] (58205) Provided verbal/tactile cueing for activities related to improving balance, coordination, kinesthetic sense, posture, motor skill, proprioception  to assist with  scapular, scapulothoracic and UE control with self care, reaching, carrying, lifting, house/yardwork, driving/computer work. Therapeutic Activities:    [x] (46948 or 03568) Provided verbal/tactile cueing for activities related to improving balance, coordination, kinesthetic sense, posture, motor skill, proprioception and motor activation to allow for proper function of scapular, scapulothoracic and UE control with self care, carrying, lifting, driving/computer work.      Home Exercise Program:    [x] (98093) Reviewed/Progressed HEP activities related to strengthening, flexibility, endurance, ROM of scapular, scapulothoracic and UE control with self care, reaching, carrying, lifting, house/yardwork, driving/computer work  [] (85888) Reviewed/Progressed HEP activities related to improving balance, coordination, kinesthetic sense, posture, motor skill, proprioception of scapular, scapulothoracic and UE control with self care, reaching, carrying, lifting, house/yardwork, driving/computer work      Manual Treatments:  PROM / STM / Oscillations-Mobs:  G-I, II, III, IV (PA's, Inf., Post.)  [x] (82592) Provided manual therapy to mobilize soft tissue/joints of cervical/CT, scapular GHJ and UE for the purpose of modulating pain, promoting relaxation,  increasing ROM, reducing/eliminating soft tissue swelling/inflammation/restriction, improving soft tissue extensibility and allowing for proper ROM for normal function with self care, reaching, carrying, lifting, house/yardwork, driving/computer work    Modalities:     [] GAME READY (VASO)- for significant edema, swelling, pain control. Charges:  Timed Code Treatment Minutes: 40   Total Treatment Minutes:  40   BWC:  TE TIME:  NMR TIME:  MANUAL TIME:  TA  UNTIMED MINUTES:  Medicare Total:   30  10              [] EVAL (LOW) 59572 (typically 20 minutes face-to-face)  [] EVAL (MOD) 28192 (typically 30 minutes face-to-face)  [] EVAL (HIGH) 01066 (typically 45 minutes face-to-face)  [] RE-EVAL     [x] XI(62005) 1     [] IONTO  [x] NMR (74626) 1     [] VASO  [x] Manual (47361) 1    [] Other:  [] TA x      [] Mech Traction (34617)  [] ES(attended) (35243)      [] ES (un) (78713):           GOALS:     Patient stated goal: Reduce pain, improve motion.      Therapist goals for Patient:   Short Term Goals: To be achieved in: 2 weeks  1. Independent in HEP and progression per patient tolerance, in order to prevent re-injury. []? Progressing: [x]? Met: []? Not Met: []? Adjusted      2.  Patient will have a decrease in pain to facilitate improvement in movement, function, and ADLs as indicated by Functional Deficits. []? Progressing: [x]? Met: []? Not Met: []? Adjusted      Long Term Goals: To be achieved in: 8 weeks  1. Disability index score of 60 % or less for the DASH to assist with reaching prior level of function. []? Progressing: [x]? Met: []? Not Met: []? Adjusted      2. Patient will demonstrate increased AROM flex/abd/ER/IR to 140/140/40/Sacrum bilaterally to allow for proper joint functioning as indicated by patients Functional Deficits. []? Progressing: [x]? Met: []? Not Met: []? Adjusted      3. Patient will demonstrate an increase in Strength of bilateral shoulders to >/=4+/5 to allow for proper functional mobility as indicated by patients Functional Deficits. [x]? Progressing: []? Met: []? Not Met: []? Adjusted      4. Patient will return to donning/doffing clothes while maintaining pain </= 2/10increased symptoms or restriction. []? Progressing: [x]? Met: []? Not Met: []? Adjusted      5. Pt will be able to sleep 5 hours without waking due to pain. (patient specific functional goal)    []? Progressing: [x]? Met: []? Not Met: []? Adjusted               ASSESSMENT:  Chaya Foy session well. ROm remains quite good and appears compliant with HEP. Shoulder pain remains reduced per her reports. Patient received education on their current pathology and how their condition effects them with their functional activities. Patient understood discussion and questions were answered. Patient understands their activity limitations and understands rational for treatment progression. Pt educated on plan of care and HEP, if worsening symptoms to d/c that exercise.            PLAN: See eval  [x] Continue per plan of care [] Alter current plan (see comments above)  [] Plan of care initiated [] Hold pending MD visit [] Discharge      Electronically signed by:  Jameson Serrato PT    Note: If patient does not return for scheduled/ recommended follow up visits, this note will serve as a discharge from care along with most recent update on progress.

## 2022-02-21 ENCOUNTER — OFFICE VISIT (OUTPATIENT)
Dept: RHEUMATOLOGY | Age: 62
End: 2022-02-21
Payer: MEDICARE

## 2022-02-21 VITALS
WEIGHT: 293 LBS | BODY MASS INDEX: 55.32 KG/M2 | HEIGHT: 61 IN | SYSTOLIC BLOOD PRESSURE: 120 MMHG | DIASTOLIC BLOOD PRESSURE: 76 MMHG

## 2022-02-21 DIAGNOSIS — Z79.52 LONG TERM SYSTEMIC STEROID USER: ICD-10-CM

## 2022-02-21 DIAGNOSIS — R89.4 SEROLOGIC ABNORMALITY: ICD-10-CM

## 2022-02-21 DIAGNOSIS — M35.3 PMR (POLYMYALGIA RHEUMATICA) (HCC): Primary | ICD-10-CM

## 2022-02-21 PROCEDURE — 3017F COLORECTAL CA SCREEN DOC REV: CPT | Performed by: INTERNAL MEDICINE

## 2022-02-21 PROCEDURE — 1036F TOBACCO NON-USER: CPT | Performed by: INTERNAL MEDICINE

## 2022-02-21 PROCEDURE — G8427 DOCREV CUR MEDS BY ELIG CLIN: HCPCS | Performed by: INTERNAL MEDICINE

## 2022-02-21 PROCEDURE — G8417 CALC BMI ABV UP PARAM F/U: HCPCS | Performed by: INTERNAL MEDICINE

## 2022-02-21 PROCEDURE — 99215 OFFICE O/P EST HI 40 MIN: CPT | Performed by: INTERNAL MEDICINE

## 2022-02-21 PROCEDURE — G8482 FLU IMMUNIZE ORDER/ADMIN: HCPCS | Performed by: INTERNAL MEDICINE

## 2022-02-21 RX ORDER — PREDNISONE 1 MG/1
TABLET ORAL
Qty: 360 TABLET | Refills: 0 | Status: ON HOLD | OUTPATIENT
Start: 2022-02-21 | End: 2022-05-09

## 2022-02-21 NOTE — PROGRESS NOTES
[address], [place]                                                           [address] [place], [place] [address], [address] [facility]                                                             [phone] (P) [phone] (F)      Note is transcribed using voice recognition software. Inadvertent computerized transcription errors may be present. Patient identification: Bonita Hawley, female : [id number],72 y.o. Background information-  MUK-P-fzpnmin, ICD pacer, gout, hypertension, chronic kidney disease, history of bariatric surgery, BMI 61  . Onxtxbcbbf-mxfcpl-fz for PMR. She is doing  much better on prednisone taper, no musculoskeletal concerns or complaints. Is not using walker anymore. No symptoms of temporal arteritis. Tolerating prednisone well. Normal ADLs and recreational activities. Lab work at the first visit-moderate elevation in sed rate, CRP. PAULINA low titer, RF 14. No rashes, photosensitivity, photosensitive eruptions, pleurisy, GI/ symptoms. Denies any symptoms of temporal arteritis. All other ROS are negative. PMH, PSH,Social history , Meds reviewed. FH-  no family history of autoimmune disorders. PHYSICAL EXAM:    Vitals:    /76   Ht 5' 1\" (1.549 m)   Wt (!) 326 lb (147.9 kg)   BMI 61.60 kg/m²   AA)x3 well nourished, and well groomed, normal judgement. MKS: No appreciable tender swollen inflamed joints in upper or lower extremities, F ROM in all peripheral joints. She is walking normally unlike last visit, was using walker. Bony crepitus, bony swelling with mild valgus deformity noted in both knees. Skin: No rashes, no induration or skin thickening or nodules. HEENT: Normal temporal arteries without any tenderness or tortuosity.   Normal lids, lacrimal glands and pupils. No oral or nasal ulcers. Salivary glands reveal no evidence of abnormality. External inspection of the ears and nose within normal limits. DATA:   Lab Results   Component Value Date    SEDRATE 36 (H) 02/16/2022     Lab Results   Component Value Date    CRP <3.0 02/16/2022     Lab Results   Component Value Date    WBC 5.5 12/02/2021    HGB 12.5 12/02/2021    HCT 38.8 12/02/2021    MCV 95.4 12/02/2021     12/02/2021     Lab Results   Component Value Date     02/16/2022    K 4.4 02/16/2022     02/16/2022    CO2 23 02/16/2022    BUN 21 (H) 02/16/2022    CREATININE 1.1 02/16/2022    GLUCOSE 95 02/16/2022    CALCIUM 9.0 02/16/2022    PROT 7.3 12/03/2021    LABALBU 4.4 12/03/2021    BILITOT 0.5 12/03/2021    ALKPHOS 143 (H) 12/22/2021    AST 16 12/03/2021    ALT 9 (L) 12/03/2021    LABGLOM 50 (A) 02/16/2022    GFRAA >60 02/16/2022    AGRATIO 1.5 12/03/2021    GLOB 2.4 03/14/2021           ASSESSMENT AND PLAN:  Idalia Landrum was seen today for follow-up. Diagnoses and all orders for this visit:    [facility]    Long term systemic steroid user    Serologic abnormality    BMI 60.0-69.9, adult ([facility]    Other orders  -     predniSONE (DELTASONE) 1 MG tablet; Take 4  tab po daily. 1. PMR-asymptomatic on 12.5 mg of prednisone daily. Continue tapering prednisone-10 mg a day for 10 days, thereafter 1 mg less every 10 days. Prescriptions renewed. 2.  DEXA scan-osteoporosis  2/2/2022 lumbar spine T score -1.8, left hip -1.1, femoral neck -2.8, right hip -2.3, right femoral neck -2.9. No fragility fractures. Various treatment options were discussed, Prolia would be the most reasonable option. We will do prior authorization. Side effects and directions, monitoring explained. 3.  BMI 61-she is aware of all healthy eating practices and mindful eating. Reiterated the importance, and the benefit of losing weight. She is very determined to do so. 4.  Low titer PAULINA, RF - negative CCP,  Suspect PMR, RA is in D/D     Plan-  As above, reassessment in 2 months. #################################################################################################  Patient indicates understanding and agrees with the management plan. Total time 42 minutes that includes the following-  Preparing to see the patient such as reviewing patients records, pre-charting, preparing the visit on the same day, performing a medically appropriate history and physical examination, counseling and educating patient about diagnosis, management plan, ordering appropriate testings, prescriptions, communicating findings to other care providers, and documenting clinical information in electronic medical record. I thank you for giving me the opportunity to be involved in Xochitl Tobin's care and I look forward following Xochitl Chambers along with you. If you have any questions or concerns please feel free to contact me at any time.   Nicanor Bond MD 02/21/22

## 2022-02-23 ENCOUNTER — TELEPHONE (OUTPATIENT)
Dept: RHEUMATOLOGY | Age: 62
End: 2022-02-23

## 2022-02-23 NOTE — TELEPHONE ENCOUNTER
Please obtain VOB and prior authorization for Prolia  through medical benefit buy and bill. Dx: Osteoporosis M81.0  Labs: 2/16/2022  DEXA: 2/2/2022    Primary: Allyson Verdin Medicare  # [id number]    Secondary: [place] Medicaid  # [id number]      DEXA scan-osteoporosis  2/2/2022 lumbar spine T score -1.8, left hip -1.1, femoral neck -2.8, right hip -2.3, right femoral neck -2.9. No fragility fractures. Various treatment options were discussed, Prolia would be the most reasonable option. We will do prior authorization.   Side effects and directions, monitoring explained

## 2022-02-24 ENCOUNTER — HOSPITAL ENCOUNTER (OUTPATIENT)
Dept: PHYSICAL THERAPY | Age: 62
Setting detail: THERAPIES SERIES
Discharge: HOME | End: 2022-02-24
Payer: MEDICARE

## 2022-02-24 DIAGNOSIS — M79.662 PAIN OF LEFT CALF: ICD-10-CM

## 2022-02-24 NOTE — FLOWSHEET NOTE
[facility], [address] [place] [address], [address] [place] [address] [address]  Phone: [phone]   Fax:     [phone]    Physical Therapy  Cancellation/No-show Note  Patient Name:  Coral Euceda  :     Date:  2022    Cancelled visits to date: 2  No-shows to date: 0    For today's appointment patient:  [x]  Cancelled  []  Rescheduled appointment  []  No-show     Reason given by patient:  [x]  Patient ill  []  Conflicting appointment  []  No transportation    []  Conflict with work  []  No reason given  []  Other:     Comments:     Phone call information:   []  Phone call made today to patient at am/pm at the number provided:      []  Patient answered, conversation as follows:    []  Patient did not answer, message left as follows:  [x]  Phone call not needed - pt contacted us to cancel and provided reason for cancellation.      Electronically signed by:  Roro Bennett, PT, PT

## 2022-02-25 ENCOUNTER — APPOINTMENT (OUTPATIENT)
Dept: PHYSICAL THERAPY | Age: 62
End: 2022-02-25
Payer: MEDICARE

## 2022-02-25 RX ORDER — METHOCARBAMOL 500 MG/1
TABLET, FILM COATED ORAL
Qty: 270 TABLET | Refills: 0 | Status: SHIPPED | OUTPATIENT
Start: 2022-02-25 | End: 2022-05-29 | Stop reason: SDUPTHER

## 2022-02-25 NOTE — TELEPHONE ENCOUNTER
Refill Request     Last Seen: Last Seen Department: 12/22/2021  Last Seen by PCP: 12/22/2021    Last Written: 10/27/21    Next Appointment:   Future Appointments   Date Time Provider Sarah Najera   3/3/2022 12:30 PM Gideon [address], [place] OSIRIS PSY [facility]   3/4/2022  1:45 PM Myah Brian, PT VINITAZ EG PT [place] [facility] [place] [facility]\""   3/23/2022 10:00 AM [facility]   3/28/2022  9:45 AM Adrian Burden MD Gigi Hill [facility]   5/2/2022  8:30 AM SCHEDULE, Sona Mena [facility]   7/14/2022 11:00 AM MD OSIRIS Strange  [place] - DYD   10/31/2022  1:00 PM Sohan Pearson, APRN - CNP [facility]       Future appointment scheduled      Requested Prescriptions     Pending Prescriptions Disp Refills    methocarbamol (ROBAXIN) 500 MG tablet [Pharmacy Med Name: METHOCARBAMOL 500 MG Tablet] 270 tablet 0     Sig: TAKE 1 TABLET THREE TIMES DAILY AS NEEDED FOR PAIN

## 2022-02-28 RX ORDER — LEVOTHYROXINE SODIUM 0.12 MG/1
125 TABLET ORAL DAILY
Qty: 30 TABLET | Refills: 1 | Status: SHIPPED | OUTPATIENT
Start: 2022-02-28 | End: 2022-04-28

## 2022-02-28 NOTE — PROGRESS NOTES
TSH is still high. Inc Synthroid to 125 mcg, new script sent to pharmacy  recheck full thyroid panel in 5 weeks.   Please let her know

## 2022-02-28 NOTE — TELEPHONE ENCOUNTER
JACKI SOLOMON   [employer] GOLD PLUS HMO  No Deductible  OOP-4700.00 Met-0  Co Ins-20% Covered-80%  Co Pay-$35.00  PA Required    Called [phone] PA Intake Team  On hold for 30 minutes

## 2022-03-01 NOTE — TELEPHONE ENCOUNTER
PA COVER MY MEDS  Medication: Prolia 60MG/ML syringes  Key:[id number] - PA Case ID: [id number]  Status:PENDING    Prolia 60MG/ML syringes  PA Case: [id number], Status: Approved,   Coverage Starts on: 3/1/2022 12:00:00 AM, Coverage Ends on: 2022 12:00:00 AM.

## 2022-03-03 ENCOUNTER — TELEMEDICINE (OUTPATIENT)
Dept: PSYCHOLOGY | Age: 62
End: 2022-03-03
Payer: MEDICARE

## 2022-03-03 DIAGNOSIS — F31.30 BIPOLAR I DISORDER, MOST RECENT EPISODE (OR CURRENT) DEPRESSED (HCC): Primary | ICD-10-CM

## 2022-03-03 PROCEDURE — 90832 PSYTX W PT 30 MINUTES: CPT | Performed by: PSYCHOLOGIST

## 2022-03-03 PROCEDURE — 99999 PR OFFICE/OUTPT VISIT,PROCEDURE ONLY: CPT | Performed by: PSYCHOLOGIST

## 2022-03-03 NOTE — PROGRESS NOTES
Behavioral Health Consultation  [address], [address] [place] [address]  Psychologist  12/2/2021   12:26 PM        Time spent with Patient: 35 minutes  This is patient's fourteenth [facility] appointment. Reason for Consult:                 Depression     Referring Provider: Shree Knutson MD     TELEHEALTH VISIT -- Audio/Visual (During EVJRV-59 public health emergency)  }  Pursuant to the emergency declaration under the [address] [place] [address] [facility] authority and the [employer] Act, this Virtual Visit was conducted, with patient's consent, to reduce the patient's risk of exposure to COVID-19 and provide continuity of care for an established patient. Services were provided through a video synchronous discussion virtually to substitute for in-person clinic visit. Pt gave verbal informed consent to participate in telehealth services. Conducted a risk-benefit analysis and determined that the patient's presenting problems are consistent with the use of telepsychology. Determined that the patient has sufficient knowledge and skills in the use of technology enabling them to adequately benefit from telepsychology. It was determined that this patient was able to be properly treated without an in-person session. Patient verified that they were currently located at the [facility] address that was provided during registration. Verified the following information:  Patient's identification: Yes  Patient location: [address] [place] [address]  Patient's call back number: [phone]  Patient's emergency contact's name and number, as well as permission to contact them if needed: Extended Emergency Contact Information  Primary Emergency Contact: Boy Mcdaniel Phone: [phone]  Mobile Phone: [phone]  Relation: Brother/Sister  Preferred language: English   needed?  No  Secondary Emergency Contact: Tatyana Whitten TAKE 1 TABLET THREE TIMES DAILY AS NEEDED FOR PAIN (Patient taking differently: TAKE 1 TABLET TWICE TIMES DAILY AS NEEDED FOR PAIN) 270 tablet 0    lisinopril (PRINIVIL;ZESTRIL) 5 MG tablet TAKE 1 TABLET EVERY DAY 90 tablet 2    montelukast (SINGULAIR) 10 MG tablet TAKE 1 TABLET EVERY NIGHT 90 tablet 1    simvastatin (ZOCOR) 20 MG tablet TAKE 1 TABLET EVERY NIGHT 90 tablet 1    lamoTRIgine (LAMICTAL) 150 MG tablet TAKE 1 TABLET TWICE DAILY OR AS OTHERWISE DIRECTED 180 tablet 1    vitamin D (ERGOCALCIFEROL) 1.25 MG (96147 UT) CAPS capsule TAKE 1 CAPSULE ONE TIME WEEKLY 8 capsule 0    fluticasone (FLONASE) 50 MCG/ACT nasal spray 2 sprays by Each Nostril route daily 1 Bottle 5    levothyroxine (SYNTHROID) 100 MCG tablet Take 1 tablet by mouth Daily 90 tablet 1    metoprolol succinate (TOPROL XL) 100 MG extended release tablet Take 1 tablet by mouth daily 90 tablet 1    allopurinol (ZYLOPRIM) 300 MG tablet TAKE 1 TABLET EVERY DAY 90 tablet 1    diclofenac sodium (VOLTAREN) 1 % GEL Apply topically 2 times daily 150 g 2    apixaban (ELIQUIS) 5 MG TABS tablet Take 1 tablet by mouth 2 times daily 180 tablet 3    torsemide (DEMADEX) 10 MG tablet 2 tabs every other day alternating with 1 tab the other days 180 tablet 3    acetaminophen (TYLENOL) 325 MG tablet Take 650 mg by mouth every 6 hours as needed for Pain TAKES EVERY DAY      cetirizine (ZYRTEC) 10 MG tablet Take 10 mg by mouth daily       No current facility-administered medications for this visit. Social History:   Social History     Socioeconomic History    Marital status:       Spouse name: Not on file    Number of children: Not on file    Years of education: Not on file    Highest education level: Not on file   Occupational History    Not on file   Tobacco Use    Smoking status: Former Smoker     Packs/day: 3.00     Years: 2.00     Pack years: 6.00     Types: Cigarettes     Quit date: 1985     Years since quittin.9    Smokeless tobacco: Never Used   Vaping Use    Vaping Use: Never used   Substance and Sexual Activity    Alcohol use: No    Drug use: No    Sexual activity: Not Currently   Other Topics Concern    Not on file   Social History Narrative    Not on file     Social Determinants of Health     Financial Resource Strain: Low Risk     Difficulty of Paying Living Expenses: Not hard at all   Food Insecurity: No Food Insecurity    Worried About Running Out of Food in the Last Year: Never true    Alexi of Food in the Last Year: Never true   Transportation Needs: No Transportation Needs    Lack of Transportation (Medical): No    Lack of Transportation (Non-Medical): No   Physical Activity:     Days of Exercise per Week: Not on file    Minutes of Exercise per Session: Not on file   Stress:     Feeling of Stress : Not on file   Social Connections:     Frequency of Communication with Friends and Family: Not on file    Frequency of Social Gatherings with Friends and Family: Not on file    Attends Congregation Services: Not on file    Active Member of [address] [place] [address] [employer] or Organizations: Not on file    Attends Club or Organization Meetings: Not on file    Marital Status: Not on file   Intimate Partner Violence:     Fear of Current or Ex-Partner: Not on file    Emotionally Abused: Not on file    Physically Abused: Not on file    Sexually Abused: Not on file   Housing Stability:     Unable to Pay for Housing in the Last Year: Not on file    Number of [place] in the Last Year: Not on file    Unstable Housing in the Last Year: Not on file       TOBACCO:   reports that she quit smoking about 36 years ago. Her smoking use included cigarettes. She has a 6.00 pack-year smoking history. She has never used smokeless tobacco.  ETOH:   reports no history of alcohol use.     Family History:   Family History   Problem Relation Age of Onset    Cancer Mother     Ovarian Cancer Mother 45        Technically peritoneal cancer   

## 2022-03-21 ENCOUNTER — OFFICE VISIT (OUTPATIENT)
Dept: RHEUMATOLOGY | Age: 62
End: 2022-03-21
Payer: MEDICARE

## 2022-03-21 VITALS
SYSTOLIC BLOOD PRESSURE: 126 MMHG | DIASTOLIC BLOOD PRESSURE: 80 MMHG | BODY MASS INDEX: 55.32 KG/M2 | HEIGHT: 61 IN | WEIGHT: 293 LBS

## 2022-03-21 DIAGNOSIS — M81.0 SENILE OSTEOPOROSIS: Primary | ICD-10-CM

## 2022-03-21 PROCEDURE — 96372 THER/PROPH/DIAG INJ SC/IM: CPT | Performed by: INTERNAL MEDICINE

## 2022-03-21 PROCEDURE — 99999 PR OFFICE/OUTPT VISIT,PROCEDURE ONLY: CPT | Performed by: INTERNAL MEDICINE

## 2022-03-23 ENCOUNTER — OFFICE VISIT (OUTPATIENT)
Dept: FAMILY MEDICINE CLINIC | Age: 62
End: 2022-03-23
Payer: MEDICARE

## 2022-03-23 VITALS
DIASTOLIC BLOOD PRESSURE: 80 MMHG | BODY MASS INDEX: 63.6 KG/M2 | OXYGEN SATURATION: 98 % | SYSTOLIC BLOOD PRESSURE: 110 MMHG | WEIGHT: 293 LBS | HEART RATE: 84 BPM

## 2022-03-23 DIAGNOSIS — Z98.84 HISTORY OF GASTRIC BYPASS: ICD-10-CM

## 2022-03-23 DIAGNOSIS — E66.01 CLASS 3 SEVERE OBESITY DUE TO EXCESS CALORIES WITH SERIOUS COMORBIDITY AND BODY MASS INDEX (BMI) OF 60.0 TO 69.9 IN ADULT (HCC): ICD-10-CM

## 2022-03-23 DIAGNOSIS — R22.31 MASS OF RIGHT AXILLA: ICD-10-CM

## 2022-03-23 DIAGNOSIS — I10 HYPERTENSION, ESSENTIAL: ICD-10-CM

## 2022-03-23 DIAGNOSIS — F33.41 RECURRENT MAJOR DEPRESSIVE DISORDER, IN PARTIAL REMISSION (HCC): Primary | ICD-10-CM

## 2022-03-23 PROCEDURE — 3017F COLORECTAL CA SCREEN DOC REV: CPT | Performed by: FAMILY MEDICINE

## 2022-03-23 PROCEDURE — 1036F TOBACCO NON-USER: CPT | Performed by: FAMILY MEDICINE

## 2022-03-23 PROCEDURE — G8427 DOCREV CUR MEDS BY ELIG CLIN: HCPCS | Performed by: FAMILY MEDICINE

## 2022-03-23 PROCEDURE — 99214 OFFICE O/P EST MOD 30 MIN: CPT | Performed by: FAMILY MEDICINE

## 2022-03-23 PROCEDURE — G8417 CALC BMI ABV UP PARAM F/U: HCPCS | Performed by: FAMILY MEDICINE

## 2022-03-23 PROCEDURE — G8482 FLU IMMUNIZE ORDER/ADMIN: HCPCS | Performed by: FAMILY MEDICINE

## 2022-03-23 ASSESSMENT — ENCOUNTER SYMPTOMS
BACK PAIN: 0
VOMITING: 0
CHEST TIGHTNESS: 0
COLOR CHANGE: 0
SHORTNESS OF BREATH: 0
NAUSEA: 0
ABDOMINAL PAIN: 0

## 2022-03-23 NOTE — PROGRESS NOTES
3/23/2022    This is a 59 y.o. female who presents for  Chief Complaint   Patient presents with    3 Month Follow-Up    Mass     right armpit, has got bigger over time but not painful. HPI:     Overall, \"feels ok today\"  Depression is still prominent, but Dr. Clint White is helping tremendously  Has not seen her in 3 weeks, sees her tomorrow  Using a lot of her techniques  No active SI/HI, has passive thoughts but less frequently   Madonna Ledbetter is helping more than she thought it would  Still using eating as a coping strategy  Set firmer boundaries with her brother due to their toxiz relationship    Saw rheumatology   prolia approved   On a steroid taper, which is not helping with WL/appetite suppression    Unable to afford aqautic therapy    Hx of gastric byspass  Has her new walker, helpful!     Mass R axillary region  Getting larger  Not tender unless she pushes it repeatedly or lifts her arm  No redness, drainage, fevers   Reassuring mammogram 7/21     Past Medical History:   Diagnosis Date    Anesthesia complication     hypotension post op    Autoimmune hemolytic anemia (HCC)     during uterine cancer    Bipolar disorder ([address]     managed by [facility]    Bundle branch block     Cardiomyopathy ([address]     Chronic systolic CHF (congestive heart failure) ([address] 7/9/2015    Depression     Family history of early CAD     Family history of ovarian cancer     mother    GERD (gastroesophageal reflux disease)     Gout     Hypertension     Hypothyroid     Osteoarthritis, knee     Shybut    Pneumonia     history of pneumonia    S/P gastric bypass 1/7/05    Unspecified cerebral artery occlusion with cerebral infarction     Uterine cancer ([address]     endometrial adenocarcinoma    Vitamin B 12 deficiency 7/09       Past Surgical History:   Procedure Laterality Date    CARDIAC CATHETERIZATION  7/10/2015    Normal Coronary Arteries    CHOLECYSTECTOMY      COLONOSCOPY  03/06/2019    NL    COLONOSCOPY N/A 3/6/2019    EGD AND COLONOSCOPY WITH ANESTHESIA performed by Marie Tesfaye MD at [address] [place] [address]      4 times    ERCP      FINGER SURGERY      gout X 2    GASTRIC BYPASS SURGERY  05    HERNIA REPAIR  10/20/2016    lap ventral hernia    LIPECTOMY      gangrenous    OTHER SURGICAL HISTORY      radium implants into uterus for uterine surgery X 2    OTHER SURGICAL HISTORY  16    exp. lap lysis of adhesion    OTHER SURGICAL HISTORY      biventricular pacer and defibrillator    PACEMAKER PLACEMENT  2016    TRANSESOPHAGEAL ECHOCARDIOGRAM  2015    UPPER GASTROINTESTINAL ENDOSCOPY  13    UPPER GASTROINTESTINAL ENDOSCOPY  2019    NL    UPPER GASTROINTESTINAL ENDOSCOPY N/A 3/6/2019    EGD AND COLONOSCOPY WITH ANESTHESIA performed by Marie Tesfaye MD at [address] [facility] Marital status:       Spouse name: Not on file    Number of children: 0    Years of education: Not on file    Highest education level: Not on file   Occupational History    Not on file   Tobacco Use    Smoking status: Former Smoker     Packs/day: 3.00     Years: 2.00     Pack years: 6.00     Types: Cigarettes     Quit date: 1985     Years since quittin.2    Smokeless tobacco: Never Used   Vaping Use    Vaping Use: Never used   Substance and Sexual Activity    Alcohol use: No    Drug use: No    Sexual activity: Not Currently   Other Topics Concern    Not on file   Social History Narrative    Not on file     Social Determinants of Health     Financial Resource Strain: Low Risk     Difficulty of Paying Living Expenses: Not hard at all   Food Insecurity: No Food Insecurity    Worried About [address] in the Last Year: Never true    [address] in the Last Year: Never true   Transportation Needs: No Transportation Needs    Lack of Transportation (Medical): No    Lack of Transportation (Non-Medical): No   Physical Activity:     Days of Exercise per Week: Not on file    Minutes of Exercise per Session: Not on file   Stress:     Feeling of Stress : Not on file   Social Connections:     Frequency of Communication with Friends and Family: Not on file    Frequency of Social Gatherings with Friends and Family: Not on file    Attends Moravian Services: Not on file    Active Member of [address] [place] [address] or Organizations: Not on file    Attends Club or Organization Meetings: Not on file    Marital Status: Not on file   Intimate Partner Violence:     Fear of Current or Ex-Partner: Not on file    Emotionally Abused: Not on file    Physically Abused: Not on file    Sexually Abused: Not on file   Housing Stability:     Unable to Pay for Housing in the Last Year: Not on file    Number of [place] in the Last Year: Not on file    Unstable Housing in the Last Year: Not on file       Family History   Problem Relation Age of Onset    Cancer Mother     Ovarian Cancer Mother 45        Technically peritoneal cancer    Depression Mother         bipolar    Arthritis Father     Rheum Arthritis Father     Arrhythmia Father     Other Father         gout    Heart Disease Father     Depression Brother         depression    Other Brother         gout    Obesity Brother     Other Brother         gout    Obesity Brother     Depression Brother     Cancer Maternal Grandmother         Breast    Breast Cancer Maternal Grandmother        Current Outpatient Medications   Medication Sig Dispense Refill    levothyroxine (SYNTHROID) 125 MCG tablet Take 1 tablet by mouth Daily 30 tablet 1    methocarbamol (ROBAXIN) 500 MG tablet TAKE 1 TABLET TWICE TIMES DAILY AS NEEDED FOR PAIN 270 tablet 0    predniSONE (DELTASONE) 1 MG tablet Take 4  tab po daily.  360 tablet 0    metoprolol succinate (TOPROL XL) 100 MG extended release tablet TAKE 1 TABLET EVERY DAY 90 tablet 0    predniSONE (DELTASONE) 5 MG tablet 4 tab po daily x 7 days. 3  tab po daily x 10 days. 2.5 tab po daily x 10 days, then 2 tab po daily therafter 180 tablet 0    Misc. Devices (BARIATRIC ROLLATOR) MISC Bariatric Rollator to use while walking. 1 each 0    diclofenac sodium (VOLTAREN) 1 % GEL APPLY TOPICALLY 2 TIMES DAILY 150 g 2    allopurinol (ZYLOPRIM) 300 MG tablet TAKE 1 TABLET EVERY DAY 90 tablet 1    CPAP Machine MISC by Does not apply route      lisinopril (PRINIVIL;ZESTRIL) 5 MG tablet TAKE 1 TABLET EVERY DAY 90 tablet 2    montelukast (SINGULAIR) 10 MG tablet TAKE 1 TABLET EVERY NIGHT 90 tablet 1    simvastatin (ZOCOR) 20 MG tablet TAKE 1 TABLET EVERY NIGHT 90 tablet 1    lamoTRIgine (LAMICTAL) 150 MG tablet TAKE 1 TABLET TWICE DAILY OR AS OTHERWISE DIRECTED 180 tablet 1    vitamin D (ERGOCALCIFEROL) 1.25 MG (16683 UT) CAPS capsule TAKE 1 CAPSULE ONE TIME WEEKLY 8 capsule 0    fluticasone (FLONASE) 50 MCG/ACT nasal spray 2 sprays by Each Nostril route daily 1 Bottle 5    apixaban (ELIQUIS) 5 MG TABS tablet Take 1 tablet by mouth 2 times daily 180 tablet 3    torsemide (DEMADEX) 10 MG tablet 2 tabs every other day alternating with 1 tab the other days 180 tablet 3    acetaminophen (TYLENOL) 325 MG tablet Take 650 mg by mouth every 6 hours as needed for Pain TAKES EVERY DAY      cetirizine (ZYRTEC) 10 MG tablet Take 10 mg by mouth daily      lurasidone (LATUDA) 40 MG TABS tablet Take 1 tablet by mouth daily 90 tablet 0     No current facility-administered medications for this visit.        Immunization History   Administered Date(s) Administered    COVID-19, Moderna, Primary or Immunocompromised, PF, 100mcg/0.5mL 03/18/2021, 04/22/2021    Influenza Virus Vaccine 01/28/2012, 10/17/2012, 11/10/2014, 10/22/2015    Influenza Whole 11/10/2014    Influenza, Intradermal, Preservative free 09/24/2013    Influenza, MDCK Quadv, IM, PF (Flucelvax 2 yrs and older) 10/17/2017    Influenza, Quadv, IM, PF (6 mo and older Fluzone, Flulaval, Fluarix, and 3 yrs and older Afluria) 12/01/2016, 01/04/2019, 10/07/2019, 09/21/2020, 12/05/2021    Pneumococcal Polysaccharide ([id number]) 01/28/2012    Tetanus 03/01/1988       Allergies   Allergen Reactions    Dilaudid [Hydromorphone Hcl] Other (See Comments)     Palpitations and orthostatic hypotension    Lavender Oil Shortness Of Breath and Rash    Penicillins Shortness Of Breath    Seroquel [Quetiapine Fumarate] Other (See Comments)     Very lethargic/almost not funtioning at all (per patient).  Adhesive Tape Other (See Comments)     BLISTERS, paper tape okay    Hydromorphone Hcl      Other reaction(s): Other (See Comments)  Rapid heart rate     Lithium Other (See Comments)     Diarrhea, vomiting, nausea, headaches,     Tetanus Toxoids Swelling       Review of Systems   Constitutional: Positive for unexpected weight change. Negative for activity change, appetite change and fatigue. Respiratory: Negative for chest tightness and shortness of breath. Cardiovascular: Negative for chest pain and palpitations. Gastrointestinal: Negative for abdominal pain, nausea and vomiting. Musculoskeletal: Positive for arthralgias. Negative for back pain. Skin: Negative for color change, rash and wound. Neurological: Negative for dizziness, tremors, seizures, weakness, light-headedness, numbness and headaches. Psychiatric/Behavioral: Positive for dysphoric mood. Negative for agitation, behavioral problems, confusion, decreased concentration, hallucinations, self-injury, sleep disturbance and suicidal ideas. The patient is not nervous/anxious and is not hyperactive. /80 (Site: Left Lower Arm, Position: Sitting, Cuff Size: Medium Adult)   Pulse 84   Wt (!) 336 lb 9.6 oz (152.7 kg)   SpO2 98%   BMI 63.60 kg/m²     Physical Exam  Vitals and nursing note reviewed. Constitutional:       General: She is not in acute distress. Appearance: Normal appearance.  She is well-developed. She is not diaphoretic. HENT:      Head: Normocephalic and atraumatic. Eyes:      Extraocular Movements: Extraocular movements intact. Conjunctiva/sclera: Conjunctivae normal.      Pupils: Pupils are equal, round, and reactive to light. Cardiovascular:      Rate and Rhythm: Normal rate and regular rhythm. Pulmonary:      Effort: Pulmonary effort is normal. No respiratory distress. Chest:       Abdominal:      Palpations: Abdomen is soft. Musculoskeletal:         General: Normal range of motion. Cervical back: Normal range of motion and neck supple. Skin:     General: Skin is warm and dry. Neurological:      Mental Status: She is alert and oriented to person, place, and time. Psychiatric:         Attention and Perception: She is attentive. Mood and Affect: Mood normal.         Speech: Speech normal.         Behavior: Behavior normal.         Thought Content: Thought content normal.         Judgment: Judgment normal.         Plan  1. Recurrent major depressive disorder, in partial remission ([address]  Improved. Will see Dr. Hakan Márquez tomorrow. Latuda helping. No active SI. Will provide resources for WL    2. History of gastric bypass  - [facility]  - External Referral to Dietitian  3. Class 3 severe obesity due to excess calories with serious comorbidity and body mass index (BMI) of 60.0 to 69.9 in adult [facility])  - [facility]  - External Referral to Dietitian    4. Mass of right axilla  Exam difficult   - US SOFT TISSUE LIMITED AREA; Future    5. Hypertension, essential  Stable         While assessing care for this patient, I have reviewed all pertinent lab work/imaging/ specialist notes and care in reference to those problems addressed above in detail. Appropriate medical decision making was based on this.      Please note that portions of this note may have been completed with a voice recognition program. Efforts were made to edit the dictations but occasionally words are mis-transcribed. No follow-ups on file.

## 2022-03-24 ENCOUNTER — TELEMEDICINE (OUTPATIENT)
Dept: PSYCHOLOGY | Age: 62
End: 2022-03-24
Payer: MEDICARE

## 2022-03-24 DIAGNOSIS — F31.30 BIPOLAR I DISORDER, MOST RECENT EPISODE (OR CURRENT) DEPRESSED (HCC): Primary | ICD-10-CM

## 2022-03-24 PROCEDURE — 90832 PSYTX W PT 30 MINUTES: CPT | Performed by: PSYCHOLOGIST

## 2022-03-24 PROCEDURE — 99999 PR OFFICE/OUTPT VISIT,PROCEDURE ONLY: CPT | Performed by: PSYCHOLOGIST

## 2022-03-24 NOTE — PROGRESS NOTES
Behavioral Health Consultation  [address], [address] [place] [address]  Psychologist  03/24/2022   12:29 PM        Time spent with Patient: 45 minutes  This is patient's fifteenth [facility] appointment. Reason for Consult:                 Depression     Referring Provider: Shaina Levin MD     TELEHEALTH VISIT -- Audio/Visual (During QTCount includes the [facility]-16 public health emergency)  }  Pursuant to the emergency declaration under the [address] [place] [address] [facility] authority and the [employer] Act, this Virtual Visit was conducted, with patient's consent, to reduce the patient's risk of exposure to COVID-19 and provide continuity of care for an established patient. Services were provided through a video synchronous discussion virtually to substitute for in-person clinic visit. Pt gave verbal informed consent to participate in telehealth services. Conducted a risk-benefit analysis and determined that the patient's presenting problems are consistent with the use of telepsychology. Determined that the patient has sufficient knowledge and skills in the use of technology enabling them to adequately benefit from telepsychology. It was determined that this patient was able to be properly treated without an in-person session. Patient verified that they were currently located at the [facility] address that was provided during registration. Verified the following information:  Patient's identification: Yes  Patient location: [address] [place] [address]  Patient's call back number: [phone]  Patient's emergency contact's name and number, as well as permission to contact them if needed: Extended Emergency Contact Information  Primary Emergency Contact: Boy Dela Cruz Phone: [phone]  Mobile Phone: [phone]  Relation: Brother/Sister  Preferred language: English   needed?  No  Secondary Emergency Contact: Tatyana Whitten [address] Phone: [phone]  Mobile Phone: [phone]  Relation: Niece/Nephew    Provider location: [place] [address]:  During the last visit, patient set goals to:   1) focus on getting back on regular medication schedule   2) be mindful about your urge to isolate from others and be more open to the recent increase in social interaction request by others    Pt reports a rough patch of depressed days of not being able to get out of bed but it did then get better for a couple days. Saw PCP and was referred to weight loss specialist so feels somewhat more hopeful. Did work more on answering calls but friends just dumped their problems on her. Found it more stressful to talk to people and had to practice a lot of limit setting. Has been taking her medication on schedule again and knows this is helpful. COntinues to feel like she doesn't deserve to get better- battles these thoughts and is able to recognize it is her depressions voice, not hers. Not feeling as much pablo as she had when she first started medication. Isn't sure if something should be changed. Recognizes she is not as \"low\" with depression as before but has been keeping a close eye.          O:  MSE:    Appearance: good hygiene   Attitude: cooperative and friendly  Consciousness: alert  Orientation: oriented to person, place, time, general circumstance  Memory: recent and remote memory intact  Attention/Concentration: intact during session  Psychomotor Activity:normal  Eye Contact: Normal  Speech: normal rate and volume, well-articulated  Mood: \"ok today\"  Affect: Congruent, euthymic  Perception: within normal limits  Thought Content: within normal limits  Thought Process: logical, coherent and goal-directed  Insight: good  Judgment: intact  Ability to understand instructions: Yes  Ability to respond meaningfully: Yes  Morbid Ideation: no   Suicide Assessment: no  Homicidal Ideation: no      History:    Medications:   Current Outpatient Medications   Medication Sig Dispense Refill    buPROPion (WELLBUTRIN XL) 150 MG extended release tablet TAKE 1 TABLET EVERY MORNING 90 tablet 1    CPAP Machine MISC by Does not apply route      methocarbamol (ROBAXIN) 500 MG tablet TAKE 1 TABLET THREE TIMES DAILY AS NEEDED FOR PAIN (Patient taking differently: TAKE 1 TABLET TWICE TIMES DAILY AS NEEDED FOR PAIN) 270 tablet 0    lisinopril (PRINIVIL;ZESTRIL) 5 MG tablet TAKE 1 TABLET EVERY DAY 90 tablet 2    montelukast (SINGULAIR) 10 MG tablet TAKE 1 TABLET EVERY NIGHT 90 tablet 1    simvastatin (ZOCOR) 20 MG tablet TAKE 1 TABLET EVERY NIGHT 90 tablet 1    lamoTRIgine (LAMICTAL) 150 MG tablet TAKE 1 TABLET TWICE DAILY OR AS OTHERWISE DIRECTED 180 tablet 1    vitamin D (ERGOCALCIFEROL) 1.25 MG (16626 UT) CAPS capsule TAKE 1 CAPSULE ONE TIME WEEKLY 8 capsule 0    fluticasone (FLONASE) 50 MCG/ACT nasal spray 2 sprays by Each Nostril route daily 1 Bottle 5    levothyroxine (SYNTHROID) 100 MCG tablet Take 1 tablet by mouth Daily 90 tablet 1    metoprolol succinate (TOPROL XL) 100 MG extended release tablet Take 1 tablet by mouth daily 90 tablet 1    allopurinol (ZYLOPRIM) 300 MG tablet TAKE 1 TABLET EVERY DAY 90 tablet 1    diclofenac sodium (VOLTAREN) 1 % GEL Apply topically 2 times daily 150 g 2    apixaban (ELIQUIS) 5 MG TABS tablet Take 1 tablet by mouth 2 times daily 180 tablet 3    torsemide (DEMADEX) 10 MG tablet 2 tabs every other day alternating with 1 tab the other days 180 tablet 3    acetaminophen (TYLENOL) 325 MG tablet Take 650 mg by mouth every 6 hours as needed for Pain TAKES EVERY DAY      cetirizine (ZYRTEC) 10 MG tablet Take 10 mg by mouth daily       No current facility-administered medications for this visit. Social History:   Social History     Socioeconomic History    Marital status:       Spouse name: Not on file    Number of children: Not on file    Years of education: Not on file    Highest education level: Not on file   Occupational History    Not on file   Tobacco Use    Smoking status: Former Smoker     Packs/day: 3.00     Years: 2.00     Pack years: 6.00     Types: Cigarettes     Quit date: 1985     Years since quittin.9    Smokeless tobacco: Never Used   Vaping Use    Vaping Use: Never used   Substance and Sexual Activity    Alcohol use: No    Drug use: No    Sexual activity: Not Currently   Other Topics Concern    Not on file   Social History Narrative    Not on file     Social Determinants of Health     Financial Resource Strain: Low Risk     Difficulty of Paying Living Expenses: Not hard at all   Food Insecurity: No Food Insecurity    Worried About Running Out of Food in the Last Year: Never true    Alexi of Food in the Last Year: Never true   Transportation Needs: No Transportation Needs    Lack of Transportation (Medical): No    Lack of Transportation (Non-Medical): No   Physical Activity:     Days of Exercise per Week: Not on file    Minutes of Exercise per Session: Not on file   Stress:     Feeling of Stress : Not on file   Social Connections:     Frequency of Communication with Friends and Family: Not on file    Frequency of Social Gatherings with Friends and Family: Not on file    Attends Anglican Services: Not on file    Active Member of [address] [place] [address] or Organizations: Not on file    Attends Club or Organization Meetings: Not on file    Marital Status: Not on file   Intimate Partner Violence:     Fear of Current or Ex-Partner: Not on file    Emotionally Abused: Not on file    Physically Abused: Not on file    Sexually Abused: Not on file   Housing Stability:     Unable to Pay for Housing in the Last Year: Not on file    Number of [place] in the Last Year: Not on file    Unstable Housing in the Last Year: Not on file       TOBACCO:   reports that she quit smoking about 36 years ago. Her smoking use included cigarettes. She has a 6.00 pack-year smoking history. She has never used smokeless tobacco.  ETOH:   reports no history of alcohol use. Family History:   Family History   Problem Relation Age of Onset    Cancer Mother     Ovarian Cancer Mother 45        Technically peritoneal cancer    Depression Mother         bipolar    Arthritis Father     Rheum Arthritis Father     Arrhythmia Father     Other Father         gout    Heart Disease Father     Depression Brother         depression    Other Brother         gout    Obesity Brother     Other Brother         gout    Obesity Brother     Depression Brother     Cancer Maternal Grandmother         Breast    Breast Cancer Maternal Grandmother          A:  Ms. Ariel Desir reports ups and downs with depression over the past 3 weeks since the last visit. She reports worsening mood for a few days with increasing isolative behaviors although reports these days passed as she was mindful about practicing more effective coping strategies. She continues to be active and engaged and responds positively to behavioral interventions. She may benefit from medication check and will be referred to Dr. [facility]era [facility]Cao at [facility]. for bridging of care appointment.       Diagnosis:       BAD I, Most recent episode depressed      Plan:  Pt interventions:    Orla-setting to identify pt's [facility], Supportive techniques, reinforced pt's skill use, CBT interventions, ACT interventions, behavioral activation interventions, and treatment planning    Pt Behavioral Change Plan  Patient set goals to:   1) f/u with Dr. uGera Chao at [facility]  2) continue to be mindful about not engaging in reinnforcing behaviors that exacerbate depression   3) return for f/u in 2 weeks, sooner if needed

## 2022-03-24 NOTE — Clinical Note
Hi there. Just FYI- going to have my  see her for bridge of care appointment to check in on her meds. Thanks!

## 2022-03-28 ENCOUNTER — OFFICE VISIT (OUTPATIENT)
Dept: CARDIOLOGY CLINIC | Age: 62
End: 2022-03-28
Payer: MEDICARE

## 2022-03-28 ENCOUNTER — NURSE ONLY (OUTPATIENT)
Dept: CARDIOLOGY CLINIC | Age: 62
End: 2022-03-28
Payer: MEDICARE

## 2022-03-28 VITALS
OXYGEN SATURATION: 100 % | BODY MASS INDEX: 55.32 KG/M2 | DIASTOLIC BLOOD PRESSURE: 62 MMHG | HEART RATE: 72 BPM | WEIGHT: 293 LBS | SYSTOLIC BLOOD PRESSURE: 110 MMHG | HEIGHT: 61 IN

## 2022-03-28 DIAGNOSIS — I63.231 ARTERIAL ISCHEMIC STROKE, ICA, RIGHT, ACUTE (HCC): ICD-10-CM

## 2022-03-28 DIAGNOSIS — I48.91 ATRIAL FIBRILLATION, UNSPECIFIED TYPE (HCC): ICD-10-CM

## 2022-03-28 DIAGNOSIS — I48.3 TYPICAL ATRIAL FLUTTER (HCC): ICD-10-CM

## 2022-03-28 DIAGNOSIS — I50.42 CHRONIC COMBINED SYSTOLIC AND DIASTOLIC CONGESTIVE HEART FAILURE (HCC): ICD-10-CM

## 2022-03-28 DIAGNOSIS — I44.7 LBBB (LEFT BUNDLE BRANCH BLOCK): Primary | ICD-10-CM

## 2022-03-28 DIAGNOSIS — I50.22 CHRONIC SYSTOLIC CHF (CONGESTIVE HEART FAILURE) (HCC): ICD-10-CM

## 2022-03-28 DIAGNOSIS — I47.20 VT (VENTRICULAR TACHYCARDIA): ICD-10-CM

## 2022-03-28 DIAGNOSIS — Z95.810 BIVENTRICULAR ICD (IMPLANTABLE CARDIOVERTER-DEFIBRILLATOR) IN PLACE: Primary | ICD-10-CM

## 2022-03-28 DIAGNOSIS — R55 SYNCOPE, UNSPECIFIED SYNCOPE TYPE: ICD-10-CM

## 2022-03-28 DIAGNOSIS — I63.9 CEREBROVASCULAR ACCIDENT (CVA), UNSPECIFIED MECHANISM (HCC): ICD-10-CM

## 2022-03-28 PROCEDURE — G8482 FLU IMMUNIZE ORDER/ADMIN: HCPCS | Performed by: INTERNAL MEDICINE

## 2022-03-28 PROCEDURE — 1036F TOBACCO NON-USER: CPT | Performed by: INTERNAL MEDICINE

## 2022-03-28 PROCEDURE — 3017F COLORECTAL CA SCREEN DOC REV: CPT | Performed by: INTERNAL MEDICINE

## 2022-03-28 PROCEDURE — 93290 INTERROG DEV EVAL ICPMS IP: CPT | Performed by: INTERNAL MEDICINE

## 2022-03-28 PROCEDURE — G8417 CALC BMI ABV UP PARAM F/U: HCPCS | Performed by: INTERNAL MEDICINE

## 2022-03-28 PROCEDURE — 99214 OFFICE O/P EST MOD 30 MIN: CPT | Performed by: INTERNAL MEDICINE

## 2022-03-28 PROCEDURE — 93284 PRGRMG EVAL IMPLANTABLE DFB: CPT | Performed by: INTERNAL MEDICINE

## 2022-03-28 PROCEDURE — G8427 DOCREV CUR MEDS BY ELIG CLIN: HCPCS | Performed by: INTERNAL MEDICINE

## 2022-03-28 NOTE — PROGRESS NOTES
Patient presents to the device clinic today for a programming evaluation for her defibrillator. Patient has a history of DCM, CVA, VT, AF, and A Flutter. Takes Eliquis and Toprol XL. Last device interrogation was on 1/31. Since then, no arrhythmias recorded. TI near baseline. LV LOC 3.5V @ 0.4ms. Device adapted output to 6V @ 0.4ms. LV trends stable since 9/2021. P wave: 3.6 mV  R wave: 12.8 mV    AP 5.7%  94.1%    All sensing and pacing parameters are within normal range. LV Vector changed to LV 3- RV Coil. Patient will see Dr. Esther Vigil today in office. Patient education was provided about device functionality, in home monitoring, and any other patient questions and/or concerns were addressed. Patient voices understanding. Patient will follow up in 3 months in office or remotely. Please see interrogation for more detail - Paceart report located under the Cardiology tab.

## 2022-03-28 NOTE — PATIENT INSTRUCTIONS
Plan:  1. Continue with remote device transmissions every 3 months. 2. Continue current medications at this time. 3. Follow up in 6 months with EP NP.

## 2022-03-28 NOTE — PROGRESS NOTES
[address]   Cardiac Followup    Date: 3/28/22  Patient Name: Gena Pineda  YOB: 1960    Primary Care Physician: Casi Urbano MD    CHIEF COMPLAINT:   Chief Complaint   Patient presents with    Follow-up    Congestive Heart Failure    Tachycardia     VT    Other     DEVICE MANAGEMENT, ATRIAL FLUTTER     Atrial Fibrillation     HPI:  Gena Pineda is a 59 y.o. female who presents for cardiomyopathy and review of device diagnostics. She had cardiomyopathy identified 7/15 with LVEF 20% by echo. Subsequent cath showed normal coronaries. She has been treated with beta blocker and ACE inhibitor, but the ACE was subsequently discontinued related to worsening renal function and hypotension. Repeat echo 7/6/16 showed LVEF . 20. She reports easy fatiguability, TAPIA with routine workloads. She did have a recent episode of syncope. ECG's have demonstrated consistent LBBB. She underwent CRT-D placement on 07/29/16. At her office visit in 3/2018, she reported she had been feeling better since the device was implanted. She underwent an echocardiogram in 11/2019 that showed an EF of 40-45%. At office visit 1/27/2021, patient's device demonstrated AP 11%,  92%,  VT on 12/16/2020 (340 ms) which was terminated with antitachycardia pacing, no AF events, battery life 1.7 years remaining. She reported she feels much better since having her device placed in 2016. On December 16th, 2020 at 10AM, patient had VT recorded on her device. Patient reported she felt very poorly the day that she had VT. She reported she felt her heart being paced out of VT and felt much better. On exam, patient has pronounced veins in her left shoulder, but she is unaware of how long the veins have been pronounced. She reported she occasionally feels skipped beats. At the conclusion of 1/2021 visit, VT zone successfully added to patient's device settings.     Patient wore a 24 hour scott monitor that demonstrated persistent AFL with an average HR of 84 () BPM, 0.1% PVCs. Patient presented to ED 3/2021 with complaints of chest pain, dyspnea and palpitations. Pt reports progressively worsening SOB over the last several days. SOB is worse with mild exertion including walking very short distances which is not her baseline. She denied cough/sputum production. No fever/chills. She also reported chest pain/pressure in her left anterior chest with associated palpitations that have been ongoing for the last 1-2 weeks. She saw her PCP in the office several days prior to ED visit for a UTI, she was started on Macrobid. Pt was noted to be very SOB. She was advised to call her Cardiologist who recommended for her to go home and transmit her pacemaker/ICD. She was noted to have VT. It was then advised for her to go the ED. Pt has trace lower extremity swelling. She reported gaining approx 80 lbs since the summer. She attributes this to overeating after her  passed away. She reported feeling tightness in her abdomen. No N/V/D. Serial troponin trend negative. Patient was found to be in acute on chronic systolic CHF. NSVT. New onset AFL. Stress test 3/2021 showed large sized inferior, fixed defect of severe intensity consistent with infarction in the territory of the RCA, LVEF of 34%. No ischemia or angina, recommending medical management. Echo 3/2021 showed reduced LVEF of 35-40%, +WMAs. Treated with IV doses of lasix. Transitioned to PO torsemide 10 mg daily. Metoprolol increased 4/1/2021. Eliquis 5 mg BID started. Patient's device was interrogated 4/1/2021 and she was found to have atypical appearing atrial flutter. Manual overdrive pacing was successful at terminating the atrial flutter. The device was reprogrammed to activate atrial ATP. The patient tolerated the procedure well and was discharged in good condition. She did not require cardioversion and did not receive sedation.     On 10/4/2021 patient's device interrogation demonstrates AP 31.6%,  97.7%, no new events, 16 months until RRT. She presented with a walker. She continues to have intermittent dizziness and TAPIA, which is unchanged. Today, 3/28/2022, Device interrogation demonstrated AP 5.7%,  94.1%, events none, MELISA 11 months. She states that she is following a weight loss clinic. She has not been very active due to arthritic issues. She is taking her medications as prescribed. Patient denies current edema, chest pain, sob, palpitations, dizziness or syncope. Past Medical History:   has a past medical history of Anesthesia complication, Autoimmune hemolytic anemia ([address] Bipolar disorder ([facility] [address] Bundle branch block, Cardiomyopathy ([address] Chronic systolic CHF (congestive heart failure) ([facility] [address] Depression, Family history of early CAD, Family history of ovarian cancer, GERD (gastroesophageal reflux disease), Gout, Hypertension, Hypothyroid, Osteoarthritis, knee, Pneumonia, S/P gastric bypass, Unspecified cerebral artery occlusion with cerebral infarction, Uterine cancer ([address] and Vitamin B 12 deficiency. Surgical History:   has a past surgical history that includes Gastric bypass surgery (1/7/05); Finger surgery; Dilation and curettage of uterus; lipectomy; Cholecystectomy; other surgical history; Upper gastrointestinal endoscopy (4/23/13); Cardiac catheterization (7/10/2015); transesophageal echocardiogram (7/13/2015); other surgical history (4/1/16); pacemaker placement (09/2016); hernia repair (10/20/2016); ERCP; other surgical history; Upper gastrointestinal endoscopy (03/06/2019); Colonoscopy (03/06/2019); Upper gastrointestinal endoscopy (N/A, 3/6/2019); and Colonoscopy (N/A, 3/6/2019). Social History:   reports that she quit smoking about 37 years ago. Her smoking use included cigarettes. She has a 6.00 pack-year smoking history.  She has never used smokeless tobacco. She reports that she does not drink alcohol and does not use cetirizine (ZYRTEC) 10 MG tablet Take 10 mg by mouth daily      predniSONE (DELTASONE) 5 MG tablet 4 tab po daily x 7 days. 3  tab po daily x 10 days. 2.5 tab po daily x 10 days, then 2 tab po daily therafter (Patient not taking: Reported on 3/28/2022) 180 tablet 0    vitamin D (ERGOCALCIFEROL) 1.25 MG (14575 UT) CAPS capsule TAKE 1 CAPSULE ONE TIME WEEKLY (Patient not taking: Reported on 3/28/2022) 8 capsule 0    fluticasone (FLONASE) 50 MCG/ACT nasal spray 2 sprays by Each Nostril route daily (Patient not taking: Reported on 3/28/2022) 1 Bottle 5     No facility-administered encounter medications on file as of 3/28/2022. Allergies:  Dilaudid [hydromorphone hcl], Lavender oil, Penicillins, Seroquel [quetiapine fumarate], Adhesive tape, Hydromorphone hcl, Lithium, and Tetanus toxoids     Review of Systems   Constitutional: Negative. HENT: Negative. Eyes: Negative. Respiratory: Negative. Cardiovascular: Negative. Gastrointestinal: Negative. Genitourinary: Negative. Musculoskeletal: Negative. Skin: Negative. Neurological: Negative. Hematological: Negative. Psychiatric/Behavioral: Negative. /62   Pulse 72   Ht 5' 1\" (1.549 m)   Wt (!) 342 lb (155.1 kg)   SpO2 100%   BMI 64.62 kg/m²     Data:    ECG 3/28/22: Personally reviewed. All current cardiac medications reviewed and adjusted accordingly  3/28/22    Device interrogation reviewed and adjusted accordingly 3/28/22     ECHO 3/2021:       Summary   Patient tachy during study. The left ventricular systolic function is moderately reduced with an   ejection fraction of 35 - 40 %. There is hypokinesis of the apex, apical lateral, inferoseptum, apical   anterior and anteroseptum walls. Septal bounce noted. Normal left ventricular size with mild concentric left ventricular   hypertrophy. Left ventricular diastolic filling pressure is elevated lateral E/e\" is 14 .    Changes noted from previous echo on 11-1-2019 in left ventricular function. Moderate mitral regurgitation. The right ventricle is mildly enlarged. Right ventricular systolic function is mildly reduced . Systolic pulmonic artery pressure (SPAP) is normal estimated at 26 mmHg   (Right atrial pressure of 3 mmHg). The right atrium is mildly dilated. STRESS TEST 3/2021:   Summary Large sized inferior, fixed defect of severe intensity consistent with  infarction in the territory of the RCA. No evidence of myocardium at risk  for significant reversible ischemia. LV function is severely reduced with and ejection fraction of 34 %. Overall findings represent a high risk scan. ECHO 11/1/2019:   Summary   The left ventricular systolic function is mildly reduced with an ejection   fraction of 40-45 %. Anteroseptal wall hypokinesis. There is mild concentric left ventricular hypertrophy. Left ventricular cavity size is mildly dilated. Grade I diastolic dysfunction with normal left ventricular filling pressure. Mild posterior mitral annular calcification. Mild thickening of the anterior leaflet of mitral valve. Moderate mitral regurgitation. The left atrium is mildly dilated. Frequent premature beats makes it difficult to identify exact wall motion   abnormality. Objective:  Physical Exam   Constitutional: She is oriented to person, place, and time. She appears well-developed and well-nourished. HENT:   Head: Normocephalic and atraumatic. Eyes: Pupils are equal, round, and reactive to light. Neck: Normal range of motion. Cardiovascular: Normal rate, regular rhythm and normal heart sounds. Pulmonary/Chest: Effort normal and breath sounds normal.   Abdominal: Soft. No tenderness. Musculoskeletal: Normal range of motion. She exhibits no edema. Neurological: She is alert and oriented to person, place, and time. Skin: Skin is warm and dry. Psychiatric: She has a normal mood and affect. Assessment:  1.  [facility]- echo 3/2021 demonstrated an LVEF of 35-40%. 2. PAT- no recurrence per device interrogations since 4/2021.   3. VT-  VT on 12/16/2020 (340 ms) per device check which was terminated with antitachycardia pacing. VT zone successfully added to device settings 1/27/2021. No recurrence. 4. S/P CRT-D- she has had remarkable symptomatic improvement with CRT. Her device is functioning normally and her ECG is markedly improved. Changed LV to 3-coil at OV on 3/28/2022. Plan:  1. Continue with remote device transmissions every 3 months. 2. Continue current medications at this time. 3. Follow up in 6 months with EP NP.           QUALITY MEASURES  1. Tobacco Cessation Counseling: NA  2. Retake of BP if >140/90:   NA  3. Documentation to PCP/referring for new patient:  Sent to PCP at close of office visit  4. CAD patient on anti-platelet: NA  5. CAD patient on STATIN therapy:  NA  6. Patient with CHF and aFib on anticoagulation:  NA      I, Jacquelyn Diaz RN, am scribing for and in the presence of Dr. Tomi Pennington. 03/28/22 10:09 AM   Jacquelyn Diaz RN      I, Dr. Tomi Pennington, personally performed the services described in this documentation as scribed by Jacquelyn Diaz RN  in my presence, and it is both accurate and complete.         Tomi Pennington M.D.

## 2022-03-29 ENCOUNTER — FOLLOWUP TELEPHONE ENCOUNTER (OUTPATIENT)
Dept: PSYCHIATRY | Age: 62
End: 2022-03-29

## 2022-03-30 ENCOUNTER — HOSPITAL ENCOUNTER (OUTPATIENT)
Dept: PSYCHIATRY | Age: 62
Setting detail: THERAPIES SERIES
Discharge: HOME | End: 2022-03-30
Payer: MEDICARE

## 2022-03-30 VITALS
OXYGEN SATURATION: 98 % | RESPIRATION RATE: 18 BRPM | HEART RATE: 93 BPM | SYSTOLIC BLOOD PRESSURE: 153 MMHG | TEMPERATURE: 97.1 F | DIASTOLIC BLOOD PRESSURE: 94 MMHG

## 2022-03-30 DIAGNOSIS — F31.77 BIPOLAR DISORDER, IN PARTIAL REMISSION, MOST RECENT EPISODE MIXED (HCC): Chronic | ICD-10-CM

## 2022-03-30 PROCEDURE — 90792 PSYCH DIAG EVAL W/MED SRVCS: CPT | Performed by: PSYCHIATRY & NEUROLOGY

## 2022-03-30 RX ORDER — LURASIDONE HYDROCHLORIDE 40 MG/1
TABLET, FILM COATED ORAL
Qty: 90 TABLET | Refills: 1 | Status: SHIPPED | OUTPATIENT
Start: 2022-03-30 | End: 2022-03-30 | Stop reason: SDUPTHER

## 2022-03-30 RX ORDER — LAMOTRIGINE 150 MG/1
300 TABLET ORAL NIGHTLY
Qty: 60 TABLET | Refills: 0 | Status: SHIPPED | OUTPATIENT
Start: 2022-03-30 | End: 2022-09-28 | Stop reason: SDUPTHER

## 2022-03-30 NOTE — TELEPHONE ENCOUNTER
Refill Request     Last Seen: Last Seen Department: 3/23/2022  Last Seen by PCP: 3/23/2022    Last Written: 12/22/21    Next Appointment:   Future Appointments   Date Time Provider Sarah Dania   3/30/2022 11:00 AM Trina Smith MD [facility] OP BHI [place] HOD   4/11/2022 12:30 PM [facility] Andrey Bowser PSYD [place] PSY [facility]   4/18/2022  1:00 PM SCHEDULE, HEALTHY WEIGHT HEALTHY WT [facility]   4/19/2022  8:00 AM [facility] [place] 1 [facility] ULTRA [place] [facility]   4/19/2022 10:00 AM [facility] [place] 01 [facility] ECHO [place] HOD   4/21/2022 12:00 PM Raymond Das MD HEALTHY WT [facility]   5/2/2022  8:30 AM SCHEDULE, GAB REMOTE TRANSMISSION [employer] [place] [facility]   7/1/2022 10:00 AM MD OSIRIS Montemayor  [place] - DYD   7/14/2022 11:00 AM MD OSIRIS Montemayor  [place] - DYD   9/22/2022  9:30 AM Branden Chan MD KNWD RHEUM [facility]   9/28/2022 10:15 AM SCHEDULE, [facility] [employer] [place] [facility]   9/28/2022 10:15 AM ALICIA Kendall CNP [employer] [place] [facility]   10/31/2022  1:00 PM [facility]       Future appointment scheduled      Requested Prescriptions     Pending Prescriptions Disp Refills    LATUDA 40 MG TABS tablet [Pharmacy Med Name: LATUDA 40 MG Tablet] 30 tablet      Sig: TAKE 1 TABLET EVERY DAY

## 2022-03-31 NOTE — PROGRESS NOTES
Department of Psychiatry  Psychiatric Evaluation     Patient's chart was reviewed. Met with patient. SUBJECTIVE:      Patient referred for bridge appointment with worsening symptoms of depression. Reports doing well for a while after discharge from the hospital.    Says she's felt more depressed over the last three weeks: decreased motivation and concentration, fatigue, increased rumination, derogatory self-talk, and irritability. Scored 23 on PHQ9 with a 2 on item 9. Says she doesn't want to die but doesn't want to live like this. Discussed treatment options at length. Agreed to consolidate both Lamictal and Latuda to QHS dosing, and increase Latuda to 60mg. ROS:   Patient has new complaints: yes - see above  Sleeping adequately:  No: hypersomnia    Appetite adequate: Yes    Did not endorse or describe head aches, SOB, cough, sore throat, chills, chest pain, abdominal pain, neuro problems, bleeding problems, or skin problems. Was moving all four extremities and speaking without difficulty.     OBJECTIVE:  VITALS:  BP (!) 153/94   Pulse 93   Temp 97.1 °F (36.2 °C) (Temporal)   Resp 18   SpO2 98%    Gait:normal    Mental Status Examination:    Appearance: good grooming and hygiene, using walker  Behavior/Attitude toward examiner:  cooperative, attentive and good eye contact  Speech: Normal rate, volume, amount  Mood:  \"down\"  Affect:  blunted     Thought processes:  Goal directed, linear, no BERTHA or gross disorganization  Thought Content:  some passive SI, no HI, no delusions voiced, no obsessions  Perceptions: no AVH  Attention: attention span and concentration were intact to interview   Abstraction: intact  Cognition:  Alert and oriented to person, place, time, and situation, recall intact  Insight: intact  Judgment: intact    Psych Medication:  Lamictal 150mg BID  Latuda 40mg QAM    ASSESSMENT AND PLAN:   This is a domiciled, , disabled 75-year-old with bipolar 1 disorder, who was present for a bridge appointment from her  primary care office with worsening symptoms of depression     DIAGNOSES:  1.  Bipolar 1 disorder, most recently depressed. 2.  History of cardiomyopathy with chronic systolic congestive heart  failure. 3.  Hypertension. 4.  Gout. 5.  Hyperlipidemia. 6.  Hypothyroidism. 7.  History of uterine cancer and CVA. 1. Discussed treatment options at length. Agreed to consolidate both Lamictal and Latuda to QHS dosing, and increase Latuda to 60mg. 2. Psychoeducation regarding symptoms of depression at length including the things she can do to help manage symptoms going forward. 3. safety plan discussed at length including presenting to the ED or calling 911 with increased thoughts of suicide   4. Follow-up with me in one week. Total time with patient was 40 minutes and more than 50 % of that time was spent counseling the patient on their symptoms, treatment, and expected goals.      Gary Reyes MD

## 2022-04-05 ENCOUNTER — FOLLOWUP TELEPHONE ENCOUNTER (OUTPATIENT)
Dept: PSYCHIATRY | Age: 62
End: 2022-04-05

## 2022-04-06 ENCOUNTER — HOSPITAL ENCOUNTER (OUTPATIENT)
Dept: PSYCHIATRY | Age: 62
Setting detail: THERAPIES SERIES
Discharge: HOME | End: 2022-04-06
Payer: MEDICARE

## 2022-04-06 PROCEDURE — 99213 OFFICE O/P EST LOW 20 MIN: CPT | Performed by: PSYCHIATRY & NEUROLOGY

## 2022-04-07 NOTE — PROGRESS NOTES
heart  failure. 3.  Hypertension. 4.  Gout. 5.  Hyperlipidemia. 6.  Hypothyroidism. 7.  History of uterine cancer and CVA. 1. On initial visit, discussed treatment options at length. Agreed to consolidate both Lamictal and Latuda to QHS dosing, and increase Latuda to 60mg. 2. Psychoeducation regarding symptoms of depression at length including the things she can do to help manage symptoms going forward. 3. safety plan discussed at length including presenting to the ED or calling 911 with increased thoughts of suicide   4. Follow-up with me in one week - April 13 at 11:00. Sooner PRN. Total time with patient was 20 minutes and more than 50 % of that time was spent counseling the patient on their symptoms, treatment, and expected goals.      Kai Lyn MD

## 2022-04-11 ENCOUNTER — TELEMEDICINE (OUTPATIENT)
Dept: PSYCHOLOGY | Age: 62
End: 2022-04-11

## 2022-04-11 ENCOUNTER — FOLLOWUP TELEPHONE ENCOUNTER (OUTPATIENT)
Dept: PSYCHIATRY | Age: 62
End: 2022-04-11

## 2022-04-11 DIAGNOSIS — F31.30 BIPOLAR I DISORDER, MOST RECENT EPISODE (OR CURRENT) DEPRESSED (HCC): Primary | ICD-10-CM

## 2022-04-11 NOTE — PATIENT INSTRUCTIONS
\"The entire autonomic nervous system (and through it, our internal organs and glands) is largely driven by our breathing patterns. By changing our breathing we can influence millions of biochemical reactions in our body, producing more relaxing substances such as endorphins and fewer anxiety-producing ones like adrenaline and higher blood acidity. Mindfulness of the breath is so effective that it is common to all meditative and prayer traditions. \" Anxiety Fear & Breathing - Breathing. com    \"When overcoming high levels of anxiety, it is important to learn the techniques of correct breathing. Many people who live with high levels of anxiety are known to breathe through their chest. Shallow breathing through the chest means you are disrupting the balance of oxygen and carbon dioxide necessary to be in a relaxed state. This type of breathing will perpetuate the symptoms of anxiety. \" [employer] com      Diaphragmatic Breathing  ( You can download the free hector,  [id number], to practice)           _____________________________________________________________________________  1. Sit in a comfortable position    2. Place one hand on your stomach and the other on your chest    3. Try to breathe so that only your stomach rises and falls    As you inhale, concentrate on your chest remaining relatively still while your stomach rises. It may be helpful for you to imagine that your pants are too big and you need to push your stomach out to hold them up. When exhaling, allow your stomach to fall in and the air to fully escape. Inhale slowly. You may choose to hold the air in for about a second. Exhale slowly. Dont push the air out, but just let the natural pressure of your body slowly move it out.     It is normal for this healthy method of breathing to feel a little awkward at first.  With practice, it will feel more natural.    4. Get your mind on your side    One other important factor in getting relaxed is your mind.  Your mind and body are connected. The mind influences the body and the body influences the mind. What you do with your mind when you are trying to relax is very important. The key is to avoid thinking about stressful things. You can think about      Neutral things (e.g., counting, saying a word like calm or relax)   Pleasant things (e.g., imagining a pleasant place)    5. It is recommended that you practice 2 times per day, 10 minutes each time. ------      \"The entire autonomic nervous system (and through it, our internal organs and glands) is largely driven by our breathing patterns. By changing our breathing we can influence millions of biochemical reactions in our body, producing more relaxing substances such as endorphins and fewer anxiety-producing ones like adrenaline and higher blood acidity. Mindfulness of the breath is so effective that it is common to all meditative and prayer traditions. \" Anxiety Fear & Breathing - Breathing. com    \"When overcoming high levels of anxiety, it is important to learn the techniques of correct breathing. Many people who live with high levels of anxiety are known to breathe through their chest. Shallow breathing through the chest means you are disrupting the balance of oxygen and carbon dioxide necessary to be in a relaxed state. This type of breathing will perpetuate the symptoms of anxiety. \" [employer]. [employer]        CONTROLLED or MEASURED BREATHING EXERCISE: (DOWNLOAD THE FREE [employer] \"VIRTUAL HOPE BOX\" TO PRACTICE THIS EXERCISE)    *The point of this is to exhale DOUBLE the amount of time you are inhaling. Below is an example of what you can practice if relaxed. Change the time you inhale when anxious to whatever you can naturally inhale for then double that time for your exhale breath.  You can sit or stand, but be sure to soften up a little before you begin. Make sure your hands are relaxed, and your knees are soft.    Drop your shoulders and let

## 2022-04-11 NOTE — PROGRESS NOTES
Behavioral Health Consultation  [address], [address] [place] [address]  Psychologist  04/11/2022   12:37 PM        Time spent with Patient: 41 minutes  This is patient's sixteenth [facility] appointment. Reason for Consult:                 Depression     Referring Provider: Fredy Andrews MD     TELEHEALTH VISIT -- Audio/Visual (During CVSJP-60 public health emergency)  }  Pursuant to the emergency declaration under the [address] [place] [address] [facility] authority and the [employer] Act, this Virtual Visit was conducted, with patient's consent, to reduce the patient's risk of exposure to COVID-19 and provide continuity of care for an established patient. Services were provided through a video synchronous discussion virtually to substitute for in-person clinic visit. Pt gave verbal informed consent to participate in telehealth services. Conducted a risk-benefit analysis and determined that the patient's presenting problems are consistent with the use of telepsychology. Determined that the patient has sufficient knowledge and skills in the use of technology enabling them to adequately benefit from telepsychology. It was determined that this patient was able to be properly treated without an in-person session. Patient verified that they were currently located at the [facility] address that was provided during registration. Verified the following information:  Patient's identification: Yes  Patient location: [address] [place] [address]  Patient's call back number: [phone]  Patient's emergency contact's name and number, as well as permission to contact them if needed: Extended Emergency Contact Information  Primary Emergency Contact: Boy Jaramillo Phone: [phone]  Mobile Phone: [phone]  Relation: Brother/Sister  Preferred language: English   needed?  No  Secondary Emergency Contact: Tatyana Whitten [address] Phone: [phone]  Mobile Phone: [phone]  Relation: Niece/Nephew    Provider location: [place], [place]      S:  During the last visit, 1) f/u with Dr. Edmundo Luna at [facility]  2) continue to be mindful about not engaging in reinnforcing behaviors that exacerbate depression   3) return for f/u in 2 weeks, sooner if needed    Pt reports recent medication changes that have helped with some of her energy issues. Not having crushing fatigue anymore. Is able to push herself  A little more but still struggling with feeling depressed and thinking about death. Not feeling suicidal but thinks about the idea of death. Started Feeling Good as recommended by Dr. Edmundo Luna. Finds parts of it helpful but gets stuck on examples in the book that she cannot relate to. Went to [employer] for the first time in 2 months- made it through most of it then had a panic attack towards the end and left. Practiced breathing in the car. Never learned behavioral strategies for panic attacks but is interested. Has considered volunteering but feels very reluctant to commit to a schedule. Used to meditate in the past and found it helpful but feels her brain and thoughts are all over the place and it would be too hard to do.       O:  MSE:    Appearance: good hygiene   Attitude: cooperative and friendly  Consciousness: alert  Orientation: oriented to person, place, time, general circumstance  Memory: recent and remote memory intact  Attention/Concentration: intact during session  Psychomotor Activity:normal  Eye Contact: Normal  Speech: normal rate and volume, well-articulated  Mood: \"ok \"  Affect: Congruent, euthymic  Perception: within normal limits  Thought Content: within normal limits  Thought Process: logical, coherent and goal-directed  Insight: good  Judgment: intact  Ability to understand instructions: Yes  Ability to respond meaningfully: Yes  Morbid Ideation: no   Suicide Assessment: no  Homicidal Ideation: no      History:    Medications:   Current Outpatient Medications   Medication Sig Dispense Refill    buPROPion (WELLBUTRIN XL) 150 MG extended release tablet TAKE 1 TABLET EVERY MORNING 90 tablet 1    CPAP Machine MISC by Does not apply route      methocarbamol (ROBAXIN) 500 MG tablet TAKE 1 TABLET THREE TIMES DAILY AS NEEDED FOR PAIN (Patient taking differently: TAKE 1 TABLET TWICE TIMES DAILY AS NEEDED FOR PAIN) 270 tablet 0    lisinopril (PRINIVIL;ZESTRIL) 5 MG tablet TAKE 1 TABLET EVERY DAY 90 tablet 2    montelukast (SINGULAIR) 10 MG tablet TAKE 1 TABLET EVERY NIGHT 90 tablet 1    simvastatin (ZOCOR) 20 MG tablet TAKE 1 TABLET EVERY NIGHT 90 tablet 1    lamoTRIgine (LAMICTAL) 150 MG tablet TAKE 1 TABLET TWICE DAILY OR AS OTHERWISE DIRECTED 180 tablet 1    vitamin D (ERGOCALCIFEROL) 1.25 MG (24738 UT) CAPS capsule TAKE 1 CAPSULE ONE TIME WEEKLY 8 capsule 0    fluticasone (FLONASE) 50 MCG/ACT nasal spray 2 sprays by Each Nostril route daily 1 Bottle 5    levothyroxine (SYNTHROID) 100 MCG tablet Take 1 tablet by mouth Daily 90 tablet 1    metoprolol succinate (TOPROL XL) 100 MG extended release tablet Take 1 tablet by mouth daily 90 tablet 1    allopurinol (ZYLOPRIM) 300 MG tablet TAKE 1 TABLET EVERY DAY 90 tablet 1    diclofenac sodium (VOLTAREN) 1 % GEL Apply topically 2 times daily 150 g 2    apixaban (ELIQUIS) 5 MG TABS tablet Take 1 tablet by mouth 2 times daily 180 tablet 3    torsemide (DEMADEX) 10 MG tablet 2 tabs every other day alternating with 1 tab the other days 180 tablet 3    acetaminophen (TYLENOL) 325 MG tablet Take 650 mg by mouth every 6 hours as needed for Pain TAKES EVERY DAY      cetirizine (ZYRTEC) 10 MG tablet Take 10 mg by mouth daily       No current facility-administered medications for this visit. Social History:   Social History     Socioeconomic History    Marital status:       Spouse name: Not on file    Number of children: Not on file    Years of education: Not on file Highest education level: Not on file   Occupational History    Not on file   Tobacco Use    Smoking status: Former Smoker     Packs/day: 3.00     Years: 2.00     Pack years: 6.00     Types: Cigarettes     Quit date: 1985     Years since quittin.9    Smokeless tobacco: Never Used   Vaping Use    Vaping Use: Never used   Substance and Sexual Activity    Alcohol use: No    Drug use: No    Sexual activity: Not Currently   Other Topics Concern    Not on file   Social History Narrative    Not on file     Social Determinants of Health     Financial Resource Strain: Low Risk     Difficulty of Paying Living Expenses: Not hard at all   Food Insecurity: No Food Insecurity    Worried About Running Out of Food in the Last Year: Never true    [address] in the Last Year: Never true   Transportation Needs: No Transportation Needs    Lack of Transportation (Medical): No    Lack of Transportation (Non-Medical): No   Physical Activity:     Days of Exercise per Week: Not on file    Minutes of Exercise per Session: Not on file   Stress:     Feeling of Stress : Not on file   Social Connections:     Frequency of Communication with Friends and Family: Not on file    Frequency of Social Gatherings with Friends and Family: Not on file    Attends Ca[employer] Services: Not on file    Active Member of Clubs or Organizations: Not on file    Attends Club or Organization Meetings: Not on file    Marital Status: Not on file   Intimate Partner Violence:     Fear of Current or Ex-Partner: Not on file    Emotionally Abused: Not on file    Physically Abused: Not on file    Sexually Abused: Not on file   Housing Stability:     Unable to Pay for Housing in the Last Year: Not on file    Number of [place] in the Last Year: Not on file    Unstable Housing in the Last Year: Not on file       TOBACCO:   reports that she quit smoking about 36 years ago. Her smoking use included cigarettes. She has a 6.00 pack-year smoking history.  She has never used smokeless tobacco.  ETOH:   reports no history of alcohol use. Family History:   Family History   Problem Relation Age of Onset    Cancer Mother     Ovarian Cancer Mother 45        Technically peritoneal cancer    Depression Mother         bipolar    Arthritis Father     Rheum Arthritis Father     Arrhythmia Father     Other Father         gout    Heart Disease Father     Depression Brother         depression    Other Brother         gout    Obesity Brother     Other Brother         gout    Obesity Brother     Depression Brother     Cancer Maternal Grandmother         Breast    Breast Cancer Maternal Grandmother          A:  Ms. Madina Garcia reports some improvement with fatigue since the last visit as she has resumed visits with psychiatry through [address]. She has had some medication changes and reports minor increase in energy although reports continued depressed mood and lack of motivation. She continues to be active and engaged and responds positively to behavioral interventions.      Diagnosis:       BAD I, Most recent episode depressed      Plan:  Pt interventions:    [place]-setting to identify pt's primary goals for [facility] visit / overall health, Supportive techniques, reinforced pt's skill use, CBT interventions, ACT interventions, behavioral activation interventions, taught and practiced breathing activity to increase energy, treatment planning    Pt Behavioral Change Plan  Patient set goals to:   1) meditate daily for 10 min 2) get out of the house daily, even if on your deck 3) practice breathing exercise once a day to activate and energize 4) get The Happiness Trap book 5) look into volunteer opportunity at [facility] for [employer] 6) return for f/u in 2 weeks, sooner if needed

## 2022-04-13 ENCOUNTER — HOSPITAL ENCOUNTER (OUTPATIENT)
Dept: PSYCHIATRY | Age: 62
Setting detail: THERAPIES SERIES
Discharge: HOME | End: 2022-04-13
Payer: MEDICARE

## 2022-04-13 VITALS
TEMPERATURE: 96.9 F | DIASTOLIC BLOOD PRESSURE: 80 MMHG | RESPIRATION RATE: 17 BRPM | SYSTOLIC BLOOD PRESSURE: 131 MMHG | OXYGEN SATURATION: 100 % | HEART RATE: 76 BPM

## 2022-04-13 PROCEDURE — 99214 OFFICE O/P EST MOD 30 MIN: CPT | Performed by: PSYCHIATRY & NEUROLOGY

## 2022-04-14 NOTE — PROGRESS NOTES
Department of Psychiatry  Bridge Progress Note     Patient's chart was reviewed. Met with patient. SUBJECTIVE:      Doing better. Scored 11 on PHQ9 today, down from 23 two weeks ago. Started reading Feeling Good but it felt too clinical for her. Has started reading Happiness Trap, and using breathing techniques,  Has found both helpful. Medication changes have been helpful too. ROS:   Patient has new complaints: no  Sleeping adequately:  yes  Appetite adequate: Yes    Did not endorse or describe head aches, SOB, cough, sore throat, chills, chest pain, abdominal pain, neuro problems, bleeding problems, or skin problems. Was moving all four extremities and speaking without difficulty. OBJECTIVE:  VITALS:  Stable  Gait:normal    Mental Status Examination:    Appearance: good grooming and hygiene, using walker  Behavior/Attitude toward examiner:  cooperative, attentive and good eye contact  Speech: Normal rate, volume, amount  Mood:  \"better\"  Affect:  mood congruent  Thought processes:  Goal directed, linear, no BERTHA or gross disorganization  Thought Content:  no SI, no HI, no delusions voiced, no obsessions  Perceptions: no AVH  Attention: attention span and concentration were intact to interview   Abstraction: intact  Cognition:  Alert and oriented to person, place, time, and situation, recall intact  Insight: intact  Judgment: intact    Psych Medication:  Lamictal 300mg QHS  Latuda 60mg QHS    ASSESSMENT AND PLAN:   This is a domiciled, , disabled 58-year-old with bipolar 1 disorder, who was present for a bridge appointment from her  primary care office with worsening symptoms of depression     DIAGNOSES:  1.  Bipolar 1 disorder, most recently depressed. 2.  History of cardiomyopathy with chronic systolic congestive heart  failure. 3.  Hypertension. 4.  Gout. 5.  Hyperlipidemia. 6.  Hypothyroidism. 7.  History of uterine cancer and CVA.     1. On initial bridge visit, discussed treatment options at length. Agreed to consolidate both Lamictal and Latuda to QHS dosing, and increase Latuda to 60mg. 2. Psychoeducation regarding symptoms of depression at length including the things she can do to help manage symptoms going forward. 3. safety plan discussed at length including presenting to the ED or calling 911 with increased thoughts of suicide   4. Follow-up with me PRN. Total time with patient was 30 minutes and more than 50 % of that time was spent counseling the patient on their symptoms, treatment, and expected goals.      Alicia Loredo MD

## 2022-04-19 ENCOUNTER — APPOINTMENT (OUTPATIENT)
Dept: ULTRASOUND IMAGING | Age: 62
End: 2022-04-19
Payer: MEDICARE

## 2022-04-19 ENCOUNTER — HOSPITAL ENCOUNTER (OUTPATIENT)
Dept: NON INVASIVE DIAGNOSTICS | Age: 62
Discharge: HOME | End: 2022-04-19
Payer: MEDICARE

## 2022-04-19 DIAGNOSIS — I50.22 CHRONIC SYSTOLIC HEART FAILURE (HCC): ICD-10-CM

## 2022-04-19 LAB
LV EF: 53 %
LVEF MODALITY: NORMAL

## 2022-04-19 PROCEDURE — 93306 TTE W/DOPPLER COMPLETE: CPT

## 2022-04-21 ENCOUNTER — TELEPHONE (OUTPATIENT)
Dept: CARDIOLOGY CLINIC | Age: 62
End: 2022-04-21

## 2022-04-21 NOTE — TELEPHONE ENCOUNTER
Spoke to pt to relay message. Pt V/U     ----- Message from ALICIA Vasquez CNP sent at 4/21/2022 12:31 PM EDT -----  Julisa Reis!! Heart function is now normal. Last echo the ejection fraction was 35-40%. Pressures and valves are stable in the heart. Continue current regimen.

## 2022-04-25 ENCOUNTER — TELEMEDICINE (OUTPATIENT)
Dept: PSYCHOLOGY | Age: 62
End: 2022-04-25
Payer: MEDICARE

## 2022-04-25 DIAGNOSIS — F31.30 BIPOLAR I DISORDER, MOST RECENT EPISODE (OR CURRENT) DEPRESSED (HCC): Primary | ICD-10-CM

## 2022-04-25 PROCEDURE — 90832 PSYTX W PT 30 MINUTES: CPT | Performed by: PSYCHOLOGIST

## 2022-04-25 NOTE — PROGRESS NOTES
Behavioral Health Consultation  [address] PsyD  Psychologist  03/24/2022   1:59 PM        Time spent with Patient: 30 minutes  This is patient's seventeenth [facility] appointment. Reason for Consult:                 Depression     Referring Provider: Chelsey Matias MD     TELEHEALTH VISIT -- Audio/Visual (During XGDLZ-52 public health emergency)  }  Pursuant to the emergency declaration under the [address] [place] [address] [facility] authority and the [employer] Act, this Virtual Visit was conducted, with patient's consent, to reduce the patient's risk of exposure to COVID-19 and provide continuity of care for an established patient. Services were provided through a video synchronous discussion virtually to substitute for in-person clinic visit. Pt gave verbal informed consent to participate in telehealth services. Conducted a risk-benefit analysis and determined that the patient's presenting problems are consistent with the use of telepsychology. Determined that the patient has sufficient knowledge and skills in the use of technology enabling them to adequately benefit from telepsychology. It was determined that this patient was able to be properly treated without an in-person session. Patient verified that they were currently located at the [facility] address that was provided during registration. Verified the following information:  Patient's identification: Yes  Patient location: [address] [place] [address]  Patient's call back number: [phone]  Patient's emergency contact's name and number, as well as permission to contact them if needed: Extended Emergency Contact Information  Primary Emergency Contact: Patrecia Cowden, [place] Phone: [phone]  Mobile Phone: [phone]  Relation: Brother/Sister  Preferred language: English   needed?  No  Secondary Emergency Contact: Tatyana Whitten [address] Phone: [phone]  Mobile Phone: [phone]  Relation: Niece/Nephew    Provider location: [place] [address]:  During the last visit pt set goals to 1) meditate daily for 10 min 2) get out of the house daily, even if on your deck 3) practice breathing exercise once a day to activate and energize 4) get The Happiness Trap book 5) look into volunteer opportunity at [facility] for [employer] 6) return for f/u in 2 weeks, sooner if needed    Pt reports she is \"progressing. \" Has been going outside daily. Has meditated 6-10 minutes a day. Has been practicing breathing exercises and did it when anxious at [employer]. Also did Breath of Fire and found it helpful. Has reached out to volunteer opportunity. Been reading Happiness Trap and finds it helpful- is thinking it through and identifies material that resonates with her and recognizes her ways of approaching have not been effective in the past.    O:  MSE:    Appearance: good hygiene   Attitude: cooperative and friendly  Consciousness: alert  Orientation: oriented to person, place, time, general circumstance  Memory: recent and remote memory intact  Attention/Concentration: intact during session  Psychomotor Activity:normal  Eye Contact: Normal  Speech: normal rate and volume, well-articulated  Mood: \"ok- progressing \"  Affect: Congruent, euthymic  Perception: within normal limits  Thought Content: within normal limits  Thought Process: logical, coherent and goal-directed  Insight: good  Judgment: intact  Ability to understand instructions: Yes  Ability to respond meaningfully: Yes  Morbid Ideation: no   Suicide Assessment: no  Homicidal Ideation: no      History:    Medications:   Current Outpatient Medications   Medication Sig Dispense Refill    buPROPion (WELLBUTRIN XL) 150 MG extended release tablet TAKE 1 TABLET EVERY MORNING 90 tablet 1    CPAP Machine MISC by Does not apply route      methocarbamol (ROBAXIN) 500 MG tablet TAKE 1 TABLET THREE TIMES DAILY AS NEEDED FOR PAIN (Patient taking differently: TAKE 1 TABLET TWICE TIMES DAILY AS NEEDED FOR PAIN) 270 tablet 0    lisinopril (PRINIVIL;ZESTRIL) 5 MG tablet TAKE 1 TABLET EVERY DAY 90 tablet 2    montelukast (SINGULAIR) 10 MG tablet TAKE 1 TABLET EVERY NIGHT 90 tablet 1    simvastatin (ZOCOR) 20 MG tablet TAKE 1 TABLET EVERY NIGHT 90 tablet 1    lamoTRIgine (LAMICTAL) 150 MG tablet TAKE 1 TABLET TWICE DAILY OR AS OTHERWISE DIRECTED 180 tablet 1    vitamin D (ERGOCALCIFEROL) 1.25 MG (14176 UT) CAPS capsule TAKE 1 CAPSULE ONE TIME WEEKLY 8 capsule 0    fluticasone (FLONASE) 50 MCG/ACT nasal spray 2 sprays by Each Nostril route daily 1 Bottle 5    levothyroxine (SYNTHROID) 100 MCG tablet Take 1 tablet by mouth Daily 90 tablet 1    metoprolol succinate (TOPROL XL) 100 MG extended release tablet Take 1 tablet by mouth daily 90 tablet 1    allopurinol (ZYLOPRIM) 300 MG tablet TAKE 1 TABLET EVERY DAY 90 tablet 1    diclofenac sodium (VOLTAREN) 1 % GEL Apply topically 2 times daily 150 g 2    apixaban (ELIQUIS) 5 MG TABS tablet Take 1 tablet by mouth 2 times daily 180 tablet 3    torsemide (DEMADEX) 10 MG tablet 2 tabs every other day alternating with 1 tab the other days 180 tablet 3    acetaminophen (TYLENOL) 325 MG tablet Take 650 mg by mouth every 6 hours as needed for Pain TAKES EVERY DAY      cetirizine (ZYRTEC) 10 MG tablet Take 10 mg by mouth daily       No current facility-administered medications for this visit. Social History:   Social History     Socioeconomic History    Marital status:       Spouse name: Not on file    Number of children: Not on file    Years of education: Not on file    Highest education level: Not on file   Occupational History    Not on file   Tobacco Use    Smoking status: Former Smoker     Packs/day: 3.00     Years: 2.00     Pack years: 6.00     Types: Cigarettes     Quit date: 1985     Years since quittin.9    Smokeless tobacco: Never Used   Vaping Use    Vaping Use: Never used   Substance and Sexual Activity    Alcohol use: No    Drug use: No    Sexual activity: Not Currently   Other Topics Concern    Not on file   Social History Narrative    Not on file     Social Determinants of Health     Financial Resource Strain: Low Risk     Difficulty of Paying Living Expenses: Not hard at all   Food Insecurity: No Food Insecurity    Worried About Running Out of Food in the Last Year: Never true    Alexi of Food in the Last Year: Never true   Transportation Needs: No Transportation Needs    Lack of Transportation (Medical): No    Lack of Transportation (Non-Medical): No   Physical Activity:     Days of Exercise per Week: Not on file    Minutes of Exercise per Session: Not on file   Stress:     Feeling of Stress : Not on file   Social Connections:     Frequency of Communication with Friends and Family: Not on file    Frequency of Social Gatherings with Friends and Family: Not on file    Attends Protestant Services: Not on file    Active Member of [address] [place] [address] or Organizations: Not on file    Attends Club or Organization Meetings: Not on file    Marital Status: Not on file   Intimate Partner Violence:     Fear of Current or Ex-Partner: Not on file    Emotionally Abused: Not on file    Physically Abused: Not on file    Sexually Abused: Not on file   Housing Stability:     Unable to Pay for Housing in the Last Year: Not on file    Number of [place] in the Last Year: Not on file    Unstable Housing in the Last Year: Not on file       TOBACCO:   reports that she quit smoking about 36 years ago. Her smoking use included cigarettes. She has a 6.00 pack-year smoking history. She has never used smokeless tobacco.  ETOH:   reports no history of alcohol use.     Family History:   Family History   Problem Relation Age of Onset    Cancer Mother     Ovarian Cancer Mother 45        Technically peritoneal cancer    Depression Mother         bipolar    Arthritis Father     Rheum Arthritis Father     Arrhythmia Father     Other Father         gout    Heart Disease Father     Depression Brother         depression    Other Brother         gout    Obesity Brother     Other Brother         gout    Obesity Brother     Depression Brother     Cancer Maternal Grandmother         Breast    Breast Cancer Maternal Grandmother          A:  Ms. Drew Petersen reports continued improvement with overall mood. She continues to be active and engaged and responds positively to behavioral interventions.      Diagnosis:       BAD I, Most recent episode depressed      Plan:  Pt interventions:    [place]-setting to identify pt's primary goals for [facility] visit / overall health, Supportive techniques, reinforced pt's skill use, CBT interventions, ACT interventions, behavioral activation interventions, treatment planning    Pt Behavioral Change Plan  Patient set goals to:   1) continue to work towards meditating daily for 10 min 2) continue to get out of the house daily 3) continue to practice breath of fire exercise 4) continue to read The Happiness Trap book 5) f/u with volunteer email and opportunity  6) return for f/u in 2 weeks, sooner if needed

## 2022-04-26 ENCOUNTER — FOLLOWUP TELEPHONE ENCOUNTER (OUTPATIENT)
Dept: PSYCHIATRY | Age: 62
End: 2022-04-26

## 2022-04-26 NOTE — TELEPHONE ENCOUNTER
Writer received VM from [facility] requesting a call back for pt's Latuda refill request. Dr. Blaire Castillo approved. Writer called and confirmed refill.

## 2022-04-28 DIAGNOSIS — Z00.00 ROUTINE GENERAL MEDICAL EXAMINATION AT A HEALTH CARE FACILITY: ICD-10-CM

## 2022-04-28 RX ORDER — TORSEMIDE 10 MG/1
TABLET ORAL
Qty: 135 TABLET | Refills: 1 | Status: SHIPPED | OUTPATIENT
Start: 2022-04-28

## 2022-04-28 RX ORDER — APIXABAN 5 MG/1
TABLET, FILM COATED ORAL
Qty: 180 TABLET | Refills: 3 | Status: SHIPPED | OUTPATIENT
Start: 2022-04-28

## 2022-04-28 NOTE — TELEPHONE ENCOUNTER
.  Refill Request     Last Seen: Last Seen Department: 3/23/2022  Last Seen by PCP: 3/23/2022    Last Written: 2-28-22 30 with 1     Next Appointment:   Future Appointments   Date Time Provider Sarah Najera   5/2/2022  8:30 AM SCHEDULE, [facility] Ilsa Youssef [facility]   5/9/2022 12:30 PM Ronmadihagladys [address] OSIRIS PSY [facility]   5/19/2022  4:30 PM MHC US RM 1 MHCZ ULTRA [facility]   7/1/2022 10:00 AM MD OSIRIS Hernandez [place] - DYD   7/14/2022 11:00 AM MD OSIRIS Hernandez [place] - DYJAVY   8/1/2022  8:30 AM [facility]   9/22/2022  9:30 AM MD LEONARDO HawthorneWD RHEUM [facility]   9/28/2022 10:15 AM Cassie Lei, [facility] Yesi Blake [facility]   9/28/2022 10:15 AM ALICIA Colon CNP [facility]   10/31/2022  1:00 PM Melburn Mantel, APRN - CNP CLERM PULM [facility]       Future appointment scheduled      Requested Prescriptions     Pending Prescriptions Disp Refills    levothyroxine (SYNTHROID) 125 MCG tablet [Pharmacy Med Name: LEVOTHYROXINE SODIUM 125 MCG Tablet] 30 tablet 1     Sig: TAKE 1 TABLET EVERY DAY.

## 2022-05-02 ENCOUNTER — NURSE ONLY (OUTPATIENT)
Dept: CARDIOLOGY CLINIC | Age: 62
End: 2022-05-02
Payer: MEDICARE

## 2022-05-02 DIAGNOSIS — I50.22 CHRONIC SYSTOLIC CHF (CONGESTIVE HEART FAILURE) (HCC): ICD-10-CM

## 2022-05-02 DIAGNOSIS — I42.0 DILATED CARDIOMYOPATHY (HCC): ICD-10-CM

## 2022-05-02 DIAGNOSIS — I47.20 VT (VENTRICULAR TACHYCARDIA): ICD-10-CM

## 2022-05-02 DIAGNOSIS — Z95.810 BIVENTRICULAR ICD (IMPLANTABLE CARDIOVERTER-DEFIBRILLATOR) IN PLACE: ICD-10-CM

## 2022-05-02 PROCEDURE — 93295 DEV INTERROG REMOTE 1/2/MLT: CPT | Performed by: INTERNAL MEDICINE

## 2022-05-02 PROCEDURE — 93296 REM INTERROG EVL PM/IDS: CPT | Performed by: INTERNAL MEDICINE

## 2022-05-02 PROCEDURE — 93297 REM INTERROG DEV EVAL ICPMS: CPT | Performed by: INTERNAL MEDICINE

## 2022-05-02 NOTE — PROGRESS NOTES
Remote transmission received for patients CRT-D. Transmission shows normal sensing and pacing function. EP physician will review. See interrogation under the cardiology tab in the [facility] [address] field for more details. Will continue to monitor remotely. Hx PAF/AFL (oac, toprol xl). No  arrhythmias recorded. MELISA @ 10 months. Thoracic impedance trend stable. TriageHF Heart Failure Risk Status on 02-May-2022 is Medium*  Pacing (% of Time Since 28-Mar-2022)  Total * 93.6% (MVP Off)    3/28/22 LV threshold appears to be chronically elevated and she did have significant clinical improvement with CRT. S/P CRT-D- she has had remarkable symptomatic improvement with CRT. Her device is functioning normally and her ECG is markedly improved. Changed LV to 3-coil at OV on 3/28/2022.

## 2022-05-05 RX ORDER — LEVOTHYROXINE SODIUM 0.12 MG/1
TABLET ORAL
Qty: 90 TABLET | Refills: 1 | Status: SHIPPED | OUTPATIENT
Start: 2022-05-05

## 2022-05-09 ENCOUNTER — TELEMEDICINE (OUTPATIENT)
Dept: PSYCHOLOGY | Age: 62
End: 2022-05-09
Payer: MEDICARE

## 2022-05-09 ENCOUNTER — HOSPITAL ENCOUNTER (INPATIENT)
Age: 62
LOS: 3 days | Discharge: HOME | DRG: 885 | End: 2022-05-12
Attending: PSYCHIATRY & NEUROLOGY | Admitting: PSYCHIATRY & NEUROLOGY
Payer: MEDICARE

## 2022-05-09 DIAGNOSIS — F31.30 BIPOLAR I DISORDER, MOST RECENT EPISODE (OR CURRENT) DEPRESSED (HCC): Primary | ICD-10-CM

## 2022-05-09 DIAGNOSIS — M35.3 PMR (POLYMYALGIA RHEUMATICA) (HCC): ICD-10-CM

## 2022-05-09 LAB
A/G RATIO: 2.2 (ref 1.1–2.2)
ALBUMIN SERPL-MCNC: 4.4 G/DL (ref 3.4–5)
ALP BLD-CCNC: 93 U/L (ref 40–129)
ALT SERPL-CCNC: 9 U/L (ref 10–40)
ANION GAP SERPL CALCULATED.3IONS-SCNC: 12 MMOL/L (ref 3–16)
AST SERPL-CCNC: 17 U/L (ref 15–37)
BASOPHILS ABSOLUTE: 0.1 K/UL (ref 0–0.2)
BASOPHILS RELATIVE PERCENT: 0.7 %
BILIRUB SERPL-MCNC: 0.6 MG/DL (ref 0–1)
BUN BLDV-MCNC: 18 MG/DL (ref 7–20)
CALCIUM SERPL-MCNC: 8.2 MG/DL (ref 8.3–10.6)
CHLORIDE BLD-SCNC: 106 MMOL/L (ref 99–110)
CO2: 22 MMOL/L (ref 21–32)
CREAT SERPL-MCNC: 1.2 MG/DL (ref 0.6–1.2)
EOSINOPHILS ABSOLUTE: 0 K/UL (ref 0–0.6)
EOSINOPHILS RELATIVE PERCENT: 0 %
GFR AFRICAN AMERICAN: 55
GFR NON-AFRICAN AMERICAN: 45
GLUCOSE BLD-MCNC: 106 MG/DL (ref 70–99)
HCT VFR BLD CALC: 37.5 % (ref 36–48)
HEMOGLOBIN: 12.2 G/DL (ref 12–16)
INFLUENZA A: NOT DETECTED
INFLUENZA B: NOT DETECTED
LYMPHOCYTES ABSOLUTE: 1.8 K/UL (ref 1–5.1)
LYMPHOCYTES RELATIVE PERCENT: 23 %
MCH RBC QN AUTO: 28.5 PG (ref 26–34)
MCHC RBC AUTO-ENTMCNC: 32.6 G/DL (ref 31–36)
MCV RBC AUTO: 87.5 FL (ref 80–100)
MONOCYTES ABSOLUTE: 0.4 K/UL (ref 0–1.3)
MONOCYTES RELATIVE PERCENT: 5 %
NEUTROPHILS ABSOLUTE: 5.5 K/UL (ref 1.7–7.7)
NEUTROPHILS RELATIVE PERCENT: 71.3 %
PDW BLD-RTO: 16.5 % (ref 12.4–15.4)
PLATELET # BLD: 179 K/UL (ref 135–450)
PMV BLD AUTO: 9.6 FL (ref 5–10.5)
POTASSIUM SERPL-SCNC: 4.8 MMOL/L (ref 3.5–5.1)
RBC # BLD: 4.28 M/UL (ref 4–5.2)
SARS-COV-2 RNA, RT PCR: NOT DETECTED
SODIUM BLD-SCNC: 140 MMOL/L (ref 136–145)
TOTAL PROTEIN: 6.4 G/DL (ref 6.4–8.2)
WBC # BLD: 7.7 K/UL (ref 4–11)

## 2022-05-09 PROCEDURE — 87636 SARSCOV2 & INF A&B AMP PRB: CPT

## 2022-05-09 PROCEDURE — 1240000000 HC EMOTIONAL WELLNESS R&B

## 2022-05-09 PROCEDURE — 6370000000 HC RX 637 (ALT 250 FOR IP): Performed by: PSYCHIATRY & NEUROLOGY

## 2022-05-09 PROCEDURE — 85025 COMPLETE CBC W/AUTO DIFF WBC: CPT

## 2022-05-09 PROCEDURE — 90834 PSYTX W PT 45 MINUTES: CPT | Performed by: PSYCHOLOGIST

## 2022-05-09 PROCEDURE — 82746 ASSAY OF FOLIC ACID SERUM: CPT

## 2022-05-09 PROCEDURE — 82607 VITAMIN B-12: CPT

## 2022-05-09 PROCEDURE — 80053 COMPREHEN METABOLIC PANEL: CPT

## 2022-05-09 PROCEDURE — 36415 COLL VENOUS BLD VENIPUNCTURE: CPT

## 2022-05-09 RX ORDER — MONTELUKAST SODIUM 10 MG/1
10 TABLET ORAL NIGHTLY
Status: DISCONTINUED | OUTPATIENT
Start: 2022-05-09 | End: 2022-05-12 | Stop reason: HOSPADM

## 2022-05-09 RX ORDER — ALLOPURINOL 300 MG/1
1 TABLET ORAL DAILY
Status: CANCELLED | OUTPATIENT
Start: 2022-05-10

## 2022-05-09 RX ORDER — LAMOTRIGINE 100 MG/1
300 TABLET ORAL NIGHTLY
Status: DISCONTINUED | OUTPATIENT
Start: 2022-05-09 | End: 2022-05-12 | Stop reason: HOSPADM

## 2022-05-09 RX ORDER — LEVOTHYROXINE SODIUM 0.12 MG/1
125 TABLET ORAL DAILY
Status: DISCONTINUED | OUTPATIENT
Start: 2022-05-10 | End: 2022-05-12 | Stop reason: HOSPADM

## 2022-05-09 RX ORDER — ALLOPURINOL 300 MG/1
300 TABLET ORAL DAILY
Status: DISCONTINUED | OUTPATIENT
Start: 2022-05-10 | End: 2022-05-12 | Stop reason: HOSPADM

## 2022-05-09 RX ORDER — ATORVASTATIN CALCIUM 10 MG/1
10 TABLET, FILM COATED ORAL NIGHTLY
Status: DISCONTINUED | OUTPATIENT
Start: 2022-05-09 | End: 2022-05-12 | Stop reason: HOSPADM

## 2022-05-09 RX ORDER — HYDROXYZINE 50 MG/1
50 TABLET, FILM COATED ORAL 3 TIMES DAILY PRN
Status: DISCONTINUED | OUTPATIENT
Start: 2022-05-09 | End: 2022-05-12 | Stop reason: HOSPADM

## 2022-05-09 RX ORDER — ACETAMINOPHEN 325 MG/1
650 TABLET ORAL EVERY 4 HOURS PRN
Status: DISCONTINUED | OUTPATIENT
Start: 2022-05-09 | End: 2022-05-12 | Stop reason: HOSPADM

## 2022-05-09 RX ORDER — METOPROLOL SUCCINATE 50 MG/1
100 TABLET, EXTENDED RELEASE ORAL DAILY
Status: DISCONTINUED | OUTPATIENT
Start: 2022-05-10 | End: 2022-05-12 | Stop reason: HOSPADM

## 2022-05-09 RX ORDER — METHOCARBAMOL 500 MG/1
500 TABLET, FILM COATED ORAL 2 TIMES DAILY PRN
Status: DISCONTINUED | OUTPATIENT
Start: 2022-05-09 | End: 2022-05-12 | Stop reason: HOSPADM

## 2022-05-09 RX ORDER — TRAZODONE HYDROCHLORIDE 50 MG/1
50 TABLET ORAL NIGHTLY PRN
Status: DISCONTINUED | OUTPATIENT
Start: 2022-05-09 | End: 2022-05-12 | Stop reason: HOSPADM

## 2022-05-09 RX ORDER — CETIRIZINE HYDROCHLORIDE 10 MG/1
10 TABLET ORAL DAILY
Status: DISCONTINUED | OUTPATIENT
Start: 2022-05-09 | End: 2022-05-12 | Stop reason: HOSPADM

## 2022-05-09 RX ORDER — LISINOPRIL 5 MG/1
5 TABLET ORAL DAILY
Status: DISCONTINUED | OUTPATIENT
Start: 2022-05-10 | End: 2022-05-12 | Stop reason: HOSPADM

## 2022-05-09 RX ADMIN — APIXABAN 5 MG: 5 TABLET, FILM COATED ORAL at 21:24

## 2022-05-09 RX ADMIN — LAMOTRIGINE 300 MG: 100 TABLET ORAL at 21:22

## 2022-05-09 RX ADMIN — METHOCARBAMOL TABLETS 500 MG: 500 TABLET, COATED ORAL at 21:25

## 2022-05-09 RX ADMIN — ATORVASTATIN CALCIUM 10 MG: 10 TABLET, FILM COATED ORAL at 21:22

## 2022-05-09 RX ADMIN — LURASIDONE HYDROCHLORIDE 60 MG: 40 TABLET, FILM COATED ORAL at 21:23

## 2022-05-09 RX ADMIN — CETIRIZINE HYDROCHLORIDE 10 MG: 10 TABLET ORAL at 18:56

## 2022-05-09 RX ADMIN — ACETAMINOPHEN 650 MG: 325 TABLET ORAL at 18:56

## 2022-05-09 RX ADMIN — MONTELUKAST SODIUM 10 MG: 10 TABLET, COATED ORAL at 21:23

## 2022-05-09 ASSESSMENT — SLEEP AND FATIGUE QUESTIONNAIRES
DO YOU HAVE DIFFICULTY SLEEPING: YES
DO YOU USE A SLEEP AID: YES
SLEEP PATTERN: DISTURBED/INTERRUPTED SLEEP;RESTLESSNESS
AVERAGE NUMBER OF SLEEP HOURS: 4

## 2022-05-09 ASSESSMENT — LIFESTYLE VARIABLES
HOW MANY STANDARD DRINKS CONTAINING ALCOHOL DO YOU HAVE ON A TYPICAL DAY: PATIENT DECLINED
HOW OFTEN DO YOU HAVE A DRINK CONTAINING ALCOHOL: NEVER

## 2022-05-09 ASSESSMENT — PAIN SCALES - GENERAL
PAINLEVEL_OUTOF10: 6
PAINLEVEL_OUTOF10: 7

## 2022-05-09 ASSESSMENT — PAIN DESCRIPTION - LOCATION
LOCATION: KNEE
LOCATION: KNEE

## 2022-05-09 ASSESSMENT — PAIN DESCRIPTION - ORIENTATION
ORIENTATION: LEFT
ORIENTATION: LEFT;RIGHT

## 2022-05-09 ASSESSMENT — PAIN - FUNCTIONAL ASSESSMENT: PAIN_FUNCTIONAL_ASSESSMENT: PREVENTS OR INTERFERES SOME ACTIVE ACTIVITIES AND ADLS

## 2022-05-09 NOTE — BH NOTE
[facility]  Admission Note     Admission Type:   Admission Type: Voluntary    Reason for admission:  Reason for Admission: death wishes    Addictive Behavior:   Addictive Behavior  In the Past 3 Months, Have You Felt or Has Someone Told You That You Have a Problem With  : None    Medical Problems:   Past Medical History:   Diagnosis Date    Anesthesia complication     hypotension post op    Autoimmune hemolytic anemia ([facility]     during uterine cancer    Bipolar disorder ([facility]     managed by Gavi Crow    Bundle branch block     Cardiomyopathy ([facility]     Chronic systolic CHF (congestive heart failure) ([facility] 7/9/2015    Depression     Family history of early CAD     Family history of ovarian cancer     mother    GERD (gastroesophageal reflux disease)     Gout     Hypertension     Hypothyroid     Osteoarthritis, knee     Shybut    Pneumonia     history of pneumonia    S/P gastric bypass 1/7/05    Unspecified cerebral artery occlusion with cerebral infarction     Uterine cancer ([facility]     endometrial adenocarcinoma    Vitamin B 12 deficiency 7/09       Status EXAM:  Mental Status and Behavioral Exam  Normal: No  Level of Assistance: Independent/Self  Facial Expression: Flat  Affect: Congruent  Level of Consciousness: Alert  Frequency of Checks: 4 times per hour, close  Mood:Normal: No  Mood: Depressed,Anhedonia  Motor Activity:Normal: Yes  Eye Contact: Good  Observed Behavior: Cooperative  Sexual Misconduct History: Current - no (no past or current hx)  Preception: [place] to person,[place] to time,[place] to place,[place] to situation  Attention:Normal: Yes  Thought Processes: Other (comment) (logical and linear)  Thought Content:Normal: Yes  Depression Symptoms: Sleep disturbance,Feelings of hopelessess,Feelings of worthlessness,Loss of interest,Change in energy level  Anxiety Symptoms: Generalized  Kassy Symptoms: No problems reported or observed.   Hallucinations: None  Delusions: No  Memory:Normal: Yes  Insight and Judgment: Yes (fair judgement and insight)    Tobacco Screening:  Practical Counseling, on admission, tiffani X, if applicable and completed (first 3 are required if patient doesn't refuse):            ( )  Recognizing danger situations (included triggers and roadblocks)                    ( )  Coping skills (new ways to manage stress, exercise, relaxation techniques, changing routine, distraction)                                                           ( )  Basic information about quitting (benefits of quitting, techniques in how to quit, available resources  ( ) Referral for counseling faxed to [place]                                           ( ) Patient refused counseling  ( ) Patient has not smoked in the last 30 days    Metabolic Screening:    Lab Results   Component Value Date    LABA1C 5.4 12/22/2021       Lab Results   Component Value Date    CHOL 148 04/01/2021    CHOL 143 10/07/2019    CHOL 135 07/26/2018    CHOL 122 01/25/2018    CHOL 150 10/15/2017    CHOL 154 07/29/2016    CHOL 118 07/07/2016    CHOL 192 10/22/2015    CHOL 130 07/10/2015    CHOL 164 08/29/2013    CHOL 151 07/05/2011    CHOL 164 06/28/2011     Lab Results   Component Value Date    TRIG 124 04/01/2021    TRIG 64 10/07/2019    TRIG 89 07/26/2018    TRIG 72 01/25/2018    TRIG 65 10/15/2017    TRIG 71 07/29/2016    TRIG 86 07/07/2016    TRIG 74 10/22/2015    TRIG 56 07/10/2015    TRIG 65 08/29/2013    TRIG 94 07/05/2011    TRIG 129 06/28/2011     Lab Results   Component Value Date    HDL 62 (H) 04/01/2021    HDL 90 (H) 10/07/2019    HDL 77 (H) 07/26/2018    HDL 69 (H) 01/25/2018    HDL 64 (H) 10/15/2017    HDL 77 (H) 07/29/2016    HDL 49 07/07/2016    HDL 95 (H) 10/22/2015    HDL 64 (H) 07/10/2015    HDL 73 (H) 08/29/2013    HDL 65 (H) 07/05/2011    HDL 61 (H) 06/28/2011     No components found for: [facility] AND TREATMENT [place]  Lab Results   Component Value Date    LABVLDL 25 04/01/2021 LABVLDL 13 10/07/2019    LABVLDL 18 07/26/2018    LABVLDL 14 01/25/2018    LABVLDL 13 10/15/2017    LABVLDL 14 07/29/2016    LABVLDL 17 07/07/2016    LABVLDL 15 10/22/2015    LABVLDL 11 07/10/2015    LABVLDL 13 08/29/2013    LABVLDL 19 07/05/2011    LABVLDL 26 06/28/2011         Body mass index is 58.46 kg/m². BP Readings from Last 2 Encounters:   05/09/22 (!) 98/51   04/13/22 131/80           Pt admitted with followings belongings:  Dental Appliances: None  Vision - Corrective Lenses: Eyeglasses  Hearing Aid: None  Jewelry: Earrings,Ring  Body Piercings Removed: N/A  Clothing: Pants,Shirt,Socks,Undergarments,Pajamas  Other Valuables: Cane     Patient's home medications were verified. Patient oriented to surroundings and program expectations and copy of patient rights given. Received admission packet:  yes. Consents reviewed, signed yes. Refused none. Patient verbalize understanding:  yes.   Patient education on precautions: yes                   Clayton Clifford RN

## 2022-05-09 NOTE — BH NOTE
4 Eyes Skin Assessment     The patient is being assessed for  Admission    I agree that 2 RN's have performed a thorough Head to Toe Skin Assessment on the patient. ALL assessment sites listed below have been assessed. Areas assessed for pressure by both nurses:  [x]   Head, Face, and Ears   [x]   Shoulders, Back, and Chest  [x]   Arms, Elbows, and Hands   [x]   Coccyx, Sacrum, and Ischum  [x]   Legs, Feet, and Heels                                Skin Assessed Under all Medical Devices by both nurses:  N/A              All Mepilex Borders were peeled back and area peeked at by both nurses:  No: not applicable   Please list where Mepilex Borders are located:  N/A                 Does the Patient have Skin Breakdown related to pressure?   No            Jonny Prevention initiated:  No   Wound Care Orders initiated:  No      [facility] nurse consulted for Pressure Injury (Stage 3,4, Unstageable, DTI, NWPT, Complex wounds)and New or Established Ostomies:  No        Nurse 1 eSignature: Electronically signed by Gamal Martínez RN on 5/9/22 at 7:39 PM EDT    **SHARE this note so that the co-signing nurse is able to place an eSignature**    Nurse 2 eSignature: Electronically signed by Jesus Sanchez RN on 5/9/22 at 7:40 PM EDT
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
## 2022-05-10 PROBLEM — Z86.73 HISTORY OF CVA (CEREBROVASCULAR ACCIDENT): Status: ACTIVE | Noted: 2022-05-10

## 2022-05-10 PROCEDURE — 6370000000 HC RX 637 (ALT 250 FOR IP): Performed by: PSYCHIATRY & NEUROLOGY

## 2022-05-10 PROCEDURE — 1240000000 HC EMOTIONAL WELLNESS R&B

## 2022-05-10 PROCEDURE — 99223 1ST HOSP IP/OBS HIGH 75: CPT | Performed by: PSYCHIATRY & NEUROLOGY

## 2022-05-10 PROCEDURE — 99222 1ST HOSP IP/OBS MODERATE 55: CPT

## 2022-05-10 RX ORDER — FLUOXETINE 10 MG/1
10 CAPSULE ORAL DAILY
Status: COMPLETED | OUTPATIENT
Start: 2022-05-10 | End: 2022-05-10

## 2022-05-10 RX ORDER — FLUOXETINE HYDROCHLORIDE 20 MG/1
20 CAPSULE ORAL DAILY
Status: DISCONTINUED | OUTPATIENT
Start: 2022-05-11 | End: 2022-05-12

## 2022-05-10 RX ADMIN — APIXABAN 5 MG: 5 TABLET, FILM COATED ORAL at 09:12

## 2022-05-10 RX ADMIN — MONTELUKAST SODIUM 10 MG: 10 TABLET, COATED ORAL at 21:29

## 2022-05-10 RX ADMIN — DICLOFENAC SODIUM 2 G: 10 GEL TOPICAL at 17:16

## 2022-05-10 RX ADMIN — TRAZODONE HYDROCHLORIDE 50 MG: 50 TABLET ORAL at 01:13

## 2022-05-10 RX ADMIN — LAMOTRIGINE 300 MG: 100 TABLET ORAL at 21:29

## 2022-05-10 RX ADMIN — CETIRIZINE HYDROCHLORIDE 10 MG: 10 TABLET ORAL at 09:12

## 2022-05-10 RX ADMIN — ACETAMINOPHEN 650 MG: 325 TABLET ORAL at 21:35

## 2022-05-10 RX ADMIN — FLUOXETINE 10 MG: 10 CAPSULE ORAL at 19:15

## 2022-05-10 RX ADMIN — LISINOPRIL 5 MG: 5 TABLET ORAL at 09:12

## 2022-05-10 RX ADMIN — PREDNISONE 6 MG: 5 TABLET ORAL at 12:29

## 2022-05-10 RX ADMIN — ALLOPURINOL 300 MG: 300 TABLET ORAL at 09:13

## 2022-05-10 RX ADMIN — APIXABAN 5 MG: 5 TABLET, FILM COATED ORAL at 21:28

## 2022-05-10 RX ADMIN — METOPROLOL SUCCINATE 100 MG: 50 TABLET, EXTENDED RELEASE ORAL at 09:12

## 2022-05-10 RX ADMIN — ACETAMINOPHEN 650 MG: 325 TABLET ORAL at 01:03

## 2022-05-10 RX ADMIN — ACETAMINOPHEN 650 MG: 325 TABLET ORAL at 14:22

## 2022-05-10 RX ADMIN — LURASIDONE HYDROCHLORIDE 60 MG: 40 TABLET, FILM COATED ORAL at 21:29

## 2022-05-10 RX ADMIN — ACETAMINOPHEN 650 MG: 325 TABLET ORAL at 09:13

## 2022-05-10 RX ADMIN — METHOCARBAMOL TABLETS 500 MG: 500 TABLET, COATED ORAL at 21:35

## 2022-05-10 RX ADMIN — ATORVASTATIN CALCIUM 10 MG: 10 TABLET, FILM COATED ORAL at 21:28

## 2022-05-10 ASSESSMENT — PAIN DESCRIPTION - LOCATION
LOCATION: KNEE

## 2022-05-10 ASSESSMENT — PAIN DESCRIPTION - PAIN TYPE: TYPE: CHRONIC PAIN

## 2022-05-10 ASSESSMENT — PAIN DESCRIPTION - DESCRIPTORS
DESCRIPTORS: THROBBING;ACHING
DESCRIPTORS: ACHING;SPASM
DESCRIPTORS: THROBBING;ACHING
DESCRIPTORS: ACHING

## 2022-05-10 ASSESSMENT — PAIN DESCRIPTION - ORIENTATION
ORIENTATION: RIGHT;LEFT
ORIENTATION: RIGHT;LEFT
ORIENTATION: RIGHT
ORIENTATION: LEFT;RIGHT

## 2022-05-10 ASSESSMENT — PAIN SCALES - GENERAL
PAINLEVEL_OUTOF10: 6
PAINLEVEL_OUTOF10: 8
PAINLEVEL_OUTOF10: 8
PAINLEVEL_OUTOF10: 7
PAINLEVEL_OUTOF10: 8

## 2022-05-10 ASSESSMENT — PAIN - FUNCTIONAL ASSESSMENT: PAIN_FUNCTIONAL_ASSESSMENT: PREVENTS OR INTERFERES WITH MANY ACTIVE NOT PASSIVE ACTIVITIES

## 2022-05-10 ASSESSMENT — PAIN DESCRIPTION - ONSET: ONSET: ON-GOING

## 2022-05-10 ASSESSMENT — PAIN DESCRIPTION - FREQUENCY: FREQUENCY: CONTINUOUS

## 2022-05-10 NOTE — PLAN OF CARE
Problem: Chronic Conditions and Co-morbidities  Goal: Patient's chronic conditions and co-morbidity symptoms are monitored and maintained or improved  Outcome: Progressing     Problem: Safety - Adult  Goal: Free from fall injury  Outcome: Progressing     Problem: Depression  Goal: Will be euthymic at discharge  Description: INTERVENTIONS:  1. Administer medication as ordered  2. Provide emotional support via 1:1 interaction with staff  3. Encourage involvement in milieu/groups/activities  4. Monitor for social isolation  Outcome: Progressing     Problem: Pain  Goal: Verbalizes/displays adequate comfort level or baseline comfort level  Outcome: Not Progressing     Problem: Skin/Tissue Integrity  Goal: Absence of new skin breakdown  Description: 1. Monitor for areas of redness and/or skin breakdown  2. Assess vascular access sites hourly  3. Every 4-6 hours minimum:  Change oxygen saturation probe site  4. Every 4-6 hours:  If on nasal continuous positive airway pressure, respiratory therapy assess nares and determine need for appliance change or resting period. Outcome: Progressing   Pt is out on the unit, pleasant, but not really intermingling with peers yet. Verbal, tearful depressed. Contracts for safety. No psychotic sx noted. Pt has had Tylenol 650 for leg pain and pt did seem comfortable. No breakdown of skin noted. Pt also had Trazodone 50 mg at 0133 and pt seemed to sleep after Tylenol and Trazodone. Pt also had Robaxin 500 mg po with hs meds with fair results. Pt is sad and lonely. She is worried about health, finances, and loneliness.

## 2022-05-10 NOTE — PLAN OF CARE
Cooperative. +meds, +eating, +groups. Visible on milieu at times. Resting in room with legs up at times for knee pain. Tylenol given PRN effective.

## 2022-05-10 NOTE — CARE COORDINATION
[facility]  Treatment Team Note  Day 1    Review Date & Time: 0900  5/10/2022    Patient was not in treatment team      Status EXAM:   Mental Status and Behavioral Exam  Normal: No  Level of Assistance: Independent/Self  Facial Expression: Flat,Sad  Affect: Congruent  Level of Consciousness: Alert  Frequency of Checks: 4 times per hour, close  Mood:Normal: No  Mood: Depressed,Anhedonia,Sad  Motor Activity:Normal: Yes (normal for pt with walker)  Eye Contact: Good  Observed Behavior: Tearful,Cooperative  Sexual Misconduct History: Past - no  Preception: [place] to person,[place] to time,[place] to place,[place] to situation  Attention:Normal: Yes  Thought Processes:  (linear)  Thought Content:Normal: Yes  Depression Symptoms: Change in energy level,Crying,Sleep disturbance,Feelings of hopelessess  Anxiety Symptoms: Generalized  Kassy Symptoms: No problems reported or observed. Hallucinations: None  Delusions: No  Memory:Normal: Yes  Insight and Judgment: Yes      Suicide Risk CSSR-S:  1) Within the past month, have you wished you were dead or wished you could go to sleep and not wake up? : Yes  2) Have you actually had any thoughts of killing yourself? : Yes  3) Have you been thinking about how you might kill yourself? : Yes  5) Have you started to work out or worked out the details of how to kill yourself? Do you intend to carry out this plan? : No  6) Have you ever done anything, started to do anything, or prepared to do anything to end your life?: No      PLAN/TREATMENT RECOMMENDATIONS UPDATE: Patient will take medication as prescribed, eat 75% of meals, attend groups, participate in milieu activities, participate in treatment team and care planning for discharge and follow up. Patient is a Voluntary admission.      Linnette Monet RN
SARS

## 2022-05-10 NOTE — GROUP NOTE
Group Therapy Note    Date: 5/10/2022    Group Start Time: 2843  Group End Time: 1400  Group Topic: Cognitive Skills    [id number] [facility]        Group Therapy Note    Attendees: 7    Group members listed their A-Z coping skills and shared with other's in the group. Notes:  Fausto Means attended group for the full duration. Fausto Means remained engaged and interacted appropriately with others in the group. Status After Intervention:  Improved    Participation Level:  Active Listener and Interactive    Participation Quality: Appropriate and Attentive      Speech:  normal      Thought Process/Content: Logical      Affective Functioning: Congruent      Mood: euthymic      Level of consciousness:  Alert      Response to Learning: Able to verbalize current knowledge/experience      Endings: None Reported    Modes of Intervention: Socialization and Exploration      Discipline Responsible: Behavorial Health Tech      Signature:  LUISA Siu

## 2022-05-11 LAB
FOLATE: >20 NG/ML (ref 4.78–24.2)
VITAMIN B-12: 308 PG/ML (ref 211–911)

## 2022-05-11 PROCEDURE — 1240000000 HC EMOTIONAL WELLNESS R&B

## 2022-05-11 PROCEDURE — 6370000000 HC RX 637 (ALT 250 FOR IP): Performed by: PSYCHIATRY & NEUROLOGY

## 2022-05-11 PROCEDURE — 99233 SBSQ HOSP IP/OBS HIGH 50: CPT | Performed by: PSYCHIATRY & NEUROLOGY

## 2022-05-11 RX ADMIN — FLUOXETINE 20 MG: 20 CAPSULE ORAL at 08:49

## 2022-05-11 RX ADMIN — LAMOTRIGINE 300 MG: 100 TABLET ORAL at 20:41

## 2022-05-11 RX ADMIN — ACETAMINOPHEN 650 MG: 325 TABLET ORAL at 20:49

## 2022-05-11 RX ADMIN — APIXABAN 5 MG: 5 TABLET, FILM COATED ORAL at 08:47

## 2022-05-11 RX ADMIN — CETIRIZINE HYDROCHLORIDE 10 MG: 10 TABLET ORAL at 08:49

## 2022-05-11 RX ADMIN — MONTELUKAST SODIUM 10 MG: 10 TABLET, COATED ORAL at 20:43

## 2022-05-11 RX ADMIN — PREDNISONE 6 MG: 5 TABLET ORAL at 08:49

## 2022-05-11 RX ADMIN — DICLOFENAC SODIUM 2 G: 10 GEL TOPICAL at 20:50

## 2022-05-11 RX ADMIN — APIXABAN 5 MG: 5 TABLET, FILM COATED ORAL at 20:40

## 2022-05-11 RX ADMIN — ALLOPURINOL 300 MG: 300 TABLET ORAL at 08:47

## 2022-05-11 RX ADMIN — METHOCARBAMOL TABLETS 500 MG: 500 TABLET, COATED ORAL at 21:58

## 2022-05-11 RX ADMIN — LURASIDONE HYDROCHLORIDE 60 MG: 40 TABLET, FILM COATED ORAL at 20:41

## 2022-05-11 RX ADMIN — LEVOTHYROXINE SODIUM 125 MCG: 125 TABLET ORAL at 06:01

## 2022-05-11 RX ADMIN — DICLOFENAC SODIUM 2 G: 10 GEL TOPICAL at 08:49

## 2022-05-11 RX ADMIN — LISINOPRIL 5 MG: 5 TABLET ORAL at 08:48

## 2022-05-11 RX ADMIN — ACETAMINOPHEN 650 MG: 325 TABLET ORAL at 08:47

## 2022-05-11 RX ADMIN — METOPROLOL SUCCINATE 100 MG: 50 TABLET, EXTENDED RELEASE ORAL at 08:45

## 2022-05-11 RX ADMIN — ATORVASTATIN CALCIUM 10 MG: 10 TABLET, FILM COATED ORAL at 20:40

## 2022-05-11 ASSESSMENT — PAIN SCALES - GENERAL
PAINLEVEL_OUTOF10: 2
PAINLEVEL_OUTOF10: 2
PAINLEVEL_OUTOF10: 7
PAINLEVEL_OUTOF10: 8

## 2022-05-11 ASSESSMENT — PAIN DESCRIPTION - DESCRIPTORS
DESCRIPTORS: ACHING
DESCRIPTORS: ACHING

## 2022-05-11 ASSESSMENT — PAIN - FUNCTIONAL ASSESSMENT
PAIN_FUNCTIONAL_ASSESSMENT: PREVENTS OR INTERFERES WITH MANY ACTIVE NOT PASSIVE ACTIVITIES
PAIN_FUNCTIONAL_ASSESSMENT: PREVENTS OR INTERFERES WITH MANY ACTIVE NOT PASSIVE ACTIVITIES

## 2022-05-11 ASSESSMENT — PAIN DESCRIPTION - ONSET: ONSET: ON-GOING

## 2022-05-11 ASSESSMENT — PAIN DESCRIPTION - ORIENTATION
ORIENTATION: RIGHT;LEFT

## 2022-05-11 ASSESSMENT — PAIN DESCRIPTION - LOCATION
LOCATION: KNEE

## 2022-05-11 ASSESSMENT — PAIN DESCRIPTION - PAIN TYPE: TYPE: CHRONIC PAIN

## 2022-05-11 ASSESSMENT — PAIN DESCRIPTION - FREQUENCY: FREQUENCY: CONTINUOUS

## 2022-05-11 NOTE — PLAN OF CARE
Cooperative. Appears brighter. Reports \"feeling better\". +meds, +eating, +groups, +sleeping. Denies SI/HI, AVH.

## 2022-05-11 NOTE — GROUP NOTE
Group Therapy Note    Date: 5/11/2022    Group Start Time: 1300  Group End Time: 6478  Group Topic: Music Therapy    [facility]        Group Therapy Note    Music Therapy group consisted of trent analysis interventions and verbal processing of theories of changing perspectives. Music used: Have You Ever Seen the Rain by [employer]. Verbal processing included discussion of past, present, and future expectations of others and boundaries for self. Attendees: 7         Notes: This pt was present and engaged across group stimuli, reflective while processing the role of self-determination and the power of choices in affecting perspectives and acknowledgment of good elements of life. Collaborative and attentive to all peers in collaborative discussion. Group concluded with members identifying one goal for challenging negative perspectives by using self-affirming statements. Status After Intervention:  Improved    Participation Level:  Active Listener and Interactive    Participation Quality: Appropriate and Attentive      Speech:  normal      Thought Process/Content: Logical      Affective Functioning: Congruent      Mood: euthymic      Level of consciousness:  Alert and Attentive      Response to Learning: Capable of insight and Progressing to goal      Endings: None Reported    Modes of Intervention: Support, Exploration, Problem-solving, Activity and Media      Discipline Responsible: Psychoeducational Specialist      Signature:  Rosemary Rivera MM, MT-BC
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
## 2022-05-11 NOTE — PROGRESS NOTES
Behavioral Services  Medicare Certification Upon Admission    I certify that this patient's inpatient psychiatric hospital admission is medically necessary for:    [x] (1) Treatment which could reasonably be expected to improve this patient's condition,       [x] (2) Or for diagnostic study;     AND     [x](2) The inpatient psychiatric services are provided while the individual is under the care of a physician and are included in the individualized plan of care.     Estimated length of stay/service 5-8 days    Plan for post-hospital care incomplete     Electronically signed by Frank Fofana MD on 5/11/2022 at 10:53 AM

## 2022-05-11 NOTE — PLAN OF CARE
Problem: Chronic Conditions and Co-morbidities  Goal: Patient's chronic conditions and co-morbidity symptoms are monitored and maintained or improved  5/10/2022 1659 by Devan Melissa RN  Outcome: Not Progressing  Flowsheets (Taken 5/10/2022 1631)  Care Plan - Patient's Chronic Conditions and Co-Morbidity Symptoms are Monitored and Maintained or Improved: Monitor and assess patient's chronic conditions and comorbid symptoms for stability, deterioration, or improvement     Problem: Pain  Goal: Verbalizes/displays adequate comfort level or baseline comfort level  5/10/2022 1659 by Devan Melissa RN  Outcome: Not Progressing     Problem: Pain  Goal: Verbalizes/displays adequate comfort level or baseline comfort level  5/10/2022 1659 by Devan Melissa RN  Outcome: Not Progressing     Problem: Skin/Tissue Integrity  Goal: Absence of new skin breakdown  Description: 1. Monitor for areas of redness and/or skin breakdown  2. Assess vascular access sites hourly  3. Every 4-6 hours minimum:  Change oxygen saturation probe site  4. Every 4-6 hours:  If on nasal continuous positive airway pressure, respiratory therapy assess nares and determine need for appliance change or resting period. 5/10/2022 1659 by Devan Melissa RN  Outcome: Not Progressing     Problem: Skin/Tissue Integrity  Goal: Absence of new skin breakdown  Description: 1. Monitor for areas of redness and/or skin breakdown  2. Assess vascular access sites hourly  3. Every 4-6 hours minimum:  Change oxygen saturation probe site  4. Every 4-6 hours:  If on nasal continuous positive airway pressure, respiratory therapy assess nares and determine need for appliance change or resting period.   5/10/2022 1659 by Devan Melissa RN  Outcome: Not Progressing     Problem: ABCDS Injury Assessment  Goal: Absence of physical injury  5/10/2022 1659 by Devan Melissa RN  Outcome: Not Progressing     Problem: Safety - Adult  Goal: Free from fall injury  5/10/2022 2153 by Angella Schreiber LPN  Outcome: Progressing  5/10/2022 1659 by Leny Rowe RN  Outcome: Not Progressing   Patient has been out on the unit social with peers watching TV. Pleasant and cooperative when approached. Denies SI, HI, or AVH. Did complaint of chronic right knee pain at a level 8. Tylenol and methocarbamol administered per PRN order for comfort. No reports of falls or injuries at this time, ambulates with walker with a slow steady gait. Compliant with medications to maintain/improve co-morbidities and care to obtain vital signs to monitor patient. Will continue to monitor patient for safety through shift.

## 2022-05-11 NOTE — GROUP NOTE
Group Therapy Note    Date: 5/11/2022    Group Start Time: 1000  Group End Time: 1050  Group Topic: Psychoeducation    [address]        Group Therapy Note    Attendees: 11         Patient's Goal:  To learn and discuss the importance of having a support system. Pt's filled out worksheet on their support systems and also given resources for other positive support systems to use in the community. Notes:  Pt attended group for the full duration. She participated in the group discussion and filled out the worksheet on her support system. Status After Intervention:  Improved    Participation Level:  Active Listener and Interactive    Participation Quality: Appropriate, Attentive, Sharing and Supportive      Speech:  normal      Thought Process/Content: Logical      Affective Functioning: Congruent      Mood: euthymic      Level of consciousness:  Alert, Oriented x4 and Attentive      Response to Learning: Able to verbalize current knowledge/experience and Able to verbalize/acknowledge new learning      Endings: None Reported    Modes of Intervention: Education, Support, Socialization, Exploration and Clarifying      Discipline Responsible: /Counselor      Signature:  GUILLERMO Vargas

## 2022-05-11 NOTE — PROGRESS NOTES
Patient awake and alert. Stated she slept well except for getting up for bathroom couple times throughout the night.

## 2022-05-11 NOTE — GROUP NOTE
Group Therapy Note    Date: 5/11/2022    Group Start Time: 1100  Group End Time: 9817  Group Topic: Cognitive Skills    [id number] [facility]        Group Therapy Note    Attendees: 10    Group members sat in a [place] and were asked various topics. The group members used yarn and created a web. The goal was to show the members of the group the connections that they had made. Notes:  Amanda Salmon attended group for the full duration. Amanda Salmon remained engaged and interacted appropriately with other members of the group. Status After Intervention:  Improved    Participation Level:  Active Listener and Interactive    Participation Quality: Appropriate, Attentive and Sharing      Speech:  normal      Thought Process/Content: Logical      Affective Functioning: Congruent      Mood: euthymic      Level of consciousness:  Alert, Oriented x4 and Attentive      Response to Learning: Able to verbalize current knowledge/experience      Endings: None Reported    Modes of Intervention: Socialization, Exploration and Activity      Discipline Responsible: Behavorial Health Tech      Signature:  LUISA Lyn

## 2022-05-12 VITALS
BODY MASS INDEX: 51.91 KG/M2 | SYSTOLIC BLOOD PRESSURE: 117 MMHG | TEMPERATURE: 98.2 F | WEIGHT: 293 LBS | RESPIRATION RATE: 16 BRPM | HEART RATE: 71 BPM | DIASTOLIC BLOOD PRESSURE: 82 MMHG | OXYGEN SATURATION: 98 % | HEIGHT: 63 IN

## 2022-05-12 PROCEDURE — 99239 HOSP IP/OBS DSCHRG MGMT >30: CPT | Performed by: PSYCHIATRY & NEUROLOGY

## 2022-05-12 PROCEDURE — 6370000000 HC RX 637 (ALT 250 FOR IP): Performed by: PSYCHIATRY & NEUROLOGY

## 2022-05-12 PROCEDURE — 5130000000 HC BRIDGE APPOINTMENT

## 2022-05-12 RX ORDER — FLUOXETINE 10 MG/1
30 CAPSULE ORAL DAILY
Qty: 30 CAPSULE | Refills: 0 | Status: SHIPPED | OUTPATIENT
Start: 2022-05-13

## 2022-05-12 RX ORDER — PREDNISONE 1 MG/1
TABLET ORAL
Qty: 180 TABLET | Refills: 0
Start: 2022-05-12 | End: 2022-10-13 | Stop reason: ALTCHOICE

## 2022-05-12 RX ADMIN — METOPROLOL SUCCINATE 100 MG: 50 TABLET, EXTENDED RELEASE ORAL at 09:19

## 2022-05-12 RX ADMIN — DICLOFENAC SODIUM 2 G: 10 GEL TOPICAL at 09:19

## 2022-05-12 RX ADMIN — FLUOXETINE 30 MG: 20 CAPSULE ORAL at 09:19

## 2022-05-12 RX ADMIN — ALLOPURINOL 300 MG: 300 TABLET ORAL at 09:18

## 2022-05-12 RX ADMIN — ACETAMINOPHEN 650 MG: 325 TABLET ORAL at 06:28

## 2022-05-12 RX ADMIN — CETIRIZINE HYDROCHLORIDE 10 MG: 10 TABLET ORAL at 09:18

## 2022-05-12 RX ADMIN — LEVOTHYROXINE SODIUM 125 MCG: 125 TABLET ORAL at 06:29

## 2022-05-12 RX ADMIN — LISINOPRIL 5 MG: 5 TABLET ORAL at 09:18

## 2022-05-12 RX ADMIN — APIXABAN 5 MG: 5 TABLET, FILM COATED ORAL at 09:18

## 2022-05-12 RX ADMIN — PREDNISONE 6 MG: 5 TABLET ORAL at 09:18

## 2022-05-12 ASSESSMENT — PAIN SCALES - GENERAL
PAINLEVEL_OUTOF10: 4
PAINLEVEL_OUTOF10: 7

## 2022-05-12 ASSESSMENT — PAIN - FUNCTIONAL ASSESSMENT: PAIN_FUNCTIONAL_ASSESSMENT: PREVENTS OR INTERFERES SOME ACTIVE ACTIVITIES AND ADLS

## 2022-05-12 ASSESSMENT — PAIN DESCRIPTION - LOCATION
LOCATION: KNEE
LOCATION: KNEE

## 2022-05-12 ASSESSMENT — PAIN DESCRIPTION - FREQUENCY: FREQUENCY: CONTINUOUS

## 2022-05-12 ASSESSMENT — PAIN DESCRIPTION - ORIENTATION
ORIENTATION: RIGHT;LEFT
ORIENTATION: RIGHT;LEFT

## 2022-05-12 ASSESSMENT — PAIN DESCRIPTION - PAIN TYPE: TYPE: CHRONIC PAIN

## 2022-05-12 ASSESSMENT — PAIN DESCRIPTION - ONSET: ONSET: ON-GOING

## 2022-05-12 ASSESSMENT — PAIN DESCRIPTION - DESCRIPTORS: DESCRIPTORS: ACHING

## 2022-05-12 NOTE — PROGRESS NOTES
Department of Psychiatry  Progress Note    Patient's chart was reviewed. Discussed with treatment team. Met with patient. SUBJECTIVE:      Reports feeling \"better\" today. Slept well last night (despite not having CPAP), mood improved, more future oriented. Has been active in the milieu and in programming. Seems to be tolerating Prozac well so far. Is interested in IOP.     ROS:   Patient has new complaints: no  Sleeping adequately:  Yes   Appetite adequate: Yes  Engaged in programming: Yes    OBJECTIVE:  VITALS:  BP (!) 149/70   Pulse 67   Temp 98.4 °F (36.9 °C) (Temporal)   Resp 16   Ht 5' 3\" (1.6 m)   Wt (!) 330 lb (149.7 kg)   SpO2 100%   Breastfeeding No   BMI 58.46 kg/m²     Mental Status Examination:    Appearance: good grooming and hygiene, using walker  Behavior/Attitude toward examiner:  cooperative, attentive and good eye contact  Speech: Normal rate, volume, amount  Mood:  \"a little better\"  Affect:  tearful  Thought processes:  Goal directed, linear, no BERTHA or gross disorganization  Thought Content:  less SI, no HI, no delusions voiced, no obsessions  Perceptions: no AVH  Attention: attention span and concentration were intact to interview   Abstraction: intact  Cognition:  Alert and oriented to person, place, time, and situation, recall intact  Insight: intact  Judgment: intact         Medication:  Scheduled:   predniSONE  6 mg Oral Daily    diclofenac sodium  2 g Topical BID    FLUoxetine  20 mg Oral Daily    cetirizine  10 mg Oral Daily    apixaban  5 mg Oral BID    allopurinol  300 mg Oral Daily    lamoTRIgine  300 mg Oral Nightly    levothyroxine  125 mcg Oral Daily    lisinopril  5 mg Oral Daily    lurasidone  60 mg Oral Nightly    metoprolol succinate  100 mg Oral Daily    montelukast  10 mg Oral Nightly    atorvastatin  10 mg Oral Nightly        PRN:  acetaminophen, traZODone, hydrOXYzine, methocarbamol     ASSESSMENT AND PLAN:    Principal Problem: Bipolar disorder, most recent episode depressed ([facility] [address]  Active Problems:    Hypertension, essential    History of CVA (cerebrovascular accident)    S/P gastric bypass    Gout    CHF (congestive heart failure) ([facility])    Morbid obesity ([facility] [address]    Hypothyroidism    Hyperlipidemia, mixed    VT (ventricular tachycardia) ([facility])    Severe obstructive sleep apnea  Resolved Problems:    * No resolved hospital problems. *     Plan:  1.  Admitted to inpatient psychiatry for stabilization and treatment.     2.   On admission, continude Lamictal 300 mg and Latuda 60mg nightly. Discussed treatment options at length and agreed to a Prozac trial given previous medication failures and monitor for signs of acute switch. Ordered q. 15-minute checks for safety, programming, and p.r.n. medication for anxiety, agitation, and insomnia. 5/11/2022 - increase Prozac to 20mg.     3.  Internal Medicine consult for admission.  Folate/B12 levels pending. #Systolic Heart failure   -EF of 35-40% on last echo 3/2021  -non ischemic cardiomyopathy   -no evidence of acute decompensation today   -continue demadex prn   -continue lisinopril and toprol      #HX of ventricular tachycardia  #HX of Atrial tachycardia    -pacemaker   -on eliquis   -follows with cardiology      #HTN   -continue lisinopril and toprol   -continue to monitor BP     #HX of CVA  -continue statin and eliquis       #Hx of gout  -continue allopurinol      #Hypothyroidism   -continue synthroid      #HLD   -continue statin      #Hx of gastric bypass      #Hx of uterine cancer   -s/p radium implants     4.  Collateral information from outpatient providers obtained.     5.  Estimated length of stay, 5-8 days for stabilization.  Voluntary. Total time with patient was 35 minutes and more than 50 % of that time was spent counseling the patient on their symptoms, treatment, and expected goals.      Eusebio Gonzalez MD

## 2022-05-12 NOTE — PLAN OF CARE
[facility]  Discharge Note    Pt discharged with followings belongings:   Dental Appliances: None  Vision - Corrective Lenses: Eyeglasses  Hearing Aid: None  Jewelry: Earrings,Ring  Body Piercings Removed: N/A  Clothing: Pants,Shirt,Socks,Undergarments,Pajamas  Other Valuables: Cane   Valuables sent home with patient. Patient education on aftercare instructions: YES  Information faxed by charge RN  at 2:17 PM .Patient verbalize understanding of AVS:  yes    Status EXAM upon discharge:  Mental Status and Behavioral Exam  Normal: Yes  Level of Assistance: Independent/Self  Facial Expression: Brightened  Affect: Appropriate,Congruent,Stable  Level of Consciousness: Alert  Frequency of Checks: 4 times per hour, close  Mood:Normal: No  Mood: Depressed,Sad  Motor Activity:Normal: Yes  Eye Contact: Good  Observed Behavior: Cooperative,Friendly  Sexual Misconduct History: Current - no  Preception: [place] to person,[place] to time,[place] to place,[place] to situation  Attention:Normal: Yes  Attention: Distractible  Thought Processes: Other (comment) (linear)  Thought Content:Normal: Yes  Depression Symptoms: Loss of interest,Change in energy level,Impaired concentration,Feelings of worthlessness,Feelings of hopelessess  Anxiety Symptoms: No problems reported or observed. [place] Symptoms: No problems reported or observed.   Hallucinations: None  Delusions: No  Memory:Normal: Yes  Insight and Judgment: No  Insight and Judgment: Poor judgment,Poor insight      Metabolic Screening:    Lab Results   Component Value Date    LABA1C 5.4 12/22/2021       Lab Results   Component Value Date    CHOL 148 04/01/2021    CHOL 143 10/07/2019    CHOL 135 07/26/2018    CHOL 122 01/25/2018    CHOL 150 10/15/2017    CHOL 154 07/29/2016    CHOL 118 07/07/2016    CHOL 192 10/22/2015    CHOL 130 07/10/2015    CHOL 164 08/29/2013    CHOL 151 07/05/2011    CHOL 164 06/28/2011     Lab Results   Component Value Date    TRIG 124 04/01/2021 TRIG 64 10/07/2019    TRIG 89 07/26/2018    TRIG 72 01/25/2018    TRIG 65 10/15/2017    TRIG 71 07/29/2016    TRIG 86 07/07/2016    TRIG 74 10/22/2015    TRIG 56 07/10/2015    TRIG 65 08/29/2013    TRIG 94 07/05/2011    TRIG 129 06/28/2011     Lab Results   Component Value Date    HDL 62 (H) 04/01/2021    HDL 90 (H) 10/07/2019    HDL 77 (H) 07/26/2018    HDL 69 (H) 01/25/2018    HDL 64 (H) 10/15/2017    HDL 77 (H) 07/29/2016    HDL 49 07/07/2016    HDL 95 (H) 10/22/2015    HDL 64 (H) 07/10/2015    HDL 73 (H) 08/29/2013    HDL 65 (H) 07/05/2011    HDL 61 (H) 06/28/2011     No components found for: [facility]UATION AND TREAT[facility] [place]  Lab Results   Component Value Date    LABVLDL 25 04/01/2021    LABVLDL 13 10/07/2019    LABVLDL 18 07/26/2018    LABVLDL 14 01/25/2018    LABVLDL 13 10/15/2017    LABVLDL 14 07/29/2016    LABVLDL 17 07/07/2016    LABVLDL 15 10/22/2015    LABVLDL 11 07/10/2015    LABVLDL 13 08/29/2013    LABVLDL 19 07/05/2011    LABVLDL 26 06/28/2011       Bridge Appointment completed: Reviewed Discharge Instructions with patient. Patient verbalizes understanding and agreement with the discharge plan using the teachback method.      Referral for Outpatient Tobacco Cessation Counseling, upon discharge (tiffani X if applicable and completed):    ( )  Hospital staff assisted patient to call Quit Line or faxed referral                                   during hospitalization                  ( )  Recognizing danger situations (included triggers and roadblocks), if not completed on admission                    ( )  Coping skills (new ways to manage stress, exercise, relaxation techniques, changing routine, distraction), if not completed on admission                                                           ( )  Basic information about quitting (benefits of quitting, techniques in how to quit, available resources, if not completed on admission  ( ) Referral for counseling faxed to [facility]   ( ) Patient refused referral  ( ) Patient refused counseling  (x ) Patient refused smoking cessation medication upon discharge    Vaccinations (tiffani X if applicable and completed):  ( ) Patient states already received influenza vaccine elsewhere  ( ) Patient received influenza vaccine during this hospitalization  ( ) Patient refused influenza vaccine at this time  (x) Influenza vaccine not offered at this time.        Nila Max RN

## 2022-05-12 NOTE — GROUP NOTE
Group Therapy Note    Date: 5/11/2022    Group Start Time: 2045  Group End Time: 2115  Group Topic: Wrap-Up    [address] [facility] [place]        Group Therapy Note    Attendees: Goals and importance of goal setting discussed. Night time milieu activities discussed. Patient's Goal:  Meditate, journal    Notes:  Successful     Status After Intervention:  Improved    Participation Level:  Active Listener and Interactive    Participation Quality: Appropriate and Attentive      Speech:  normal      Thought Process/Content: Logical  Linear      Affective Functioning: Congruent      Mood: depressed      Level of consciousness:  Alert and Oriented x4      Response to Learning: Progressing to goal      Endings: None Reported    Modes of Intervention: Support      Discipline Responsible: Behavorial Health Tech      Signature:  Kathy Setting

## 2022-05-12 NOTE — GROUP NOTE
Group Therapy Note    Date: 5/12/2022    Group Start Time: 1000  Group End Time: 9898  Group Topic: Cognitive Skills    [id number] [facility]        Group Therapy Note    Attendees: 8    Group members were asked to trace their hand and write compliments on other group members hands. The goal was to increase self-esteem through positive affirmations. Notes:  Cristian Yadav remained in group for the full duration. Cristian Yadav remained engaged and interacted appropriately with other members of the group. Status After Intervention:  Improved    Participation Level:  Active Listener and Interactive    Participation Quality: Appropriate, Attentive and Sharing      Speech:  normal      Thought Process/Content: Logical      Affective Functioning: Congruent      Mood: euthymic      Level of consciousness:  Alert      Response to Learning: Able to verbalize current knowledge/experience      Endings: None Reported    Modes of Intervention: Socialization, Exploration and Activity      Discipline Responsible: Behavorial Health Tech      Signature:  LUISA Doughrety

## 2022-05-12 NOTE — PLAN OF CARE
Problem: Safety - Adult  Goal: Free from fall injury  5/12/2022 0034 by Lian Berman RN  Outcome: Progressing  Flowsheets (Taken 5/11/2022 2323)  Free From Fall Injury: Instruct family/caregiver on patient safety  5/11/2022 1403 by Angel Isaac RN  Outcome: Progressing  Flowsheets (Taken 5/11/2022 1401)  Free From Fall Injury: Instruct family/caregiver on patient safety     Problem: Self Harm/Suicidality  Goal: Will have no self-injury during hospital stay  Description: INTERVENTIONS:  1. Q 30 MINUTES: Routine safety checks  2. Q SHIFT & PRN: Assess risk to determine if routine checks are adequate to maintain patient safety  5/12/2022 0034 by Lian Berman RN  Outcome: Progressing  5/11/2022 1403 by Angel Isaac RN  Outcome: Progressing     Problem: Depression  Goal: Will be euthymic at discharge  Description: INTERVENTIONS:  1. Administer medication as ordered  2. Provide emotional support via 1:1 interaction with staff  3. Encourage involvement in milieu/groups/activities  4. Monitor for social isolation  5/12/2022 0034 by Lian Berman RN  Outcome: Progressing  5/11/2022 1403 by Angel Isaac RN  Outcome: Progressing     Pt is a&o x 4, friendly, cooperative, social with peers, visible on unit until bed. Spent about an hour talking with pt this evening - pt very open about her depression and SI, states she is feeling \"better\" and understanding how to look objectively at how she came to be here and how she can better handle issues in the future. States not sure that she is ready to leave as soon as tomorrow, but will talk with provider in the morning. Pt presents as positive and future-oriented. Identifies her brother who lives with her as a source of some support. Had a snack prior to bed. Denies SI/HI and AVH.

## 2022-05-12 NOTE — FLOWSHEET NOTE
Purposeful Rounding    Patient Location: Day room    Patient willing to engage in conversation: Yes    Presentation/behavior: Cooperative and Pleasant    Affect: Brightens with interaction    Concerns reported: none    PRN medications given: n/a    Environmental assessment: Room free from clutter, Clear path to bathroom  and No safety hazards noted    Fall prevention interventions in place: Yellow non-skid socks on      Daily Kimble Fall Risk Score: 40-medium      Electronically signed by Robert Flores RN on 5/12/22 at 8:26 AM EDT

## 2022-05-16 DIAGNOSIS — Z00.00 ROUTINE GENERAL MEDICAL EXAMINATION AT A HEALTH CARE FACILITY: ICD-10-CM

## 2022-05-16 RX ORDER — METOPROLOL SUCCINATE 100 MG/1
100 TABLET, EXTENDED RELEASE ORAL DAILY
Qty: 90 TABLET | Refills: 1 | Status: SHIPPED | OUTPATIENT
Start: 2022-05-16

## 2022-05-16 NOTE — TELEPHONE ENCOUNTER
Refill Request     CONFIRM preferrred pharmacy with the patient. If Mail Order Rx - Pend for 90 day refill.       Last Seen: Last Seen Department: 3/23/2022  Last Seen by PCP: 3/23/2022    Last Written: 1/28/22 no refills #90    Next Appointment:   Future Appointments   Date Time Provider Sarah Najera   5/19/2022  4:30 PM [address]   7/1/2022 10:00 AM MD OSIRIS Emmanuel  [place] - DYD   7/14/2022 11:00 AM MD OSIRIS Emmanuel [place] - DYJAVY   8/1/2022  8:30 AM SCHEDULE, Kojo Bustillo [facility]   9/22/2022  9:30 [facility]   9/28/2022 10:15 AM Caprice Hernandez, [facility]   9/28/2022 10:15 AM Asha Titus APRN - CNP [facility]   10/31/2022  1:00 PM Brian Law APRN - CNP [facility]       Future appointment scheduled      Requested Prescriptions     Pending Prescriptions Disp Refills    metoprolol succinate (TOPROL XL) 100 MG extended release tablet 90 tablet 0

## 2022-05-18 ENCOUNTER — FOLLOWUP TELEPHONE ENCOUNTER (OUTPATIENT)
Dept: PSYCHIATRY | Age: 62
End: 2022-05-18

## 2022-05-18 NOTE — TELEPHONE ENCOUNTER
Pt called requesting refill for fluoxetine. Prior auth received from [facility] [email or web address].  Writer called and gave

## 2022-05-19 ENCOUNTER — HOSPITAL ENCOUNTER (OUTPATIENT)
Dept: ULTRASOUND IMAGING | Age: 62
Discharge: HOME | End: 2022-05-19
Payer: MEDICARE

## 2022-05-19 DIAGNOSIS — R22.31 MASS OF RIGHT AXILLA: ICD-10-CM

## 2022-05-19 PROCEDURE — 76999 ECHO EXAMINATION PROCEDURE: CPT

## 2022-05-20 ENCOUNTER — FOLLOWUP TELEPHONE ENCOUNTER (OUTPATIENT)
Dept: PSYCHIATRY | Age: 62
End: 2022-05-20

## 2022-05-20 NOTE — TELEPHONE ENCOUNTER
Pt called stating [employer] informed her they did not have a Rx for her fluoxetine. Dr. Best Chambers approved Rx to be called in to pt's preferred pharmacy. [facility] Pt notified.

## 2022-05-24 ENCOUNTER — FOLLOWUP TELEPHONE ENCOUNTER (OUTPATIENT)
Dept: PSYCHIATRY | Age: 62
End: 2022-05-24

## 2022-05-24 NOTE — TELEPHONE ENCOUNTER
Pt requesting Latuda refill to be called in to [facility] mail delivery pharmacy. Dr. Amy Chavez approved with. Writer called in.

## 2022-05-29 DIAGNOSIS — E78.5 HYPERLIPIDEMIA, UNSPECIFIED HYPERLIPIDEMIA TYPE: ICD-10-CM

## 2022-05-29 DIAGNOSIS — M79.662 PAIN OF LEFT CALF: ICD-10-CM

## 2022-05-30 NOTE — TELEPHONE ENCOUNTER
.  Refill Request     CONFIRM preferrred pharmacy with the patient. If Mail Order Rx - Pend for 90 day refill.       Last Seen: Last Seen Department: 3/23/2022  Last Seen by PCP: 3/23/2022    Last Written: 2/25/22 270 with 0     Next Appointment:   Future Appointments   Date Time Provider Sarah Dania   7/1/2022 10:00 AM MD OSIRIS Zamudio  [place] - [facility]YJAV[facility]   7/14/2022 11:00 AM MD OSIRIS Zamudio  [place] - DYJAVY   8/1/2022  8:30 AM SCHEDULE, [facility]   9/22/2022  9:30 AM [facility]   9/28/2022 10:15 AM SCHEDULE, [facility]   9/28/2022 10:15 AM ALICIA Hernandez - CNP [facility]   10/31/2022  1:00 PM ALICIA English - CNP [facility]           Requested Prescriptions     Pending Prescriptions Disp Refills    methocarbamol (ROBAXIN) 500 MG tablet 270 tablet 1     Sig: TAKE 1 TABLET TWICE TIMES DAILY AS NEEDED FOR PAIN

## 2022-05-31 RX ORDER — SIMVASTATIN 20 MG
TABLET ORAL
Qty: 90 TABLET | Refills: 1 | Status: SHIPPED | OUTPATIENT
Start: 2022-05-31

## 2022-05-31 RX ORDER — METHOCARBAMOL 500 MG/1
TABLET, FILM COATED ORAL
Qty: 270 TABLET | Refills: 1 | Status: SHIPPED | OUTPATIENT
Start: 2022-05-31

## 2022-06-06 ENCOUNTER — FOLLOWUP TELEPHONE ENCOUNTER (OUTPATIENT)
Dept: PSYCHIATRY | Age: 62
End: 2022-06-06

## 2022-06-06 NOTE — TELEPHONE ENCOUNTER
Writer called pt to see if she was interested in the IOP program. Pt states she would like to think about it.

## 2022-06-15 ENCOUNTER — FOLLOWUP TELEPHONE ENCOUNTER (OUTPATIENT)
Dept: PSYCHIATRY | Age: 62
End: 2022-06-15

## 2022-06-15 NOTE — TELEPHONE ENCOUNTER
Pt requesting refill on fluoxetine. Dr. Andrey Bowser approved. Writer called in to Bela Benavidez in [facility].
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
## 2022-06-21 ENCOUNTER — TELEMEDICINE (OUTPATIENT)
Dept: PSYCHOLOGY | Age: 62
End: 2022-06-21
Payer: MEDICARE

## 2022-06-21 DIAGNOSIS — F43.10 PTSD (POST-TRAUMATIC STRESS DISORDER): ICD-10-CM

## 2022-06-21 DIAGNOSIS — M1A.9XX0 CHRONIC GOUT WITHOUT TOPHUS, UNSPECIFIED CAUSE, UNSPECIFIED SITE: ICD-10-CM

## 2022-06-21 DIAGNOSIS — F31.30 BIPOLAR I DISORDER, MOST RECENT EPISODE (OR CURRENT) DEPRESSED (HCC): Primary | ICD-10-CM

## 2022-06-21 PROCEDURE — 90832 PSYTX W PT 30 MINUTES: CPT | Performed by: PSYCHOLOGIST

## 2022-06-21 RX ORDER — ALLOPURINOL 300 MG/1
TABLET ORAL
Qty: 90 TABLET | Refills: 1 | Status: SHIPPED | OUTPATIENT
Start: 2022-06-21

## 2022-06-21 NOTE — PROGRESS NOTES
Behavioral Health Consultation  [address] Zita Rader PsyD  Psychologist  06/21/2022   1:42 PM        Time spent with Patient: 30 minutes  This is patient's nineteenth [facility] appointment. Reason for Consult:                 Depression     Referring Provider: Yoly Atkins MD     TELEHEALTH VISIT -- Audio/Visual (During [id number] public health emergency)  }  Pursuant to the emergency declaration under the [address] [place] [address] [facility] authority and the [employer] Act, this Virtual Visit was conducted, with patient's consent, to reduce the patient's risk of exposure to COVID-19 and provide continuity of care for an established patient. Services were provided through a video synchronous discussion virtually to substitute for in-person clinic visit. Pt gave verbal informed consent to participate in telehealth services. Conducted a risk-benefit analysis and determined that the patient's presenting problems are consistent with the use of telepsychology. Determined that the patient has sufficient knowledge and skills in the use of technology enabling them to adequately benefit from telepsychology. It was determined that this patient was able to be properly treated without an in-person session. Patient verified that they were currently located at the [facility] address that was provided during registration. Verified the following information:  Patient's identification: Yes  Patient location: [address] [place] [address]  Patient's call back number: [phone]  Patient's emergency contact's name and number, as well as permission to contact them if needed: Extended Emergency Contact Information  Primary Emergency Contact: Boy Hines Phone: [phone]  Mobile Phone: [phone]  Relation: Brother/Sister  Preferred language: English   needed?  No  Secondary Emergency Contact: Tatyana Whitten [address] Phone: [phone]  Mobile Phone: [phone]  Relation: Niece/Nephew    Provider location: Sandrita Etienne[address]:  During the last visit [facility]) return for f/u after hospitalization    Getting good sleep now. Feels medication is finally right. Sleeping better than she has in her whole life. Feels frustrated that she doing much of what she has been taught and mood is much better, but does get triggered still for no reason. Wonders about trauma tx as discussed in previous visits. Didn't realize trauma would reoccur the way it does.      O:  MSE:    Appearance: good hygiene   Attitude: cooperative and friendly  Consciousness: alert  Orientation: oriented to person, place, time, general circumstance  Memory: recent and remote memory intact  Attention/Concentration: intact during session  Psychomotor Activity:normal  Eye Contact: Rare  Speech: normal rate and volume, well-articulated  Mood: \"good \"  Affect: euthymic, contruent  Perception: within normal limits  Thought Content: within normal limits  Thought Process: logical, coherent and goal-directed  Insight: good  Judgment: intact  Ability to understand instructions: Yes  Ability to respond meaningfully: Yes  Morbid Ideation: no   Suicide Assessment: suicidal ideation, no plan or intent  Homicidal Ideation: no      History:    Medications:   Current Outpatient Medications   Medication Sig Dispense Refill    buPROPion (WELLBUTRIN XL) 150 MG extended release tablet TAKE 1 TABLET EVERY MORNING 90 tablet 1    CPAP Machine MISC by Does not apply route      methocarbamol (ROBAXIN) 500 MG tablet TAKE 1 TABLET THREE TIMES DAILY AS NEEDED FOR PAIN (Patient taking differently: TAKE 1 TABLET TWICE TIMES DAILY AS NEEDED FOR PAIN) 270 tablet 0    lisinopril (PRINIVIL;ZESTRIL) 5 MG tablet TAKE 1 TABLET EVERY DAY 90 tablet 2    montelukast (SINGULAIR) 10 MG tablet TAKE 1 TABLET EVERY NIGHT 90 tablet 1    simvastatin (ZOCOR) 20 MG tablet TAKE 1 TABLET EVERY NIGHT 90 tablet 1    lamoTRIgine (LAMICTAL) 150 MG tablet TAKE 1 TABLET TWICE DAILY OR AS OTHERWISE DIRECTED 180 tablet 1    vitamin D (ERGOCALCIFEROL) 1.25 MG (17443 UT) CAPS capsule TAKE 1 CAPSULE ONE TIME WEEKLY 8 capsule 0    fluticasone (FLONASE) 50 MCG/ACT nasal spray 2 sprays by Each Nostril route daily 1 Bottle 5    levothyroxine (SYNTHROID) 100 MCG tablet Take 1 tablet by mouth Daily 90 tablet 1    metoprolol succinate (TOPROL XL) 100 MG extended release tablet Take 1 tablet by mouth daily 90 tablet 1    allopurinol (ZYLOPRIM) 300 MG tablet TAKE 1 TABLET EVERY DAY 90 tablet 1    diclofenac sodium (VOLTAREN) 1 % GEL Apply topically 2 times daily 150 g 2    apixaban (ELIQUIS) 5 MG TABS tablet Take 1 tablet by mouth 2 times daily 180 tablet 3    torsemide (DEMADEX) 10 MG tablet 2 tabs every other day alternating with 1 tab the other days 180 tablet 3    acetaminophen (TYLENOL) 325 MG tablet Take 650 mg by mouth every 6 hours as needed for Pain TAKES EVERY DAY      cetirizine (ZYRTEC) 10 MG tablet Take 10 mg by mouth daily       No current facility-administered medications for this visit. Social History:   Social History     Socioeconomic History    Marital status:       Spouse name: Not on file    Number of children: Not on file    Years of education: Not on file    Highest education level: Not on file   Occupational History    Not on file   Tobacco Use    Smoking status: Former Smoker     Packs/day: 3.00     Years: 2.00     Pack years: 6.00     Types: Cigarettes     Quit date: 1985     Years since quittin.9    Smokeless tobacco: Never Used   Vaping Use    Vaping Use: Never used   Substance and Sexual Activity    Alcohol use: No    Drug use: No    Sexual activity: Not Currently   Other Topics Concern    Not on file   Social History Narrative    Not on file     Social Determinants of Health     Financial Resource Strain: Low Risk     Difficulty of Paying Living Expenses: Not hard at all   Food Insecurity: No Food Insecurity    Worried About Running Out of Food in the Last Year: Never true    Ran Out of Food in the Last Year: Never true   Transportation Needs: No Transportation Needs    Lack of Transportation (Medical): No    Lack of Transportation (Non-Medical): No   Physical Activity:     Days of Exercise per Week: Not on file    Minutes of Exercise per Session: Not on file   Stress:     Feeling of Stress : Not on file   Social Connections:     Frequency of Communication with Friends and Family: Not on file    Frequency of Social Gatherings with Friends and Family: Not on file    Attends Islam Services: Not on file    Active Member of [address] [place] [address] [place] or Organizations: Not on file    Attends Club or Organization Meetings: Not on file    Marital Status: Not on file   Intimate Partner Violence:     Fear of Current or Ex-Partner: Not on file    Emotionally Abused: Not on file    Physically Abused: Not on file    Sexually Abused: Not on file   Housing Stability:     Unable to Pay for Housing in the Last Year: Not on file    Number of [place] in the Last Year: Not on file    Unstable Housing in the Last Year: Not on file       TOBACCO:   reports that she quit smoking about 36 years ago. Her smoking use included cigarettes. She has a 6.00 pack-year smoking history. She has never used smokeless tobacco.  ETOH:   reports no history of alcohol use.     Family History:   Family History   Problem Relation Age of Onset    Cancer Mother     Ovarian Cancer Mother 45        Technically peritoneal cancer    Depression Mother         bipolar    Arthritis Father     Rheum Arthritis Father     Arrhythmia Father     Other Father         gout    Heart Disease Father     Depression Brother         depression    Other Brother         gout    Obesity Brother     Other Brother         gout    Obesity Brother     Depression Brother     Cancer Maternal Grandmother         Breast    Breast Cancer Maternal Grandmother          A:  Ms. Tobin's mood has been improved and stable since her hospitalization. She reports medication have been working although does notice she is triggered in various ways about past trauma. PTSD tx was discussed at length and pt is agreeable to referral. She was active and engaged.     Diagnosis:       BAD I, Most recent episode depressed      Plan:  Pt interventions:    [place]-setting to identify pt's primary goals for [facility] visit / overall health, Supportive techniques, reinforced pt's skill use, psychoeucation re: evidence based tx for PTSD, CBT interventions, provided referrals for PTSD tx, treatment planning    Pt Behavioral Change Plan  Patient set goals to:   1) Call [id number] at [phone] to schedule intake 2) return for f/u after completion at [address]

## 2022-06-21 NOTE — TELEPHONE ENCOUNTER
Refill Request     CONFIRM preferrred pharmacy with the patient. If Mail Order Rx - Pend for 90 day refill.       Last Seen: Last Seen Department: 3/23/2022  Last Seen by PCP: 3/23/2022    Last Written: 12/22/2021 90 Tablet 2 Refills    Next Appointment:   Future Appointments   Date Time Provider Sarah Dania   6/21/2022  2:00 PM [place], [place] [facility]   7/1/2022 10:00 AM MD OSIRIS Syed  [place] [facility]   7/14/2022 11:00 AM MD OSIRIS Syed  [place] [facility]   8/1/2022  8:30 AM SCHEDULE, Neena Klein [facility]   9/22/2022  9:30 AM Itz Toure MD [facility]   9/28/2022 10:15 [facility]   9/28/2022 10:15 AM [facility]   10/31/2022  1:00 PM Marlena [place], APRN - CNP [facility]       Future appointment scheduled      Requested Prescriptions     Pending Prescriptions Disp Refills    allopurinol (ZYLOPRIM) 300 MG tablet [Pharmacy Med Name: ALLOPURINOL 300 MG Tablet] 90 tablet 1     Sig: TAKE 1 TABLET EVERY DAY

## 2022-06-23 ENCOUNTER — FOLLOWUP TELEPHONE ENCOUNTER (OUTPATIENT)
Dept: PSYCHIATRY | Age: 62
End: 2022-06-23

## 2022-06-23 NOTE — TELEPHONE ENCOUNTER
Pt's mail order pharmacy called requesting refill on Lamictal. Dr. Kamala Monique approved. Writer called in to pharmacy.

## 2022-06-27 ENCOUNTER — TELEPHONE (OUTPATIENT)
Dept: FAMILY MEDICINE CLINIC | Age: 62
End: 2022-06-27

## 2022-06-27 NOTE — TELEPHONE ENCOUNTER
Left messg for pt re AWV scheduled for 7/14= would like to switch her to my schedule for the AWV- she is seeing Dr Cora Shoemaker on 7/1/22 already.   Also note the AWV could be done either in office or over telephone per her preference

## 2022-07-01 ENCOUNTER — OFFICE VISIT (OUTPATIENT)
Dept: FAMILY MEDICINE CLINIC | Age: 62
End: 2022-07-01
Payer: MEDICARE

## 2022-07-01 VITALS
DIASTOLIC BLOOD PRESSURE: 72 MMHG | SYSTOLIC BLOOD PRESSURE: 118 MMHG | WEIGHT: 293 LBS | BODY MASS INDEX: 59.17 KG/M2 | HEART RATE: 61 BPM | OXYGEN SATURATION: 98 %

## 2022-07-01 DIAGNOSIS — E66.01 MORBID OBESITY (HCC): Primary | ICD-10-CM

## 2022-07-01 DIAGNOSIS — Z86.73 HISTORY OF CVA (CEREBROVASCULAR ACCIDENT): ICD-10-CM

## 2022-07-01 DIAGNOSIS — F31.30 BIPOLAR DISORDER, MOST RECENT EPISODE DEPRESSED (HCC): ICD-10-CM

## 2022-07-01 DIAGNOSIS — Z71.89 ENCOUNTER FOR HERB AND VITAMIN SUPPLEMENT MANAGEMENT: ICD-10-CM

## 2022-07-01 DIAGNOSIS — F43.10 PTSD (POST-TRAUMATIC STRESS DISORDER): ICD-10-CM

## 2022-07-01 DIAGNOSIS — E78.2 HYPERLIPIDEMIA, MIXED: ICD-10-CM

## 2022-07-01 DIAGNOSIS — Z23 NEED FOR PROPHYLACTIC VACCINATION AGAINST STREPTOCOCCUS PNEUMONIAE (PNEUMOCOCCUS): ICD-10-CM

## 2022-07-01 DIAGNOSIS — E03.9 HYPOTHYROIDISM, UNSPECIFIED TYPE: ICD-10-CM

## 2022-07-01 DIAGNOSIS — R79.9 ABNORMAL FINDING OF BLOOD CHEMISTRY, UNSPECIFIED: ICD-10-CM

## 2022-07-01 DIAGNOSIS — Z98.84 HISTORY OF ROUX-EN-Y GASTRIC BYPASS: ICD-10-CM

## 2022-07-01 DIAGNOSIS — E55.9 VITAMIN D DEFICIENCY, UNSPECIFIED: ICD-10-CM

## 2022-07-01 DIAGNOSIS — Z23 NEED FOR PROPHYLACTIC VACCINATION AND INOCULATION AGAINST VARICELLA: ICD-10-CM

## 2022-07-01 DIAGNOSIS — I10 HYPERTENSION, ESSENTIAL: ICD-10-CM

## 2022-07-01 DIAGNOSIS — I48.91 ATRIAL FIBRILLATION, UNSPECIFIED TYPE (HCC): ICD-10-CM

## 2022-07-01 DIAGNOSIS — Z98.84 S/P GASTRIC BYPASS: ICD-10-CM

## 2022-07-01 PROCEDURE — 1036F TOBACCO NON-USER: CPT | Performed by: FAMILY MEDICINE

## 2022-07-01 PROCEDURE — G8427 DOCREV CUR MEDS BY ELIG CLIN: HCPCS | Performed by: FAMILY MEDICINE

## 2022-07-01 PROCEDURE — G8417 CALC BMI ABV UP PARAM F/U: HCPCS | Performed by: FAMILY MEDICINE

## 2022-07-01 PROCEDURE — 3017F COLORECTAL CA SCREEN DOC REV: CPT | Performed by: FAMILY MEDICINE

## 2022-07-01 PROCEDURE — 99214 OFFICE O/P EST MOD 30 MIN: CPT | Performed by: FAMILY MEDICINE

## 2022-07-01 ASSESSMENT — PATIENT HEALTH QUESTIONNAIRE - PHQ9
6. FEELING BAD ABOUT YOURSELF - OR THAT YOU ARE A FAILURE OR HAVE LET YOURSELF OR YOUR FAMILY DOWN: 1
8. MOVING OR SPEAKING SO SLOWLY THAT OTHER PEOPLE COULD HAVE NOTICED. OR THE OPPOSITE, BEING SO FIGETY OR RESTLESS THAT YOU HAVE BEEN MOVING AROUND A LOT MORE THAN USUAL: 0
5. POOR APPETITE OR OVEREATING: 0
10. IF YOU CHECKED OFF ANY PROBLEMS, HOW DIFFICULT HAVE THESE PROBLEMS MADE IT FOR YOU TO DO YOUR WORK, TAKE CARE OF THINGS AT HOME, OR GET ALONG WITH OTHER PEOPLE: 0
2. FEELING DOWN, DEPRESSED OR HOPELESS: 0
SUM OF ALL RESPONSES TO PHQ QUESTIONS 1-9: 2
4. FEELING TIRED OR HAVING LITTLE ENERGY: 1
1. LITTLE INTEREST OR PLEASURE IN DOING THINGS: 0
3. TROUBLE FALLING OR STAYING ASLEEP: 0
SUM OF ALL RESPONSES TO PHQ QUESTIONS 1-9: 2
SUM OF ALL RESPONSES TO PHQ9 QUESTIONS 1 & 2: 0
9. THOUGHTS THAT YOU WOULD BE BETTER OFF DEAD, OR OF HURTING YOURSELF: 0
7. TROUBLE CONCENTRATING ON THINGS, SUCH AS READING THE NEWSPAPER OR WATCHING TELEVISION: 0

## 2022-07-01 ASSESSMENT — ENCOUNTER SYMPTOMS
CHEST TIGHTNESS: 0
BACK PAIN: 0
VOMITING: 0
ABDOMINAL PAIN: 0
COLOR CHANGE: 0
NAUSEA: 0
SHORTNESS OF BREATH: 0

## 2022-07-01 ASSESSMENT — ANXIETY QUESTIONNAIRES
1. FEELING NERVOUS, ANXIOUS, OR ON EDGE: 1
4. TROUBLE RELAXING: 0
7. FEELING AFRAID AS IF SOMETHING AWFUL MIGHT HAPPEN: 1
5. BEING SO RESTLESS THAT IT IS HARD TO SIT STILL: 0
2. NOT BEING ABLE TO STOP OR CONTROL WORRYING: 1
IF YOU CHECKED OFF ANY PROBLEMS ON THIS QUESTIONNAIRE, HOW DIFFICULT HAVE THESE PROBLEMS MADE IT FOR YOU TO DO YOUR WORK, TAKE CARE OF THINGS AT HOME, OR GET ALONG WITH OTHER PEOPLE: NOT DIFFICULT AT ALL
3. WORRYING TOO MUCH ABOUT DIFFERENT THINGS: 1
6. BECOMING EASILY ANNOYED OR IRRITABLE: 0
GAD7 TOTAL SCORE: 4

## 2022-07-01 NOTE — PROGRESS NOTES
7/1/2022    This is a 59 y.o. female who presents for  Chief Complaint   Patient presents with    Hypertension     f/u    Follow-up     Discuss shingles vaccine        HPI:     Feeling better overall   Feels the medications are the right combination   Recent hospitalization  Saw Dr. Ary Vasquez (male), medications adjusted  Feels this is working for her  Prozac 30 mg in the AM, latuda and Lamictal at night   Socializing more, interacting with people more  Still has days she doesn't want to get out of bed but understands this is ok  Working with Dr. Ary Vasquez (female), and will return after seeing the stress clinic at [facility] for her PTSD  Sleeping better, at least 8 hours now     Saw the [facility]   Insurance will not cover   Cut out chips, pop  Allows One once monthly   Drinks more Fruity/sparkling water  For the bubbles   Working on a more liquid intake   Cut out candy bars    Will f/u with Dr. Sandra Willams in 9/22 for pain  Not taking steroids currently    inquires about the shingles vaccine and if she should receive this      Past Medical History:   Diagnosis Date    Anesthesia complication     hypotension post op    Autoimmune hemolytic anemia ([address]     during uterine cancer    Bipolar disorder ([address]     managed by Jeremiah Blevins)    Bundle branch block     Cardiomyopathy ([address]     Chronic systolic CHF (congestive heart failure) ([address] 7/9/2015    Depression     Family history of early CAD     Family history of ovarian cancer     mother    GERD (gastroesophageal reflux disease)     Gout     Hypertension     Hypothyroid     Osteoarthritis, knee     Shybut    Pneumonia     history of pneumonia    S/P gastric bypass 1/7/05    Unspecified cerebral artery occlusion with cerebral infarction     Uterine cancer ([address]     endometrial adenocarcinoma    Vitamin B 12 deficiency 7/09       Past Surgical History:   Procedure Laterality Date    CARDIAC CATHETERIZATION  7/10/2015    Normal Coronary Arteries    CHOLECYSTECTOMY      COLONOSCOPY  2019    NL    COLONOSCOPY N/A 3/6/2019    EGD AND COLONOSCOPY WITH ANESTHESIA performed by Luann Castro MD at [address] [place] [address]      4 times    ERCP      FINGER SURGERY      gout X 2    GASTRIC BYPASS SURGERY  05    HERNIA REPAIR  10/20/2016    lap ventral hernia    LIPECTOMY      gangrenous    OTHER SURGICAL HISTORY      radium implants into uterus for uterine surgery X 2    OTHER SURGICAL HISTORY  16    exp. lap lysis of adhesion    OTHER SURGICAL HISTORY      biventricular pacer and defibrillator    PACEMAKER PLACEMENT  2016    TRANSESOPHAGEAL ECHOCARDIOGRAM  2015    UPPER GASTROINTESTINAL ENDOSCOPY  13    UPPER GASTROINTESTINAL ENDOSCOPY  2019    NL    UPPER GASTROINTESTINAL ENDOSCOPY N/A 3/6/2019    EGD AND COLONOSCOPY WITH ANESTHESIA performed by Luann Castro MD at [address] [facility] Marital status:       Spouse name: Not on file    Number of children: 0    Years of education: Not on file    Highest education level: Not on file   Occupational History    Not on file   Tobacco Use    Smoking status: Former Smoker     Packs/day: 3.00     Years: 2.00     Pack years: 6.00     Types: Cigarettes     Quit date: 1985     Years since quittin.5    Smokeless tobacco: Never Used   Vaping Use    Vaping Use: Never used   Substance and Sexual Activity    Alcohol use: No    Drug use: No    Sexual activity: Not Currently   Other Topics Concern    Not on file   Social History Narrative    Not on file     Social Determinants of Health     Financial Resource Strain: Low Risk     Difficulty of Paying Living Expenses: Not hard at all   Food Insecurity: No Food Insecurity    Worried About [facility] in the Last Year: Never true    Alexi of Food in the Last Year: Never true   Transportation Needs: No Transportation Needs    Lack of Transportation (Medical): No    Lack of Transportation (Non-Medical):  No   Physical Activity:     Days of Exercise per Week: Not on file    Minutes of Exercise per Session: Not on file   Stress:     Feeling of Stress : Not on file   Social Connections:     Frequency of Communication with Friends and Family: Not on file    Frequency of Social Gatherings with Friends and Family: Not on file    Attends Sikhism Services: Not on file    Active Member of Clubs or Organizations: Not on file    Attends Club or Organization Meetings: Not on file    Marital Status: Not on file   Intimate Partner Violence:     Fear of Current or Ex-Partner: Not on file    Emotionally Abused: Not on file    Physically Abused: Not on file    Sexually Abused: Not on file   Housing Stability:     Unable to Pay for Housing in the Last Year: Not on file    Number of Places Lived in the Last Year: Not on file    Unstable Housing in the Last Year: Not on file       Family History   Problem Relation Age of Onset    Cancer Mother     Ovarian Cancer Mother 45        Technically peritoneal cancer    Depression Mother         bipolar    Arthritis Father     Rheum Arthritis Father     Arrhythmia Father     Other Father         gout    Heart Disease Father     Depression Brother         depression    Other Brother         gout    Obesity Brother     Other Brother         gout    Obesity Brother     Depression Brother     Cancer Maternal Grandmother         Breast    Breast Cancer Maternal Grandmother        Current Outpatient Medications   Medication Sig Dispense Refill    allopurinol (ZYLOPRIM) 300 MG tablet TAKE 1 TABLET EVERY DAY 90 tablet 1    simvastatin (ZOCOR) 20 MG tablet TAKE 1 TABLET EVERY NIGHT 90 tablet 1    methocarbamol (ROBAXIN) 500 MG tablet TAKE 1 TABLET TWICE TIMES DAILY AS NEEDED FOR PAIN 270 tablet 1    metoprolol succinate (TOPROL XL) 100 MG extended release tablet Take 1 tablet by mouth daily 90 tablet 1    predniSONE (DELTASONE) 5 MG tablet 4 tab po daily x 7 days. 3  tab po daily x 10 days. 2.5 tab po daily x 10 days, then 2 tab po daily therafter 180 tablet 0    FLUoxetine (PROZAC) 10 MG capsule Take 3 capsules by mouth daily 30 capsule 0    levothyroxine (SYNTHROID) 125 MCG tablet TAKE 1 TABLET EVERY DAY. 90 tablet 1    torsemide (DEMADEX) 10 MG tablet TAKE 2 TABLETS EVERY OTHER DAY ALTERNATING WITH 1 TABLET THE OTHER DAYS AS DIRECTED 135 tablet 1    ELIQUIS 5 MG TABS tablet TAKE 1 TABLET TWICE DAILY 180 tablet 3    lurasidone (LATUDA) 60 MG TABS tablet Take 1 tablet by mouth nightly 30 tablet 0    Misc. Devices (BARIATRIC ROLLATOR) MISC Bariatric Rollator to use while walking. 1 each 0    diclofenac sodium (VOLTAREN) 1 % GEL APPLY TOPICALLY 2 TIMES DAILY 150 g 2    CPAP Machine MISC by Does not apply route      lisinopril (PRINIVIL;ZESTRIL) 5 MG tablet TAKE 1 TABLET EVERY DAY 90 tablet 2    montelukast (SINGULAIR) 10 MG tablet TAKE 1 TABLET EVERY NIGHT 90 tablet 1    acetaminophen (TYLENOL) 325 MG tablet Take 650 mg by mouth every 6 hours as needed for Pain TAKES EVERY DAY      cetirizine (ZYRTEC) 10 MG tablet Take 10 mg by mouth daily      lamoTRIgine (LAMICTAL) 150 MG tablet Take 2 tablets by mouth nightly 60 tablet 0     No current facility-administered medications for this visit.        Immunization History   Administered Date(s) Administered    COVID-19, MODERNA BLUE border, Primary or Immunocompromised, (age 12y+), IM, 100 mcg/0.5mL 03/18/2021, 04/22/2021    Influenza Virus Vaccine 01/28/2012, 10/17/2012, 11/10/2014, 10/22/2015    Influenza Whole 11/10/2014    Influenza, Intradermal, Preservative free 09/24/2013    Influenza, MDCK Quadv, IM, PF (Flucelvax 2 yrs and older) 10/17/2017    Influenza, Fany Horn, IM, PF (6 mo and older Fluzone, Flulaval, Fluarix, and 3 yrs and older Afluria) 12/01/2016, 01/04/2019, 10/07/2019, 09/21/2020, 12/05/2021    Pneumococcal Polysaccharide ([id number]) 01/28/2012    Tetanus 03/01/1988       Allergies   Allergen Reactions    Dilaudid [Hydromorphone Hcl] Other (See Comments)     Palpitations and orthostatic hypotension    Lavender Oil Shortness Of Breath and Rash    Penicillins Shortness Of Breath    Seroquel [Quetiapine Fumarate] Other (See Comments)     Very lethargic/almost not funtioning at all (per patient).  Adhesive Tape Other (See Comments)     BLISTERS, paper tape okay    Hydromorphone Hcl      Other reaction(s): Other (See Comments)  Rapid heart rate     Lithium Other (See Comments)     Diarrhea, vomiting, nausea, headaches,     Tetanus Toxoids Swelling       Review of Systems   Constitutional: Negative for activity change, appetite change, fatigue and unexpected weight change. Respiratory: Negative for chest tightness and shortness of breath. Cardiovascular: Negative for chest pain and palpitations. Gastrointestinal: Negative for abdominal pain, nausea and vomiting. Musculoskeletal: Negative for arthralgias and back pain. Skin: Negative for color change. Neurological: Negative for dizziness, tremors, seizures, weakness, light-headedness, numbness and headaches. Psychiatric/Behavioral: Negative for agitation, behavioral problems, confusion, decreased concentration, dysphoric mood, hallucinations, self-injury, sleep disturbance and suicidal ideas. The patient is not nervous/anxious and is not hyperactive. /72 (Site: Left Upper Arm, Position: Sitting, Cuff Size: Small Adult)   Pulse 61   Wt (!) 334 lb (151.5 kg)   SpO2 98%   BMI 59.17 kg/m²     Physical Exam  Vitals and nursing note reviewed. Constitutional:       General: She is not in acute distress. Appearance: Normal appearance. She is well-developed. She is not diaphoretic. HENT:      Head: Normocephalic and atraumatic. Eyes:      Extraocular Movements: Extraocular movements intact.       Conjunctiva/sclera: Conjunctivae normal.      Pupils: Pupils are equal, round, and reactive to light. Cardiovascular:      Rate and Rhythm: Normal rate and regular rhythm. Pulmonary:      Effort: Pulmonary effort is normal. No respiratory distress. Abdominal:      Palpations: Abdomen is soft. Musculoskeletal:         General: Normal range of motion. Cervical back: Normal range of motion and neck supple. Skin:     General: Skin is warm and dry. Neurological:      Mental Status: She is alert and oriented to person, place, and time. Psychiatric:         Attention and Perception: She is attentive. Mood and Affect: Mood normal.         Speech: Speech normal.         Behavior: Behavior normal.         Thought Content: Thought content normal.         Judgment: Judgment normal.         Plan  1. Morbid obesity ([facility]  Nutrition discussed in detail. Recommendations made based on pt's health history and lifestyle. 342 --> 334#, congratulated her on her success  - Comprehensive Metabolic Panel; Future  - Hemoglobin A1C; Future  - Lipid Panel; Future  - TSH with Reflex; Future    Labs prior to next visit to assess nutrition status   Pending respond to PTSD therapy and [facility] success, may consider weekly injection to assist     2. S/P gastric bypass  - Comprehensive Metabolic Panel; Future  - CBC with Auto Differential; Future  - Hemoglobin A1C; Future  - Lipid Panel; Future  - Vitamin D 25 Hydroxy; Future  - TSH with Reflex; Future  - Magnesium; Future  - Vitamin B12; Future  - Folate; Future    3. Bipolar disorder, most recent episode depressed ([facility]  4. PTSD (post-traumatic stress disorder)  Will c/w medications per Dr. Tu Ochoa (male)  Will see  stress center and then return to see Dr Tu Ochoa (female)     5. Need for prophylactic vaccination against Streptococcus pneumoniae (pneumococcus)  6.  Need for prophylactic vaccination and inoculation against varicella  Discussed and encouraged vaccines, will obtain at the pharmacy 7. Encounter for herb and vitamin supplement management  - Comprehensive Metabolic Panel; Future  - CBC with Auto Differential; Future  - Hemoglobin A1C; Future  - Lipid Panel; Future  - Vitamin D 25 Hydroxy; Future  - TSH with Reflex; Future  - Magnesium; Future  - Vitamin B12; Future  - Folate; Future  8. Hypothyroidism, unspecified type  - TSH with Reflex; Future  9. Hypertension, essential  - Comprehensive Metabolic Panel; Future  - TSH with Reflex; Future  10. Hyperlipidemia, mixed  - Lipid Panel; Future  11. History of Eloy-en-Y gastric bypass  - Comprehensive Metabolic Panel; Future  - CBC with Auto Differential; Future  - Hemoglobin A1C; Future  - Lipid Panel; Future  - Vitamin D 25 Hydroxy; Future  - TSH with Reflex; Future  - Magnesium; Future  - Vitamin B12; Future  - Folate; Future  12. History of CVA (cerebrovascular accident)  - Comprehensive Metabolic Panel; Future  - CBC with Auto Differential; Future  - Hemoglobin A1C; Future  - Lipid Panel; Future  - Vitamin D 25 Hydroxy; Future  - TSH with Reflex; Future  - Magnesium; Future  - Vitamin B12; Future  - Folate; Future  13. Atrial fibrillation, unspecified type (HCC)  - TSH with Reflex; Future  14. Abnormal finding of blood chemistry, unspecified   - Hemoglobin A1C; Future  15. Vitamin D deficiency, unspecified   - Vitamin D 25 Hydroxy; Future        While assessing care for this patient, I have reviewed all pertinent lab work/imaging/ specialist notes and care in reference to those problems addressed above in detail. Appropriate medical decision making was based on this. Please note that portions of this note may have been completed with a voice recognition program. Efforts were made to edit the dictations but occasionally words are mis-transcribed. Return in about 3 months (around 10/1/2022) for 30 minute visit.

## 2022-07-07 ENCOUNTER — FOLLOWUP TELEPHONE ENCOUNTER (OUTPATIENT)
Dept: PSYCHIATRY | Age: 62
End: 2022-07-07

## 2022-07-07 NOTE — TELEPHONE ENCOUNTER
Pt's mail order pharmacy called for fluoxetine refill. Dr. Frank Rodríguez approved. Writer called in to pharmacy in [employer] at [phone]. Pt notified.

## 2022-07-14 RX ORDER — MONTELUKAST SODIUM 10 MG/1
TABLET ORAL
Qty: 90 TABLET | Refills: 1 | OUTPATIENT
Start: 2022-07-14

## 2022-07-18 RX ORDER — MONTELUKAST SODIUM 10 MG/1
10 TABLET ORAL NIGHTLY
Qty: 90 TABLET | Refills: 3 | Status: SHIPPED | OUTPATIENT
Start: 2022-07-18

## 2022-07-21 ENCOUNTER — TELEMEDICINE (OUTPATIENT)
Dept: FAMILY MEDICINE CLINIC | Age: 62
End: 2022-07-21
Payer: MEDICARE

## 2022-07-21 DIAGNOSIS — Z00.00 MEDICARE ANNUAL WELLNESS VISIT, SUBSEQUENT: Primary | ICD-10-CM

## 2022-07-21 PROCEDURE — G0439 PPPS, SUBSEQ VISIT: HCPCS | Performed by: FAMILY MEDICINE

## 2022-07-21 ASSESSMENT — LIFESTYLE VARIABLES
HOW OFTEN DO YOU HAVE A DRINK CONTAINING ALCOHOL: NEVER
HOW MANY STANDARD DRINKS CONTAINING ALCOHOL DO YOU HAVE ON A TYPICAL DAY: PATIENT DOES NOT DRINK

## 2022-07-21 ASSESSMENT — PATIENT HEALTH QUESTIONNAIRE - PHQ9
1. LITTLE INTEREST OR PLEASURE IN DOING THINGS: 1
2. FEELING DOWN, DEPRESSED OR HOPELESS: 0
SUM OF ALL RESPONSES TO PHQ QUESTIONS 1-9: 3
SUM OF ALL RESPONSES TO PHQ9 QUESTIONS 1 & 2: 1
9. THOUGHTS THAT YOU WOULD BE BETTER OFF DEAD, OR OF HURTING YOURSELF: 0
7. TROUBLE CONCENTRATING ON THINGS, SUCH AS READING THE NEWSPAPER OR WATCHING TELEVISION: 2
5. POOR APPETITE OR OVEREATING: 0
SUM OF ALL RESPONSES TO PHQ QUESTIONS 1-9: 3
4. FEELING TIRED OR HAVING LITTLE ENERGY: 0
SUM OF ALL RESPONSES TO PHQ QUESTIONS 1-9: 3
8. MOVING OR SPEAKING SO SLOWLY THAT OTHER PEOPLE COULD HAVE NOTICED. OR THE OPPOSITE, BEING SO FIGETY OR RESTLESS THAT YOU HAVE BEEN MOVING AROUND A LOT MORE THAN USUAL: 0
SUM OF ALL RESPONSES TO PHQ QUESTIONS 1-9: 3
10. IF YOU CHECKED OFF ANY PROBLEMS, HOW DIFFICULT HAVE THESE PROBLEMS MADE IT FOR YOU TO DO YOUR WORK, TAKE CARE OF THINGS AT HOME, OR GET ALONG WITH OTHER PEOPLE: 1
6. FEELING BAD ABOUT YOURSELF - OR THAT YOU ARE A FAILURE OR HAVE LET YOURSELF OR YOUR FAMILY DOWN: 0
3. TROUBLE FALLING OR STAYING ASLEEP: 0

## 2022-07-21 NOTE — PROGRESS NOTES
Medicare Annual Wellness Visit    Taty Camp is here for Medicare AWV    Assessment & Plan   Medicare annual wellness visit, subsequent    Recommendations for Preventive Services Due: see orders and patient instructions/AVS.  Recommended screening schedule for the next 5-10 years is provided to the patient in written form: see Patient Instructions/AVS.     No follow-ups on file. Subjective       Patient's complete Health Risk Assessment and screening values have been reviewed and are found in Flowsheets. The following problems were reviewed today and where indicated follow up appointments were made and/or referrals ordered.     Positive Risk Factor Screenings with Interventions:    Fall Risk:  Do you feel unsteady or are you worried about falling? : (!) yes  2 or more falls in past year?: (!) yes  Fall with injury in past year?: (!) yes   Fall Risk Interventions:    Home safety tips provided            General Health and ACP:  General  In general, how would you say your health is?: Fair  In the past 7 days, have you experienced any of the following: New or Increased Pain, New or Increased Fatigue, Loneliness, Social Isolation, Stress or Anger?: No  Do you get the social and emotional support that you need?: Yes  Do you have a Living Will?: (!) No    Advance Directives       Power of  Living Will ACP-Advance Directive ACP-Power of     Not on File Not on File Not on File Not on File          General Health Risk Interventions:  No Living Will: Patient declines ACP discussion/assistance    Health Habits/Nutrition:  Physical Activity: Insufficiently Active    Days of Exercise per Week: 2 days    Minutes of Exercise per Session: 10 min     Have you lost any weight without trying in the past 3 months?: No     Have you seen the dentist within the past year?: (!) No  Health Habits/Nutrition Interventions:  Dental exam overdue:  patient encouraged to make appointment with his/her dentist    Hearing/Vision:  Do you or your family notice any trouble with your hearing that hasn't been managed with hearing aids?: No  Do you have difficulty driving, watching TV, or doing any of your daily activities because of your eyesight?: No  Have you had an eye exam within the past year?: (!) No  No results found. Hearing/Vision Interventions:  Vision concerns:  patient encouraged to make appointment with his/her eye specialist     ADLs:  In the past 7 days, did you need help from others to perform any of the following everyday activities: Eating, dressing, grooming, bathing, toileting, or walking/balance?: No  In the past 7 days, did you need help from others to take care of any of the following: Laundry, housekeeping, banking/finances, shopping, telephone use, food preparation, transportation, or taking medications?: (!) Yes  Select all that apply: Marisela Wong  ADL Interventions:  Patient declines any further evaluation/treatment for this issue          Objective      Patient-Reported Vitals  Patient-Reported Weight: 322lb  Patient-Reported Height: 5' 3\"     Patient did not monitor blood pressure        Allergies   Allergen Reactions    Dilaudid [Hydromorphone Hcl] Other (See Comments)     Palpitations and orthostatic hypotension    Lavender Oil Shortness Of Breath and Rash    Penicillins Shortness Of Breath    Seroquel [Quetiapine Fumarate] Other (See Comments)     Very lethargic/almost not funtioning at all (per patient). Adhesive Tape Other (See Comments)     BLISTERS, paper tape okay    Hydromorphone Hcl      Other reaction(s): Other (See Comments)  Rapid heart rate     Lithium Other (See Comments)     Diarrhea, vomiting, nausea, headaches,     Tetanus Toxoids Swelling     Prior to Visit Medications    Medication Sig Taking?  Authorizing Provider   montelukast (SINGULAIR) 10 MG tablet Take 1 tablet by mouth nightly Yes Delaney Pelletier MD   allopurinol (ZYLOPRIM) 300 MG tablet TAKE 1 TABLET EVERY DAY Yes ALICIA Gomez CNP   simvastatin (ZOCOR) 20 MG tablet TAKE 1 TABLET EVERY NIGHT Yes ALICIA Granado   methocarbamol (ROBAXIN) 500 MG tablet TAKE 1 TABLET TWICE TIMES DAILY AS NEEDED FOR PAIN Yes ALICIA Granado   metoprolol succinate (TOPROL XL) 100 MG extended release tablet Take 1 tablet by mouth daily Yes Lauren Kern MD   FLUoxetine (PROZAC) 10 MG capsule Take 3 capsules by mouth daily Yes Ever MD Holley   levothyroxine (SYNTHROID) 125 MCG tablet TAKE 1 TABLET EVERY DAY. Yes Lauren Kern MD   torsemide (DEMADEX) 10 MG tablet TAKE 2 TABLETS EVERY OTHER DAY ALTERNATING WITH 1 TABLET THE OTHER DAYS AS DIRECTED Yes ALICIA Tarango CNP   ELIQUIS 5 MG TABS tablet TAKE 1 TABLET TWICE DAILY Yes ALICIA Tarango CNP   lurasidone (LATUDA) 60 MG TABS tablet Take 1 tablet by mouth nightly Yes Ever MD Holley   diclofenac sodium (VOLTAREN) 1 % GEL APPLY TOPICALLY 2 TIMES DAILY Yes Lauren Kern MD   lisinopril (PRINIVIL;ZESTRIL) 5 MG tablet TAKE 1 TABLET EVERY DAY Yes ALICIA Carrasquillo - CNP   acetaminophen (TYLENOL) 325 MG tablet Take 650 mg by mouth every 6 hours as needed for Pain TAKES EVERY DAY Yes Historical Provider, MD   cetirizine (ZYRTEC) 10 MG tablet Take 10 mg by mouth daily Yes Historical Provider, MD   predniSONE (DELTASONE) 5 MG tablet 4 tab po daily x 7 days. 3  tab po daily x 10 days. 2.5 tab po daily x 10 days, then 2 tab po daily therafter  Patient not taking: Reported on 7/21/2022  Ever MD Holley   lamoTRIgine (LAMICTAL) 150 MG tablet Take 2 tablets by mouth nightly  Ever MD Holley   [facility]. Devices (BARIATRIC ROLLATOR) MISC Bariatric Rollator to use while walking.   Lauren Kern MD   CPAP Machine MISC by Does not apply route  Historical Provider, MD L[place]in (Including outside providers/suppliers regularly involved in providing care):   Patient Care Team:  Lauren Kern MD as PCP - General (Family Medicine)  Ina Lara MD as PCP - [facility]  Griselda Garsia MD as Surgeon (General Surgery)  ALICIA Graham CNP as Nurse Practitioner (Family Nurse Practitioner)     Reviewed and updated this visit:  Tobacco  Allergies  Meds  Med Hx  Surg Hx  Soc Hx  Fam Hx          Mike Pale, was evaluated through a synchronous (real-time) audio-video encounter. The patient (or guardian if applicable) is aware that this is a billable service, which includes applicable co-pays. This Virtual Visit was conducted with patient's (and/or legal guardian's) consent. The visit was conducted pursuant to the emergency declaration under the [address] [place] [address] authority and the [employer] and bttn General Act. Patient identification was verified, and a caregiver was present when appropriate. The patient was located at Home: [address] [place] [address]. Provider was located at [facility] (Appt Dept): [address] [facility]  [address] [facility] [address]. Dontae Chin LPN, [birth year], performed the documented evaluation under the direct supervision of the attending physician. This encounter was performed under myОлег MDs, direct supervision, 7/21/2022.

## 2022-07-21 NOTE — PATIENT INSTRUCTIONS
Personalized Preventive Plan for Sara Watson - 7/21/2022  Medicare offers a range of preventive health benefits. Some of the tests and screenings are paid in full while other may be subject to a deductible, co-insurance, and/or copay. Some of these benefits include a comprehensive review of your medical history including lifestyle, illnesses that may run in your family, and various assessments and screenings as appropriate. After reviewing your medical record and screening and assessments performed today your provider may have ordered immunizations, labs, imaging, and/or referrals for you. A list of these orders (if applicable) as well as your Preventive Care list are included within your After Visit Summary for your review. Other Preventive Recommendations:    A preventive eye exam performed by an eye specialist is recommended every 1-2 years to screen for glaucoma; cataracts, macular degeneration, and other eye disorders. A preventive dental visit is recommended every 6 months. Try to get at least 150 minutes of exercise per week or 10,000 steps per day on a pedometer . Order or download the FREE \"Exercise & Physical Activity: Your Everyday Guide\" from [employer]. Call 1-936.688.7438 or search [employer] online. You need 8216-8755 mg of calcium and 7993-6385 IU of vitamin D per day. It is possible to meet your calcium requirement with diet alone, but a vitamin D supplement is usually necessary to meet this goal.  When exposed to the sun, use a sunscreen that protects against both UVA and UVB radiation with an SPF of 30 or greater. Reapply every 2 to 3 hours or after sweating, drying off with a towel, or swimming. Always wear a seat belt when traveling in a car. Always wear a helmet when riding a bicycle or motorcycle. falls

## 2022-08-01 ENCOUNTER — NURSE ONLY (OUTPATIENT)
Dept: CARDIOLOGY CLINIC | Age: 62
End: 2022-08-01
Payer: MEDICARE

## 2022-08-01 DIAGNOSIS — Z95.810 BIVENTRICULAR ICD (IMPLANTABLE CARDIOVERTER-DEFIBRILLATOR) IN PLACE: ICD-10-CM

## 2022-08-01 DIAGNOSIS — I50.22 CHRONIC SYSTOLIC CHF (CONGESTIVE HEART FAILURE) (HCC): Primary | ICD-10-CM

## 2022-08-01 DIAGNOSIS — I47.20 VT (VENTRICULAR TACHYCARDIA): ICD-10-CM

## 2022-08-01 DIAGNOSIS — I42.0 DILATED CARDIOMYOPATHY (HCC): ICD-10-CM

## 2022-08-01 PROCEDURE — 93297 REM INTERROG DEV EVAL ICPMS: CPT | Performed by: INTERNAL MEDICINE

## 2022-08-01 PROCEDURE — 93296 REM INTERROG EVL PM/IDS: CPT | Performed by: INTERNAL MEDICINE

## 2022-08-01 PROCEDURE — 93295 DEV INTERROG REMOTE 1/2/MLT: CPT | Performed by: INTERNAL MEDICINE

## 2022-08-01 NOTE — TELEPHONE ENCOUNTER
Refill Request     CONFIRM preferrred pharmacy with the patient. If Mail Order Rx - Pend for 90 day refill.       Last Seen: Last Seen Department: 7/21/2022  Last Seen by PCP: Visit date not found    Last Written: 12/29/21 150g 2 refills    Next Appointment:   Future Appointments   Date Time Provider Sarah Dania   8/1/2022  8:30 AM SCHEDULE, Patrick Mcintyre [facility]   9/22/2022  9:30 AM Claudette Olivarez MD [facility]   9/28/2022 10:15 AM SCHEDULE, [facility]   9/28/2022 10:15 AM ALICIA Woody CNP [facility]   10/3/2022  9:45 AM MD OSIRIS Worrell - DYJAVY   10/31/2022  1:00 PM Andres Viktoriya, APRN - CNP [facility]   7/27/2023 11:00 AM SCHEDULE, LETTY MUELLER AWV ANNA ROMERO       Future appointment scheduled      Requested Prescriptions     Pending Prescriptions Disp Refills    diclofenac sodium (VOLTAREN) 1 %  g 2     Sig: Apply topically 2 times daily

## 2022-08-03 NOTE — PROGRESS NOTES
Remote transmission received for patients CRT-D. Transmission shows normal sensing and pacing function. EP physician will review. See interrogation under the cardiology tab in the [facility] [address] field for more details. Will continue to monitor remotely. Hx PAF/AFL (oac, toprol xl). End of 91-day monitoring period 8/1/22. MELISA @ 11 months. Pacing (% of Time Since 02-May-2022)  Total * 97.0% (MVP Off)  Time in AT/AF 0.4 hr/day (1.8%)  Longest AT/AF 26 hours  Episodes Since: 02-May-2022  24 Treated AT/AF. Pace-Terminated Episodes 0 17 of 24 (70.8%). 10 Monitored AT/AF w/ V rates  bpm.    Optivol returned to baseline and Thoracic impedance trend stable. 3/28/22 LV threshold appears to be chronically elevated and she did have significant clinical improvement with CRT. S/P CRT-D- she has had remarkable symptomatic improvement with CRT. Her device is functioning normally and her ECG is markedly improved. Changed LV to 3-coil at OV on 3/28/2022.

## 2022-08-18 NOTE — PROGRESS NOTES
Left   Swell Tender Swell Tender   PIP1           PIP2           PIP3           PIP4          PIP5          MCP1           MCP2  x  x       MCP3    x       MCP4           MCP5           Wrist  x  x       Elbow           Shoulder          Knee           Subjective arthralgias across MCPs, PIPs, wrists bilaterally. Loose fist right hand. Subjective arthralgias in her shoulders, thighs. Range of motion are full in both shoulders but associated with discomfort at extreme degree of range of motion. Bony crepitus, bony swelling with mild valgus deformity noted in both knees. Skin: No rashes, no induration or skin thickening or nodules. HEENT: Normal temporal arteries without any tenderness or tortuosity. DATA:   Lab Results   Component Value Date    SEDRATE 36 (H) 02/16/2022     Lab Results   Component Value Date    CRP <3.0 02/16/2022     Lab Results   Component Value Date    WBC 7.7 05/09/2022    HGB 12.2 05/09/2022    HCT 37.5 05/09/2022    MCV 87.5 05/09/2022     05/09/2022     Lab Results   Component Value Date     05/09/2022    K 4.8 05/09/2022     05/09/2022    CO2 22 05/09/2022    BUN 18 05/09/2022    CREATININE 1.2 05/09/2022    GLUCOSE 106 (H) 05/09/2022    CALCIUM 8.2 (L) 05/09/2022    PROT 6.4 05/09/2022    LABALBU 4.4 05/09/2022    BILITOT 0.6 05/09/2022    ALKPHOS 93 05/09/2022    AST 17 05/09/2022    ALT 9 (L) 05/09/2022    LABGLOM 45 (A) 05/09/2022    GFRAA 55 (A) 05/09/2022    AGRATIO 2.2 05/09/2022    GLOB 2.4 03/14/2021           ASSESSMENT AND PLAN:  Tavares Henry was seen today for joint pain. Diagnoses and all orders for this visit:  Inflammatory arthritis in PMR distribution    -     C-Reactive Protein; Future  -     Sedimentation Rate; Future  -     Basic Metabolic Panel; Future  -     CBC with Auto Differential; Future  -     Discontinue: hydroxychloroquine (PLAQUENIL) 200 MG tablet;  Take 1 tablet by mouth 2 times daily  - hydroxychloroquine (PLAQUENIL) 200 MG tablet; Take 1 tablet by mouth 2 times daily    Senile osteoporosis    BMI 60.0-69.9, adult (HCC)    Vitamin D deficiency  -     Vitamin D 25 Hydroxy; Future      Other orders  -     Discontinue: Calcium Citrate-Vitamin D (CALCIUM CITRATE CHEWY BITE) 500-500 MG-UNIT CHEW; Take 1 tab po BID with food. -     Discontinue: predniSONE (DELTASONE) 5 MG tablet; Take 1 tab po daily. -     Calcium Citrate-Vitamin D (CALCIUM CITRATE CHEWY BITE) 500-500 MG-UNIT CHEW; Take 1 tab po BID with food. -     predniSONE (DELTASONE) 5 MG tablet; Take 1 tab po daily. 1.  Inflammatory polyarthritis-affecting small, intermediate and large joints. Current presentation has been different than last presentation, in the past-she mainly had PMR distribution inflammatory arthritis. Inflammatory arthritis flared off of prednisone.  -Resume prednisone 5 mg a day. Add hydroxychloroquine 200 mg twice daily as steroid sparing medication. Side effects, directions, monitoring of these medication discussed with patient. If she is able to tolerate hydroxychloroquine, will plan for baseline eye exam at next visit. Check labs as above. 2.  DEXA scan-osteoporosis  Is on Prolia every 6 monthly, calcium vitamin D supplementation. Prolia 3/21/2022. Next dose September 22, 2022. 2/2/2022 lumbar spine T score -1.8, left hip -1.1, femoral neck -2.8, right hip -2.3, right femoral neck -2.9.      3. BMI 58 -she is aware of all healthy eating practices and mindful eating. Lost about 15 pounds since last visit, has been paying attention to portion sizes. Reassessment in 4 to 6 weeks. #################################################################################################  Patient indicates understanding and agrees with the management plan.   Total time 34 minutes that includes the following-  Preparing to see the patient such as reviewing patients records, pre-charting, preparing the visit on the same day, performing a medically appropriate history and physical examination, counseling and educating patient about diagnosis, management plan, ordering appropriate testings, prescriptions, communicating findings to other care providers, and documenting clinical information in electronic medical record. I thank you for giving me the opportunity to be involved in Melany Tobin's care and I look forward following Melany Ornelas along with you. If you have any questions or concerns please feel free to contact me at any time.   Lupis Hinojosa MD 08/19/22

## 2022-08-19 ENCOUNTER — OFFICE VISIT (OUTPATIENT)
Dept: RHEUMATOLOGY | Age: 62
End: 2022-08-19
Payer: MEDICARE

## 2022-08-19 VITALS
SYSTOLIC BLOOD PRESSURE: 128 MMHG | BODY MASS INDEX: 51.91 KG/M2 | HEIGHT: 63 IN | WEIGHT: 293 LBS | DIASTOLIC BLOOD PRESSURE: 84 MMHG

## 2022-08-19 DIAGNOSIS — M35.3 PMR (POLYMYALGIA RHEUMATICA) (HCC): ICD-10-CM

## 2022-08-19 DIAGNOSIS — E55.9 VITAMIN D DEFICIENCY: ICD-10-CM

## 2022-08-19 DIAGNOSIS — R79.9 ABNORMAL FINDING OF BLOOD CHEMISTRY, UNSPECIFIED: ICD-10-CM

## 2022-08-19 DIAGNOSIS — Z86.73 HISTORY OF CVA (CEREBROVASCULAR ACCIDENT): ICD-10-CM

## 2022-08-19 DIAGNOSIS — E78.2 HYPERLIPIDEMIA, MIXED: ICD-10-CM

## 2022-08-19 DIAGNOSIS — E66.01 MORBID OBESITY (HCC): ICD-10-CM

## 2022-08-19 DIAGNOSIS — I48.91 ATRIAL FIBRILLATION, UNSPECIFIED TYPE (HCC): ICD-10-CM

## 2022-08-19 DIAGNOSIS — E03.9 HYPOTHYROIDISM, UNSPECIFIED TYPE: ICD-10-CM

## 2022-08-19 DIAGNOSIS — M19.90 INFLAMMATORY ARTHRITIS: Primary | ICD-10-CM

## 2022-08-19 DIAGNOSIS — Z71.89 ENCOUNTER FOR HERB AND VITAMIN SUPPLEMENT MANAGEMENT: ICD-10-CM

## 2022-08-19 DIAGNOSIS — I10 HYPERTENSION, ESSENTIAL: ICD-10-CM

## 2022-08-19 DIAGNOSIS — E55.9 VITAMIN D DEFICIENCY, UNSPECIFIED: ICD-10-CM

## 2022-08-19 DIAGNOSIS — M81.0 SENILE OSTEOPOROSIS: ICD-10-CM

## 2022-08-19 DIAGNOSIS — E03.8 SUBCLINICAL HYPOTHYROIDISM: ICD-10-CM

## 2022-08-19 DIAGNOSIS — Z98.84 S/P GASTRIC BYPASS: ICD-10-CM

## 2022-08-19 DIAGNOSIS — Z98.84 HISTORY OF ROUX-EN-Y GASTRIC BYPASS: ICD-10-CM

## 2022-08-19 LAB
A/G RATIO: 1.5 (ref 1.1–2.2)
ALBUMIN SERPL-MCNC: 3.8 G/DL (ref 3.4–5)
ALP BLD-CCNC: 98 U/L (ref 40–129)
ALT SERPL-CCNC: 7 U/L (ref 10–40)
ANION GAP SERPL CALCULATED.3IONS-SCNC: 22 MMOL/L (ref 3–16)
AST SERPL-CCNC: 17 U/L (ref 15–37)
BASOPHILS ABSOLUTE: 0 K/UL (ref 0–0.2)
BASOPHILS RELATIVE PERCENT: 0.6 %
BILIRUB SERPL-MCNC: 0.6 MG/DL (ref 0–1)
BUN BLDV-MCNC: 20 MG/DL (ref 7–20)
C-REACTIVE PROTEIN: 9.2 MG/L (ref 0–5.1)
CALCIUM SERPL-MCNC: 9.2 MG/DL (ref 8.3–10.6)
CHLORIDE BLD-SCNC: 105 MMOL/L (ref 99–110)
CHOLESTEROL, TOTAL: 143 MG/DL (ref 0–199)
CO2: 17 MMOL/L (ref 21–32)
CREAT SERPL-MCNC: 1.4 MG/DL (ref 0.6–1.2)
EOSINOPHILS ABSOLUTE: 0 K/UL (ref 0–0.6)
EOSINOPHILS RELATIVE PERCENT: 0 %
FOLATE: 13.67 NG/ML (ref 4.78–24.2)
GFR AFRICAN AMERICAN: 46
GFR NON-AFRICAN AMERICAN: 38
GLUCOSE BLD-MCNC: 101 MG/DL (ref 70–99)
HCT VFR BLD CALC: 39.5 % (ref 36–48)
HDLC SERPL-MCNC: 63 MG/DL (ref 40–60)
HEMOGLOBIN: 12.4 G/DL (ref 12–16)
LDL CHOLESTEROL CALCULATED: 60 MG/DL
LYMPHOCYTES ABSOLUTE: 2.2 K/UL (ref 1–5.1)
LYMPHOCYTES RELATIVE PERCENT: 31 %
MAGNESIUM: 2 MG/DL (ref 1.8–2.4)
MCH RBC QN AUTO: 28.7 PG (ref 26–34)
MCHC RBC AUTO-ENTMCNC: 31.5 G/DL (ref 31–36)
MCV RBC AUTO: 91.1 FL (ref 80–100)
MONOCYTES ABSOLUTE: 0.6 K/UL (ref 0–1.3)
MONOCYTES RELATIVE PERCENT: 8.4 %
NEUTROPHILS ABSOLUTE: 4.3 K/UL (ref 1.7–7.7)
NEUTROPHILS RELATIVE PERCENT: 60 %
PDW BLD-RTO: 17.6 % (ref 12.4–15.4)
PLATELET # BLD: 202 K/UL (ref 135–450)
PMV BLD AUTO: 10.6 FL (ref 5–10.5)
POTASSIUM SERPL-SCNC: 4.2 MMOL/L (ref 3.5–5.1)
RBC # BLD: 4.34 M/UL (ref 4–5.2)
SEDIMENTATION RATE, ERYTHROCYTE: 47 MM/HR (ref 0–30)
SODIUM BLD-SCNC: 144 MMOL/L (ref 136–145)
T4 FREE: 1.5 NG/DL (ref 0.9–1.8)
TOTAL PROTEIN: 6.3 G/DL (ref 6.4–8.2)
TRIGL SERPL-MCNC: 102 MG/DL (ref 0–150)
TSH REFLEX: 5.11 UIU/ML (ref 0.27–4.2)
VITAMIN B-12: 344 PG/ML (ref 211–911)
VITAMIN D 25-HYDROXY: 41.2 NG/ML
VLDLC SERPL CALC-MCNC: 20 MG/DL
WBC # BLD: 7.2 K/UL (ref 4–11)

## 2022-08-19 PROCEDURE — 99214 OFFICE O/P EST MOD 30 MIN: CPT | Performed by: INTERNAL MEDICINE

## 2022-08-19 PROCEDURE — G8427 DOCREV CUR MEDS BY ELIG CLIN: HCPCS | Performed by: INTERNAL MEDICINE

## 2022-08-19 PROCEDURE — 3017F COLORECTAL CA SCREEN DOC REV: CPT | Performed by: INTERNAL MEDICINE

## 2022-08-19 PROCEDURE — 1036F TOBACCO NON-USER: CPT | Performed by: INTERNAL MEDICINE

## 2022-08-19 PROCEDURE — G8417 CALC BMI ABV UP PARAM F/U: HCPCS | Performed by: INTERNAL MEDICINE

## 2022-08-19 RX ORDER — HYDROXYCHLOROQUINE SULFATE 200 MG/1
200 TABLET, FILM COATED ORAL 2 TIMES DAILY
Qty: 60 TABLET | Refills: 1 | Status: SHIPPED | OUTPATIENT
Start: 2022-08-19

## 2022-08-19 RX ORDER — HYDROXYCHLOROQUINE SULFATE 200 MG/1
200 TABLET, FILM COATED ORAL 2 TIMES DAILY
Qty: 60 TABLET | Refills: 1 | Status: SHIPPED | OUTPATIENT
Start: 2022-08-19 | End: 2022-08-19

## 2022-08-19 RX ORDER — PREDNISONE 1 MG/1
TABLET ORAL
Qty: 90 TABLET | Refills: 0 | Status: SHIPPED | OUTPATIENT
Start: 2022-08-19 | End: 2022-08-19

## 2022-08-19 RX ORDER — PREDNISONE 1 MG/1
TABLET ORAL
Qty: 90 TABLET | Refills: 0 | Status: SHIPPED | OUTPATIENT
Start: 2022-08-19

## 2022-08-20 LAB
ESTIMATED AVERAGE GLUCOSE: 108.3 MG/DL
HBA1C MFR BLD: 5.4 %

## 2022-08-29 ENCOUNTER — FOLLOWUP TELEPHONE ENCOUNTER (OUTPATIENT)
Dept: PSYCHIATRY | Age: 62
End: 2022-08-29

## 2022-08-29 RX ORDER — LISINOPRIL 5 MG/1
5 TABLET ORAL DAILY
Qty: 90 TABLET | Refills: 2 | Status: SHIPPED | OUTPATIENT
Start: 2022-08-29

## 2022-08-29 NOTE — TELEPHONE ENCOUNTER
Pt requesting refill on Latuday. Dr. Mallory Salazar approved with 3 refills. Writer called in to [facility] order pharmacy.

## 2022-09-26 NOTE — PROGRESS NOTES
[address], [place]                                                           [address] [place] [address]                                                              (P) [phone] (F)      Note is transcribed using voice recognition software. Inadvertent computerized transcription errors may be present. Patient identification: Theresa Summers, female : ,79 y.o. Background information-  [id number], ICD pacer, gout, hypertension, chronic kidney disease, history of bariatric surgery, BMI 61      Subjective-  She is asymptomatic in terms of inflammatory arthritis/PMR. Is on 5 mg of prednisone daily and hydroxychloroquine 200 mg twice daily. ADLs and recreational activities are normal.  States that she has been walking more, trying to lose weight. She also has osteoporosis, due for Prolia injection. Tolerating medications well. Lab work-low titer RF-14    No rashes, photosensitivity, photosensitive eruptions, pleurisy, GI/ symptoms. Denies any symptoms of temporal arteritis. All other ROS are negative. PMH, PSH,Social history , Meds reviewed. FH-  no family history of autoimmune disorders. PHYSICAL EXAM:    Vitals:    /70   Ht 5' 3\" (1.6 m)   Wt (!) 328 lb (148.8 kg)   BMI 58.10 kg/m²   AA)x3 well nourished, and well groomed, normal judgement.     MKS:  28 joint JOINT COUNT:                               Right                                                  Left   Swell Tender Swell Tender   PIP1           PIP2           PIP3           PIP4          PIP5          MCP1           MCP2           MCP3          MCP4           MCP5           Wrist            Elbow           Shoulder          Knee             Normal musculoskeletal examination and upper, lower extremities and spine other than asymptomatic OA changes in her CMC's, knees. Full range of motion all peripheral joints. No subjective musculoskeletal concerns or complaints. Skin: No rashes, no induration or skin thickening or nodules. HEENT: Normal temporal arteries without any tenderness or tortuosity. DATA:   Lab Results   Component Value Date    SEDRATE 47 (H) 08/19/2022     Lab Results   Component Value Date    CRP 9.2 (H) 08/19/2022     Lab Results   Component Value Date    WBC 7.2 08/19/2022    HGB 12.4 08/19/2022    HCT 39.5 08/19/2022    MCV 91.1 08/19/2022     08/19/2022     Lab Results   Component Value Date     08/19/2022    K 4.2 08/19/2022     08/19/2022    CO2 17 (L) 08/19/2022    BUN 20 08/19/2022    CREATININE 1.4 (H) 08/19/2022    GLUCOSE 101 (H) 08/19/2022    CALCIUM 9.2 08/19/2022    PROT 6.3 (L) 08/19/2022    LABALBU 3.8 08/19/2022    BILITOT 0.6 08/19/2022    ALKPHOS 98 08/19/2022    AST 17 08/19/2022    ALT 7 (L) 08/19/2022    LABGLOM 38 (A) 08/19/2022    GFRAA 46 (A) 08/19/2022    AGRATIO 1.5 08/19/2022    GLOB 2.4 03/14/2021           ASSESSMENT AND PLAN:  Tavares Henry was seen today for follow-up and injections. Diagnoses and all orders for this visit:    PMR (polymyalgia rheumatica) (HCC)  -     hydroxychloroquine (PLAQUENIL) 200 MG tablet; Take 1 tablet by mouth 2 times daily    Senile osteoporosis  -     denosumab (PROLIA) SC injection 60 mg        1. Inflammatory polyarthritis in PMR distribution-affecting small, intermediate and large joints. Asymptomatic, recommend continuing prednisone 5 mg a day for next 30 days, thereafter reduce to 2.5 mg daily for next 30 days and discontinue. Continue hydroxychloroquine 200 mg twice daily, started a month ago. -Recommend baseline eye exam before next visit. 2.  DEXA scan-osteoporosis  Is on Prolia every 6 monthly, calcium vitamin D supplementation.   Prolia 3/21/2022, 9/27/2022, next dose 3/28 2023. Continue calcium and vitamin D supplementation. 2/2/2022 lumbar spine T score -1.8, left hip -1.1, femoral neck -2.8, right hip -2.3, right femoral neck -2.9.      3. BMI 58 -reiterated healthy eating practices and mindful eating, and paying attention to calories. Physical activities as tolerated. Call with any flares of symptoms, follow-up in 6 months. #################################################################################################  Patient indicates understanding and agrees with the management plan. Total time 32 minutes that includes the following-  Preparing to see the patient such as reviewing patients records, pre-charting, preparing the visit on the same day, performing a medically appropriate history and physical examination, counseling and educating patient about diagnosis, management plan, ordering appropriate testings, prescriptions, communicating findings to other care providers, and documenting clinical information in electronic medical record. I thank you for giving me the opportunity to be involved in Lennie Tobin's care and I look forward following Lennie Crawford along with you. If you have any questions or concerns please feel free to contact me at any time.   Jakob Comer MD 09/27/22

## 2022-09-27 ENCOUNTER — OFFICE VISIT (OUTPATIENT)
Dept: RHEUMATOLOGY | Age: 62
End: 2022-09-27
Payer: MEDICARE

## 2022-09-27 VITALS
HEIGHT: 63 IN | SYSTOLIC BLOOD PRESSURE: 116 MMHG | DIASTOLIC BLOOD PRESSURE: 70 MMHG | WEIGHT: 293 LBS | BODY MASS INDEX: 51.91 KG/M2

## 2022-09-27 DIAGNOSIS — Z79.899 HIGH RISK MEDICATION USE: ICD-10-CM

## 2022-09-27 DIAGNOSIS — M81.0 SENILE OSTEOPOROSIS: ICD-10-CM

## 2022-09-27 DIAGNOSIS — M35.3 PMR (POLYMYALGIA RHEUMATICA) (HCC): Primary | ICD-10-CM

## 2022-09-27 PROCEDURE — 99214 OFFICE O/P EST MOD 30 MIN: CPT | Performed by: INTERNAL MEDICINE

## 2022-09-27 PROCEDURE — G8417 CALC BMI ABV UP PARAM F/U: HCPCS | Performed by: INTERNAL MEDICINE

## 2022-09-27 PROCEDURE — 1036F TOBACCO NON-USER: CPT | Performed by: INTERNAL MEDICINE

## 2022-09-27 PROCEDURE — G8427 DOCREV CUR MEDS BY ELIG CLIN: HCPCS | Performed by: INTERNAL MEDICINE

## 2022-09-27 PROCEDURE — 3017F COLORECTAL CA SCREEN DOC REV: CPT | Performed by: INTERNAL MEDICINE

## 2022-09-27 PROCEDURE — 96372 THER/PROPH/DIAG INJ SC/IM: CPT | Performed by: INTERNAL MEDICINE

## 2022-09-27 RX ORDER — HYDROXYCHLOROQUINE SULFATE 200 MG/1
200 TABLET, FILM COATED ORAL 2 TIMES DAILY
Qty: 180 TABLET | Refills: 1 | Status: SHIPPED | OUTPATIENT
Start: 2022-09-27 | End: 2022-10-13 | Stop reason: SDUPTHER

## 2022-09-28 ENCOUNTER — NURSE ONLY (OUTPATIENT)
Dept: CARDIOLOGY CLINIC | Age: 62
End: 2022-09-28
Payer: MEDICARE

## 2022-09-28 ENCOUNTER — OFFICE VISIT (OUTPATIENT)
Dept: CARDIOLOGY CLINIC | Age: 62
End: 2022-09-28
Payer: MEDICARE

## 2022-09-28 VITALS
SYSTOLIC BLOOD PRESSURE: 126 MMHG | HEART RATE: 67 BPM | WEIGHT: 293 LBS | HEIGHT: 63 IN | OXYGEN SATURATION: 96 % | BODY MASS INDEX: 51.91 KG/M2 | DIASTOLIC BLOOD PRESSURE: 74 MMHG

## 2022-09-28 DIAGNOSIS — F31.77 BIPOLAR DISORDER, IN PARTIAL REMISSION, MOST RECENT EPISODE MIXED (HCC): Chronic | ICD-10-CM

## 2022-09-28 DIAGNOSIS — I42.0 DILATED CARDIOMYOPATHY (HCC): ICD-10-CM

## 2022-09-28 DIAGNOSIS — Z95.810 BIVENTRICULAR ICD (IMPLANTABLE CARDIOVERTER-DEFIBRILLATOR) IN PLACE: ICD-10-CM

## 2022-09-28 DIAGNOSIS — I47.20 VT (VENTRICULAR TACHYCARDIA): ICD-10-CM

## 2022-09-28 DIAGNOSIS — I48.92 ATRIAL FLUTTER, UNSPECIFIED TYPE (HCC): Primary | ICD-10-CM

## 2022-09-28 PROCEDURE — 93284 PRGRMG EVAL IMPLANTABLE DFB: CPT | Performed by: INTERNAL MEDICINE

## 2022-09-28 PROCEDURE — 93290 INTERROG DEV EVAL ICPMS IP: CPT | Performed by: INTERNAL MEDICINE

## 2022-09-28 PROCEDURE — G8417 CALC BMI ABV UP PARAM F/U: HCPCS | Performed by: NURSE PRACTITIONER

## 2022-09-28 PROCEDURE — 99214 OFFICE O/P EST MOD 30 MIN: CPT | Performed by: NURSE PRACTITIONER

## 2022-09-28 PROCEDURE — G8427 DOCREV CUR MEDS BY ELIG CLIN: HCPCS | Performed by: NURSE PRACTITIONER

## 2022-09-28 PROCEDURE — 1036F TOBACCO NON-USER: CPT | Performed by: NURSE PRACTITIONER

## 2022-09-28 PROCEDURE — 3017F COLORECTAL CA SCREEN DOC REV: CPT | Performed by: NURSE PRACTITIONER

## 2022-09-28 RX ORDER — LAMOTRIGINE 150 MG/1
300 TABLET ORAL NIGHTLY
Qty: 60 TABLET | Refills: 3 | Status: SHIPPED | OUTPATIENT
Start: 2022-09-28 | End: 2023-01-26

## 2022-09-28 NOTE — PROGRESS NOTES
[address]   Electrophysiology Outpatient Note              Date:  September 28, 2022  Patient name: Taty Camp  YOB: 1960    Primary Care physician: Asa Choudhury MD    HISTORY OF PRESENT ILLNESS: The patient is a 57 y.o.  female with a history of NICM, AFL, ventricular tachycardia, LBBB, AFL, HTN, HLD and remote CVA. In 2015 she had a LHC that showed no coronary disease. In 2016, she had a BiV ICD implanted for EF 20%. She was admitted to the hospital 3/11/2021 for complaints of SOB and chest discomfort. Device interrogation showed persistent atrial arrhythmia, some episodes resulted in ATP. Echo showed EF 35-40%. On 4/1/2021, she was brought to the EP lab and manual overdrive pacing was used to terminate atrial flutter and ATP was activated. In 4/2022, echo showed an EF of 50 to 55%. Today she is being seen for AFL and NICM. She has been feeling well. She reports two admissions for mental health issues but she has been feeling well from a cardiology standpoint. Denies chest pain, palpitations, shortness of breath, and dizziness.        Device check today shows:   Brand: [employer] MRI CRT-D  Mode: DDDR  Normal function   21.3% AP  68.4%  96.8% effective    Arrhythmias: none  Battery life 9 months   RA impedance 399 ohms   RV impedance 380 ohms   LV impedance 456 ohms  RA threshold 0.875 V @ 0.40 ms  RV threshold 0.625 V @ 0.40 ms  LV threshold 2.500  V @ 0.40 ms  RA sensitivity 0.30 mV  RV sensitivity 0.30 mV  Optivol: TI at baseline     Past Medical History:   has a past medical history of Anesthesia complication, Autoimmune hemolytic anemia ([address] Bipolar disorder ([address] Bundle branch block, Cardiomyopathy ([address] Chronic systolic CHF (congestive heart failure) ([address] Depression, Family history of early CAD, Family history of ovarian cancer, GERD (gastroesophageal reflux disease), Gout, Hypertension, Hypothyroid, Osteoarthritis, knee, Pneumonia, S/P gastric bypass, Unspecified cerebral artery occlusion with cerebral infarction, Uterine cancer ([facility] and Vitamin B 12 deficiency. Past Surgical History:   has a past surgical history that includes Gastric bypass surgery (1/7/05); Finger surgery; Dilation and curettage of uterus; lipectomy; Cholecystectomy; other surgical history; Upper gastrointestinal endoscopy (4/23/13); Cardiac catheterization (7/10/2015); transesophageal echocardiogram (7/13/2015); other surgical history (4/1/16); pacemaker placement (09/2016); hernia repair (10/20/2016); ERCP; other surgical history; Upper gastrointestinal endoscopy (03/06/2019); Colonoscopy (03/06/2019); Upper gastrointestinal endoscopy (N/A, 3/6/2019); and Colonoscopy (N/A, 3/6/2019). Home Medications:    Prior to Admission medications    Medication Sig Start Date End Date Taking? Authorizing Provider   hydroxychloroquine (PLAQUENIL) 200 MG tablet Take 1 tablet by mouth 2 times daily 9/27/22  Yes Meridee Schirmer, MD   lisinopril (PRINIVIL;ZESTRIL) 5 MG tablet Take 1 tablet by mouth daily 8/29/22  Yes ALICIA Jamison CNP   hydroxychloroquine (PLAQUENIL) 200 MG tablet Take 1 tablet by mouth 2 times daily 8/19/22  Yes Meridee Schirmer, MD   Calcium Citrate-Vitamin D (CALCIUM CITRATE CHEWY BITE) 500-500 MG-UNIT CHEW Take 1 tab po BID with food. 8/19/22  Yes Meridee Schirmer, MD   predniSONE (DELTASONE) 5 MG tablet Take 1 tab po daily.  8/19/22  Yes Meridee Schirmer, MD   diclofenac sodium (VOLTAREN) 1 % GEL Apply topically 2 times daily 8/1/22  Yes ALICIA Arnold CNP   montelukast (SINGULAIR) 10 MG tablet Take 1 tablet by mouth nightly 7/18/22  Yes Kenny Stone MD   allopurinol (ZYLOPRIM) 300 MG tablet TAKE 1 TABLET EVERY DAY 6/21/22  Yes ALICIA Ruffin CNP   simvastatin (ZOCOR) 20 MG tablet TAKE 1 TABLET EVERY NIGHT 5/31/22  Yes ALICIA Rosales   methocarbamol (ROBAXIN) 500 MG tablet TAKE 1 TABLET TWICE TIMES DAILY AS NEEDED FOR PAIN 5/31/22 Yes ALICIA Devine   metoprolol succinate (TOPROL XL) 100 MG extended release tablet Take 1 tablet by mouth daily 5/16/22  Yes Kim Simon MD   predniSONE (DELTASONE) 5 MG tablet 4 tab po daily x 7 days. 3  tab po daily x 10 days. 2.5 tab po daily x 10 days, then 2 tab po daily therafter  Patient taking differently: 4 tab po daily x 7 days. 3  tab po daily x 10 days. 2.5 tab po daily x 10 days, then 2 tab po daily therafter 5/12/22  Yes Amrik Carey MD   FLUoxetine (PROZAC) 10 MG capsule Take 3 capsules by mouth daily 5/13/22  Yes Amrik Carey MD   levothyroxine (SYNTHROID) 125 MCG tablet TAKE 1 TABLET EVERY DAY. 5/5/22  Yes Kim Simon MD   torsemide (DEMADEX) 10 MG tablet TAKE 2 TABLETS EVERY OTHER DAY ALTERNATING WITH 1 TABLET THE OTHER DAYS AS DIRECTED 4/28/22  Yes ALICIA Bates CNP   ELIQUIS 5 MG TABS tablet TAKE 1 TABLET TWICE DAILY 4/28/22  Yes ALICIA Bates CNP   lurasidone (LATUDA) 60 MG TABS tablet Take 1 tablet by mouth nightly 3/30/22  Yes Amrik Carey MD   Misc. Devices (BARIATRIC ROLLATOR) MISC Bariatric Rollator to use while walking. 1/12/22  Yes Kim Simon MD   CPAP Machine MISC by Does not apply route   Yes Historical Provider, MD   acetaminophen (TYLENOL) 325 MG tablet Take 650 mg by mouth every 6 hours as needed for Pain TAKES EVERY DAY   Yes Historical Provider, MD   cetirizine (ZYRTEC) 10 MG tablet Take 10 mg by mouth daily   Yes Historical Provider, MD   lamoTRIgine (LAMICTAL) 150 MG tablet Take 2 tablets by mouth nightly 3/30/22 4/29/22  Amrik Carey MD       Allergies:  Dilaudid [hydromorphone hcl], Lavender oil, Penicillins, Seroquel [quetiapine fumarate], Adhesive tape, Hydromorphone hcl, Lithium, and Tetanus toxoids    Social History:   reports that she quit smoking about 37 years ago. Her smoking use included cigarettes. She has a 6.00 pack-year smoking history.  She has never used smokeless tobacco. She reports that she does not drink alcohol and does not use drugs. Family History: family history includes Arrhythmia in her father; Arthritis in her father; Breast Cancer in her maternal grandmother; Cancer in her maternal grandmother and mother; Depression in her brother, brother, and mother; Heart Disease in her father; Obesity in her brother and brother; Other in her brother, brother, and father; Ovarian Cancer (age of onset: 45) in her mother; Rheum Arthritis in her father. All 14 point review of systems are completed and pertinent positives are mentioned in the history of present illness. Other systems are reviewed and are negative. PHYSICAL EXAM:    Vital signs:    /74   Pulse 67   Ht 5' 3\" (1.6 m)   Wt (!) 326 lb 12.8 oz (148.2 kg)   SpO2 96%   BMI 57.89 kg/m²      Constitutional and general appearance: alert, cooperative, no distress and appears stated age  HEENT: PERRL, no cervical lymphadenopathy. No masses palpable. Normal oral mucosa  Respiratory:  Normal excursion and expansion without use of accessory muscles  Resp auscultation: Normal breath sounds without wheezing, rhonchi, and rales  Cardiovascular: The apical impulse is not displaced  Heart tones are crisp and normal. regular S1 and S2.  Jugular venous pulsation Normal  The carotid upstroke is normal in amplitude and contour without delay or bruit  Peripheral pulses are symmetrical and full   Abdomen:  No masses or tenderness  Bowel sounds present  Extremities:   No cyanosis or clubbing   No lower extremity edema   Skin: warm and dry  Neurological:  Alert and oriented  Moves all extremities well  No abnormalities of mood, affect, memory, mentation, or behavior are noted    DATA:    ECG 4/1/2021:  AS  66 bpm    Echo 4/2022   Summary   Left ventricular systolic function is low normal with ejection fraction   estimated at 50-55 %. There is very mild inferior and inferoseptal hypokinesis.    All remaining wall segments appear low normal in function. Left ventricular size is mildly increased. There is moderate concentric left ventricular hypertrophy. Grade II diastolic dysfunction with elevated filling pressure. (e' 8.6 cm/s)   The left atrium is mildly dilated. Mild posterior mitral annular calcification is present. Mild mitral regurgitation. Aortic valve sclerosis without aortic stenosis. Mild tricuspid regurgitation. Systolic pulmonary artery pressure (SPAP) estimated at 24 mmHg (RA pressure   8 mmHg), consistent with mild pulmonary hypertension. Normal systolic pulmonary artery pressure (SPAP) estimated at 24 mmHg (RA   pressure 8 mmHg). Pacer / ICD wire is visualized in the right ventricle and right atrium. Echo 3/13/2021:   Patient tachy during study. The left ventricular systolic function is moderately reduced with an   ejection fraction of 35 - 40 %. There is hypokinesis of the apex, apical lateral, inferoseptum, apical   anterior and anteroseptum walls. Septal bounce noted. Normal left ventricular size with mild concentric left ventricular   hypertrophy. Left ventricular diastolic filling pressure is elevated lateral E/e\" is 14 . Changes noted from previous echo on 11-1-2019 in left ventricular function. Moderate mitral regurgitation. The right ventricle is mildly enlarged. Right ventricular systolic function is mildly reduced . Systolic pulmonic artery pressure (SPAP) is normal estimated at 26 mmHg   (Right atrial pressure of 3 mmHg). The right atrium is mildly dilated. Echo 11/1/2019: The left ventricular systolic function is mildly reduced with an ejection   fraction of 40-45 %. Anteroseptal wall hypokinesis. There is mild concentric left ventricular hypertrophy. Left ventricular cavity size is mildly dilated. Grade I diastolic dysfunction with normal left ventricular filling pressure. Mild posterior mitral annular calcification.    Mild thickening of the anterior leaflet of mitral valve. Moderate mitral regurgitation. The left atrium is mildly dilated. Frequent premature beats makes it difficult to identify exact wall motion   abnormality. [facility] 7/2015:  IMPRESSION:  1. Angiographically normal coronary arteries. 2.  Severe pulmonary hypertension 66/37 mean 49 mmHg. 3.  Markedly elevated pulmonary capillary wedge pressure 32 mmHg, and left  ventricular end diastolic pressure 36 mmHg. 4.  Transpulmonary gradient is 17 mmHg, which suggested a left-sided as well  intrinsic pulmonary etiology of the severe pulmonary hypertension. 5.  Mildly reduced cardiac index by Giovanni of 2.36 L/min/m2. RECOMMENDATION:  The patient has no epicardial disease. Her chest pain is  from the heart failure. She is noted to have moderate to severe mitral  regurgitation on the echocardiogram, and will schedule for SANGEETHA to further  evaluate the mitral regurgitation. In the meantime, we will also start her   on heart failure therapy that will include a beta-blocker and ACE inhibitor   as well as diuretic therapy    All labs and testing reviewed. CARDIOLOGY LABS:   CBC: No results for input(s): WBC, HGB, HCT, PLT in the last 72 hours. BMP: No results for input(s): NA, K, CO2, BUN, CREATININE, LABGLOM, GLUCOSE in the last 72 hours. PT/INR: No results for input(s): PROTIME, INR in the last 72 hours. APTT:No results for input(s): APTT in the last 72 hours. FASTING LIPID PANEL:  Lab Results   Component Value Date/Time    HDL 63 08/19/2022 11:09 AM    HDL 65 07/05/2011 09:30 PM    LDLCALC 60 08/19/2022 11:09 AM    TRIG 102 08/19/2022 11:09 AM     LIVER PROFILE:No results for input(s): AST, ALT, ALB in the last 72 hours.     IMPRESSION:      Assessment:   Paroxsymal atrial flutter: stable   -terminated by manual overdrive pacing 0/0/8925, device reprogrammed to activate ATP    -device interrogation per HPI  Non-ischemic dilated cardiomyopathy: improved    -EF 40-45% 11/2019, EF 35-40% 3/2021, EF 50-55% 4/2022   -s/p BiV ICD implant 7/3547  Chronic systolic CHF: euvolemic on exam   Ventricular tachycardia: stable   LBBB  HTN: controlled   HLD  Remote CVA  Hypothyroid  Obesity   History of gastric bypass   MARCY: waiting on CPAP to be delivered     Plan:   Continue Eliquis and Toprol    Yearly labs due (CBC, BMP) 8/2023  Remaining battery of device is 9 months. Will closely monitor and plan for generator change appropriately.    Remote device transmissions every three months   Follow up in 6 months      [facility] clover Diaz APRN-CNP  [facility]  [phone]

## 2022-09-28 NOTE — PATIENT INSTRUCTIONS
Continue Eliquis and Toprol    Yearly labs due (CBC, BMP) 8/2023  Remaining battery of device is 9 months. Will closely monitor and plan for generator change appropriately.    Remote device transmissions every three months   Follow up in 6 months

## 2022-09-30 ENCOUNTER — TELEPHONE (OUTPATIENT)
Dept: PULMONOLOGY | Age: 62
End: 2022-09-30

## 2022-09-30 ENCOUNTER — TELEMEDICINE (OUTPATIENT)
Dept: PULMONOLOGY | Age: 62
End: 2022-09-30
Payer: MEDICARE

## 2022-09-30 DIAGNOSIS — G47.33 SEVERE OBSTRUCTIVE SLEEP APNEA: Primary | ICD-10-CM

## 2022-09-30 DIAGNOSIS — R53.83 OTHER FATIGUE: ICD-10-CM

## 2022-09-30 DIAGNOSIS — I10 HYPERTENSION, ESSENTIAL: ICD-10-CM

## 2022-09-30 DIAGNOSIS — E66.01 MORBID OBESITY WITH BMI OF 50.0-59.9, ADULT (HCC): ICD-10-CM

## 2022-09-30 DIAGNOSIS — Z71.89 CPAP USE COUNSELING: ICD-10-CM

## 2022-09-30 DIAGNOSIS — F51.04 PSYCHOPHYSIOLOGICAL INSOMNIA: ICD-10-CM

## 2022-09-30 PROCEDURE — G8427 DOCREV CUR MEDS BY ELIG CLIN: HCPCS | Performed by: NURSE PRACTITIONER

## 2022-09-30 PROCEDURE — 3017F COLORECTAL CA SCREEN DOC REV: CPT | Performed by: NURSE PRACTITIONER

## 2022-09-30 PROCEDURE — 99214 OFFICE O/P EST MOD 30 MIN: CPT | Performed by: NURSE PRACTITIONER

## 2022-09-30 ASSESSMENT — SLEEP AND FATIGUE QUESTIONNAIRES
HOW LIKELY ARE YOU TO NOD OFF OR FALL ASLEEP WHILE SITTING INACTIVE IN A PUBLIC PLACE: 0
HOW LIKELY ARE YOU TO NOD OFF OR FALL ASLEEP IN A CAR, WHILE STOPPED FOR A FEW MINUTES IN TRAFFIC: 0
HOW LIKELY ARE YOU TO NOD OFF OR FALL ASLEEP WHEN YOU ARE A PASSENGER IN A CAR FOR AN HOUR WITHOUT A BREAK: 0
HOW LIKELY ARE YOU TO NOD OFF OR FALL ASLEEP WHILE SITTING AND TALKING TO SOMEONE: 0
HOW LIKELY ARE YOU TO NOD OFF OR FALL ASLEEP WHILE WATCHING TV: 1
HOW LIKELY ARE YOU TO NOD OFF OR FALL ASLEEP WHILE LYING DOWN TO REST IN THE AFTERNOON WHEN CIRCUMSTANCES PERMIT: 1
HOW LIKELY ARE YOU TO NOD OFF OR FALL ASLEEP WHILE SITTING QUIETLY AFTER LUNCH WITHOUT ALCOHOL: 0
HOW LIKELY ARE YOU TO NOD OFF OR FALL ASLEEP WHILE SITTING AND READING: 1
ESS TOTAL SCORE: 3

## 2022-09-30 NOTE — PROGRESS NOTES
Patient ID: Emerald Conti is a 57 y.o. female who is being seen today for   Chief Complaint   Patient presents with    Sleep Apnea     1 year sleep     Referring: ASHLEY Nguyen    HPI:     Emerald Conti is a 57 y.o. female for televideo appointment via video and audio doxy. me virtual visit for MARCY follow up. States she is doing okay with CPAP states she does feel like pressure is too low. Patient is using CPAP 4-6 hrs/night. Using humidifier. No snoring on CPAP. The pressure feels too low. The mask is comfortable- full face mask. No mask leak. No significant daytime sleepiness. states she is doing much better with sleepiness since medications adjusted  No nodding off when driving. No dry nose or throat. No fatigue. Bedtime is 11 pm and rise time is 730-9 am. Sleep onset is few minutes. Wakes up 1 times at night total. 1 nocturia. It takes few minutes to fall back a sleep. Occasional naps during the day. Occasional headache in am. No weight gain. 0-1 caffienated beverages during the day. No alcohol. ESS is 3          Previous HPI 11/2/21  Emerald Conti is a 57 y.o. female for televideo appointment via video and audio doxy. me virtual visit for MARCY follow up. PSG and CPAP titration were reviewed by me and noted below. PSG showed severe MARCY and patient started CPAP after testing. Results were dicussed with patient and multiple good questions were answered. States she is doing okay with CPAP, states it has been better with pressure decrease. States she is less tired since using CPAP no longer taking 2-3 hour naps. She continues to work on her sleep habits    Patient is using CPAP 4-6 hrs/night. Using humidifier. No snoring on CPAP. The pressure is well tolerated. The mask is comfortable- nasal mask. No mask leak. No significant daytime sleepiness. No nodding off when driving. No dry nose or throat. No fatigue. Bedtime is 10 pm- 2 am and rise time is 9-10 am. Sleep onset is usually 20-30 minutes.  Wakes up 0-2 times at night total. 0-2 nocturia. It takes usually few minutes to fall back a sleep. Rare naps during the day. No headache in am. No weight gain. 1-2 caffienated beverages during the day. No alcohol. ESS is 6. Initial HPI 5/17/21  Umer Daily is a 57 y.o. female for televideo appointment via video and audio doxy. me virtual visit for witnessed apnea evaluation. Patient reports snoring at night for the past unknown years. Worse in supine position. Wakes self snoring. Unsure if witnessed apnea. Wakes up at night choking or gasping for air. No restorative sleep. +dry mouth upon awakening. Fatigue and tiredness during the day. No history of sleep apnea. Bedtime 11 pm-3am  and rise time is 730-830 am. It takes 45 minutes to fall asleep- lays there, sometimes might watch TV or look at phone. 2 -3nocturia. Wakes up 2-3 times at night. It takes 10-unknown minutes to fall back a sleep. Takes 1-3 nap during the day ( minutes). +headache in am. No car wrecks or near wrecks because of the sleepiness. No nodding off while driving. Gained 60 pounds in the past 1-2 years. +forgetfulness and decreased concentration. Drinks 1-2 caffinated beverages per day. No significant alcohol. Sometimes restless feelings in legs at night.  +teeth grinding. No nightmares. No sleep walking. +night time panic attacks. No narcotics. No drug abuse. +history of depression, +history of anxiety, + history of bipolar disorder. +history of atrial fibrillation. No history of DM. +history of HTN. No history of ischemic heart disease. +history of stroke, TIAx3. ESS is 10. No smoking. No known FH for  narcolepsy.  +FH for MARCY and RLS- brothers    Sleep Medicine 9/30/2022 11/2/2021 5/17/2021   Sitting and reading 1 2 2   Watching TV 1 1 1   Sitting, inactive in a public place (e.g. a theatre or a meeting) 0 0 1   As a passenger in a car for an hour without a break 0 0 1   Lying down to rest in the afternoon when circumstances permit 1 2 2 Sitting and talking to someone 0 0 1   Sitting quietly after a lunch without alcohol 0 1 2   In a car, while stopped for a few minutes in traffic 0 0 0   [place] Sleepiness Score 3 6 10       Past Medical History:  Past Medical History:   Diagnosis Date    Anesthesia complication     hypotension post op    Autoimmune hemolytic anemia (HCC)     during uterine cancer    Bipolar disorder ([facility]     managed by Dewane [facility] Rain)    Bundle branch block     Cardiomyopathy ([facility]     Chronic systolic CHF (congestive heart failure) [facility] 7/9/2015    Depression     Family history of early CAD     Family history of ovarian cancer     mother    GERD (gastroesophageal reflux disease)     Gout     Hypertension     Hypothyroid     Osteoarthritis, knee     Shybut    Pneumonia     history of pneumonia    S/P gastric bypass 1/7/05    Unspecified cerebral artery occlusion with cerebral infarction     Uterine cancer ([facility]     endometrial adenocarcinoma    Vitamin B 12 deficiency 7/09       Past Surgical History:        Procedure Laterality Date    CARDIAC CATHETERIZATION  7/10/2015    Normal Coronary Arteries    CHOLECYSTECTOMY      COLONOSCOPY  03/06/2019    NL    COLONOSCOPY N/A 3/6/2019    EGD AND COLONOSCOPY WITH ANESTHESIA performed by Antonio Avila MD at [address] [place] [address]      4 times    ERCP      FINGER SURGERY      gout X 2    GASTRIC BYPASS SURGERY  1/7/05    HERNIA REPAIR  10/20/2016    lap ventral hernia    LIPECTOMY      gangrenous    OTHER SURGICAL HISTORY      radium implants into uterus for uterine surgery X 2    OTHER SURGICAL HISTORY  4/1/16    exp.  lap lysis of adhesion    OTHER SURGICAL HISTORY      biventricular pacer and defibrillator    PACEMAKER PLACEMENT  09/2016    TRANSESOPHAGEAL ECHOCARDIOGRAM  7/13/2015    UPPER GASTROINTESTINAL ENDOSCOPY  4/23/13    UPPER GASTROINTESTINAL ENDOSCOPY  03/06/2019    NL    UPPER GASTROINTESTINAL ENDOSCOPY N/A 3/6/2019    EGD AND COLONOSCOPY WITH ANESTHESIA performed by Nancy Chavis MD at [address] [place] [address] Dr:  is allergic to dilaudid [hydromorphone hcl], lavender oil, penicillins, seroquel [quetiapine fumarate], adhesive tape, hydromorphone hcl, lithium, and tetanus toxoids. Social History:    TOBACCO:   reports that she quit smoking about 37 years ago. Her smoking use included cigarettes. She has a 6.00 pack-year smoking history. She has never used smokeless tobacco.  ETOH:   reports no history of alcohol use. Family History:       Problem Relation Age of Onset    Cancer Mother     Ovarian Cancer Mother 45        Technically peritoneal cancer    Depression Mother         bipolar    Arthritis Father     Rheum Arthritis Father     Arrhythmia Father     Other Father         gout    Heart Disease Father     Depression Brother         depression    Other Brother         gout    Obesity Brother     Other Brother         gout    Obesity Brother     Depression Brother     Cancer Maternal Grandmother         Breast    Breast Cancer Maternal Grandmother        Current Medications:    Current Outpatient Medications:     lamoTRIgine (LAMICTAL) 150 MG tablet, Take 2 tablets by mouth nightly, Disp: 60 tablet, Rfl: 3    hydroxychloroquine (PLAQUENIL) 200 MG tablet, Take 1 tablet by mouth 2 times daily, Disp: 180 tablet, Rfl: 1    lisinopril (PRINIVIL;ZESTRIL) 5 MG tablet, Take 1 tablet by mouth daily, Disp: 90 tablet, Rfl: 2    hydroxychloroquine (PLAQUENIL) 200 MG tablet, Take 1 tablet by mouth 2 times daily, Disp: 60 tablet, Rfl: 1    Calcium Citrate-Vitamin D (CALCIUM CITRATE CHEWY BITE) 500-500 MG-UNIT CHEW, Take 1 tab po BID with food. , Disp: 180 tablet, Rfl: 3    predniSONE (DELTASONE) 5 MG tablet, Take 1 tab po daily. , Disp: 90 tablet, Rfl: 0    diclofenac sodium (VOLTAREN) 1 % GEL, Apply topically 2 times daily, Disp: 150 g, Rfl: 2    montelukast (SINGULAIR) 10 MG tablet, Take 1 tablet by mouth nightly, Disp: 90 tablet, Rfl: 3    allopurinol (ZYLOPRIM) 300 MG tablet, TAKE 1 TABLET EVERY DAY, Disp: 90 tablet, Rfl: 1    simvastatin (ZOCOR) 20 MG tablet, TAKE 1 TABLET EVERY NIGHT, Disp: 90 tablet, Rfl: 1    methocarbamol (ROBAXIN) 500 MG tablet, TAKE 1 TABLET TWICE TIMES DAILY AS NEEDED FOR PAIN, Disp: 270 tablet, Rfl: 1    metoprolol succinate (TOPROL XL) 100 MG extended release tablet, Take 1 tablet by mouth daily, Disp: 90 tablet, Rfl: 1    FLUoxetine (PROZAC) 10 MG capsule, Take 3 capsules by mouth daily, Disp: 30 capsule, Rfl: 0    levothyroxine (SYNTHROID) 125 MCG tablet, TAKE 1 TABLET EVERY DAY., Disp: 90 tablet, Rfl: 1    torsemide (DEMADEX) 10 MG tablet, TAKE 2 TABLETS EVERY OTHER DAY ALTERNATING WITH 1 TABLET THE OTHER DAYS AS DIRECTED, Disp: 135 tablet, Rfl: 1    ELIQUIS 5 MG TABS tablet, TAKE 1 TABLET TWICE DAILY, Disp: 180 tablet, Rfl: 3    lurasidone (LATUDA) 60 MG TABS tablet, Take 1 tablet by mouth nightly, Disp: 30 tablet, Rfl: 0    Misc. Devices (BARIATRIC ROLLATOR) MISC, Bariatric Rollator to use while walking., Disp: 1 each, Rfl: 0    CPAP Machine MISC, by Does not apply route, Disp: , Rfl:     acetaminophen (TYLENOL) 325 MG tablet, Take 650 mg by mouth every 6 hours as needed for Pain TAKES EVERY DAY, Disp: , Rfl:     cetirizine (ZYRTEC) 10 MG tablet, Take 10 mg by mouth daily, Disp: , Rfl:     predniSONE (DELTASONE) 5 MG tablet, 4 tab po daily x 7 days. 3  tab po daily x 10 days. 2.5 tab po daily x 10 days, then 2 tab po daily therafter (Patient not taking: Reported on 9/30/2022), Disp: 180 tablet, Rfl: 0      REVIEW OF SYSTEMS:  Review of Systems      Objective:   PHYSICAL EXAM:  There were no vitals taken for this visit. Physical Exam  Exam:  Gen: No acute distress, does not appear to be in pain. Appears well developed and nourished. HENT: Head is normocephalic and atraumatic. Normal appearing nose. External Ears normal.   Neck: No visualized mass. Trachea is midline   Eyes: EOM intact.  No visible discharge. Resp:No visualized signs of difficulty breathing or respiratory distress, speaking in full sentences. Respiratory effort normal.  Neuro: Awake. Alert. Able to follow commands. No facial asymmetry. Skin: No significant lesions or discoloration noted on facial skin    Musculoskeletal: Normal range of motion of the neck. Psych: Oriented x 3. No anxiety. Normal affect. DATA:   3/13/2021 Echo EF 35-40%  7/8/2021 PSG AHI 37.8/REM AHI 70, low SPO2 87%, limited REM sleep  8/19/2021 titration improved MARCY with CPAP, treatment emergent central sleep apnea, recommendation CPAP 13 cm H2O    CPAP download data:  Compliance download report from 9/27/21 to 10/26/21 reviewed today by me and showed patient is using machine 4:30 hrs/night with 80% compliance and AHI 2.6 within this time frame. 24/30days with greater than 4 hours of machine use. CPAP12 cm H20     Compliance download report from 8/29/22 to 9/27/22 reviewed today by me and showed patient is using machine 4:17 hrs/night with 57% compliance and AHI 9.1 within this time frame. 17/30days with greater than 4 hours of machine use. CPAP 12 cm H20     Assessment:       Severe MARCY. CPAP 12 cm H2O. suboptimal compliance on review today, residual AHI 9.1  Snoring-resolved on CPAP  Hypersomnia -improving  Fatigue-improving  Morning headache-  Morbid obesity  Sleep onset and maintenance insomnia-psychophysiological hyperarousal, poor sleep hygiene, comorbid conditions, MARCY likely contributing-improving  Cardiomyopathy, CHF, hypertension, AICD, arrhythmia, PAF-followed by cardiology        Plan:      Send order to change to CPAP 13 cm H2O  Discussed severity of sleep apnea and importance of treatment  Advised to use CPAP 6-8 hrs at night and during naps-continue to increase time used each night. Replacement of mask, tubing, head straps every 3-6 months or sooner if damaged.    Patient instructed to contact [employer] for any mask, tubing or machine trouble shooting if problems arise. Sleep hygiene  Cognitive behavioral therapy was discussed with patient including stimulus control and sleep restriction  Recommend set bed/wake times, no naps in the daytime, decrease time in bed. Continue to work with psychiatrist/psychologist  Avoid sedatives, alcohol and caffeinated drinks at bed time. No driving motorized vehicles or operating heavy machinery while fatigue, drowsy or sleepy. Weight loss is also recommended as a long-term intervention. Complications of MARCY if not treated were discussed with patient patient to include systemic hypertension, pulmonary hypertension, cardiovascular morbidities, car accidents and all cause mortality. Discussed pathophysiology of MARCY with patient today. Patient education provided regarding sleep tips  Continue blood pressure medications as prescribed by treating/prescribing provider- treatment of MARCY can lower blood pressure by levels that are clinically significant. Korey Kimball, was evaluated through a synchronous (real-time) audio-video encounter. The patient (or guardian if applicable) is aware that this is a billable service, which includes applicable co-pays. This Virtual Visit was conducted with patient's (and/or legal guardian's) consent. The visit was conducted pursuant to the emergency declaration under the [address] [place] [address], [address] [place] [address] [facility] authority and the [employer] and [employer] Act. Patient identification was verified, and a caregiver was present when appropriate. The patient was located at Home: [address] [place] [address]. Provider was located at Home ([address]: [place]. Total time spent for this encounter: Not billed by time    --ALICIA Rangel CNP on 9/30/2022 at 9:27 AM    An electronic signature was used to authenticate this note.

## 2022-09-30 NOTE — PATIENT INSTRUCTIONS

## 2022-10-10 ENCOUNTER — FOLLOWUP TELEPHONE ENCOUNTER (OUTPATIENT)
Dept: PSYCHIATRY | Age: 62
End: 2022-10-10

## 2022-10-13 ENCOUNTER — OFFICE VISIT (OUTPATIENT)
Dept: FAMILY MEDICINE CLINIC | Age: 62
End: 2022-10-13
Payer: MEDICARE

## 2022-10-13 VITALS
OXYGEN SATURATION: 97 % | HEART RATE: 80 BPM | HEIGHT: 63 IN | WEIGHT: 293 LBS | TEMPERATURE: 97.6 F | DIASTOLIC BLOOD PRESSURE: 70 MMHG | SYSTOLIC BLOOD PRESSURE: 122 MMHG | BODY MASS INDEX: 51.91 KG/M2

## 2022-10-13 DIAGNOSIS — E66.01 CLASS 3 SEVERE OBESITY DUE TO EXCESS CALORIES WITH SERIOUS COMORBIDITY AND BODY MASS INDEX (BMI) OF 50.0 TO 59.9 IN ADULT (HCC): ICD-10-CM

## 2022-10-13 DIAGNOSIS — F41.9 ANXIETY: Primary | ICD-10-CM

## 2022-10-13 DIAGNOSIS — Z23 NEED FOR INFLUENZA VACCINATION: ICD-10-CM

## 2022-10-13 PROCEDURE — 3074F SYST BP LT 130 MM HG: CPT | Performed by: FAMILY MEDICINE

## 2022-10-13 PROCEDURE — 99214 OFFICE O/P EST MOD 30 MIN: CPT | Performed by: FAMILY MEDICINE

## 2022-10-13 PROCEDURE — G8482 FLU IMMUNIZE ORDER/ADMIN: HCPCS | Performed by: FAMILY MEDICINE

## 2022-10-13 PROCEDURE — 3017F COLORECTAL CA SCREEN DOC REV: CPT | Performed by: FAMILY MEDICINE

## 2022-10-13 PROCEDURE — G8417 CALC BMI ABV UP PARAM F/U: HCPCS | Performed by: FAMILY MEDICINE

## 2022-10-13 PROCEDURE — 1036F TOBACCO NON-USER: CPT | Performed by: FAMILY MEDICINE

## 2022-10-13 PROCEDURE — 90674 CCIIV4 VAC NO PRSV 0.5 ML IM: CPT | Performed by: FAMILY MEDICINE

## 2022-10-13 PROCEDURE — 3078F DIAST BP <80 MM HG: CPT | Performed by: FAMILY MEDICINE

## 2022-10-13 PROCEDURE — G0008 ADMIN INFLUENZA VIRUS VAC: HCPCS | Performed by: FAMILY MEDICINE

## 2022-10-13 PROCEDURE — G8427 DOCREV CUR MEDS BY ELIG CLIN: HCPCS | Performed by: FAMILY MEDICINE

## 2022-10-13 RX ORDER — SEMAGLUTIDE 1.34 MG/ML
0.25 INJECTION, SOLUTION SUBCUTANEOUS WEEKLY
Qty: 2 ADJUSTABLE DOSE PRE-FILLED PEN SYRINGE | Refills: 0 | Status: SHIPPED | OUTPATIENT
Start: 2022-10-13

## 2022-10-13 SDOH — ECONOMIC STABILITY: FOOD INSECURITY: WITHIN THE PAST 12 MONTHS, THE FOOD YOU BOUGHT JUST DIDN'T LAST AND YOU DIDN'T HAVE MONEY TO GET MORE.: NEVER TRUE

## 2022-10-13 SDOH — ECONOMIC STABILITY: HOUSING INSECURITY: IN THE LAST 12 MONTHS, HOW MANY PLACES HAVE YOU LIVED?: 1

## 2022-10-13 SDOH — ECONOMIC STABILITY: HOUSING INSECURITY
IN THE LAST 12 MONTHS, WAS THERE A TIME WHEN YOU DID NOT HAVE A STEADY PLACE TO SLEEP OR SLEPT IN A SHELTER (INCLUDING NOW)?: NO

## 2022-10-13 SDOH — ECONOMIC STABILITY: FOOD INSECURITY: WITHIN THE PAST 12 MONTHS, YOU WORRIED THAT YOUR FOOD WOULD RUN OUT BEFORE YOU GOT MONEY TO BUY MORE.: NEVER TRUE

## 2022-10-13 SDOH — ECONOMIC STABILITY: INCOME INSECURITY: IN THE LAST 12 MONTHS, WAS THERE A TIME WHEN YOU WERE NOT ABLE TO PAY THE MORTGAGE OR RENT ON TIME?: NO

## 2022-10-13 ASSESSMENT — PATIENT HEALTH QUESTIONNAIRE - PHQ9
SUM OF ALL RESPONSES TO PHQ QUESTIONS 1-9: 7
6. FEELING BAD ABOUT YOURSELF - OR THAT YOU ARE A FAILURE OR HAVE LET YOURSELF OR YOUR FAMILY DOWN: 1
3. TROUBLE FALLING OR STAYING ASLEEP: 1
9. THOUGHTS THAT YOU WOULD BE BETTER OFF DEAD, OR OF HURTING YOURSELF: 0
2. FEELING DOWN, DEPRESSED OR HOPELESS: 1
SUM OF ALL RESPONSES TO PHQ9 QUESTIONS 1 & 2: 2
SUM OF ALL RESPONSES TO PHQ QUESTIONS 1-9: 7
5. POOR APPETITE OR OVEREATING: 1
10. IF YOU CHECKED OFF ANY PROBLEMS, HOW DIFFICULT HAVE THESE PROBLEMS MADE IT FOR YOU TO DO YOUR WORK, TAKE CARE OF THINGS AT HOME, OR GET ALONG WITH OTHER PEOPLE: 0
1. LITTLE INTEREST OR PLEASURE IN DOING THINGS: 1
SUM OF ALL RESPONSES TO PHQ QUESTIONS 1-9: 7
7. TROUBLE CONCENTRATING ON THINGS, SUCH AS READING THE NEWSPAPER OR WATCHING TELEVISION: 1
8. MOVING OR SPEAKING SO SLOWLY THAT OTHER PEOPLE COULD HAVE NOTICED. OR THE OPPOSITE, BEING SO FIGETY OR RESTLESS THAT YOU HAVE BEEN MOVING AROUND A LOT MORE THAN USUAL: 0
4. FEELING TIRED OR HAVING LITTLE ENERGY: 1
SUM OF ALL RESPONSES TO PHQ QUESTIONS 1-9: 7

## 2022-10-13 ASSESSMENT — ANXIETY QUESTIONNAIRES
6. BECOMING EASILY ANNOYED OR IRRITABLE: 1
4. TROUBLE RELAXING: 2
1. FEELING NERVOUS, ANXIOUS, OR ON EDGE: 3
GAD7 TOTAL SCORE: 13
3. WORRYING TOO MUCH ABOUT DIFFERENT THINGS: 2
2. NOT BEING ABLE TO STOP OR CONTROL WORRYING: 3
IF YOU CHECKED OFF ANY PROBLEMS ON THIS QUESTIONNAIRE, HOW DIFFICULT HAVE THESE PROBLEMS MADE IT FOR YOU TO DO YOUR WORK, TAKE CARE OF THINGS AT HOME, OR GET ALONG WITH OTHER PEOPLE: NOT DIFFICULT AT ALL
5. BEING SO RESTLESS THAT IT IS HARD TO SIT STILL: 1
7. FEELING AFRAID AS IF SOMETHING AWFUL MIGHT HAPPEN: 1

## 2022-10-13 ASSESSMENT — SOCIAL DETERMINANTS OF HEALTH (SDOH): HOW HARD IS IT FOR YOU TO PAY FOR THE VERY BASICS LIKE FOOD, HOUSING, MEDICAL CARE, AND HEATING?: NOT HARD AT ALL

## 2022-10-13 NOTE — PROGRESS NOTES
10/13/2022    This is a 57 y.o. female who presents for  Chief Complaint   Patient presents with    Follow-up    Anxiety       HPI:     Feeling better overall   Feels the medications are the right combination   Recent hospitalization  Saw Dr. Jhoana Stewart (male), medications adjusted  Feels this is working for her  Prozac 30 mg in the AM, latuda and Lamictal at night   Socializing more, interacting with people more  Still has days she doesn't want to get out of bed but understands this is ok  Working with Dr. Jhoana Stewart (female), and will return after seeing the stress clinic at [facility] for her PTSD  Sleeping better, at least 8 hours now      Saw the SHABBIR DIAZ [facility]   Insurance will not cover   Cut out chips, pop  Allows One once monthly   Drinks more Fruity/sparkling water  For the bubbles   Working on a more liquid intake   Cut out candy bars     Will f/u with Dr. Raven Ibarra in 9/22 for pain  Not taking steroids currently     Past Medical History:   Diagnosis Date    Anesthesia complication     hypotension post op    Autoimmune hemolytic anemia ([address]     during uterine cancer    Bipolar disorder ([facility]     managed by Concepcion Counter Tor Billedy)    Bundle branch block     Cardiomyopathy ([address]     Chronic systolic CHF (congestive heart failure) ([facility] 7/9/2015    Depression     Family history of early CAD     Family history of ovarian cancer     mother    GERD (gastroesophageal reflux disease)     Gout     Hypertension     Hypothyroid     Osteoarthritis, knee     Shybut    Pneumonia     history of pneumonia    S/P gastric bypass 1/7/05    Unspecified cerebral artery occlusion with cerebral infarction     Uterine cancer [facility])     endometrial adenocarcinoma    Vitamin B 12 deficiency 7/09       Past Surgical History:   Procedure Laterality Date    CARDIAC CATHETERIZATION  7/10/2015    Normal Coronary Arteries    CHOLECYSTECTOMY      COLONOSCOPY  03/06/2019    NL    COLONOSCOPY N/A 3/6/2019    EGD AND COLONOSCOPY WITH ANESTHESIA performed by Bhargavi Rodriguez MD at [facility]      4 times    ERCP      FINGER SURGERY      gout X 2    GASTRIC BYPASS SURGERY  05    HERNIA REPAIR  10/20/2016    lap ventral hernia    LIPECTOMY      gangrenous    OTHER SURGICAL HISTORY      radium implants into uterus for uterine surgery X 2    OTHER SURGICAL HISTORY  16    exp. lap lysis of adhesion    OTHER SURGICAL HISTORY      biventricular pacer and defibrillator    PACEMAKER PLACEMENT  2016    TRANSESOPHAGEAL ECHOCARDIOGRAM  2015    UPPER GASTROINTESTINAL ENDOSCOPY  13    UPPER GASTROINTESTINAL ENDOSCOPY  2019    NL    UPPER GASTROINTESTINAL ENDOSCOPY N/A 3/6/2019    EGD AND COLONOSCOPY WITH ANESTHESIA performed by Bhargavi Rodriguez MD at [address] History     Socioeconomic History    Marital status:      Spouse name: Not on file    Number of children: 0    Years of education: Not on file    Highest education level: Not on file   Occupational History    Not on file   Tobacco Use    Smoking status: Former     Packs/day: 3.00     Years: 2.00     Pack years: 6.00     Types: Cigarettes     Quit date: 1985     Years since quittin.8    Smokeless tobacco: Never   Vaping Use    Vaping Use: Never used   Substance and Sexual Activity    Alcohol use: No    Drug use: No    Sexual activity: Not Currently   Other Topics Concern    Not on file   Social History Narrative    Not on file     Social Determinants of Health     Financial Resource Strain: Low Risk     Difficulty of Paying Living Expenses: Not hard at all   Food Insecurity: No Food Insecurity    Worried About Running Out of Food in the Last Year: Never true    [address] in the Last Year: Never true   Transportation Needs: No Transportation Needs    Lack of Transportation (Medical): No    Lack of Transportation (Non-Medical):  No   Physical Activity: Insufficiently Active    Days of Exercise per Week: 2 days    Minutes of Exercise per Session: 10 min   Stress: Not on file   Social Connections: Not on file   Intimate Partner Violence: Not on file   Housing Stability: Low Risk     Unable to Pay for Housing in the Last Year: No    Number of Places Lived in the Last Year: 1    Unstable Housing in the Last Year: No       Family History   Problem Relation Age of Onset    Cancer Mother     Ovarian Cancer Mother 45        Technically peritoneal cancer    Depression Mother         bipolar    Arthritis Father     Rheum Arthritis Father     Arrhythmia Father     Other Father         gout    Heart Disease Father     Depression Brother         depression    Other Brother         gout    Obesity Brother     Other Brother         gout    Obesity Brother     Depression Brother     Cancer Maternal Grandmother         Breast    Breast Cancer Maternal Grandmother        Current Outpatient Medications   Medication Sig Dispense Refill    Semaglutide,0.25 or 0.5MG/DOS, (OZEMPIC, 0.25 OR 0.5 MG/DOSE,) 2 MG/1.5ML SOPN Inject 0.25 mg into the skin once a week 2 Adjustable Dose Pre-filled Pen Syringe 0    lamoTRIgine (LAMICTAL) 150 MG tablet Take 2 tablets by mouth nightly 60 tablet 3    lisinopril (PRINIVIL;ZESTRIL) 5 MG tablet Take 1 tablet by mouth daily 90 tablet 2    hydroxychloroquine (PLAQUENIL) 200 MG tablet Take 1 tablet by mouth 2 times daily 60 tablet 1    Calcium Citrate-Vitamin D (CALCIUM CITRATE CHEWY BITE) 500-500 MG-UNIT CHEW Take 1 tab po BID with food. 180 tablet 3    predniSONE (DELTASONE) 5 MG tablet Take 1 tab po daily.  90 tablet 0    diclofenac sodium (VOLTAREN) 1 % GEL Apply topically 2 times daily 150 g 2    montelukast (SINGULAIR) 10 MG tablet Take 1 tablet by mouth nightly 90 tablet 3    allopurinol (ZYLOPRIM) 300 MG tablet TAKE 1 TABLET EVERY DAY 90 tablet 1    simvastatin (ZOCOR) 20 MG tablet TAKE 1 TABLET EVERY NIGHT 90 tablet 1    methocarbamol (ROBAXIN) 500 MG tablet TAKE 1 TABLET TWICE TIMES DAILY AS NEEDED FOR PAIN 270 tablet 1    metoprolol succinate (TOPROL XL) 100 MG extended release tablet Take 1 tablet by mouth daily 90 tablet 1    FLUoxetine (PROZAC) 10 MG capsule Take 3 capsules by mouth daily 30 capsule 0    levothyroxine (SYNTHROID) 125 MCG tablet TAKE 1 TABLET EVERY DAY. 90 tablet 1    torsemide (DEMADEX) 10 MG tablet TAKE 2 TABLETS EVERY OTHER DAY ALTERNATING WITH 1 TABLET THE OTHER DAYS AS DIRECTED 135 tablet 1    ELIQUIS 5 MG TABS tablet TAKE 1 TABLET TWICE DAILY 180 tablet 3    lurasidone (LATUDA) 60 MG TABS tablet Take 1 tablet by mouth nightly 30 tablet 0    Misc. Devices (BARIATRIC ROLLATOR) MISC Bariatric Rollator to use while walking. 1 each 0    CPAP Machine MISC by Does not apply route      acetaminophen (TYLENOL) 325 MG tablet Take 650 mg by mouth every 6 hours as needed for Pain TAKES EVERY DAY      cetirizine (ZYRTEC) 10 MG tablet Take 10 mg by mouth daily       No current facility-administered medications for this visit.        Immunization History   Administered Date(s) Administered    COVID-19, MODERNA BLUE border, Primary or Immunocompromised, (age 12y+), IM, 100 mcg/0.5mL 03/18/2021, 04/22/2021    Influenza Virus Vaccine 01/28/2012, 10/17/2012, 11/10/2014, 10/22/2015    Influenza Whole 11/10/2014    Influenza, FLUARIX, FLULAVAL, [address] (age 10 mo+) AND AFLURIA, (age 1 y+), PF, 0.5mL 12/01/2016, 01/04/2019, 10/07/2019, 09/21/2020, 12/05/2021    Influenza, FLUCELVAX, (age 10 mo+), MDCK, PF, 0.5mL 10/17/2017, 10/13/2022    Influenza, Intradermal, Preservative free 09/24/2013    Pneumococcal Polysaccharide (Mykreyoxt06) 01/28/2012    Tetanus 03/01/1988    Zoster Recombinant (Shingrix) 09/02/2022       Allergies   Allergen Reactions    Dilaudid [Hydromorphone Hcl] Other (See Comments)     Palpitations and orthostatic hypotension    Lavender Oil Shortness Of Breath and Rash    Penicillins Shortness Of Breath    Seroquel [Quetiapine Fumarate] Other (See Comments)     Very lethargic/almost not funtioning at all (per patient). Adhesive Tape Other (See Comments)     BLISTERS, paper tape okay    Hydromorphone Hcl      Other reaction(s): Other (See Comments)  Rapid heart rate     Lithium Other (See Comments)     Diarrhea, vomiting, nausea, headaches,     Tetanus Toxoids Swelling       Review of Systems   Constitutional:  Negative for activity change, appetite change, fatigue and unexpected weight change. Respiratory:  Negative for chest tightness and shortness of breath. Cardiovascular:  Negative for chest pain and palpitations. Gastrointestinal:  Negative for abdominal pain, nausea and vomiting. Musculoskeletal:  Negative for arthralgias and back pain. Skin:  Negative for color change. Neurological:  Negative for dizziness, tremors, seizures, weakness, light-headedness, numbness and headaches. Psychiatric/Behavioral:  Positive for dysphoric mood. Negative for agitation, behavioral problems, confusion, decreased concentration, hallucinations, self-injury, sleep disturbance and suicidal ideas. The patient is not nervous/anxious and is not hyperactive. /70   Pulse 80   Temp 97.6 °F (36.4 °C)   Ht 5' 3\" (1.6 m)   Wt (!) 329 lb (149.2 kg)   SpO2 97%   BMI 58.28 kg/m²     Physical Exam  Vitals and nursing note reviewed. Constitutional:       General: She is not in acute distress. Appearance: Normal appearance. She is well-developed. She is not diaphoretic. HENT:      Head: Normocephalic and atraumatic. Eyes:      Extraocular Movements: Extraocular movements intact. Conjunctiva/sclera: Conjunctivae normal.      Pupils: Pupils are equal, round, and reactive to light. Cardiovascular:      Rate and Rhythm: Normal rate and regular rhythm. Pulmonary:      Effort: Pulmonary effort is normal. No respiratory distress. Abdominal:      Palpations: Abdomen is soft. Musculoskeletal:         General: Normal range of motion.       Cervical back: Normal range of motion and neck supple. Skin:     General: Skin is warm and dry. Neurological:      Mental Status: She is alert and oriented to person, place, and time. Psychiatric:         Attention and Perception: She is attentive. Mood and Affect: Mood normal.         Speech: Speech normal.         Behavior: Behavior normal.         Thought Content: Thought content normal.         Judgment: Judgment normal.       Plan  1. Anxiety  improved    2. Class 3 severe obesity due to excess calories with serious comorbidity and body mass index (BMI) of 50.0 to 59.9 in adult [facility])  New medication prescribed, Aes discussed in detail, no contraindications noted, pt agreeable to plan and understands risks. - Semaglutide,0.25 or 0.5MG/DOS, (OZEMPIC, 0.25 OR 0.5 MG/DOSE,) 2 MG/1.5ML SOPN; Inject 0.25 mg into the skin once a week  Dispense: 2 Adjustable Dose Pre-filled Pen Syringe; Refill: 0    3. Need for influenza vaccination  - Influenza, FLUCELVAX, (age 10 mo+), IM, Preservative Free, 0.5 mL    I have spent 30 minutes on patient care, with more than 50% spent counseling and coordinating care for diagnoses and plans listed above. While assessing care for this patient, I have reviewed all pertinent lab work/imaging/ specialist notes and care in reference to those problems addressed above in detail. Appropriate medical decision making was based on this. Please note that portions of this note may have been completed with a voice recognition program. Efforts were made to edit the dictations but occasionally words are mis-transcribed. Return in about 3 months (around 1/13/2023) for 30 minute visit, follow up medication changes.

## 2022-11-01 ASSESSMENT — ENCOUNTER SYMPTOMS
VOMITING: 0
BACK PAIN: 0
CHEST TIGHTNESS: 0
ABDOMINAL PAIN: 0
COLOR CHANGE: 0
NAUSEA: 0
SHORTNESS OF BREATH: 0

## 2022-11-09 NOTE — TELEPHONE ENCOUNTER
CPAP download report from 10/8/2022 - 11/6/2022 on CPAP 13 cm H2O. Compliance is good 93%. AHI is improving however still slightly elevated 7.5. Report also showing leak 48.8 L/min    Please send order to change to CPAP 14 cm H2O and look for new report about 30 days after pressure is changed.   Patient should also work on mask fitting as leak is likely contributing to elevated AHI

## 2022-11-10 NOTE — TELEPHONE ENCOUNTER
Order signed. Please get new download report about 30 days after pressure is changed. Leaving CPAP running while going to the restroom at night should not count as leak.

## 2022-11-10 NOTE — TELEPHONE ENCOUNTER
Pt returned call and was informed with verbal understanding. Pt asks if her taking off her mask to go to the restroom during the night and leaving the machine running, could be causing it to show a leak? Order for pressure change pending. Will make sure pressure gets changed, then get a new compliance in 30 days.

## 2022-11-11 NOTE — TELEPHONE ENCOUNTER
Spoke to patient and informed her. She asked what she should do to get her mask to stop leaking. She said she does not wash her mask daily. I advised her to as the oils on her face can cause it not to seal. Also informed her to call her DME for a mask fitting if needed and to call us if she need anything    Order faxed, new report 30 days after pressure change.

## 2022-11-13 DIAGNOSIS — Z00.00 ROUTINE GENERAL MEDICAL EXAMINATION AT A HEALTH CARE FACILITY: ICD-10-CM

## 2022-11-14 RX ORDER — METOPROLOL SUCCINATE 100 MG/1
100 TABLET, EXTENDED RELEASE ORAL DAILY
Qty: 90 TABLET | Refills: 1 | Status: SHIPPED | OUTPATIENT
Start: 2022-11-14

## 2022-11-28 DIAGNOSIS — E78.5 HYPERLIPIDEMIA, UNSPECIFIED HYPERLIPIDEMIA TYPE: ICD-10-CM

## 2022-11-28 RX ORDER — SIMVASTATIN 20 MG
TABLET ORAL
Qty: 90 TABLET | Refills: 1 | OUTPATIENT
Start: 2022-11-28

## 2022-12-02 DIAGNOSIS — E78.5 HYPERLIPIDEMIA, UNSPECIFIED HYPERLIPIDEMIA TYPE: ICD-10-CM

## 2022-12-02 RX ORDER — SIMVASTATIN 20 MG
TABLET ORAL
Qty: 90 TABLET | Refills: 1 | Status: SHIPPED | OUTPATIENT
Start: 2022-12-02

## 2022-12-02 NOTE — TELEPHONE ENCOUNTER
Medication Refill    Medication needing refilled: Simvastatin    Dosage of the medication: 20mg    How are you taking this medication (QD, BID, TID, QID, PRN):  take 1 tablet every night    30 or 90 day supply called in: 90    When will you run out of your medication: [facility] are we sending the medication to?:    [address] [facility] [phone] - F [phone]   [address] [place] [address]. [address]   Phone:  [phone]  Fax:  [phone]

## 2022-12-05 NOTE — TELEPHONE ENCOUNTER
12/05 Called [phone]  unable to make contact, LM for pt to call [facility] and schedule. Pt already has appt with NPAM 03/29, we can move this appt to 03/30 and pt can have NPAM, device check and NPRB all on the same day.

## 2022-12-12 NOTE — TELEPHONE ENCOUNTER
CPAP download report from 11/12/2022 - 12/11/2022 on CPAP 14 cm H2O reviewed. Compliance is good 100%. AHI is improving however still slightly elevated 5.6. Report showing leak 42.2 L/min    Please send order to change to CPAP 15 cm H2O and get new report about 30 days after pressure is changed.   Continue to work on mask fitting

## 2022-12-26 DIAGNOSIS — F31.77 BIPOLAR DISORDER, IN PARTIAL REMISSION, MOST RECENT EPISODE MIXED (HCC): Chronic | ICD-10-CM

## 2022-12-26 RX ORDER — LAMOTRIGINE 150 MG/1
300 TABLET ORAL NIGHTLY
Qty: 60 TABLET | Refills: 0 | Status: SHIPPED | OUTPATIENT
Start: 2022-12-26 | End: 2023-01-23 | Stop reason: SDUPTHER

## 2022-12-26 NOTE — TELEPHONE ENCOUNTER
Refill Request     CONFIRM preferrred pharmacy with the patient. If Mail Order Rx - Pend for 90 day refill. Last Seen: Last Seen Department: 10/13/2022  Last Seen by PCP: Visit date not found    Last Written: 9/28/2022    If no future appointment scheduled, route STAFF MESSAGE with patient name to the [facility] for scheduling. Next Appointment:   Future Appointments   Date Time Provider Sarah Najera   1/3/2023  8:25 AM SCHEDULE, Dakota Zapata REMOTE TRANSMISSION [facility]   1/13/2023  1:00 PM MD OSIRIS Giles  [place] - DYJAVY   3/28/2023 11:30 AM [facility]   3/30/2023  1:00 PM ALICIA Mitchell CNP Sumeet Car [facility]   3/30/2023  2:00 PM [facility]   3/30/2023  2:00 PM ALICIA Munguia CNP Juliana [facility]   3/31/2023 11:40 AM ALICIA Gomez CNP CLEJAVIER PULM [facility]   7/27/2023 11:00 AM SCHEDULE, LETTY MUELLER AWV LPMICHELE MUELLER [place] - DYJAVY       Message sent to  to schedule appt with patient?   NO      Requested Prescriptions     Pending Prescriptions Disp Refills    lamoTRIgine (LAMICTAL) 150 MG tablet 60 tablet 3     Sig: Take 2 tablets by mouth nightly

## 2023-01-03 ENCOUNTER — NURSE ONLY (OUTPATIENT)
Dept: CARDIOLOGY CLINIC | Age: 63
End: 2023-01-03
Payer: MEDICARE

## 2023-01-03 DIAGNOSIS — I47.20 VT (VENTRICULAR TACHYCARDIA): ICD-10-CM

## 2023-01-03 DIAGNOSIS — Z95.810 BIVENTRICULAR ICD (IMPLANTABLE CARDIOVERTER-DEFIBRILLATOR) IN PLACE: ICD-10-CM

## 2023-01-03 DIAGNOSIS — I50.22 CHRONIC SYSTOLIC CHF (CONGESTIVE HEART FAILURE) (HCC): Primary | ICD-10-CM

## 2023-01-03 DIAGNOSIS — I42.0 DILATED CARDIOMYOPATHY (HCC): ICD-10-CM

## 2023-01-03 PROCEDURE — 93295 DEV INTERROG REMOTE 1/2/MLT: CPT | Performed by: INTERNAL MEDICINE

## 2023-01-03 PROCEDURE — 93297 REM INTERROG DEV EVAL ICPMS: CPT | Performed by: NURSE PRACTITIONER

## 2023-01-03 PROCEDURE — 93296 REM INTERROG EVL PM/IDS: CPT | Performed by: INTERNAL MEDICINE

## 2023-01-03 NOTE — PROGRESS NOTES
End of 91-day monitoring period 1/3/23. Device Status (Implanted: 29-Jul-2016)  Remaining Longevity 5 months (03-Jan-2023). Pacing (% of Time Since 28-Sep-2022)  Total * 98.7% (MVP Off)  Possible OptiVol fluid accumulation: 26-Dec-2022 -- ongoing, consistent w/ decreased TI.  NPRB to review. No  arrhythmias recorded. Remote transmission received for patients CRT-D. Transmission shows normal sensing and pacing function. EP physician will review. See interrogation under the cardiology tab in the [address] [place] [address] [address] field for more details. Will continue to monitor remotely. Hx PAF/AFL (eliquis, toprol xl). 3/28/22 LV threshold appears to be chronically elevated and she did have significant clinical improvement with CRT. S/P CRT-D- she has had remarkable symptomatic improvement with CRT. Her device is functioning normally and her ECG is markedly improved. Changed LV to 3-coil at OV on 3/28/2022.

## 2023-01-04 NOTE — RESULT ENCOUNTER NOTE
Reviewed. Please have her take 2 tabs torsemide daily for 3 days and then go back to 1 tab alternating with 2 tabs every other day.

## 2023-01-06 ENCOUNTER — TELEPHONE (OUTPATIENT)
Dept: CARDIOLOGY CLINIC | Age: 63
End: 2023-01-06

## 2023-01-06 RX ORDER — TORSEMIDE 10 MG/1
TABLET ORAL
Qty: 135 TABLET | Refills: 1 | Status: SHIPPED | OUTPATIENT
Start: 2023-01-06

## 2023-01-06 NOTE — TELEPHONE ENCOUNTER
----- Message from ALICIA Oquendo CNP sent at 1/4/2023  4:43 PM EST -----  Reviewed. Please have her take 2 tabs torsemide daily for 3 days and then go back to 1 tab alternating with 2 tabs every other day.

## 2023-01-06 NOTE — TELEPHONE ENCOUNTER
Pt called and stated she received her new hardware for device in the mail today and was advised to send transmission, she stated she sent transmission about 5 mintues ago, please advise

## 2023-01-09 ENCOUNTER — FOLLOWUP TELEPHONE ENCOUNTER (OUTPATIENT)
Dept: PSYCHIATRY | Age: 63
End: 2023-01-09

## 2023-01-09 NOTE — TELEPHONE ENCOUNTER
Ludwin Smiley 3 days ago     LR  Remote transmission received for patient's CRT-D. Transmission shows normal sensing and pacing function. EP physician will review. See interrogation under the cardiology tab in the [facility] [address] field for more details. Will continue to monitor remotely.        noted

## 2023-01-11 ENCOUNTER — TELEPHONE (OUTPATIENT)
Dept: RHEUMATOLOGY | Age: 63
End: 2023-01-11

## 2023-01-12 NOTE — TELEPHONE ENCOUNTER
Refill Request     CONFIRM preferrred pharmacy with the patient. If Mail Order Rx - Pend for 90 day refill. Last Seen: Last Seen Department: 10/13/2022  Last Seen by PCP: Visit date not found    Last Written: 8/01/2022, #15g, 2 refill    If no future appointment scheduled, route STAFF MESSAGE with patient name to the [facility] for scheduling. Next Appointment:   Future Appointments   Date Time Provider Sarah Najera   3/9/2023  2:15 PM MD OSIRIS Reaves  [place] - DYJAVY   3/28/2023 11:30 AM [facility]   3/30/2023  1:00 PM ALICIA Bird CNP [facility]   3/30/2023  2:00 PM SCHEDULE, [facility] Ty  [address] [place] [address]   3/30/2023  2:00 PM ALICIA Mixon CNP [facility]   3/31/2023 11:40 AM Maia Lei, APRN - CNP CLERM PULM [facility]   4/10/2023  7:30 AM SCHEDULE, Yue Cheek REMOTE TRANSMISSION Ty [facility]   7/27/2023 11:00 AM SCHEDULE, LETTY MUELLER AWV ANNA ROMERO       Message sent to  to schedule appt with patient?   NO      Requested Prescriptions     Pending Prescriptions Disp Refills    diclofenac sodium (VOLTAREN) 1 %  g 2     Sig: Apply topically 2 times daily relief

## 2023-01-23 DIAGNOSIS — Z00.00 ROUTINE GENERAL MEDICAL EXAMINATION AT A HEALTH CARE FACILITY: ICD-10-CM

## 2023-01-23 DIAGNOSIS — F31.77 BIPOLAR DISORDER, IN PARTIAL REMISSION, MOST RECENT EPISODE MIXED (HCC): Chronic | ICD-10-CM

## 2023-01-23 RX ORDER — LEVOTHYROXINE SODIUM 0.12 MG/1
125 TABLET ORAL DAILY
Qty: 90 TABLET | Refills: 1 | Status: SHIPPED | OUTPATIENT
Start: 2023-01-23

## 2023-01-23 NOTE — TELEPHONE ENCOUNTER
.Refill Request     CONFIRM preferrred pharmacy with the patient. If Mail Order Rx - Pend for 90 day refill. Last Seen: Last Seen Department: 10/13/2022  Last Seen by PCP: Visit date not found    Last Written: 12-26-22 60 with 0     If no future appointment scheduled, route STAFF MESSAGE with patient name to the [facility] for scheduling. Next Appointment:   Future Appointments   Date Time Provider Sarah Najera   3/9/2023  2:15 PM MD OSIRIS Najera  [place] - DYD   3/28/2023 11:30 AM [facility]   3/30/2023  1:00 PM Pierre Douglas APRN - CNP [facility]   3/30/2023  2:00 PM SCHEDULE, [facility] [address]   3/30/2023  2:00 PM ALICIA Schmid - SANTO [facility]   3/31/2023 11:40 AM Bernie Finney APRN - [facility] KALLIE PULM [facility]   4/10/2023  7:30 AM SCHEDULE, Luzma Dinero REMOTE TRANSMISSION [facility]   7/27/2023 11:00 AM SCHEDULE, VINITAX OSIRIS  AWV LPN OSIRIS  [place] - DYD       Message sent to  to schedule appt with patient?   N/A      Requested Prescriptions     Pending Prescriptions Disp Refills    lamoTRIgine (LAMICTAL) 150 MG tablet 180 tablet 1     Sig: Take 2 tablets by mouth nightly

## 2023-01-23 NOTE — TELEPHONE ENCOUNTER
Refill Request     CONFIRM preferrred pharmacy with the patient. If Mail Order Rx - Pend for 90 day refill. Last Seen: Last Seen Department: 10/13/2022  Last Seen by PCP: 10/13/2022    Last Written: 5/05/2022, #90, 1 refill    If no future appointment scheduled, route STAFF MESSAGE with patient name to the [facility] for scheduling. Next Appointment:   Future Appointments   Date Time Provider Sarah Najera   3/9/2023  2:15 PM MD OSIRIS Corea  [place] - DYD   3/28/2023 11:30 AM [facility]   3/30/2023  1:00 PM ALICIA Abdullahi - SANTO [facility]   3/30/2023  2:00 PM SCHEDULE, [facility] [place] [address]   3/30/2023  2:00 PM ALICIA Mckeon - SANTO [facility]   3/31/2023 11:40 AM ALICIA Graham - CNP CLERM PULM [facility]   4/10/2023  7:30 AM SCHEDULE, [facility] REMOTE TRANSMISSION [place] [facility]   7/27/2023 11:00 AM SCHEDULE, LETTY NEWELL  AWV ANNA NEWELL  [place] - DYJAVY       Message sent to  to schedule appt with patient?   NO      Requested Prescriptions     Pending Prescriptions Disp Refills    levothyroxine (SYNTHROID) 125 MCG tablet 90 tablet 1

## 2023-01-25 RX ORDER — LAMOTRIGINE 150 MG/1
300 TABLET ORAL NIGHTLY
Qty: 180 TABLET | Refills: 1 | Status: SHIPPED | OUTPATIENT
Start: 2023-01-25 | End: 2023-05-25

## 2023-02-13 DIAGNOSIS — M35.3 PMR (POLYMYALGIA RHEUMATICA) (HCC): ICD-10-CM

## 2023-02-13 RX ORDER — HYDROXYCHLOROQUINE SULFATE 200 MG/1
TABLET, FILM COATED ORAL
Qty: 120 TABLET | Refills: 2 | Status: SHIPPED | OUTPATIENT
Start: 2023-02-13

## 2023-02-26 DIAGNOSIS — M79.662 PAIN OF LEFT CALF: ICD-10-CM

## 2023-02-27 NOTE — TELEPHONE ENCOUNTER
Refill Request     CONFIRM preferrred pharmacy with the patient. If Mail Order Rx - Pend for 90 day refill. Last Seen: Last Seen Department: 10/13/2022  Last Seen by PCP: Visit date not found    Last Written: 05/31/2022 27 tablet 1 refills     If no future appointment scheduled, route STAFF MESSAGE with patient name to the [facility] for scheduling. Next Appointment:   Future Appointments   Date Time Provider Sarah Najera   3/9/2023  2:15 PM MD OSIRIS Cerna [place] - DYJAVY   3/28/2023 11:30 AM MD LEONARDO ArtWD RHEUM [facility]   3/30/2023  1:00 PM ALICIA Gomez CNP [facility]   3/30/2023  2:00 PM SCHEDULE, [facility] Brenna Barrow [address] [place] [address]   3/30/2023  2:00 PM ALICIA Hong CNP [facility]   3/31/2023 11:40 AM ALICIA Barry CNP PULM [facility]   4/10/2023  7:30 AM SCHEDULE, Deena Turner REMOTE TRANSMISSION Brenna Barrow [facility]   7/27/2023 11:00 AM SCHEDULE, LETTY MUELLER AWV ANNA NEWELL  Eliud ROMERO       Message sent to  to schedule appt with patient?   NO      Requested Prescriptions     Pending Prescriptions Disp Refills    methocarbamol (ROBAXIN) 500 MG tablet 270 tablet 1     Sig: TAKE 1 TABLET TWICE TIMES DAILY AS NEEDED FOR PAIN

## 2023-02-28 RX ORDER — METHOCARBAMOL 500 MG/1
TABLET, FILM COATED ORAL
Qty: 270 TABLET | Refills: 1 | Status: SHIPPED | OUTPATIENT
Start: 2023-02-28

## 2023-03-09 ENCOUNTER — OFFICE VISIT (OUTPATIENT)
Dept: FAMILY MEDICINE CLINIC | Age: 63
End: 2023-03-09

## 2023-03-09 VITALS
DIASTOLIC BLOOD PRESSURE: 80 MMHG | HEIGHT: 63 IN | BODY MASS INDEX: 51.91 KG/M2 | HEART RATE: 66 BPM | OXYGEN SATURATION: 95 % | WEIGHT: 293 LBS | TEMPERATURE: 97.4 F | SYSTOLIC BLOOD PRESSURE: 128 MMHG

## 2023-03-09 DIAGNOSIS — I69.354 HEMIPLEGIA AND HEMIPARESIS FOLLOWING CEREBRAL INFARCTION AFFECTING LEFT NON-DOMINANT SIDE (HCC): ICD-10-CM

## 2023-03-09 DIAGNOSIS — I42.0 DILATED CARDIOMYOPATHY (HCC): ICD-10-CM

## 2023-03-09 DIAGNOSIS — M35.3 PMR (POLYMYALGIA RHEUMATICA) (HCC): ICD-10-CM

## 2023-03-09 DIAGNOSIS — F31.30 BIPOLAR DISORDER, MOST RECENT EPISODE DEPRESSED (HCC): ICD-10-CM

## 2023-03-09 DIAGNOSIS — E03.9 HYPOTHYROIDISM, UNSPECIFIED TYPE: ICD-10-CM

## 2023-03-09 DIAGNOSIS — I47.20 VT (VENTRICULAR TACHYCARDIA) (HCC): ICD-10-CM

## 2023-03-09 DIAGNOSIS — I47.20 VT (VENTRICULAR TACHYCARDIA): ICD-10-CM

## 2023-03-09 DIAGNOSIS — M19.90 INFLAMMATORY ARTHRITIS: ICD-10-CM

## 2023-03-09 DIAGNOSIS — L98.9 SKIN LESION: Primary | ICD-10-CM

## 2023-03-09 DIAGNOSIS — G25.2 RESTING TREMOR: ICD-10-CM

## 2023-03-09 DIAGNOSIS — I20.9 ANGINA PECTORIS, UNSPECIFIED (HCC): ICD-10-CM

## 2023-03-09 ASSESSMENT — PATIENT HEALTH QUESTIONNAIRE - PHQ9
SUM OF ALL RESPONSES TO PHQ QUESTIONS 1-9: 6
1. LITTLE INTEREST OR PLEASURE IN DOING THINGS: 0
SUM OF ALL RESPONSES TO PHQ QUESTIONS 1-9: 6
SUM OF ALL RESPONSES TO PHQ QUESTIONS 1-9: 6
4. FEELING TIRED OR HAVING LITTLE ENERGY: 1
SUM OF ALL RESPONSES TO PHQ QUESTIONS 1-9: 6
8. MOVING OR SPEAKING SO SLOWLY THAT OTHER PEOPLE COULD HAVE NOTICED. OR THE OPPOSITE, BEING SO FIGETY OR RESTLESS THAT YOU HAVE BEEN MOVING AROUND A LOT MORE THAN USUAL: 0
2. FEELING DOWN, DEPRESSED OR HOPELESS: 1
5. POOR APPETITE OR OVEREATING: 1
10. IF YOU CHECKED OFF ANY PROBLEMS, HOW DIFFICULT HAVE THESE PROBLEMS MADE IT FOR YOU TO DO YOUR WORK, TAKE CARE OF THINGS AT HOME, OR GET ALONG WITH OTHER PEOPLE: 0
7. TROUBLE CONCENTRATING ON THINGS, SUCH AS READING THE NEWSPAPER OR WATCHING TELEVISION: 1
3. TROUBLE FALLING OR STAYING ASLEEP: 1
SUM OF ALL RESPONSES TO PHQ9 QUESTIONS 1 & 2: 1
6. FEELING BAD ABOUT YOURSELF - OR THAT YOU ARE A FAILURE OR HAVE LET YOURSELF OR YOUR FAMILY DOWN: 1
9. THOUGHTS THAT YOU WOULD BE BETTER OFF DEAD, OR OF HURTING YOURSELF: 0

## 2023-03-09 ASSESSMENT — ENCOUNTER SYMPTOMS
SHORTNESS OF BREATH: 0
NAUSEA: 0
VOMITING: 0
CHEST TIGHTNESS: 0
COLOR CHANGE: 0
ABDOMINAL PAIN: 0

## 2023-03-09 NOTE — PROGRESS NOTES
3/9/2023    This is a 57 y.o. female who presents for  Chief Complaint   Patient presents with    Follow-up     3 month follow up      Skin Problem    Wrist Pain     Right        HPI:     Feeling better overall   Feels the medications are the right combination   Prozac 30 mg in the AM, latuda and Lamictal at night   Socializing more, interacting with people more  Working with Dr. Perla Lawrence (female), and will return after seeing the stress clinic at [facility] for her PTSD    Trying to exercise/walk more at home  Feels better when doing this      Saw the SHABBIR DIAZ [facility]   Insurance will not cover   Cut out chips, pop  Working on a more liquid intake   Cut out candy bars  [place]  Lost 21-22 lbs on Ozempic but she was so sick/vomiting      Will f/u with Dr. Kathy Whitney in 9/22 for pain  Not taking steroids currently  Feels the new medication is very helpful    + skin lesions, picks at them, inquires what to do     No acute concerning Sxs: No CP, SOB, palpitations, dizziness, HA, etc.   Sees Cardio and EP end of this month      + new resting R arm tremor  R dominant   Intermittent  Mild  No weakness    Past Medical History:   Diagnosis Date    Anesthesia complication     hypotension post op    Autoimmune hemolytic anemia ([address]     during uterine cancer    Bipolar disorder [facility]     managed by Yuliana Castelan)    Bundle branch block     Cardiomyopathy ([address]     Chronic systolic CHF (congestive heart failure) ([facility] 7/9/2015    Depression     Family history of early CAD     Family history of ovarian cancer     mother    GERD (gastroesophageal reflux disease)     Gout     Hypertension     Hypothyroid     Osteoarthritis, knee     Shybut    Pneumonia     history of pneumonia    S/P gastric bypass 1/7/05    Unspecified cerebral artery occlusion with cerebral infarction     Uterine cancer [facility])     endometrial adenocarcinoma    Vitamin B 12 deficiency 7/09       Past Surgical History:   Procedure Laterality Date    CARDIAC CATHETERIZATION 7/10/2015    Normal Coronary Arteries    CHOLECYSTECTOMY      COLONOSCOPY  2019    NL    COLONOSCOPY N/A 3/6/2019    EGD AND COLONOSCOPY WITH ANESTHESIA performed by Armando Simon MD at [facility] ENDOSCOPY    DILATION AND CURETTAGE OF UTERUS      4 times    ERCP      FINGER SURGERY      gout X 2    GASTRIC BYPASS SURGERY  05    HERNIA REPAIR  10/20/2016    lap ventral hernia    LIPECTOMY      gangrenous    OTHER SURGICAL HISTORY      radium implants into uterus for uterine surgery X 2    OTHER SURGICAL HISTORY  16    exp. lap lysis of adhesion    OTHER SURGICAL HISTORY      biventricular pacer and defibrillator    PACEMAKER PLACEMENT  2016    TRANSESOPHAGEAL ECHOCARDIOGRAM  2015    UPPER GASTROINTESTINAL ENDOSCOPY  13    UPPER GASTROINTESTINAL ENDOSCOPY  2019    NL    UPPER GASTROINTESTINAL ENDOSCOPY N/A 3/6/2019    EGD AND COLONOSCOPY WITH ANESTHESIA performed by Armando Simon MD at [facility] ENDOSCOPY       Social History     Socioeconomic History    Marital status:      Spouse name: Not on file    Number of children: 0    Years of education: Not on file    Highest education level: Not on file   Occupational History    Not on file   Tobacco Use    Smoking status: Former     Packs/day: 3.00     Years: 2.00     Pack years: 6.00     Types: Cigarettes     Quit date: 1985     Years since quittin.2    Smokeless tobacco: Never   Vaping Use    Vaping Use: Never used   Substance and Sexual Activity    Alcohol use: No    Drug use: No    Sexual activity: Not Currently   Other Topics Concern    Not on file   Social History Narrative    Not on file     Social Determinants of Health     Financial Resource Strain: Low Risk     Difficulty of Paying Living Expenses: Not hard at all   Food Insecurity: No Food Insecurity    Worried About Running Out of Food in the Last Year: Never true    Ran Out of Food in the Last Year: Never true   Transportation Needs: No  Transportation Needs    Lack of Transportation (Medical): No    Lack of Transportation (Non-Medical):  No   Physical Activity: Insufficiently Active    Days of Exercise per Week: 2 days    Minutes of Exercise per Session: 10 min   Stress: Not on file   Social Connections: Not on file   Intimate Partner Violence: Not on file   Housing Stability: Low Risk     Unable to Pay for Housing in the Last Year: No    Number of Places Lived in the Last Year: 1    Unstable Housing in the Last Year: No       Family History   Problem Relation Age of Onset    Cancer Mother     Ovarian Cancer Mother 45        Technically peritoneal cancer    Depression Mother         bipolar    Arthritis Father     Rheum Arthritis Father     Arrhythmia Father     Other Father         gout    Heart Disease Father     Depression Brother         depression    Other Brother         gout    Obesity Brother     Other Brother         gout    Obesity Brother     Depression Brother     Cancer Maternal Grandmother         Breast    Breast Cancer Maternal Grandmother        Current Outpatient Medications   Medication Sig Dispense Refill    methocarbamol (ROBAXIN) 500 MG tablet TAKE 1 TABLET TWICE TIMES DAILY AS NEEDED FOR PAIN 270 tablet 1    hydroxychloroquine (PLAQUENIL) 200 MG tablet TAKE 1 TABLET TWICE DAILY 120 tablet 2    lamoTRIgine (LAMICTAL) 150 MG tablet Take 2 tablets by mouth nightly 180 tablet 1    levothyroxine (SYNTHROID) 125 MCG tablet Take 1 tablet by mouth Daily 90 tablet 1    diclofenac sodium (VOLTAREN) 1 % GEL Apply topically 2 times daily 150 g 2    torsemide (DEMADEX) 10 MG tablet TAKE 2 TABLETS EVERY OTHER DAY ALTERNATING WITH 1 TABLET THE OTHER DAYS AS DIRECTED 135 tablet 1    simvastatin (ZOCOR) 20 MG tablet TAKE 1 TABLET EVERY NIGHT 90 tablet 1    metoprolol succinate (TOPROL XL) 100 MG extended release tablet Take 1 tablet by mouth daily 90 tablet 1    lisinopril (PRINIVIL;ZESTRIL) 5 MG tablet Take 1 tablet by mouth daily 90 tablet 2 Calcium Citrate-Vitamin D (CALCIUM CITRATE CHEWY BITE) 500-500 MG-UNIT CHEW Take 1 tab po BID with food. 180 tablet 3    montelukast (SINGULAIR) 10 MG tablet Take 1 tablet by mouth nightly 90 tablet 3    allopurinol (ZYLOPRIM) 300 MG tablet TAKE 1 TABLET EVERY DAY 90 tablet 1    FLUoxetine (PROZAC) 10 MG capsule Take 3 capsules by mouth daily 30 capsule 0    ELIQUIS 5 MG TABS tablet TAKE 1 TABLET TWICE DAILY 180 tablet 3    lurasidone (LATUDA) 60 MG TABS tablet Take 1 tablet by mouth nightly 30 tablet 0    Misc. Devices (BARIATRIC ROLLATOR) MISC Bariatric Rollator to use while walking. 1 each 0    CPAP Machine MISC by Does not apply route      acetaminophen (TYLENOL) 325 MG tablet Take 650 mg by mouth every 6 hours as needed for Pain TAKES EVERY DAY      cetirizine (ZYRTEC) 10 MG tablet Take 10 mg by mouth daily       No current facility-administered medications for this visit. Immunization History   Administered Date(s) Administered    COVID-19, MODERNA BLUE border, Primary or Immunocompromised, (age 12y+), IM, 100 mcg/0.5mL 03/18/2021, 04/22/2021    Influenza Virus Vaccine 01/28/2012, 10/17/2012, 11/10/2014, 10/22/2015    Influenza Whole 11/10/2014    Influenza, FLUARIX, FLULAVAL, Budd Larissa (age 10 mo+) AND AFLURIA, (age 1 y+), PF, 0.5mL 12/01/2016, 01/04/2019, 10/07/2019, 09/21/2020, 12/05/2021    Influenza, FLUCELVAX, (age 10 mo+), MDCK, PF, 0.5mL 10/17/2017, 10/13/2022    Influenza, Intradermal, Preservative free 09/24/2013    Pneumococcal Polysaccharide ([id number]) 01/28/2012    Tetanus 03/01/1988    Zoster Recombinant (Shingrix) 09/02/2022, 01/02/2023       Allergies   Allergen Reactions    Dilaudid [Hydromorphone Hcl] Other (See Comments)     Palpitations and orthostatic hypotension    Lavender Oil Shortness Of Breath and Rash    Penicillins Shortness Of Breath    Seroquel [Quetiapine Fumarate] Other (See Comments)     Very lethargic/almost not funtioning at all (per patient).     Adhesive Tape Other (See Comments)     BLISTERS, paper tape okay    Hydromorphone Hcl      Other reaction(s): Other (See Comments)  Rapid heart rate     Lithium Other (See Comments)     Diarrhea, vomiting, nausea, headaches,     Tetanus Toxoids Swelling       Review of Systems   Constitutional:  Negative for activity change, appetite change, chills, diaphoresis, fatigue and fever.   Eyes:  Negative for visual disturbance.   Respiratory:  Negative for chest tightness and shortness of breath.    Cardiovascular:  Negative for chest pain, palpitations and leg swelling.   Gastrointestinal:  Negative for abdominal pain, nausea and vomiting.   Genitourinary:  Negative for decreased urine volume.   Musculoskeletal:  Positive for arthralgias and gait problem.   Skin:  Negative for color change.   Neurological:  Positive for tremors. Negative for dizziness, weakness, light-headedness, numbness and headaches.   Psychiatric/Behavioral:  Negative for sleep disturbance.      /80   Pulse 66   Temp 97.4 °F (36.3 °C)   Ht 5' 3\" (1.6 m)   Wt (!) 321 lb (145.6 kg)   SpO2 95%   BMI 56.86 kg/m²     Physical Exam  Vitals and nursing note reviewed.   Constitutional:       General: She is not in acute distress.     Appearance: Normal appearance. She is well-developed. She is not diaphoretic.   HENT:      Head: Normocephalic and atraumatic.   Eyes:      Extraocular Movements: Extraocular movements intact.      Conjunctiva/sclera: Conjunctivae normal.      Pupils: Pupils are equal, round, and reactive to light.   Cardiovascular:      Rate and Rhythm: Normal rate and regular rhythm.   Pulmonary:      Effort: Pulmonary effort is normal. No respiratory distress.   Abdominal:      Palpations: Abdomen is soft.   Musculoskeletal:         General: Normal range of motion.      Cervical back: Normal range of motion and neck supple.   Skin:     General: Skin is warm and dry.   Neurological:      Mental Status: She is alert and oriented to person, place, and  time.   Psychiatric:         Attention and Perception: She is attentive. Mood and Affect: Mood normal.         Speech: Speech normal.         Behavior: Behavior normal.         Thought Content: Thought content normal.         Judgment: Judgment normal.       Plan  1. Skin lesion  Add Eucerin nightly and with bathing  Trial hydrocortisone BID for 2 weeks  If no improvement, will need biopsy, discussed    2. Resting tremor  No bradykinesia, gait is hard to assess (walks with walker)  Will trial R UE strengthening exercises daily  Encouraged shoulder and cervical exercises daily as well  If no improvement in 4-6 weeks, will order EMG vs refer to Neurology     3. Hypothyroidism, unspecified type  - TSH with Reflex; Future    4. Body mass index (BMI) 50.0-59.9, adult ([facility])  Tried Ozempic, had significant GI Aes  Down 13 lbs since 7/22, congratulated her on her success  Defer any medications, encouraged increased daily activity     5. PMR (polymyalgia rheumatica) ([facility]  Managed by Rheumatology     6. Hemiplegia and hemiparesis following cerebral infarction affecting left non-dominant side ([facility])  No new concerns     7. Dilated cardiomyopathy ([facility]  Follows with Cardiology   - Basic Metabolic Panel; Future    8. Bipolar disorder, most recent episode depressed ([address]  Improved and stable with current medications     9. VT (ventricular tachycardia)  No new concerns   - Basic Metabolic Panel; Future    10. Angina pectoris, unspecified ([address]  No new concerns       While assessing care for this patient, I have reviewed all pertinent lab work/imaging/ specialist notes and care in reference to those problems addressed above in detail. Appropriate medical decision making was based on this. Please note that portions of this note may have been completed with a voice recognition program. Efforts were made to edit the dictations but occasionally words are mis-transcribed. Return in about 6 months (around 9/9/2023).

## 2023-03-10 LAB
ANION GAP SERPL CALCULATED.3IONS-SCNC: 12 MMOL/L (ref 3–16)
BUN BLDV-MCNC: 22 MG/DL (ref 7–20)
CALCIUM SERPL-MCNC: 9.9 MG/DL (ref 8.3–10.6)
CHLORIDE BLD-SCNC: 103 MMOL/L (ref 99–110)
CO2: 22 MMOL/L (ref 21–32)
CREAT SERPL-MCNC: 1.5 MG/DL (ref 0.6–1.2)
GFR SERPL CREATININE-BSD FRML MDRD: 39 ML/MIN/{1.73_M2}
GLUCOSE BLD-MCNC: 97 MG/DL (ref 70–99)
POTASSIUM SERPL-SCNC: 4.9 MMOL/L (ref 3.5–5.1)
RHEUMATOID FACTOR: <10 IU/ML
SODIUM BLD-SCNC: 137 MMOL/L (ref 136–145)
TSH REFLEX: 2.44 UIU/ML (ref 0.27–4.2)

## 2023-03-27 DIAGNOSIS — M1A.9XX0 CHRONIC GOUT WITHOUT TOPHUS, UNSPECIFIED CAUSE, UNSPECIFIED SITE: ICD-10-CM

## 2023-03-27 RX ORDER — ALLOPURINOL 300 MG/1
TABLET ORAL
Qty: 90 TABLET | Refills: 1 | Status: SHIPPED | OUTPATIENT
Start: 2023-03-27

## 2023-03-27 NOTE — TELEPHONE ENCOUNTER
Refill Request     CONFIRM preferred pharmacy with the patient. If Mail Order Rx - Pend for 90 day refill. Last Seen: Last Seen Department: 3/9/2023  Last Seen by PCP: Visit date not found    Last Written: 06/21/2022 90 tablet 1 refill    If no future appointment scheduled, route STAFF MESSAGE with patient name to the [facility] for scheduling. Next Appointment:   Future Appointments   Date Time Provider Sarah Naejra   3/28/2023 11:45 AM MD NAOMY Munoz [facility]   3/30/2023  1:00 PM Tomasz Veliz, APRN - CNP [facility]   3/30/2023  2:00 PM SCHEDULE, [facility]   3/30/2023  2:00 PM Lorna Garcia APRN - CNP [facility]   3/31/2023 11:40 AM Antoni Christine APRN - CNP [facility]   4/10/2023  7:30 AM SCHEDULE, [facility] REMOTE TRANSMISSION [facility]   7/27/2023 11:00 AM SCHEDULE, [facility] OSIRIS  [facility] LPMICHELE NEWELL  [place] - [facility]D   9/11/2023  2:00 PM MD OSIRIS Foster  [place] - DYD       Message sent to [address] [place] [address] to schedule appt with patient?   NO      Requested Prescriptions     Pending Prescriptions Disp Refills    allopurinol (ZYLOPRIM) 300 MG tablet [Pharmacy Med Name: ALLOPURINOL 300 MG Tablet] 90 tablet 1     Sig: TAKE 1 TABLET EVERY DAY

## 2023-03-28 ENCOUNTER — OFFICE VISIT (OUTPATIENT)
Dept: RHEUMATOLOGY | Age: 63
End: 2023-03-28
Payer: MEDICARE

## 2023-03-28 VITALS
DIASTOLIC BLOOD PRESSURE: 76 MMHG | HEIGHT: 63 IN | WEIGHT: 293 LBS | SYSTOLIC BLOOD PRESSURE: 120 MMHG | BODY MASS INDEX: 51.91 KG/M2

## 2023-03-28 DIAGNOSIS — M81.0 SENILE OSTEOPOROSIS: Primary | ICD-10-CM

## 2023-03-28 DIAGNOSIS — M35.3 PMR (POLYMYALGIA RHEUMATICA) (HCC): ICD-10-CM

## 2023-03-28 PROCEDURE — 96372 THER/PROPH/DIAG INJ SC/IM: CPT | Performed by: INTERNAL MEDICINE

## 2023-03-28 PROCEDURE — 3017F COLORECTAL CA SCREEN DOC REV: CPT | Performed by: INTERNAL MEDICINE

## 2023-03-28 PROCEDURE — 99214 OFFICE O/P EST MOD 30 MIN: CPT | Performed by: INTERNAL MEDICINE

## 2023-03-28 PROCEDURE — 3074F SYST BP LT 130 MM HG: CPT | Performed by: INTERNAL MEDICINE

## 2023-03-28 PROCEDURE — G8427 DOCREV CUR MEDS BY ELIG CLIN: HCPCS | Performed by: INTERNAL MEDICINE

## 2023-03-28 PROCEDURE — 3078F DIAST BP <80 MM HG: CPT | Performed by: INTERNAL MEDICINE

## 2023-03-28 PROCEDURE — 1036F TOBACCO NON-USER: CPT | Performed by: INTERNAL MEDICINE

## 2023-03-28 PROCEDURE — G8482 FLU IMMUNIZE ORDER/ADMIN: HCPCS | Performed by: INTERNAL MEDICINE

## 2023-03-28 PROCEDURE — G8417 CALC BMI ABV UP PARAM F/U: HCPCS | Performed by: INTERNAL MEDICINE

## 2023-03-28 RX ORDER — HYDROXYCHLOROQUINE SULFATE 200 MG/1
200 TABLET, FILM COATED ORAL 2 TIMES DAILY
Qty: 180 TABLET | Refills: 0 | Status: SHIPPED | OUTPATIENT
Start: 2023-03-28

## 2023-03-28 NOTE — PROGRESS NOTES
[address], MD                                                           [address] [place] [address] [phone] (P)  (F)      Note is transcribed using voice recognition software. Inadvertent computerized transcription errors may be present. Patient identification: Ghislaine Ennis, female : [id number] y.o. Background information-  [id number], ICD pacer, gout, hypertension, chronic kidney disease, history of bariatric surgery, BMI 61      Subjective-  Patient is asymptomatic. Denies any pain or stiffness in his joints. Off of prednisone, pleased on hydroxychloroquine. ADLs and recreational activities are normal.    Lab work-low titer RF-14    No rashes, photosensitivity, photosensitive eruptions, pleurisy, GI/ symptoms. Denies any symptoms of temporal arteritis. All other ROS are negative. PMH, PSH,Social history , Meds reviewed. FH-  no family history of autoimmune disorders. PHYSICAL EXAM:    Vitals:    /76   Ht 5' 3\" (1.6 m)   Wt (!) 321 lb (145.6 kg)   BMI 56.86 kg/m²   AA)x3 well nourished, and well groomed, normal judgement. MKS:  28 joint JOINT COUNT:                               Right                                                  Left   Swell Tender Swell Tender   PIP1           PIP2           PIP3           PIP4          PIP5          MCP1           MCP2           MCP3          MCP4           MCP5           Wrist            Elbow           Shoulder          Knee           No inflammatory findings consistent in joint exam.  Skin: No rashes, no induration or skin thickening or nodules. HEENT: Normal temporal arteries without any tenderness or tortuosity.       DATA:

## 2023-03-29 NOTE — PROGRESS NOTES
(RA   pressure 8 mmHg). Pacer / ICD wire is visualized in the right ventricle and right atrium. Echo 3/13/2021:   Patient tachy during study. The left ventricular systolic function is moderately reduced with an   ejection fraction of 35 - 40 %. There is hypokinesis of the apex, apical lateral, inferoseptum, apical   anterior and anteroseptum walls. Septal bounce noted. Normal left ventricular size with mild concentric left ventricular   hypertrophy. Left ventricular diastolic filling pressure is elevated lateral E/e\" is 14 . Changes noted from previous echo on 11-1-2019 in left ventricular function. Moderate mitral regurgitation. The right ventricle is mildly enlarged. Right ventricular systolic function is mildly reduced . Systolic pulmonic artery pressure (SPAP) is normal estimated at 26 mmHg   (Right atrial pressure of 3 mmHg). The right atrium is mildly dilated. Echo 11/1/2019: The left ventricular systolic function is mildly reduced with an ejection   fraction of 40-45 %. Anteroseptal wall hypokinesis. There is mild concentric left ventricular hypertrophy. Left ventricular cavity size is mildly dilated. Grade I diastolic dysfunction with normal left ventricular filling pressure. Mild posterior mitral annular calcification. Mild thickening of the anterior leaflet of mitral valve. Moderate mitral regurgitation. The left atrium is mildly dilated. Frequent premature beats makes it difficult to identify exact wall motion   abnormality. [facility] 7/2015:  IMPRESSION:  1. Angiographically normal coronary arteries. 2.  Severe pulmonary hypertension 66/37 mean 49 mmHg. 3.  Markedly elevated pulmonary capillary wedge pressure 32 mmHg, and left  ventricular end diastolic pressure 36 mmHg. 4.  Transpulmonary gradient is 17 mmHg, which suggested a left-sided as well  intrinsic pulmonary etiology of the severe pulmonary hypertension.    5.  Mildly reduced cardiac index by Giovanni

## 2023-03-30 ENCOUNTER — NURSE ONLY (OUTPATIENT)
Dept: CARDIOLOGY CLINIC | Age: 63
End: 2023-03-30
Payer: MEDICARE

## 2023-03-30 ENCOUNTER — OFFICE VISIT (OUTPATIENT)
Dept: CARDIOLOGY CLINIC | Age: 63
End: 2023-03-30
Payer: MEDICARE

## 2023-03-30 VITALS
WEIGHT: 293 LBS | HEART RATE: 59 BPM | HEIGHT: 65 IN | SYSTOLIC BLOOD PRESSURE: 96 MMHG | OXYGEN SATURATION: 99 % | BODY MASS INDEX: 48.82 KG/M2 | DIASTOLIC BLOOD PRESSURE: 62 MMHG

## 2023-03-30 VITALS
DIASTOLIC BLOOD PRESSURE: 62 MMHG | BODY MASS INDEX: 53.5 KG/M2 | HEART RATE: 59 BPM | OXYGEN SATURATION: 99 % | WEIGHT: 293 LBS | SYSTOLIC BLOOD PRESSURE: 96 MMHG

## 2023-03-30 DIAGNOSIS — Z95.810 BIVENTRICULAR ICD (IMPLANTABLE CARDIOVERTER-DEFIBRILLATOR) IN PLACE: ICD-10-CM

## 2023-03-30 DIAGNOSIS — I48.91 ATRIAL FIBRILLATION, UNSPECIFIED TYPE (HCC): Primary | ICD-10-CM

## 2023-03-30 DIAGNOSIS — I42.0 DILATED CARDIOMYOPATHY (HCC): ICD-10-CM

## 2023-03-30 DIAGNOSIS — I47.20 VT (VENTRICULAR TACHYCARDIA) (HCC): ICD-10-CM

## 2023-03-30 DIAGNOSIS — I50.22 CHRONIC SYSTOLIC CHF (CONGESTIVE HEART FAILURE) (HCC): ICD-10-CM

## 2023-03-30 DIAGNOSIS — R79.89 ABNORMAL CBC: ICD-10-CM

## 2023-03-30 DIAGNOSIS — I50.22 CHRONIC SYSTOLIC CHF (CONGESTIVE HEART FAILURE) (HCC): Primary | ICD-10-CM

## 2023-03-30 DIAGNOSIS — I63.231 ARTERIAL ISCHEMIC STROKE, ICA, RIGHT, ACUTE (HCC): Primary | ICD-10-CM

## 2023-03-30 PROCEDURE — G8427 DOCREV CUR MEDS BY ELIG CLIN: HCPCS | Performed by: NURSE PRACTITIONER

## 2023-03-30 PROCEDURE — G8417 CALC BMI ABV UP PARAM F/U: HCPCS | Performed by: NURSE PRACTITIONER

## 2023-03-30 PROCEDURE — 93284 PRGRMG EVAL IMPLANTABLE DFB: CPT | Performed by: INTERNAL MEDICINE

## 2023-03-30 PROCEDURE — 1036F TOBACCO NON-USER: CPT | Performed by: NURSE PRACTITIONER

## 2023-03-30 PROCEDURE — 3078F DIAST BP <80 MM HG: CPT | Performed by: NURSE PRACTITIONER

## 2023-03-30 PROCEDURE — 3074F SYST BP LT 130 MM HG: CPT | Performed by: NURSE PRACTITIONER

## 2023-03-30 PROCEDURE — 3017F COLORECTAL CA SCREEN DOC REV: CPT | Performed by: NURSE PRACTITIONER

## 2023-03-30 PROCEDURE — G8482 FLU IMMUNIZE ORDER/ADMIN: HCPCS | Performed by: NURSE PRACTITIONER

## 2023-03-30 PROCEDURE — 93290 INTERROG DEV EVAL ICPMS IP: CPT | Performed by: NURSE PRACTITIONER

## 2023-03-30 PROCEDURE — 99214 OFFICE O/P EST MOD 30 MIN: CPT | Performed by: NURSE PRACTITIONER

## 2023-03-30 RX ORDER — CALCIUM CARBONATE 200(500)MG
2 TABLET,CHEWABLE ORAL 2 TIMES DAILY
COMMUNITY

## 2023-03-30 NOTE — PROGRESS NOTES
Patient presents to the device clinic today for a programming evaluation for her defibrillator. Patient has a history of stroke, DCM, AF, A Flutter, and VT. Takes Eliquis and Toprol XL. Last device interrogation was on 1/3. Since then, no arrhythmias recorded. TI is decreased. 3 months until RRT. P wave: 3.6 mV  R wave: 12.5 mV    AP 18.5%  98.7%    All sensing and pacing parameters are within normal range. Increased RA output to keep 2:1. Patient will see MALENA Martins in office today. Patient education was provided about device functionality, in home monitoring, and any other patient questions and/or concerns were addressed. Patient voices understanding. Patient will follow up in 3 months in office or remotely. Please see interrogation for more detail - Paceart report located under the Cardiology tab.

## 2023-03-30 NOTE — PROGRESS NOTES
[facility]   Cardiac Follow-up    Primary Care Doctor:  Krys David MD    Chief Complaint   Patient presents with    Follow-up    Congestive Heart Failure          History of Present Illness:   I had the pleasure of seeing Emerald Gallito in follow up for cardiomyopathy. Hx of CHF, non-ischemic dilated cardiomyopathy, S/P  BiV-AICD 2016, pulmonary HTN, LBBB, HTN.   admitted to the hospital 3/11/2021-3/14/2021 with chest pain, MADELEINE, shortness of breath, device check showed NSVT, A. fib with RVR. On 4/1/21 cardioversion was canceled, as her interrogated device showed typical a flutter, and device changes were made and converted to NSR. Since last visit,  Trying to move more, joints limit her activity   Breathing is pretty good; improved breathing with stopping and resting. She is able to walk from the parking lot without shortness of breath. Able to walk up and down the ramp 2 times without resting. She does get some palpitations with over exertion. She is now off of the prednisone; seeing rheumatology     Emerald Conti describes symptoms including dyspnea on exertion, palpitations but denies lower extremity edema, syncope. Home weights: 319 lbs- trying to lose weight   Appetite: down  Fluid intake:   Diet: cutting back     Taking medications as prescribed: Yes  Able to afford medications: Yes    Past Medical History:   has a past medical history of Anesthesia complication, Autoimmune hemolytic anemia ([address] Bipolar disorder ([address] Bundle branch block, Cardiomyopathy ([address] Chronic systolic CHF (congestive heart failure) ([address] Depression, Family history of early CAD, Family history of ovarian cancer, GERD (gastroesophageal reflux disease), Gout, Hypertension, Hypothyroid, Osteoarthritis, knee, Pneumonia, S/P gastric bypass, Unspecified cerebral artery occlusion with cerebral infarction, Uterine cancer ([address] and Vitamin B 12 deficiency.   Surgical History:   has a past surgical history

## 2023-03-30 NOTE — PATIENT INSTRUCTIONS
Check CBC, iron studies   Hold the torsemide tomorrow and then take 1 tab Saturday and then resume the alternating dosing with 2 tabs on Sunday. Monitor your dizziness and blood presurres.    Continue lisinopril   Continue Toprol Xl   Repeat echo this summer   Follow up with EP as planned  Follow up with CHF team in 6 months

## 2023-03-30 NOTE — PATIENT INSTRUCTIONS
No changes today     Remote device transmissions every three months     Will closely monitor your battery life and schedule change out once appropriate     Follow up in 6 months

## 2023-03-31 ENCOUNTER — TELEPHONE (OUTPATIENT)
Dept: PULMONOLOGY | Age: 63
End: 2023-03-31

## 2023-03-31 ENCOUNTER — TELEMEDICINE (OUTPATIENT)
Dept: PULMONOLOGY | Age: 63
End: 2023-03-31
Payer: MEDICARE

## 2023-03-31 DIAGNOSIS — I10 HYPERTENSION, ESSENTIAL: ICD-10-CM

## 2023-03-31 DIAGNOSIS — G47.33 SEVERE OBSTRUCTIVE SLEEP APNEA: Primary | ICD-10-CM

## 2023-03-31 DIAGNOSIS — E66.01 MORBID OBESITY WITH BMI OF 50.0-59.9, ADULT (HCC): ICD-10-CM

## 2023-03-31 DIAGNOSIS — Z71.89 CPAP USE COUNSELING: ICD-10-CM

## 2023-03-31 PROCEDURE — 3017F COLORECTAL CA SCREEN DOC REV: CPT | Performed by: NURSE PRACTITIONER

## 2023-03-31 PROCEDURE — G8427 DOCREV CUR MEDS BY ELIG CLIN: HCPCS | Performed by: NURSE PRACTITIONER

## 2023-03-31 PROCEDURE — 99214 OFFICE O/P EST MOD 30 MIN: CPT | Performed by: NURSE PRACTITIONER

## 2023-03-31 ASSESSMENT — SLEEP AND FATIGUE QUESTIONNAIRES
HOW LIKELY ARE YOU TO NOD OFF OR FALL ASLEEP WHILE SITTING AND TALKING TO SOMEONE: 0
HOW LIKELY ARE YOU TO NOD OFF OR FALL ASLEEP WHILE LYING DOWN TO REST IN THE AFTERNOON WHEN CIRCUMSTANCES PERMIT: 2
HOW LIKELY ARE YOU TO NOD OFF OR FALL ASLEEP WHILE SITTING QUIETLY AFTER LUNCH WITHOUT ALCOHOL: 1
HOW LIKELY ARE YOU TO NOD OFF OR FALL ASLEEP WHEN YOU ARE A PASSENGER IN A CAR FOR AN HOUR WITHOUT A BREAK: 0
HOW LIKELY ARE YOU TO NOD OFF OR FALL ASLEEP WHILE WATCHING TV: 1
ESS TOTAL SCORE: 5
HOW LIKELY ARE YOU TO NOD OFF OR FALL ASLEEP WHILE SITTING AND READING: 1
HOW LIKELY ARE YOU TO NOD OFF OR FALL ASLEEP IN A CAR, WHILE STOPPED FOR A FEW MINUTES IN TRAFFIC: 0
HOW LIKELY ARE YOU TO NOD OFF OR FALL ASLEEP WHILE SITTING INACTIVE IN A PUBLIC PLACE: 0

## 2023-03-31 NOTE — PATIENT INSTRUCTIONS
your CPAP   equipment.  - Do follow the recommended cleaning schedule. - Do change your disposable filter frequently. Adapted From: [employer]/cleaning. shtm.   These are general suggestions for all models please follow specific s recommendations and specific instructions

## 2023-03-31 NOTE — PROGRESS NOTES
pulmonary hypertension, cardiovascular morbidities, car accidents and all cause mortality. Discussed pathophysiology of MARCY with patient today. Patient education provided regarding sleep tips  Continue blood pressure medications as prescribed by treating/prescribing provider- treatment of MARCY can lower blood pressure by levels that are clinically significant. Matthias Nicholson, was evaluated through a synchronous (real-time) audio-video encounter. The patient (or guardian if applicable) is aware that this is a billable service, which includes applicable co-pays. This Virtual Visit was conducted with patient's (and/or legal guardian's) consent. The visit was conducted pursuant to the emergency declaration under the [facility], [address] [place] [address] [facility] authority and the [employer] and [email or web address] General Act. Patient identification was verified, and a caregiver was present when appropriate. The patient was located at Home: [address] [place] [address]  Provider was located at [facility] (Appt Dept): [address], [address] [place] [address]         Total time spent for this encounter: Not billed by time    --ALICIA Lal CNP on 3/31/2023 at 12:23 PM    An electronic signature was used to authenticate this note.

## 2023-04-11 ENCOUNTER — TELEPHONE (OUTPATIENT)
Dept: CARDIOLOGY CLINIC | Age: 63
End: 2023-04-11

## 2023-05-01 DIAGNOSIS — Z00.00 ROUTINE GENERAL MEDICAL EXAMINATION AT A HEALTH CARE FACILITY: ICD-10-CM

## 2023-05-01 RX ORDER — METOPROLOL SUCCINATE 100 MG/1
TABLET, EXTENDED RELEASE ORAL
Qty: 90 TABLET | Refills: 1 | Status: SHIPPED | OUTPATIENT
Start: 2023-05-01

## 2023-05-01 NOTE — TELEPHONE ENCOUNTER
Refill Request     CONFIRM preferred pharmacy with the patient. If Mail Order Rx - Pend for 90 day refill. Last Seen: Last Seen Department: 3/9/2023  Last Seen by PCP: 3/9/2023    Last Written: 11/14/2022, #90, 1 refill    If no future appointment scheduled:  Review the last OV with PCP and review information for follow-up visit,  Route STAFF MESSAGE with patient name to the [facility] for scheduling with the following information:            -  Timing of next visit           -  Visit type ie Physical, OV, etc           -  Diagnoses/Reason ie. COPD, HTN - Do not use MEDICATION, Follow-up or CHECK UP - Give reason for visit      Next Appointment:   Future Appointments   Date Time Provider Sarah Najera   7/10/2023 10:00 AM SCHEDULE, Kelly Ribera REMOTE TRANSMISSION [facility]   7/27/2023 11:00 AM [facility]EDU[facility]  [place] - DYJAVY   9/11/2023  2:00 PM MD OSIRIS Goodrich  [place] - DYJAVY   10/26/2023  1:15 PM SCHEDULE, [facility]   10/26/2023  1:15 PM Kathy Kayla, APRN - CNP [facility]   10/26/2023  2:00 PM Camillo Skipper, APRN - CNP [facility]       Message sent to  to schedule appt with patient?   NO      Requested Prescriptions     Pending Prescriptions Disp Refills    metoprolol succinate (TOPROL XL) 100 MG extended release tablet [Pharmacy Med Name: METOPROLOL SUCCINATE  MG Tablet Extended Release 24 Hour] 90 tablet 1     Sig: TAKE 1 TABLET EVERY DAY

## 2023-05-16 ENCOUNTER — TELEPHONE (OUTPATIENT)
Dept: FAMILY MEDICINE CLINIC | Age: 63
End: 2023-05-16

## 2023-05-16 DIAGNOSIS — M35.3 PMR (POLYMYALGIA RHEUMATICA) (HCC): Primary | ICD-10-CM

## 2023-05-16 NOTE — TELEPHONE ENCOUNTER
----- Message from Sandy Gr sent at 5/16/2023  2:37 PM EDT -----  Regarding: Rheumatologist   Contact: [phone]  I found a rheumatologist. I have an appointment for July 27th   Freddy Trejo, phone number  [phone].  They currently don't have a fax number  They need a referral   Thanks

## 2023-05-18 NOTE — TELEPHONE ENCOUNTER
Referral placed. Please call their office and see how they would like us to send the referral if they do not have a fax #.  Thank you

## 2023-05-28 DIAGNOSIS — I10 HYPERTENSION, ESSENTIAL: Primary | ICD-10-CM

## 2023-05-31 DIAGNOSIS — I50.22 CHRONIC SYSTOLIC CHF (CONGESTIVE HEART FAILURE) (HCC): ICD-10-CM

## 2023-05-31 DIAGNOSIS — R79.89 ABNORMAL CBC: ICD-10-CM

## 2023-05-31 DIAGNOSIS — I42.0 DILATED CARDIOMYOPATHY (HCC): ICD-10-CM

## 2023-05-31 RX ORDER — LISINOPRIL 5 MG/1
TABLET ORAL
Qty: 90 TABLET | Refills: 3 | Status: SHIPPED | OUTPATIENT
Start: 2023-05-31

## 2023-06-01 ENCOUNTER — TELEPHONE (OUTPATIENT)
Dept: CARDIOLOGY CLINIC | Age: 63
End: 2023-06-01

## 2023-06-01 LAB
DEPRECATED RDW RBC AUTO: 16.1 % (ref 12.4–15.4)
FERRITIN SERPL IA-MCNC: 26.8 NG/ML (ref 15–150)
HCT VFR BLD AUTO: 37.7 % (ref 36–48)
HGB BLD-MCNC: 12.1 G/DL (ref 12–16)
IRON SATN MFR SERPL: 25 % (ref 15–50)
IRON SERPL-MCNC: 87 UG/DL (ref 37–145)
MCH RBC QN AUTO: 30.5 PG (ref 26–34)
MCHC RBC AUTO-ENTMCNC: 32.1 G/DL (ref 31–36)
MCV RBC AUTO: 95.1 FL (ref 80–100)
PLATELET # BLD AUTO: 159 K/UL (ref 135–450)
PMV BLD AUTO: 12.4 FL (ref 5–10.5)
RBC # BLD AUTO: 3.97 M/UL (ref 4–5.2)
TIBC SERPL-MCNC: 352 UG/DL (ref 260–445)
WBC # BLD AUTO: 6 K/UL (ref 4–11)

## 2023-06-01 NOTE — TELEPHONE ENCOUNTER
----- Message from ALICIA Persaud CNP sent at 6/1/2023  7:39 AM EDT -----  Reviewed, CBC levels are normal and iron studies normal.

## 2023-06-19 ENCOUNTER — TELEPHONE (OUTPATIENT)
Dept: CARDIOLOGY CLINIC | Age: 63
End: 2023-06-19

## 2023-06-21 ENCOUNTER — TELEPHONE (OUTPATIENT)
Dept: CARDIOLOGY CLINIC | Age: 63
End: 2023-06-21

## 2023-06-21 NOTE — TELEPHONE ENCOUNTER
----- Message from Jennifer Saldivar sent at 6/20/2023  2:10 PM EDT -----    ----- Message -----  From: Isai Easley MD  Sent: 6/20/2023  12:14 PM EDT  To: Jennifer Saldivar    device at [facility]. She needs to be scheduled for device replacement.

## 2023-06-23 NOTE — TELEPHONE ENCOUNTER
Procedure:  Generator Change  Doctor:  Dr. Gasper Yu  Date:  8/28/23 (needs done by 9/19 per Corrine Eddy)  Time:  12pm  Arrival:  10:30am  Reps:  [employer]  Anesthesia:  n/a      Spoke with patient. Please have patient arrive to the main entrance of [address] [place] [address] ([address] [place] [address], [address] [place] [address]) and check in with the registration desk. They will be directed to the Cath Lab. Please call patient regarding medication instructions. Remind patient to be NPO after midnight (8 hours prior). Do not apply lotions/creams on skin the day of procedure.

## 2023-07-02 SDOH — HEALTH STABILITY: PHYSICAL HEALTH: ON AVERAGE, HOW MANY MINUTES DO YOU ENGAGE IN EXERCISE AT THIS LEVEL?: 10 MIN

## 2023-07-02 SDOH — HEALTH STABILITY: PHYSICAL HEALTH: ON AVERAGE, HOW MANY DAYS PER WEEK DO YOU ENGAGE IN MODERATE TO STRENUOUS EXERCISE (LIKE A BRISK WALK)?: 1 DAY

## 2023-07-03 ENCOUNTER — OFFICE VISIT (OUTPATIENT)
Dept: FAMILY MEDICINE CLINIC | Age: 63
End: 2023-07-03

## 2023-07-03 VITALS
SYSTOLIC BLOOD PRESSURE: 104 MMHG | HEART RATE: 82 BPM | OXYGEN SATURATION: 98 % | WEIGHT: 293 LBS | DIASTOLIC BLOOD PRESSURE: 68 MMHG | BODY MASS INDEX: 52.59 KG/M2

## 2023-07-03 DIAGNOSIS — E66.01 MORBID OBESITY WITH BMI OF 50.0-59.9, ADULT (HCC): ICD-10-CM

## 2023-07-03 DIAGNOSIS — I50.42 CHRONIC COMBINED SYSTOLIC AND DIASTOLIC CONGESTIVE HEART FAILURE (HCC): Primary | ICD-10-CM

## 2023-07-03 DIAGNOSIS — M17.0 PRIMARY OSTEOARTHRITIS OF BOTH KNEES: ICD-10-CM

## 2023-07-03 DIAGNOSIS — Z95.810 BIVENTRICULAR ICD (IMPLANTABLE CARDIOVERTER-DEFIBRILLATOR) IN PLACE: ICD-10-CM

## 2023-07-03 DIAGNOSIS — J30.9 ALLERGIC RHINITIS, UNSPECIFIED SEASONALITY, UNSPECIFIED TRIGGER: ICD-10-CM

## 2023-07-03 DIAGNOSIS — R30.0 DYSURIA: ICD-10-CM

## 2023-07-03 DIAGNOSIS — I42.0 DILATED CARDIOMYOPATHY (HCC): ICD-10-CM

## 2023-07-03 DIAGNOSIS — I44.7 LBBB (LEFT BUNDLE BRANCH BLOCK): ICD-10-CM

## 2023-07-03 DIAGNOSIS — N18.31 STAGE 3A CHRONIC KIDNEY DISEASE (HCC): ICD-10-CM

## 2023-07-03 DIAGNOSIS — M35.3 PMR (POLYMYALGIA RHEUMATICA) (HCC): ICD-10-CM

## 2023-07-03 DIAGNOSIS — N39.46 MIXED STRESS AND URGE URINARY INCONTINENCE: ICD-10-CM

## 2023-07-03 DIAGNOSIS — Z98.84 S/P GASTRIC BYPASS: ICD-10-CM

## 2023-07-03 DIAGNOSIS — Z12.4 SCREENING FOR CERVICAL CANCER: ICD-10-CM

## 2023-07-03 PROBLEM — N18.30 CHRONIC RENAL DISEASE, STAGE III (HCC): Status: ACTIVE | Noted: 2023-07-03

## 2023-07-03 RX ORDER — MULTIVIT-MIN/IRON/FOLIC ACID/K 18-600-40
CAPSULE ORAL
COMMUNITY

## 2023-07-03 RX ORDER — MONTELUKAST SODIUM 10 MG/1
10 TABLET ORAL NIGHTLY
Qty: 90 TABLET | Refills: 3 | Status: SHIPPED | OUTPATIENT
Start: 2023-07-03

## 2023-07-03 ASSESSMENT — PATIENT HEALTH QUESTIONNAIRE - PHQ9
6. FEELING BAD ABOUT YOURSELF - OR THAT YOU ARE A FAILURE OR HAVE LET YOURSELF OR YOUR FAMILY DOWN: 0
3. TROUBLE FALLING OR STAYING ASLEEP: 1
SUM OF ALL RESPONSES TO PHQ9 QUESTIONS 1 & 2: 2
SUM OF ALL RESPONSES TO PHQ QUESTIONS 1-9: 6
10. IF YOU CHECKED OFF ANY PROBLEMS, HOW DIFFICULT HAVE THESE PROBLEMS MADE IT FOR YOU TO DO YOUR WORK, TAKE CARE OF THINGS AT HOME, OR GET ALONG WITH OTHER PEOPLE: 0
8. MOVING OR SPEAKING SO SLOWLY THAT OTHER PEOPLE COULD HAVE NOTICED. OR THE OPPOSITE, BEING SO FIGETY OR RESTLESS THAT YOU HAVE BEEN MOVING AROUND A LOT MORE THAN USUAL: 0
9. THOUGHTS THAT YOU WOULD BE BETTER OFF DEAD, OR OF HURTING YOURSELF: 0
7. TROUBLE CONCENTRATING ON THINGS, SUCH AS READING THE NEWSPAPER OR WATCHING TELEVISION: 1
1. LITTLE INTEREST OR PLEASURE IN DOING THINGS: 1
SUM OF ALL RESPONSES TO PHQ QUESTIONS 1-9: 6
2. FEELING DOWN, DEPRESSED OR HOPELESS: 1
SUM OF ALL RESPONSES TO PHQ QUESTIONS 1-9: 6
4. FEELING TIRED OR HAVING LITTLE ENERGY: 1
SUM OF ALL RESPONSES TO PHQ QUESTIONS 1-9: 6
5. POOR APPETITE OR OVEREATING: 1

## 2023-07-03 ASSESSMENT — ANXIETY QUESTIONNAIRES
GAD7 TOTAL SCORE: 3
1. FEELING NERVOUS, ANXIOUS, OR ON EDGE: 1
3. WORRYING TOO MUCH ABOUT DIFFERENT THINGS: 1
2. NOT BEING ABLE TO STOP OR CONTROL WORRYING: 1
IF YOU CHECKED OFF ANY PROBLEMS ON THIS QUESTIONNAIRE, HOW DIFFICULT HAVE THESE PROBLEMS MADE IT FOR YOU TO DO YOUR WORK, TAKE CARE OF THINGS AT HOME, OR GET ALONG WITH OTHER PEOPLE: NOT DIFFICULT AT ALL
5. BEING SO RESTLESS THAT IT IS HARD TO SIT STILL: 0
7. FEELING AFRAID AS IF SOMETHING AWFUL MIGHT HAPPEN: 0
6. BECOMING EASILY ANNOYED OR IRRITABLE: 0
4. TROUBLE RELAXING: 0

## 2023-07-03 NOTE — PROGRESS NOTES
7/3/2023    Chayo Lane (:  1960) is a 57 y.o. female, here for evaluation of the following medical concerns:  Chief Complaint   Patient presents with    New Patient    Urinary Frequency     Urinating frequently and when stopping its very painful; every 2 hours. HPI    Obesity  Has previously followed with  center  Ozempic caused severe diarrhea    Anxiety/Depression/PTSD  Prozac 10mg  Lamictal 300mg nightly  Latuda 60mg nightly  Required inpatient stay   However stable on current regimen    MARCY  Compliant with cpap    CHF, lesli ischemic cardiomyopathy, pulm HTN, LBBB, HTN, BiV-AICD   Lisinopril, Toprol XL  Follows with EP, cardiology,     Inflammatory polyarthritis/PMR  On hydroxychloroquine  Has previously been on prednisone  Following with Dr. Cade Barrios previously, now has appointment with new rheumatologist.    Leg weakness  Last worked with PT 18 months ago  Working daily on walking and standing without assistance. Walks with walker on long distances    DEXA  Prolia    Gout  Has been well controlled on allopurinol    CKD3a  GFR typically 38-50. Hx of Uterine cancer    HX of CVA   Review of Systems  All other systems reviewed and negative    Prior to Visit Medications    Medication Sig Taking?  Authorizing Provider   Cholecalciferol (VITAMIN D) 50 MCG ( UT) CAPS capsule Take by mouth Yes Historical Provider, MD   diclofenac sodium (VOLTAREN) 1 % GEL Apply topically 2 times daily Yes Mery Lee DO   montelukast (SINGULAIR) 10 MG tablet Take 1 tablet by mouth nightly Yes Mery Lee DO   lisinopril (PRINIVIL;ZESTRIL) 5 MG tablet TAKE 1 TABLET EVERY DAY Yes ALICIA Simon - CNP   apixaban (ELIQUIS) 5 MG TABS tablet Take 1 tablet by mouth 2 times daily Yes ALICIA Abad - CNP   metoprolol succinate (TOPROL XL) 100 MG extended release tablet TAKE 1 TABLET EVERY DAY Yes Fransico Wagner MD   calcium carbonate (TUMS) 500 MG chewable tablet Take 2 tablets by

## 2023-07-10 ENCOUNTER — NURSE ONLY (OUTPATIENT)
Dept: CARDIOLOGY CLINIC | Age: 63
End: 2023-07-10

## 2023-07-10 DIAGNOSIS — Z95.810 BIVENTRICULAR ICD (IMPLANTABLE CARDIOVERTER-DEFIBRILLATOR) IN PLACE: ICD-10-CM

## 2023-07-12 ENCOUNTER — HOSPITAL ENCOUNTER (OUTPATIENT)
Dept: CARDIOLOGY | Age: 63
Discharge: HOME | End: 2023-07-12
Payer: MEDICARE

## 2023-07-12 DIAGNOSIS — I50.22 CHRONIC SYSTOLIC CHF (CONGESTIVE HEART FAILURE) (HCC): ICD-10-CM

## 2023-07-12 DIAGNOSIS — I42.0 DILATED CARDIOMYOPATHY (HCC): ICD-10-CM

## 2023-07-12 LAB
LV EF: 48 %
LVEF MODALITY: NORMAL

## 2023-07-12 PROCEDURE — 93306 TTE W/DOPPLER COMPLETE: CPT

## 2023-07-17 ENCOUNTER — TELEPHONE (OUTPATIENT)
Dept: CARDIOLOGY CLINIC | Age: 63
End: 2023-07-17

## 2023-07-17 NOTE — TELEPHONE ENCOUNTER
----- Message from ALICIA Mitchell CNP sent at 7/17/2023  1:17 PM EDT -----  Please call patient with echocardiogram results. Echo shows heart function has similar to prior echos with LVEF 45-50%. Normal right side of the heart.     Continue current regimen and keep follow up

## 2023-07-24 DIAGNOSIS — F31.77 BIPOLAR DISORDER, IN PARTIAL REMISSION, MOST RECENT EPISODE MIXED (HCC): Chronic | ICD-10-CM

## 2023-07-24 DIAGNOSIS — Z00.00 ROUTINE GENERAL MEDICAL EXAMINATION AT A HEALTH CARE FACILITY: ICD-10-CM

## 2023-07-24 RX ORDER — LEVOTHYROXINE SODIUM 0.12 MG/1
125 TABLET ORAL DAILY
Qty: 90 TABLET | Refills: 1 | Status: SHIPPED | OUTPATIENT
Start: 2023-07-24

## 2023-07-24 RX ORDER — LAMOTRIGINE 150 MG/1
300 TABLET ORAL NIGHTLY
Qty: 180 TABLET | Refills: 1 | Status: SHIPPED | OUTPATIENT
Start: 2023-07-24 | End: 2023-11-21

## 2023-07-24 NOTE — TELEPHONE ENCOUNTER
Refill Request     CONFIRM preferred pharmacy with the patient. If Mail Order Rx - Pend for 90 day refill. Last Seen: Last Seen Department: 7/3/2023  Last Seen by PCP: Visit date not found    Last Written: 1/25/2023    If no future appointment scheduled:  Review the last OV with PCP and review information for follow-up visit,  Route STAFF MESSAGE with patient name to the [facility] for scheduling with the following information:            -  Timing of next visit           -  Visit type ie Physical, OV, etc           -  Diagnoses/Reason ie. COPD, HTN - Do not use MEDICATION, Follow-up or CHECK UP - Give reason for visit      Next Appointment:   Future Appointments   Date Time Provider [address]   7/27/2023 11:00 AM SCHEDULE, [facility] OSIRIS MUELLER AWV ANNA MUELLER [place] - DYD   8/28/2023 12:00 PM SCHEDULE, [facility] CATH LAB ROOM 4 [facility]   10/9/2023  9:05 AM SCHEDULE, Bailey Hunter [facility]   10/26/2023  1:15 PM [facility]EDULE, [facility] [employer] [facility]   10/26/2023  1:15 PM Asha Mcgee APRN - CNP [employer] [facility]   10/26/2023  2:00 PM ALICIA Gayle - CNP [facility]   1/3/2024  1:00 PM DO OSIRIS Conroy [place] - DYD   4/1/2024 11:00 AM ALICIA Mckeon - CNP [facility]       Message sent to  to schedule appt with patient?   NO      Requested Prescriptions     Pending Prescriptions Disp Refills    lamoTRIgine (LAMICTAL) 150 MG tablet 180 tablet 1     Sig: Take 2 tablets by mouth nightly    levothyroxine (SYNTHROID) 125 MCG tablet 90 tablet 1     Sig: Take 1 tablet by mouth Daily

## 2023-07-26 SDOH — HEALTH STABILITY: PHYSICAL HEALTH: ON AVERAGE, HOW MANY DAYS PER WEEK DO YOU ENGAGE IN MODERATE TO STRENUOUS EXERCISE (LIKE A BRISK WALK)?: 0 DAYS

## 2023-07-26 ASSESSMENT — PATIENT HEALTH QUESTIONNAIRE - PHQ9
2. FEELING DOWN, DEPRESSED OR HOPELESS: 0
SUM OF ALL RESPONSES TO PHQ9 QUESTIONS 1 & 2: 0
SUM OF ALL RESPONSES TO PHQ QUESTIONS 1-9: 0
SUM OF ALL RESPONSES TO PHQ QUESTIONS 1-9: 0
1. LITTLE INTEREST OR PLEASURE IN DOING THINGS: 0
SUM OF ALL RESPONSES TO PHQ QUESTIONS 1-9: 0
SUM OF ALL RESPONSES TO PHQ QUESTIONS 1-9: 0

## 2023-07-26 ASSESSMENT — LIFESTYLE VARIABLES
HOW OFTEN DO YOU HAVE A DRINK CONTAINING ALCOHOL: NEVER
HOW MANY STANDARD DRINKS CONTAINING ALCOHOL DO YOU HAVE ON A TYPICAL DAY: 0
HOW MANY STANDARD DRINKS CONTAINING ALCOHOL DO YOU HAVE ON A TYPICAL DAY: PATIENT DOES NOT DRINK
HOW OFTEN DO YOU HAVE SIX OR MORE DRINKS ON ONE OCCASION: 1
HOW OFTEN DO YOU HAVE A DRINK CONTAINING ALCOHOL: 1

## 2023-07-27 ENCOUNTER — TELEMEDICINE (OUTPATIENT)
Dept: FAMILY MEDICINE CLINIC | Age: 63
End: 2023-07-27

## 2023-07-27 DIAGNOSIS — Z00.00 MEDICARE ANNUAL WELLNESS VISIT, SUBSEQUENT: Primary | ICD-10-CM

## 2023-07-31 ENCOUNTER — TELEPHONE (OUTPATIENT)
Dept: CARDIOLOGY CLINIC | Age: 63
End: 2023-07-31

## 2023-08-18 NOTE — PROGRESS NOTES
Patient admitted to room 353 from [facility]. Patient oriented to room, call light, bed rails, phone, lights, & bathroom. Patient instructed about the schedule of the day including: VS frequency, lab draws, possible tests, frequency of MD & staff rounds. Pt instructed about prescribed diet, how/when to call for meal service & television. Telemetry box in place, patient aware of placement & reason. Bed locked, in lowest position, side rails up 2/4, call light within reach. Patient

## 2023-08-21 ENCOUNTER — TELEMEDICINE (OUTPATIENT)
Dept: PRIMARY CARE CLINIC | Age: 63
End: 2023-08-21

## 2023-08-21 DIAGNOSIS — J02.9 SORE THROAT: ICD-10-CM

## 2023-08-21 DIAGNOSIS — R05.1 ACUTE COUGH: Primary | ICD-10-CM

## 2023-08-21 DIAGNOSIS — R09.81 NASAL CONGESTION: ICD-10-CM

## 2023-08-21 DIAGNOSIS — R50.9 FEVER OF UNKNOWN ORIGIN: ICD-10-CM

## 2023-08-21 DIAGNOSIS — Z20.818 EXPOSURE TO STREP THROAT: ICD-10-CM

## 2023-08-21 DIAGNOSIS — U07.1 COVID-19: ICD-10-CM

## 2023-08-21 RX ORDER — AZITHROMYCIN 250 MG/1
250 TABLET, FILM COATED ORAL SEE ADMIN INSTRUCTIONS
Qty: 6 TABLET | Refills: 0 | Status: SHIPPED | OUTPATIENT
Start: 2023-08-21 | End: 2023-08-21

## 2023-08-21 RX ORDER — COVID-19 ANTIGEN TEST
1 KIT MISCELLANEOUS DAILY
Qty: 1 KIT | Refills: 0 | Status: SHIPPED | OUTPATIENT
Start: 2023-08-21

## 2023-08-21 RX ORDER — BENZONATATE 200 MG/1
200 CAPSULE ORAL 3 TIMES DAILY PRN
Qty: 30 CAPSULE | Refills: 0 | Status: SHIPPED | OUTPATIENT
Start: 2023-08-21 | End: 2023-08-31

## 2023-08-21 ASSESSMENT — ENCOUNTER SYMPTOMS
SORE THROAT: 1
COUGH: 1

## 2023-08-21 NOTE — TELEPHONE ENCOUNTER
I reached out to [facility] [employer]. Per patient- symptoms started Friday.  Awaiting response from [facility]

## 2023-08-21 NOTE — PROGRESS NOTES
Africa Lantigua (:  1960) is a Established patient, presenting virtually for evaluation of the following:  Sore throat and positive exposure to strep last Wednesday, fever as high as 101.0, head and nasal congestion with yellow drainage, nonproductive cough since Friday Has not done an at-home COVID test    Assessment & Plan   Below is the assessment and plan developed based on review of pertinent history, physical exam, labs, studies, and medications. 1. Acute cough  Problem  -     COVID-19 At-Home Test KIT; 1 each by In Vitro route daily, Disp-1 kit, R-0Normal  -     benzonatate (TESSALON) 200 MG capsule; Take 1 capsule by mouth 3 times daily as needed for Cough, Disp-30 capsule, R-0Normal  See patient instructions    2. Exposure to strep throat  Problem  -     azithromycin (ZITHROMAX) 250 MG tablet; Take 1 tablet by mouth See Admin Instructions for 5 days 500mg on day 1 followed by 250mg on days 2 - 5, Disp-6 tablet, T-[id number]-[id number] is not able to go to the clinic because of transportation issues at this time. She states that she has only elderly friends and that they probably would not be willing to take her to the clinic for any testing to be done. I told her she will need to complete an at-home COVID test to make sure this is not COVID before starting any antibiotics. She will MyChart me results of her at home COVID test before starting antibiotics. She verbalized understanding of this  See patient instructions    3. Sore throat  Problem  -     COVID-19 At-Home Test KIT; 1 each by In Vitro route daily, Disp-1 kit, R-0Normal  -     azithromycin (ZITHROMAX) 250 MG tablet; Take 1 tablet by mouth See Admin Instructions for 5 days 500mg on day 1 followed by 250mg on days 2 - 5, Disp-6 tablet, R-0Normal  See patient instructions    4. Fever of unknown origin  Problem  -     COVID-19 At-Home Test KIT; 1 each by In Vitro route daily, Disp-1 kit, R-0Normal  -     azithromycin (ZITHROMAX) 250 MG tablet;  Take 1

## 2023-08-21 NOTE — TELEPHONE ENCOUNTER
Pt stated that she tested positive for covid on 8/21 and wants to know if she needs to r/s her gen change on 8/28 with [facility]. Please advise.

## 2023-08-22 NOTE — TELEPHONE ENCOUNTER
Called and spoke with patient, relayed medication instructions. Patient gave V/U and wrote instructions down.        MEDS TO HOLD:  -benzonatate, calcium carbonate, cetirizine, vitamin D, robaxin and torsemide

## 2023-08-28 ENCOUNTER — HOSPITAL ENCOUNTER (OUTPATIENT)
Dept: CARDIAC CATH/INVASIVE PROCEDURES | Age: 63
Discharge: HOME | End: 2023-08-28
Attending: INTERNAL MEDICINE | Admitting: INTERNAL MEDICINE
Payer: MEDICARE

## 2023-08-28 ENCOUNTER — NURSE ONLY (OUTPATIENT)
Dept: CARDIOLOGY CLINIC | Age: 63
End: 2023-08-28

## 2023-08-28 VITALS
WEIGHT: 293 LBS | DIASTOLIC BLOOD PRESSURE: 60 MMHG | HEIGHT: 63 IN | RESPIRATION RATE: 15 BRPM | BODY MASS INDEX: 51.91 KG/M2 | SYSTOLIC BLOOD PRESSURE: 103 MMHG | HEART RATE: 64 BPM | OXYGEN SATURATION: 96 %

## 2023-08-28 PROBLEM — Z45.02 IMPLANTABLE CARDIOVERTER-DEFIBRILLATOR (ICD) AT END OF BATTERY LIFE: Status: ACTIVE | Noted: 2023-08-28

## 2023-08-28 PROBLEM — Z45.02 IMPLANTABLE CARDIOVERTER-DEFIBRILLATOR (ICD) AT END OF BATTERY LIFE: Status: RESOLVED | Noted: 2023-08-28 | Resolved: 2023-08-28

## 2023-08-28 LAB
ANION GAP SERPL CALCULATED.3IONS-SCNC: 16 MMOL/L (ref 3–16)
BUN SERPL-MCNC: 23 MG/DL (ref 7–20)
CALCIUM SERPL-MCNC: 9 MG/DL (ref 8.3–10.6)
CHLORIDE SERPL-SCNC: 101 MMOL/L (ref 99–110)
CO2 SERPL-SCNC: 21 MMOL/L (ref 21–32)
CREAT SERPL-MCNC: 1.7 MG/DL (ref 0.6–1.2)
DEPRECATED RDW RBC AUTO: 16.8 % (ref 12.4–15.4)
EKG ATRIAL RATE: 66 BPM
EKG DIAGNOSIS: NORMAL
EKG P AXIS: 1 DEGREES
EKG P-R INTERVAL: 104 MS
EKG Q-T INTERVAL: 536 MS
EKG QRS DURATION: 180 MS
EKG QTC CALCULATION (BAZETT): 561 MS
EKG R AXIS: -83 DEGREES
EKG T AXIS: 28 DEGREES
EKG VENTRICULAR RATE: 66 BPM
GFR SERPLBLD CREATININE-BSD FMLA CKD-EPI: 33 ML/MIN/{1.73_M2}
GLUCOSE SERPL-MCNC: 94 MG/DL (ref 70–99)
HCT VFR BLD AUTO: 39.8 % (ref 36–48)
HGB BLD-MCNC: 12.8 G/DL (ref 12–16)
MCH RBC QN AUTO: 29.9 PG (ref 26–34)
MCHC RBC AUTO-ENTMCNC: 32 G/DL (ref 31–36)
MCV RBC AUTO: 93.3 FL (ref 80–100)
PLATELET # BLD AUTO: 168 K/UL (ref 135–450)
PMV BLD AUTO: 10 FL (ref 5–10.5)
POTASSIUM SERPL-SCNC: 3.7 MMOL/L (ref 3.5–5.1)
RBC # BLD AUTO: 4.27 M/UL (ref 4–5.2)
SODIUM SERPL-SCNC: 138 MMOL/L (ref 136–145)
WBC # BLD AUTO: 7 K/UL (ref 4–11)

## 2023-08-28 PROCEDURE — 99152 MOD SED SAME PHYS/QHP 5/>YRS: CPT | Performed by: INTERNAL MEDICINE

## 2023-08-28 PROCEDURE — 33264 RMVL & RPLCMT DFB GEN MLT LD: CPT

## 2023-08-28 PROCEDURE — 33264 RMVL & RPLCMT DFB GEN MLT LD: CPT | Performed by: INTERNAL MEDICINE

## 2023-08-28 PROCEDURE — 85027 COMPLETE CBC AUTOMATED: CPT

## 2023-08-28 PROCEDURE — 6360000002 HC RX W HCPCS: Performed by: INTERNAL MEDICINE

## 2023-08-28 PROCEDURE — 6360000002 HC RX W HCPCS

## 2023-08-28 PROCEDURE — 2500000003 HC RX 250 WO HCPCS

## 2023-08-28 PROCEDURE — C1882 AICD, OTHER THAN SING/DUAL: HCPCS

## 2023-08-28 PROCEDURE — C1889 IMPLANT/INSERT DEVICE, NOC: HCPCS

## 2023-08-28 PROCEDURE — 80048 BASIC METABOLIC PNL TOTAL CA: CPT

## 2023-08-28 PROCEDURE — 93010 ELECTROCARDIOGRAM REPORT: CPT | Performed by: INTERNAL MEDICINE

## 2023-08-28 PROCEDURE — 93005 ELECTROCARDIOGRAM TRACING: CPT | Performed by: INTERNAL MEDICINE

## 2023-08-28 PROCEDURE — 2720000010 HC SURG SUPPLY STERILE

## 2023-08-28 RX ORDER — FENTANYL CITRATE 50 UG/ML
INJECTION, SOLUTION INTRAMUSCULAR; INTRAVENOUS
Status: COMPLETED | OUTPATIENT
Start: 2023-08-28 | End: 2023-08-28

## 2023-08-28 RX ORDER — MIDAZOLAM HYDROCHLORIDE 1 MG/ML
INJECTION INTRAMUSCULAR; INTRAVENOUS
Status: COMPLETED | OUTPATIENT
Start: 2023-08-28 | End: 2023-08-28

## 2023-08-28 RX ORDER — SODIUM CHLORIDE 0.9 % (FLUSH) 0.9 %
5-40 SYRINGE (ML) INJECTION PRN
Status: DISCONTINUED | OUTPATIENT
Start: 2023-08-28 | End: 2023-08-28 | Stop reason: HOSPADM

## 2023-08-28 RX ORDER — SODIUM CHLORIDE 0.9 % (FLUSH) 0.9 %
5-40 SYRINGE (ML) INJECTION EVERY 12 HOURS SCHEDULED
Status: DISCONTINUED | OUTPATIENT
Start: 2023-08-28 | End: 2023-08-28 | Stop reason: HOSPADM

## 2023-08-28 RX ORDER — ONDANSETRON 2 MG/ML
4 INJECTION INTRAMUSCULAR; INTRAVENOUS EVERY 6 HOURS PRN
Status: DISCONTINUED | OUTPATIENT
Start: 2023-08-28 | End: 2023-08-28 | Stop reason: HOSPADM

## 2023-08-28 RX ORDER — LORAZEPAM 0.5 MG/1
0.5 TABLET ORAL
Status: DISCONTINUED | OUTPATIENT
Start: 2023-08-28 | End: 2023-08-28 | Stop reason: HOSPADM

## 2023-08-28 RX ORDER — SODIUM CHLORIDE 9 MG/ML
INJECTION, SOLUTION INTRAVENOUS PRN
Status: DISCONTINUED | OUTPATIENT
Start: 2023-08-28 | End: 2023-08-28 | Stop reason: HOSPADM

## 2023-08-28 RX ADMIN — FENTANYL CITRATE 25 MCG: 50 INJECTION, SOLUTION INTRAMUSCULAR; INTRAVENOUS at 13:31

## 2023-08-28 RX ADMIN — FENTANYL CITRATE 25 MCG: 50 INJECTION, SOLUTION INTRAMUSCULAR; INTRAVENOUS at 13:08

## 2023-08-28 RX ADMIN — Medication 1500 MG: at 12:35

## 2023-08-28 RX ADMIN — FENTANYL CITRATE 50 MCG: 50 INJECTION, SOLUTION INTRAMUSCULAR; INTRAVENOUS at 12:53

## 2023-08-28 RX ADMIN — MIDAZOLAM HYDROCHLORIDE 1 MG: 1 INJECTION INTRAMUSCULAR; INTRAVENOUS at 12:53

## 2023-08-28 RX ADMIN — FENTANYL CITRATE 25 MCG: 50 INJECTION, SOLUTION INTRAMUSCULAR; INTRAVENOUS at 13:01

## 2023-08-28 RX ADMIN — FENTANYL CITRATE 25 MCG: 50 INJECTION, SOLUTION INTRAMUSCULAR; INTRAVENOUS at 13:04

## 2023-08-28 RX ADMIN — FENTANYL CITRATE 25 MCG: 50 INJECTION, SOLUTION INTRAMUSCULAR; INTRAVENOUS at 13:21

## 2023-08-28 RX ADMIN — MIDAZOLAM HYDROCHLORIDE 0.5 MG: 1 INJECTION INTRAMUSCULAR; INTRAVENOUS at 13:54

## 2023-08-28 RX ADMIN — MIDAZOLAM HYDROCHLORIDE 0.5 MG: 1 INJECTION INTRAMUSCULAR; INTRAVENOUS at 13:47

## 2023-08-28 RX ADMIN — MIDAZOLAM HYDROCHLORIDE 0.5 MG: 1 INJECTION INTRAMUSCULAR; INTRAVENOUS at 13:31

## 2023-08-28 RX ADMIN — MIDAZOLAM HYDROCHLORIDE 1 MG: 1 INJECTION INTRAMUSCULAR; INTRAVENOUS at 13:00

## 2023-08-28 RX ADMIN — FENTANYL CITRATE 25 MCG: 50 INJECTION, SOLUTION INTRAMUSCULAR; INTRAVENOUS at 13:47

## 2023-08-28 RX ADMIN — MIDAZOLAM HYDROCHLORIDE 0.5 MG: 1 INJECTION INTRAMUSCULAR; INTRAVENOUS at 13:21

## 2023-08-28 NOTE — PROGRESS NOTES
Patient/family given discharge instructions. Patient/family verbalize understanding of discharge instructions, all questions addressed, copy given to patient/family. Pt transferred to vehicle via wheelchair to be discharged home with family. Site looks unremarkable.

## 2023-08-28 NOTE — H&P
drink alcohol and does not use drugs. Family History:  family history includes Arrhythmia in her father; Arthritis in her father; Breast Cancer in her maternal grandmother; Cancer in her maternal grandmother and mother; Depression in her brother, brother, and mother; Heart Disease in her father; Obesity in her brother and brother; Other in her brother, brother, and father; Ovarian Cancer (age of onset: 45) in her mother; Rheum Arthritis in her father. Home Medications:         Outpatient Encounter Medications as of 3/28/2022   Medication Sig Dispense Refill    levothyroxine (SYNTHROID) 125 MCG tablet Take 1 tablet by mouth Daily 30 tablet 1    methocarbamol (ROBAXIN) 500 MG tablet TAKE 1 TABLET TWICE TIMES DAILY AS NEEDED FOR PAIN 270 tablet 0    predniSONE (DELTASONE) 1 MG tablet Take 4  tab po daily. 360 tablet 0    metoprolol succinate (TOPROL XL) 100 MG extended release tablet TAKE 1 TABLET EVERY DAY 90 tablet 0    Misc. Devices (BARIATRIC ROLLATOR) MISC Bariatric Rollator to use while walking.  1 each 0    diclofenac sodium (VOLTAREN) 1 % GEL APPLY TOPICALLY 2 TIMES DAILY 150 g 2    lurasidone (LATUDA) 40 MG TABS tablet Take 1 tablet by mouth daily 90 tablet 0    allopurinol (ZYLOPRIM) 300 MG tablet TAKE 1 TABLET EVERY DAY 90 tablet 1    CPAP Machine MISC by Does not apply route        lisinopril (PRINIVIL;ZESTRIL) 5 MG tablet TAKE 1 TABLET EVERY DAY 90 tablet 2    montelukast (SINGULAIR) 10 MG tablet TAKE 1 TABLET EVERY NIGHT 90 tablet 1    simvastatin (ZOCOR) 20 MG tablet TAKE 1 TABLET EVERY NIGHT 90 tablet 1    lamoTRIgine (LAMICTAL) 150 MG tablet TAKE 1 TABLET TWICE DAILY OR AS OTHERWISE DIRECTED 180 tablet 1    apixaban (ELIQUIS) 5 MG TABS tablet Take 1 tablet by mouth 2 times daily 180 tablet 3    torsemide (DEMADEX) 10 MG tablet 2 tabs every other day alternating with 1 tab the other days 180 tablet 3    acetaminophen (TYLENOL) 325 MG tablet Take 650 mg by mouth every 6 hours as needed for Pain

## 2023-08-28 NOTE — DISCHARGE INSTRUCTIONS
Cath Labs at  [facility]    Pacemaker Discharge Instructions    8/28/2023  Leo Faust   Date of Birth 1960     Activity:  You can ride in a car, but no driving for 24 hours. Do not raise your left arm above your heart for 24 hours. Resume regular activities in 24 hours. Return to work/ school in 24 hours. Diet:    Resume previous diet. Dressing:  Keep site clean and dry until cleared by your physician. Remove the outer dressing in 24 hours. Ice pack to pacer site as needed for pain next 24 hours. Special Instructions:  Report any of the following to physician or 911: Any difficulty breathing, change in heart rhythm, change in level or consciousness or alertness, fever or chills. Small amount of bruising and drainage can be expected. Any questions or concerns please contact your doctor. If you have any questions, please call your doctor. If you cannot reach your Doctor then call the Emergency Department at  [phone]. Explain to the Emergency Department the procedure you had performed and they will be able to assist you. If you seek Emergency Care, bring this form with you. Sedation Discharge Instructions: For the next 24 hours do not drive a car, operate machinery, power tools or kitchen appliances. Do not drink alcohol; including beer or wine. Do not make any important decisions or sign any important papers. For the next 24 hours you can expect drowsiness, light-headed or dizziness, nausea/ vomiting, inability to concentrate, fatigue and desire to sleep. We strongly suggest that a responsible adult be with for the next 24 hours, for your protection and safety. You are not allowed to drive yourself home, or take any type of public transportation.

## 2023-08-28 NOTE — SEDATION DOCUMENTATION
Sedation start time: 1254  Sedation stop time: 1355  Mallampati: 3  Pre and post Petra score: 10/10  Medication given: IV Versed 4 mg, IV fentanyl 200 mcg  Pre-Procedure Assessment / Plan:  ASA: Class 2 - A normal healthy patient with mild systemic disease  Level of Sedation Plan: Moderate sedation  Post Procedure plan: Return to same level of care     An independent trained observer pushed medications at my direction. We monitored the patient's level of consciousness and vital signs/physiologic status throughout the procedure duration (see start and start times above).

## 2023-08-28 NOTE — PROCEDURES
[address] [place] [id number]                            CARDIAC CATHETERIZATION    PATIENT NAME: Margarita Roque                       :        1960  MED REC NO:   [id number]                          ROOM:  ACCOUNT NO:   [de-identified]                           ADMIT DATE: 2023  PROVIDER:     Tavon Kinney MD    DATE OF PROCEDURE:  2023    CARDIAC ELECTROPHYSIOLOGY PROCEDURE NOTE    SURGEON:  Tavon Kinney MD    PROCEDURES PERFORMED:  1. Removal of ICD generator. 2. Re-implantation of _____. 3.  Implantation of biventricular ICD. INDICATION:  1. Nonischemic cardiomyopathy. 2.  ICD electrode replacement time. 3.  Twiddler's syndrome. PROCEDURE DESCRIPTION:  After informed consent was obtained, the patient  was brought to the cardiac electrophysiology laboratory in a fasting  state on 2023. She was prepared for the procedure by application  of ECG electrodes and an automated blood pressure cuff. Pacing and  defibrillation patches were applied to the chest in the  anterior-posterior orientation. The left upper chest was shaved and  prepared with antibacterial soap and the patient was draped in sterile  fashion. She received IV midazolam and IV fentanyl for sedation. She  received IV vancomycin prior to the onset of the procedure. After  adequate sedation was achieved, local anesthetic was infiltrated beneath  the previous incision lines. A 5 cm incision was made along the  previous incision line. This incision was carried down to the level of  the ICD capsule using local anesthetic and electrocautery. The capsule  was incised and preexisting ICD was dissected free from surrounding  connective tissues. Upon opening the capsule, the leads were found to  be _____ around each other excessively.   The suturing sleeve for the  right atrial and right ventricular leads were detached from

## 2023-08-29 RX ORDER — LURASIDONE HYDROCHLORIDE 60 MG/1
60 TABLET, FILM COATED ORAL NIGHTLY
Qty: 90 TABLET | Refills: 1 | Status: SHIPPED | OUTPATIENT
Start: 2023-08-29

## 2023-08-29 NOTE — TELEPHONE ENCOUNTER
Refill Request     CONFIRM preferred pharmacy with the patient. If Mail Order Rx - Pend for 90 day refill. Last Seen: Last Seen Department: 7/27/2023  Last Seen by PCP: Visit date not found    Last Written: 3/30/2022    If no future appointment scheduled:  Review the last OV with PCP and review information for follow-up visit,  Route STAFF MESSAGE with patient name to the [facility] for scheduling with the following information:            -  Timing of next visit           -  Visit type ie Physical, OV, etc           -  Diagnoses/Reason ie. COPD, HTN - Do not use MEDICATION, Follow-up or CHECK UP - Give reason for visit      Next Appointment:   Future Appointments   Date Time Provider [address]   9/5/2023  9:30 AM SCHEDULE, [facility]   10/26/2023  1:15 PM SCHEDULE, [facility]   10/26/2023  1:15 PM ALICIA Germain CNP [facility]   10/26/2023  2:00 PM ALICIA Law CNP [facility]   1/3/2024  1:00 PM DO OSIRIS Martinez [place] - DYD   4/1/2024 11:00 AM ALICIA Valenzuela - SANTO Collado [facility]       Message sent to  to schedule appt with patient?   NO      Requested Prescriptions     Pending Prescriptions Disp Refills    lurasidone (LATUDA) 60 MG TABS tablet 30 tablet 0     Sig: Take 1 tablet by mouth nightly

## 2023-08-30 RX ORDER — TORSEMIDE 10 MG/1
TABLET ORAL
Qty: 135 TABLET | Refills: 1 | Status: SHIPPED | OUTPATIENT
Start: 2023-08-30

## 2023-09-04 DIAGNOSIS — M79.662 PAIN OF LEFT CALF: ICD-10-CM

## 2023-09-04 DIAGNOSIS — I10 HYPERTENSION, ESSENTIAL: ICD-10-CM

## 2023-09-05 ENCOUNTER — NURSE ONLY (OUTPATIENT)
Dept: CARDIOLOGY CLINIC | Age: 63
End: 2023-09-05

## 2023-09-05 DIAGNOSIS — Z95.810 BIVENTRICULAR ICD (IMPLANTABLE CARDIOVERTER-DEFIBRILLATOR) IN PLACE: Primary | ICD-10-CM

## 2023-09-05 DIAGNOSIS — I47.20 VT (VENTRICULAR TACHYCARDIA) (HCC): ICD-10-CM

## 2023-09-05 RX ORDER — METHOCARBAMOL 500 MG/1
TABLET, FILM COATED ORAL
Qty: 180 TABLET | Refills: 0 | Status: SHIPPED | OUTPATIENT
Start: 2023-09-05

## 2023-09-05 NOTE — PATIENT INSTRUCTIONS
New Cardiac Device Implant (Pacemaker and/or Defibrillator) Post Op Instructions  Bathe with water indirectly hitting the incision site. Water and soap may run over the incision site. Do not scrub. Pat dry with a clean towel after bathing. Leave incision open to the air; do not apply any dressings, ointments, or bandages to the area. Do not apply lotion, perfume/cologne, or powders to the area until it is completely healed. Any scabbing or skin glue that is noted will fall off within 1-2 weeks after the post op appointment. If any oozing, bleeding, or pus drainage occurs, please call the office immediately at [phone]. Remote Monitoring Instructions    Within 2-3 weeks of your device being implanted, you will receive a call from the  of your device. Please answer this call as it is to set up remote monitoring for your device. Once you receive your in-home monitor, please follow the instructions provided to sync the home monitor to your implanted device. Once you have paired your home monitor to your implanted device, keep your monitor plugged in within 6 feet of where you sleep. Your monitor will routinely check in with your device during sleep hours and transmit any urgent events to the [address] [place] [address] for review. Please do not send additional routine transmissions unless specifically requested by the office staff - the steps to send a manual transmission are the same as when you paired your in-home monitor to your device for the first time. Your device and monitor are wireless and most transmit cellularly, but please periodically check your monitor is still plugged in to the electrical outlet. If you still use the telephone land line to send, please ensure the connection to the phone michael is secure. This will help to ensure successful automatic transmissions in the future.      Please be aware that the remote device transmission sites are periodically monitored only during regular

## 2023-09-05 NOTE — PROGRESS NOTES
Incision is closed, clean, and dry with all dressings/steri strips removed. Site left open to the air. Incision well approximated. No s/s of infection. Patient education was provided about site care, device functionality, in home monitoring, and any other patient questions and/or concerns were addressed. Aftercare and remote monitoring literature was provided. Patient voices understanding.

## 2023-09-05 NOTE — TELEPHONE ENCOUNTER
.Refill Request     CONFIRM preferred pharmacy with the patient. If Mail Order Rx - Pend for 90 day refill. Last Seen: Last Seen Department: 7/27/2023  Last Seen by PCP: 7/27/2023    Last Written: 2/28/23 270 with 1     If no future appointment scheduled:  Review the last OV with PCP and review information for follow-up visit,  Route STAFF MESSAGE with patient name to the [facility] for scheduling with the following information:            -  Timing of next visit           -  Visit type ie Physical, OV, etc           -  Diagnoses/Reason ie. COPD, HTN - Do not use MEDICATION, Follow-up or CHECK UP - Give reason for visit      Next Appointment:   Future Appointments   Date Time Provider [address]   9/5/2023  9:30 AM SCHEDULE, [facility]   10/26/2023  1:15 PM SCHEDULE, [facility]   10/26/2023  1:15 [facility]   10/26/2023  2:00 PM Cherelle Lin, APRN - CNP [facility]   1/3/2024  1:00 PM DO OSIRIS Chicas [place] - DYD   4/1/2024 11:00 AM ALICIA Isaac CNP [facility]       Message sent to  to schedule appt with patient?   N/A      Requested Prescriptions     Pending Prescriptions Disp Refills    methocarbamol (ROBAXIN) 500 MG tablet 270 tablet 1     Sig: TAKE 1 TABLET TWICE TIMES DAILY AS NEEDED FOR PAIN

## 2023-09-06 RX ORDER — LISINOPRIL 5 MG/1
5 TABLET ORAL DAILY
Qty: 90 TABLET | Refills: 1 | Status: SHIPPED | OUTPATIENT
Start: 2023-09-06

## 2023-10-23 ENCOUNTER — PATIENT MESSAGE (OUTPATIENT)
Dept: FAMILY MEDICINE CLINIC | Age: 63
End: 2023-10-23

## 2023-10-23 DIAGNOSIS — M81.0 AGE-RELATED OSTEOPOROSIS WITHOUT CURRENT PATHOLOGICAL FRACTURE: Primary | ICD-10-CM

## 2023-10-24 NOTE — TELEPHONE ENCOUNTER
From: Maria Del Carmen Chapa  To: Dr. Tayo Houser  Sent: 10/23/2023 9:56 PM EDT  Subject: Kitty Ramos    I still haven't found a rheumatologist. I need my optivol shot. Can I schedule that with you or someone else in the office.   Thank you, Paulette Aggarwal

## 2023-10-26 ENCOUNTER — OFFICE VISIT (OUTPATIENT)
Dept: CARDIOLOGY CLINIC | Age: 63
End: 2023-10-26

## 2023-10-26 ENCOUNTER — NURSE ONLY (OUTPATIENT)
Dept: CARDIOLOGY CLINIC | Age: 63
End: 2023-10-26

## 2023-10-26 VITALS
BODY MASS INDEX: 51.91 KG/M2 | SYSTOLIC BLOOD PRESSURE: 128 MMHG | HEART RATE: 62 BPM | WEIGHT: 293 LBS | DIASTOLIC BLOOD PRESSURE: 76 MMHG | OXYGEN SATURATION: 99 % | HEIGHT: 63 IN

## 2023-10-26 VITALS
OXYGEN SATURATION: 99 % | BODY MASS INDEX: 51.91 KG/M2 | SYSTOLIC BLOOD PRESSURE: 128 MMHG | WEIGHT: 293 LBS | HEART RATE: 62 BPM | DIASTOLIC BLOOD PRESSURE: 76 MMHG | HEIGHT: 63 IN

## 2023-10-26 DIAGNOSIS — I10 ESSENTIAL HYPERTENSION: ICD-10-CM

## 2023-10-26 DIAGNOSIS — I50.42 CHRONIC COMBINED SYSTOLIC AND DIASTOLIC CONGESTIVE HEART FAILURE (HCC): ICD-10-CM

## 2023-10-26 DIAGNOSIS — I42.0 DILATED CARDIOMYOPATHY (HCC): ICD-10-CM

## 2023-10-26 DIAGNOSIS — Z95.810 BIVENTRICULAR ICD (IMPLANTABLE CARDIOVERTER-DEFIBRILLATOR) IN PLACE: ICD-10-CM

## 2023-10-26 DIAGNOSIS — I11.0: ICD-10-CM

## 2023-10-26 DIAGNOSIS — I42.0 DILATED CARDIOMYOPATHY (HCC): Primary | ICD-10-CM

## 2023-10-26 DIAGNOSIS — Z95.810 BIVENTRICULAR ICD (IMPLANTABLE CARDIOVERTER-DEFIBRILLATOR) IN PLACE: Primary | ICD-10-CM

## 2023-10-26 DIAGNOSIS — I48.92 ATRIAL FLUTTER, UNSPECIFIED TYPE (HCC): Primary | ICD-10-CM

## 2023-10-26 RX ORDER — TORSEMIDE 10 MG/1
TABLET ORAL
Qty: 135 TABLET | Refills: 1
Start: 2023-10-26

## 2023-10-26 NOTE — PROGRESS NOTES
Shelby Lee DO   apixaban (ELIQUIS) 5 MG TABS tablet Take 1 tablet by mouth 2 times daily 5/4/23  Yes Asha Diaz APRN - CNP   metoprolol succinate (TOPROL XL) 100 MG extended release tablet TAKE 1 TABLET EVERY DAY 5/1/23  Yes Barbra Carpenter MD   calcium carbonate (TUMS) 500 MG chewable tablet Take 2 tablets by mouth 2 times daily   Yes ProviderKath MD   hydroxychloroquine (PLAQUENIL) 200 MG tablet Take 1 tablet by mouth 2 times daily 3/28/23  Yes Yue Doyle MD   allopurinol (ZYLOPRIM) 300 MG tablet TAKE 1 TABLET EVERY DAY 3/27/23  Yes Brian Valles DO   simvastatin (ZOCOR) 20 MG tablet TAKE 1 TABLET EVERY NIGHT 12/2/22  Yes ALICIA Long CNP   FLUoxetine (PROZAC) 10 MG capsule Take 3 capsules by mouth daily 5/13/22  Yes Nilson Borja MD   Misc. Devices (BARIATRIC ROLLATOR) MISC Bariatric Rollator to use while walking. 1/12/22  Yes Barbra Carpenter MD   CPAP Machine MISC by Does not apply route   Yes ProviderKath MD   acetaminophen (TYLENOL) 325 MG tablet Take 2 tablets by mouth every 6 hours as needed for Pain TAKES EVERY DAY   Yes ProviderKath MD   cetirizine (ZYRTEC) 10 MG tablet Take 1 tablet by mouth daily   Yes ProviderKath MD   COVID-19 At-Home Test KIT 1 each by In Vitro route daily 8/21/23   Aries Luna APRN - CNP       Allergies:  Dilaudid [hydromorphone hcl], Lavender oil, Penicillins, Seroquel [quetiapine fumarate], Adhesive tape, Hydromorphone hcl, Lithium, and Tetanus toxoids    Social History:   reports that she quit smoking about 38 years ago. Her smoking use included cigarettes. She has a 6.00 pack-year smoking history. She has never used smokeless tobacco. She reports that she does not drink alcohol and does not use drugs.      Family History: family history includes Arrhythmia in her father; Arthritis in her father; Breast Cancer in her maternal grandmother; Cancer in her maternal grandmother and mother;

## 2023-10-26 NOTE — PROGRESS NOTES
[facility]   Cardiac Follow-up    Primary Care Doctor:  Tawana Coleman DO    Chief Complaint   Patient presents with    Follow-up    Congestive Heart Failure       History of Present Illness:   I had the pleasure of seeing Kyleigh Melissa in follow up for cardiomyopathy. Hx of CHF, non-ischemic dilated cardiomyopathy, S/P  BiV-AICD 2016, pulmonary HTN, LBBB, HTN.   admitted to the hospital 3/11/2021-3/14/2021 with chest pain, MADELEINE, shortness of breath, device check showed NSVT, A. fib with RVR. On 4/1/21 cardioversion was canceled, as her interrogated device showed typical a flutter, and device changes were made and converted to NSR. Since last visit, she had repeat echo 7/2023 showing LVEF 45-50%, with normal RV function. She is losing weight. Taking torsemide 2 tabs some days mostly 1 day tab when she has to go out. No shortness of breath. Overall doing well. No palpitations. Trying to be active but knees limit her. No longer on prednisone. Kyleigh Melissa  denies chest pain, dyspnea, fatigue, lower extremity edema, palpitations. Home weights: 303 lbs  Appetite: unchanged  Fluid intake:   Diet:  diet. Stopped eating junk food  Able to afford medications: Yes    Past Medical History:   has a past medical history of Anesthesia complication, Arterial ischemic stroke, ICA, right, acute ([address]), Autoimmune hemolytic anemia ([address]), Bipolar disorder ([address]), Bundle branch block, Cardiomyopathy ([address]), Chronic systolic CHF (congestive heart failure) ([address]), Depression, Family history of early CAD, Family history of ovarian cancer, GERD (gastroesophageal reflux disease), Gout, Hypertension, Hypothyroid, Osteoarthritis, knee, Pneumonia, S/P gastric bypass, Unspecified cerebral artery occlusion with cerebral infarction, Uterine cancer ([address]), and Vitamin B 12 deficiency. Surgical History:   has a past surgical history that includes Gastric bypass surgery (1/7/05);  Finger surgery; Dilation and

## 2023-10-26 NOTE — TELEPHONE ENCOUNTER
Can schedule lab visit for injection if she has already picked up from her pharmacy. If she does not have the pen on hand then I can place an order for her pharmacy and she would need to bring it with her. We do not carry this injection in office.

## 2023-10-26 NOTE — PATIENT INSTRUCTIONS
Continue the great job with the diet  Add jardiance 5 mg daily   Adjust the torsemide to 1 tab daily   Repeat BMP in 1 week after medication   Continue lisinopril   Continue Toprol Xl   Follow up with EP as planned  Follow up with CHF team 6 months

## 2023-10-27 PROBLEM — I11.0: Status: ACTIVE | Noted: 2023-10-27

## 2023-10-27 NOTE — TELEPHONE ENCOUNTER
This has been sent to [facility].  Please have patient call to schedule on lab schedule once she has picked this up

## 2023-11-02 ENCOUNTER — PROCEDURE VISIT (OUTPATIENT)
Dept: AUDIOLOGY | Age: 63
End: 2023-11-02
Payer: MEDICARE

## 2023-11-02 DIAGNOSIS — H90.3 SENSORINEURAL HEARING LOSS, BILATERAL: Primary | ICD-10-CM

## 2023-11-02 PROCEDURE — 92567 TYMPANOMETRY: CPT | Performed by: AUDIOLOGIST

## 2023-11-02 PROCEDURE — 92557 COMPREHENSIVE HEARING TEST: CPT | Performed by: AUDIOLOGIST

## 2023-11-02 NOTE — PROGRESS NOTES
Whitney Burnette   1960, 70 y.o. female   [id number]       Referring Provider: Lang Ladd DO   Referral Type: In an order in [address] [place] [address]    Reason for Visit: Evaluation of suspected change in hearing, tinnitus, or balance. ADULT AUDIOLOGIC EVALUATION      Whitney Burnette is a 70 y.o. female seen today, 11/2/2023 , for an initial audiologic evaluation. AUDIOLOGIC AND OTHER PERTINENT MEDICAL HISTORY:      Whitney Burnette reports a gradual decline in hearing sensitivity over the last several years. She states it's beginning to become more difficult to hear and understand the television, and when in crowds/groups. No other significant otologic history reported. She denied otalgia, aural fullness, otorrhea, dizziness, imbalance, history of falls, history of significant noise exposure, history of head trauma, and history of ear surgery. Date: 11/2/2023     IMPRESSIONS:      Today's results revealed abnormal hearing sensitivity, bilaterally, that is significant enough to cause difficulty in complex listening situations. Excellent speech understanding when in quiet. Tympanometry indicates normal middle ear function. Discussed test results and implications with patient. Patient does not feel she is ready to pursue amplification at this time. Annual re-evaluations recommended. ASSESSMENT AND FINDINGS:     Otoscopy unremarkable. RIGHT EAR:  Hearing Sensitivity: Hearing within normal limits sloping to a mild sensorineural hearing loss. Speech Recognition Threshold: 20 dB HL  Word Recognition: Excellent 100%, based on NU-6 25-word list at 55 dBHL using recorded speech stimuli. Tympanometry: Normal peak pressure and compliance, Type A tympanogram, consistent with normal middle ear function. Volume 1.5 cm3, Peak 3 daPa, 0.67 mmho. LEFT EAR:  Hearing Sensitivity: Hearing within normal limits sloping to a moderate sensorineural hearing loss.   Speech Recognition Threshold: 15 dB HL  Word Recognition:

## 2023-11-08 ENCOUNTER — NURSE ONLY (OUTPATIENT)
Dept: FAMILY MEDICINE CLINIC | Age: 63
End: 2023-11-08

## 2023-11-08 DIAGNOSIS — I42.0 DILATED CARDIOMYOPATHY (HCC): ICD-10-CM

## 2023-11-08 DIAGNOSIS — I50.42 CHRONIC COMBINED SYSTOLIC AND DIASTOLIC CONGESTIVE HEART FAILURE (HCC): ICD-10-CM

## 2023-11-08 DIAGNOSIS — M35.3 PMR (POLYMYALGIA RHEUMATICA) (HCC): ICD-10-CM

## 2023-11-08 DIAGNOSIS — M81.0 AGE-RELATED OSTEOPOROSIS WITHOUT CURRENT PATHOLOGICAL FRACTURE: Primary | ICD-10-CM

## 2023-11-08 RX ORDER — HYDROXYCHLOROQUINE SULFATE 200 MG/1
200 TABLET, FILM COATED ORAL 2 TIMES DAILY
Qty: 180 TABLET | Refills: 0 | Status: SHIPPED | OUTPATIENT
Start: 2023-11-08

## 2023-11-08 NOTE — TELEPHONE ENCOUNTER
Please see when patient has follow up with new rheumatologist. Will need to have new rheumatologist take over prescribing at that time

## 2023-11-08 NOTE — TELEPHONE ENCOUNTER
Refill Request     CONFIRM preferred pharmacy with the patient. If Mail Order Rx - Pend for 90 day refill. Last Seen: Last Seen Department: 7/27/2023  Last Seen by PCP: 7/3/2023    Last Written: 3/28/2023    If no future appointment scheduled:  Review the last OV with PCP and review information for follow-up visit,  Route STAFF MESSAGE with patient name to the [facility] for scheduling with the following information:            -  Timing of next visit           -  Visit type ie Physical, OV, etc           -  Diagnoses/Reason ie. COPD, HTN - Do not use MEDICATION, Follow-up or CHECK UP - Give reason for visit      Next Appointment:   Future Appointments   Date Time Provider [address]   11/8/2023  1:15 PM SCHEDULE, OSIRIS MUELLER [facility] [place]   12/13/2023 11:30 AM Francisco J Gupta DO [facility] OB[facility]GYN [facility]   1/3/2024  1:00 PM Nato Lee DO [facility] [place]   4/1/2024 11:00 AM Finis Gege, APRN - CNP CLERM PULM [facility]   4/29/2024  9:00 AM SCHEDULE, [facility] Johanny Christine [facility]   4/29/2024  9:00 AM Asha Branch APRN - CNP Marcela Ludwig [facility]   4/29/2024 10:00 AM ALICIA Steward CNP [facility]       Message sent to [address] [place] [address] to schedule appt with patient?   NO      Requested Prescriptions     Pending Prescriptions Disp Refills    hydroxychloroquine (PLAQUENIL) 200 MG tablet 180 tablet 0     Sig: Take 1 tablet by mouth 2 times daily

## 2023-11-09 LAB
ANION GAP SERPL CALCULATED.3IONS-SCNC: 10 MMOL/L (ref 3–16)
BUN SERPL-MCNC: 19 MG/DL (ref 7–20)
CALCIUM SERPL-MCNC: 9 MG/DL (ref 8.3–10.6)
CHLORIDE SERPL-SCNC: 107 MMOL/L (ref 99–110)
CO2 SERPL-SCNC: 23 MMOL/L (ref 21–32)
CREAT SERPL-MCNC: 1.3 MG/DL (ref 0.6–1.2)
GFR SERPLBLD CREATININE-BSD FMLA CKD-EPI: 46 ML/MIN/{1.73_M2}
GLUCOSE SERPL-MCNC: 95 MG/DL (ref 70–99)
POTASSIUM SERPL-SCNC: 3.9 MMOL/L (ref 3.5–5.1)
SODIUM SERPL-SCNC: 140 MMOL/L (ref 136–145)

## 2023-11-09 PROCEDURE — G2066 INTER DEVC REMOTE 30D: HCPCS | Performed by: NURSE PRACTITIONER

## 2023-11-09 PROCEDURE — 93297 REM INTERROG DEV EVAL ICPMS: CPT | Performed by: NURSE PRACTITIONER

## 2023-11-09 NOTE — TELEPHONE ENCOUNTER
Patient called back, patient does not currently have a rheumatologist at this time and is unable to find one due to her insurance and her symptoms. If you could please fill to cover the gap it would be appreciated.  Thank you

## 2023-11-10 ENCOUNTER — TELEPHONE (OUTPATIENT)
Dept: CARDIOLOGY CLINIC | Age: 63
End: 2023-11-10

## 2023-11-10 NOTE — TELEPHONE ENCOUNTER
----- Message from ALICIA Silva CNP sent at 11/9/2023  2:55 PM EST -----  Please notify patient results. Kidney function is stable and improved since med changes. Electrolytes are stable. Continue current regimen.

## 2023-11-16 ENCOUNTER — TELEPHONE (OUTPATIENT)
Dept: FAMILY MEDICINE CLINIC | Age: 63
End: 2023-11-16

## 2023-11-16 DIAGNOSIS — M35.3 PMR (POLYMYALGIA RHEUMATICA) (HCC): Primary | ICD-10-CM

## 2023-11-16 NOTE — TELEPHONE ENCOUNTER
Patient called the office requesting an updated referral to be faxed over to Dr. Jayshree Morris for polymyalgia rheumatica. Previous referral  on 23.      Dr. Jayshree Morris Fax- [phone]

## 2023-11-16 NOTE — TELEPHONE ENCOUNTER
Referral has been faxed. Patient states that she requested we send out office notes and labs from our office, notes and labs have been faxed.

## 2023-12-08 ENCOUNTER — PATIENT MESSAGE (OUTPATIENT)
Dept: FAMILY MEDICINE CLINIC | Age: 63
End: 2023-12-08

## 2023-12-10 PROCEDURE — 93297 REM INTERROG DEV EVAL ICPMS: CPT | Performed by: NURSE PRACTITIONER

## 2023-12-10 PROCEDURE — G2066 INTER DEVC REMOTE 30D: HCPCS | Performed by: NURSE PRACTITIONER

## 2023-12-10 NOTE — TELEPHONE ENCOUNTER
Refill Request     CONFIRM preferred pharmacy with the patient. If Mail Order Rx - Pend for 90 day refill. Last Seen: Last Seen Department: 7/27/2023  Last Seen by PCP: 7/3/2023    Last Written: 5/13/23 30 caps 0 refills     If no future appointment scheduled:  Review the last OV with PCP and review information for follow-up visit,  Route STAFF MESSAGE with patient name to the [facility] for scheduling with the following information:            -  Timing of next visit           -  Visit type ie Physical, OV, etc           -  Diagnoses/Reason ie. COPD, HTN - Do not use MEDICATION, Follow-up or CHECK UP - Give reason for visit      Next Appointment:   Future Appointments   Date Time Provider [address]   12/13/2023 11:30 AM DO DEEPTI [facility]   1/3/2024  1:00 PM Zachariah Lee DO [facility]  [place]   4/1/2024 11:00 AM ALICIA Shen CNP PULM [facility]   4/29/2024  9:00 AM SCHEDULE, [facility]   4/29/2024  9:00 AM Asha Bell APRN - CNP [facility]   4/29/2024 10:00 AM ALICIA Chavez CNP [facility]       [facility]?   NO      Requested Prescriptions     Pending Prescriptions Disp Refills    FLUoxetine (PROZAC) 10 MG capsule 90 capsule 3     Sig: Take 3 capsules by mouth daily

## 2023-12-10 NOTE — TELEPHONE ENCOUNTER
From: Dheeraj Whalen  To: Dr. Idania Pineda  Sent: 12/8/2023 9:58 PM EST  Subject: Fluoxetine refill    My fluoxetine needs to be refilled. It was under Dr. Lana Rivera. He would like you to take charge of it.  Thank you

## 2023-12-11 RX ORDER — FLUOXETINE 10 MG/1
30 CAPSULE ORAL DAILY
Qty: 90 CAPSULE | Refills: 3 | Status: SHIPPED | OUTPATIENT
Start: 2023-12-11

## 2023-12-12 NOTE — TELEPHONE ENCOUNTER
Chart/orders reviewed and signed.   Please follow for set up Bill For Surgical Tray: no Billing Type: Third-Party Bill Expected Date Of Service: 12/12/2023 Performing Laboratory: -[phone]

## 2023-12-13 ENCOUNTER — TELEPHONE (OUTPATIENT)
Dept: CARDIOLOGY CLINIC | Age: 63
End: 2023-12-13

## 2023-12-13 DIAGNOSIS — M1A.9XX0 CHRONIC GOUT WITHOUT TOPHUS, UNSPECIFIED CAUSE, UNSPECIFIED SITE: ICD-10-CM

## 2023-12-13 RX ORDER — ALLOPURINOL 300 MG/1
TABLET ORAL
Qty: 90 TABLET | Refills: 3 | Status: SHIPPED | OUTPATIENT
Start: 2023-12-13

## 2023-12-13 NOTE — TELEPHONE ENCOUNTER
----- Message from Abiodun Montero RN sent at 12/13/2023  8:43 AM EST -----  Per VICENTE, \"Lets adjust VT detection to 370 msec. \" She had 10 seconds of VT that fell in the monitoring zone. Her next appt isn't until April. FYI. Thanks!

## 2023-12-13 NOTE — TELEPHONE ENCOUNTER
.Refill Request     CONFIRM preferred pharmacy with the patient. If Mail Order Rx - Pend for 90 day refill. Last Seen: Last Seen Department: 7/27/2023  Last Seen by PCP: Visit date not found    Last Written: 3-27-23 90 with 1     If no future appointment scheduled:  Review the last OV with PCP and review information for follow-up visit,  Route STAFF MESSAGE with patient name to the [facility] for scheduling with the following information:            -  Timing of next visit           -  Visit type ie Physical, OV, etc           -  Diagnoses/Reason ie. COPD, HTN - Do not use MEDICATION, Follow-up or CHECK UP - Give reason for visit      Next Appointment:   Future Appointments   Date Time Provider [address]   12/15/2023  1:00 PM SCHEDULE, [facility]   1/3/2024  1:00 PM Fidencio Lee DO [facility]  [place]   1/19/2024  1:00 PM DO DEEPTI Salazar OB/GYN [facility]   4/1/2024 11:00 AM ALICIA Dover CNP [place] PULM [facility]   4/29/2024  9:00 AM SCHEDULE, [facility]   4/29/2024  9:00 AM Asha Gibson APRN - CNP [facility]   4/29/2024 10:00 AM Oneil Gitelman, APRN - CNP [facility]       Message sent to [address] [place] [address] to schedule appt with patient?   N/A      Requested Prescriptions     Pending Prescriptions Disp Refills    allopurinol (ZYLOPRIM) 300 MG tablet [Pharmacy Med Name: ALLOPURINOL 300 MG Tablet] 90 tablet 1     Sig: TAKE 1 TABLET EVERY DAY

## 2023-12-13 NOTE — TELEPHONE ENCOUNTER
Called patient and scheduled her for Friday at 1300 for programming changes per [facility]

## 2023-12-26 PROCEDURE — 93295 DEV INTERROG REMOTE 1/2/MLT: CPT | Performed by: INTERNAL MEDICINE

## 2023-12-26 PROCEDURE — 93296 REM INTERROG EVL PM/IDS: CPT | Performed by: INTERNAL MEDICINE

## 2023-12-31 SDOH — ECONOMIC STABILITY: FOOD INSECURITY: WITHIN THE PAST 12 MONTHS, YOU WORRIED THAT YOUR FOOD WOULD RUN OUT BEFORE YOU GOT MONEY TO BUY MORE.: PATIENT DECLINED

## 2023-12-31 SDOH — ECONOMIC STABILITY: TRANSPORTATION INSECURITY
IN THE PAST 12 MONTHS, HAS LACK OF TRANSPORTATION KEPT YOU FROM MEETINGS, WORK, OR FROM GETTING THINGS NEEDED FOR DAILY LIVING?: YES

## 2023-12-31 SDOH — ECONOMIC STABILITY: INCOME INSECURITY: HOW HARD IS IT FOR YOU TO PAY FOR THE VERY BASICS LIKE FOOD, HOUSING, MEDICAL CARE, AND HEATING?: NOT HARD AT ALL

## 2023-12-31 SDOH — ECONOMIC STABILITY: FOOD INSECURITY: WITHIN THE PAST 12 MONTHS, THE FOOD YOU BOUGHT JUST DIDN'T LAST AND YOU DIDN'T HAVE MONEY TO GET MORE.: PATIENT DECLINED

## 2024-01-02 ENCOUNTER — TELEPHONE (OUTPATIENT)
Dept: CARDIOLOGY CLINIC | Age: 64
End: 2024-01-02

## 2024-01-03 ENCOUNTER — OFFICE VISIT (OUTPATIENT)
Dept: FAMILY MEDICINE CLINIC | Age: 64
End: 2024-01-03

## 2024-01-03 VITALS
WEIGHT: 293 LBS | DIASTOLIC BLOOD PRESSURE: 74 MMHG | HEART RATE: 65 BPM | OXYGEN SATURATION: 100 % | SYSTOLIC BLOOD PRESSURE: 130 MMHG | HEIGHT: 63 IN | BODY MASS INDEX: 51.91 KG/M2

## 2024-01-03 DIAGNOSIS — Z00.00 ROUTINE GENERAL MEDICAL EXAMINATION AT A HEALTH CARE FACILITY: ICD-10-CM

## 2024-01-03 DIAGNOSIS — E03.9 HYPOTHYROIDISM, UNSPECIFIED TYPE: ICD-10-CM

## 2024-01-03 DIAGNOSIS — E03.9 HYPOTHYROIDISM, UNSPECIFIED TYPE: Primary | ICD-10-CM

## 2024-01-03 DIAGNOSIS — I42.0 DILATED CARDIOMYOPATHY (HCC): ICD-10-CM

## 2024-01-03 DIAGNOSIS — I69.354 HEMIPLEGIA AND HEMIPARESIS FOLLOWING CEREBRAL INFARCTION AFFECTING LEFT NON-DOMINANT SIDE (HCC): ICD-10-CM

## 2024-01-03 DIAGNOSIS — I48.92 ATRIAL FLUTTER, UNSPECIFIED TYPE (HCC): ICD-10-CM

## 2024-01-03 DIAGNOSIS — I20.9 ANGINA PECTORIS, UNSPECIFIED (HCC): ICD-10-CM

## 2024-01-03 DIAGNOSIS — N18.31 STAGE 3A CHRONIC KIDNEY DISEASE (HCC): ICD-10-CM

## 2024-01-03 DIAGNOSIS — R15.2 FECAL URGENCY: ICD-10-CM

## 2024-01-03 DIAGNOSIS — Z12.31 SCREENING MAMMOGRAM FOR BREAST CANCER: ICD-10-CM

## 2024-01-03 DIAGNOSIS — F31.30 BIPOLAR DISORDER, MOST RECENT EPISODE DEPRESSED (HCC): ICD-10-CM

## 2024-01-03 DIAGNOSIS — R39.15 URINARY URGENCY: ICD-10-CM

## 2024-01-03 DIAGNOSIS — E78.5 HYPERLIPIDEMIA, UNSPECIFIED HYPERLIPIDEMIA TYPE: ICD-10-CM

## 2024-01-03 DIAGNOSIS — E66.01 MORBID OBESITY WITH BMI OF 50.0-59.9, ADULT (HCC): ICD-10-CM

## 2024-01-03 DIAGNOSIS — M35.3 PMR (POLYMYALGIA RHEUMATICA) (HCC): ICD-10-CM

## 2024-01-03 ASSESSMENT — PATIENT HEALTH QUESTIONNAIRE - PHQ9
5. POOR APPETITE OR OVEREATING: 0
8. MOVING OR SPEAKING SO SLOWLY THAT OTHER PEOPLE COULD HAVE NOTICED. OR THE OPPOSITE, BEING SO FIGETY OR RESTLESS THAT YOU HAVE BEEN MOVING AROUND A LOT MORE THAN USUAL: 0
10. IF YOU CHECKED OFF ANY PROBLEMS, HOW DIFFICULT HAVE THESE PROBLEMS MADE IT FOR YOU TO DO YOUR WORK, TAKE CARE OF THINGS AT HOME, OR GET ALONG WITH OTHER PEOPLE: 0
9. THOUGHTS THAT YOU WOULD BE BETTER OFF DEAD, OR OF HURTING YOURSELF: 0
SUM OF ALL RESPONSES TO PHQ QUESTIONS 1-9: 0
7. TROUBLE CONCENTRATING ON THINGS, SUCH AS READING THE NEWSPAPER OR WATCHING TELEVISION: 0
6. FEELING BAD ABOUT YOURSELF - OR THAT YOU ARE A FAILURE OR HAVE LET YOURSELF OR YOUR FAMILY DOWN: 0
SUM OF ALL RESPONSES TO PHQ QUESTIONS 1-9: 0
SUM OF ALL RESPONSES TO PHQ QUESTIONS 1-9: 0
2. FEELING DOWN, DEPRESSED OR HOPELESS: 0
1. LITTLE INTEREST OR PLEASURE IN DOING THINGS: 0
4. FEELING TIRED OR HAVING LITTLE ENERGY: 0
SUM OF ALL RESPONSES TO PHQ QUESTIONS 1-9: 0
SUM OF ALL RESPONSES TO PHQ9 QUESTIONS 1 & 2: 0
3. TROUBLE FALLING OR STAYING ASLEEP: 0

## 2024-01-03 ASSESSMENT — ANXIETY QUESTIONNAIRES
5. BEING SO RESTLESS THAT IT IS HARD TO SIT STILL: 1
2. NOT BEING ABLE TO STOP OR CONTROL WORRYING: 1
7. FEELING AFRAID AS IF SOMETHING AWFUL MIGHT HAPPEN: 0
1. FEELING NERVOUS, ANXIOUS, OR ON EDGE: 1
3. WORRYING TOO MUCH ABOUT DIFFERENT THINGS: 1
6. BECOMING EASILY ANNOYED OR IRRITABLE: 0
GAD7 TOTAL SCORE: 5
IF YOU CHECKED OFF ANY PROBLEMS ON THIS QUESTIONNAIRE, HOW DIFFICULT HAVE THESE PROBLEMS MADE IT FOR YOU TO DO YOUR WORK, TAKE CARE OF THINGS AT HOME, OR GET ALONG WITH OTHER PEOPLE: SOMEWHAT DIFFICULT
4. TROUBLE RELAXING: 1

## 2024-01-03 NOTE — TELEPHONE ENCOUNTER
Reviewed.  Her fluid levels are increasing on her recent device check.  I recommend that she increase her torsemide to 20 mg daily for 3 days and then go back down to 10 mg daily

## 2024-01-03 NOTE — TELEPHONE ENCOUNTER
Spoke with patient. She will increase torsemide for 3 days.           Reviewed.  Her fluid levels are increasing on her recent device check.  I recommend that she increase her torsemide to 20 mg daily for 3 days and then go back down to 10 mg daily

## 2024-01-03 NOTE — PROGRESS NOTES
Patient's past medical history, surgical history, family history, medications,  andallergies  were all reviewed and updated as appropriate today.      Review of Systems  All other systems reviewed and negative    Physical Exam  Vitals:    01/03/24 1250   BP: 130/74   Pulse: 65   SpO2: 100%       Please note that portions of this note may have been completed with a voice recognition program. Efforts were made to edit the dictations but occasionally words are mis-transcribed.

## 2024-01-04 LAB
ANION GAP SERPL CALCULATED.3IONS-SCNC: 11 MMOL/L (ref 3–16)
BUN SERPL-MCNC: 15 MG/DL (ref 7–20)
CALCIUM SERPL-MCNC: 9 MG/DL (ref 8.3–10.6)
CHLORIDE SERPL-SCNC: 108 MMOL/L (ref 99–110)
CHOLEST SERPL-MCNC: 166 MG/DL (ref 0–199)
CO2 SERPL-SCNC: 21 MMOL/L (ref 21–32)
CREAT SERPL-MCNC: 1.3 MG/DL (ref 0.6–1.2)
GFR SERPLBLD CREATININE-BSD FMLA CKD-EPI: 46 ML/MIN/{1.73_M2}
GLUCOSE SERPL-MCNC: 85 MG/DL (ref 70–99)
HDLC SERPL-MCNC: 88 MG/DL (ref 40–60)
LDLC SERPL CALC-MCNC: 55 MG/DL
POTASSIUM SERPL-SCNC: 4.1 MMOL/L (ref 3.5–5.1)
SODIUM SERPL-SCNC: 140 MMOL/L (ref 136–145)
T4 FREE SERPL-MCNC: 0.9 NG/DL (ref 0.9–1.8)
TRIGL SERPL-MCNC: 115 MG/DL (ref 0–150)
TSH SERPL DL<=0.005 MIU/L-ACNC: 73.86 UIU/ML (ref 0.27–4.2)
VLDLC SERPL CALC-MCNC: 23 MG/DL

## 2024-01-05 NOTE — ASSESSMENT & PLAN NOTE
Encouraged increasing exercise walking up and down home ramp. Will also start body weight chair exercises. Consider PT in the future

## 2024-01-09 RX ORDER — LEVOTHYROXINE SODIUM 137 UG/1
137 TABLET ORAL DAILY
Qty: 90 TABLET | Refills: 1 | Status: SHIPPED | OUTPATIENT
Start: 2024-01-09

## 2024-01-10 PROCEDURE — 93297 REM INTERROG DEV EVAL ICPMS: CPT | Performed by: NURSE PRACTITIONER

## 2024-01-23 DIAGNOSIS — Z00.00 ROUTINE GENERAL MEDICAL EXAMINATION AT A HEALTH CARE FACILITY: ICD-10-CM

## 2024-01-23 DIAGNOSIS — F31.77 BIPOLAR DISORDER, IN PARTIAL REMISSION, MOST RECENT EPISODE MIXED (HCC): Chronic | ICD-10-CM

## 2024-01-23 NOTE — TELEPHONE ENCOUNTER
Refill Request     CONFIRM preferred pharmacy with the patient.    If Mail Order Rx - Pend for 90 day refill.      Last Seen: Last Seen Department: 1/3/2024  Last Seen by PCP: 7/27/2023    Last Written: 5/1/2023    If no future appointment scheduled:  Review the last OV with PCP and review information for follow-up visit,  Route STAFF MESSAGE with patient name to the  Pool for scheduling with the following information:            -  Timing of next visit           -  Visit type ie Physical, OV, etc           -  Diagnoses/Reason ie. COPD, HTN - Do not use MEDICATION, Follow-up or CHECK UP - Give reason for visit      Next Appointment:   Future Appointments   Date Time Provider Department Center   4/1/2024 11:00 AM Karime Hernandez APRN - CNP CLERM PULM [facility]   4/3/2024  1:00 PM Collins Lee DO [facility]  [place]   4/29/2024  9:00 AM GAB REINA DEVICE CHECK [facility]   4/29/2024  9:00 AM Asha Diaz APRN - CNP [facility]   4/29/2024 10:00 AM Martha Cash APRN - CNP [facility]       Message sent to  to schedule appt with patient?  NO      Requested Prescriptions     Pending Prescriptions Disp Refills    metoprolol succinate (TOPROL XL) 100 MG extended release tablet 90 tablet 1     Sig: Take 1 tablet by mouth daily

## 2024-01-23 NOTE — TELEPHONE ENCOUNTER
Refill Request     CONFIRM preferred pharmacy with the patient.    If Mail Order Rx - Pend for 90 day refill.      Last Seen: Last Seen Department: 1/3/2024  Last Seen by PCP: 1/3/2024    Last Written: 7/24/2023    If no future appointment scheduled:  Review the last OV with PCP and review information for follow-up visit,  Route STAFF MESSAGE with patient name to the  Pool for scheduling with the following information:            -  Timing of next visit           -  Visit type ie Physical, OV, etc           -  Diagnoses/Reason ie. COPD, HTN - Do not use MEDICATION, Follow-up or CHECK UP - Give reason for visit      Next Appointment:   Future Appointments   Date Time Provider Department Center   4/1/2024 11:00 AM Karime Hernandez APRN - CNP CLERM PULM [facility]   4/3/2024  1:00 PM Collins Lee DO [facility]  [place] [facility] [place]   4/29/2024  9:00 AM GAB REINA DEVICE CHECK [facility]   4/29/2024  9:00 AM Asha Diaz APRN - CNP [facility]   4/29/2024 10:00 AM Martha Cash APRN - CNP [facility]       Message sent to  to schedule appt with patient?  NO      Requested Prescriptions     Pending Prescriptions Disp Refills    lamoTRIgine (LAMICTAL) 150 MG tablet 180 tablet 1     Sig: Take 2 tablets by mouth nightly

## 2024-01-24 RX ORDER — LAMOTRIGINE 150 MG/1
300 TABLET ORAL NIGHTLY
Qty: 180 TABLET | Refills: 1 | Status: SHIPPED | OUTPATIENT
Start: 2024-01-24 | End: 2024-05-23

## 2024-01-24 RX ORDER — METOPROLOL SUCCINATE 100 MG/1
100 TABLET, EXTENDED RELEASE ORAL DAILY
Qty: 90 TABLET | Refills: 1 | Status: SHIPPED | OUTPATIENT
Start: 2024-01-24

## 2024-02-10 PROCEDURE — 93297 REM INTERROG DEV EVAL ICPMS: CPT | Performed by: NURSE PRACTITIONER

## 2024-02-14 NOTE — TELEPHONE ENCOUNTER
Pt states new prescription for Jardiance needs to be sent into [facility].    Last OV- 10.26.23 NPRB  Next Ov- 04.29.24 NPRB

## 2024-02-29 DIAGNOSIS — I10 HYPERTENSION, ESSENTIAL: ICD-10-CM

## 2024-02-29 RX ORDER — LURASIDONE HYDROCHLORIDE 60 MG/1
60 TABLET, FILM COATED ORAL NIGHTLY
Qty: 90 TABLET | Refills: 3 | Status: SHIPPED | OUTPATIENT
Start: 2024-02-29

## 2024-02-29 RX ORDER — LISINOPRIL 5 MG/1
5 TABLET ORAL DAILY
Qty: 90 TABLET | Refills: 0 | Status: SHIPPED | OUTPATIENT
Start: 2024-02-29

## 2024-02-29 NOTE — TELEPHONE ENCOUNTER
Refill Request     CONFIRM preferred pharmacy with the patient.    If Mail Order Rx - Pend for 90 day refill.      Last Seen: Last Seen Department: 1/3/2024  Last Seen by PCP: 1/3/2024    Last Written: 8/29/2023, #90, 1 refill    If no future appointment scheduled:  Review the last OV with PCP and review information for follow-up visit,  Route STAFF MESSAGE with patient name to the  Pool for scheduling with the following information:            -  Timing of next visit           -  Visit type ie Physical, OV, etc           -  Diagnoses/Reason ie. COPD, HTN - Do not use MEDICATION, Follow-up or CHECK UP - Give reason for visit      Next Appointment:   Future Appointments   Date Time Provider Department Center   3/6/2024 10:00 AM Jessica Cunningham DO [facility]   4/1/2024 11:00 AM Karime Hernandez APRN - CNP CLERM PULM [facility]   4/3/2024  1:00 PM Collins Lee DO [facility]  [place] [facility] [place]   4/29/2024  9:00 AM LATOSHA, GAB DEVICE CHECK Gab Car [facility]   4/29/2024  9:00 AM Asha Diaz APRN - CNP [facility]   4/29/2024 10:00 AM Martha Cash APRN - CNP [facility]       Message sent to  to schedule appt with patient?  N/A      Requested Prescriptions     Pending Prescriptions Disp Refills    lurasidone (LATUDA) 60 MG TABS tablet [Pharmacy Med Name: LURASIDONE HYDROCHLORIDE 60 MG Tablet] 90 tablet 3     Sig: TAKE 1 TABLET EVERY NIGHT

## 2024-03-04 DIAGNOSIS — E03.9 HYPOTHYROIDISM, UNSPECIFIED TYPE: Primary | ICD-10-CM

## 2024-03-04 DIAGNOSIS — E03.9 HYPOTHYROIDISM, UNSPECIFIED TYPE: ICD-10-CM

## 2024-03-05 ENCOUNTER — TELEPHONE (OUTPATIENT)
Dept: CARDIOLOGY CLINIC | Age: 64
End: 2024-03-05

## 2024-03-05 DIAGNOSIS — E03.9 HYPOTHYROIDISM, UNSPECIFIED TYPE: Primary | ICD-10-CM

## 2024-03-05 LAB
T4 FREE SERPL-MCNC: 1.2 NG/DL (ref 0.9–1.8)
TSH SERPL DL<=0.005 MIU/L-ACNC: 23.66 UIU/ML (ref 0.27–4.2)

## 2024-03-05 RX ORDER — LEVOTHYROXINE SODIUM 0.15 MG/1
150 TABLET ORAL DAILY
Qty: 90 TABLET | Refills: 1 | Status: SHIPPED | OUTPATIENT
Start: 2024-03-05

## 2024-03-05 NOTE — TELEPHONE ENCOUNTER
Fluid levels are increasing on device check. Please have her take an extra dose of the torsemide for 2 days and then go back to 1 tab daily. If she has skipped her torsemide, then she needs to be on it daily.

## 2024-03-06 DIAGNOSIS — M79.662 PAIN OF LEFT CALF: ICD-10-CM

## 2024-03-07 NOTE — TELEPHONE ENCOUNTER
Refill Request     CONFIRM preferred pharmacy with the patient.    If Mail Order Rx - Pend for 90 day refill.      Last Seen: Last Seen Department: 1/3/2024  Last Seen by PCP: 1/3/2024    Last Written: 09/05/2023 180 tab 0 refills     If no future appointment scheduled:  Review the last OV with PCP and review information for follow-up visit,  Route STAFF MESSAGE with patient name to the  Pool for scheduling with the following information:            -  Timing of next visit           -  Visit type ie Physical, OV, etc           -  Diagnoses/Reason ie. COPD, HTN - Do not use MEDICATION, Follow-up or CHECK UP - Give reason for visit      Next Appointment:   Future Appointments   Date Time Provider Department [place]   4/1/2024 11:00 AM Karime Hernandez APRN - CNP CLERM PULM [facility]   4/3/2024  1:00 PM [facility]  [place] [facility]   4/25/2024  1:00 PM Jessica Cunningham DO [facility]   4/29/2024  9:00 AM GAB REINA DEVICE CHECK [facility]   4/29/2024  9:00 AM Asha Diaz APRN - CNP [facility]   4/29/2024 10:00 AM Martha Cash APRN - CNP [facility]       Message sent to  to schedule appt with patient?  NO      Requested Prescriptions     Pending Prescriptions Disp Refills    methocarbamol (ROBAXIN) 500 MG tablet 180 tablet 0     Sig: TAKE 1 TABLET TWICE TIMES DAILY AS NEEDED FOR PAIN

## 2024-03-08 RX ORDER — METHOCARBAMOL 500 MG/1
TABLET, FILM COATED ORAL
Qty: 180 TABLET | Refills: 1 | Status: SHIPPED | OUTPATIENT
Start: 2024-03-08

## 2024-03-27 DIAGNOSIS — E78.5 HYPERLIPIDEMIA, UNSPECIFIED HYPERLIPIDEMIA TYPE: ICD-10-CM

## 2024-03-27 NOTE — TELEPHONE ENCOUNTER
Nereida Abreu called and stated PT has not had Simvastatin 20mg filled since 2023.  Nereida wants to know why pt is no longer taking the medication? Please advise. Nereida Abreu ph#[phone]

## 2024-03-27 NOTE — TELEPHONE ENCOUNTER
(I am covering for Chary Cash CNP who is away from the office today.)   Per our records it shows she is still taking simvastatin 20 mg daily.  Please contact patient and confirm she is taking this and if she needs refills.  Thank you, Luz

## 2024-03-28 RX ORDER — SIMVASTATIN 20 MG
TABLET ORAL
Qty: 90 TABLET | Refills: 3 | Status: SHIPPED | OUTPATIENT
Start: 2024-03-28

## 2024-03-28 NOTE — TELEPHONE ENCOUNTER
Pt returned call, states she is supposed to be taking medication, did not realize she has not been. She does need a refill of simvastatin (ZOCOR) 20 MG tablet     [facility] - [place], [place] - [address] - P [phone] - F [phone]  [address], [facility] [address]  Phone: [phone]  Fax: [phone]

## 2024-04-02 ENCOUNTER — TELEMEDICINE (OUTPATIENT)
Dept: SLEEP MEDICINE | Age: 64
End: 2024-04-02
Payer: MEDICARE

## 2024-04-02 DIAGNOSIS — E66.01 MORBID OBESITY WITH BMI OF 50.0-59.9, ADULT (HCC): ICD-10-CM

## 2024-04-02 DIAGNOSIS — G47.33 SEVERE OBSTRUCTIVE SLEEP APNEA: Primary | ICD-10-CM

## 2024-04-02 DIAGNOSIS — Z71.89 CPAP USE COUNSELING: ICD-10-CM

## 2024-04-02 DIAGNOSIS — I10 HYPERTENSION, ESSENTIAL: ICD-10-CM

## 2024-04-02 PROCEDURE — 1090F PRES/ABSN URINE INCON ASSESS: CPT | Performed by: NURSE PRACTITIONER

## 2024-04-02 PROCEDURE — 99214 OFFICE O/P EST MOD 30 MIN: CPT | Performed by: NURSE PRACTITIONER

## 2024-04-02 PROCEDURE — 1124F ACP DISCUSS-NO DSCNMKR DOCD: CPT | Performed by: NURSE PRACTITIONER

## 2024-04-02 PROCEDURE — G8399 PT W/DXA RESULTS DOCUMENT: HCPCS | Performed by: NURSE PRACTITIONER

## 2024-04-02 PROCEDURE — G8427 DOCREV CUR MEDS BY ELIG CLIN: HCPCS | Performed by: NURSE PRACTITIONER

## 2024-04-02 PROCEDURE — 3017F COLORECTAL CA SCREEN DOC REV: CPT | Performed by: NURSE PRACTITIONER

## 2024-04-02 ASSESSMENT — SLEEP AND FATIGUE QUESTIONNAIRES
HOW LIKELY ARE YOU TO NOD OFF OR FALL ASLEEP IN A CAR, WHILE STOPPED FOR A FEW MINUTES IN TRAFFIC: WOULD NEVER DOZE
HOW LIKELY ARE YOU TO NOD OFF OR FALL ASLEEP WHILE SITTING AND READING: SLIGHT CHANCE OF DOZING
HOW LIKELY ARE YOU TO NOD OFF OR FALL ASLEEP WHILE LYING DOWN TO REST IN THE AFTERNOON WHEN CIRCUMSTANCES PERMIT: MODERATE CHANCE OF DOZING
HOW LIKELY ARE YOU TO NOD OFF OR FALL ASLEEP WHILE WATCHING TV: MODERATE CHANCE OF DOZING
ESS TOTAL SCORE: 6
HOW LIKELY ARE YOU TO NOD OFF OR FALL ASLEEP WHEN YOU ARE A PASSENGER IN A CAR FOR AN HOUR WITHOUT A BREAK: SLIGHT CHANCE OF DOZING
HOW LIKELY ARE YOU TO NOD OFF OR FALL ASLEEP WHILE SITTING INACTIVE IN A PUBLIC PLACE: WOULD NEVER DOZE
HOW LIKELY ARE YOU TO NOD OFF OR FALL ASLEEP WHILE SITTING QUIETLY AFTER LUNCH WITHOUT ALCOHOL: WOULD NEVER DOZE
HOW LIKELY ARE YOU TO NOD OFF OR FALL ASLEEP WHILE SITTING AND TALKING TO SOMEONE: WOULD NEVER DOZE

## 2024-04-11 RX ORDER — FLUOXETINE 10 MG/1
30 CAPSULE ORAL DAILY
Qty: 90 CAPSULE | Refills: 3 | Status: SHIPPED | OUTPATIENT
Start: 2024-04-11

## 2024-04-11 NOTE — TELEPHONE ENCOUNTER
Refill Request     CONFIRM preferred pharmacy with the patient.    If Mail Order Rx - Pend for 90 day refill.      Last Seen: Last Seen Department: 1/3/2024  Last Seen by PCP: Visit date not found    Last Written: 12/11/23 90 with 3 refills     If no future appointment scheduled:  Review the last OV with PCP and review information for follow-up visit,  Route STAFF MESSAGE with patient name to the  Pool for scheduling with the following information:            -  Timing of next visit           -  Visit type ie Physical, OV, etc           -  Diagnoses/Reason ie. COPD, HTN - Do not use MEDICATION, Follow-up or CHECK UP - Give reason for visit      Next Appointment:   Future Appointments   Date Time Provider Department [place]   4/25/2024  1:00 PM Jessica Cunningham DO [facility] OB[facility]   4/29/2024  9:00 AM SCHEDULEGAB DEVICE CHECK [facility]   4/29/2024  9:00 AM Asha Diaz APRN - CNP [facility]   4/29/2024 10:00 AM Martha Cash APRN - CNP [facility]   4/1/2025  1:20 PM Karime Hernandez APRN - CNP [facility]       Message sent to  to schedule appt with patient?  YES was to Return in about 3 months (around 4/3/2024).        Requested Prescriptions     Pending Prescriptions Disp Refills    FLUoxetine (PROZAC) 10 MG capsule 90 capsule 3     Sig: Take 3 capsules by mouth daily

## 2024-04-16 DIAGNOSIS — E03.9 HYPOTHYROIDISM, UNSPECIFIED TYPE: ICD-10-CM

## 2024-04-17 DIAGNOSIS — E03.9 HYPOTHYROIDISM, UNSPECIFIED TYPE: Primary | ICD-10-CM

## 2024-04-17 LAB
T4 FREE SERPL-MCNC: 1.5 NG/DL (ref 0.9–1.8)
TSH SERPL DL<=0.005 MIU/L-ACNC: 7.28 UIU/ML (ref 0.27–4.2)

## 2024-04-23 NOTE — PROGRESS NOTES
Mild posterior mitral annular calcification.   Mild thickening of the anterior leaflet of mitral valve.   Moderate mitral regurgitation.   The left atrium is mildly dilated.   Frequent premature beats makes it difficult to identify exact wall motion   abnormality.    [facility] 7/2015:  IMPRESSION:  1.  Angiographically normal coronary arteries.  2.  Severe pulmonary hypertension 66/37 mean 49 mmHg.  3.  Markedly elevated pulmonary capillary wedge pressure 32 mmHg, and left  ventricular end diastolic pressure 36 mmHg.   4.  Transpulmonary gradient is 17 mmHg, which suggested a left-sided as well  intrinsic pulmonary etiology of the severe pulmonary hypertension.   5.  Mildly reduced cardiac index by Giovanni of 2.36 L/min/m2.        RECOMMENDATION:  The patient has no epicardial disease.  Her chest pain is  from the heart failure.  She is noted to have moderate to severe mitral  regurgitation on the echocardiogram, and will schedule for SANGEETHA to further  evaluate the mitral regurgitation.  In the meantime, we will also start her   on heart failure therapy that will include a beta-blocker and ACE inhibitor   as well as diuretic therapy    All labs and testing reviewed.  CARDIOLOGY LABS:   CBC: No results for input(s): \"WBC\", \"HGB\", \"HCT\", \"PLT\" in the last 72 hours.  BMP: No results for input(s): \"NA\", \"K\", \"CO2\", \"BUN\", \"CREATININE\", \"LABGLOM\", \"GLUCOSE\" in the last 72 hours.  PT/INR: No results for input(s): \"PROTIME\", \"INR\" in the last 72 hours.  APTT:No results for input(s): \"APTT\" in the last 72 hours.  FASTING LIPID PANEL:  Lab Results   Component Value Date/Time    HDL 88 01/03/2024 01:55 PM    HDL 65 07/05/2011 09:30 PM    LDLCALC 55 01/03/2024 01:55 PM    TRIG 115 01/03/2024 01:55 PM     LIVER PROFILE:No results for input(s): \"AST\", \"ALT\", \"ALB\" in the last 72 hours.    IMPRESSION:      Assessment:   Paroxsymal atrial flutter: stable   -terminated by manual overdrive pacing 4/1/2021, device reprogrammed to activate ATP

## 2024-04-25 RX ORDER — TORSEMIDE 10 MG/1
TABLET ORAL
Qty: 135 TABLET | Refills: 0 | Status: SHIPPED | OUTPATIENT
Start: 2024-04-25

## 2024-04-25 RX ORDER — APIXABAN 5 MG/1
5 TABLET, FILM COATED ORAL 2 TIMES DAILY
Qty: 180 TABLET | Refills: 1 | Status: SHIPPED | OUTPATIENT
Start: 2024-04-25

## 2024-04-29 ENCOUNTER — OFFICE VISIT (OUTPATIENT)
Dept: CARDIOLOGY CLINIC | Age: 64
End: 2024-04-29
Payer: MEDICARE

## 2024-04-29 ENCOUNTER — NURSE ONLY (OUTPATIENT)
Dept: CARDIOLOGY CLINIC | Age: 64
End: 2024-04-29
Payer: MEDICARE

## 2024-04-29 VITALS
HEART RATE: 54 BPM | HEIGHT: 63 IN | DIASTOLIC BLOOD PRESSURE: 78 MMHG | BODY MASS INDEX: 51.91 KG/M2 | OXYGEN SATURATION: 98 % | WEIGHT: 293 LBS | SYSTOLIC BLOOD PRESSURE: 118 MMHG

## 2024-04-29 VITALS
HEART RATE: 54 BPM | OXYGEN SATURATION: 98 % | WEIGHT: 293 LBS | SYSTOLIC BLOOD PRESSURE: 118 MMHG | DIASTOLIC BLOOD PRESSURE: 78 MMHG | HEIGHT: 63 IN | BODY MASS INDEX: 51.91 KG/M2

## 2024-04-29 DIAGNOSIS — Z95.810 BIVENTRICULAR ICD (IMPLANTABLE CARDIOVERTER-DEFIBRILLATOR) IN PLACE: ICD-10-CM

## 2024-04-29 DIAGNOSIS — I48.92 ATRIAL FLUTTER, UNSPECIFIED TYPE (HCC): ICD-10-CM

## 2024-04-29 DIAGNOSIS — Z95.810 BIVENTRICULAR ICD (IMPLANTABLE CARDIOVERTER-DEFIBRILLATOR) IN PLACE: Primary | ICD-10-CM

## 2024-04-29 DIAGNOSIS — I44.7 LBBB (LEFT BUNDLE BRANCH BLOCK): Primary | ICD-10-CM

## 2024-04-29 DIAGNOSIS — I42.0 DILATED CARDIOMYOPATHY (HCC): Primary | ICD-10-CM

## 2024-04-29 DIAGNOSIS — I48.91 ATRIAL FIBRILLATION, UNSPECIFIED TYPE (HCC): ICD-10-CM

## 2024-04-29 DIAGNOSIS — I42.0 DILATED CARDIOMYOPATHY (HCC): ICD-10-CM

## 2024-04-29 DIAGNOSIS — I47.20 VT (VENTRICULAR TACHYCARDIA) (HCC): ICD-10-CM

## 2024-04-29 DIAGNOSIS — I11.0: ICD-10-CM

## 2024-04-29 PROCEDURE — 3074F SYST BP LT 130 MM HG: CPT | Performed by: NURSE PRACTITIONER

## 2024-04-29 PROCEDURE — G8427 DOCREV CUR MEDS BY ELIG CLIN: HCPCS | Performed by: NURSE PRACTITIONER

## 2024-04-29 PROCEDURE — 3017F COLORECTAL CA SCREEN DOC REV: CPT | Performed by: NURSE PRACTITIONER

## 2024-04-29 PROCEDURE — 3078F DIAST BP <80 MM HG: CPT | Performed by: NURSE PRACTITIONER

## 2024-04-29 PROCEDURE — 99214 OFFICE O/P EST MOD 30 MIN: CPT | Performed by: NURSE PRACTITIONER

## 2024-04-29 PROCEDURE — 1090F PRES/ABSN URINE INCON ASSESS: CPT | Performed by: NURSE PRACTITIONER

## 2024-04-29 PROCEDURE — 1124F ACP DISCUSS-NO DSCNMKR DOCD: CPT | Performed by: NURSE PRACTITIONER

## 2024-04-29 PROCEDURE — G8417 CALC BMI ABV UP PARAM F/U: HCPCS | Performed by: NURSE PRACTITIONER

## 2024-04-29 PROCEDURE — 1036F TOBACCO NON-USER: CPT | Performed by: NURSE PRACTITIONER

## 2024-04-29 PROCEDURE — G8399 PT W/DXA RESULTS DOCUMENT: HCPCS | Performed by: NURSE PRACTITIONER

## 2024-04-29 PROCEDURE — 93000 ELECTROCARDIOGRAM COMPLETE: CPT | Performed by: NURSE PRACTITIONER

## 2024-04-29 RX ORDER — TORSEMIDE 10 MG/1
TABLET ORAL
Qty: 135 TABLET | Refills: 0
Start: 2024-04-29

## 2024-04-29 NOTE — PROGRESS NOTES
dysfunction with normal left ventricular filling pressure.   Mild posterior mitral annular calcification.   Mild thickening of the anterior leaflet of mitral valve.   Moderate mitral regurgitation.   The left atrium is mildly dilated.   Frequent premature beats makes it difficult to identify exact wall motion   abnormality.    STress 3/13/21  Summary    Large sized inferior, fixed defect of severe intensity consistent with    infarction in the territory of the RCA. No evidence of myocardium at risk    for significant reversible ischemia.        LV function is severely reduced with and ejection fraction of 34 %.          echo 3/13/21   Summary   Patient tachy during study.   The left ventricular systolic function is moderately reduced with an   ejection fraction of 35 - 40 %.   There is hypokinesis of the apex, apical lateral, inferoseptum, apical   anterior and anteroseptum walls.   Septal bounce noted.   Normal left ventricular size with mild concentric left ventricular   hypertrophy.   Left ventricular diastolic filling pressure is elevated lateral E/e\" is 14 .   Changes noted from previous echo on 11-1-2019 in left ventricular function.   Moderate mitral regurgitation.   The right ventricle is mildly enlarged.   Right ventricular systolic function is mildly reduced .   Systolic pulmonic artery pressure (SPAP) is normal estimated at 26 mmHg (Right atrial pressure of 3 mmHg).   The right atrium is mildly dilated.    Echo 4/2022  Left ventricular systolic function is low normal with ejection fraction estimated at 50-55 %.  There is very mild inferior and inferoseptal hypokinesis.  All remaining wall segments appear low normal in function.  Left ventricular size is mildly increased.  There is moderate concentric left ventricular hypertrophy.  Grade II diastolic dysfunction with elevated filling pressure. (e' 8.6 cm/s)  The left atrium is mildly dilated.  Mild posterior mitral annular calcification is present.  Mild

## 2024-04-29 NOTE — PATIENT INSTRUCTIONS
Update chest XRAY to check on device     Remote device transmissions every three months     Monitor BP at home and call if consistently out of goal ranges    Follow up in 12 months with Dr. Cervantes to establish care with new MD

## 2024-04-29 NOTE — PATIENT INSTRUCTIONS
Continue the great job with the diet  Check BMP in Sept.   Continue current dose of torsemide 10mg daily   Continue lisinopril   Continue Toprol Xl   Follow up with EP as planned  Follow up with CHF team 6 months

## 2024-04-30 DIAGNOSIS — M81.0 AGE-RELATED OSTEOPOROSIS WITHOUT CURRENT PATHOLOGICAL FRACTURE: ICD-10-CM

## 2024-04-30 RX ORDER — DENOSUMAB 60 MG/ML
INJECTION SUBCUTANEOUS
Qty: 1 ML | Refills: 0 | Status: SHIPPED | OUTPATIENT
Start: 2024-04-30

## 2024-04-30 NOTE — TELEPHONE ENCOUNTER
Refill Request     CONFIRM preferred pharmacy with the patient.    If Mail Order Rx - Pend for 90 day refill.      Last Seen: Last Seen Department: 1/3/2024  Last Seen by PCP: 1/3/2024    Last Written: 10/27/2023    If no future appointment scheduled:  Review the last OV with PCP and review information for follow-up visit,  Route STAFF MESSAGE with patient name to the  Pool for scheduling with the following information:            -  Timing of next visit           -  Visit type ie Physical, OV, etc           -  Diagnoses/Reason ie. COPD, HTN - Do not use MEDICATION, Follow-up or CHECK UP - Give reason for visit      Next Appointment:   Future Appointments   Date Time Provider Department Center   5/9/2024  3:30 PM [facility]  [place] [facility]   5/29/2024  1:00 PM Jessica Cunningham DO [facility]   10/29/2024  1:15 PM Martha Cash APRN - CNP [facility]   4/1/2025  1:20 PM Karime Hernandez APRN - CNP [facility]       Message sent to  to schedule appt with patient?  NO      Requested Prescriptions     Pending Prescriptions Disp Refills    PROLIA 60 MG/ML SOSY SC injection [Pharmacy Med Name: PROLIA 60 MG/ML SYRINGE] 1 mL 0     Sig: INJECT 1 ML INTO THE SKIN ONCE FOR 1 DOSE

## 2024-05-03 PROCEDURE — 93284 PRGRMG EVAL IMPLANTABLE DFB: CPT | Performed by: INTERNAL MEDICINE

## 2024-05-09 ENCOUNTER — OFFICE VISIT (OUTPATIENT)
Dept: FAMILY MEDICINE CLINIC | Age: 64
End: 2024-05-09
Payer: MEDICARE

## 2024-05-09 ENCOUNTER — HOSPITAL ENCOUNTER (OUTPATIENT)
Dept: GENERAL RADIOLOGY | Age: 64
Discharge: HOME | End: 2024-05-09
Payer: MEDICARE

## 2024-05-09 VITALS
HEART RATE: 63 BPM | WEIGHT: 293 LBS | BODY MASS INDEX: 51.91 KG/M2 | RESPIRATION RATE: 16 BRPM | DIASTOLIC BLOOD PRESSURE: 56 MMHG | HEIGHT: 63 IN | SYSTOLIC BLOOD PRESSURE: 88 MMHG | OXYGEN SATURATION: 98 %

## 2024-05-09 VITALS
BODY MASS INDEX: 51.91 KG/M2 | HEIGHT: 63 IN | WEIGHT: 293 LBS | DIASTOLIC BLOOD PRESSURE: 56 MMHG | SYSTOLIC BLOOD PRESSURE: 88 MMHG | OXYGEN SATURATION: 98 % | HEART RATE: 63 BPM | RESPIRATION RATE: 18 BRPM

## 2024-05-09 DIAGNOSIS — M35.3 PMR (POLYMYALGIA RHEUMATICA) (HCC): ICD-10-CM

## 2024-05-09 DIAGNOSIS — I47.20 VT (VENTRICULAR TACHYCARDIA) (HCC): ICD-10-CM

## 2024-05-09 DIAGNOSIS — N18.31 STAGE 3A CHRONIC KIDNEY DISEASE (HCC): ICD-10-CM

## 2024-05-09 DIAGNOSIS — Z00.00 MEDICARE ANNUAL WELLNESS VISIT, SUBSEQUENT: Primary | ICD-10-CM

## 2024-05-09 DIAGNOSIS — E03.9 HYPOTHYROIDISM, UNSPECIFIED TYPE: ICD-10-CM

## 2024-05-09 DIAGNOSIS — I11.0: ICD-10-CM

## 2024-05-09 DIAGNOSIS — I50.22 CHRONIC SYSTOLIC CHF (CONGESTIVE HEART FAILURE) (HCC): ICD-10-CM

## 2024-05-09 DIAGNOSIS — I44.7 LBBB (LEFT BUNDLE BRANCH BLOCK): ICD-10-CM

## 2024-05-09 DIAGNOSIS — E66.01 MORBID OBESITY WITH BMI OF 50.0-59.9, ADULT (HCC): ICD-10-CM

## 2024-05-09 DIAGNOSIS — I50.42 CHRONIC COMBINED SYSTOLIC AND DIASTOLIC CONGESTIVE HEART FAILURE (HCC): Primary | ICD-10-CM

## 2024-05-09 DIAGNOSIS — F31.30 BIPOLAR DISORDER, MOST RECENT EPISODE DEPRESSED (HCC): ICD-10-CM

## 2024-05-09 PROCEDURE — G8428 CUR MEDS NOT DOCUMENT: HCPCS | Performed by: STUDENT IN AN ORGANIZED HEALTH CARE EDUCATION/TRAINING PROGRAM

## 2024-05-09 PROCEDURE — 1036F TOBACCO NON-USER: CPT | Performed by: STUDENT IN AN ORGANIZED HEALTH CARE EDUCATION/TRAINING PROGRAM

## 2024-05-09 PROCEDURE — 3017F COLORECTAL CA SCREEN DOC REV: CPT | Performed by: STUDENT IN AN ORGANIZED HEALTH CARE EDUCATION/TRAINING PROGRAM

## 2024-05-09 PROCEDURE — 1090F PRES/ABSN URINE INCON ASSESS: CPT | Performed by: STUDENT IN AN ORGANIZED HEALTH CARE EDUCATION/TRAINING PROGRAM

## 2024-05-09 PROCEDURE — 3074F SYST BP LT 130 MM HG: CPT | Performed by: STUDENT IN AN ORGANIZED HEALTH CARE EDUCATION/TRAINING PROGRAM

## 2024-05-09 PROCEDURE — 99213 OFFICE O/P EST LOW 20 MIN: CPT | Performed by: STUDENT IN AN ORGANIZED HEALTH CARE EDUCATION/TRAINING PROGRAM

## 2024-05-09 PROCEDURE — G8399 PT W/DXA RESULTS DOCUMENT: HCPCS | Performed by: STUDENT IN AN ORGANIZED HEALTH CARE EDUCATION/TRAINING PROGRAM

## 2024-05-09 PROCEDURE — G0439 PPPS, SUBSEQ VISIT: HCPCS | Performed by: STUDENT IN AN ORGANIZED HEALTH CARE EDUCATION/TRAINING PROGRAM

## 2024-05-09 PROCEDURE — 3078F DIAST BP <80 MM HG: CPT | Performed by: STUDENT IN AN ORGANIZED HEALTH CARE EDUCATION/TRAINING PROGRAM

## 2024-05-09 PROCEDURE — 71046 X-RAY EXAM CHEST 2 VIEWS: CPT

## 2024-05-09 PROCEDURE — G8417 CALC BMI ABV UP PARAM F/U: HCPCS | Performed by: STUDENT IN AN ORGANIZED HEALTH CARE EDUCATION/TRAINING PROGRAM

## 2024-05-09 PROCEDURE — 1124F ACP DISCUSS-NO DSCNMKR DOCD: CPT | Performed by: STUDENT IN AN ORGANIZED HEALTH CARE EDUCATION/TRAINING PROGRAM

## 2024-05-09 ASSESSMENT — PATIENT HEALTH QUESTIONNAIRE - PHQ9
8. MOVING OR SPEAKING SO SLOWLY THAT OTHER PEOPLE COULD HAVE NOTICED. OR THE OPPOSITE, BEING SO FIGETY OR RESTLESS THAT YOU HAVE BEEN MOVING AROUND A LOT MORE THAN USUAL: NOT AT ALL
7. TROUBLE CONCENTRATING ON THINGS, SUCH AS READING THE NEWSPAPER OR WATCHING TELEVISION: SEVERAL DAYS
SUM OF ALL RESPONSES TO PHQ QUESTIONS 1-9: 1
9. THOUGHTS THAT YOU WOULD BE BETTER OFF DEAD, OR OF HURTING YOURSELF: NOT AT ALL
2. FEELING DOWN, DEPRESSED OR HOPELESS: NOT AT ALL
SUM OF ALL RESPONSES TO PHQ QUESTIONS 1-9: 1
4. FEELING TIRED OR HAVING LITTLE ENERGY: NOT AT ALL
6. FEELING BAD ABOUT YOURSELF - OR THAT YOU ARE A FAILURE OR HAVE LET YOURSELF OR YOUR FAMILY DOWN: NOT AT ALL
SUM OF ALL RESPONSES TO PHQ QUESTIONS 1-9: 1
SUM OF ALL RESPONSES TO PHQ9 QUESTIONS 1 & 2: 0
1. LITTLE INTEREST OR PLEASURE IN DOING THINGS: NOT AT ALL
10. IF YOU CHECKED OFF ANY PROBLEMS, HOW DIFFICULT HAVE THESE PROBLEMS MADE IT FOR YOU TO DO YOUR WORK, TAKE CARE OF THINGS AT HOME, OR GET ALONG WITH OTHER PEOPLE: SOMEWHAT DIFFICULT
3. TROUBLE FALLING OR STAYING ASLEEP: NOT AT ALL
SUM OF ALL RESPONSES TO PHQ QUESTIONS 1-9: 1
5. POOR APPETITE OR OVEREATING: NOT AT ALL

## 2024-05-09 NOTE — PROGRESS NOTES
Assessment:  Encounter Diagnoses   Name Primary?    Chronic combined systolic and diastolic congestive heart failure (HCC) Yes    Chronic systolic CHF (congestive heart failure) (HCC)     Heart disease, hypertensive, with heart failure (HCC)     Morbid obesity with BMI of 50.0-59.9, adult (HCC)     Stage 3a chronic kidney disease (HCC)     Bipolar disorder, most recent episode depressed (HCC)     PMR (polymyalgia rheumatica) (HCC)        Plan:  1. Chronic combined systolic and diastolic congestive heart failure (HCC)  2. Chronic systolic CHF (congestive heart failure) (HCC)  3. Heart disease, hypertensive, with heart failure (HCC)  4. Morbid obesity with BMI of 50.0-59.9, adult (HCC)  5. Stage 3a chronic kidney disease (HCC)  6. Bipolar disorder, most recent episode depressed (HCC)  7. PMR (polymyalgia rheumatica) (HCC)  Noticing improving strength and decreasing weight with diet and exercise changes. Encouraged to progress exercises as tolerated. Following with cardiology and compliant with medications.  Gfr stable on recent check. Compliant with psychiatric medications and feels mood is doing well. Following with rheumatology and compliant with medications.       No follow-ups on file.    Spent a total of 21 minutes with the patient, with >50% of visit discussing the pathophysiology, etiology, risks, and principles of treatment of above.        Patient: Malou Tobin is a 63 y.o. female who presents today with the following Chief Complaint(s):  Chief Complaint   Patient presents with    Cholesterol Problem         HPI    Obesity  Has previously followed with  center  Ozempic for 1 month about a year ago - caused severe diarrhea     Anxiety/Depression/PTSD  Prozac 10mg  Lamictal 300mg nightly  Latuda 60mg nightly  Required inpatient stay   However stable on current regimen    Diarrhea  Reports that this has improved since cleaning water bottle    MARCY  Compliant with cpap    pA-fib  Eliquis and toprol    CHF, non

## 2024-05-09 NOTE — PROGRESS NOTES
Medicare Annual Wellness Visit    Malou Tobin is here for Medicare AWV    Assessment & Plan   Medicare annual wellness visit, subsequent  Recommendations for Preventive Services Due: see orders and patient instructions/AVS.  Recommended screening schedule for the next 5-10 years is provided to the patient in written form: see Patient Instructions/AVS.     No follow-ups on file.     Subjective       Patient's complete Health Risk Assessment and screening values have been reviewed and are found in Flowsheets. The following problems were reviewed today and where indicated follow up appointments were made and/or referrals ordered.    Positive Risk Factor Screenings with Interventions:    Fall Risk:  Do you feel unsteady or are you worried about falling? : (!) yes  2 or more falls in past year?: (!) yes  Fall with injury in past year?: (!) yes     Interventions:    Reviewed medications, home hazards, visual acuity, and co-morbidities that can increase risk for falls  See AVS for additional education material             Activity, Diet, and Weight:  On average, how many days per week do you engage in moderate to strenuous exercise (like a brisk walk)?: 6 days  On average, how many minutes do you engage in exercise at this level?: 10 min    Do you eat balanced/healthy meals regularly?: Yes    Body mass index is 53.2 kg/m². (!) Abnormal    Obesity Interventions:  See AVS for additional education material            Dentist Screen:  Have you seen the dentist within the past year?: (!) No    Intervention:  Advised to schedule with their dentist       ADL's:   Patient reports needing help with:  Select all that apply: (!) Shopping (help carrying in groceries)  Interventions:  Patient declined any further interventions or treatment    Advanced Directives:  Do you have a Living Will?: (!) No    Intervention:  has NO advanced directive - information provided                     Objective   Vitals:    05/09/24 1501   BP: (!)

## 2024-05-09 NOTE — PATIENT INSTRUCTIONS
Learning About Being Active as an Older Adult  Why is being active important as you get older?     Being active is one of the best things you can do for your health. And it's never too late to start. Being active--or getting active, if you aren't already--has definite benefits. It can:  Give you more energy,  Keep your mind sharp.  Improve balance to reduce your risk of falls.  Help you manage chronic illness with fewer medicines.  No matter how old you are, how fit you are, or what health problems you have, there is a form of activity that will work for you. And the more physical activity you can do, the better your overall health will be.  What kinds of activity can help you stay healthy?  Being more active will make your daily activities easier. Physical activity includes planned exercise and things you do in daily life. There are four types of activity:  Aerobic.  Doing aerobic activity makes your heart and lungs strong.  Includes walking, dancing, and gardening.  Aim for at least 2½ hours spread throughout the week.  It improves your energy and can help you sleep better.  Muscle-strengthening.  This type of activity can help maintain muscle and strengthen bones.  Includes climbing stairs, using resistance bands, and lifting or carrying heavy loads.  Aim for at least twice a week.  It can help protect the knees and other joints.  Stretching.  Stretching gives you better range of motion in joints and muscles.  Includes upper arm stretches, calf stretches, and gentle yoga.  Aim for at least twice a week, preferably after your muscles are warmed up from other activities.  It can help you function better in daily life.  Balancing.  This helps you stay coordinated and have good posture.  Includes heel-to-toe walking, edna chi, and certain types of yoga.  Aim for at least 3 days a week.  It can reduce your risk of falling.  Even if you have a hard time meeting the recommendations, it's better to be more active

## 2024-05-10 DIAGNOSIS — E03.9 HYPOTHYROIDISM, UNSPECIFIED TYPE: ICD-10-CM

## 2024-05-10 LAB
T4 FREE SERPL-MCNC: 1.5 NG/DL (ref 0.9–1.8)
TSH SERPL DL<=0.005 MIU/L-ACNC: 11.02 UIU/ML (ref 0.27–4.2)

## 2024-05-10 RX ORDER — LEVOTHYROXINE SODIUM 175 UG/1
175 TABLET ORAL DAILY
Qty: 90 TABLET | Refills: 1 | Status: SHIPPED | OUTPATIENT
Start: 2024-05-10

## 2024-05-29 ENCOUNTER — OFFICE VISIT (OUTPATIENT)
Dept: OBGYN CLINIC | Age: 64
End: 2024-05-29
Payer: MEDICARE

## 2024-05-29 VITALS
OXYGEN SATURATION: 98 % | HEART RATE: 85 BPM | DIASTOLIC BLOOD PRESSURE: 69 MMHG | BODY MASS INDEX: 50.5 KG/M2 | HEIGHT: 63 IN | WEIGHT: 285 LBS | SYSTOLIC BLOOD PRESSURE: 105 MMHG | TEMPERATURE: 98 F

## 2024-05-29 DIAGNOSIS — Z80.41 FAMILY HISTORY OF OVARIAN CANCER: ICD-10-CM

## 2024-05-29 DIAGNOSIS — Z01.419 WOMEN'S ANNUAL ROUTINE GYNECOLOGICAL EXAMINATION: Primary | ICD-10-CM

## 2024-05-29 DIAGNOSIS — Z12.4 SCREENING FOR CERVICAL CANCER: ICD-10-CM

## 2024-05-29 DIAGNOSIS — I10 HYPERTENSION, ESSENTIAL: ICD-10-CM

## 2024-05-29 DIAGNOSIS — R30.0 DYSURIA: ICD-10-CM

## 2024-05-29 DIAGNOSIS — N89.8 VAGINAL DISCHARGE: ICD-10-CM

## 2024-05-29 DIAGNOSIS — Z80.3 FAMILY HISTORY OF BREAST CANCER: ICD-10-CM

## 2024-05-29 DIAGNOSIS — Z85.42 HISTORY OF UTERINE CANCER: ICD-10-CM

## 2024-05-29 PROCEDURE — 99387 INIT PM E/M NEW PAT 65+ YRS: CPT | Performed by: OBSTETRICS & GYNECOLOGY

## 2024-05-29 PROCEDURE — 3074F SYST BP LT 130 MM HG: CPT | Performed by: OBSTETRICS & GYNECOLOGY

## 2024-05-29 PROCEDURE — 3078F DIAST BP <80 MM HG: CPT | Performed by: OBSTETRICS & GYNECOLOGY

## 2024-05-29 ASSESSMENT — ENCOUNTER SYMPTOMS
BLOOD IN STOOL: 0
DIARRHEA: 1
CONSTIPATION: 0
BACK PAIN: 0

## 2024-05-29 NOTE — PROGRESS NOTES
Osteoporosis, no prolia and hydro chlorquine     Medical History:  Past Medical History:   Diagnosis Date    Anesthesia complication     hypotension post op    Arterial ischemic stroke, ICA, right, acute (HCC)     Autoimmune hemolytic anemia (HCC)     during uterine cancer    Bipolar disorder (HCC)     managed by Diane Sidhu ([place])    Bundle branch block     Cardiomyopathy (HCC)     Chronic systolic CHF (congestive heart failure) ([facility]) 07/09/2015    Depression     Family history of early CAD     Family history of ovarian cancer     mother    GERD (gastroesophageal reflux disease)     Gout     Hypertension     Hypothyroid     Osteoarthritis, knee     Shybut    Pneumonia     history of pneumonia    S/P gastric bypass 01/07/2005    Sepsis (HCC) 07/05/2016    Small bowel obstruction (HCC)     Unspecified cerebral artery occlusion with cerebral infarction     Uterine cancer (HCC)     endometrial adenocarcinoma    Vitamin B 12 deficiency 07/2009       Surgical History:  Past Surgical History:   Procedure Laterality Date    CARDIAC CATHETERIZATION  7/10/2015    Normal Coronary Arteries    CHOLECYSTECTOMY      COLONOSCOPY  03/06/2019    NL    COLONOSCOPY N/A 3/6/2019    EGD AND COLONOSCOPY WITH ANESTHESIA performed by Armando Simon MD at [facility] ENDOSCOPY    DILATION AND CURETTAGE OF UTERUS      4 times    ERCP      FINGER SURGERY      gout X 2    GASTRIC BYPASS SURGERY  1/7/05    HERNIA REPAIR  10/20/2016    lap ventral hernia    LIPECTOMY      gangrenous    OTHER SURGICAL HISTORY      radium implants into uterus for uterine surgery X 2    OTHER SURGICAL HISTORY  4/1/16    exp. lap lysis of adhesion    OTHER SURGICAL HISTORY      biventricular pacer and defibrillator    PACEMAKER PLACEMENT  09/2016    TRANSESOPHAGEAL ECHOCARDIOGRAM  7/13/2015    UPPER GASTROINTESTINAL ENDOSCOPY  4/23/13    UPPER GASTROINTESTINAL ENDOSCOPY  03/06/2019    NL    UPPER GASTROINTESTINAL ENDOSCOPY N/A 3/6/2019    EGD AND

## 2024-05-30 LAB
CANDIDA DNA VAG QL NAA+PROBE: ABNORMAL
G VAGINALIS DNA SPEC QL NAA+PROBE: ABNORMAL
T VAGINALIS DNA VAG QL NAA+PROBE: ABNORMAL

## 2024-05-31 LAB
BACTERIA UR CULT: ABNORMAL
ORGANISM: ABNORMAL

## 2024-05-31 RX ORDER — LISINOPRIL 5 MG/1
5 TABLET ORAL DAILY
Qty: 90 TABLET | Refills: 3 | Status: SHIPPED | OUTPATIENT
Start: 2024-05-31

## 2024-06-04 ENCOUNTER — TELEPHONE (OUTPATIENT)
Dept: OBGYN CLINIC | Age: 64
End: 2024-06-04

## 2024-06-04 ENCOUNTER — HOSPITAL ENCOUNTER (OUTPATIENT)
Dept: WOMENS IMAGING | Age: 64
Discharge: HOME | End: 2024-06-04
Payer: MEDICARE

## 2024-06-04 VITALS — HEIGHT: 63 IN | BODY MASS INDEX: 51.21 KG/M2 | WEIGHT: 289 LBS

## 2024-06-04 DIAGNOSIS — Z12.31 SCREENING MAMMOGRAM FOR BREAST CANCER: ICD-10-CM

## 2024-06-04 DIAGNOSIS — Z12.4 SCREENING FOR CERVICAL CANCER: Primary | ICD-10-CM

## 2024-06-04 PROCEDURE — 77063 BREAST TOMOSYNTHESIS BI: CPT

## 2024-06-05 LAB
HPV HR 12 DNA SPEC QL NAA+PROBE: NOT DETECTED
HPV16 DNA SPEC QL NAA+PROBE: NOT DETECTED
HPV16+18+H RISK 12 DNA SPEC-IMP: NORMAL
HPV18 DNA SPEC QL NAA+PROBE: NOT DETECTED

## 2024-06-12 ENCOUNTER — OFFICE VISIT (OUTPATIENT)
Dept: OBGYN CLINIC | Age: 64
End: 2024-06-12
Payer: MEDICARE

## 2024-06-12 VITALS
OXYGEN SATURATION: 96 % | BODY MASS INDEX: 51.91 KG/M2 | SYSTOLIC BLOOD PRESSURE: 118 MMHG | WEIGHT: 293 LBS | HEART RATE: 78 BPM | DIASTOLIC BLOOD PRESSURE: 85 MMHG | HEIGHT: 63 IN | TEMPERATURE: 97.8 F

## 2024-06-12 DIAGNOSIS — Z85.42 HISTORY OF UTERINE CANCER: Primary | ICD-10-CM

## 2024-06-12 PROCEDURE — 99213 OFFICE O/P EST LOW 20 MIN: CPT | Performed by: OBSTETRICS & GYNECOLOGY

## 2024-06-12 PROCEDURE — 3074F SYST BP LT 130 MM HG: CPT | Performed by: OBSTETRICS & GYNECOLOGY

## 2024-06-12 PROCEDURE — 1124F ACP DISCUSS-NO DSCNMKR DOCD: CPT | Performed by: OBSTETRICS & GYNECOLOGY

## 2024-06-12 PROCEDURE — G8428 CUR MEDS NOT DOCUMENT: HCPCS | Performed by: OBSTETRICS & GYNECOLOGY

## 2024-06-12 PROCEDURE — G8399 PT W/DXA RESULTS DOCUMENT: HCPCS | Performed by: OBSTETRICS & GYNECOLOGY

## 2024-06-12 PROCEDURE — 3079F DIAST BP 80-89 MM HG: CPT | Performed by: OBSTETRICS & GYNECOLOGY

## 2024-06-12 PROCEDURE — 1090F PRES/ABSN URINE INCON ASSESS: CPT | Performed by: OBSTETRICS & GYNECOLOGY

## 2024-06-12 PROCEDURE — 3017F COLORECTAL CA SCREEN DOC REV: CPT | Performed by: OBSTETRICS & GYNECOLOGY

## 2024-06-12 PROCEDURE — 1036F TOBACCO NON-USER: CPT | Performed by: OBSTETRICS & GYNECOLOGY

## 2024-06-12 PROCEDURE — G8417 CALC BMI ABV UP PARAM F/U: HCPCS | Performed by: OBSTETRICS & GYNECOLOGY

## 2024-06-12 NOTE — PROGRESS NOTES
[facility]     CC:   Chief Complaint   Patient presents with    Follow-up     Follow up: US         HPI:  63 y.o.  presents with:   63 year old postmenopausal female  presents for follow up. Patient was previously seen and evaluated for annual examination. Patient reported a history of uterine cancer that was diagnosed in . She reports that she was treated with radium implants for her stage 4 cancer due to weighing almost 500 pounds at the time. Records are unavailable but she does report that she was seen in [place]. She was seen today due to complaints of pelvic pressure and for imaging. She denies any postmenopausal bleeding.        Medications:  Current Outpatient Medications   Medication Sig Dispense Refill    lisinopril (PRINIVIL;ZESTRIL) 5 MG tablet TAKE 1 TABLET EVERY DAY 90 tablet 3    levothyroxine (SYNTHROID) 175 MCG tablet Take 1 tablet by mouth daily 90 tablet 1    PROLIA 60 MG/ML SOSY SC injection INJECT 1 ML INTO THE SKIN ONCE FOR 1 DOSE 1 mL 0    torsemide (DEMADEX) 10 MG tablet 1 tab daily 135 tablet 0    ELIQUIS 5 MG TABS tablet TAKE 1 TABLET TWICE DAILY 180 tablet 1    FLUoxetine (PROZAC) 10 MG capsule Take 3 capsules by mouth daily 90 capsule 3    simvastatin (ZOCOR) 20 MG tablet TAKE 1 TABLET EVERY NIGHT 90 tablet 3    methocarbamol (ROBAXIN) 500 MG tablet TAKE 1 TABLET TWICE TIMES DAILY AS NEEDED FOR PAIN 180 tablet 1    lurasidone (LATUDA) 60 MG TABS tablet TAKE 1 TABLET EVERY NIGHT 90 tablet 3    empagliflozin (JARDIANCE) 10 MG tablet Take 1 tablet by mouth daily 90 tablet 3    metoprolol succinate (TOPROL XL) 100 MG extended release tablet Take 1 tablet by mouth daily 90 tablet 1    lamoTRIgine (LAMICTAL) 150 MG tablet Take 2 tablets by mouth nightly 180 tablet 1    allopurinol (ZYLOPRIM) 300 MG tablet TAKE 1 TABLET EVERY DAY 90 tablet 3    hydroxychloroquine (PLAQUENIL) 200 MG tablet Take 1 tablet by mouth 2 times daily 180 tablet 0    BIOTIN PO Take by mouth

## 2024-07-03 DIAGNOSIS — J30.9 ALLERGIC RHINITIS, UNSPECIFIED SEASONALITY, UNSPECIFIED TRIGGER: ICD-10-CM

## 2024-07-04 NOTE — TELEPHONE ENCOUNTER
Refill Request     CONFIRM preferred pharmacy with the patient.    If Mail Order Rx - Pend for 90 day refill.      Last Seen: Last Seen Department: 5/9/2024  Last Seen by PCP: 5/9/2024    Last Written:   Singulair-7/3/23 90 tab 3 refills  Prozac-4/11/24 90 cap 3 refills    If no future appointment scheduled:  Review the last OV with PCP and review information for follow-up visit,  Route STAFF MESSAGE with patient name to the  Pool for scheduling with the following information:            -  Timing of next visit           -  Visit type ie Physical, OV, etc           -  Diagnoses/Reason ie. COPD, HTN - Do not use MEDICATION, Follow-up or CHECK UP - Give reason for visit      Next Appointment:   Future Appointments   Date Time Provider Department Center   10/29/2024  1:15 PM Martha Cash APRN - CNP [facility]   11/11/2024  1:00 PM Collins Lee DO [facility]  [place] [facility]   4/1/2025  1:20 PM Hernandez, Karime, APRN - CNP [facility]       Message sent to  to schedule appt with patient?  NO      Requested Prescriptions     Pending Prescriptions Disp Refills    montelukast (SINGULAIR) 10 MG tablet [Pharmacy Med Name: MONTELUKAST SODIUM 10 MG Tablet] 90 tablet 3     Sig: TAKE 1 TABLET EVERY NIGHT    FLUoxetine (PROZAC) 10 MG capsule [Pharmacy Med Name: FLUOXETINE HYDROCHLORIDE 10 MG Capsule] 270 capsule 3     Sig: TAKE 3 CAPSULES EVERY DAY

## 2024-07-05 RX ORDER — MONTELUKAST SODIUM 10 MG/1
10 TABLET ORAL NIGHTLY
Qty: 90 TABLET | Refills: 3 | Status: SHIPPED | OUTPATIENT
Start: 2024-07-05

## 2024-07-05 RX ORDER — FLUOXETINE 10 MG/1
CAPSULE ORAL
Qty: 360 CAPSULE | Refills: 1 | Status: SHIPPED | OUTPATIENT
Start: 2024-07-05

## 2024-07-14 PROCEDURE — 93297 REM INTERROG DEV EVAL ICPMS: CPT | Performed by: NURSE PRACTITIONER

## 2024-07-17 ENCOUNTER — APPOINTMENT (OUTPATIENT)
Dept: GENERAL RADIOLOGY | Age: 64
End: 2024-07-17
Payer: MEDICARE

## 2024-07-17 ENCOUNTER — HOSPITAL ENCOUNTER (EMERGENCY)
Age: 64
Discharge: HOME | End: 2024-07-17
Attending: EMERGENCY MEDICINE
Payer: MEDICARE

## 2024-07-17 ENCOUNTER — APPOINTMENT (OUTPATIENT)
Dept: CT IMAGING | Age: 64
End: 2024-07-17
Payer: MEDICARE

## 2024-07-17 VITALS
DIASTOLIC BLOOD PRESSURE: 71 MMHG | RESPIRATION RATE: 16 BRPM | SYSTOLIC BLOOD PRESSURE: 145 MMHG | HEIGHT: 63 IN | WEIGHT: 289 LBS | TEMPERATURE: 98.1 F | BODY MASS INDEX: 51.21 KG/M2 | HEART RATE: 61 BPM | OXYGEN SATURATION: 98 %
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
## 2024-07-17 DIAGNOSIS — S42.411A CLOSED SUPRACONDYLAR FRACTURE OF RIGHT HUMERUS, INITIAL ENCOUNTER: ICD-10-CM

## 2024-07-17 PROCEDURE — 73521 X-RAY EXAM HIPS BI 2 VIEWS: CPT

## 2024-07-17 PROCEDURE — 99285 EMERGENCY DEPT VISIT HI MDM: CPT

## 2024-07-17 PROCEDURE — 73090 X-RAY EXAM OF FOREARM: CPT

## 2024-07-17 PROCEDURE — 73080 X-RAY EXAM OF ELBOW: CPT

## 2024-07-17 PROCEDURE — 70450 CT HEAD/BRAIN W/O DYE: CPT

## 2024-07-17 PROCEDURE — 6370000000 HC RX 637 (ALT 250 FOR IP): Performed by: EMERGENCY MEDICINE

## 2024-07-17 RX ORDER — OXYCODONE HYDROCHLORIDE AND ACETAMINOPHEN 5; 325 MG/1; MG/1
1 TABLET ORAL EVERY 4 HOURS PRN
Qty: 9 TABLET | Refills: 0 | Status: SHIPPED | OUTPATIENT
Start: 2024-07-17 | End: 2024-07-20

## 2024-07-17 RX ORDER — OXYCODONE HYDROCHLORIDE AND ACETAMINOPHEN 5; 325 MG/1; MG/1
1 TABLET ORAL ONCE
Status: COMPLETED | OUTPATIENT
Start: 2024-07-17 | End: 2024-07-17

## 2024-07-17 RX ORDER — OXYCODONE HYDROCHLORIDE AND ACETAMINOPHEN 5; 325 MG/1; MG/1
1 TABLET ORAL
Status: DISCONTINUED | OUTPATIENT
Start: 2024-07-17 | End: 2024-07-17 | Stop reason: HOSPADM

## 2024-07-17 RX ADMIN — OXYCODONE HYDROCHLORIDE AND ACETAMINOPHEN 1 TABLET: 5; 325 TABLET ORAL at 16:51

## 2024-07-17 ASSESSMENT — PAIN SCALES - GENERAL
PAINLEVEL_OUTOF10: 8
PAINLEVEL_OUTOF10: 10
PAINLEVEL_OUTOF10: 10
PAINLEVEL_OUTOF10: 6
PAINLEVEL_OUTOF10: 9

## 2024-07-17 ASSESSMENT — PAIN DESCRIPTION - ORIENTATION
ORIENTATION: LEFT
ORIENTATION: RIGHT

## 2024-07-17 ASSESSMENT — PAIN DESCRIPTION - LOCATION
LOCATION: ARM;HIP
LOCATION: ARM
LOCATION: ARM;HIP
LOCATION: ARM;HIP

## 2024-07-17 ASSESSMENT — LIFESTYLE VARIABLES
HOW MANY STANDARD DRINKS CONTAINING ALCOHOL DO YOU HAVE ON A TYPICAL DAY: PATIENT DOES NOT DRINK
HOW OFTEN DO YOU HAVE A DRINK CONTAINING ALCOHOL: NEVER

## 2024-07-17 ASSESSMENT — PAIN - FUNCTIONAL ASSESSMENT
PAIN_FUNCTIONAL_ASSESSMENT: 0-10

## 2024-07-17 NOTE — ED NOTES
1830-Perfect Serve sent by Dr. Smith to Dr. Hernández Orthopedic Surgery for consult.   1839-Consult completed to Orthopedic Surgery Dr. Smith speaking with Dr. Hernández via Perfect Serve.

## 2024-07-17 NOTE — ED NOTES
Assisted patient to bathroom via wheelchair. Patient was able to walk from doorway to toilet. Patient back in room and right arm propped up on pillow and ice pack applied.

## 2024-07-18 ENCOUNTER — TELEPHONE (OUTPATIENT)
Dept: ORTHOPEDIC SURGERY | Age: 64
End: 2024-07-18

## 2024-07-18 ENCOUNTER — OFFICE VISIT (OUTPATIENT)
Dept: ORTHOPEDIC SURGERY | Age: 64
End: 2024-07-18
Payer: MEDICARE

## 2024-07-18 DIAGNOSIS — S42.411A CLOSED SUPRACONDYLAR FRACTURE OF RIGHT HUMERUS, INITIAL ENCOUNTER: Primary | ICD-10-CM

## 2024-07-18 PROCEDURE — 99203 OFFICE O/P NEW LOW 30 MIN: CPT | Performed by: ORTHOPAEDIC SURGERY

## 2024-07-18 PROCEDURE — 3017F COLORECTAL CA SCREEN DOC REV: CPT | Performed by: ORTHOPAEDIC SURGERY

## 2024-07-18 PROCEDURE — G8428 CUR MEDS NOT DOCUMENT: HCPCS | Performed by: ORTHOPAEDIC SURGERY

## 2024-07-18 PROCEDURE — 1124F ACP DISCUSS-NO DSCNMKR DOCD: CPT | Performed by: ORTHOPAEDIC SURGERY

## 2024-07-18 PROCEDURE — G8399 PT W/DXA RESULTS DOCUMENT: HCPCS | Performed by: ORTHOPAEDIC SURGERY

## 2024-07-18 PROCEDURE — 1036F TOBACCO NON-USER: CPT | Performed by: ORTHOPAEDIC SURGERY

## 2024-07-18 PROCEDURE — G8417 CALC BMI ABV UP PARAM F/U: HCPCS | Performed by: ORTHOPAEDIC SURGERY

## 2024-07-18 PROCEDURE — 1090F PRES/ABSN URINE INCON ASSESS: CPT | Performed by: ORTHOPAEDIC SURGERY

## 2024-07-18 SDOH — HEALTH STABILITY: PHYSICAL HEALTH: ON AVERAGE, HOW MANY DAYS PER WEEK DO YOU ENGAGE IN MODERATE TO STRENUOUS EXERCISE (LIKE A BRISK WALK)?: 0 DAYS

## 2024-07-18 NOTE — PROGRESS NOTES
ventral hernia    LIPECTOMY      gangrenous    OTHER SURGICAL HISTORY      radium implants into uterus for uterine surgery X 2    OTHER SURGICAL HISTORY  16    exp. lap lysis of adhesion    OTHER SURGICAL HISTORY      biventricular pacer and defibrillator    PACEMAKER PLACEMENT  2016    TRANSESOPHAGEAL ECHOCARDIOGRAM  2015    UPPER GASTROINTESTINAL ENDOSCOPY  13    UPPER GASTROINTESTINAL ENDOSCOPY  2019    NL    UPPER GASTROINTESTINAL ENDOSCOPY N/A 3/6/2019    EGD AND COLONOSCOPY WITH ANESTHESIA performed by Armando Simon MD at [facility] ENDOSCOPY       Social History     Socioeconomic History    Marital status:      Spouse name: Not on file    Number of children: 0    Years of education: Not on file    Highest education level: Not on file   Occupational History    Not on file   Tobacco Use    Smoking status: Former     Current packs/day: 0.00     Average packs/day: 2.6 packs/day for 19.0 years (49.0 ttl pk-yrs)     Types: Cigarettes     Start date: 1983     Quit date:      Years since quittin.5    Smokeless tobacco: Never   Vaping Use    Vaping Use: Never used   Substance and Sexual Activity    Alcohol use: No    Drug use: No    Sexual activity: Not Currently   Other Topics Concern    Not on file   Social History Narrative    Not on file     Social Determinants of Health     Financial Resource Strain: Low Risk  (10/13/2022)    Overall Financial Resource Strain (CARDIA)     Difficulty of Paying Living Expenses: Not hard at all   Food Insecurity: Not on file (2023)   Transportation Needs: No Transportation Needs (10/13/2022)    PRAPARE - Transportation     Lack of Transportation (Medical): No     Lack of Transportation (Non-Medical): No   Physical Activity: Inactive (2024)    Exercise Vital Sign     Days of Exercise per Week: 0 days     Minutes of Exercise per Session: 10 min   Stress: Not on file   Social Connections: Not on file   Intimate Partner 
Genaro VIVAS

## 2024-07-18 NOTE — TELEPHONE ENCOUNTER
LVM for patient to return phone call to schedule ED follow up.     Upon return call please offer 7/18/24 at [facility] at 7:00 PM.   If she is available for the 7PM apt, please reach out to Lana Gtz.

## 2024-07-18 NOTE — DISCHARGE INSTRUCTIONS
Please follow up with orthopedics for further evaluation and treatment.   Ibuprofen and tylenol as needed for symptoms. Percocet only for breakthrough pain.   If persistent or worsening symptoms, or if you have any concerns, return to ED immediately.    [FreeTextEntry1] : Mouth injury with "fat" upper lip.\par Teeth appear normal.\par Hopeful no nerve damage\par Offer cold compress and drink\par \par RTO PRN advised on signs and symptoms requiring re evaluation and concern.\par

## 2024-07-19 NOTE — ED PROVIDER NOTES
Dictation system and may contain errors related to that system including errors in grammar, punctuation, and spelling, as well as words and phrases that may be inappropriate. If there are any questions or concerns please feel free to contact the dictating provider for clarification.)          Lisseth Smith MD  07/18/24 7391

## 2024-07-25 DIAGNOSIS — Z00.00 ROUTINE GENERAL MEDICAL EXAMINATION AT A HEALTH CARE FACILITY: ICD-10-CM

## 2024-07-25 DIAGNOSIS — F31.77 BIPOLAR DISORDER, IN PARTIAL REMISSION, MOST RECENT EPISODE MIXED (HCC): Chronic | ICD-10-CM

## 2024-07-25 NOTE — TELEPHONE ENCOUNTER
Refill Request     CONFIRM preferred pharmacy with the patient.    If Mail Order Rx - Pend for 90 day refill.      Last Seen: Last Seen Department: 5/9/2024  Last Seen by PCP: 5/9/2024    Last Written: lamotrigine 1/24/24 180 with 1 refill     Metoprolol 1/24/24 90 with 1 refill     If no future appointment scheduled:  Review the last OV with PCP and review information for follow-up visit,  Route STAFF MESSAGE with patient name to the  Pool for scheduling with the following information:            -  Timing of next visit           -  Visit type ie Physical, OV, etc           -  Diagnoses/Reason ie. COPD, HTN - Do not use MEDICATION, Follow-up or CHECK UP - Give reason for visit      Next Appointment:   Future Appointments   Date Time Provider Department Center   8/6/2024  9:30 AM Edwin Hernández MD [facility]   10/29/2024  1:15 PM Martha Cash, APRN - CNP [facility]   11/11/2024  1:00 PM Collisn Lee DO [facility]  [place] [facility]   4/1/2025  1:20 PM Karime Hernandez, APRN - CNP [facility]       Message sent to  to schedule appt with patient?  NO      Requested Prescriptions     Pending Prescriptions Disp Refills    lamoTRIgine (LAMICTAL) 150 MG tablet [Pharmacy Med Name: LAMOTRIGINE 150 MG Tablet] 180 tablet 3     Sig: TAKE 2 TABLETS EVERY NIGHT    metoprolol succinate (TOPROL XL) 100 MG extended release tablet [Pharmacy Med Name: METOPROLOL SUCCINATE  MG Tablet Extended Release 24 Hour] 90 tablet 3     Sig: TAKE 1 TABLET EVERY DAY

## 2024-07-26 RX ORDER — LAMOTRIGINE 150 MG/1
TABLET ORAL
Qty: 180 TABLET | Refills: 3 | Status: SHIPPED | OUTPATIENT
Start: 2024-07-26

## 2024-07-26 RX ORDER — METOPROLOL SUCCINATE 100 MG/1
100 TABLET, EXTENDED RELEASE ORAL DAILY
Qty: 90 TABLET | Refills: 3 | Status: SHIPPED | OUTPATIENT
Start: 2024-07-26

## 2024-07-26 NOTE — H&P
Hospital Medicine History & Physical      PCP: J Luis Amaya MD    Date of Admission: 5/9/2022    Date of Service: Pt seen/examined on 5/10/2022     Chief Complaint:  No chief complaint on file. History Of Present Illness: The patient is a 59 y.o. female with pmhx of bipolar disorder, CHF, HTN, hypothyroidism, CVA, gastric bypass, B12 deficiency who presented to [facility] for increasing depression and worsening SI. Patient was seen and evaluated in the ED by the ED medical provider, patient was medically cleared for admission to [facility] at [facility]. This note serves as an admission medical H&P. Tobacco use:  Former  ETOH use: None  Illicit drug use: None    Patient denies any medical complaints     Past Medical History:        Diagnosis Date    Anesthesia complication     hypotension post op    Autoimmune hemolytic anemia (HCC)     during uterine cancer    Bipolar disorder ([facility]     managed by Mae Hernandez)    Bundle branch block     Cardiomyopathy ([facility]     Chronic systolic CHF (congestive heart failure) ([facility] 7/9/2015    Depression     Family history of early CAD     Family history of ovarian cancer     mother    GERD (gastroesophageal reflux disease)     Gout     Hypertension     Hypothyroid     Osteoarthritis, knee     Shybut    Pneumonia     history of pneumonia    S/P gastric bypass 1/7/05    Unspecified cerebral artery occlusion with cerebral infarction     Uterine cancer ([facility]     endometrial adenocarcinoma    Vitamin B 12 deficiency 7/09       Past Surgical History:        Procedure Laterality Date    CARDIAC CATHETERIZATION  7/10/2015    Normal Coronary Arteries    CHOLECYSTECTOMY      COLONOSCOPY  03/06/2019    NL    COLONOSCOPY N/A 3/6/2019    EGD AND COLONOSCOPY WITH ANESTHESIA performed by Jhonatan Espinal MD at [address] [place] [address]      4 times    ERCP      FINGER SURGERY      gout X 2    GASTRIC BYPASS Pt A&Ox4, no complaints of nausea, vomiting, diarrhea     PRN norco given x2 for back pain     Up x1 and walker     L PIV- flushes well with no blood return noted. Saline locked     Meds given per MAR. No comments, questions or concerns at this time. Will endorse to oncoming RN        SURGERY  1/7/05    HERNIA REPAIR  10/20/2016    lap ventral hernia    LIPECTOMY      gangrenous    OTHER SURGICAL HISTORY      radium implants into uterus for uterine surgery X 2    OTHER SURGICAL HISTORY  4/1/16    exp. lap lysis of adhesion    OTHER SURGICAL HISTORY      biventricular pacer and defibrillator    PACEMAKER PLACEMENT  09/2016    TRANSESOPHAGEAL ECHOCARDIOGRAM  7/13/2015    UPPER GASTROINTESTINAL ENDOSCOPY  4/23/13    UPPER GASTROINTESTINAL ENDOSCOPY  03/06/2019    NL    UPPER GASTROINTESTINAL ENDOSCOPY N/A 3/6/2019    EGD AND COLONOSCOPY WITH ANESTHESIA performed by Husam Shaikh MD at [address] [place] [address]       Medications Prior to Admission:    Prior to Admission medications    Medication Sig Start Date End Date Taking? Authorizing Provider   levothyroxine (SYNTHROID) 125 MCG tablet TAKE 1 TABLET EVERY DAY. 5/5/22   Tho Soto MD   torsemide (DEMADEX) 10 MG tablet TAKE 2 TABLETS EVERY OTHER DAY ALTERNATING WITH 1 TABLET THE OTHER DAYS AS DIRECTED 4/28/22   ALICIA Hilliard CNP   ELIQUIS 5 MG TABS tablet TAKE 1 TABLET TWICE DAILY 4/28/22   ALICIA Hilliard CNP   lurasidone (LATUDA) 60 MG TABS tablet Take 1 tablet by mouth nightly 3/30/22   Andrei Gomez MD   lamoTRIgine (LAMICTAL) 150 MG tablet Take 2 tablets by mouth nightly 3/30/22 4/29/22  Andrei Gomez MD   methocarbamol (ROBAXIN) 500 MG tablet TAKE 1 TABLET TWICE TIMES DAILY AS NEEDED FOR PAIN 2/25/22   Tho Soto MD   metoprolol succinate (TOPROL XL) 100 MG extended release tablet TAKE 1 TABLET EVERY DAY 1/28/22   [facility]   Misc. Devices (BARIATRIC ROLLATOR) MISC Bariatric Rollator to use while walking.  1/12/22   Tho Soto MD   diclofenac sodium (VOLTAREN) 1 % GEL APPLY TOPICALLY 2 TIMES DAILY 12/29/21   Tho Soto MD   allopurinol (ZYLOPRIM) 300 MG tablet TAKE 1 TABLET EVERY DAY 12/22/21   Tho Soto MD   CPAP Machine MISC by Does not apply route Historical Provider, MD   lisinopril (PRINIVIL;ZESTRIL) 5 MG tablet TAKE 1 TABLET EVERY DAY 10/15/21   Adrian Monzon APRN - CNP   montelukast (SINGULAIR) 10 MG tablet TAKE 1 TABLET EVERY NIGHT 9/21/21   Gurmeet Mood, APRN - CNP   simvastatin (ZOCOR) 20 MG tablet TAKE 1 TABLET EVERY NIGHT 9/21/21   Violet Hill Mood, APRN - CNP   acetaminophen (TYLENOL) 325 MG tablet Take 650 mg by mouth every 6 hours as needed for Pain TAKES EVERY DAY    Historical Provider, MD   cetirizine (ZYRTEC) 10 MG tablet Take 10 mg by mouth daily    Historical Provider, MD       Allergies:  Dilaudid [hydromorphone hcl], Lavender oil, Penicillins, Seroquel [quetiapine fumarate], Adhesive tape, Hydromorphone hcl, Lithium, and Tetanus toxoids    Social History:  The patient currently lives at home     TOBACCO:   reports that she quit smoking about 37 years ago. Her smoking use included cigarettes. She has a 6.00 pack-year smoking history. She has never used smokeless tobacco.  ETOH:   reports no history of alcohol use.       Family History:   Positive as follows:        Problem Relation Age of Onset    Cancer Mother     Ovarian Cancer Mother 45        Technically peritoneal cancer    Depression Mother         bipolar    Arthritis Father     Rheum Arthritis Father     Arrhythmia Father     Other Father         gout    Heart Disease Father     Depression Brother         depression    Other Brother         gout    Obesity Brother     Other Brother         gout    Obesity Brother     Depression Brother     Cancer Maternal Grandmother         Breast    Breast Cancer Maternal Grandmother        REVIEW OF SYSTEMS:       Constitutional: Negative for fever   HENT: Negative for sore throat   Eyes: Negative for redness   Respiratory: Negative  for dyspnea, cough   Cardiovascular: Negative for chest pain   Gastrointestinal: Negative for vomiting, diarrhea   Genitourinary: Negative for hematuria   Musculoskeletal: Negative for arthralgias Skin: Negative for rash   Neurological: Negative for syncope    Hematological: Negative for easy bruising/bleeding   Psychiatric/Behavorial: Per psychiatry team evaluation     PHYSICAL EXAM:    /69   Pulse 84   Temp 97.7 °F (36.5 °C) (Oral)   Resp 16   Ht 5' 3\" (1.6 m)   Wt (!) 330 lb (149.7 kg)   SpO2 99%   Breastfeeding No   BMI 58.46 kg/m²     Gen: No distress. Alert. Obese female   Eyes: PERRL. No sclera icterus. No conjunctival injection. Neck: No JVD. No Carotid Bruit. Trachea midline. Resp: No accessory muscle use. No crackles. No wheezes. No rhonchi. CV: Regular rate. Regular rhythm. No murmur. No rub. No edema. GI: Non-tender. Non-distended. Normal bowel sounds. Skin: Warm and dry. No nodule on exposed extremities. No rash on exposed extremities. M/S: No cyanosis. No joint deformity. No clubbing. Neuro: Awake. No focal neurologic deficit on exam.  Cranial nerves II through XII intact. Patient is able to ambulate without difficulty.   Psych: Per psychiatry team evaluation     Lab Results   Component Value Date    WBC 7.7 05/09/2022    HGB 12.2 05/09/2022    HCT 37.5 05/09/2022    MCV 87.5 05/09/2022     05/09/2022     Lab Results   Component Value Date     05/09/2022    K 4.8 05/09/2022     05/09/2022    CO2 22 05/09/2022    BUN 18 05/09/2022    CREATININE 1.2 05/09/2022    GLUCOSE 106 (H) 05/09/2022    CALCIUM 8.2 (L) 05/09/2022    PROT 6.4 05/09/2022    LABALBU 4.4 05/09/2022    BILITOT 0.6 05/09/2022    ALKPHOS 93 05/09/2022    AST 17 05/09/2022    ALT 9 (L) 05/09/2022    LABGLOM 45 (A) 05/09/2022    GFRAA 55 (A) 05/09/2022    AGRATIO 2.2 05/09/2022    GLOB 2.4 03/14/2021       U/A:    Lab Results   Component Value Date    NITRITE negative 10/08/2021    COLORU yellow 10/08/2021    COLORU Yellow 03/14/2021    WBCUA >100 03/11/2021    RBCUA 21-50 03/11/2021    MUCUS Rare 03/30/2016    BACTERIA 3+ 07/05/2016    CLARITYU clear 10/08/2021    CLARITYU Clear 03/14/2021    SPECGRAV 1.010 10/08/2021    SPECGRAV 1.015 03/14/2021    LEUKOCYTESUR small 10/08/2021    LEUKOCYTESUR Negative 03/14/2021    BLOODU negative 10/08/2021    BLOODU Negative 03/14/2021    GLUCOSEU negative 10/08/2021    GLUCOSEU Negative 03/14/2021    GLUCOSEU NEGATIVE 06/04/2012       CULTURES  COVID and Flu not detected     RADIOLOGY  No orders to display       ASSESSMENT/PLAN:  #depression   #Suicidal ideation   - per psychiatry team    #Systolic Heart failure   -EF of 35-40% on last echo 3/2021  -non ischemic cardiomyopathy   -no evidence of acute decompensation today   -continue demadex prn   -continue lisinopril and toprol     #HX of ventricular tachycardia  #HX of Atrial tachycardia    -pacemaker   -on eliquis   -follows with cardiology     #HTN   -continue lisinopril and toprol   -continue to monitor BP    #HX of CVA  -continue statin and eliquis      #Hx of gout  -continue allopurinol     #Hypothyroidism   -continue synthroid     #HLD   -continue statin     #Hx of gastric bypass     #Hx of uterine cancer   -s/p radium implants      Pt has no medical complaints at this time. They were informed that should a medical concern arise during their admission they may have [facility] contact us.         Khurram Barrett   5/10/2022 10:38 AM

## 2024-07-29 DIAGNOSIS — S42.411A CLOSED SUPRACONDYLAR FRACTURE OF RIGHT HUMERUS, INITIAL ENCOUNTER: Primary | ICD-10-CM

## 2024-07-29 NOTE — TELEPHONE ENCOUNTER
Prescription Refill     Medication Name:  OXYCODONE  Pharmacy: [id number] [address] [facility] -  [phone] - F [phone]   Patient Contact Number:  [phone]

## 2024-07-30 RX ORDER — TRAMADOL HYDROCHLORIDE 50 MG/1
50 TABLET ORAL EVERY 6 HOURS PRN
Qty: 20 TABLET | Refills: 0 | Status: SHIPPED | OUTPATIENT
Start: 2024-07-30 | End: 2024-08-04

## 2024-07-30 NOTE — TELEPHONE ENCOUNTER
Patient last seen 7/18/24 and medication last filled (Percocet in ED) 7/17/24:    Requested Prescriptions     Pending Prescriptions Disp Refills    traMADol (ULTRAM) 50 MG tablet 20 tablet 0     Sig: Take 1 tablet by mouth every 6 hours as needed for Pain for up to 5 days. Max Daily Amount: 200 mg

## 2024-08-05 ENCOUNTER — HOSPITAL ENCOUNTER (EMERGENCY)
Age: 64
Discharge: ANOTHER HOSPITAL | End: 2024-08-06
Attending: STUDENT IN AN ORGANIZED HEALTH CARE EDUCATION/TRAINING PROGRAM
Payer: MEDICARE

## 2024-08-05 ENCOUNTER — TELEPHONE (OUTPATIENT)
Dept: CARDIOLOGY CLINIC | Age: 64
End: 2024-08-05

## 2024-08-05 ENCOUNTER — APPOINTMENT (OUTPATIENT)
Dept: CT IMAGING | Age: 64
End: 2024-08-05
Payer: MEDICARE

## 2024-08-05 ENCOUNTER — APPOINTMENT (OUTPATIENT)
Dept: GENERAL RADIOLOGY | Age: 64
End: 2024-08-05
Payer: MEDICARE

## 2024-08-05 DIAGNOSIS — E87.6 HYPOKALEMIA: ICD-10-CM

## 2024-08-05 DIAGNOSIS — E87.29 HIGH ANION GAP METABOLIC ACIDOSIS: ICD-10-CM

## 2024-08-05 DIAGNOSIS — I50.9 ACUTE ON CHRONIC CONGESTIVE HEART FAILURE, UNSPECIFIED HEART FAILURE TYPE (HCC): ICD-10-CM

## 2024-08-05 DIAGNOSIS — R55 NEAR SYNCOPE: Primary | ICD-10-CM

## 2024-08-05 DIAGNOSIS — I95.9 HYPOTENSION, UNSPECIFIED HYPOTENSION TYPE: ICD-10-CM

## 2024-08-05 LAB
ALBUMIN SERPL-MCNC: 2.8 G/DL (ref 3.4–5)
ALBUMIN/GLOB SERPL: 1.3 {RATIO} (ref 1.1–2.2)
ALP SERPL-CCNC: 99 U/L (ref 40–129)
ALT SERPL-CCNC: 12 U/L (ref 10–40)
ANION GAP SERPL CALCULATED.3IONS-SCNC: 19 MMOL/L (ref 3–16)
AST SERPL-CCNC: 28 U/L (ref 15–37)
BASE EXCESS BLDV CALC-SCNC: -8.5 MMOL/L (ref -3–3)
BASOPHILS # BLD: 0 K/UL (ref 0–0.2)
BASOPHILS NFR BLD: 0 %
BILIRUB SERPL-MCNC: 1.5 MG/DL (ref 0–1)
BUN SERPL-MCNC: 38 MG/DL (ref 7–20)
CALCIUM SERPL-MCNC: 8.2 MG/DL (ref 8.3–10.6)
CHLORIDE SERPL-SCNC: 105 MMOL/L (ref 99–110)
CO2 BLDV-SCNC: 17 MMOL/L
CO2 SERPL-SCNC: 15 MMOL/L (ref 21–32)
COHGB MFR BLDV: 2.7 % (ref 0–1.5)
CREAT SERPL-MCNC: 1.8 MG/DL (ref 0.6–1.2)
DEPRECATED RDW RBC AUTO: 17.7 % (ref 12.4–15.4)
DOHLE BOD BLD QL SMEAR: PRESENT
EOSINOPHIL # BLD: 0 K/UL (ref 0–0.6)
EOSINOPHIL NFR BLD: 0 %
GFR SERPLBLD CREATININE-BSD FMLA CKD-EPI: 31 ML/MIN/{1.73_M2}
GLUCOSE SERPL-MCNC: 90 MG/DL (ref 70–99)
HCO3 BLDV-SCNC: 15.8 MMOL/L (ref 23–29)
HCT VFR BLD AUTO: 35.2 % (ref 36–48)
HGB BLD-MCNC: 11.4 G/DL (ref 12–16)
LACTATE BLDV-SCNC: 2.2 MMOL/L (ref 0.4–2)
LACTATE BLDV-SCNC: 6.1 MMOL/L (ref 0.4–2)
LIPASE SERPL-CCNC: 5 U/L (ref 13–60)
LYMPHOCYTES # BLD: 0.4 K/UL (ref 1–5.1)
LYMPHOCYTES NFR BLD: 7 %
MAGNESIUM SERPL-MCNC: 2.1 MG/DL (ref 1.8–2.4)
MCH RBC QN AUTO: 28.9 PG (ref 26–34)
MCHC RBC AUTO-ENTMCNC: 32.3 G/DL (ref 31–36)
MCV RBC AUTO: 89.2 FL (ref 80–100)
METHGB MFR BLDV: 0.3 %
MONOCYTES # BLD: 0 K/UL (ref 0–1.3)
MONOCYTES NFR BLD: 0 %
NEUTROPHILS # BLD: 5.8 K/UL (ref 1.7–7.7)
NEUTROPHILS NFR BLD: 87 %
NEUTS BAND NFR BLD MANUAL: 6 % (ref 0–7)
NT-PROBNP SERPL-MCNC: 9751 PG/ML (ref 0–124)
O2 THERAPY: ABNORMAL
PCO2 BLDV: 29.9 MMHG (ref 40–50)
PH BLDV: 7.34 [PH] (ref 7.35–7.45)
PLATELET # BLD AUTO: 77 K/UL (ref 135–450)
PLATELET BLD QL SMEAR: ABNORMAL
PMV BLD AUTO: 10.8 FL (ref 5–10.5)
PO2 BLDV: 50.6 MMHG (ref 25–40)
POTASSIUM SERPL-SCNC: 3.3 MMOL/L (ref 3.5–5.1)
PROT SERPL-MCNC: 5 G/DL (ref 6.4–8.2)
RBC # BLD AUTO: 3.95 M/UL (ref 4–5.2)
SAO2 % BLDV: 84 %
SLIDE REVIEW: ABNORMAL
SODIUM SERPL-SCNC: 139 MMOL/L (ref 136–145)
TROPONIN, HIGH SENSITIVITY: 28 NG/L (ref 0–14)
TROPONIN, HIGH SENSITIVITY: 30 NG/L (ref 0–14)
TSH SERPL DL<=0.005 MIU/L-ACNC: 14.48 UIU/ML (ref 0.27–4.2)
WBC # BLD AUTO: 6.2 K/UL (ref 4–11)

## 2024-08-05 PROCEDURE — 83735 ASSAY OF MAGNESIUM: CPT

## 2024-08-05 PROCEDURE — 96361 HYDRATE IV INFUSION ADD-ON: CPT

## 2024-08-05 PROCEDURE — 99285 EMERGENCY DEPT VISIT HI MDM: CPT

## 2024-08-05 PROCEDURE — 71045 X-RAY EXAM CHEST 1 VIEW: CPT

## 2024-08-05 PROCEDURE — 83880 ASSAY OF NATRIURETIC PEPTIDE: CPT

## 2024-08-05 PROCEDURE — 82803 BLOOD GASES ANY COMBINATION: CPT

## 2024-08-05 PROCEDURE — 84439 ASSAY OF FREE THYROXINE: CPT

## 2024-08-05 PROCEDURE — 36415 COLL VENOUS BLD VENIPUNCTURE: CPT

## 2024-08-05 PROCEDURE — 83605 ASSAY OF LACTIC ACID: CPT

## 2024-08-05 PROCEDURE — 84484 ASSAY OF TROPONIN QUANT: CPT

## 2024-08-05 PROCEDURE — 85025 COMPLETE CBC W/AUTO DIFF WBC: CPT

## 2024-08-05 PROCEDURE — 6370000000 HC RX 637 (ALT 250 FOR IP): Performed by: STUDENT IN AN ORGANIZED HEALTH CARE EDUCATION/TRAINING PROGRAM

## 2024-08-05 PROCEDURE — 80053 COMPREHEN METABOLIC PANEL: CPT

## 2024-08-05 PROCEDURE — 70450 CT HEAD/BRAIN W/O DYE: CPT

## 2024-08-05 PROCEDURE — 93005 ELECTROCARDIOGRAM TRACING: CPT | Performed by: STUDENT IN AN ORGANIZED HEALTH CARE EDUCATION/TRAINING PROGRAM

## 2024-08-05 PROCEDURE — 83690 ASSAY OF LIPASE: CPT

## 2024-08-05 PROCEDURE — 2580000003 HC RX 258: Performed by: STUDENT IN AN ORGANIZED HEALTH CARE EDUCATION/TRAINING PROGRAM

## 2024-08-05 PROCEDURE — 2500000003 HC RX 250 WO HCPCS: Performed by: STUDENT IN AN ORGANIZED HEALTH CARE EDUCATION/TRAINING PROGRAM

## 2024-08-05 PROCEDURE — 84443 ASSAY THYROID STIM HORMONE: CPT

## 2024-08-05 RX ORDER — HYDROCODONE BITARTRATE AND ACETAMINOPHEN 5; 325 MG/1; MG/1
1 TABLET ORAL
Status: COMPLETED | OUTPATIENT
Start: 2024-08-05 | End: 2024-08-05

## 2024-08-05 RX ORDER — MIDODRINE HYDROCHLORIDE 5 MG/1
5 TABLET ORAL ONCE
Status: COMPLETED | OUTPATIENT
Start: 2024-08-05 | End: 2024-08-05

## 2024-08-05 RX ORDER — 0.9 % SODIUM CHLORIDE 0.9 %
1000 INTRAVENOUS SOLUTION INTRAVENOUS ONCE
Status: COMPLETED | OUTPATIENT
Start: 2024-08-05 | End: 2024-08-05

## 2024-08-05 RX ADMIN — MIDODRINE HYDROCHLORIDE 5 MG: 5 TABLET ORAL at 23:47

## 2024-08-05 RX ADMIN — HYDROCODONE BITARTRATE AND ACETAMINOPHEN 1 TABLET: 5; 325 TABLET ORAL at 21:50

## 2024-08-05 RX ADMIN — POTASSIUM BICARBONATE 40 MEQ: 782 TABLET, EFFERVESCENT ORAL at 21:50

## 2024-08-05 RX ADMIN — SODIUM CHLORIDE 1000 ML: 9 INJECTION, SOLUTION INTRAVENOUS at 20:40

## 2024-08-05 RX ADMIN — POTASSIUM BICARBONATE 40 MEQ: 782 TABLET, EFFERVESCENT ORAL at 22:59

## 2024-08-05 RX ADMIN — MIDODRINE HYDROCHLORIDE 5 MG: 5 TABLET ORAL at 22:37

## 2024-08-05 RX ADMIN — SODIUM BICARBONATE: 84 INJECTION, SOLUTION INTRAVENOUS at 23:14

## 2024-08-05 ASSESSMENT — PAIN DESCRIPTION - LOCATION
LOCATION: ARM;NECK
LOCATION: ARM;NECK

## 2024-08-05 ASSESSMENT — PAIN SCALES - GENERAL
PAINLEVEL_OUTOF10: 8
PAINLEVEL_OUTOF10: 3

## 2024-08-05 ASSESSMENT — PAIN DESCRIPTION - ORIENTATION
ORIENTATION: RIGHT
ORIENTATION: RIGHT

## 2024-08-05 ASSESSMENT — PAIN DESCRIPTION - DESCRIPTORS
DESCRIPTORS: ACHING
DESCRIPTORS: ACHING

## 2024-08-05 ASSESSMENT — PAIN - FUNCTIONAL ASSESSMENT: PAIN_FUNCTIONAL_ASSESSMENT: 0-10

## 2024-08-05 NOTE — TELEPHONE ENCOUNTER
Please call patient.  Her optivol has increased.  Is she taking torsemide 10 mg daily?  Needs to start or increase diuretic.  Find out hat she is taking.  ELHAM

## 2024-08-05 NOTE — TELEPHONE ENCOUNTER
Have her take 2 of the 10 mg torsemide for about 5 days to see if that helps her shortness of breath.  ELHAM

## 2024-08-05 NOTE — TELEPHONE ENCOUNTER
Spoke with pt. She is taking torsemide 10 mg daily and jardiance 10 mg daily. She says she broke her arm 3 weeks ago. She is unsteady on her feet so she doesn't get around much and can not weigh herself. She reports she is drinking less than 2 bottles of water daily. She reports increased SOB with activity.

## 2024-08-06 ENCOUNTER — HOSPITAL ENCOUNTER (INPATIENT)
Age: 64
LOS: 3 days | DRG: 871 | End: 2024-08-09
Attending: INTERNAL MEDICINE | Admitting: INTERNAL MEDICINE
Payer: MEDICARE

## 2024-08-06 ENCOUNTER — APPOINTMENT (OUTPATIENT)
Dept: CT IMAGING | Age: 64
DRG: 871 | End: 2024-08-06
Attending: INTERNAL MEDICINE
Payer: MEDICARE

## 2024-08-06 ENCOUNTER — APPOINTMENT (OUTPATIENT)
Dept: GENERAL RADIOLOGY | Age: 64
DRG: 871 | End: 2024-08-06
Attending: INTERNAL MEDICINE
Payer: MEDICARE

## 2024-08-06 VITALS
HEIGHT: 63 IN | SYSTOLIC BLOOD PRESSURE: 86 MMHG | BODY MASS INDEX: 49.61 KG/M2 | HEART RATE: 82 BPM | RESPIRATION RATE: 20 BRPM | WEIGHT: 280 LBS | DIASTOLIC BLOOD PRESSURE: 60 MMHG | OXYGEN SATURATION: 92 % | TEMPERATURE: 97.9 F

## 2024-08-06 DIAGNOSIS — R57.9: Primary | ICD-10-CM

## 2024-08-06 DIAGNOSIS — B95.61 STAPHYLOCOCCUS AUREUS BACTEREMIA: ICD-10-CM

## 2024-08-06 DIAGNOSIS — I50.22 CHRONIC SYSTOLIC CONGESTIVE HEART FAILURE (HCC): ICD-10-CM

## 2024-08-06 DIAGNOSIS — I50.23 ACUTE ON CHRONIC SYSTOLIC HEART FAILURE (HCC): ICD-10-CM

## 2024-08-06 DIAGNOSIS — R78.81 STAPHYLOCOCCUS AUREUS BACTEREMIA: ICD-10-CM

## 2024-08-06 PROBLEM — R55 SYNCOPE, NEAR: Status: ACTIVE | Noted: 2024-08-06

## 2024-08-06 LAB
ALBUMIN SERPL-MCNC: 2.5 G/DL (ref 3.4–5)
ALBUMIN/GLOB SERPL: 1.4 {RATIO} (ref 1.1–2.2)
ALP SERPL-CCNC: 99 U/L (ref 40–129)
ALT SERPL-CCNC: 11 U/L (ref 10–40)
ANION GAP SERPL CALCULATED.3IONS-SCNC: 9 MMOL/L (ref 3–16)
ANISOCYTOSIS BLD QL SMEAR: ABNORMAL
AST SERPL-CCNC: 25 U/L (ref 15–37)
BASE EXCESS BLDV CALC-SCNC: -3.5 MMOL/L (ref -3–3)
BASOPHILS # BLD: 0 K/UL (ref 0–0.2)
BASOPHILS NFR BLD: 0 %
BILIRUB SERPL-MCNC: 1.4 MG/DL (ref 0–1)
BUN SERPL-MCNC: 39 MG/DL (ref 7–20)
CA-I BLD-SCNC: 1.06 MMOL/L (ref 1.12–1.32)
CALCIUM SERPL-MCNC: 7.5 MG/DL (ref 8.3–10.6)
CHLORIDE SERPL-SCNC: 107 MMOL/L (ref 99–110)
CO2 BLDV-SCNC: 22 MMOL/L
CO2 SERPL-SCNC: 23 MMOL/L (ref 21–32)
COHGB MFR BLDV: 1.9 % (ref 0–1.5)
CREAT SERPL-MCNC: 1.7 MG/DL (ref 0.6–1.2)
DEPRECATED RDW RBC AUTO: 17.5 % (ref 12.4–15.4)
EKG ATRIAL RATE: 97 BPM
EKG DIAGNOSIS: NORMAL
EKG Q-T INTERVAL: 484 MS
EKG QRS DURATION: 196 MS
EKG QTC CALCULATION (BAZETT): 617 MS
EKG R AXIS: 199 DEGREES
EKG T AXIS: 77 DEGREES
EKG VENTRICULAR RATE: 98 BPM
EOSINOPHIL # BLD: 0 K/UL (ref 0–0.6)
EOSINOPHIL NFR BLD: 0 %
GFR SERPLBLD CREATININE-BSD FMLA CKD-EPI: 33 ML/MIN/{1.73_M2}
GLUCOSE SERPL-MCNC: 94 MG/DL (ref 70–99)
HCO3 BLDV-SCNC: 21.2 MMOL/L (ref 23–29)
HCT VFR BLD AUTO: 30.1 % (ref 36–48)
HGB BLD-MCNC: 9.7 G/DL (ref 12–16)
LACTATE BLDV-SCNC: 1.9 MMOL/L (ref 0.4–2)
LYMPHOCYTES # BLD: 0.4 K/UL (ref 1–5.1)
LYMPHOCYTES NFR BLD: 8 %
MAGNESIUM SERPL-MCNC: 1.9 MG/DL (ref 1.8–2.4)
MCH RBC QN AUTO: 28.5 PG (ref 26–34)
MCHC RBC AUTO-ENTMCNC: 32.2 G/DL (ref 31–36)
MCV RBC AUTO: 88.5 FL (ref 80–100)
METHGB MFR BLDV: 0.2 %
MONOCYTES # BLD: 0.2 K/UL (ref 0–1.3)
MONOCYTES NFR BLD: 4 %
NEUTROPHILS # BLD: 4.5 K/UL (ref 1.7–7.7)
NEUTROPHILS NFR BLD: 85 %
NEUTS BAND NFR BLD MANUAL: 3 % (ref 0–7)
NT-PROBNP SERPL-MCNC: ABNORMAL PG/ML (ref 0–124)
O2 CT VFR BLDV CALC: 13 VOL %
O2 THERAPY: ABNORMAL
PCO2 BLDV: 36.6 MMHG (ref 40–50)
PH BLDV: 7.38 [PH] (ref 7.35–7.45)
PH BLDV: 7.41 [PH] (ref 7.35–7.45)
PLATELET # BLD AUTO: 59 K/UL (ref 135–450)
PLATELET BLD QL SMEAR: ABNORMAL
PMV BLD AUTO: 11.5 FL (ref 5–10.5)
PO2 BLDV: 71.6 MMHG (ref 25–40)
POIKILOCYTOSIS BLD QL SMEAR: ABNORMAL
POTASSIUM SERPL-SCNC: 3.6 MMOL/L (ref 3.5–5.1)
PROT SERPL-MCNC: 4.3 G/DL (ref 6.4–8.2)
RBC # BLD AUTO: 3.4 M/UL (ref 4–5.2)
SAO2 % BLDV: 94 %
SLIDE REVIEW: ABNORMAL
SODIUM SERPL-SCNC: 139 MMOL/L (ref 136–145)
T4 FREE SERPL-MCNC: 1.3 NG/DL (ref 0.9–1.8)
TROPONIN, HIGH SENSITIVITY: 27 NG/L (ref 0–14)
WBC # BLD AUTO: 5.1 K/UL (ref 4–11)

## 2024-08-06 PROCEDURE — 96367 TX/PROPH/DG ADDL SEQ IV INF: CPT

## 2024-08-06 PROCEDURE — 82330 ASSAY OF CALCIUM: CPT

## 2024-08-06 PROCEDURE — 2060000000 HC ICU INTERMEDIATE R&B

## 2024-08-06 PROCEDURE — 83880 ASSAY OF NATRIURETIC PEPTIDE: CPT

## 2024-08-06 PROCEDURE — 2580000003 HC RX 258: Performed by: INTERNAL MEDICINE

## 2024-08-06 PROCEDURE — 83605 ASSAY OF LACTIC ACID: CPT

## 2024-08-06 PROCEDURE — 6370000000 HC RX 637 (ALT 250 FOR IP): Performed by: INTERNAL MEDICINE

## 2024-08-06 PROCEDURE — 96366 THER/PROPH/DIAG IV INF ADDON: CPT

## 2024-08-06 PROCEDURE — 83735 ASSAY OF MAGNESIUM: CPT

## 2024-08-06 PROCEDURE — 99222 1ST HOSP IP/OBS MODERATE 55: CPT | Performed by: STUDENT IN AN ORGANIZED HEALTH CARE EDUCATION/TRAINING PROGRAM

## 2024-08-06 PROCEDURE — 2580000003 HC RX 258: Performed by: EMERGENCY MEDICINE

## 2024-08-06 PROCEDURE — 2500000003 HC RX 250 WO HCPCS: Performed by: EMERGENCY MEDICINE

## 2024-08-06 PROCEDURE — 80053 COMPREHEN METABOLIC PANEL: CPT

## 2024-08-06 PROCEDURE — 93010 ELECTROCARDIOGRAM REPORT: CPT | Performed by: INTERNAL MEDICINE

## 2024-08-06 PROCEDURE — 96365 THER/PROPH/DIAG IV INF INIT: CPT

## 2024-08-06 PROCEDURE — 85025 COMPLETE CBC W/AUTO DIFF WBC: CPT

## 2024-08-06 PROCEDURE — 93005 ELECTROCARDIOGRAM TRACING: CPT | Performed by: INTERNAL MEDICINE

## 2024-08-06 PROCEDURE — 82803 BLOOD GASES ANY COMBINATION: CPT

## 2024-08-06 PROCEDURE — 84484 ASSAY OF TROPONIN QUANT: CPT

## 2024-08-06 PROCEDURE — 73080 X-RAY EXAM OF ELBOW: CPT

## 2024-08-06 PROCEDURE — 6370000000 HC RX 637 (ALT 250 FOR IP): Performed by: EMERGENCY MEDICINE

## 2024-08-06 PROCEDURE — 2580000003 HC RX 258: Performed by: STUDENT IN AN ORGANIZED HEALTH CARE EDUCATION/TRAINING PROGRAM

## 2024-08-06 PROCEDURE — 73200 CT UPPER EXTREMITY W/O DYE: CPT

## 2024-08-06 PROCEDURE — 51798 US URINE CAPACITY MEASURE: CPT

## 2024-08-06 RX ORDER — MAGNESIUM SULFATE IN WATER 40 MG/ML
2000 INJECTION, SOLUTION INTRAVENOUS PRN
Status: DISCONTINUED | OUTPATIENT
Start: 2024-08-06 | End: 2024-08-09

## 2024-08-06 RX ORDER — ACETAMINOPHEN 325 MG/1
650 TABLET ORAL EVERY 6 HOURS PRN
Status: DISCONTINUED | OUTPATIENT
Start: 2024-08-06 | End: 2024-08-09 | Stop reason: HOSPADM

## 2024-08-06 RX ORDER — SODIUM CHLORIDE 9 MG/ML
INJECTION, SOLUTION INTRAVENOUS PRN
Status: DISCONTINUED | OUTPATIENT
Start: 2024-08-06 | End: 2024-08-07

## 2024-08-06 RX ORDER — CALCIUM CARBONATE 500 MG/1
2 TABLET, CHEWABLE ORAL 2 TIMES DAILY
Status: DISCONTINUED | OUTPATIENT
Start: 2024-08-06 | End: 2024-08-09 | Stop reason: HOSPADM

## 2024-08-06 RX ORDER — SODIUM CHLORIDE, SODIUM LACTATE, POTASSIUM CHLORIDE, AND CALCIUM CHLORIDE .6; .31; .03; .02 G/100ML; G/100ML; G/100ML; G/100ML
500 INJECTION, SOLUTION INTRAVENOUS ONCE
Status: COMPLETED | OUTPATIENT
Start: 2024-08-06 | End: 2024-08-06

## 2024-08-06 RX ORDER — CALCIUM GLUCONATE 20 MG/ML
1000 INJECTION, SOLUTION INTRAVENOUS ONCE
Status: COMPLETED | OUTPATIENT
Start: 2024-08-06 | End: 2024-08-06

## 2024-08-06 RX ORDER — METHOCARBAMOL 500 MG/1
500 TABLET, FILM COATED ORAL 2 TIMES DAILY PRN
Status: DISCONTINUED | OUTPATIENT
Start: 2024-08-06 | End: 2024-08-09 | Stop reason: HOSPADM

## 2024-08-06 RX ORDER — FLUOXETINE 10 MG/1
10 CAPSULE ORAL DAILY
Status: DISCONTINUED | OUTPATIENT
Start: 2024-08-07 | End: 2024-08-06

## 2024-08-06 RX ORDER — ONDANSETRON 2 MG/ML
4 INJECTION INTRAMUSCULAR; INTRAVENOUS EVERY 6 HOURS PRN
Status: DISCONTINUED | OUTPATIENT
Start: 2024-08-06 | End: 2024-08-07

## 2024-08-06 RX ORDER — ONDANSETRON 2 MG/ML
4 INJECTION INTRAMUSCULAR; INTRAVENOUS EVERY 6 HOURS PRN
Status: DISCONTINUED | OUTPATIENT
Start: 2024-08-06 | End: 2024-08-06

## 2024-08-06 RX ORDER — SODIUM CHLORIDE, SODIUM LACTATE, POTASSIUM CHLORIDE, AND CALCIUM CHLORIDE .6; .31; .03; .02 G/100ML; G/100ML; G/100ML; G/100ML
1000 INJECTION, SOLUTION INTRAVENOUS ONCE
Status: COMPLETED | OUTPATIENT
Start: 2024-08-06 | End: 2024-08-06

## 2024-08-06 RX ORDER — ONDANSETRON 4 MG/1
4 TABLET, ORALLY DISINTEGRATING ORAL EVERY 8 HOURS PRN
Status: DISCONTINUED | OUTPATIENT
Start: 2024-08-06 | End: 2024-08-06

## 2024-08-06 RX ORDER — ACETAMINOPHEN 650 MG/1
650 SUPPOSITORY RECTAL EVERY 6 HOURS PRN
Status: DISCONTINUED | OUTPATIENT
Start: 2024-08-06 | End: 2024-08-09 | Stop reason: HOSPADM

## 2024-08-06 RX ORDER — LURASIDONE HYDROCHLORIDE 40 MG/1
60 TABLET, FILM COATED ORAL NIGHTLY
Status: DISCONTINUED | OUTPATIENT
Start: 2024-08-06 | End: 2024-08-09 | Stop reason: HOSPADM

## 2024-08-06 RX ORDER — ALLOPURINOL 300 MG/1
300 TABLET ORAL DAILY
Status: DISCONTINUED | OUTPATIENT
Start: 2024-08-07 | End: 2024-08-09 | Stop reason: HOSPADM

## 2024-08-06 RX ORDER — HYDROXYCHLOROQUINE SULFATE 200 MG/1
200 TABLET, FILM COATED ORAL 2 TIMES DAILY
Status: DISCONTINUED | OUTPATIENT
Start: 2024-08-06 | End: 2024-08-09 | Stop reason: HOSPADM

## 2024-08-06 RX ORDER — MONTELUKAST SODIUM 10 MG/1
10 TABLET ORAL NIGHTLY
Status: DISCONTINUED | OUTPATIENT
Start: 2024-08-06 | End: 2024-08-09 | Stop reason: HOSPADM

## 2024-08-06 RX ORDER — CETIRIZINE HYDROCHLORIDE 10 MG/1
10 TABLET ORAL DAILY
Status: DISCONTINUED | OUTPATIENT
Start: 2024-08-06 | End: 2024-08-09 | Stop reason: HOSPADM

## 2024-08-06 RX ORDER — ATORVASTATIN CALCIUM 10 MG/1
20 TABLET, FILM COATED ORAL DAILY
Status: DISCONTINUED | OUTPATIENT
Start: 2024-08-06 | End: 2024-08-09 | Stop reason: HOSPADM

## 2024-08-06 RX ORDER — LISINOPRIL 5 MG/1
5 TABLET ORAL DAILY
Status: DISCONTINUED | OUTPATIENT
Start: 2024-08-07 | End: 2024-08-09 | Stop reason: HOSPADM

## 2024-08-06 RX ORDER — POLYETHYLENE GLYCOL 3350 17 G/17G
17 POWDER, FOR SOLUTION ORAL DAILY PRN
Status: DISCONTINUED | OUTPATIENT
Start: 2024-08-06 | End: 2024-08-09 | Stop reason: HOSPADM

## 2024-08-06 RX ORDER — IBUPROFEN 400 MG/1
400 TABLET ORAL ONCE
Status: COMPLETED | OUTPATIENT
Start: 2024-08-06 | End: 2024-08-06

## 2024-08-06 RX ORDER — SODIUM CHLORIDE 0.9 % (FLUSH) 0.9 %
5-40 SYRINGE (ML) INJECTION EVERY 12 HOURS SCHEDULED
Status: DISCONTINUED | OUTPATIENT
Start: 2024-08-06 | End: 2024-08-07

## 2024-08-06 RX ORDER — HYDROCODONE BITARTRATE AND ACETAMINOPHEN 5; 325 MG/1; MG/1
1 TABLET ORAL ONCE
Status: COMPLETED | OUTPATIENT
Start: 2024-08-06 | End: 2024-08-06

## 2024-08-06 RX ORDER — POTASSIUM CHLORIDE 7.45 MG/ML
10 INJECTION INTRAVENOUS PRN
Status: DISCONTINUED | OUTPATIENT
Start: 2024-08-06 | End: 2024-08-08

## 2024-08-06 RX ORDER — SODIUM CHLORIDE 9 MG/ML
INJECTION, SOLUTION INTRAVENOUS CONTINUOUS
Status: DISCONTINUED | OUTPATIENT
Start: 2024-08-06 | End: 2024-08-07

## 2024-08-06 RX ORDER — LAMOTRIGINE 100 MG/1
300 TABLET ORAL EVERY EVENING
Status: DISCONTINUED | OUTPATIENT
Start: 2024-08-06 | End: 2024-08-09 | Stop reason: HOSPADM

## 2024-08-06 RX ORDER — VITAMIN B COMPLEX
2000 TABLET ORAL DAILY
Status: DISCONTINUED | OUTPATIENT
Start: 2024-08-07 | End: 2024-08-09 | Stop reason: HOSPADM

## 2024-08-06 RX ORDER — POTASSIUM CHLORIDE 20 MEQ/1
40 TABLET, EXTENDED RELEASE ORAL PRN
Status: DISCONTINUED | OUTPATIENT
Start: 2024-08-06 | End: 2024-08-08

## 2024-08-06 RX ORDER — TORSEMIDE 20 MG/1
10 TABLET ORAL DAILY
Status: DISCONTINUED | OUTPATIENT
Start: 2024-08-07 | End: 2024-08-09 | Stop reason: HOSPADM

## 2024-08-06 RX ORDER — ONDANSETRON 4 MG/1
4 TABLET, ORALLY DISINTEGRATING ORAL EVERY 8 HOURS PRN
Status: DISCONTINUED | OUTPATIENT
Start: 2024-08-06 | End: 2024-08-07

## 2024-08-06 RX ORDER — METOPROLOL SUCCINATE 50 MG/1
100 TABLET, EXTENDED RELEASE ORAL DAILY
Status: DISCONTINUED | OUTPATIENT
Start: 2024-08-07 | End: 2024-08-09 | Stop reason: HOSPADM

## 2024-08-06 RX ORDER — ACETAMINOPHEN 325 MG/1
650 TABLET ORAL ONCE
Status: COMPLETED | OUTPATIENT
Start: 2024-08-06 | End: 2024-08-06

## 2024-08-06 RX ORDER — SODIUM CHLORIDE 0.9 % (FLUSH) 0.9 %
5-40 SYRINGE (ML) INJECTION PRN
Status: DISCONTINUED | OUTPATIENT
Start: 2024-08-06 | End: 2024-08-07

## 2024-08-06 RX ORDER — SODIUM CHLORIDE 9 MG/ML
INJECTION, SOLUTION INTRAVENOUS CONTINUOUS
Status: ACTIVE | OUTPATIENT
Start: 2024-08-06 | End: 2024-08-06

## 2024-08-06 RX ADMIN — SODIUM CHLORIDE: 9 INJECTION, SOLUTION INTRAVENOUS at 04:25

## 2024-08-06 RX ADMIN — IBUPROFEN 400 MG: 400 TABLET, FILM COATED ORAL at 01:41

## 2024-08-06 RX ADMIN — LURASIDONE HYDROCHLORIDE 60 MG: 40 TABLET, FILM COATED ORAL at 22:50

## 2024-08-06 RX ADMIN — SODIUM CHLORIDE, POTASSIUM CHLORIDE, SODIUM LACTATE AND CALCIUM CHLORIDE 1000 ML: 600; 310; 30; 20 INJECTION, SOLUTION INTRAVENOUS at 01:08

## 2024-08-06 RX ADMIN — MONTELUKAST 10 MG: 10 TABLET, FILM COATED ORAL at 20:35

## 2024-08-06 RX ADMIN — LAMOTRIGINE 300 MG: 100 TABLET ORAL at 18:02

## 2024-08-06 RX ADMIN — HYDROXYCHLOROQUINE SULFATE 200 MG: 200 TABLET ORAL at 20:36

## 2024-08-06 RX ADMIN — CALCIUM CARBONATE 1000 MG: 500 TABLET, CHEWABLE ORAL at 20:36

## 2024-08-06 RX ADMIN — HYDROCODONE BITARTRATE AND ACETAMINOPHEN 1 TABLET: 5; 325 TABLET ORAL at 09:42

## 2024-08-06 RX ADMIN — CALCIUM GLUCONATE 1000 MG: 20 INJECTION, SOLUTION INTRAVENOUS at 08:35

## 2024-08-06 RX ADMIN — SODIUM CHLORIDE: 9 INJECTION, SOLUTION INTRAVENOUS at 18:43

## 2024-08-06 RX ADMIN — SODIUM CHLORIDE, POTASSIUM CHLORIDE, SODIUM LACTATE AND CALCIUM CHLORIDE 500 ML: 600; 310; 30; 20 INJECTION, SOLUTION INTRAVENOUS at 02:36

## 2024-08-06 RX ADMIN — METHOCARBAMOL 500 MG: 500 TABLET ORAL at 18:02

## 2024-08-06 RX ADMIN — ACETAMINOPHEN 650 MG: 325 TABLET ORAL at 06:47

## 2024-08-06 RX ADMIN — LEVOTHYROXINE SODIUM 175 MCG: 25 TABLET ORAL at 07:57

## 2024-08-06 ASSESSMENT — PAIN SCALES - GENERAL
PAINLEVEL_OUTOF10: 9
PAINLEVEL_OUTOF10: 5
PAINLEVEL_OUTOF10: 5
PAINLEVEL_OUTOF10: 9
PAINLEVEL_OUTOF10: 6
PAINLEVEL_OUTOF10: 7
PAINLEVEL_OUTOF10: 7

## 2024-08-06 ASSESSMENT — PAIN DESCRIPTION - DESCRIPTORS
DESCRIPTORS: THROBBING
DESCRIPTORS: ACHING;THROBBING;BURNING

## 2024-08-06 ASSESSMENT — PAIN DESCRIPTION - ORIENTATION
ORIENTATION: RIGHT
ORIENTATION: RIGHT

## 2024-08-06 ASSESSMENT — PAIN DESCRIPTION - LOCATION
LOCATION: ARM
LOCATION: ARM

## 2024-08-06 NOTE — PROGRESS NOTES
Patient admitted to room 430 from [facility].  Patient oriented to room, call light, bed rails, phone, lights and bathroom.  Patient instructed about the schedule of the day including: vital sign frequency, lab draws, possible tests, frequency of MD and staff rounds, including RN/MD rounding together at bedside, daily weights, and I &O's.  Patient instructed about prescribed diet,and television. bed alarm in place, patient aware of placement and reason.   Telemetry box 82 in place, patient aware of placement and reason.  Bed locked, in lowest position, side rails up 2/4, call light within reach.  Will continue to monitor.

## 2024-08-06 NOTE — ED PROVIDER NOTES
I assumed care of this patient from Dr. Prince at shift change with bed assignment pending at [facility].  The patient is being transferred there because she is followed by the heart failure clinic there and also for evaluation by EP.  She has had some labile blood pressures here.  They have been soft her entire ER stay.  She has been given 2 L of IV fluids.  If she is notified by the nurse that her pressure again dropped.  I gave an additional 500 cc of LR.  I evaluated the patient.  She is awake and alert, no hypoxia.  She states she feels a little lightheaded but feels well.  She does not feel short of breath.  Lungs are clear.  Blood pressures currently maintaining 90s over 50s.  I discussed central line placement and pressors with the patient.  She is hesitant for central line.  She has had 1 in the past and did not enjoy it.  I explained to her that if she has more low blood pressures we are limited in the amount of fluids we can administer.  Blood pressure holding stable currently so we will hold off on a central line for the time being.  I did attempt to find a facility that has a ready bed.  We reached out to [facility], [facility] and [facility] and they all are discharged dependent.  She is not a candidate for [facility] because they do not have electrophysiology.  We will await the bed at [facility].  Patient continues to feel well here.    Per report from Dr. Prince, the patient had a hyperdynamic EF on bedside echo with a fully compressible IVC consistent with hypovolemia.  Renal function also is indicative of hypovolemia and dehydration which correlates to her report of voluminous diarrhea with up to 5 episodes daily for quite some time.    Results for orders placed or performed during the hospital encounter of 08/05/24   CBC with Auto Differential   Result Value Ref Range    WBC 6.2 4.0 - 11.0 K/uL    RBC 3.95 (L) 4.00 - 5.20 M/uL    Hemoglobin 11.4 (L) 12.0 - 16.0 g/dL 
Patient was signed out to me by Dr. Waterman at 0710 to follow-up on the patient while awaiting transfer to [facility].  Briefly, the patient states that she has been having more frequent diarrhea recently although has had issues with this in the past.  She did report falling to her knees and does not think she hit her head but may have barely hit her head on a chair.  She does report chronic neck pain although does feel like it is worse since the fall.  She denies any unilateral numbness or weakness but did feel weak all over.  She denies any vomiting.  She did have chest pain when she arrived to the ED but denies any chest pain currently.  She denies normally being on oxygen although is currently on 3 L nasal cannula.  She has a history of heart failure based on chart review and does take Eliquis.  She states her normal blood pressures around 90/70.  She states when she first got here it was much worse but currently it is back to where she is normally at.  She denies any new arm pain although does complain of some right arm discomfort from a prior fall.  She has osteoporosis.  Due to near syncopal event, she was brought to the ED for further evaluation.  She does normally take Synthroid.      Physical:   Gen: No acute distress.  Alert and answering questions appropriately.  Psych: Normal mood and affect  HEENT: NCAT, MMM  Neck: supple, mild tenderness to palpation throughout the posterior neck including the midline and paraspinal regions, normal range of motion  Cardiac: RRR, pulses 2+ in left upper extremity involving radial pulse and bilateral dorsalis pedis pulses, cap refill less than 2 seconds to right hand although unable to check pulse due to having a splint  Lungs: Bilateral breath sounds present, no respiratory distress, no stridor  Abdomen: soft and nontender with no R/D/G  Neuro: no focal neuro deficits with strength and sensation 5/5 in all 4 extremities although limited right upper extremity exam 
with ECG of 28-AUG-2023 10:44,Vent. rate has increased BY  32 BPM       ECG  The Ekg interpreted by me shows  Paced rhythm with a rate of 98 bpm.  No acute injury pattern.  , QTc 562 by Bazett formula.    RADIOLOGY  XR CHEST PORTABLE   Final Result   No radiographic evidence of an acute cardiopulmonary process.         CT Head W/O Contrast   Final Result   No acute intracranial abnormality.           ED COURSE/MDM  Patient seen and evaluated. Old records reviewed. Labs and imaging reviewed and results discussed with patient.       DIFFERENTIAL DX  ACS, dysrhythmia, orthostasis    Cardiac workup obtained.  Due to concern for possible fall today I did obtain a CT head as well as chest x-ray, both of these are reassuring.  Lab work obtained and she does have an azotemia with a creatinine 1.8, baseline is 1.3.  Given 1 L of normal saline.  Potassium is mildly low at 3.3 given 40 mill equivalents orally.  I did review outpatient notes and psych medication from Dr. Suarez for more heart failure monitoring that was concerning for possible heart failure.  Due to this we will avoid further IV fluids, although patient does not appear fluid overloaded on my exam.  She was mildly hypotensive on arrival with systolic blood pressures in the 90s, maps in the 60s to 70s.  Pacemaker was interrogated and she is noted to have episodes of V. tach as well as rapid A-fib that pacemaker is able to overdrive paced out of.  Due to these dysrhythmias and her near syncopal episodes, I did consult with cardiology, Stiven on memory.  He did agree with transfer to [facility] for further evaluation with EP as well as heart failure specialist.  On reevaluation patient continues to feel lightheaded despite the fluid.  Blood pressures in the 90s/50s.  Due to this and concerned about potentially inducing fluid overload, I did treat with a 5 mg dose of midodrine.  Discussed with [facility] hospitalist, Dr. Monroy, who requested I start a

## 2024-08-06 NOTE — ED NOTES
[employer] called to give updates on pts results. Writer notified MD at this time. [employer] rep states they will be faxing the report over.

## 2024-08-06 NOTE — CONSULTS
Orthopedic Surgery Consult                       CC/Reason for consult:  R elbow pain    HPI:      The patient is a 63 y.o. female with R elbow pain after a fall. She initially sustained a right distal humerus fracture on 7/17/24. It was minimally displaced, and she saw my partner Dr. Hernández, who was managing her fracture nonoperatively due to its minimal displacement. The patient suffered another fall after a feeling of light headedness last night. XR of the R elbow demonstrated increased displacement of the fracture, and orthopedics was consulted. The patient is being evaluated for her heart. She is on Eliquis. BMI 50.    Past Medical History:    Past Medical History:   Diagnosis Date    Anesthesia complication     hypotension post op    Arterial ischemic stroke, ICA, right, acute (HCC)     Autoimmune hemolytic anemia ([facility])     during uterine cancer    Bipolar disorder ([facility])     managed by Diane RuthDahiana)    Bundle branch block     Cardiomyopathy ([facility])     Chronic systolic CHF (congestive heart failure) ([facility]) 07/09/2015    Depression     Family history of early CAD     Family history of ovarian cancer     mother    GERD (gastroesophageal reflux disease)     Gout     Hypertension     Hypothyroid     Osteoarthritis, knee     Shybut    Pneumonia     history of pneumonia    S/P gastric bypass 01/07/2005    Sepsis ([facility]) 07/05/2016    Small bowel obstruction ([facility])     Unspecified cerebral artery occlusion with cerebral infarction     Uterine cancer (HCC)     endometrial adenocarcinoma    Vitamin B 12 deficiency 07/2009       Past Surgical History:    Past Surgical History:   Procedure Laterality Date    CARDIAC CATHETERIZATION  7/10/2015    Normal Coronary Arteries    CHOLECYSTECTOMY      COLONOSCOPY  03/06/2019    NL    COLONOSCOPY N/A 3/6/2019    EGD AND COLONOSCOPY WITH ANESTHESIA performed by Armando Simon MD at [facility] ENDOSCOPY    DILATION AND CURETTAGE OF UTERUS      4 times    ERCP      FINGER  pain/swelling  -DVT ppx: EPC per primary, hold for possible surgery tomorrow  -Please call with any questions    Naseem Garcia,   Orthopedic Surgery and Sports Medicine  6:51 PM 8/6/2024

## 2024-08-06 NOTE — ED NOTES
2144 Call placed to Cardiology   2149 Consult completed with call back from Stiven Jain speaking with Dr. Prince

## 2024-08-06 NOTE — ED NOTES
Pts brother called at this time to see if the pt is usually only aox2. The brother states that the patient is usually very with it. The patients brother states that the past three days her words have been slurred and that she has been very tired and not making much sense. The pts brother also states that she is becoming out of breath more easily. The brother would like updates and his number is in the chart.

## 2024-08-06 NOTE — ACP (ADVANCE CARE PLANNING)
Advance Care Planning     Advance Care Planning Inpatient Note  [facility]    Today's Date: 8/6/2024  Unit: [facility] C4 PCU    Received request from HealthCare Provider.  Upon review of chart and communication with care team, patient's decision making abilities are not in question.. Patient was/were present in the room during visit.    Goals of ACP Conversation:  Discuss advance care planning documents  Facilitate a discussion related to patient's goals of care as they align with the patient's values and beliefs.    Health Care Decision Makers:       Primary Decision Maker: Arnel Elmore - Brother/Sister - [phone]    Secondary Decision Maker: Tatyana Whitten - Niece/Nephew - [phone]  Summary:  No Decision Maker named by patient at this time  Malou did not mention updates to decision makers in conversation. Her brother and family member are listed in chart.  Advance Care Planning Documents (Patient Wishes):  Malou desires to review ACP documents and discuss further with  tomorrow.       Assessment:  Malou engaged in conversation. She indicated she welcomed an ACP conversation, however, \"not today\".    Interventions:   honored Malou's wishes to delay ACP conversation  to tomorrow.  provided ACP documents for review.     Care Preferences Communicated:   No    Outcomes/Plan:  ACP Discussion: Postponed    Electronically signed by Chaplain LEONARD on 8/6/2024 at 4:16 PM

## 2024-08-06 NOTE — H&P
Hospital Medicine History & Physical      Date of Admission: 8/6/2024    Date of Service:  Pt seen/examined on 8/6/24     [x]Admitted to Inpatient with expected LOS greater than two midnights due to medical therapy.  []Placed in Observation status.    Chief Admission Complaint:  weakness, nearly passed out    Presenting Admission History:      She was a transfer from [place] ED to [facility].      63 y.o. female with PMH significant for CHF/systolic, non-ischemic dilated cardiomyopathy, S/P BiV-AICD 2016, pulmonary HTN, LBBB, depression, h/o uterine cancer, hypothyroidism, osteoarthritis.  Also sustained a fall about 3 weeks and injured her right elbow and had nondisplaced fracture.  She has had a splint on since that time and is supposed follow-up with orthopedics    She  presented to [facility] ED with complaint of weakness, lightheadedness.   She stated she walking in her house felt weak and lightheaded and fell to her knees.  She denies any loss of consciousness.  She denied any significant injury due to the fall.  She presented to the ED at [facility] for this.  Further evaluation and management.    Of pertinence past few days prior to admission she did have some diarrhea and decreased oral intake.  She reports she is compliant with her medications    Assessment/Plan:      Current Principal Problem:  Syncope, near      CHF/systolic : Her volume status appears stable at this time.  Chest x-ray was done on 8/5/2024 no volume overload was noted.   She does follow with [facility] cardiology for this.  Beta-blocker/metoprolol 100 mg daily, torsemide 10 mg daily, ACE/lisinopril and Jardiance.  Her last echocardiogram was July/2023.  BNP was noted to be elevated.  Will continue to follow volume status, continue medical therapy.  Will ask cardiology to see in consultation.  Will obtain echocardiogram        Hypertension : Blood pressure is noted to be running low and her creatinine is mildly elevated  exam:->fall. inj Reason for Exam: pain s/p fall FINDINGS: There is a supracondylar fracture of the humerus with mild displacement.  I recommend further evaluation with views of the humerus.  There are some osteoarthritic changes of the elbow.  There is no evidence of any fracture.     Supracondylar fracture of the wrist with mild displacement.  Correlation with views of the you Amparo recommended. No evidence of acute fracture. Osteoarthritis of the elbow.       PCP: Collins Lee DO    Past Medical History:        Diagnosis Date    Anesthesia complication     hypotension post op    Arterial ischemic stroke, ICA, right, acute (HCC)     Autoimmune hemolytic anemia (HCC)     during uterine cancer    Bipolar disorder (HCC)     managed by Diane Sidhu ([place])    Bundle branch block     Cardiomyopathy ([facility])     Chronic systolic CHF (congestive heart failure) ([facility]) 07/09/2015    Depression     Family history of early CAD     Family history of ovarian cancer     mother    GERD (gastroesophageal reflux disease)     Gout     Hypertension     Hypothyroid     Osteoarthritis, knee     Shybut    Pneumonia     history of pneumonia    S/P gastric bypass 01/07/2005    Sepsis ([facility]) 07/05/2016    Small bowel obstruction (HCC)     Unspecified cerebral artery occlusion with cerebral infarction     Uterine cancer (HCC)     endometrial adenocarcinoma    Vitamin B 12 deficiency 07/2009       Past Surgical History:        Procedure Laterality Date    CARDIAC CATHETERIZATION  7/10/2015    Normal Coronary Arteries    CHOLECYSTECTOMY      COLONOSCOPY  03/06/2019    NL    COLONOSCOPY N/A 3/6/2019    EGD AND COLONOSCOPY WITH ANESTHESIA performed by Armando Simon MD at [facility]    DILATION AND CURETTAGE OF UTERUS      4 times    ERCP      FINGER SURGERY      gout X 2    GASTRIC BYPASS SURGERY  1/7/05    HERNIA REPAIR  10/20/2016    lap ventral hernia    LIPECTOMY      gangrenous    OTHER SURGICAL HISTORY      radium

## 2024-08-06 NOTE — ED NOTES
Pt unable to sign EMTALA form d/t pts dominant hand being in splint and sling. Pt stated it was okay for verbal consent on EMTALA.

## 2024-08-06 NOTE — ED NOTES
Bladder scan 415mL. Pt states she does not want to be cathed and that she will continue to try to urinate. Notified Dr. Meraz.

## 2024-08-06 NOTE — ED NOTES
MD bone with midodrine administration   Plastic Surgeon Procedure Text (A): After obtaining clear surgical margins the patient was sent to plastics for surgical repair.  The patient understands they will receive post-surgical care and follow-up from the referring physician's office.

## 2024-08-06 NOTE — ED NOTES
Telephone report given to FLORIDALMA Mc from  at [facility]. [employer] [facility] ETA [id number].

## 2024-08-06 NOTE — ED NOTES
Called access [facility] for update on bed status. [facility] was calling [place] nursing supervisor to check on status.

## 2024-08-07 ENCOUNTER — PROCEDURE VISIT (OUTPATIENT)
Dept: CARDIOLOGY CLINIC | Age: 64
End: 2024-08-07

## 2024-08-07 ENCOUNTER — CARE COORDINATION (OUTPATIENT)
Dept: CASE MANAGEMENT | Age: 64
End: 2024-08-07

## 2024-08-07 ENCOUNTER — APPOINTMENT (OUTPATIENT)
Dept: CT IMAGING | Age: 64
DRG: 871 | End: 2024-08-07
Attending: INTERNAL MEDICINE
Payer: MEDICARE

## 2024-08-07 DIAGNOSIS — I47.20 VT (VENTRICULAR TACHYCARDIA) (HCC): ICD-10-CM

## 2024-08-07 DIAGNOSIS — Z95.810 BIVENTRICULAR ICD (IMPLANTABLE CARDIOVERTER-DEFIBRILLATOR) IN PLACE: Primary | ICD-10-CM

## 2024-08-07 DIAGNOSIS — I42.0 DILATED CARDIOMYOPATHY (HCC): ICD-10-CM

## 2024-08-07 LAB
ANION GAP SERPL CALCULATED.3IONS-SCNC: 14 MMOL/L (ref 3–16)
ANION GAP SERPL CALCULATED.3IONS-SCNC: 16 MMOL/L (ref 3–16)
APTT BLD: 38.2 SEC (ref 22.1–36.4)
BACTERIA URNS QL MICRO: ABNORMAL /HPF
BASE EXCESS BLDA CALC-SCNC: -6 MMOL/L (ref -3–3)
BASE EXCESS BLDV CALC-SCNC: -5.6 MMOL/L (ref -3–3)
BILIRUB DIRECT SERPL-MCNC: 0.8 MG/DL (ref 0–0.3)
BILIRUB INDIRECT SERPL-MCNC: 0.8 MG/DL (ref 0–1)
BILIRUB SERPL-MCNC: 1.6 MG/DL (ref 0–1)
BILIRUB UR QL STRIP.AUTO: ABNORMAL
BUN SERPL-MCNC: 44 MG/DL (ref 7–20)
BUN SERPL-MCNC: 44 MG/DL (ref 7–20)
CA-I BLD-SCNC: 1.02 MMOL/L (ref 1.12–1.32)
CALCIUM SERPL-MCNC: 7.6 MG/DL (ref 8.3–10.6)
CALCIUM SERPL-MCNC: 8 MG/DL (ref 8.3–10.6)
CHLORIDE SERPL-SCNC: 105 MMOL/L (ref 99–110)
CHLORIDE SERPL-SCNC: 109 MMOL/L (ref 99–110)
CHOLEST SERPL-MCNC: 61 MG/DL (ref 0–199)
CLARITY UR: CLEAR
CO2 BLDA-SCNC: 19 MMOL/L
CO2 BLDV-SCNC: 19 MMOL/L
CO2 SERPL-SCNC: 16 MMOL/L (ref 21–32)
CO2 SERPL-SCNC: 17 MMOL/L (ref 21–32)
COHGB MFR BLDV: 2.9 % (ref 0–1.5)
COLOR UR: YELLOW
CREAT SERPL-MCNC: 1.7 MG/DL (ref 0.6–1.2)
CREAT SERPL-MCNC: 1.9 MG/DL (ref 0.6–1.2)
DAT POLY-SP REAG RBC QL: NORMAL
DEPRECATED RDW RBC AUTO: 17.7 % (ref 12.4–15.4)
EPI CELLS #/AREA URNS HPF: ABNORMAL /HPF (ref 0–5)
FERRITIN SERPL IA-MCNC: 240.1 NG/ML (ref 15–150)
FIBRINOGEN PPP-MCNC: 491 MG/DL (ref 227–534)
FOLATE SERPL-MCNC: 4.58 NG/ML (ref 4.78–24.2)
GFR SERPLBLD CREATININE-BSD FMLA CKD-EPI: 29 ML/MIN/{1.73_M2}
GFR SERPLBLD CREATININE-BSD FMLA CKD-EPI: 33 ML/MIN/{1.73_M2}
GLUCOSE BLD-MCNC: 130 MG/DL (ref 70–99)
GLUCOSE BLD-MCNC: 51 MG/DL (ref 70–99)
GLUCOSE BLD-MCNC: 74 MG/DL (ref 70–99)
GLUCOSE BLD-MCNC: 80 MG/DL (ref 70–99)
GLUCOSE BLD-MCNC: 86 MG/DL (ref 70–99)
GLUCOSE SERPL-MCNC: 69 MG/DL (ref 70–99)
GLUCOSE SERPL-MCNC: 77 MG/DL (ref 70–99)
GLUCOSE UR STRIP.AUTO-MCNC: NEGATIVE MG/DL
HCO3 BLDA-SCNC: 18 MMOL/L (ref 21–29)
HCO3 BLDV-SCNC: 17.7 MMOL/L (ref 23–29)
HCT VFR BLD AUTO: 30.9 % (ref 36–48)
HCT VFR BLD AUTO: 34.5 % (ref 36–48)
HDLC SERPL-MCNC: 13 MG/DL (ref 40–60)
HGB BLD-MCNC: 10.8 G/DL (ref 12–16)
HGB UR QL STRIP.AUTO: ABNORMAL
IMMATURE RETIC FRACT: 0.2 (ref 0.21–0.37)
INR PPP: 1.71 (ref 0.85–1.15)
IRON SATN MFR SERPL: 12 % (ref 15–50)
IRON SERPL-MCNC: 22 UG/DL (ref 37–145)
KETONES UR STRIP.AUTO-MCNC: ABNORMAL MG/DL
LDH SERPL L TO P-CCNC: 302 U/L (ref 100–190)
LDLC SERPL CALC-MCNC: 18 MG/DL
LEUKOCYTE ESTERASE UR QL STRIP.AUTO: NEGATIVE
MAGNESIUM SERPL-MCNC: 2.2 MG/DL (ref 1.8–2.4)
MCH RBC QN AUTO: 28.4 PG (ref 26–34)
MCHC RBC AUTO-ENTMCNC: 31.5 G/DL (ref 31–36)
MCV RBC AUTO: 90.3 FL (ref 80–100)
METHGB MFR BLDV: 0.2 %
NITRITE UR QL STRIP.AUTO: NEGATIVE
O2 THERAPY: ABNORMAL
PATH INTERP BLD-IMP: NORMAL
PCO2 BLDA: 24.6 MM HG (ref 35–45)
PCO2 BLDV: 28 MMHG (ref 40–50)
PERFORMED ON: ABNORMAL
PERFORMED ON: NORMAL
PERFORMED ON: NORMAL
PH BLDA: 7.47 [PH] (ref 7.35–7.45)
PH BLDV: 7.42 [PH] (ref 7.35–7.45)
PH BLDV: 7.42 [PH] (ref 7.35–7.45)
PH UR STRIP.AUTO: 5.5 [PH] (ref 5–8)
PLATELET # BLD AUTO: 38 K/UL (ref 135–450)
PLATELET BLD QL SMEAR: ABNORMAL
PMV BLD AUTO: 11.6 FL (ref 5–10.5)
PO2 BLDA: 64.2 MM HG (ref 75–108)
PO2 BLDV: 70.7 MMHG (ref 25–40)
POC SAMPLE TYPE: ABNORMAL
POTASSIUM SERPL-SCNC: 3.7 MMOL/L (ref 3.5–5.1)
POTASSIUM SERPL-SCNC: 3.7 MMOL/L (ref 3.5–5.1)
PROT UR STRIP.AUTO-MCNC: 30 MG/DL
PROTHROMBIN TIME: 20.2 SEC (ref 11.9–14.9)
RBC # BLD AUTO: 3.82 M/UL (ref 4–5.2)
RBC #/AREA URNS HPF: ABNORMAL /HPF (ref 0–4)
RETICS # AUTO: 0.02 M/UL (ref 0.02–0.1)
RETICS/RBC NFR AUTO: 0.5 % (ref 0.5–2.18)
SAO2 % BLDA: 94 % (ref 93–100)
SAO2 % BLDV: 93 %
SLIDE REVIEW: ABNORMAL
SODIUM SERPL-SCNC: 138 MMOL/L (ref 136–145)
SODIUM SERPL-SCNC: 139 MMOL/L (ref 136–145)
SP GR UR STRIP.AUTO: 1.02 (ref 1–1.03)
TIBC SERPL-MCNC: 183 UG/DL (ref 260–445)
TRIGL SERPL-MCNC: 151 MG/DL (ref 0–150)
UA DIPSTICK W REFLEX MICRO PNL UR: YES
URN SPEC COLLECT METH UR: ABNORMAL
UROBILINOGEN UR STRIP-ACNC: 1 E.U./DL
VIT B12 SERPL-MCNC: 1175 PG/ML (ref 211–911)
VLDLC SERPL CALC-MCNC: 30 MG/DL
WBC # BLD AUTO: 6.7 K/UL (ref 4–11)
WBC #/AREA URNS HPF: ABNORMAL /HPF (ref 0–5)

## 2024-08-07 PROCEDURE — 86880 COOMBS TEST DIRECT: CPT

## 2024-08-07 PROCEDURE — 2000000000 HC ICU R&B

## 2024-08-07 PROCEDURE — C1751 CATH, INF, PER/CENT/MIDLINE: HCPCS

## 2024-08-07 PROCEDURE — 2580000003 HC RX 258: Performed by: INTERNAL MEDICINE

## 2024-08-07 PROCEDURE — 82607 VITAMIN B-12: CPT

## 2024-08-07 PROCEDURE — 94761 N-INVAS EAR/PLS OXIMETRY MLT: CPT

## 2024-08-07 PROCEDURE — 82248 BILIRUBIN DIRECT: CPT

## 2024-08-07 PROCEDURE — 83550 IRON BINDING TEST: CPT

## 2024-08-07 PROCEDURE — 85730 THROMBOPLASTIN TIME PARTIAL: CPT

## 2024-08-07 PROCEDURE — 85384 FIBRINOGEN ACTIVITY: CPT

## 2024-08-07 PROCEDURE — 81001 URINALYSIS AUTO W/SCOPE: CPT

## 2024-08-07 PROCEDURE — 87150 DNA/RNA AMPLIFIED PROBE: CPT

## 2024-08-07 PROCEDURE — 87040 BLOOD CULTURE FOR BACTERIA: CPT

## 2024-08-07 PROCEDURE — 82803 BLOOD GASES ANY COMBINATION: CPT

## 2024-08-07 PROCEDURE — 83615 LACTATE (LD) (LDH) ENZYME: CPT

## 2024-08-07 PROCEDURE — 80048 BASIC METABOLIC PNL TOTAL CA: CPT

## 2024-08-07 PROCEDURE — 2580000003 HC RX 258: Performed by: NURSE PRACTITIONER

## 2024-08-07 PROCEDURE — 99291 CRITICAL CARE FIRST HOUR: CPT | Performed by: INTERNAL MEDICINE

## 2024-08-07 PROCEDURE — 82947 ASSAY GLUCOSE BLOOD QUANT: CPT

## 2024-08-07 PROCEDURE — 83735 ASSAY OF MAGNESIUM: CPT

## 2024-08-07 PROCEDURE — 82330 ASSAY OF CALCIUM: CPT

## 2024-08-07 PROCEDURE — 94640 AIRWAY INHALATION TREATMENT: CPT

## 2024-08-07 PROCEDURE — 85027 COMPLETE CBC AUTOMATED: CPT

## 2024-08-07 PROCEDURE — 85610 PROTHROMBIN TIME: CPT

## 2024-08-07 PROCEDURE — 80061 LIPID PANEL: CPT

## 2024-08-07 PROCEDURE — 93005 ELECTROCARDIOGRAM TRACING: CPT | Performed by: INTERNAL MEDICINE

## 2024-08-07 PROCEDURE — 6370000000 HC RX 637 (ALT 250 FOR IP): Performed by: INTERNAL MEDICINE

## 2024-08-07 PROCEDURE — 82746 ASSAY OF FOLIC ACID SERUM: CPT

## 2024-08-07 PROCEDURE — 36415 COLL VENOUS BLD VENIPUNCTURE: CPT

## 2024-08-07 PROCEDURE — 36569 INSJ PICC 5 YR+ W/O IMAGING: CPT

## 2024-08-07 PROCEDURE — 87186 SC STD MICRODIL/AGAR DIL: CPT

## 2024-08-07 PROCEDURE — 2500000003 HC RX 250 WO HCPCS: Performed by: INTERNAL MEDICINE

## 2024-08-07 PROCEDURE — 83540 ASSAY OF IRON: CPT

## 2024-08-07 PROCEDURE — 51702 INSERT TEMP BLADDER CATH: CPT

## 2024-08-07 PROCEDURE — 85045 AUTOMATED RETICULOCYTE COUNT: CPT

## 2024-08-07 PROCEDURE — 2700000000 HC OXYGEN THERAPY PER DAY

## 2024-08-07 PROCEDURE — 82728 ASSAY OF FERRITIN: CPT

## 2024-08-07 PROCEDURE — 83010 ASSAY OF HAPTOGLOBIN QUANT: CPT

## 2024-08-07 PROCEDURE — 76377 3D RENDER W/INTRP POSTPROCES: CPT

## 2024-08-07 PROCEDURE — 94660 CPAP INITIATION&MGMT: CPT

## 2024-08-07 PROCEDURE — 05HF33Z INSERTION OF INFUSION DEVICE INTO LEFT CEPHALIC VEIN, PERCUTANEOUS APPROACH: ICD-10-PCS | Performed by: INTERNAL MEDICINE

## 2024-08-07 PROCEDURE — 82247 BILIRUBIN TOTAL: CPT

## 2024-08-07 RX ORDER — LIDOCAINE HYDROCHLORIDE 10 MG/ML
INJECTION, SOLUTION INFILTRATION; PERINEURAL
Status: DISPENSED
Start: 2024-08-07 | End: 2024-08-08

## 2024-08-07 RX ORDER — SODIUM CHLORIDE 0.9 % (FLUSH) 0.9 %
5-40 SYRINGE (ML) INJECTION PRN
Status: DISCONTINUED | OUTPATIENT
Start: 2024-08-07 | End: 2024-08-09 | Stop reason: HOSPADM

## 2024-08-07 RX ORDER — SODIUM CHLORIDE FOR INHALATION 0.9 %
3 VIAL, NEBULIZER (ML) INHALATION NIGHTLY
Status: DISCONTINUED | OUTPATIENT
Start: 2024-08-07 | End: 2024-08-09 | Stop reason: HOSPADM

## 2024-08-07 RX ORDER — CASTOR OIL AND BALSAM, PERU 788; 87 MG/G; MG/G
OINTMENT TOPICAL 2 TIMES DAILY
Status: DISCONTINUED | OUTPATIENT
Start: 2024-08-07 | End: 2024-08-09 | Stop reason: HOSPADM

## 2024-08-07 RX ORDER — SODIUM CHLORIDE 9 MG/ML
INJECTION, SOLUTION INTRAVENOUS PRN
Status: DISCONTINUED | OUTPATIENT
Start: 2024-08-07 | End: 2024-08-09 | Stop reason: HOSPADM

## 2024-08-07 RX ORDER — SODIUM CHLORIDE 0.9 % (FLUSH) 0.9 %
5-40 SYRINGE (ML) INJECTION EVERY 12 HOURS SCHEDULED
Status: DISCONTINUED | OUTPATIENT
Start: 2024-08-07 | End: 2024-08-09 | Stop reason: HOSPADM

## 2024-08-07 RX ADMIN — CALCIUM CARBONATE 1000 MG: 500 TABLET, CHEWABLE ORAL at 09:42

## 2024-08-07 RX ADMIN — Medication 2000 UNITS: at 09:41

## 2024-08-07 RX ADMIN — LAMOTRIGINE 300 MG: 100 TABLET ORAL at 18:25

## 2024-08-07 RX ADMIN — CETIRIZINE HYDROCHLORIDE 10 MG: 10 TABLET, FILM COATED ORAL at 09:42

## 2024-08-07 RX ADMIN — ACETAMINOPHEN 650 MG: 325 TABLET ORAL at 19:58

## 2024-08-07 RX ADMIN — ISODIUM CHLORIDE 3 ML: 0.03 SOLUTION RESPIRATORY (INHALATION) at 23:08

## 2024-08-07 RX ADMIN — METHOCARBAMOL 500 MG: 500 TABLET ORAL at 06:27

## 2024-08-07 RX ADMIN — SODIUM CHLORIDE, PRESERVATIVE FREE 10 ML: 5 INJECTION INTRAVENOUS at 20:11

## 2024-08-07 RX ADMIN — EMPAGLIFLOZIN 10 MG: 10 TABLET, FILM COATED ORAL at 09:53

## 2024-08-07 RX ADMIN — LURASIDONE HYDROCHLORIDE 60 MG: 40 TABLET, FILM COATED ORAL at 20:41

## 2024-08-07 RX ADMIN — ALLOPURINOL 300 MG: 300 TABLET ORAL at 09:43

## 2024-08-07 RX ADMIN — ATORVASTATIN CALCIUM 20 MG: 10 TABLET, FILM COATED ORAL at 09:41

## 2024-08-07 RX ADMIN — MUPIROCIN: 20 OINTMENT TOPICAL at 19:59

## 2024-08-07 RX ADMIN — Medication 10 ML: at 11:40

## 2024-08-07 RX ADMIN — HYDROXYCHLOROQUINE SULFATE 200 MG: 200 TABLET ORAL at 09:53

## 2024-08-07 RX ADMIN — METHOCARBAMOL 500 MG: 500 TABLET ORAL at 19:58

## 2024-08-07 RX ADMIN — HYDROXYCHLOROQUINE SULFATE 200 MG: 200 TABLET ORAL at 19:58

## 2024-08-07 RX ADMIN — METHOCARBAMOL 500 MG: 500 TABLET ORAL at 09:52

## 2024-08-07 RX ADMIN — CASTOR OIL AND BALSAM, PERU: 788; 87 OINTMENT TOPICAL at 20:42

## 2024-08-07 RX ADMIN — SODIUM CHLORIDE 5 MCG/MIN: 9 INJECTION, SOLUTION INTRAVENOUS at 12:27

## 2024-08-07 RX ADMIN — FLUOXETINE HYDROCHLORIDE 30 MG: 20 CAPSULE ORAL at 09:40

## 2024-08-07 RX ADMIN — LEVOTHYROXINE SODIUM 175 MCG: 0.03 TABLET ORAL at 09:41

## 2024-08-07 RX ADMIN — DICLOFENAC SODIUM 2 G: 10 GEL TOPICAL at 09:42

## 2024-08-07 RX ADMIN — CALCIUM CARBONATE 1000 MG: 500 TABLET, CHEWABLE ORAL at 19:57

## 2024-08-07 RX ADMIN — MONTELUKAST 10 MG: 10 TABLET, FILM COATED ORAL at 19:58

## 2024-08-07 RX ADMIN — ACETAMINOPHEN 650 MG: 325 TABLET ORAL at 06:26

## 2024-08-07 ASSESSMENT — PAIN DESCRIPTION - DESCRIPTORS
DESCRIPTORS: ACHING
DESCRIPTORS: ACHING
DESCRIPTORS: ACHING;DISCOMFORT
DESCRIPTORS: ACHING

## 2024-08-07 ASSESSMENT — PAIN DESCRIPTION - FREQUENCY
FREQUENCY: CONTINUOUS
FREQUENCY: CONTINUOUS

## 2024-08-07 ASSESSMENT — PAIN DESCRIPTION - ORIENTATION
ORIENTATION: RIGHT

## 2024-08-07 ASSESSMENT — PAIN DESCRIPTION - ONSET
ONSET: ON-GOING
ONSET: ON-GOING

## 2024-08-07 ASSESSMENT — PAIN DESCRIPTION - LOCATION
LOCATION: ARM
LOCATION: GENERALIZED
LOCATION: ARM

## 2024-08-07 ASSESSMENT — PAIN SCALES - GENERAL
PAINLEVEL_OUTOF10: 8
PAINLEVEL_OUTOF10: 7
PAINLEVEL_OUTOF10: 8
PAINLEVEL_OUTOF10: 4
PAINLEVEL_OUTOF10: 9

## 2024-08-07 ASSESSMENT — PAIN DESCRIPTION - PAIN TYPE
TYPE: ACUTE PAIN
TYPE: ACUTE PAIN

## 2024-08-07 ASSESSMENT — PAIN - FUNCTIONAL ASSESSMENT
PAIN_FUNCTIONAL_ASSESSMENT: PREVENTS OR INTERFERES SOME ACTIVE ACTIVITIES AND ADLS
PAIN_FUNCTIONAL_ASSESSMENT: PREVENTS OR INTERFERES SOME ACTIVE ACTIVITIES AND ADLS

## 2024-08-07 NOTE — SIGNIFICANT EVENT
Notified brother (Arnel) of the patient's change in condition and the need to move to CVICU (C2) Room 221.  I carried personal belongings that were gathered by Lianne to C2 bedside.

## 2024-08-07 NOTE — PLAN OF CARE
Problem: Chronic Conditions and Co-morbidities  Goal: Patient's chronic conditions and co-morbidity symptoms are monitored and maintained or improved  8/7/2024 0039 by Lise Chamberlain, RN  Outcome: Progressing     Problem: Discharge Planning  Goal: Discharge to home or other facility with appropriate resources  Outcome: Progressing     Problem: Safety - Adult  Goal: Free from fall injury  8/7/2024 0039 by Lise Chamberlain, RN  Outcome: Progressing     Problem: ABCDS Injury Assessment  Goal: Absence of physical injury  8/7/2024 0039 by Lise Chamberlain, RN  Outcome: Progressing     Problem: Pain  Goal: Verbalizes/displays adequate comfort level or baseline comfort level  8/7/2024 0039 by Lise Chamberlain, RN  Outcome: Progressing     Problem: Skin/Tissue Integrity  Goal: Absence of new skin breakdown  Description: 1.  Monitor for areas of redness and/or skin breakdown  2.  Assess vascular access sites hourly  3.  Every 4-6 hours minimum:  Change oxygen saturation probe site  4.  Every 4-6 hours:  If on nasal continuous positive airway pressure, respiratory therapy assess nares and determine need for appliance change or resting period.  Outcome: Progressing

## 2024-08-07 NOTE — CARE COORDINATION
CM assessment deferred- pt had episode of unstable BP- trf to ICU for management. Pt would be eligible for Home CHF monitoring program if interested. CM following. Jeane Titus RN

## 2024-08-07 NOTE — CONSULTS
Comprehensive Nutrition Assessment    Type and Reason for Visit:  Initial, Wound, Consult, Patient Education    Nutrition Recommendations/Plan:   Resume regular diet as soon as medically feasible   Add ensure BID when able to consume PO  Provide CHF diet education when appropriate   Monitor nutrition adequacy, pertinent labs, bowel habits, wt changes, and clinical progress     Malnutrition Assessment:  Malnutrition Status:  At risk for malnutrition (Comment) (08/07/24 1312)    Context:  Acute Illness     Findings of the 6 clinical characteristics of malnutrition:  Energy Intake:  Mild decrease in energy intake (Comment)    Nutrition Assessment:    Consult for CHF diet education/ screen for wounds: 63 y.o. f w/ PMH of CHF, cardiomyopathy, S/P BiV-AICD, pulmonary HTN, LBBB, depression, h/o uterine cancer, hypothyroidism, osteoarthritis admitted w/ syncope and displaced R distal humerus fracture. Hx of uterine cancer, oncolcogy consulted, may need biopsy to determine malignancy status; would also recommend CT CAP. Ortho following, OR today. Rapid response called today, pt moved to ICU. Hypotensive on levo. Pt w/ wound care at time of attempted visit. Previously on regular diet, NPO for ortho surgery today. No significant wt loss in EMR. Recommend advancing diet and add ONS as soon as medically feasible. Not appropriate for CHF diet education at this time. Will monitor.    Nutrition Related Findings:    Labs reviewed. No BM documented. Wound Type: Pressure Injury, Stage II       Current Nutrition Intake & Therapies:    Average Meal Intake: NPO  Average Supplements Intake: NPO  Diet NPO    Anthropometric Measures:  Height: 160 cm (5' 3\")  Ideal Body Weight (IBW): 115 lbs (52 kg)       Current Body Weight: 131.1 kg (289 lb), 251.3 % IBW. Weight Source: Bed Scale  Current BMI (kg/m2): 51.2        Weight Adjustment For: No Adjustment                 BMI Categories: Obese Class 3 (BMI 40.0 or greater)    Estimated Daily  Nutrient Needs:  Energy Requirements Based On: Kcal/kg (30-35 kcals/kg)  Weight Used for Energy Requirements: Ideal (52 kg)  Energy (kcal/day): 1231-0881  Weight Used for Protein Requirements: Ideal (1.2-1.5 g/kg)  Protein (g/day): 62-78 g  Method Used for Fluid Requirements: 1 ml/kcal  Fluid (ml/day):      Nutrition Diagnosis:   Increased nutrient needs related to increase demand for energy/nutrients, lack or limited access to food as evidenced by wounds, NPO or clear liquid status due to medical condition    Nutrition Interventions:   Food and/or Nutrient Delivery: Start Oral Diet, Start Oral Nutrition Supplement  Nutrition Education/Counseling: Education not appropriate  Coordination of Nutrition Care: Continue to monitor while inpatient       Goals:     Goals: PO intake 50% or greater, prior to discharge       Nutrition Monitoring and Evaluation:   Behavioral-Environmental Outcomes: None Identified  Food/Nutrient Intake Outcomes: Food and Nutrient Intake, Supplement Intake  Physical Signs/Symptoms Outcomes: Weight, Nutrition Focused Physical Findings, Biochemical Data, Hemodynamic Status    Discharge Planning:    Too soon to determine     Cleopatra Bustos MS, RD, LD  Contact: Office: [phone]; [place]: [phone]

## 2024-08-07 NOTE — CONSULTS
PULMONARY AND CRITICAL CARE INPATIENT NOTE        Malou Tobin   : 1960  MRN: [id number]     Admitting Physician: Baldev Yang MD  Attending Physician: Baldev Yang MD  PCP: Collins Lee DO    Admission: 2024   Date of Service: 2024    No chief complaint on file.          ASSESSMENT & PLAN       63 y.o. pleasant  female patient with:    Assessment:  Shock.  Under evaluation.  Medication effect, hypovolemic from poor oral intake, cardiogenic component,?  Septic component  Ischemic cardiomyopathy.  Combined systolic and diastolic heart failure  Near syncope  MADELEINE/CKD 3 with metabolic acidosis  Heart block with BiV ICD since 2016  pHTN  Recent fall with right elbow fracture/splinting  MARCY on CPAP 14  Bipolar disorder  Hypothyroidism  GERD  Morbid obesity with BMI 51.  Status post gastric bypass      Plan:              Titrate Levophed for map above 65  Keep holding blood pressure medications  Follow blood cultures  Low threshold for starting antibiotics if the patient spikes fever or worsens with her hemodynamics  Follow-up echocardiogram  Appreciate nephrology input  Will provide BiPAP at bedtime to replace home CPAP unit  Bronchial hygiene measures  Aspiration precautions  Target blood sugar between 140 and 180 mg/dL  DVT ppx measures.  Apixaban on hold  PT/OT when patient is able to participate  Continue to wean oxygen with target 90 to 94%.    LOS: Hospital Day: 2     Code:Full Code    Critical care time spent on this patient, excluding procedures time, is ~35 minutes.  Critical care time was spent on reviewing overnight events, interpreting labs and imaging, direct bedside exam, ventilator/breathing assistance device/drip review and adjustment, running multidisciplinary rounds and coordinating critical care plan with other providers.            Subjective/Objective           CC/Reason for Consult: Shock        HPI: 2024  63-year-old female patient with PMH of HTN, HFrEF, dilated  membranes.  Cardiac: Normal S1 and S2.  Lungs: Good air entry bilaterally.  Abdomen: Soft.  Morbidly obese  Back & Extremities: Symmetric pulses with good perfusion.  Cast on the right elbow  Neurological: No focal deficit..       ________________________________________________________  Electronically signed by:  Eugenie Li MD,FACP    8/7/2024    4:22 PM.     [facility]  [address], [place] [address]   Phone (office): [phone]

## 2024-08-07 NOTE — PROGRESS NOTES
4 Eyes Skin Assessment     NAME:  Malou Tobin  YOB: 1960  MEDICAL RECORD NUMBER:  [id number]    The patient is being assessed for  Admission    I agree that at least one RN has performed a thorough Head to Toe Skin Assessment on the patient. ALL assessment sites listed below have been assessed.      Areas assessed by both nurses:    Head, Face, Ears, Shoulders, Back, Chest, Arms, Elbows, Hands, Sacrum. Buttock, Coccyx, Ischium, and Legs. Feet and Heels        Does the Patient have a Wound? Yes wound(s) were present on assessment. LDA wound assessment was Initiated and completed by RNStage 2 right buttock, skin tears x 2 left arm, bottom of left foot       Jonny Prevention initiated by RN: Yes  Wound Care Orders initiated by RN: Yes    Pressure Injury (Stage 3,4, Unstageable, DTI, NWPT, and Complex wounds) if present, place Wound referral order by RN under : No    New Ostomies, if present place, Ostomy referral order under : No     Nurse 1 eSignature: Electronically signed by Lise Chamberlain RN on 8/7/24 at 6:06 AM EDT    **SHARE this note so that the co-signing nurse can place an eSignature**    Nurse 2 eSignature: Electronically signed by Tony Howell RN on 8/7/24 at 6:07 AM EDT

## 2024-08-07 NOTE — PLAN OF CARE
CHF Care Plan      Patient's EF (Ejection Fraction) is greater than 40%    Heart Failure Medications:  Diuretics:: Torsemide    (One of the following REQUIRED for EF </= 40%/SYSTOLIC FAILURE but MAY be used in EF% >40%/DIASTOLIC FAILURE)        ACE:: Lisinopril        ARB:: None         ARNI:: None    (Beta Blockers)  NON- Evidenced Based Beta Blocker (for EF% >40%/DIASTOLIC FAILURE): Metoprolol SUCCinate- Toprol XL    Evidenced Based Beta Blocker::(REQUIRED for EF% <40%/SYSTOLIC FAILURE)   ...................................................................................................................................................    Failed to redirect to the Timeline version of the [employer] SmartLink.      Patient's weights and intake/output reviewed    Daily Weight log at bedside, patient/family participation in use of log: \"yes    Patient's current weight today shows a difference of 8 lbs more than last documented weight.      Intake/Output Summary (Last 24 hours) at 8/7/2024 1947  Last data filed at 8/7/2024 1930  Gross per 24 hour   Intake 2150.95 ml   Output 1980 ml   Net 170.95 ml       Education Booklet Provided: yes    Comorbidities Reviewed Yes    Patient has a past medical history of Anesthesia complication, Arterial ischemic stroke, ICA, right, acute (HCC), Autoimmune hemolytic anemia ([facility]), Bipolar disorder ([facility]), Bundle branch block, Cardiomyopathy (HCC), Chronic systolic CHF (congestive heart failure) ([facility]), Depression, Family history of early CAD, Family history of ovarian cancer, GERD (gastroesophageal reflux disease), Gout, Hypertension, Hypothyroid, Osteoarthritis, knee, Pneumonia, S/P gastric bypass, Sepsis ([facility]), Small bowel obstruction ([facility]), Unspecified cerebral artery occlusion with cerebral infarction, Uterine cancer (HCC), and Vitamin B 12 deficiency.     >>For CHF and Comorbidity documentation on Education Time and Topics, please see Education Tab      Pt resting in bed at this time on   2 L O2. Pt with complaints of shortness of breath. Pt without lower extremity edema.     Patient and/or Family's stated Goal of Care this Admission: reduce shortness of breath, increase activity tolerance, better understand heart failure and disease management, be more comfortable, and reduce lower extremity edema prior to discharge        :

## 2024-08-07 NOTE — PROGRESS NOTES
Patient transferred to ICU after RR for hypotension, minimally responsive to IVF bolus and need for vasopressors, Poor PIV access, required USIV placement on left and limited access on the right due to recent falls.    Increasing need for levophed, currently at 9 mcg/min. Discussed with Dr. Bah and OK for PICC line placement.    Tracy Fuentes, APRN-CNP

## 2024-08-07 NOTE — CONSULTS
Thanks for consulting [facility] for the care of Malou Tobin.       Full consult will follow  Please call with questions at-    24 Hrs Answering service [phone]  Perfect serve, or cell phone 7 am - 5pm  MD [facility]  [facility]

## 2024-08-07 NOTE — PROGRESS NOTES
Hospital Medicine Progress Note      Date of Admission: 8/6/2024  Hospital Day: 2    Chief Admission Complaint:  weakness, nearly passed out      Subjective: She is lying in bed, in no distress.  She denies chest pain or shortness of breath.  States she feels weak and tired    Presenting Admission History:       63 y.o. female with PMH significant for CHF/systolic, non-ischemic dilated cardiomyopathy, S/P BiV-AICD 2016, pulmonary HTN, LBBB, depression, h/o uterine cancer, hypothyroidism, osteoarthritis.  Of pertinence is that she sustained a fall about 3 weeks and injured her right elbow and had nondisplaced fracture.  She has had a splint on since that time and is supposed follow-up with orthopedics     She  presented to [facility] ED with complaint of weakness, lightheadedness.   She stated she was walking in her house felt weak and lightheaded and fell to her knees.  She denied any loss of consciousness.  She denied any significant injury due to the fall.  She did give history that for the past few days prior to admission she did have some diarrhea and decreased oral intake.  She was continuing to take her diuretics and antihypertensive medications.  Weakness and lightheadedness was progressed for 2 to 3 days prior to this admission.  She denied chest pain she denied any shortness of breath.    She presented to the ED at [facility].               Assessment/Plan:      Current Principal Problem:  Syncope, near    CHF/systolic : Her volume status stable exam appears stable at this time.  Chest x-ray was done on 8/5/2024 no volume overload was noted.   She also gave history that she was not eating or drinking well and was continuing to take her diuretics.    She does follow with [facility] cardiology.  She is maintained on a regimen of beta-blocker/metoprolol 100 mg daily, torsemide 10 mg daily, ACE/lisinopril and Jardiance.  Her last echocardiogram was July/2023.  .  Will continue to follow volume status,  CONSULT TO RESPIRATORY CARE  IP CONSULT TO HEART FAILURE NURSE/COORDINATOR  IP CONSULT TO DIETITIAN  IP CONSULT TO CARDIOLOGY  IP CONSULT TO ORTHOPEDIC SURGERY      ------------------------------------------------------------------------------------------------------------------------------------------------------------------------    MDM      [x] High (any 2)    A. Problems (any 1)  [x] Acute/Chronic Illness/injury posing threat to life or bodily function:    [] Severe exacerbation of chronic illness:    ---------------------------------------------------------------------  B. Risk of Treatment (any 1)   [x] Drugs/treatments that require intensive monitoring for toxicity include:    [] IV ABX requiring serial renal monitoring for nephrotoxicity:     [] IV Narcotic analgesia for adverse drug reaction  [] IV diuresis requiring serial monitoring for renal impairment and electrolyte derangements  [x] Critical electrolyte abnormalities requiring IV replacement and close serial monitoring  [] Insulin - monitoring serial FSBS for Hypoglycemic adverse drug reaction  [x] Anticoagulation requiring serial monitoring of coagulation factors  [] Other -   [] Change in code status:    [] Decision to escalate care:    [] Major surgery/procedure with associated risk factors:    ----------------------------------------------------------------------  C. Data (any 2)  [x] Discussed current management and discharge planning options with Case Management.  [] Discussed management of the case with:    [x] Telemetry personally reviewed and interpreted as documented above    [x] Imaging personally reviewed and interpreted, includes:  chest xray EKG   [] Data Review (any 3)  [x] All available Consultant notes from yesterday/today were reviewed  [x] All current labs were reviewed and interpreted for clinical significance   [x] Appropriate follow-up labs were ordered  [] Collateral history obtained from:        Medications:  Personally

## 2024-08-07 NOTE — PROGRESS NOTES
Arrived to place PICC after chart review. Pre-procedure and timeout done with FLORIDALMA Arroyo. Discussed limitations of placement and allergies. Consent confirmed. Procedure explained to pt, including risks and benefits. All questions answered. Pt verbalized understanding.     Right arm not appropriate for PICC placement d/t injury to elbow - unable to extend extremity for assessment and elbow is red, edematous, and warm to touch.     Discussed with RN and patient that we could attempt LUE, but high likelihood that PICC would not advance past left-sided pacemaker. Agreed with RN and patient that if PICC would not advance, procedure would convert to midline placement.     LUE vessels easily collapsible upon assessment. No difficulty accessing L cephalic vein. Blood free flowing and non-pulsatile. Guidewire and introducer easily inserted. However, unable to advance final 12cm of catheter. Attempted buddywire and repositioning with no success. Procedure converted to midline, which was placed successfully. Midline flushes easily and has brisk blood return.      OK to use midline.     Patient tolerated sterile procedure well. Bed left in low position with belongings and call light within reach. Educated on line care. Handoff to bedside RN.      Please call with any questions or concerns. The  will direct you to the PICC RN that is on call.      [phone]

## 2024-08-07 NOTE — CONSULTS
Ph: [phone], Fax: [phone]           [facility]               Reason for admission:                 Fall/diarrhea/weakness/pancytopenia/MADELEINE/hypotension    Brief Summary :     Malou Tobin is being seen by nephrology for fall she was found to have fracture in the right elbow nondisplaced.  Her course of hospitalization is complicated by MADELEINE and also hypotension needing transfer to our intensive care unit for Levophed drip.  Also has pancytopenia being seen by hematology      Interval History and plan:         At ICU on Levophed drip  Appears to have slight edema on oxygen  Creatinine going up from baseline of 1.3  Weight 800 mL of urine yesterday  Platelet count is low and hemoglobin is low seen by hematology  Plan:  -Since baseline creatinine was 1.3 okay for PICC line  -Agree with supportive care  -No fluid needed at this time  -Check UA  - repeat BMPand check VBG since CO2 was low , also repeat ical   - agree with holding BP meds  - hold jardiance                    Assessment :     CKD Stage 3 AA  With MADELEINE  MADELEINE likely due to hypotension  As CKD 3 at baseline  Baseline creatinine 1.3 on 3/4/2024  -With eGFR of 45 to 46 mL/min  Creatinine 1.9 at the time of consult  UA- pending  Renal imaging-none in the system    Acidosis  CO2 16     Combined systolic and diastolic heart failure  Echo: 7/23-EF 45 to 50%, mild to moderate LVH, grade 1 diastolic dysfunction  Has AICD placed    Hypertension  BP: (77-97)/(46-57) Pulse:  [1-108]   Blood pressure very low 77 at the time of consult on Levophed drip       History of uterine cancer              [facility] would like to thank Baldev Yang MD   for opportunity to serve this patient      Please call with questions at-   24 Hrs Answering service [phone] or  7 am- 5 pm via Perfect serve or cell phone  Dr.Sudhir Olvin MD       HPI :     Malou Tobin is a 63 y.o. female presented to   the hospital on 8/6/2024 with past medical  chloride 10 mEq/100 mL IVPB (Peripheral Line), 10 mEq, IntraVENous, PRN  magnesium sulfate 2000 mg in 50 mL IVPB premix, 2,000 mg, IntraVENous, PRN  0.9 % sodium chloride infusion, , IntraVENous, Continuous    Vitals :     Vitals:    08/07/24 1402   BP: (!) 96/48   Pulse: 88   Resp: 24   Temp:    SpO2: 96%          Physical Examination :     appearance: Alert, orientated  Respiratory: no distress  Cardiovascular: no visibly  raised JVD, Edema none   Abdomen: -  soft  Other relevant findings: - mild  On oxygen      Labs :     CBC:   Recent Labs     08/05/24 2000 08/06/24 0743 08/07/24  0620 08/07/24  1142   WBC 6.2 5.1 6.7  --    HGB 11.4* 9.7* 10.8*  --    HCT 35.2* 30.1* 34.5* 30.9*   PLT 77* 59* 38*  --      BMP:    Recent Labs     08/05/24 2000 08/06/24 0743 08/07/24 0620    139 139   K 3.3* 3.6 3.7    107 109   CO2 15* 23 16*   BUN 38* 39* 44*   CREATININE 1.8* 1.7* 1.9*   GLUCOSE 90 94 77   MG 2.10 1.90 2.20     Lab Results   Component Value Date/Time    COLORU yellow 10/08/2021 03:34 PM    COLORU Yellow 03/14/2021 01:40 PM    NITRU Negative 03/14/2021 01:40 PM    GLUCOSEU negative 10/08/2021 03:34 PM    GLUCOSEU Negative 03/14/2021 01:40 PM    GLUCOSEU NEGATIVE 06/04/2012 11:48 AM    KETUA negative 10/08/2021 03:34 PM    KETUA Negative 03/14/2021 01:40 PM    UROBILINOGEN 0.2 03/14/2021 01:40 PM    BILIRUBINUR negative 10/08/2021 03:34 PM    BILIRUBINUR NEGATIVE 06/04/2012 11:48 AM        ----------------------------------------------------------  Please call with questions at      24 Hrs Answering service [phone]  Perfect serve, or cell phone 7 am - 5pm  Bridger Bah MD   [email or web address]

## 2024-08-07 NOTE — CONSULTS
[facility] Continence Nurse  Consult Note       NAME:  Malou Tobin  MEDICAL RECORD NUMBER:  [id number]  AGE: 63 y.o.   GENDER: female  : 1960  TODAY'S DATE:  2024    Subjective; Can you re arrange my pillows, I'm having difficulty breathing.  Patient not in distress with Wound Care.  Arron RN arrived to assist with turning patient with new nurse.   They noted patient's condition and took over care.    Reason for WOCN Evaluation and Assessment:   stage 2 right inner buttock     Patient has to right inner buttocks small non blanching purple area with shearing below it.  Also on left plantar noted dry/flaky plantar with dry blood.   After cleaning with normal saline, small dry tears.  Noted dry areas to side & anterior left foot as possible eczema or fungal.   Applied Anti fungal ointment (red top tube) at this time.         Malou Tobin is a 63 y.o. female referred by:   [] Physician  [x] Nursing  [] Other:     Wound Identification:  Wound Type: pressure  Contributing Factors: poor glucose control, chronic pressure, decreased mobility, shear force, and obesity    Wound History: 63 y.o. female with PMH significant for CHF/systolic, non-ischemic dilated cardiomyopathy, S/P BiV-AICD 2016, pulmonary HTN, LBBB, depression, h/o uterine cancer, hypothyroidism, osteoarthritis.  Also sustained a fall about 3 weeks.  She  presented to [facility] ED with complaint of weakness, lightheadedness.   She stated she walking in her house felt weak and lightheaded and fell to her knees.  She denies any loss of consciousness.  She denied any significant injury due to the fall.    Current Wound Care Treatment:  SUDHEER    Patient Goal of Care:  [x] Wound Healing  [] Odor Control  [] Palliative Care  [x] Pain Control   [] Other:         PAST MEDICAL HISTORY        Diagnosis Date    Anesthesia complication     hypotension post op    Arterial ischemic stroke, ICA, right, acute (HCC)     Autoimmune hemolytic anemia

## 2024-08-07 NOTE — FLOWSHEET NOTE
08/06/24 2024   Assessment   Charting Type Shift assessment   Psychosocial   Psychosocial (WDL) WDL   Neurological   Neuro (WDL) WDL   Level of Consciousness 0   Orientation Level Oriented X4   Cognition Appropriate judgement;Appropriate safety awareness;Appropriate attention/concentration;Appropriate for developmental age;Follows commands   Speech Clear   [place] Coma Scale   Eye Opening 4   Best Verbal Response 5   Best Motor Response 6   [place] Coma Scale Score 15   HEENT (Head, Ears, Eyes, Nose, & Throat)   HEENT (WDL) X   Right Eye Glasses   Left Eye Glasses   Teeth Missing teeth   Respiratory   Respiratory (WDL) WDL   Cardiac   Cardiac (WDL) X   Heart Sounds S1, S2   Cardiac Rhythm Ventricular paced   Cardiac Symptoms Lightheaded   Cardiac Monitor   Cardiac/Telemetry Monitor On Portable telemetry pack applied   Alarm Audible Centralized cardiac monitoring   Alarms Set Centralized cardiac monitoring   Pacemaker   Pacemaker Yes   Pacemaker Type Permanent   Genitourinary   Genitourinary (WDL) X  (external catheter)   Anti-Embolism   Anti-Embolism Intervention Medication   Skin Integumentary    Skin Integumentary (WDL) X   Skin Integrity Tear   Location L arm  (x 2)   Multiple Skin Integrity Sites Yes   Skin Integrity Site 2   Skin Integrity Location 2 Tear   Location 2   (L foot)   Skin Integrity Site 3   Skin Integrity Location 3 Wound (see LDA)    Location 3   (L buttock)   Preventative Dressing Yes   Musculoskeletal   Musculoskeletal (WDL) X   Musculoskeletal Details   R Elbow Injury/trauma;Limited movement   Wound 08/06/24 Buttocks Inner;Right stage 2   Date First Assessed: 08/06/24   Present on Original Admission: Yes  Primary Wound Type: Pressure Injury  Location: Buttocks  Wound Location Orientation: Inner;Right  Wound Description (Comments): stage 2   Wound Etiology Pressure Stage 2   Dressing Status New dressing applied

## 2024-08-07 NOTE — PROGRESS NOTES
Patient transferred to ICU from 430 to C221. Hosptialist at bedside (Dr. Yang). Patient placed on continuous monitoring, vitals assessed, Rns at bedside, RN handoff received.

## 2024-08-07 NOTE — CARE COORDINATION
Spoke to [facility] Nuzhat GAVIRIA and notified that patient was eligible for RPM program.  Karyna Epstein, RN   [phone]  Christa Casas, RN  Phone [phone]

## 2024-08-07 NOTE — CONSULTS
[facility] Hematology Care    Consult Note      Requesting Physician:  Baldev Yang     CHIEF COMPLAINT:  thrombocytopenia, and h/o uterine cancer       HISTORY OF PRESENT ILLNESS:    Malou Tobin is a 63 year old female with PMHX of GERD, HTN, hypothyroidism, history of autoimmune hemolytic anemia, CHF, cardiomyopathy, s/p BiV-AICD 2016, history of a stroke who presented to [facility] ED on 08/05 with concerns for more frequent diarrhea, another fall to her knees, ongoing weakness and fatigue as well as chest pain although this resolved on arrival. Required 3L o2 via NC.   ED presentation on 07/17 for fall, slipped using her walker. Had right elbow minimally displaced distal humerus fracture.     Hem/onc consulted for thrombocytopenia and history of uterine cancer. Patient with rapid response at time of visit due to hypotension. Transferred to ICU. Exam/history limited due to intervention ongoing.     Ms. Tobin  is a 63 y.o. female we are seeing in consultation for     ICD-10-CM    1. Chronic systolic congestive heart failure (HCC)  I50.22 Echo (TTE) complete (PRN contrast/bubble/strain/3D)     Echo (TTE) complete (PRN contrast/bubble/strain/3D)             Past Medical History:  Past Medical History:   Diagnosis Date    Anesthesia complication     hypotension post op    Arterial ischemic stroke, ICA, right, acute (HCC)     Autoimmune hemolytic anemia (HCC)     during uterine cancer    Bipolar disorder (HCC)     managed by Diane Sherwood)    Bundle branch block     Cardiomyopathy (HCC)     Chronic systolic CHF (congestive heart failure) (HCC) 07/09/2015    Depression     Family history of early CAD     Family history of ovarian cancer     mother    GERD (gastroesophageal reflux disease)     Gout     Hypertension     Hypothyroid     Osteoarthritis, knee     Shybut    Pneumonia     history of pneumonia     8/6/2024 11:08 pm    TECHNIQUE:  CT of the right elbow was performed without the administration of intravenous  contrast.  Multiplanar reformatted images are provided for review. Automated  exposure control, iterative reconstruction, and/or weight based adjustment of  the mA/kV was utilized to reduce the radiation dose to as low as reasonably  achievable.    COMPARISON:  None.    HISTORY  ORDERING SYSTEM PROVIDED HISTORY: eval distal humerus fracture  TECHNOLOGIST PROVIDED HISTORY:  Reason for exam:->eval distal humerus fracture  Reason for Exam: eval distal humerus fracture  Relevant Medical/Surgical History: best scan available, pt could not raise  affected arm and hard to fit affected area withing FOV with arm lowered  because of body habitus    FINDINGS:  Bones: Displaced angulated supracondylar fracture    Soft Tissue: There is extensive subcutaneous soft tissue edema of the elbow  and posterior aspect of the forearm.    Joint: Moderate degenerative arthrosis of the elbow.  Impression: 1. Displaced angulated supracondylar fracture.  2. Extensive subcutaneous soft tissue edema of the elbow and posterior aspect  of the forearm.      Problem List  Patient Active Problem List   Diagnosis    Hypertension, essential    S/P gastric bypass    Gout    Localized osteoarthrosis, lower leg    CHF (congestive heart failure) ([facility])    LBBB (left bundle branch block)    Mitral regurgitation    Morbid obesity with BMI of 50.0-59.9, adult ([facility])    Hypothyroidism    Dilated cardiomyopathy ([facility])    Syncope    Chronic systolic CHF (congestive heart failure) ([facility])    Incisional hernia, without obstruction or gangrene    Biventricular ICD (implantable cardioverter-defibrillator) in place    CVA (cerebral vascular accident) ([facility])    Hemiparesis ([facility])    Facial asymmetry    Dysphasia    Visual field constriction, bilateral    Hyperlipidemia, mixed    Choledocholithiasis    Esophagitis determined by endoscopy    History of Eloy-en-Y

## 2024-08-08 ENCOUNTER — APPOINTMENT (OUTPATIENT)
Dept: GENERAL RADIOLOGY | Age: 64
DRG: 871 | End: 2024-08-08
Attending: INTERNAL MEDICINE
Payer: MEDICARE

## 2024-08-08 ENCOUNTER — TELEPHONE (OUTPATIENT)
Dept: FAMILY MEDICINE CLINIC | Age: 64
End: 2024-08-08

## 2024-08-08 ENCOUNTER — PROCEDURE VISIT (OUTPATIENT)
Dept: CARDIOLOGY CLINIC | Age: 64
End: 2024-08-08

## 2024-08-08 ENCOUNTER — APPOINTMENT (OUTPATIENT)
Age: 64
DRG: 871 | End: 2024-08-08
Attending: INTERNAL MEDICINE
Payer: MEDICARE

## 2024-08-08 DIAGNOSIS — Z95.810 BIVENTRICULAR ICD (IMPLANTABLE CARDIOVERTER-DEFIBRILLATOR) IN PLACE: Primary | ICD-10-CM

## 2024-08-08 DIAGNOSIS — I47.20 VENTRICULAR TACHYCARDIA (HCC): ICD-10-CM

## 2024-08-08 DIAGNOSIS — I42.0 DILATED CARDIOMYOPATHY (HCC): ICD-10-CM

## 2024-08-08 PROBLEM — D69.6 THROMBOCYTOPENIA (HCC): Status: ACTIVE | Noted: 2024-08-08

## 2024-08-08 PROBLEM — N17.9 ACUTE KIDNEY INJURY SUPERIMPOSED ON CKD (HCC): Status: ACTIVE | Noted: 2024-08-08

## 2024-08-08 PROBLEM — R78.81 STAPHYLOCOCCUS AUREUS BACTEREMIA: Status: ACTIVE | Noted: 2024-08-08

## 2024-08-08 PROBLEM — I50.21 ACUTE SYSTOLIC HEART FAILURE (HCC): Status: ACTIVE | Noted: 2024-08-08

## 2024-08-08 PROBLEM — N18.9 ACUTE KIDNEY INJURY SUPERIMPOSED ON CKD (HCC): Status: ACTIVE | Noted: 2024-08-08

## 2024-08-08 PROBLEM — I50.23 ACUTE ON CHRONIC SYSTOLIC HEART FAILURE (HCC): Status: ACTIVE | Noted: 2024-08-08

## 2024-08-08 PROBLEM — B95.61 STAPHYLOCOCCUS AUREUS BACTEREMIA: Status: ACTIVE | Noted: 2024-08-08

## 2024-08-08 PROBLEM — Z79.899 ON AMIODARONE THERAPY: Status: ACTIVE | Noted: 2024-08-08

## 2024-08-08 PROBLEM — I42.8 NON-ISCHEMIC CARDIOMYOPATHY (HCC): Status: ACTIVE | Noted: 2024-08-08

## 2024-08-08 PROBLEM — I42.8 NONISCHEMIC CARDIOMYOPATHY (HCC): Status: ACTIVE | Noted: 2024-08-08

## 2024-08-08 LAB
ALBUMIN SERPL-MCNC: 2.4 G/DL (ref 3.4–5)
ANION GAP SERPL CALCULATED.3IONS-SCNC: 16 MMOL/L (ref 3–16)
ANION GAP SERPL CALCULATED.3IONS-SCNC: 17 MMOL/L (ref 3–16)
ANION GAP SERPL CALCULATED.3IONS-SCNC: 21 MMOL/L (ref 3–16)
ANION GAP SERPL CALCULATED.3IONS-SCNC: 22 MMOL/L (ref 3–16)
BASE EXCESS BLDA CALC-SCNC: -12 MMOL/L (ref -3–3)
BASE EXCESS BLDA CALC-SCNC: -14 MMOL/L (ref -3–3)
BASE EXCESS BLDA CALC-SCNC: -15 MMOL/L (ref -3–3)
BASE EXCESS BLDA CALC-SCNC: -7 MMOL/L (ref -3–3)
BASE EXCESS BLDA CALC-SCNC: -9 MMOL/L (ref -3–3)
BUN SERPL-MCNC: 48 MG/DL (ref 7–20)
BUN SERPL-MCNC: 48 MG/DL (ref 7–20)
BUN SERPL-MCNC: 50 MG/DL (ref 7–20)
BUN SERPL-MCNC: 54 MG/DL (ref 7–20)
CA-I BLD-SCNC: 1.11 MMOL/L (ref 1.12–1.32)
CA-I BLD-SCNC: 1.11 MMOL/L (ref 1.12–1.32)
CA-I BLD-SCNC: 1.17 MMOL/L (ref 1.12–1.32)
CA-I BLD-SCNC: 1.21 MMOL/L (ref 1.12–1.32)
CA-I BLD-SCNC: 1.22 MMOL/L (ref 1.12–1.32)
CA-I BLD-SCNC: 1.82 MMOL/L (ref 1.12–1.32)
CALCIUM SERPL-MCNC: 7.7 MG/DL (ref 8.3–10.6)
CALCIUM SERPL-MCNC: 7.9 MG/DL (ref 8.3–10.6)
CALCIUM SERPL-MCNC: 8.5 MG/DL (ref 8.3–10.6)
CALCIUM SERPL-MCNC: 8.6 MG/DL (ref 8.3–10.6)
CHLORIDE SERPL-SCNC: 103 MMOL/L (ref 99–110)
CHLORIDE SERPL-SCNC: 104 MMOL/L (ref 99–110)
CHLORIDE SERPL-SCNC: 104 MMOL/L (ref 99–110)
CHLORIDE SERPL-SCNC: 105 MMOL/L (ref 99–110)
CK SERPL-CCNC: 272 U/L (ref 26–192)
CK SERPL-CCNC: 338 U/L (ref 26–192)
CO2 BLDA-SCNC: 13 MMOL/L
CO2 BLDA-SCNC: 13 MMOL/L
CO2 BLDA-SCNC: 14 MMOL/L
CO2 BLDA-SCNC: 14 MMOL/L
CO2 BLDA-SCNC: 15 MMOL/L
CO2 BLDA-SCNC: 17 MMOL/L
CO2 BLDA-SCNC: 19 MMOL/L
CO2 SERPL-SCNC: 12 MMOL/L (ref 21–32)
CO2 SERPL-SCNC: 13 MMOL/L (ref 21–32)
CO2 SERPL-SCNC: 16 MMOL/L (ref 21–32)
CO2 SERPL-SCNC: 17 MMOL/L (ref 21–32)
CREAT SERPL-MCNC: 2 MG/DL (ref 0.6–1.2)
CREAT SERPL-MCNC: 2.2 MG/DL (ref 0.6–1.2)
CREAT SERPL-MCNC: 2.4 MG/DL (ref 0.6–1.2)
CREAT SERPL-MCNC: 2.4 MG/DL (ref 0.6–1.2)
DEPRECATED RDW RBC AUTO: 18 % (ref 12.4–15.4)
ECHO AO ROOT DIAM: 2.8 CM
ECHO AO ROOT INDEX: 1.2 CM/M2
ECHO AV AREA PEAK VELOCITY: 2.1 CM2
ECHO AV AREA VTI: 2 CM2
ECHO AV AREA/BSA PEAK VELOCITY: 0.9 CM2/M2
ECHO AV AREA/BSA VTI: 0.9 CM2/M2
ECHO AV MEAN GRADIENT: 7 MMHG
ECHO AV MEAN VELOCITY: 1.3 M/S
ECHO AV PEAK GRADIENT: 14 MMHG
ECHO AV PEAK VELOCITY: 1.9 M/S
ECHO AV VELOCITY RATIO: 0.58
ECHO AV VTI: 22.2 CM
ECHO BSA: 2.5 M2
ECHO LA AREA 2C: 19.2 CM2
ECHO LA AREA 4C: 19 CM2
ECHO LA DIAMETER INDEX: 1.63 CM/M2
ECHO LA DIAMETER: 3.8 CM
ECHO LA MAJOR AXIS: 6.7 CM
ECHO LA MINOR AXIS: 5.6 CM
ECHO LA TO AORTIC ROOT RATIO: 1.36
ECHO LA VOL BP: 52 ML (ref 22–52)
ECHO LA VOL MOD A2C: 54 ML (ref 22–52)
ECHO LA VOL MOD A4C: 43 ML (ref 22–52)
ECHO LA VOL/BSA BIPLANE: 22 ML/M2 (ref 16–34)
ECHO LA VOLUME INDEX MOD A2C: 23 ML/M2 (ref 16–34)
ECHO LA VOLUME INDEX MOD A4C: 18 ML/M2 (ref 16–34)
ECHO LV E' SEPTAL VELOCITY: 4 CM/S
ECHO LV EDV 3D: 146 ML
ECHO LV EDV INDEX 3D: 63 ML/M2
ECHO LV EJECTION FRACTION 3D: 50 %
ECHO LV ESV 3D: 73 ML
ECHO LV ESV INDEX 3D: 31 ML/M2
ECHO LV FRACTIONAL SHORTENING: 25 % (ref 28–44)
ECHO LV GLOBAL LONGITUDINAL STRAIN (GLS): -7 %
ECHO LV INTERNAL DIMENSION DIASTOLE INDEX: 2.4 CM/M2
ECHO LV INTERNAL DIMENSION DIASTOLIC: 5.6 CM (ref 3.9–5.3)
ECHO LV INTERNAL DIMENSION SYSTOLIC INDEX: 1.8 CM/M2
ECHO LV INTERNAL DIMENSION SYSTOLIC: 4.2 CM
ECHO LV IVSD: 1.1 CM (ref 0.6–0.9)
ECHO LV MASS 2D: 249.3 G (ref 67–162)
ECHO LV MASS 3D INDEX: 70.4 G/M2
ECHO LV MASS 3D: 164 G
ECHO LV MASS INDEX 2D: 107 G/M2 (ref 43–95)
ECHO LV POSTERIOR WALL DIASTOLIC: 1.1 CM (ref 0.6–0.9)
ECHO LV RELATIVE WALL THICKNESS RATIO: 0.39
ECHO LVOT AREA: 3.5 CM2
ECHO LVOT AV VTI INDEX: 0.59
ECHO LVOT DIAM: 2.1 CM
ECHO LVOT MEAN GRADIENT: 3 MMHG
ECHO LVOT PEAK GRADIENT: 5 MMHG
ECHO LVOT PEAK VELOCITY: 1.1 M/S
ECHO LVOT STROKE VOLUME INDEX: 19.5 ML/M2
ECHO LVOT SV: 45.4 ML
ECHO LVOT VTI: 13.1 CM
ECHO MV A VELOCITY: 0.78 M/S
ECHO MV E DECELERATION TIME (DT): 217 MS
ECHO MV E VELOCITY: 0.55 M/S
ECHO MV E/A RATIO: 0.71
ECHO MV E/E' SEPTAL: 13.75
ECHO RA AREA 4C: 17.2 CM2
ECHO RA END SYSTOLIC VOLUME APICAL 4 CHAMBER INDEX BSA: 19 ML/M2
ECHO RA VOLUME: 44 ML
ECHO RV FREE WALL PEAK S': 9 CM/S
ECHO RV TAPSE: 1.4 CM (ref 1.7–?)
EKG ATRIAL RATE: 102 BPM
EKG ATRIAL RATE: 102 BPM
EKG ATRIAL RATE: 108 BPM
EKG ATRIAL RATE: 144 BPM
EKG ATRIAL RATE: 91 BPM
EKG DIAGNOSIS: NORMAL
EKG P AXIS: 68 DEGREES
EKG P-R INTERVAL: 128 MS
EKG Q-T INTERVAL: 220 MS
EKG Q-T INTERVAL: 410 MS
EKG Q-T INTERVAL: 448 MS
EKG Q-T INTERVAL: 474 MS
EKG Q-T INTERVAL: 494 MS
EKG QRS DURATION: 162 MS
EKG QRS DURATION: 164 MS
EKG QRS DURATION: 190 MS
EKG QRS DURATION: 204 MS
EKG QRS DURATION: 214 MS
EKG QTC CALCULATION (BAZETT): 397 MS
EKG QTC CALCULATION (BAZETT): 569 MS
EKG QTC CALCULATION (BAZETT): 583 MS
EKG QTC CALCULATION (BAZETT): 587 MS
EKG QTC CALCULATION (BAZETT): 617 MS
EKG R AXIS: 168 DEGREES
EKG R AXIS: 186 DEGREES
EKG R AXIS: 228 DEGREES
EKG R AXIS: 250 DEGREES
EKG R AXIS: 269 DEGREES
EKG T AXIS: 38 DEGREES
EKG T AXIS: 52 DEGREES
EKG T AXIS: 74 DEGREES
EKG T AXIS: 96 DEGREES
EKG T AXIS: 99 DEGREES
EKG VENTRICULAR RATE: 102 BPM
EKG VENTRICULAR RATE: 102 BPM
EKG VENTRICULAR RATE: 116 BPM
EKG VENTRICULAR RATE: 196 BPM
EKG VENTRICULAR RATE: 85 BPM
GFR SERPLBLD CREATININE-BSD FMLA CKD-EPI: 22 ML/MIN/{1.73_M2}
GFR SERPLBLD CREATININE-BSD FMLA CKD-EPI: 22 ML/MIN/{1.73_M2}
GFR SERPLBLD CREATININE-BSD FMLA CKD-EPI: 24 ML/MIN/{1.73_M2}
GFR SERPLBLD CREATININE-BSD FMLA CKD-EPI: 27 ML/MIN/{1.73_M2}
GLUCOSE BLD-MCNC: 101 MG/DL (ref 70–99)
GLUCOSE BLD-MCNC: 102 MG/DL (ref 70–99)
GLUCOSE BLD-MCNC: 103 MG/DL (ref 70–99)
GLUCOSE BLD-MCNC: 77 MG/DL (ref 70–99)
GLUCOSE BLD-MCNC: 85 MG/DL (ref 70–99)
GLUCOSE BLD-MCNC: 88 MG/DL (ref 70–99)
GLUCOSE BLD-MCNC: 89 MG/DL (ref 70–99)
GLUCOSE BLD-MCNC: 91 MG/DL (ref 70–99)
GLUCOSE SERPL-MCNC: 100 MG/DL (ref 70–99)
GLUCOSE SERPL-MCNC: 105 MG/DL (ref 70–99)
GLUCOSE SERPL-MCNC: 86 MG/DL (ref 70–99)
GLUCOSE SERPL-MCNC: 99 MG/DL (ref 70–99)
HAPTOGLOB SERPL-MCNC: 92 MG/DL (ref 30–200)
HCO3 BLDA-SCNC: 12.1 MMOL/L (ref 21–29)
HCO3 BLDA-SCNC: 12.4 MMOL/L (ref 21–29)
HCO3 BLDA-SCNC: 12.6 MMOL/L (ref 21–29)
HCO3 BLDA-SCNC: 13.2 MMOL/L (ref 21–29)
HCO3 BLDA-SCNC: 13.8 MMOL/L (ref 21–29)
HCO3 BLDA-SCNC: 16.4 MMOL/L (ref 21–29)
HCO3 BLDA-SCNC: 17.9 MMOL/L (ref 21–29)
HCT VFR BLD AUTO: 34 % (ref 36–48)
HCT VFR BLD AUTO: 36 % (ref 36–48)
HCT VFR BLD AUTO: 36 % (ref 36–48)
HCT VFR BLD AUTO: 37 % (ref 36–48)
HCT VFR BLD AUTO: 37 % (ref 36–48)
HCT VFR BLD AUTO: 37.5 % (ref 36–48)
HCT VFR BLD AUTO: 40 % (ref 36–48)
HGB BLD CALC-MCNC: 11.6 GM/DL (ref 12–16)
HGB BLD CALC-MCNC: 12.4 GM/DL (ref 12–16)
HGB BLD CALC-MCNC: 12.5 GM/DL (ref 12–16)
HGB BLD CALC-MCNC: 12.5 GM/DL (ref 12–16)
HGB BLD CALC-MCNC: 13.7 GM/DL (ref 12–16)
HGB BLD-MCNC: 11.3 G/DL (ref 12–16)
HGB BLD-MCNC: 11.7 G/DL (ref 12–16)
LACTATE BLD-SCNC: 3.63 MMOL/L (ref 0.4–2)
LACTATE BLD-SCNC: 4.25 MMOL/L (ref 0.4–2)
LACTATE BLD-SCNC: 4.78 MMOL/L (ref 0.4–2)
LACTATE BLD-SCNC: 5.99 MMOL/L (ref 0.4–2)
LACTATE BLD-SCNC: 6.04 MMOL/L (ref 0.4–2)
LACTATE BLD-SCNC: 7.1 MMOL/L (ref 0.4–2)
LACTATE BLD-SCNC: 7.54 MMOL/L (ref 0.4–2)
LACTATE BLDV-SCNC: 4.6 MMOL/L (ref 0.4–2)
LACTATE BLDV-SCNC: 6.2 MMOL/L (ref 0.4–2)
LACTATE BLDV-SCNC: 7.5 MMOL/L (ref 0.4–2)
MAGNESIUM SERPL-MCNC: 1.8 MG/DL (ref 1.8–2.4)
MAGNESIUM SERPL-MCNC: 1.9 MG/DL (ref 1.8–2.4)
MAGNESIUM SERPL-MCNC: 2.4 MG/DL (ref 1.8–2.4)
MCH RBC QN AUTO: 27.6 PG (ref 26–34)
MCHC RBC AUTO-ENTMCNC: 31.2 G/DL (ref 31–36)
MCV RBC AUTO: 88.5 FL (ref 80–100)
NT-PROBNP SERPL-MCNC: ABNORMAL PG/ML (ref 0–124)
PCO2 BLDA: 23.1 MM HG (ref 35–45)
PCO2 BLDA: 25.8 MM HG (ref 35–45)
PCO2 BLDA: 27 MM HG (ref 35–45)
PCO2 BLDA: 28.7 MM HG (ref 35–45)
PCO2 BLDA: 28.7 MM HG (ref 35–45)
PCO2 BLDA: 29.8 MM HG (ref 35–45)
PCO2 BLDA: 30.2 MM HG (ref 35–45)
PERFORMED ON: ABNORMAL
PERFORMED ON: NORMAL
PERFORMED ON: NORMAL
PH BLDA: 7.25 [PH] (ref 7.35–7.45)
PH BLDA: 7.27 [PH] (ref 7.35–7.45)
PH BLDA: 7.29 [PH] (ref 7.35–7.45)
PH BLDA: 7.32 [PH] (ref 7.35–7.45)
PH BLDA: 7.33 [PH] (ref 7.35–7.45)
PH BLDA: 7.35 [PH] (ref 7.35–7.45)
PH BLDA: 7.38 [PH] (ref 7.35–7.45)
PH BLDV: 7.28 [PH] (ref 7.35–7.45)
PHOSPHATE SERPL-MCNC: 4.1 MG/DL (ref 2.5–4.9)
PLATELET # BLD AUTO: 38 K/UL (ref 135–450)
PMV BLD AUTO: 12.3 FL (ref 5–10.5)
PO2 BLDA: 100.2 MM HG (ref 75–108)
PO2 BLDA: 108.6 MM HG (ref 75–108)
PO2 BLDA: 110.5 MM HG (ref 75–108)
PO2 BLDA: 114.1 MM HG (ref 75–108)
PO2 BLDA: 114.4 MM HG (ref 75–108)
PO2 BLDA: 119.1 MM HG (ref 75–108)
PO2 BLDA: 78.2 MM HG (ref 75–108)
POC SAMPLE TYPE: ABNORMAL
POTASSIUM BLD-SCNC: 3.6 MMOL/L (ref 3.5–5.1)
POTASSIUM BLD-SCNC: 3.8 MMOL/L (ref 3.5–5.1)
POTASSIUM BLD-SCNC: 4 MMOL/L (ref 3.5–5.1)
POTASSIUM SERPL-SCNC: 3.5 MMOL/L (ref 3.5–5.1)
POTASSIUM SERPL-SCNC: 3.6 MMOL/L (ref 3.5–5.1)
POTASSIUM SERPL-SCNC: 4.5 MMOL/L (ref 3.5–5.1)
POTASSIUM SERPL-SCNC: 4.6 MMOL/L (ref 3.5–5.1)
PROCALCITONIN SERPL IA-MCNC: 6.2 NG/ML (ref 0–0.15)
PROCALCITONIN SERPL IA-MCNC: 6.58 NG/ML (ref 0–0.15)
RBC # BLD AUTO: 4.23 M/UL (ref 4–5.2)
REPORT: NORMAL
SAO2 % BLDA: 95 % (ref 93–100)
SAO2 % BLDA: 97 % (ref 93–100)
SAO2 % BLDA: 97 % (ref 93–100)
SAO2 % BLDA: 98 % (ref 93–100)
SODIUM BLD-SCNC: 141 MMOL/L (ref 136–145)
SODIUM BLD-SCNC: 142 MMOL/L (ref 136–145)
SODIUM SERPL-SCNC: 136 MMOL/L (ref 136–145)
SODIUM SERPL-SCNC: 137 MMOL/L (ref 136–145)
SODIUM SERPL-SCNC: 138 MMOL/L (ref 136–145)
SODIUM SERPL-SCNC: 139 MMOL/L (ref 136–145)
TROPONIN, HIGH SENSITIVITY: 24 NG/L (ref 0–14)
WBC # BLD AUTO: 22.5 K/UL (ref 4–11)

## 2024-08-08 PROCEDURE — 84145 PROCALCITONIN (PCT): CPT

## 2024-08-08 PROCEDURE — 99291 CRITICAL CARE FIRST HOUR: CPT | Performed by: INTERNAL MEDICINE

## 2024-08-08 PROCEDURE — 85014 HEMATOCRIT: CPT

## 2024-08-08 PROCEDURE — 2580000003 HC RX 258: Performed by: NURSE PRACTITIONER

## 2024-08-08 PROCEDURE — 2500000003 HC RX 250 WO HCPCS: Performed by: STUDENT IN AN ORGANIZED HEALTH CARE EDUCATION/TRAINING PROGRAM

## 2024-08-08 PROCEDURE — 99223 1ST HOSP IP/OBS HIGH 75: CPT | Performed by: INTERNAL MEDICINE

## 2024-08-08 PROCEDURE — 2500000003 HC RX 250 WO HCPCS: Performed by: INTERNAL MEDICINE

## 2024-08-08 PROCEDURE — 04HK33Z INSERTION OF INFUSION DEVICE INTO RIGHT FEMORAL ARTERY, PERCUTANEOUS APPROACH: ICD-10-PCS

## 2024-08-08 PROCEDURE — P9047 ALBUMIN (HUMAN), 25%, 50ML: HCPCS | Performed by: NURSE PRACTITIONER

## 2024-08-08 PROCEDURE — 94761 N-INVAS EAR/PLS OXIMETRY MLT: CPT

## 2024-08-08 PROCEDURE — 51702 INSERT TEMP BLADDER CATH: CPT

## 2024-08-08 PROCEDURE — 85018 HEMOGLOBIN: CPT

## 2024-08-08 PROCEDURE — 83735 ASSAY OF MAGNESIUM: CPT

## 2024-08-08 PROCEDURE — 93005 ELECTROCARDIOGRAM TRACING: CPT | Performed by: INTERNAL MEDICINE

## 2024-08-08 PROCEDURE — 2000000000 HC ICU R&B

## 2024-08-08 PROCEDURE — 36556 INSERT NON-TUNNEL CV CATH: CPT

## 2024-08-08 PROCEDURE — 2580000003 HC RX 258: Performed by: INTERNAL MEDICINE

## 2024-08-08 PROCEDURE — 93010 ELECTROCARDIOGRAM REPORT: CPT | Performed by: INTERNAL MEDICINE

## 2024-08-08 PROCEDURE — 2700000000 HC OXYGEN THERAPY PER DAY

## 2024-08-08 PROCEDURE — 99233 SBSQ HOSP IP/OBS HIGH 50: CPT

## 2024-08-08 PROCEDURE — 94640 AIRWAY INHALATION TREATMENT: CPT

## 2024-08-08 PROCEDURE — 93306 TTE W/DOPPLER COMPLETE: CPT | Performed by: INTERNAL MEDICINE

## 2024-08-08 PROCEDURE — 6360000002 HC RX W HCPCS: Performed by: INTERNAL MEDICINE

## 2024-08-08 PROCEDURE — 83605 ASSAY OF LACTIC ACID: CPT

## 2024-08-08 PROCEDURE — 85027 COMPLETE CBC AUTOMATED: CPT

## 2024-08-08 PROCEDURE — 83880 ASSAY OF NATRIURETIC PEPTIDE: CPT

## 2024-08-08 PROCEDURE — 82330 ASSAY OF CALCIUM: CPT

## 2024-08-08 PROCEDURE — 82947 ASSAY GLUCOSE BLOOD QUANT: CPT

## 2024-08-08 PROCEDURE — 6370000000 HC RX 637 (ALT 250 FOR IP): Performed by: INTERNAL MEDICINE

## 2024-08-08 PROCEDURE — 71045 X-RAY EXAM CHEST 1 VIEW: CPT

## 2024-08-08 PROCEDURE — 94660 CPAP INITIATION&MGMT: CPT

## 2024-08-08 PROCEDURE — 02HV33Z INSERTION OF INFUSION DEVICE INTO SUPERIOR VENA CAVA, PERCUTANEOUS APPROACH: ICD-10-PCS | Performed by: INTERNAL MEDICINE

## 2024-08-08 PROCEDURE — 86023 IMMUNOGLOBULIN ASSAY: CPT

## 2024-08-08 PROCEDURE — 6360000002 HC RX W HCPCS: Performed by: NURSE PRACTITIONER

## 2024-08-08 PROCEDURE — 36415 COLL VENOUS BLD VENIPUNCTURE: CPT

## 2024-08-08 PROCEDURE — 5A09457 ASSISTANCE WITH RESPIRATORY VENTILATION, 24-96 CONSECUTIVE HOURS, CONTINUOUS POSITIVE AIRWAY PRESSURE: ICD-10-PCS | Performed by: INTERNAL MEDICINE

## 2024-08-08 PROCEDURE — 93306 TTE W/DOPPLER COMPLETE: CPT

## 2024-08-08 PROCEDURE — 82550 ASSAY OF CK (CPK): CPT

## 2024-08-08 PROCEDURE — 84295 ASSAY OF SERUM SODIUM: CPT

## 2024-08-08 PROCEDURE — 80069 RENAL FUNCTION PANEL: CPT

## 2024-08-08 PROCEDURE — 93356 MYOCRD STRAIN IMG SPCKL TRCK: CPT | Performed by: INTERNAL MEDICINE

## 2024-08-08 PROCEDURE — 99292 CRITICAL CARE ADDL 30 MIN: CPT | Performed by: INTERNAL MEDICINE

## 2024-08-08 PROCEDURE — 82803 BLOOD GASES ANY COMBINATION: CPT

## 2024-08-08 PROCEDURE — 84238 ASSAY NONENDOCRINE RECEPTOR: CPT

## 2024-08-08 PROCEDURE — 84132 ASSAY OF SERUM POTASSIUM: CPT

## 2024-08-08 PROCEDURE — 84484 ASSAY OF TROPONIN QUANT: CPT

## 2024-08-08 RX ORDER — ETOMIDATE 2 MG/ML
10 INJECTION INTRAVENOUS ONCE
Status: DISCONTINUED | OUTPATIENT
Start: 2024-08-08 | End: 2024-08-08

## 2024-08-08 RX ORDER — LINEZOLID 2 MG/ML
600 INJECTION, SOLUTION INTRAVENOUS EVERY 12 HOURS
Status: DISCONTINUED | OUTPATIENT
Start: 2024-08-08 | End: 2024-08-08

## 2024-08-08 RX ORDER — 0.9 % SODIUM CHLORIDE 0.9 %
1000 INTRAVENOUS SOLUTION INTRAVENOUS ONCE
Status: COMPLETED | OUTPATIENT
Start: 2024-08-08 | End: 2024-08-08

## 2024-08-08 RX ORDER — CALCIUM CHLORIDE 100 MG/ML
1000 INJECTION INTRAVENOUS; INTRAVENTRICULAR ONCE
Status: COMPLETED | OUTPATIENT
Start: 2024-08-08 | End: 2024-08-08

## 2024-08-08 RX ORDER — FOLIC ACID 1 MG/1
1 TABLET ORAL DAILY
Status: DISCONTINUED | OUTPATIENT
Start: 2024-08-08 | End: 2024-08-09 | Stop reason: HOSPADM

## 2024-08-08 RX ORDER — MAGNESIUM SULFATE IN WATER 40 MG/ML
2000 INJECTION, SOLUTION INTRAVENOUS ONCE
Status: DISCONTINUED | OUTPATIENT
Start: 2024-08-08 | End: 2024-08-09

## 2024-08-08 RX ORDER — SODIUM BICARBONATE 650 MG/1
1300 TABLET ORAL ONCE
Status: COMPLETED | OUTPATIENT
Start: 2024-08-08 | End: 2024-08-08

## 2024-08-08 RX ORDER — ALBUMIN (HUMAN) 12.5 G/50ML
25 SOLUTION INTRAVENOUS 2 TIMES DAILY
Status: COMPLETED | OUTPATIENT
Start: 2024-08-08 | End: 2024-08-09

## 2024-08-08 RX ORDER — DOBUTAMINE HYDROCHLORIDE 200 MG/100ML
5 INJECTION INTRAVENOUS CONTINUOUS
Status: DISCONTINUED | OUTPATIENT
Start: 2024-08-08 | End: 2024-08-08

## 2024-08-08 RX ADMIN — HYDROCORTISONE SODIUM SUCCINATE 50 MG: 100 INJECTION, POWDER, FOR SOLUTION INTRAMUSCULAR; INTRAVENOUS at 20:04

## 2024-08-08 RX ADMIN — SODIUM CHLORIDE 1000 ML: 9 INJECTION, SOLUTION INTRAVENOUS at 03:30

## 2024-08-08 RX ADMIN — DOBUTAMINE HYDROCHLORIDE 5 MCG/KG/MIN: 200 INJECTION INTRAVENOUS at 09:51

## 2024-08-08 RX ADMIN — ISODIUM CHLORIDE 3 ML: 0.03 SOLUTION RESPIRATORY (INHALATION) at 20:14

## 2024-08-08 RX ADMIN — SODIUM BICARBONATE 50 MEQ: 84 INJECTION INTRAVENOUS at 05:43

## 2024-08-08 RX ADMIN — POTASSIUM CHLORIDE 40 MEQ: 1500 TABLET, EXTENDED RELEASE ORAL at 01:18

## 2024-08-08 RX ADMIN — CASTOR OIL AND BALSAM, PERU: 788; 87 OINTMENT TOPICAL at 20:05

## 2024-08-08 RX ADMIN — CASTOR OIL AND BALSAM, PERU: 788; 87 OINTMENT TOPICAL at 09:46

## 2024-08-08 RX ADMIN — AMIODARONE HYDROCHLORIDE 1 MG/MIN: 50 INJECTION, SOLUTION INTRAVENOUS at 09:14

## 2024-08-08 RX ADMIN — MUPIROCIN: 20 OINTMENT TOPICAL at 09:46

## 2024-08-08 RX ADMIN — CEFEPIME 2000 MG: 2 INJECTION, POWDER, FOR SOLUTION INTRAVENOUS at 08:35

## 2024-08-08 RX ADMIN — SODIUM CHLORIDE, PRESERVATIVE FREE 10 ML: 5 INJECTION INTRAVENOUS at 09:46

## 2024-08-08 RX ADMIN — MUPIROCIN: 20 OINTMENT TOPICAL at 20:05

## 2024-08-08 RX ADMIN — HYDROCORTISONE SODIUM SUCCINATE 50 MG: 100 INJECTION, POWDER, FOR SOLUTION INTRAMUSCULAR; INTRAVENOUS at 09:45

## 2024-08-08 RX ADMIN — ALBUMIN (HUMAN) 25 G: 0.25 INJECTION, SOLUTION INTRAVENOUS at 09:17

## 2024-08-08 RX ADMIN — AMIODARONE HYDROCHLORIDE 0.5 MG/MIN: 50 INJECTION, SOLUTION INTRAVENOUS at 14:06

## 2024-08-08 RX ADMIN — FUROSEMIDE 5 MG/HR: 10 INJECTION, SOLUTION INTRAMUSCULAR; INTRAVENOUS at 06:48

## 2024-08-08 RX ADMIN — CALCIUM CHLORIDE 1000 MG: 100 INJECTION, SOLUTION INTRAVENOUS at 06:45

## 2024-08-08 RX ADMIN — VASOPRESSIN 0.03 UNITS/MIN: 20 INJECTION INTRAVENOUS at 15:11

## 2024-08-08 RX ADMIN — SODIUM BICARBONATE 1300 MG: 650 TABLET ORAL at 02:59

## 2024-08-08 RX ADMIN — VANCOMYCIN HYDROCHLORIDE 2500 MG: 1 INJECTION, POWDER, LYOPHILIZED, FOR SOLUTION INTRAVENOUS at 11:45

## 2024-08-08 RX ADMIN — HYDROCORTISONE SODIUM SUCCINATE 50 MG: 100 INJECTION, POWDER, FOR SOLUTION INTRAMUSCULAR; INTRAVENOUS at 15:22

## 2024-08-08 RX ADMIN — LINEZOLID 600 MG: 600 INJECTION, SOLUTION INTRAVENOUS at 07:01

## 2024-08-08 RX ADMIN — VASOPRESSIN 0.03 UNITS/MIN: 20 INJECTION INTRAVENOUS at 06:24

## 2024-08-08 RX ADMIN — SODIUM CHLORIDE 20 MCG/MIN: 9 INJECTION, SOLUTION INTRAVENOUS at 11:47

## 2024-08-08 RX ADMIN — SODIUM CHLORIDE 50 MCG/MIN: 9 INJECTION, SOLUTION INTRAVENOUS at 06:16

## 2024-08-08 RX ADMIN — SODIUM BICARBONATE 50 MEQ: 84 INJECTION INTRAVENOUS at 23:56

## 2024-08-08 RX ADMIN — SODIUM BICARBONATE: 84 INJECTION, SOLUTION INTRAVENOUS at 06:45

## 2024-08-08 RX ADMIN — SODIUM CHLORIDE, PRESERVATIVE FREE 10 ML: 5 INJECTION INTRAVENOUS at 20:04

## 2024-08-08 ASSESSMENT — PAIN SCALES - GENERAL
PAINLEVEL_OUTOF10: 0
PAINLEVEL_OUTOF10: 4
PAINLEVEL_OUTOF10: 4

## 2024-08-08 NOTE — CARE COORDINATION
Case Management Assessment  Initial Evaluation    Date/Time of Evaluation: 8/8/2024 4:16 PM  Assessment Completed by: Karmen Gonzales RN    If patient is discharged prior to next notation, then this note serves as note for discharge by case management.    Patient Name: Malou Tobin                   YOB: 1960  Diagnosis: Syncope, near [R55]                   Date / Time: 8/6/2024 11:31 AM    Patient Admission Status: Inpatient   Readmission Risk (Low < 19, Mod (19-27), High > 27): Readmission Risk Score: 20.2    Current PCP: Collins Lee, DO  PCP verified by CM? Yes    Chart Reviewed: Yes      History Provided by: Patient  Patient Orientation: Alert and Oriented    Patient Cognition: Alert    Hospitalization in the last 30 days (Readmission):  No    If yes, Readmission Assessment in CM Navigator will be completed.    Advance Directives:      Code Status: Full Code   Patient's Primary Decision Maker is: Patient Declined (Legal Next of Kin Remains as Decision Maker)    Primary Decision Maker: Elmore Arnel - Brother/Sister - [phone]    Secondary Decision Maker: Tatyana Whitten - Niece/Nephew - [phone]    Discharge Planning:    Patient lives with: Family Members Type of Home: House  Primary Care Giver: Self  Patient Support Systems include: Family Members, [place]   Current Financial resources: Medicare, Medicaid  Current community resources: None  Current services prior to admission: None            Current DME:              Type of Home Care services:  OT, PT, Skilled Therapy, Housekeeping    ADLS  Prior functional level: Independent in ADLs/IADLs  Current functional level: Assistance with the following:, Bathing, Dressing, Toileting, Cooking, Housework, Shopping, Mobility    PT AM-PAC:   /24  OT AM-PAC:   /24    Family can provide assistance at DC: Yes (brother Arnel if needed)  Would you like Case Management to discuss the discharge plan with any other family

## 2024-08-08 NOTE — CARE COORDINATION
Aware of RPM eligibility. Will discuss with patient when she is more clinically stable.    Following.    Karmen Gonzales RN

## 2024-08-08 NOTE — PROGRESS NOTES
CHF Care Plan      Patient's EF (Ejection Fraction) is greater than 40%    Heart Failure Medications:  Diuretics:: Torsemide, furosemide    (One of the following REQUIRED for EF </= 40%/SYSTOLIC FAILURE but MAY be used in EF% >40%/DIASTOLIC FAILURE)        ACE:: None        ARB:: None         ARNI:: None    (Beta Blockers)  NON- Evidenced Based Beta Blocker (for EF% >40%/DIASTOLIC FAILURE):     Evidenced Based Beta Blocker::(REQUIRED for EF% <40%/SYSTOLIC FAILURE) Metoprolol SUCCinate- Toprol XL  ...................................................................................................................................................    Failed to redirect to the Timeline version of the [employer] SmartLink.      Patient's weights and intake/output reviewed    Daily Weight log at bedside, patient/family participation in use of log: \"yes    Patient's current weight today shows a difference of 10 lbs more than last documented weight.      Intake/Output Summary (Last 24 hours) at 8/8/2024 1643  Last data filed at 8/8/2024 1615  Gross per 24 hour   Intake 4386.28 ml   Output 982 ml   Net 3404.28 ml       Education Booklet Provided: yes    Comorbidities Reviewed Yes    Patient has a past medical history of Anesthesia complication, Arterial ischemic stroke, ICA, right, acute ([facility]), Autoimmune hemolytic anemia ([facility]), Bipolar disorder ([facility]), Bundle branch block, Cardiomyopathy (HCC), Chronic systolic CHF (congestive heart failure) ([facility]), Depression, Family history of early CAD, Family history of ovarian cancer, GERD (gastroesophageal reflux disease), Gout, Hypertension, Hypothyroid, Osteoarthritis, knee, Pneumonia, S/P gastric bypass, Sepsis ([facility]), Small bowel obstruction ([facility]), Unspecified cerebral artery occlusion with cerebral infarction, Uterine cancer (HCC), and Vitamin B 12 deficiency.     >>For CHF and Comorbidity documentation on Education Time and Topics, please see Education Tab      Pt resting in bed at this

## 2024-08-08 NOTE — CONSULTS
Pharmacy Note  Vancomycin Consult    Malou Tobin is a 63 y.o. female started on Vancomycin for Sepsis; consult received from Dr. Li to manage therapy. Also receiving the following antibiotics: Cefepime.    Allergies:  Dilaudid [hydromorphone hcl], Lavender oil, Penicillins, Seroquel [quetiapine fumarate], Adhesive tape, Hydromorphone hcl, Lithium, and Tetanus toxoids     Tmax: 100.2    Micro: BC pending     Recent Labs     08/08/24  0048 08/08/24  0429   CREATININE 2.0* 2.2*       Recent Labs     08/07/24  0620 08/08/24  0429   WBC 6.7 22.5*       Estimated Creatinine Clearance: 35 mL/min (A) (based on SCr of 2.2 mg/dL (H)).      Intake/Output Summary (Last 24 hours) at 8/8/2024 0937  Last data filed at 8/8/2024 0700  Gross per 24 hour   Intake 2770.24 ml   Output 1800 ml   Net 970.24 ml       Wt Readings from Last 1 Encounters:   08/08/24 (!) 141.1 kg (311 lb)         Body mass index is 55.09 kg/m².    Loading dose (critically ill or in ICU, require dialysis or renal replacement therapy): Vancomycin 25 mg/kg IVPB x 1 (maximum 2500 mg).  Pulse dose: fluctuating renal function, MADELEINE, ESRD  Goal Vancomycin trough: 15-20 mcg/mL     Assessment/Plan:  Will initiate Vancomycin with a one time loading dose of 2500 mg x1. Will pulse dose vancomycin due to renal function. Vancomycin level ordered for 8/9 0600. Timing of trough level will be determined based on culture results, renal function, and clinical response.     Thank you for the consult.  Andreas Flower, PharmD 8/8/2024 9:39 AM    Patient started on CRRT  Will adjust to vancomycin 1250mg x1 dose at 0800  Recheck vancomycin random level at 1800  Red Mann PharmD 8/9/2024  6:23 AM

## 2024-08-08 NOTE — PROGRESS NOTES
4 Eyes Skin Assessment     The patient is being assess for   Shift Handoff    I agree that 2 RN's have performed a thorough Head to Toe Skin Assessment on the patient. ALL assessment sites listed below have been assessed.      Areas assessed by both nurses:   [x]   Head, Face, and Ears   [x]   Shoulders, Back, and Chest, Abdomen  [x]   Arms, Elbows, and Hands   [x]   Coccyx, Sacrum, and Ischium  [x]   Legs, Feet, and Heels            **SHARE this note so that the co-signing nurse is able to place an eSignature**    Co-signer eSignature: {Esignature:[id number]}    Does the Patient have Skin Breakdown?  Yes LDA WOUND CARE was Initiated documentation include the Beatrice-wound, Wound Assessment, Measurements, Dressing Treatment, Drainage, and Color\",          Jonny Prevention initiated:  Yes   Wound Care Orders initiated:  Yes      WOC nurse consulted for Pressure Injury (Stage 3,4, Unstageable, DTI, NWPT, Complex wounds)and New or Established Ostomies:  No      Primary Nurse eSignature: Electronically signed by Cora Jc RN on 8/8/24 at 2:33 PM EDT

## 2024-08-08 NOTE — PLAN OF CARE
Problem: Chronic Conditions and Co-morbidities  Goal: Patient's chronic conditions and co-morbidity symptoms are monitored and maintained or improved  Outcome: Progressing  Flowsheets (Taken 8/8/2024 0800)  Care Plan - Patient's Chronic Conditions and Co-Morbidity Symptoms are Monitored and Maintained or Improved: Monitor and assess patient's chronic conditions and comorbid symptoms for stability, deterioration, or improvement     Problem: Discharge Planning  Goal: Discharge to home or other facility with appropriate resources  Outcome: Progressing  Flowsheets (Taken 8/8/2024 0800)  Discharge to home or other facility with appropriate resources: Identify barriers to discharge with patient and caregiver     Problem: Safety - Adult  Goal: Free from fall injury  Outcome: Progressing     Problem: ABCDS Injury Assessment  Goal: Absence of physical injury  Outcome: Progressing     Problem: Pain  Goal: Verbalizes/displays adequate comfort level or baseline comfort level  Outcome: Progressing     Problem: Skin/Tissue Integrity  Goal: Absence of new skin breakdown  Description: 1.  Monitor for areas of redness and/or skin breakdown  2.  Assess vascular access sites hourly  3.  Every 4-6 hours minimum:  Change oxygen saturation probe site  4.  Every 4-6 hours:  If on nasal continuous positive airway pressure, respiratory therapy assess nares and determine need for appliance change or resting period.  Outcome: Progressing     Problem: Nutrition Deficit:  Goal: Optimize nutritional status  Outcome: Progressing  Flowsheets (Taken 8/8/2024 0903 by Cleopatra Bustos, MS, RD, LD)  Nutrient intake appropriate for improving, restoring, or maintaining nutritional needs: Assess nutritional status and recommend course of action

## 2024-08-08 NOTE — ACP (ADVANCE CARE PLANNING)
Advance Care Planning   Advance Care Planning Clinical Specialist  Conversation Note      Date of ACP Conversation: 8/8/2024    Conversation Conducted with:   Patient with Decision Making Capacity   Healthcare Decision Maker: Next of Kin by law (only applies in absence of above) as below. Patient is planning on completing AD's with Spiritual Care this visit.    ACP Clinical Specialist: Karmen Gonzales RN      *When Decision Maker makes decisions on behalf of the incapacitated patient: Decision Maker is asked to consider and make decisions based on patient values, known preferences, or best interests.       Current Designated Health Care Decision Maker:   Primary Decision Maker: Arnel Elmore - Brother/Sister - [phone]    Secondary Decision Maker: Tatyana Whitten - Niece/Nephew - [phone]  (as entered in ACP Activity Healthcare Decision Maker field.  Validate  this information as still accurate & up-to-date; edit Healthcare Decision Maker field as needed.)       For below questions, when conducting conversation with Health Care Decision Maker, substitute \"she\" and \"her\" for \"you\" and \"your\".      Hospitalization  If your health were to worsen and it became clear that your chance of recovery was unlikely, what would your preferences be regarding hospitalization?:    Choice:  [x]  The patient would want hospitalization  []  The patient would prefer comfort-focused treatment without hospitalization.    Ventilation  If you were in your present state of health and suddenly became very ill and were unable to breathe on your own, what would your preference be about the use of a ventilator (breathing machine) if it were available to you?      If patient would desire the use of a ventilator (breathing machine), answer \"yes\", if not \"no\":yes    If your health were to worsen and it became clear that your chance of recovery was unlikely, would that change your answer? Yes    Resuscitation  CPR works best to restart the

## 2024-08-08 NOTE — PROGRESS NOTES
[facility] paged per RN for \"worsening MADELEINE\" @ 05:19 08/08/2024. Arif on call.

## 2024-08-08 NOTE — PROGRESS NOTES
08/08/24 0312   NIV Type   $NIV $Daily Charge   NIV Started/Stopped On   Equipment Type v60   Mode CPAP   Mask Type Full face mask   Mask Size Medium   Assessment   Pulse 99   Respirations 27   SpO2 95 %   Settings/Measurements   CPAP/EPAP 14 cmH2O   Vt (Measured) 632 mL   FiO2  35 %   Mask Leak (lpm) 0 lpm   Patient's Home Machine No   Alarm Settings   Alarms On Y   Low Pressure (cmH2O) 6 cmH2O   High Pressure (cmH2O) 30 cmH2O   Patient Observation   Observations mepilex on nose

## 2024-08-08 NOTE — PROGRESS NOTES
Dr. Monroy at bedside for central line and arterial line placement.     Electronically signed by Emelyn Madden RN on 8/8/2024 at 6:03 AM

## 2024-08-08 NOTE — CONSULTS
Infectious Diseases   Consult Note      Reason for Consult:  sepsis, SAB    Requesting Physician:  Alfredo       Date of Admission: 8/6/2024    Subjective:   CHIEF COMPLAINT:  none given       HPI:    Malou Tobin is a 63yoF with history of CHF, NICM, BiV-ICD in place, pHTN, hypothyroidism, history of uterine cancer  PMR on HCQ    ED 7/17/24 - traumatic displaced, distal humerus fracture managed non-operatively.  Arm has been in a sling.                 ED 8/6/24 - weakness, pre-syncope, diarrhea.    She was hypotensive.   MADELEINE noted   Admitted for evaluation.    Moved to ICU on 8/7/24 for hypotension, started on levophed, also on vasopressin.      BC were collected on 8/7 and are now positive for S aureus.    Earlier this morning, was started on IV vanc   WBC today is elevated to 22  Platelets are falling, today 38  AoCKD   Low grade fever   No open wounds except for some dermatitis on the L foot.      She is on Bipap and does not contribute to the history.    Levo 20  Vasopressin 0.03  Amiodarone        Current abx:  Cefepime 2g q12  [facility]        Past Surgical History:       Diagnosis Date    Anesthesia complication     hypotension post op    Arterial ischemic stroke, ICA, right, acute (HCC)     Autoimmune hemolytic anemia (HCC)     during uterine cancer    Bipolar disorder (HCC)     managed by Diane Sherwood)    Bundle branch block     Cardiomyopathy (HCC)     Chronic systolic CHF (congestive heart failure) ([facility]) 07/09/2015    Depression     Family history of early CAD     Family history of ovarian cancer     mother    GERD (gastroesophageal reflux disease)     Gout     Hypertension     Hypothyroid     Osteoarthritis, knee     Shybut    Pneumonia     history of pneumonia    S/P gastric bypass 01/07/2005    Sepsis ([facility]) 07/05/2016    Small bowel obstruction (HCC)     Unspecified cerebral artery occlusion with cerebral infarction     Uterine cancer (HCC)     endometrial adenocarcinoma

## 2024-08-08 NOTE — PROGRESS NOTES
08/08/24 1213   NIV Type   Mode Bilevel   Mask Type Full face mask   Mask Size Medium   Assessment   Pulse 86   Respirations 18   SpO2 99 %   Using Accessory Muscles No   Skin Protection for O2 Device Yes   Orientation Middle   Location Nose   Settings/Measurements   PIP Observed 13 cm H20   IPAP 12 cmH20   CPAP/EPAP 5 cmH2O   Vt (Measured) 695 mL   Rate Ordered 16   FiO2  35 %   I Time/ I Time % 1 s   Minute Volume (L/min) 17.1 Liters   Mask Leak (lpm) 4 lpm   Patient's Home Machine No   Alarm Settings   Alarms On Y   Low Pressure (cmH2O) 6 cmH2O   High Pressure (cmH2O) 30 cmH2O   Apnea (secs) 20 secs   RR Low (bpm) 6   RR High (bpm) 40 br/min

## 2024-08-08 NOTE — PLAN OF CARE
CHF Care Plan      Patient's EF (Ejection Fraction) is greater than 40%    Heart Failure Medications:  Diuretics:: None    (One of the following REQUIRED for EF </= 40%/SYSTOLIC FAILURE but MAY be used in EF% >40%/DIASTOLIC FAILURE)        ACE:: Lisinopril        ARB:: None         ARNI:: None    (Beta Blockers)  NON- Evidenced Based Beta Blocker (for EF% >40%/DIASTOLIC FAILURE): Metoprolol TARTrate- Lopressor    Evidenced Based Beta Blocker::(REQUIRED for EF% <40%/SYSTOLIC FAILURE) None  ...................................................................................................................................................    Failed to redirect to the Timeline version of the [employer] SmartLink.      Patient's weights and intake/output reviewed    Daily Weight log at bedside, patient/family participation in use of log: \"yes    Patient's current weight today shows a difference of 2 lbs more than last documented weight.      Intake/Output Summary (Last 24 hours) at 8/8/2024 1936  Last data filed at 8/8/2024 1900  Gross per 24 hour   Intake 2612.84 ml   Output 260 ml   Net 2352.84 ml       Education Booklet Provided: yes    Comorbidities Reviewed Yes    Patient has a past medical history of Anesthesia complication, Arterial ischemic stroke, ICA, right, acute ([facility]), Autoimmune hemolytic anemia ([facility]), Bipolar disorder ([facility]), Bundle branch block, Cardiomyopathy (HCC), Chronic systolic CHF (congestive heart failure) ([facility]), Depression, Family history of early CAD, Family history of ovarian cancer, GERD (gastroesophageal reflux disease), Gout, Hypertension, Hypothyroid, Osteoarthritis, knee, Pneumonia, S/P gastric bypass, Sepsis ([facility]), Small bowel obstruction ([facility]), Unspecified cerebral artery occlusion with cerebral infarction, Uterine cancer (HCC), and Vitamin B 12 deficiency.     >>For CHF and Comorbidity documentation on Education Time and Topics, please see Education Tab      Pt resting in bed at this time on

## 2024-08-08 NOTE — ONCOLOGY
ONCOLOGY HEMATOLOGY CARE PROGRESS NOTE      SUBJECTIVE:    Now in the ICU on Levophed, Vasopressin, Lasix drips.    On Amio for V-tach.    On CPAP.    Paced rhythm now.    ROS:     Constitutional:  No weight loss, No fever, No chills, No night sweats.  Energy level good.  Eyes:  No impairment or change in vision  ENT / Mouth:  No pain, abnormal ulceration, bleeding, nasal drip or change in voice or hearing  Cardiovascular:  No chest pain, palpitations, new edema, or calf discomfort  Respiratory:  No pain, hemoptysis, change to breathing  Breast:  No pain, discharge, change in appearance or texture  Gastrointestinal:  No pain, cramping, jaundice, change to eating and bowel habits  Urinary:  No pain, bleeding or change in continence  Genitalia: No pain, bleeding or discharge  Musculoskeletal:  No redness, pain, edema or weakness  Skin:  No pruritus, rash, change to nodules or lesions  Neurologic:  No discomfort, change in mental status, speech, sensory or motor activity  Psychiatric:  No change in concentration or change to affect or mood  Endocrine:  No hot flashes, increased thirst, or change to urine production  Hematologic: No petechiae, ecchymosis or bleeding  Lymphatic:  No lymphadenopathy or lymphedema  Allergy / Immunologic:  No eczema, hives, frequent or recurrent infections    OBJECTIVE        Physical    VITALS:  Patient Vitals for the past 24 hrs:   BP Temp Temp src Pulse Resp SpO2 Height Weight   08/08/24 0600 (!) 89/52 -- -- (!) 113 22 98 % -- --   08/08/24 0545 (!) 96/57 -- -- (!) 113 21 97 % -- --   08/08/24 0530 93/69 -- -- (!) 107 22 98 % -- --   08/08/24 0515 (!) 64/29 -- -- (!) 106 22 97 % -- --   08/08/24 0500 (!) 63/51 -- -- (!) 102 22 98 % -- --   08/08/24 0445 (!) 73/53 -- -- -- -- -- -- --   08/08/24 0400 (!) 82/57 99.5 °F (37.5 °C) Bladder (!) 109 20 98 % -- --   08/08/24 0338 -- -- -- -- -- -- -- (!) 141.5 kg (311 lb 15.2 oz)   08/08/24 0330 (!) 94/57 --  of both knees    Pain of left calf    Angina pectoris, unspecified (HCC)    VT (ventricular tachycardia) (HCC)    Atrial flutter (HCC)    Severe obstructive sleep apnea    Atrial fibrillation (HCC)    Apnea    Bipolar disorder, most recent episode depressed (HCC)    History of CVA (cerebrovascular accident)    PTSD (post-traumatic stress disorder)    PMR (polymyalgia rheumatica) (HCC)    Chronic renal disease, stage III (HCC) [[id number]]    Heart disease, hypertensive, with heart failure (HCC)    Closed supracondylar fracture of right humerus    Syncope, near       ASSESSMENT AND PLAN:    Thrombocytopenia  Anemia  - patient has a remote history in chart problem list of autoimmune hemolytic anemia although cannot locate any records of this or treatment, etc.  - no evidence of acute bleeding    - patient on hydroxychloroquine for polymyalgia rheumatica and on Lamictal for bipolar disorder, which can cause myelosuppression although patient reports being on this medication for over a year with no recent dose changes and given acute drop would think this is less likely to be etiology   - Plts unremarkable prior to admission  - PLTS 77 on 08/05 --> 59 --> 38  - HGB 11.4 on admission down to 9.7 then to 10.8 this AM; relatively stable HGB   - Workup              - Hemolysis evl: Abd retic 18 K/uL, hapto 92 mg/dL,  U/L.  MAHA (TTP, etc) unlikely.              - fibrinogen unremarkable at 491 mg/dL  - DIC unlikely.              - B12 1175 pg/mL, folate 4.58 ng/mL - start folic acid.              - Check antiplatelet antibodies.  - 8/08: Meds are possible, but less likely.  ITP possible.  Sepsis possible.  - recommend continued trending; transfuse for HGB <7 g/dL, plts <10K unless acute bleeding identified <50K     History of Uterine Cancer  - per patient diagnosed in 2000  - reportedly received radium implants due to weighing 500 lbs at the time    - seen by gyn on 06/12/2024 who referred patient to gyn/onc at [facility] for

## 2024-08-08 NOTE — PROGRESS NOTES
0506: Dr. Monroy notified of worsening labs and increased pressor requirements. Waiting on response.     0518: Nephrology paged  2105: Writer spoke with nephrologist on call, updates given. Orders to be placed by MD.     Electronically signed by Emelyn Madden RN on 8/8/2024 at 5:06 AM

## 2024-08-08 NOTE — PROGRESS NOTES
08/07/24 8037   NIV Type   $NIV $Daily Charge   NIV Started/Stopped On   Equipment Type v60   Mode CPAP   Mask Type Full face mask   Mask Size Medium   Assessment   Pulse 99   Respirations 21   SpO2 97 %   Settings/Measurements   CPAP/EPAP 14 cmH2O   Vt (Measured) 692 mL   FiO2  35 %   Mask Leak (lpm) 31 lpm   Patient's Home Machine No   Alarm Settings   Alarms On Y   Low Pressure (cmH2O) 6 cmH2O   High Pressure (cmH2O) 30 cmH2O   Patient Observation   Observations mepilex on nose

## 2024-08-08 NOTE — CARE COORDINATION
Aware of need for initial assessment. Patient still on CPAP, but hopefully will be dc'd soon. Spoke with her briefly at bedside. Explained CM role, and explained that I would return for conversation later today, after CPAP is off.  Also remains on Levo, Vasopressin and Dobutamine at this time.  Aware of eligibility for RPM - will discuss with patient when appropriate.    Following.    Karmen Gonzales RN

## 2024-08-08 NOTE — PLAN OF CARE
Problem: Chronic Conditions and Co-morbidities  Goal: Patient's chronic conditions and co-morbidity symptoms are monitored and maintained or improved  8/8/2024 1934 by Arnel Gregorio RN  Outcome: Progressing  Flowsheets  Taken 8/8/2024 1934 by Arnel Gregorio RN  Care Plan - Patient's Chronic Conditions and Co-Morbidity Symptoms are Monitored and Maintained or Improved:   Monitor and assess patient's chronic conditions and comorbid symptoms for stability, deterioration, or improvement   Collaborate with multidisciplinary team to address chronic and comorbid conditions and prevent exacerbation or deterioration   Update acute care plan with appropriate goals if chronic or comorbid symptoms are exacerbated and prevent overall improvement and discharge  Taken 8/8/2024 1600 by Cora Jc RN  Care Plan - Patient's Chronic Conditions and Co-Morbidity Symptoms are Monitored and Maintained or Improved: Monitor and assess patient's chronic conditions and comorbid symptoms for stability, deterioration, or improvement  8/8/2024 1333 by Cora Jc RN  Outcome: Progressing  Flowsheets  Taken 8/8/2024 1200  Care Plan - Patient's Chronic Conditions and Co-Morbidity Symptoms are Monitored and Maintained or Improved: Monitor and assess patient's chronic conditions and comorbid symptoms for stability, deterioration, or improvement  Taken 8/8/2024 0800  Care Plan - Patient's Chronic Conditions and Co-Morbidity Symptoms are Monitored and Maintained or Improved: Monitor and assess patient's chronic conditions and comorbid symptoms for stability, deterioration, or improvement     Problem: Discharge Planning  Goal: Discharge to home or other facility with appropriate resources  8/8/2024 1934 by Arnel Gregorio RN  Outcome: Progressing  Flowsheets  Taken 8/8/2024 1934 by Arenl Gregorio RN  Discharge to home or other facility with appropriate resources:   Identify barriers to discharge with patient and

## 2024-08-08 NOTE — PROGRESS NOTES
Shift: [id number]    Admitting diagnosis: Syncope    Presentation to hospital: presented to [place] ED after near syncopal episode    Surgery: no     Nursing assessment at handoff  unstable    Emergency Contact/POA:Arnel Ramírez   Family updated: yes throughout shift     Most recent vitals: BP (!) 104/49   Pulse (!) 101   Temp 99.5 °F (37.5 °C) (Bladder)   Resp 23   Ht 1.6 m (5' 3\")   Wt (!) 141.1 kg (311 lb)   SpO2 94%   BMI 55.09 kg/m²      Rhythm: Paced    NC/HFNC- 6 lpm or CPAP  Respiratory support: - No ventilator support  - CPAP     Vent days: Day 0    Increased O2 requirements: no    Admission weight Weight - Scale: 127 kg (280 lb)  Today's weight   Wt Readings from Last 1 Encounters:   08/08/24 (!) 141.1 kg (311 lb)         UOP >30ml/hr: no. Nephrology aware    Locke need assessed each shift: yes, critically ill    Restraints: no  Order current and documentation up to date?    Lines/Drains  LDA Insertion Date Discontinued Date Dressing Changes   PIV  8/5     TLC/Midline 8/7 8/8     Arterial  8/8     Locke  8/7     Vas Cath      ETT       Surgical drains        Night Shift Hospitalist Interventions    Problem(Brief) Date Time Intervention Physician contacted                                               Drip rates at handoff:    amiodarone 0.5 mg/min (08/08/24 1800)    VASOpressin 20 Units in sodium chloride 0.9 % 100 mL infusion 0.03 Units/min (08/08/24 1800)    norepinephrine 25 mcg/min (08/08/24 1800)    sodium chloride         Hospital Course Daily Updates:  Admit Day# 1 Nights 8/7  -worsening kidney Fx, increased pressor requirements  Elevated lactic, impending doom  -1amp bicarb  -K+ replaced  -CXR obtained  -lasix and bicarb gtt started  -vasopressin gtt added   -central line and arterial line placed  -Sustained VT from 9815-7201, remained alert at this time. MD and multiple RN's at bedside. 2gm Mg+, 300mg Amiodarone IVP, 2G CaCl-  -started on Cefepime and Zyvox  -awaiting amio gtt  -spoke

## 2024-08-08 NOTE — PROGRESS NOTES
Sustained V-tach/Hypotension w/ Dr. Monroy     - 2g Calcium Chloride IVP given   - 2g Magnesium sulfate IVP given   - total of 300mg Amiodarone IVP given     Pt sustained V-tach 140s resolved s/p resuscitative meds to pacing rhythm. Remained on Norepinephrine/Vasopressin gtt.

## 2024-08-08 NOTE — PROGRESS NOTES
levothyroxine (SYNTHROID) tablet 175 mcg  175 mcg Oral Daily Baldev Yang MD   175 mcg at 08/07/24 0941    [Held by provider] lisinopril (PRINIVIL;ZESTRIL) tablet 5 mg  5 mg Oral Daily Baldev Yang MD        lurasidone (LATUDA) tablet 60 mg  60 mg Oral Nightly Baldev Yang MD   60 mg at 08/07/24 2041    methocarbamol (ROBAXIN) tablet 500 mg  500 mg Oral BID PRN Baldev Yang MD   500 mg at 08/1958    [Held by provider] metoprolol succinate (TOPROL XL) extended release tablet 100 mg  100 mg Oral Daily Baldev Yang MD        montelukast (SINGULAIR) tablet 10 mg  10 mg Oral Nightly Baldev Yang MD   10 mg at 08/1958    atorvastatin (LIPITOR) tablet 20 mg  20 mg Oral Daily Baldev Yang MD   20 mg at 08/07/24 0941    [Held by provider] torsemide (DEMADEX) tablet 10 mg  10 mg Oral Daily Baldev Yang MD        lamoTRIgine (LAMICTAL) tablet 300 mg  300 mg Oral QPM Baldev Yang MD   300 mg at 08/07/24 1825    FLUoxetine (PROZAC) capsule 30 mg  30 mg Oral Daily Baldev Yang MD   30 mg at 08/07/24 0940    polyethylene glycol (GLYCOLAX) packet 17 g  17 g Oral Daily PRN Baldev Yang MD        acetaminophen (TYLENOL) tablet 650 mg  650 mg Oral Q6H PRN Baldev Yang MD   650 mg at 08/1958    Or    acetaminophen (TYLENOL) suppository 650 mg  650 mg Rectal Q6H PRN Baldev Yang MD        magnesium sulfate 2000 mg in 50 mL IVPB premix  2,000 mg IntraVENous PRN Baldev Yang MD           Objective:     Telemetry monitor: Telemetry independently reviewed and interpreted by me today and shows: normal pacemaker rhythm     Physical Exam:  Constitutional and general appearance: alert, cooperative, severe distress, and appears stated age  HEENT: PERRL, no cervical lymphadenopathy. No masses palpable. Normal oral mucosa  Respiratory:  Normal excursion and expansion without use of accessory muscles  Resp auscultation: Normal breath sounds without wheezing, rhonchi, and rales  Cardiovascular:  The  apical impulse is not displaced  Heart tones are crisp and normal. regular S1 and S2.  Jugular venous pulsation +JVD  The carotid upstroke is normal in amplitude and contour without delay or bruit  Peripheral pulses are symmetrical and full   Abdomen:  No masses or tenderness  Bowel sounds present  Extremities:   No cyanosis or clubbing   No lower extremity edema   Skin: warm and dry  Neurological:  Alert and oriented  Moves all extremities well  No abnormalities of mood, affect, memory, mentation, or behavior are noted    Data  EKG 8/8/2024  Ventricular tachycardia rate 145      Echo pending this admission      Echo 7/12/2023   Summary   Left ventricular systolic function is mildly reduced with a visually   estimated ejection fraction of 45-50 %.   The left ventricle is normal in size with mild-moderate concentric left   ventricular hypertrophy.   Inferior/apical hypokinesis.   Grade I diastolic dysfunction with normal LV filling pressures.   Normal right ventricular size and function.   Inadequate tricuspid valve regurgitation to estimate systolic pulmonary   artery pressure.    Echo 4/2022  Summary   Left ventricular systolic function is low normal with ejection fraction   estimated at 50-55 %.   There is very mild inferior and inferoseptal hypokinesis.   All remaining wall segments appear low normal in function.   Left ventricular size is mildly increased.   There is moderate concentric left ventricular hypertrophy.   Grade II diastolic dysfunction with elevated filling pressure. (e' 8.6 cm/s)   The left atrium is mildly dilated.   Mild posterior mitral annular calcification is present.   Mild mitral regurgitation.   Aortic valve sclerosis without aortic stenosis.   Mild tricuspid regurgitation.   Systolic pulmonary artery pressure (SPAP) estimated at 24 mmHg (RA pressure   8 mmHg), consistent with mild pulmonary hypertension.   Normal systolic pulmonary artery pressure (SPAP) estimated at 24 mmHg (RA   pressure  8 mmHg).   Pacer / ICD wire is visualized in the right ventricle and right atrium.    Stress test 3/2021  Summary   Large sized inferior, fixed defect of severe intensity consistent with   infarction in the territory of the RCA. No evidence of myocardium at risk   for significant reversible ischemia.      LV function is severely reduced with and ejection fraction of 34 %.      Overall findings represent a high risk scan.    SANGEETHA: 07/13/15  Dilated cardiomyopathy, EF 20%.  There is severe global hypokinesis.  Moderate mitral regurgitation.  Mild tricuspid regurgitation.  Systolic pulmonary artery pressure is estimated at 42 mmHg consistent   with mild pulmonary hypertension.     Cardiac cath: 07/13/15  1.  Angiographically normal coronary arteries.  2.  Severe pulmonary hypertension 66/37 mean 49 mmHg.  3.  Markedly elevated pulmonary capillary wedge pressure 32 mmHg, and left  ventricular end diastolic pressure 36 mmHg.    4.  Transpulmonary gradient is 17 mmHg, which suggested a left-sided as well  intrinsic pulmonary etiology of the severe pulmonary hypertension.    5.  Mildly reduced cardiac index by Giovanni of 2.36 L/min/m2.     RECOMMENDATION:  The patient has no epicardial disease.  Her chest pain is from the heart failure.  She is noted to have moderate to severe mitral  regurgitation on the echocardiogram, and will schedule for SANGEETHA to further  evaluate the mitral regurgitation.  In the meantime, we will also start her    on heart failure therapy that will include a beta-blocker and ACE inhibitor    as well as diuretic therapy.     All labs and testing reviewed.  Lab Review     Renal Profile:   Lab Results   Component Value Date/Time    CREATININE 2.2 08/08/2024 04:29 AM    BUN 48 08/08/2024 04:29 AM     08/08/2024 04:29 AM    K 3.6 08/08/2024 04:29 AM    K 3.5 08/08/2024 12:48 AM     08/08/2024 04:29 AM    CO2 16 08/08/2024 04:29 AM     CBC:    Lab Results   Component Value Date/Time    WBC 22.5

## 2024-08-08 NOTE — PROGRESS NOTES
Hospital Medicine Progress Note      Date of Admission: 8/6/2024  Hospital Day: 3    Chief Admission Complaint:  weakness, nearly passed out      Subjective: She is lying in bed.  Looks weak and tired.  She denies any complaints at this time.  She stated she was feeling a little better today    Presenting Admission History:       63 y.o. female with PMH significant for CHF/systolic, non-ischemic dilated cardiomyopathy, S/P BiV-AICD 2016, pulmonary HTN, LBBB, depression, h/o uterine cancer, hypothyroidism, osteoarthritis.  Of pertinence is that she sustained a fall about 3 weeks and injured her right elbow and had nondisplaced fracture.  She has had a splint on since that time and is supposed follow-up with orthopedics     She  presented to [facility] ED with complaint of weakness, lightheadedness.   She stated she was walking in her house felt weak and lightheaded and fell to her knees.  She denied any loss of consciousness.  She denied any significant injury due to the fall.  She did give history that for the past few days prior to admission she did have some diarrhea and decreased oral intake.  She was continuing to take her diuretics and antihypertensive medications.  Weakness and lightheadedness was progressed for 2 to 3 days prior to this admission.  She denied chest pain she denied any shortness of breath.     Her hospital course has been complicated by hypotension and she was transferred to the ICU to receive vasopressor support.  Blood cultures were collected on 8/7/2024.  Within 24 hours cultures were positive for gram-positive cocci in clusters present Staphylococcus.  She has been started on 8/8/2024 on IV vancomycin     Cardiology, pulmonary critical care, nephrology and oncology have been consulted       Assessment/Plan:      Current Principal Problem:  Syncope, near    Bacteremia : As noted above blood culture collected 8/7/2024 do reveal gram-positive cocci resembling Staphylococcus.  She was     LABURIN 75,000 CFU/ml 05/29/2024 03:24 PM     Blood Cultures:   Lab Results   Component Value Date/Time    BC  08/07/2024 06:06 PM     Gram stain Aerobic bottle:  Gram positive cocci in clusters  resembling Staphylococcus  Information to follow  Gram stain Anaerobic bottle:  Gram positive cocci in clusters  resembling Staphylococcus  Information to follow       Lab Results   Component Value Date/Time    BLOODCULT2  08/07/2024 06:06 PM     Gram stain Aerobic bottle:  Gram positive cocci in clusters  resembling Staphylococcus  Information to follow  Gram stain Anaerobic bottle:  Gram positive cocci in clusters  resembling Staphylococcus  Information to follow      BLOODCULT2  08/07/2024 06:06 PM     mecA gene not detected  See additional report for complete BCID panel.       Organism:   Lab Results   Component Value Date/Time    ORG Staph aureus DNA Detected 08/07/2024 06:06 PM         YULI VERGARA MD

## 2024-08-08 NOTE — PROGRESS NOTES
08/08/24 1556   NIV Type   NIV Started/Stopped On   Mode Bilevel   Mask Type Full face mask   Mask Size Medium   Assessment   Pulse 100   Respirations 20   SpO2 97 %   Using Accessory Muscles No   Skin Protection for O2 Device Yes   Location Nose   Intervention(s) Skin Barrier   Breath Sounds   Right Upper Lobe Clear   Right Middle Lobe Diminished   Right Lower Lobe Diminished   Left Upper Lobe Clear   Left Lower Lobe Diminished   Settings/Measurements   PIP Observed 13 cm H20   IPAP 13 cmH20   CPAP/EPAP 12 cmH2O   Vt (Set, mL) 5 mL   Vt (Measured) 800 mL   Rate Ordered 16   FiO2  35 %   I Time/ I Time % 1 s   Minute Volume (L/min) 20 Liters   Mask Leak (lpm) 3 lpm   Patient's Home Machine No   Alarm Settings   Alarms On Y   Low Pressure (cmH2O) 8 cmH2O   High Pressure (cmH2O) 30 cmH2O   Apnea (secs) 20 secs   RR Low (bpm) 6   RR High (bpm) 40 br/min   Oxygen Therapy/Pulse Ox   O2 Therapy Oxygen   O2 Device PAP (positive airway pressure)     This patient remains on Bipap

## 2024-08-08 NOTE — PROGRESS NOTES
Comprehensive Nutrition Assessment    Type and Reason for Visit:  Reassess    Nutrition Recommendations/Plan:   NPO, diet advancement pending O2 requirements and MD approval  If unable to advance diet in 24-48 hours, may need to consider alternative methods of nutrition. Consult RD for recommendations.   Monitor nutrition adequacy, pertinent labs, bowel habits, wt changes, and clinical progress     Malnutrition Assessment:  Malnutrition Status:  At risk for malnutrition (Comment) (08/08/24 6894)    Context:  Acute Illness     Findings of the 6 clinical characteristics of malnutrition:  Energy Intake:  Mild decrease in energy intake (Comment)    Nutrition Assessment:    Follow up: Pt in ICU, hypotensive on levo and vaso. On bipap overnight and today. Nephrology following for possible CRRT. Made NPO this morning per CNP. Will monitor for possible diet advancement vs need for alternative nutrition. Consult RD for recommendations, will continue to monitor.    Nutrition Related Findings:    Labs reviewed. No BM. Wound Type: Pressure Injury, Stage II       Current Nutrition Intake & Therapies:    Average Meal Intake: NPO  Average Supplements Intake: NPO  Diet NPO    Anthropometric Measures:  Height: 160 cm (5' 3\")  Ideal Body Weight (IBW): 115 lbs (52 kg)       Current Body Weight: 141.1 kg (311 lb), 251.3 % IBW. Weight Source: Bed Scale  Current BMI (kg/m2): 55.1        Weight Adjustment For: No Adjustment                 BMI Categories: Obese Class 3 (BMI 40.0 or greater)    Estimated Daily Nutrient Needs:  Energy Requirements Based On: Kcal/kg (30-35 kcals/kg)  Weight Used for Energy Requirements: Ideal (52 kg)  Energy (kcal/day): 2799-4346  Weight Used for Protein Requirements: Ideal (1.2-1.5 g/kg)  Protein (g/day): 62-78 g  Method Used for Fluid Requirements: 1 ml/kcal  Fluid (ml/day):      Nutrition Diagnosis:   Increased nutrient needs related to inadequate protein-energy intake, impaired respiratory function as  fall precautions

## 2024-08-08 NOTE — PROGRESS NOTES
Ph: [phone], Fax: [phone]           [facility]               Reason for admission:                 Fall/diarrhea/weakness/pancytopenia/MADELEINE/hypotension    Brief Summary :     Malou Tobin is being seen by nephrology for fall she was found to have fracture in the right elbow nondisplaced.  Her course of hospitalization is complicated by MADELEINE and also hypotension needing transfer to our intensive care unit for Levophed drip.  Also has pancytopenia being seen by hematology      Interval History and plan:         At ICU needing BiPAP overnight  Made 1.8 L of urine yesterday  316  Creatinine went up to 2.2  Acidotic  And lactic acid higher now at 4.6  Needing vasopressors-Levophed, and vasopressin glucamide account  Blood pressure went down significantly again last night  Echo pending  Pro- BNP going up  Getting albumin  Got   lasix drip and now on hold  Seen by hematology-no evidence of TTP/HUS though  platelet is low- likely sepsis  Cs results pending  Ph 7.317   Xray shows pulmonary edema    Plan:  Agree with albumin   Do supportive treatment  No indication for urgent bicarb drip specifically with the fluid overload and also has  pH above 7.35  Echo pending  There is a chance of  renal replacement therapy this hospitalization but currently potassium is a stable and also made close to 2 L of urine yesterday  Seen in room with RN discussed with patient  Also discussed with ICU staff/attending             Assessment :     CKD Stage 3 AA  With MADELEINE  MADELEINE likely due to hypotension  As CKD 3 at baseline  Baseline creatinine 1.3 on 3/4/2024  -With eGFR of 45 to 46 mL/min  Creatinine 1.9 at the time of consult  UA-8/24-moderate blood, small bilirubin, trace ketones, 30 protein  Renal imaging-none in the system    Acidosis  CO2 16     Combined systolic and diastolic heart failure  Echo: 7/23-EF 45 to 50%, mild to moderate LVH, grade 1 diastolic dysfunction  Has AICD placed    Hypertension  BP:

## 2024-08-08 NOTE — CONSULTS
[facility]   CONSULTATION  [phone]      Attending Physician: Baldev Yagn MD  Reason for Consultation/Chief Complaint: Shock, acute heart failure, ventricular tachycardia    Subjective   History of Present Illness:  Malou Tobin is a 63 y.o. female with a history of chronic LV systolic heart failure secondary to non-ischemic cardiomyopathy, ventricular tachycardia s/p biventricular ICD, CKD, thrombocytopenia, chronic anemia, uterine cancer, and morbid obesity who was admitted following a fall in her home.    The patient is followed by our heart failure clinic and had an LVEF of 20% in 2015.  Invasive coronary angiography at that time demonstrated normal coronary arteries.  Her most recent TTE prior to admission in 7/2023 showed an LVEF to 45-50% with normal right ventricular size and systolic function.    The patient reports that she was walking to her bathroom several days ago and had a fall which she describes as being mechanical in nature.  She had reported feeling lightheaded at the time to the admitting team, but says that she does not believe she lost consciousness.    Since admission, she has become hypotensive and developed a lactic acidosis.  Earlier this morning, she had several minutes of slow VT that was below the limit of detection on her previous device settings.  She has been followed by EP and is now on amiodarone.  Her device settings have now been adjusted as well.  Her WBC count traci to 22.5 today and procalcitonin is 6.58.  She is currently on cefepime and vancomycin.  Lactate is now 7.5.  Most recent Pro-BNP was 16,483.  High sensitivity troponin trended 28-->27-->24.  Creatinine is 2.2 from previous baseline of ~1.3.  Chest x-ray from today showed pulmonary vascular congestion with increased perihilar interstitial markings and a small left pleural effusion.    She is currently on dobutamine at 5 mcg/kg/h, norepinephrine, and vasopressin with BP in the 120/40s.   the HPI.  All other review of symptoms negative unless otherwise noted below.      Objective   PHYSICAL EXAM:    Vitals:    08/08/24 0900   BP: (!) 128/50   Pulse: 100   Resp: 22   Temp: 99.5 °F (37.5 °C)   SpO2: 96%    Weight - Scale: (!) 141.1 kg (311 lb)       General: Morbidly obese adult female lying in bed wearing nasal cannula.  HEENT: Normocephalic, atraumatic, non-icteric, hearing intact, nares normal, mucous membranes moist.  Neck: JVP elevated.  Heart: Tachycardic with regular rhythm. Normal S1 and S2. No murmurs, gallops or rubs.  Lungs: Breath sounds diminished bilaterally.  No wheezes, rales, or rhonchi.  Abdomen: Soft, non-tender. Normoactive bowel sounds. No masses or organomegaly.  Skin: No rashes, wounds, or lesions.  Extremities: No clubbing, cyanosis, or edema.  Neuro: Alert and oriented. No focal deficits noted.      Labs   CBC:   Lab Results   Component Value Date/Time    WBC 22.5 08/08/2024 04:29 AM    RBC 4.23 08/08/2024 04:29 AM    HGB 12.5 08/08/2024 10:15 AM    HCT 37.5 08/08/2024 04:29 AM    MCV 88.5 08/08/2024 04:29 AM    RDW 18.0 08/08/2024 04:29 AM    PLT 38 08/08/2024 04:29 AM     CMP:  Lab Results   Component Value Date/Time     08/08/2024 04:29 AM    K 3.6 08/08/2024 04:29 AM    K 3.5 08/08/2024 12:48 AM     08/08/2024 04:29 AM    CO2 16 08/08/2024 04:29 AM    BUN 48 08/08/2024 04:29 AM    CREATININE 2.2 08/08/2024 04:29 AM    GFRAA 46 08/19/2022 11:09 AM    GFRAA >60 04/08/2013 11:10 AM    AGRATIO 1.4 08/06/2024 07:43 AM    LABGLOM 24 08/08/2024 04:29 AM    LABGLOM 45 03/04/2024 03:22 PM    GLUCOSE 86 08/08/2024 04:29 AM    CALCIUM 7.7 08/08/2024 04:29 AM    BILITOT 1.6 08/07/2024 11:42 AM    ALKPHOS 99 08/06/2024 07:43 AM    AST 25 08/06/2024 07:43 AM    ALT 11 08/06/2024 07:43 AM     PT/INR:  No results found for: \"PTINR\"  HgBA1c:  Lab Results   Component Value Date    LABA1C 5.4 08/19/2022     Lab Results   Component Value Date    CKTOTAL 272 (H) 08/08/2024

## 2024-08-08 NOTE — TELEPHONE ENCOUNTER
ENTRY FOR [facility] MAMMOGRAM RAFFLE    Completion of mammogram before Oct 31st equals 1 entry. Completion same day as order/visit with EFM will equal 2 entries.    Mammogram Completion date: 6/4/2024      RAFFLE TIX #: [id number]      [facility] Raffle will be held on Friday Nov 1st.  4 winners will be selected. (One from each office POD)  If chosen office staff will contact patient to coordinate raffle basket collection.

## 2024-08-08 NOTE — CONSULTS
[address] [address]                              CONSULTATION      PATIENT NAME: DIVYA KELLY                 : 1960  MED REC NO: [id number]                      ROOM: 0221  ACCOUNT NO: [id number]                       ADMIT DATE: 2024  PROVIDER: Jae Judd MD    CARDIAC ELECTROPHYSIOLOGY CONSULTATION    CONSULT DATE: 2024    REFERRING PHYSICIAN:  VIRGILIO SHER      REASON FOR CONSULTATION:  Syncope.    HISTORY OF PRESENT ILLNESS:  The patient is a 63-year-old woman with history of nonischemic cardiomyopathy, atrial arrhythmias, ventricular arrhythmias, and biventricular ICD implantation, was admitted after a fall at home.  The patient reports that she was extremely lightheaded and did ultimately fall.  At the time of evaluation, she was found to have significant arterial hypotension with systolic blood pressures in the low 70 mmHg range.  She was in sinus rhythm at the time of admission on 2024 with atrial synchronous biventricular pacing.  Interrogation of her device demonstrates normal device function.  She did evolve atrial tachycardia beginning at 5:44 a.m. on 2024.  This was terminated with antitachycardia pacing on 2024.  Her thoracic impedance has declined since mid July.  At the time of evaluation on 2024, the patient reports some weakness, but no particular shortness of breath.  Her blood pressure has recovered somewhat with IV fluid administration.  The most recent echo from 2023, demonstrates ejection fraction of 45% to 50% with mild to moderate concentric LVH and segmental wall motion abnormalities.  ECG at the time of admission on 2024, demonstrates sinus rhythm with atrial synchronous ventricular pacing.  Some evidence for intermittent loss of LV capture is evident.  Laboratory evaluation at the time of admission is remarkable for elevated BUN and  murmur or gallop.  The jugular venous pressure is difficult to assess.  ABDOMEN:  Obese, soft, nontender.  EXTREMITIES: No pitting pretibial dependent edema.  There is no cyanosis.  There is no evidence for chronic venous stasis.  SKIN: Otherwise warm and dry without skin rash.    DIAGNOSTIC STUDIES:  12-lead ECG from 08/05/2024, demonstrates sinus rhythm with intermittent atrial pacing biventricular capture is evident.    IMPRESSIONS:    1. Arterial hypotension requiring volume administration and IV pressors.  2. Thrombocytopenia.  3. Acute on chronic systolic congestive heart failure. She has arterial hypotension in the presence of volume overload on device diagnostics.  4. Atrial tachycardia.  The patient presents with near syncope and syncope.  She is found to have profound arterial hypotension at the time of admission.  Device interrogation demonstrates reduced thoracic impedance indicative of volume overload.  She also had atrial tachycardia, which began at 5:44 a.m. on 08/07/2024.  This was terminated with antitachycardia pacing.  The thrombocytopenia is being investigated by Hematology consultants.  In view of the reduced thoracic impedance by device interrogation, would exercise caution with volume administration.  Her blood pressure does seem to be improving in some of her pre-admission heart failure medications might be available at this time.    RECOMMENDATIONS:    1. Continuous ECG telemetry monitoring.  2. Blood pressure support as necessary. Minimize volume administration.   3. 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 Activate atrial ATP.  4. Management of thrombocytopenia per Hematology.  Thanks for the opportunity to assist in the care of this patient.  Please contact me if you have any questions regarding her evaluation.          RICK WATSON MD      D:  08/08/2024 08:37:52     T:  08/08/2024 10:16:59

## 2024-08-08 NOTE — PROGRESS NOTES
Shift: [id number]    Admitting diagnosis: Syncope    Presentation to hospital: presented to [place] ED after near syncopal episode    Surgery: no     Nursing assessment at handoff  unstable    Emergency Contact/POA:Arnel Ramírez   Family updated:    Most recent vitals: BP 95/60   Pulse (!) 108   Temp 99.5 °F (37.5 °C) (Bladder)   Resp 23   Ht 1.6 m (5' 3\")   Wt (!) 141.1 kg (311 lb)   SpO2 98%   BMI 55.09 kg/m²      Rhythm: Paced    NC/HFNC- 4 lpm or CPAP  Respiratory support: - No ventilator support  - CPAP   only when sleeping    Vent days: Day 0    Increased O2 requirements: no    Admission weight Weight - Scale: 127 kg (280 lb)  Today's weight   Wt Readings from Last 1 Encounters:   08/08/24 (!) 141.1 kg (311 lb)         UOP >30ml/hr: no. Nephrology aware    Locke need assessed each shift: yes, critically ill    Restraints: no  Order current and documentation up to date?    Lines/Drains  LDA Insertion Date Discontinued Date Dressing Changes   PIV  8/5     TLC/Midline 8/7     Arterial       Lokce  8/7     Vas Cath      ETT       Surgical drains        Night Shift Hospitalist Interventions    Problem(Brief) Date Time Intervention Physician contacted                                               Drip rates at handoff:    VASOpressin 20 Units in sodium chloride 0.9 % 100 mL infusion 0.04 Units/min (08/08/24 0700)    furosemide (LASIX) 100 mg in sodium chloride 0.9 % 100 mL infusion 5 mg/hr (08/08/24 0700)    sodium bicarbonate 150 mEq in dextrose 5 % 1,000 mL infusion 50 mL/hr at 08/08/24 0700    amiodarone      Followed by    amiodarone      norepinephrine 65 mcg/min (08/08/24 0700)    sodium chloride         Hospital Course Daily Updates:  Admit Day# 1 Nights 8/7  -worsening kidney Fx, increased pressor requirements  Elevated lactic, impending doom  -1amp bicarb  -K+ replaced  -CXR obtained  -lasix and bicarb gtt started  -vasopressin gtt added   -central line and arterial line placed  -Sustained VT from

## 2024-08-08 NOTE — SIGNIFICANT EVENT
I was asked to evaluate the patient.  Send on arrival patient was alert oriented on BiPAP reports that she feels that she is going to die with impending doom.  His BP continued to drop unresponsive to fluid 500 mL -did not give 30 mL/kg due to concern for fluid overload.  Patient continues to be unstable thus initiated Levophed and vasopressin with increased requirement.  Chest x-ray, labs consistent with acidosis and pulmonary edema.  Noted elevated lactic acidosis, empirically on antibiotics.  Patient was started on bicarb drip, Lasix drip.  Central line and arterial line was performed emergently with also consent given by the patient at bedside given she is alert oriented and can take decisions.  Patient then flipped to tachyarrhythmia what appears to be wide QRS ventricular tachycardia.  Patient was given 2 g of calcium, 2 g of magnesium and initial 150 Mg of amiodarone with minimal improvement, a repeat 150 mg of amiodarone bolus improved patient returned back to paced rhythm and BP improved.      Total critical care time caring for this patient with life threatening, unstable organ failure, including direct patient contact, management of life support systems, review of data including imaging and labs, discussions with other team members and physicians at [facility] 47 min so far today, excluding procedures.    Cardiology, critical care consulted.

## 2024-08-09 ENCOUNTER — APPOINTMENT (OUTPATIENT)
Dept: GENERAL RADIOLOGY | Age: 64
DRG: 871 | End: 2024-08-09
Attending: INTERNAL MEDICINE
Payer: MEDICARE

## 2024-08-09 ENCOUNTER — APPOINTMENT (OUTPATIENT)
Dept: GENERAL RADIOLOGY | Age: 64
DRG: 871 | End: 2024-08-09
Payer: MEDICARE

## 2024-08-09 ENCOUNTER — PROCEDURE VISIT (OUTPATIENT)
Dept: CARDIOLOGY CLINIC | Age: 64
End: 2024-08-09
Payer: MEDICARE

## 2024-08-09 VITALS
TEMPERATURE: 100.4 F | OXYGEN SATURATION: 88 % | BODY MASS INDEX: 51.91 KG/M2 | WEIGHT: 293 LBS | SYSTOLIC BLOOD PRESSURE: 91 MMHG | DIASTOLIC BLOOD PRESSURE: 61 MMHG | HEIGHT: 63 IN

## 2024-08-09 DIAGNOSIS — Z95.810 BIVENTRICULAR ICD (IMPLANTABLE CARDIOVERTER-DEFIBRILLATOR) IN PLACE: Primary | ICD-10-CM

## 2024-08-09 DIAGNOSIS — I47.20 VENTRICULAR TACHYCARDIA (HCC): ICD-10-CM

## 2024-08-09 PROBLEM — R57.9 REFRACTORY SHOCK (HCC): Status: ACTIVE | Noted: 2024-08-09

## 2024-08-09 LAB
ANION GAP SERPL CALCULATED.3IONS-SCNC: 19 MMOL/L (ref 3–16)
ANION GAP SERPL CALCULATED.3IONS-SCNC: 23 MMOL/L (ref 3–16)
BASE EXCESS BLDA CALC-SCNC: -13 MMOL/L (ref -3–3)
BASE EXCESS BLDA CALC-SCNC: -14 MMOL/L (ref -3–3)
BASE EXCESS BLDA CALC-SCNC: -16 MMOL/L (ref -3–3)
BASE EXCESS BLDA CALC-SCNC: -18 MMOL/L (ref -3–3)
BUN SERPL-MCNC: 46 MG/DL (ref 7–20)
BUN SERPL-MCNC: 53 MG/DL (ref 7–20)
CA-I BLD-SCNC: 0.97 MMOL/L (ref 1.12–1.32)
CA-I BLD-SCNC: 1 MMOL/L (ref 1.12–1.32)
CA-I BLD-SCNC: 1.01 MMOL/L (ref 1.12–1.32)
CA-I BLD-SCNC: 1.08 MMOL/L (ref 1.12–1.32)
CALCIUM SERPL-MCNC: 7.8 MG/DL (ref 8.3–10.6)
CALCIUM SERPL-MCNC: 8.1 MG/DL (ref 8.3–10.6)
CHLORIDE SERPL-SCNC: 105 MMOL/L (ref 99–110)
CHLORIDE SERPL-SCNC: 108 MMOL/L (ref 99–110)
CO2 BLDA-SCNC: 11 MMOL/L
CO2 BLDA-SCNC: 13 MMOL/L
CO2 BLDA-SCNC: 14 MMOL/L
CO2 BLDA-SCNC: 14 MMOL/L
CO2 SERPL-SCNC: 13 MMOL/L (ref 21–32)
CO2 SERPL-SCNC: 14 MMOL/L (ref 21–32)
CREAT SERPL-MCNC: 1.9 MG/DL (ref 0.6–1.2)
CREAT SERPL-MCNC: 2.3 MG/DL (ref 0.6–1.2)
DEPRECATED RDW RBC AUTO: 18.5 % (ref 12.4–15.4)
GFR SERPLBLD CREATININE-BSD FMLA CKD-EPI: 23 ML/MIN/{1.73_M2}
GFR SERPLBLD CREATININE-BSD FMLA CKD-EPI: 29 ML/MIN/{1.73_M2}
GLUCOSE BLD-MCNC: 61 MG/DL (ref 70–99)
GLUCOSE BLD-MCNC: 90 MG/DL (ref 70–99)
GLUCOSE SERPL-MCNC: 118 MG/DL (ref 70–99)
GLUCOSE SERPL-MCNC: 82 MG/DL (ref 70–99)
HCO3 BLDA-SCNC: 10.4 MMOL/L (ref 21–29)
HCO3 BLDA-SCNC: 12.4 MMOL/L (ref 21–29)
HCO3 BLDA-SCNC: 12.6 MMOL/L (ref 21–29)
HCO3 BLDA-SCNC: 12.8 MMOL/L (ref 21–29)
HCT VFR BLD AUTO: 26 % (ref 36–48)
HCT VFR BLD AUTO: 33.2 % (ref 36–48)
HCT VFR BLD AUTO: 34 % (ref 36–48)
HGB BLD CALC-MCNC: 11.6 GM/DL (ref 12–16)
HGB BLD CALC-MCNC: 8.9 GM/DL (ref 12–16)
HGB BLD-MCNC: 10.3 G/DL (ref 12–16)
LACTATE BLD-SCNC: 11.95 MMOL/L (ref 0.4–2)
LACTATE BLD-SCNC: 18.21 MMOL/L (ref 0.4–2)
LACTATE BLD-SCNC: 8.73 MMOL/L (ref 0.4–2)
LACTATE BLDV-SCNC: 4.4 MMOL/L (ref 0.4–2)
MAGNESIUM SERPL-MCNC: 2.4 MG/DL (ref 1.8–2.4)
MAGNESIUM SERPL-MCNC: 2.6 MG/DL (ref 1.8–2.4)
MAGNESIUM SERPL-MCNC: 2.6 MG/DL (ref 1.8–2.4)
MCH RBC QN AUTO: 27.6 PG (ref 26–34)
MCHC RBC AUTO-ENTMCNC: 31 G/DL (ref 31–36)
MCV RBC AUTO: 89.2 FL (ref 80–100)
NT-PROBNP SERPL-MCNC: ABNORMAL PG/ML (ref 0–124)
PA IGG BLD QL FC: NEGATIVE
PA IGM BLD QL FC: NEGATIVE
PCO2 BLDA: 22.2 MM HG (ref 35–45)
PCO2 BLDA: 26.5 MM HG (ref 35–45)
PCO2 BLDA: 27.2 MM HG (ref 35–45)
PCO2 BLDA: 30.9 MM HG (ref 35–45)
PERFORMED ON: ABNORMAL
PH BLDA: 7.2 [PH] (ref 7.35–7.45)
PH BLDA: 7.21 [PH] (ref 7.35–7.45)
PH BLDA: 7.28 [PH] (ref 7.35–7.45)
PH BLDA: 7.37 [PH] (ref 7.35–7.45)
PH BLDV: 7.13 [PH] (ref 7.35–7.45)
PH BLDV: 7.28 [PH] (ref 7.35–7.45)
PHOSPHATE SERPL-MCNC: 4.3 MG/DL (ref 2.5–4.9)
PLATELET # BLD AUTO: 39 K/UL (ref 135–450)
PMV BLD AUTO: 11.8 FL (ref 5–10.5)
PO2 BLDA: 152.8 MM HG (ref 75–108)
PO2 BLDA: 171.2 MM HG (ref 75–108)
PO2 BLDA: 323.6 MM HG (ref 75–108)
PO2 BLDA: 82 MM HG (ref 75–108)
POC SAMPLE TYPE: ABNORMAL
POTASSIUM BLD-SCNC: 5.3 MMOL/L (ref 3.5–5.1)
POTASSIUM BLD-SCNC: 6.5 MMOL/L (ref 3.5–5.1)
POTASSIUM SERPL-SCNC: 4.5 MMOL/L (ref 3.5–5.1)
POTASSIUM SERPL-SCNC: 5.1 MMOL/L (ref 3.5–5.1)
POTASSIUM SERPL-SCNC: 5.5 MMOL/L (ref 3.5–5.1)
RBC # BLD AUTO: 3.72 M/UL (ref 4–5.2)
SAO2 % BLDA: 100 % (ref 93–100)
SAO2 % BLDA: 96 % (ref 93–100)
SAO2 % BLDA: 99 % (ref 93–100)
SAO2 % BLDA: 99 % (ref 93–100)
SODIUM BLD-SCNC: 141 MMOL/L (ref 136–145)
SODIUM BLD-SCNC: 142 MMOL/L (ref 136–145)
SODIUM SERPL-SCNC: 140 MMOL/L (ref 136–145)
SODIUM SERPL-SCNC: 142 MMOL/L (ref 136–145)
VANCOMYCIN SERPL-MCNC: 17.2 UG/ML
WBC # BLD AUTO: 24.7 K/UL (ref 4–11)

## 2024-08-09 PROCEDURE — 71045 X-RAY EXAM CHEST 1 VIEW: CPT

## 2024-08-09 PROCEDURE — 2700000000 HC OXYGEN THERAPY PER DAY

## 2024-08-09 PROCEDURE — 0BH17EZ INSERTION OF ENDOTRACHEAL AIRWAY INTO TRACHEA, VIA NATURAL OR ARTIFICIAL OPENING: ICD-10-PCS | Performed by: INTERNAL MEDICINE

## 2024-08-09 PROCEDURE — 92950 HEART/LUNG RESUSCITATION CPR: CPT

## 2024-08-09 PROCEDURE — 99291 CRITICAL CARE FIRST HOUR: CPT | Performed by: INTERNAL MEDICINE

## 2024-08-09 PROCEDURE — 84132 ASSAY OF SERUM POTASSIUM: CPT

## 2024-08-09 PROCEDURE — 94660 CPAP INITIATION&MGMT: CPT

## 2024-08-09 PROCEDURE — 5A1D70Z PERFORMANCE OF URINARY FILTRATION, INTERMITTENT, LESS THAN 6 HOURS PER DAY: ICD-10-PCS | Performed by: STUDENT IN AN ORGANIZED HEALTH CARE EDUCATION/TRAINING PROGRAM

## 2024-08-09 PROCEDURE — B543ZZA ULTRASONOGRAPHY OF RIGHT JUGULAR VEINS, GUIDANCE: ICD-10-PCS | Performed by: INTERNAL MEDICINE

## 2024-08-09 PROCEDURE — 2580000003 HC RX 258: Performed by: INTERNAL MEDICINE

## 2024-08-09 PROCEDURE — 6360000002 HC RX W HCPCS: Performed by: STUDENT IN AN ORGANIZED HEALTH CARE EDUCATION/TRAINING PROGRAM

## 2024-08-09 PROCEDURE — 2500000003 HC RX 250 WO HCPCS: Performed by: INTERNAL MEDICINE

## 2024-08-09 PROCEDURE — 85014 HEMATOCRIT: CPT

## 2024-08-09 PROCEDURE — 80048 BASIC METABOLIC PNL TOTAL CA: CPT

## 2024-08-09 PROCEDURE — 6360000002 HC RX W HCPCS: Performed by: INTERNAL MEDICINE

## 2024-08-09 PROCEDURE — 94761 N-INVAS EAR/PLS OXIMETRY MLT: CPT

## 2024-08-09 PROCEDURE — 2500000003 HC RX 250 WO HCPCS: Performed by: STUDENT IN AN ORGANIZED HEALTH CARE EDUCATION/TRAINING PROGRAM

## 2024-08-09 PROCEDURE — 84295 ASSAY OF SERUM SODIUM: CPT

## 2024-08-09 PROCEDURE — 2500000003 HC RX 250 WO HCPCS

## 2024-08-09 PROCEDURE — 6360000002 HC RX W HCPCS: Performed by: NURSE PRACTITIONER

## 2024-08-09 PROCEDURE — 05HM33Z INSERTION OF INFUSION DEVICE INTO RIGHT INTERNAL JUGULAR VEIN, PERCUTANEOUS APPROACH: ICD-10-PCS | Performed by: INTERNAL MEDICINE

## 2024-08-09 PROCEDURE — 92950 HEART/LUNG RESUSCITATION CPR: CPT | Performed by: INTERNAL MEDICINE

## 2024-08-09 PROCEDURE — 83605 ASSAY OF LACTIC ACID: CPT

## 2024-08-09 PROCEDURE — 82947 ASSAY GLUCOSE BLOOD QUANT: CPT

## 2024-08-09 PROCEDURE — 94002 VENT MGMT INPAT INIT DAY: CPT

## 2024-08-09 PROCEDURE — 5A1935Z RESPIRATORY VENTILATION, LESS THAN 24 CONSECUTIVE HOURS: ICD-10-PCS | Performed by: INTERNAL MEDICINE

## 2024-08-09 PROCEDURE — APPNB180 APP NON BILLABLE TIME > 60 MINS: Performed by: NURSE PRACTITIONER

## 2024-08-09 PROCEDURE — 31500 INSERT EMERGENCY AIRWAY: CPT | Performed by: INTERNAL MEDICINE

## 2024-08-09 PROCEDURE — 93284 PRGRMG EVAL IMPLANTABLE DFB: CPT | Performed by: INTERNAL MEDICINE

## 2024-08-09 PROCEDURE — 83735 ASSAY OF MAGNESIUM: CPT

## 2024-08-09 PROCEDURE — 82330 ASSAY OF CALCIUM: CPT

## 2024-08-09 PROCEDURE — P9047 ALBUMIN (HUMAN), 25%, 50ML: HCPCS | Performed by: NURSE PRACTITIONER

## 2024-08-09 PROCEDURE — 85027 COMPLETE CBC AUTOMATED: CPT

## 2024-08-09 PROCEDURE — 82803 BLOOD GASES ANY COMBINATION: CPT

## 2024-08-09 PROCEDURE — 84100 ASSAY OF PHOSPHORUS: CPT

## 2024-08-09 PROCEDURE — 2580000003 HC RX 258: Performed by: STUDENT IN AN ORGANIZED HEALTH CARE EDUCATION/TRAINING PROGRAM

## 2024-08-09 PROCEDURE — 03HC33Z INSERTION OF INFUSION DEVICE INTO LEFT RADIAL ARTERY, PERCUTANEOUS APPROACH: ICD-10-PCS

## 2024-08-09 PROCEDURE — 90945 DIALYSIS ONE EVALUATION: CPT

## 2024-08-09 PROCEDURE — 31500 INSERT EMERGENCY AIRWAY: CPT

## 2024-08-09 PROCEDURE — 36556 INSERT NON-TUNNEL CV CATH: CPT

## 2024-08-09 PROCEDURE — 6370000000 HC RX 637 (ALT 250 FOR IP): Performed by: INTERNAL MEDICINE

## 2024-08-09 PROCEDURE — 5A12012 PERFORMANCE OF CARDIAC OUTPUT, SINGLE, MANUAL: ICD-10-PCS | Performed by: INTERNAL MEDICINE

## 2024-08-09 PROCEDURE — 99292 CRITICAL CARE ADDL 30 MIN: CPT | Performed by: INTERNAL MEDICINE

## 2024-08-09 PROCEDURE — 83880 ASSAY OF NATRIURETIC PEPTIDE: CPT

## 2024-08-09 PROCEDURE — 2580000003 HC RX 258: Performed by: NURSE PRACTITIONER

## 2024-08-09 PROCEDURE — 80202 ASSAY OF VANCOMYCIN: CPT

## 2024-08-09 PROCEDURE — 99233 SBSQ HOSP IP/OBS HIGH 50: CPT | Performed by: INTERNAL MEDICINE

## 2024-08-09 RX ORDER — POTASSIUM CHLORIDE 29.8 MG/ML
20 INJECTION INTRAVENOUS PRN
Status: DISCONTINUED | OUTPATIENT
Start: 2024-08-09 | End: 2024-08-09 | Stop reason: HOSPADM

## 2024-08-09 RX ORDER — FUROSEMIDE 10 MG/ML
80 INJECTION INTRAMUSCULAR; INTRAVENOUS ONCE
Status: COMPLETED | OUTPATIENT
Start: 2024-08-09 | End: 2024-08-09

## 2024-08-09 RX ORDER — HEPARIN SODIUM 1000 [USP'U]/ML
INJECTION, SOLUTION INTRAVENOUS; SUBCUTANEOUS PRN
Status: DISCONTINUED | OUTPATIENT
Start: 2024-08-09 | End: 2024-08-09

## 2024-08-09 RX ORDER — FENTANYL CITRATE-0.9 % NACL/PF 20 MCG/2ML
50 SYRINGE (ML) INTRAVENOUS EVERY 30 MIN PRN
Status: DISCONTINUED | OUTPATIENT
Start: 2024-08-09 | End: 2024-08-09

## 2024-08-09 RX ORDER — FENTANYL CITRATE 50 UG/ML
INJECTION, SOLUTION INTRAMUSCULAR; INTRAVENOUS
Status: COMPLETED | OUTPATIENT
Start: 2024-08-09 | End: 2024-08-09

## 2024-08-09 RX ORDER — MIDAZOLAM HYDROCHLORIDE 1 MG/ML
INJECTION INTRAMUSCULAR; INTRAVENOUS
Status: COMPLETED | OUTPATIENT
Start: 2024-08-09 | End: 2024-08-09

## 2024-08-09 RX ORDER — FENTANYL CITRATE-0.9 % NACL/PF 10 MCG/ML
25-200 PLASTIC BAG, INJECTION (ML) INTRAVENOUS CONTINUOUS
Status: DISCONTINUED | OUTPATIENT
Start: 2024-08-09 | End: 2024-08-09

## 2024-08-09 RX ORDER — FENTANYL CITRATE-0.9 % NACL/PF 20 MCG/2ML
50 SYRINGE (ML) INTRAVENOUS EVERY 30 MIN PRN
Status: DISCONTINUED | OUTPATIENT
Start: 2024-08-09 | End: 2024-08-09 | Stop reason: HOSPADM

## 2024-08-09 RX ORDER — CALCIUM GLUCONATE 20 MG/ML
2000 INJECTION, SOLUTION INTRAVENOUS PRN
Status: DISCONTINUED | OUTPATIENT
Start: 2024-08-09 | End: 2024-08-09 | Stop reason: HOSPADM

## 2024-08-09 RX ORDER — ETOMIDATE 2 MG/ML
INJECTION INTRAVENOUS
Status: COMPLETED | OUTPATIENT
Start: 2024-08-09 | End: 2024-08-09

## 2024-08-09 RX ORDER — LIDOCAINE HYDROCHLORIDE 10 MG/ML
5 INJECTION, SOLUTION EPIDURAL; INFILTRATION; INTRACAUDAL; PERINEURAL ONCE
Status: COMPLETED | OUTPATIENT
Start: 2024-08-09 | End: 2024-08-09

## 2024-08-09 RX ORDER — CALCIUM CHLORIDE 100 MG/ML
INJECTION INTRAVENOUS; INTRAVENTRICULAR
Status: COMPLETED | OUTPATIENT
Start: 2024-08-09 | End: 2024-08-09

## 2024-08-09 RX ORDER — LIDOCAINE HYDROCHLORIDE ANHYDROUS AND DEXTROSE MONOHYDRATE 5; 400 G/100ML; MG/100ML
1 INJECTION, SOLUTION INTRAVENOUS CONTINUOUS
Status: DISCONTINUED | OUTPATIENT
Start: 2024-08-09 | End: 2024-08-09

## 2024-08-09 RX ORDER — LIDOCAINE HCL/PF 100 MG/5ML
150 SYRINGE (ML) INJECTION ONCE
Status: COMPLETED | OUTPATIENT
Start: 2024-08-09 | End: 2024-08-09

## 2024-08-09 RX ORDER — CALCIUM GLUCONATE 20 MG/ML
1000 INJECTION, SOLUTION INTRAVENOUS PRN
Status: DISCONTINUED | OUTPATIENT
Start: 2024-08-09 | End: 2024-08-09 | Stop reason: HOSPADM

## 2024-08-09 RX ORDER — MIDAZOLAM HYDROCHLORIDE 1 MG/ML
2 INJECTION INTRAMUSCULAR; INTRAVENOUS ONCE
Status: COMPLETED | OUTPATIENT
Start: 2024-08-09 | End: 2024-08-09

## 2024-08-09 RX ORDER — FENTANYL CITRATE-0.9 % NACL/PF 10 MCG/ML
25-200 PLASTIC BAG, INJECTION (ML) INTRAVENOUS CONTINUOUS
Status: DISCONTINUED | OUTPATIENT
Start: 2024-08-09 | End: 2024-08-09 | Stop reason: HOSPADM

## 2024-08-09 RX ORDER — CALCIUM CHLORIDE, MAGNESIUM CHLORIDE, DEXTROSE MONOHYDRATE, LACTIC ACID, SODIUM CHLORIDE, SODIUM BICARBONATE AND POTASSIUM CHLORIDE 5.15; 2.03; 22; 5.4; 6.46; 3.09; .157 G/L; G/L; G/L; G/L; G/L; G/L; G/L
INJECTION INTRAVENOUS CONTINUOUS
Status: DISCONTINUED | OUTPATIENT
Start: 2024-08-09 | End: 2024-08-09 | Stop reason: HOSPADM

## 2024-08-09 RX ORDER — EPINEPHRINE IN SOD CHLOR,ISO 1 MG/10 ML
SYRINGE (ML) INTRAVENOUS
Status: COMPLETED | OUTPATIENT
Start: 2024-08-09 | End: 2024-08-09

## 2024-08-09 RX ORDER — HEPARIN SODIUM 1000 [USP'U]/ML
INJECTION, SOLUTION INTRAVENOUS; SUBCUTANEOUS PRN
Status: DISCONTINUED | OUTPATIENT
Start: 2024-08-09 | End: 2024-08-09 | Stop reason: HOSPADM

## 2024-08-09 RX ORDER — MAGNESIUM SULFATE 1 G/100ML
1000 INJECTION INTRAVENOUS PRN
Status: DISCONTINUED | OUTPATIENT
Start: 2024-08-09 | End: 2024-08-09 | Stop reason: HOSPADM

## 2024-08-09 RX ORDER — LIDOCAINE HYDROCHLORIDE 10 MG/ML
INJECTION, SOLUTION INFILTRATION; PERINEURAL
Status: DISCONTINUED
Start: 2024-08-09 | End: 2024-08-09

## 2024-08-09 RX ORDER — MIDAZOLAM HYDROCHLORIDE 1 MG/ML
2 INJECTION INTRAMUSCULAR; INTRAVENOUS
Status: DISCONTINUED | OUTPATIENT
Start: 2024-08-09 | End: 2024-08-09 | Stop reason: HOSPADM

## 2024-08-09 RX ORDER — MINERAL OIL AND WHITE PETROLATUM 150; 830 MG/G; MG/G
OINTMENT OPHTHALMIC
Status: DISCONTINUED | OUTPATIENT
Start: 2024-08-09 | End: 2024-08-09 | Stop reason: HOSPADM

## 2024-08-09 RX ORDER — CHLORHEXIDINE GLUCONATE ORAL RINSE 1.2 MG/ML
15 SOLUTION DENTAL 2 TIMES DAILY
Status: DISCONTINUED | OUTPATIENT
Start: 2024-08-09 | End: 2024-08-09 | Stop reason: HOSPADM

## 2024-08-09 RX ADMIN — Medication: at 08:16

## 2024-08-09 RX ADMIN — Medication 50 MCG: at 13:25

## 2024-08-09 RX ADMIN — SODIUM CHLORIDE 70 MCG/MIN: 9 INJECTION, SOLUTION INTRAVENOUS at 10:02

## 2024-08-09 RX ADMIN — MIDAZOLAM 2 MG: 1 INJECTION INTRAMUSCULAR; INTRAVENOUS at 11:40

## 2024-08-09 RX ADMIN — CASTOR OIL AND BALSAM, PERU: 788; 87 OINTMENT TOPICAL at 10:31

## 2024-08-09 RX ADMIN — Medication: at 03:56

## 2024-08-09 RX ADMIN — ALBUMIN (HUMAN) 25 G: 0.25 INJECTION, SOLUTION INTRAVENOUS at 07:30

## 2024-08-09 RX ADMIN — CALCIUM CHLORIDE 1000 MG: 100 INJECTION, SOLUTION INTRAVENOUS at 12:53

## 2024-08-09 RX ADMIN — WHITE PETROLATUM 57.7 %-MINERAL OIL 31.9 % EYE OINTMENT: at 12:26

## 2024-08-09 RX ADMIN — SODIUM BICARBONATE 100 MEQ: 84 INJECTION INTRAVENOUS at 08:45

## 2024-08-09 RX ADMIN — FENTANYL CITRATE 25 MCG: 50 INJECTION, SOLUTION INTRAMUSCULAR; INTRAVENOUS at 12:50

## 2024-08-09 RX ADMIN — MIDAZOLAM 2 MG: 1 INJECTION INTRAMUSCULAR; INTRAVENOUS at 12:52

## 2024-08-09 RX ADMIN — ETOMIDATE 20 MG: 2 INJECTION INTRAVENOUS at 07:48

## 2024-08-09 RX ADMIN — PHENYLEPHRINE HYDROCHLORIDE 30 MCG/MIN: 10 INJECTION INTRAVENOUS at 09:30

## 2024-08-09 RX ADMIN — MIDAZOLAM 2 MG: 1 INJECTION INTRAMUSCULAR; INTRAVENOUS at 08:55

## 2024-08-09 RX ADMIN — SODIUM BICARBONATE: 84 INJECTION, SOLUTION INTRAVENOUS at 12:12

## 2024-08-09 RX ADMIN — EPINEPHRINE 500 MCG: 0.1 INJECTION, SOLUTION ENDOTRACHEAL; INTRACARDIAC; INTRAVENOUS at 13:00

## 2024-08-09 RX ADMIN — MUPIROCIN: 20 OINTMENT TOPICAL at 10:31

## 2024-08-09 RX ADMIN — HYDROCORTISONE SODIUM SUCCINATE 50 MG: 100 INJECTION, POWDER, FOR SOLUTION INTRAMUSCULAR; INTRAVENOUS at 02:14

## 2024-08-09 RX ADMIN — CALCIUM GLUCONATE 1000 MG: 20 INJECTION, SOLUTION INTRAVENOUS at 10:46

## 2024-08-09 RX ADMIN — CALCIUM CHLORIDE, MAGNESIUM CHLORIDE, DEXTROSE MONOHYDRATE, LACTIC ACID, SODIUM CHLORIDE, SODIUM BICARBONATE AND POTASSIUM CHLORIDE: 5.15; 2.03; 22; 5.4; 6.46; 3.09; .157 INJECTION INTRAVENOUS at 12:09

## 2024-08-09 RX ADMIN — FUROSEMIDE 80 MG: 10 INJECTION, SOLUTION INTRAMUSCULAR; INTRAVENOUS at 00:35

## 2024-08-09 RX ADMIN — VANCOMYCIN HYDROCHLORIDE 1250 MG: 10 INJECTION, POWDER, LYOPHILIZED, FOR SOLUTION INTRAVENOUS at 08:48

## 2024-08-09 RX ADMIN — LEVOTHYROXINE SODIUM 175 MCG: 0.03 TABLET ORAL at 10:43

## 2024-08-09 RX ADMIN — EPINEPHRINE 1 MG: 0.1 INJECTION, SOLUTION ENDOTRACHEAL; INTRACARDIAC; INTRAVENOUS at 13:08

## 2024-08-09 RX ADMIN — FAMOTIDINE 20 MG: 10 INJECTION, SOLUTION INTRAVENOUS at 09:42

## 2024-08-09 RX ADMIN — AMIODARONE HYDROCHLORIDE 0.5 MG/MIN: 50 INJECTION, SOLUTION INTRAVENOUS at 04:38

## 2024-08-09 RX ADMIN — ALLOPURINOL 300 MG: 300 TABLET ORAL at 10:43

## 2024-08-09 RX ADMIN — SODIUM BICARBONATE 100 MEQ: 84 INJECTION, SOLUTION INTRAVENOUS at 13:00

## 2024-08-09 RX ADMIN — SODIUM BICARBONATE 50 MEQ: 84 INJECTION INTRAVENOUS at 00:34

## 2024-08-09 RX ADMIN — SODIUM CHLORIDE, PRESERVATIVE FREE 10 ML: 5 INJECTION INTRAVENOUS at 09:19

## 2024-08-09 RX ADMIN — SODIUM BICARBONATE 50 MEQ: 84 INJECTION, SOLUTION INTRAVENOUS at 12:45

## 2024-08-09 RX ADMIN — LIDOCAINE HYDROCHLORIDE 1 MG/MIN: 4 INJECTION, SOLUTION INTRAVENOUS at 06:21

## 2024-08-09 RX ADMIN — WHITE PETROLATUM 57.7 %-MINERAL OIL 31.9 % EYE OINTMENT: at 10:44

## 2024-08-09 RX ADMIN — MIDAZOLAM 2 MG: 1 INJECTION INTRAMUSCULAR; INTRAVENOUS at 07:44

## 2024-08-09 RX ADMIN — LIDOCAINE HYDROCHLORIDE 5 ML: 10 INJECTION, SOLUTION EPIDURAL; INFILTRATION; INTRACAUDAL; PERINEURAL at 01:30

## 2024-08-09 RX ADMIN — MIDAZOLAM 2 MG: 1 INJECTION INTRAMUSCULAR; INTRAVENOUS at 13:28

## 2024-08-09 RX ADMIN — HYDROCORTISONE SODIUM SUCCINATE 50 MG: 100 INJECTION, POWDER, FOR SOLUTION INTRAMUSCULAR; INTRAVENOUS at 07:40

## 2024-08-09 RX ADMIN — VASOPRESSIN 0.03 UNITS/MIN: 20 INJECTION INTRAVENOUS at 11:54

## 2024-08-09 RX ADMIN — CHLORHEXIDINE GLUCONATE 15 ML: 1.2 RINSE ORAL at 10:34

## 2024-08-09 RX ADMIN — Medication: at 08:15

## 2024-08-09 RX ADMIN — SODIUM CHLORIDE 15 MCG/MIN: 9 INJECTION, SOLUTION INTRAVENOUS at 00:19

## 2024-08-09 RX ADMIN — VASOPRESSIN 0.03 UNITS/MIN: 20 INJECTION INTRAVENOUS at 00:09

## 2024-08-09 RX ADMIN — Medication: at 03:11

## 2024-08-09 RX ADMIN — LIDOCAINE HYDROCHLORIDE 150 MG: 20 INJECTION INTRAVENOUS at 06:16

## 2024-08-09 RX ADMIN — Medication 25 MCG/HR: at 08:03

## 2024-08-09 ASSESSMENT — PULMONARY FUNCTION TESTS
PIF_VALUE: 23
PIF_VALUE: 28
PIF_VALUE: 22
PIF_VALUE: 23
PIF_VALUE: 22
PIF_VALUE: 27

## 2024-08-09 ASSESSMENT — PAIN SCALES - GENERAL: PAINLEVEL_OUTOF10: 0

## 2024-08-09 NOTE — PROGRESS NOTES
08/09/24 0759   Oxygen Therapy/Pulse Ox   O2 Therapy Oxygen   $Oxygen $Daily Charge   O2 Device Ventilator   FiO2  100 %   Pulse 89   Respirations 22   $Pulse Oximeter $Spot check (multiple/continuous)   ETCO2 (mmHg) 25 mmHg

## 2024-08-09 NOTE — PROCEDURES
PROCEDURE NOTE  Date: 8/9/2024   Name: Malou Tobin  YOB: 1960    Insert Arterial Line    Date/Time: 8/9/2024 6:59 AM    Performed by: Chaya Landa MD  Authorized by: Chaya Monroy DO  Consent: The procedure was performed in an emergent situation.  Patient identity confirmed: arm band  Time out: Immediately prior to procedure a \"time out\" was called to verify the correct patient, procedure, equipment, support staff and site/side marked as required.  Preparation: Patient was prepped and draped in the usual sterile fashion.  Indications: hemodynamic monitoring  Location: left radial  Needle gauge: 18  Number of attempts: 3  Post-procedure: dressing applied  Patient tolerance: patient tolerated the procedure well with no immediate complications

## 2024-08-09 NOTE — PLAN OF CARE
Ortho Plan of Care  Ortho has been following peripherally through chart review to determine patient's surgical statues regarding right distal humerus fracture  Pt bacteremic, currently intubated and on CRRT  At this time would recommend nonoperative management of the right distal humerus fracture.   Recommend pt to remain in sling, OK for gentle wrist and progressive passive/active assisted elbow range of motion as dictated by pain.   Will have pt follow up in the outpatient office after discharge from hospital for transition to hinged brace, or if pt remains hospitalized in a lower level of care please re consult ortho for brace ordering. DCP updated, spoke with Pt's brother Arnel who is in agreement with plan. Ortho will sign off.    Katie Solorio PA-C

## 2024-08-09 NOTE — PROCEDURES
PROCEDURE NOTE  Date: 8/9/2024   Name: Malou Tobin  YOB: 1960    Intubation    Date/Time: 8/9/2024 8:04 AM    Performed by: Eugenie Li MD  Authorized by: Eugenie Li MD  Consent: The procedure was performed in an emergent situation. Verbal consent obtained.  Patient understanding: patient states understanding of the procedure being performed  Indications: respiratory distress, respiratory failure, hypoxemia and airway protection  Intubation method: video-assisted  Patient status: sedated  Pretreatment medications: midazolam  Sedatives: etomidate  Laryngoscope size: Zavala 3  Tube size: 8.0 mm  Tube type: cuffed  Number of attempts: 1  Cords visualized: yes  Post-procedure assessment: chest rise  Breath sounds: absent over the epigastrium  ETT to lip: 22 cm  Tube secured with: ETT perez

## 2024-08-09 NOTE — PROGRESS NOTES
Ph: [phone], Fax: [phone]           [facility].[employer]               Reason for admission:                 Fall/diarrhea/weakness/pancytopenia/MADELEINE/hypotension    Brief Summary :     Malou Tobin is being seen by nephrology for fall she was found to have fracture in the right elbow nondisplaced.  Her course of hospitalization is complicated by MADELEINE and also hypotension needing transfer to our intensive care unit for Levophed drip.  Also has pancytopenia being seen by hematology      Interval History and plan:         Her condition deteriorated overnight  Has had worsening metabolic acidosis and MADELEINE worsened and urine output dropped significantly  Had a Vas-Cath placed last night and this morning  She needed intubation and mechanical ventilation       Has worsening acidosis      Plan:  On CRRT  Using prismasate 4/2.5  Continue on that  Trying to keep even for now and eventually will need to take fluid off  On 2 vasopressors trying to wean down  Seen in room with RN and discussed plans    Will switch post filter to bicarb   Monitor labs every 4 hours with above changes       Assessment :     CKD Stage 3 AA  With MADELEINE  Started CRRT on 8/9/2024  MADELEINE likely due to hypotension  As CKD 3 at baseline  Baseline creatinine 1.3 on 3/4/2024  -With eGFR of 45 to 46 mL/min  Creatinine 1.9 at the time of consult  UA-8/24-moderate blood, small bilirubin, trace ketones, 30 protein  Renal imaging-none in the system    Acidosis  CO2 16     Combined systolic and diastolic heart failure  Echo: 7/23-EF 45 to 50%, mild to moderate LVH, grade 1 diastolic dysfunction  Has AICD placed    Hypertension  BP: ()/(56-81) Pulse:  []   Blood pressure very low 77 at the time of consult on Levophed drip       History of uterine cancer              [facility] would like to thank Baldev Yang MD   for opportunity to serve this patient      Please call with questions at-   24 Hrs Answering service

## 2024-08-09 NOTE — PROGRESS NOTES
08/09/24 0757   Patient Observation   Pulse 89   Respirations 24   SpO2 100 %   ETCO2 (mmHg) 24 mmHg   Observations ambu @ bedside   Breath Sounds   Right Upper Lobe Diminished   Right Middle Lobe Diminished   Right Lower Lobe Diminished   Left Upper Lobe Diminished   Left Lower Lobe Diminished   Vent Information   Ventilator Initiate Yes   Vent Mode AC/VC   Ventilator Settings   FiO2  100 %   Vt (Set, mL) 450 mL   Resp Rate (Set) 25 bpm   PEEP/CPAP (cmH2O) 8   Vent Patient Data (Readings)   Vt (Measured) 895 mL   Peak Inspiratory Pressure (cmH2O) 27 cmH2O   Rate Measured 29 br/min   Minute Volume (L/min) 15.6 Liters   Mean Airway Pressure (cmH2O) 13 cmH20   Plateau Pressure (cm H2O) 0 cm H2O   Driving Pressure -8   I:E Ratio 1:1.70   Vent Alarm Settings   High Pressure (cmH2O) 45 cmH2O   Low Minute Volume (lpm) 2 L/min   RR High (bpm) 40 br/min   ETT    Placement Date/Time: 08/09/24 0745   Present on Admission/Arrival: No  Placed By: Licensed provider  Airway Tube Size: 8 mm  Location: Oral  Secured At: 23 cm  Measured From: Lips   $ Intubation Emergent  $ Yes   Secured At 23 cm   Measured From Lips   ETT Placement Center   Secured By Commercial tube perez   Site Assessment Dry;Cool   Cuff Pressure 30 cm H2O

## 2024-08-09 NOTE — PROGRESS NOTES
08/09/24 0306   NIV Type   $NIV $Daily Charge   NIV Started/Stopped On   Equipment Type v60   Mode Bilevel   Mask Type Full face mask   Mask Size Medium   Assessment   Pulse 96   Respirations 23   SpO2 98 %   Settings/Measurements   IPAP 12 cmH20   CPAP/EPAP 5 cmH2O   Vt (Measured) 516 mL   Rate Ordered 16   FiO2  35 %   Mask Leak (lpm) 0 lpm   Patient's Home Machine No   Alarm Settings   Alarms On Y   Low Pressure (cmH2O) 6 cmH2O   High Pressure (cmH2O) 30 cmH2O

## 2024-08-09 NOTE — PROGRESS NOTES
08/09/24 1122   Encounter Summary   Encounter Overview/Reason Loneliness/Social Isolation   Service Provided For Patient  (Attempted visit. Last night pateint intubated, today is sedated and not alert. Recommend continued spiritual care.)   Referral/Consult From Multi-disciplinary team   Last Encounter  08/09/24   Complexity of Encounter Low   Begin Time 1120   End Time  1125   Total Time Calculated 5 min   Spiritual/Emotional needs   Type Spiritual Support   Assessment/Intervention/Outcome   Assessment Unable to assess      Margarita Hernandez EdD, NEAL COOPER ([facility])    Thank you for consulting Spiritual Health    If you would like a 's presence for emotional, spiritual, grief or comfort care,   please dial \"[phone]\" and ask for the  on-call to be paged.    For help with Advanced Care Planning, Power of  for Healthcare or Living Will forms, you may also call us directly:    [phone]) [place]  [phone]) [place]  [phone]) Outpatient    [facility]

## 2024-08-09 NOTE — PROGRESS NOTES
0715: Spoke with Palmer Jain NP regarding patient's Ventricular Tachycardia. NP states he will put a page out to Dr. Rivera with Electrophyshiology.    0721: Dr. Rivera returned page. Verbal orders received for 300mg Amiodarone push and that he would reach out to Dr. Judd since he is familiar with patient.     Electronically signed by Emelyn Madden RN on 8/9/2024 at 7:55 AM

## 2024-08-09 NOTE — PROGRESS NOTES
08/08/24 @ 0171 Nephrology paged per FLORIDALMA Dunaway;'s request  [phone] Hospital or Facility: [facility] From: Frank Garcia RE: DIVYA KELLY 1960 RM: [id number] Please contact FLORIDALMA Mai to discuss worsening lab results Need Callback: NEEDS CALLBACK CCU URGENT

## 2024-08-09 NOTE — PROGRESS NOTES
2140- Pt had several runs of sustained Vtach (printed in paper chart). Asymptomatic and MAP remained greater than 65. After several AICD fires, pt returned to Vpaced rhythm present prior. Notified Dr Monroy. Labs drawn. Pt resting comfortably at this time in no distress.

## 2024-08-09 NOTE — PROGRESS NOTES
RN called with worsening metabolic acidosis.   Reviewed chart and discussed with patient.   Patient has MADELEINE and urine output is dropping and 200 ml today so far  On pressors.   On BiPAP with 35 % FiO2. And reviewed images and pt is volume overloaded already and cannot given bicarbonate infusion   Will need to start CRRT to stabilize condition and I discussed with patient over phone with her nurse and discussed risks and benefits and alternatives and patient agreeable to start RRT.   Requested in house MD Dr Monroy and he will kindly help to place dialysis catheter  CRRT orders placed.   Ordered bicarbonate push with IV Lasix in the meantime.     Discussed with nurse   Please call us in case of any questions overnight.

## 2024-08-09 NOTE — PROGRESS NOTES
[facility]   PROGRESS NOTE  [phone]      Attending Physician: Baldev Yang MD  Reason for Consultation/Chief Complaint: Shock, acute heart failure, ventricular tachycardia     Subjective     Patient developed atrial fibrillation with RVR earlier this morning associated with worsening hypotension.  Electrocardioversion was performed, and following this, the patient developed ventricular fibrillation.  She then underwent defibrillation and is now in a paced rhythm.    She is now intubated and has been started on CRRT.    She is currently on IV Levophed at 75 mcg/min and vasopressin at 0.03 units/min.      CURRENT Medications:  prismaSATE BGK 4/2.5 dialysis solution, Continuous  prismaSATE BGK 4/2.5 dialysis solution, Continuous  prismaSATE BGK 4/2.5 dialysis solution, Continuous  potassium chloride 20 mEq/50 mL IVPB (Central Line), PRN  magnesium sulfate 1000 mg in dextrose 5% 100 mL IVPB, PRN  calcium gluconate 1,000 mg in sodium chloride 50 mL, PRN   Or  calcium gluconate 2,000 mg in sodium chloride 100 mL, PRN   Or  calcium gluconate 3,000 mg in sodium chloride 0.9 % 100 mL IVPB, PRN   Or  calcium gluconate 4,000 mg in sodium chloride 0.9 % 100 mL IVPB, PRN  sodium phosphate 6 mmol in sodium chloride 0.9 % 250 mL IVPB, PRN   Or  sodium phosphate 12 mmol in sodium chloride 0.9 % 250 mL IVPB, PRN   Or  sodium phosphate 18 mmol in sodium chloride 0.9 % 500 mL IVPB, PRN   Or  sodium phosphate 24 mmol in sodium chloride 0.9 % 500 mL IVPB, PRN  heparin (porcine) injection 1,100-1,900 Units, PRN   And  heparin (porcine) injection 1,100-1,900 Units, PRN  chlorhexidine (PERIDEX) 0.12 % solution 15 mL, BID  famotidine (PEPCID) 20 mg in sodium chloride (PF) 0.9 % 10 mL injection, Daily  lubrifresh P.M. (artificial tears) ophthalmic ointment, 6 times per day  phenylephrine (EDUARDO-SYNEPHRINE) 100 mg in sodium chloride 0.9 % 250 mL infusion, Continuous  norepinephrine (LEVOPHED) 32 mg in sodium chloride  morning.  3.  Her hypotension appears to be primarily mediated by sepsis as above, and given this and her recent arrhythmias, would recommend that she remain off dobutamine.  4. Titrate vasopressors as able to maintain MAP >65.  5. Continue IV amiodarone infusion.  6. Will defer initiation of anticoagulation to EP team given relatively severe thrombocytopenia.  7. Not currently able to tolerate addition of beta-blocker or ACE inhibitor/ARB.  8. Remains on IV vancomycin.  9. Continue to monitor on telemetry and repeat EKG for any rhythm changes.  10. Maintain K >4 and Mg >2.  11. Continue to trend CBCs, BMP, magnesium, and lactate.        Critical Care   Due to the high probability of clinically significant life threating deterioration of the patient's condition that required my urgent intervention, a total critical care time of >35 minutes was used. This time excludes any time that may have been spent performing procedures. This includes, but is not limited to, vital sign monitoring, telemetry monitoring, continuous pulse oximety, IV medication, clinical response to the IV medications, documentation time, consultation time, interpretation of lab data, review of nursing notes and old record review.         Thank you for allowing us to participate in the care of Maolu Tobin. Please call me with any questions [phone].    Jewel White DO  [facility]  [phone] [place] Office  [phone] [place] Office  8/9/2024 11:11 AM    I will address the patient's cardiac risk factors and adjusted pharmacologic treatment as needed. In addition, I have reinforced the need for patient directed risk factor modification.  All questions and concerns were addressed to the patient/family. Alternatives to my treatment were discussed. The note was completed using EMR. Every effort was made to ensure accuracy; however, inadvertent computerized transcription errors may be present.

## 2024-08-09 NOTE — SIGNIFICANT EVENT
Called to bedside by RN with worsening hemodynamic status, dropping BP to 60's while on levo/vaso/sherry and not responding despite max doses.  Worsening acidosis despite CRRT and fluids adjusted per nephrology.    Call placed to patient's brother, Sergei and updated on worsening clinical status.  Attempted to CV x 2 without improvement, trial of epinephrine with minimal response.  RRT stopped due to BP in the 50's and patient not tolerating.  Family wanted to continue full aggressive measures including CPR although understanding that patient's condition was grave and unlikely to survive if unable to support BP to restart RRT and correct acidosis.    1308 patient lost pulse, CPR initiated and epinephrine given  1310 ROSC achieved but BP again began to drop.    Drs Guillermo, Cindy, Trey all present at the bedside and further attempts including bicarb, calcium, epinephrine given without improvement.    Patient's brother notified of continued decline and decision to stop further futile resuscitation measures and provide supportive comfort measures after discussion with medical team.    [place] present at the bedside.  RN's remained in the room with patient until TOD at 1345.    Tracy Fuentes, APRN-CNP

## 2024-08-09 NOTE — DISCHARGE SUMMARY
Hospital Medicine Discharge Summary    Patient: Malou Tobin   : 1960     Primary Care Provider: Collins Lee DO  Admitting Provider: Baldev Vergara MD  Discharge Provider: BALDEV VERGARA MD     Admit Date: 2024   Disposition:      Date of Death: 2024   Time of Death: 1345    Immediate Cause of Death: Septic shock  Underlying Conditions: Congestive heart failure, renal failure  Other Contributing Conditions: Respiratory failure    Discharge Diagnoses:    Active Hospital Problems    Diagnosis     Refractory shock (HCC) [R57.9]     Acute systolic heart failure (HCC) [I50.21]     On amiodarone therapy [Z79.899]     Thrombocytopenia (HCC) [D69.6]     Nonischemic cardiomyopathy (HCC) [I42.8]     Acute kidney injury superimposed on CKD (HCC) [N17.9, N18.9]     Acute on chronic systolic heart failure (HCC) [I50.23]     Non-ischemic cardiomyopathy (HCC) [I42.8]     Staphylococcus aureus bacteremia [R78.81, B95.61]     Syncope, near [R55]     Ventricular tachycardia (HCC) [I47.20]     Elevated lactic acid level [R79.89]     Septic shock (HCC) [A41.9, R65.21]     Elevated brain natriuretic peptide (BNP) level [R79.89]     MADELEINE (acute kidney injury) (HCC) [N17.9]        Presenting Admission History:      63 y.o. female with PMH significant for CHF/systolic, non-ischemic dilated cardiomyopathy, S/P BiV-AICD 2016, pulmonary HTN, LBBB, depression, h/o uterine cancer, hypothyroidism, osteoarthritis.  Of pertinence is that she sustained a fall about 3 weeks and injured her right elbow and had nondisplaced fracture.  She has had a splint on since that time and is supposed follow-up with orthopedics     She  presented to [facility] ED with complaint of weakness, lightheadedness.   She stated she was walking in her house felt weak and lightheaded and fell to her knees.  She denied any loss of consciousness.  She denied any significant injury due to the fall.  She did give history that for the past few  days prior to admission she did have some diarrhea and decreased oral intake.  She was continuing to take her diuretics and antihypertensive medications.  Weakness and lightheadedness was progressed for 2 to 3 days prior to this admission.  She denied chest pain she denied any shortness of breath.     Her hospital course has been complicated by hypotension and she was transferred to the ICU to receive vasopressor support.  Blood cultures were collected on 8/7/2024.  Within 24 hours cultures were positive for gram-positive cocci in clusters present Staphylococcus.  She wasbeen started on 8/8/2024 on IV vancomycin     Cardiology, pulmonary critical care, nephrology, infectious disease and oncology were consulted     Assessment/Plan:    Bacteremia : As noted above blood culture collected 8/7/2024 do reveal gram-positive cocci resembling Staphylococcus.  She was started on IV vancomycin on 8/8/2024.  Will follow cultures for sensitivity.  Transthoracic echocardiogram has been completed, no vegetations noted.  She does have a ICD in place.  Infectious disease also consulted for further input.      Acute on chronic CHF/systolic :      She has been  maintained on a regimen of beta-blocker/metoprolol 100 mg daily, torsemide 10 mg daily, ACE/lisinopril and Jardiance.  Her last echocardiogram was July/2023 and a repeat echocardiogram has been ordered for this admission  .  Will continue to follow volume status, continue medical therapy.       Her blood pressure has been low and she is quiring vasopressor support.  Try to minimize volume administration due to her CHF  She informs me she usually runs her blood pressure /50-60.  We are holding her metoprolol ACE inhibitor and torsemide at this time.  Cardiology consulted and pulmonary was also consulted and is following.        Right elbow fracture : Noted.  She is wearing a sling.  She was supposed to follow-up with orthopedics and did consult.  X-ray of the right elbow

## 2024-08-09 NOTE — CARE COORDINATION
CM update note  PD #3  Sepsis/CHF. Followed by Hospital Medicine,Nephrology, Pulmonology, Cardiology, Hem/Onc and ID.  Spoke with patient yesterday to obtain assessment & PMH.  Her brother lives with her in one story home. She has had multiple falls recently, and has sustained a  fx rt elbow and some rib fractures.She agreed during our discussion, that she would likely need SNF rehab, before returning home.  Obviously, her condition has deteriorated since this discussion.    CM will continue to follow, and assist with dc needs when appropriate.    Karmen Gonzales, RN

## 2024-08-09 NOTE — PROGRESS NOTES
added.  Patient required multiple cardioversions and epinephrine pushes.  She went into cardiac arrest with temporal return of spontaneous circulation.  Lactic acid went up to 18 and patient continued to worsen despite maximal resuscitative efforts.  Decision was made to stop aggressive futile efforts.  Family updated about patient's admission.    LOS: Hospital Day: 4     Code: No code    Critical care time spent on this patient, excluding procedures time, is ~110 minutes.  Critical care time was spent on reviewing overnight events, interpreting labs and imaging, direct bedside exam, ventilator/breathing assistance device/drip review and adjustment, running multidisciplinary rounds and coordinating critical care plan with other providers.        Subjective/Objective           CC/Reason for Consult: Shock        HPI: August 7, 2024  63-year-old female patient with PMH of HTN, HFrEF, dilated cardiomyopathy, BiV ICD since 2016, pHTN, sleep apnea on CPAP of 14, left bundle branch block, bipolar disorder, uterine cancer, hypothyroidism, gastric bypass surgery, recent fall with right elbow injury requiring splinting who presented to [facility] on August 5 with generalized weakness and fatigue and falling on her knees due to lightheadedness.  She had no head trauma or loss of consciousness.  Patient was admitted with near syncope initially to the floor and was started on medications for heart failure.  Patient had acute kidney injury with elevated proBNP.  She received IV fluids at [place] ED for transfer to [place].    Patient was transferred to the ICU on August 7 after a rapid response was called for hypotension and altered mental status.    Patient was resuscitated with more volume and started on Levophed and that helped her mental status and hemodynamics.    Interval History: 8/9/2024  Patient remained unstable overnight with V. tach, A-fib RVR, worsening kidney function requiring CRRT declining mental  elbow  Neurological: No focal deficit..       ________________________________________________________  Electronically signed by:  Eugenie Li MD,FACP    8/9/2024    2:47 PM.     [facility]  [address], [place] [address]   Phone (office): [phone]

## 2024-08-09 NOTE — PROGRESS NOTES
08/08/24 2015   NIV Type   NIV Started/Stopped On   Equipment Type V60   Mode Bilevel   Mask Type Full face mask   Mask Size Medium   Assessment   Pulse (!) 101   Respirations 23   SpO2 95 %   Breath Sounds   Respiratory Pattern Regular   Breath Sounds Bilateral Diminished   Settings/Measurements   PIP Observed 14 cm H20   IPAP 12 cmH20   CPAP/EPAP 5 cmH2O   Vt (Measured) 900 mL   Rate Ordered 16   FiO2  35 %   I Time/ I Time % 1 s   Minute Volume (L/min) 20 Liters   Mask Leak (lpm) 0 lpm   Patient's Home Machine No   Alarm Settings   Alarms On Y   Low Pressure (cmH2O) 8 cmH2O   High Pressure (cmH2O) 30 cmH2O   Apnea (secs) 20 secs   RR Low (bpm) 6   RR High (bpm) 40 br/min   Oxygen Therapy/Pulse Ox   O2 Therapy Oxygen   O2 Device PAP (positive airway pressure)

## 2024-08-09 NOTE — PROGRESS NOTES
1240- Dr. Li and Tracy NP called to bedside due to worsening acidosis/ hemodynamics.     Hospitalist, Dr. Yang, cardiology Dr. White, and intensive care Dr. Li all at bedside.     Pt required multiple cardioversions and was pre medicated with versed and fentanyl bolus  per Dr. Li.     1308 CPR started      2 amps of bicarb, calcium, and epinephrine administered per Dr. Li orders     Pt condition worsened despite maximal resuscitative efforts. Decision was made to stop aggressive efforts after Tracy NP spoke to Arnel (brother)

## 2024-08-09 NOTE — SIGNIFICANT EVENT
Paged to evaluate the patient,    Patient continues to be alert oriented however patient continues to have worsening acidosis in the setting of MADELEINE and his fluid overtly overloaded.  Nephrology,appreciated was consulted, recommended CRRT.  Vas-Cath was placed.  Please see procedure for further details.  Patient tolerated CRRT however during the night patient is arterial line in the femoral was dislodged.  A another arterial line was placed on the left wrist.  Patient continues to be medically unstable requiring pressor support to tolerate CRRT in addition continues to doing ventricular tachycardia.  Cardiology, appreciated was consulted recommended lidocaine          Due to the immediate potential for life-threatening deterioration due to shock, acidosis, I spent 42 minutes providing critical care.  This time is excluding time spent performing procedures.

## 2024-08-09 NOTE — PROCEDURES
PROCEDURE NOTE  Date: 8/9/2024   Name: Malou Tobin  YOB: 1960    INDICATION: Acidosis, fluid overload, MADELEINE    ATTENDING PHYSICIAN:     PROCEDURE: Dialysis vascular cath placement right internal jugular vein    CONSENT:  The procedure, risks, and benefits were explained to patient and consent was obtained.    TIME OUT:  The patient and procedure were properly identified with the nurse in the room.    STERILE PREP:  Full maximum sterile field/barrier technique was followed (with cap and mask and sterile gown and sterile gloves and large sterile sheet and hand hygeine and 2% chlorhexidine for cutaneous antisepsis).    LOCAL ANESTHETIC:   Aqueous lidocaine 5cc    PROCEDURE:   Using direct ultrasound guidance, a right internal jugular vein central venous catheter was placed using a modified seldinger technique without difficulty. Excellent return of non-pulsatile, dark venous blood from all lumens. All lumens were flushed with normal saline. Minimal bleeding occurred and there was hemostasis prior to conclusion of procedure. Catheter was sutured in place and a sterile dressing with biopatch was placed.    COMPLICATIONS:  None  Estimated blood loss: 10 cc    CHEST XRAY REVIEWED: Post-procedure chest x-ray is pending at this time

## 2024-08-09 NOTE — PROGRESS NOTES
08/09/24 1618   Encounter Summary   Encounter Overview/Reason Grief, Loss, and Adjustments;Crisis   Service Provided For Patient;Family   Referral/Consult From Nurse   Support System Family members;[place] community   Last Encounter  08/09/24  ( ministered with family following patient's death)   Begin Time 1315   End Time  1600   Total Time Calculated 165 min   Grief, Loss, and Adjustments   Type Grief and loss   Assessment/Intervention/Outcome   Intervention Sustaining Presence/Ministry of presence;Prayer (assurance of)/[place];Grief Care

## 2024-08-09 NOTE — PROGRESS NOTES
08/08/24 9561   NIV Type   NIV Started/Stopped On   Equipment Type v60   Mode Bilevel   Mask Type Full face mask   Mask Size Medium   Assessment   Pulse 81   Respirations 25   SpO2 97 %   Settings/Measurements   IPAP 12 cmH20   CPAP/EPAP 5 cmH2O   Vt (Measured) 654 mL   Rate Ordered 16   FiO2  35 %   Mask Leak (lpm) 4 lpm   Patient's Home Machine No   Alarm Settings   Alarms On Y   Low Pressure (cmH2O) 6 cmH2O   High Pressure (cmH2O) 30 cmH2O   Patient Observation   Observations protec gel on nose and cheeks

## 2024-08-09 NOTE — CODE DOCUMENTATION
Patient became progressively unstable again this afternoon despite maximal support with multiple vasopressors, volume expansion with bicarbonate, calcium, albumin, CRRT.  Blood gas showed critical acidosis with lactic acid of 18.  Epinephrine was given  mics and then 500 mics multiple times to help support blood pressure with temporary response for less than a minute.    Patient was in V. tach and received 3 cardioversions with 200 J.  She converted after the second to V-fib and after the third back into VT tach again.    Patient became pulseless and resuscitation was done for a few minutes.  Despite maximal dose of 4 vasopressors the patient could not stay stable on continue to worsen.  Family was updated about the unsurvivable critical illness with multiorgan failure and the understand.  We provided the patient with maximal meaningful support possible with no response unfortunately.    Infectious disease, cardiology and EP services as well as the primary hospitalist also attended resuscitation efforts and felt comfortable with the plan of stopping resuscitation due to refractory shock and unlikely chance for meaningful recovery.

## 2024-08-09 NOTE — PROGRESS NOTES
Shift: [id number]    Admitting diagnosis: Syncope    Presentation to hospital: presented to [facility] ED after near syncopal episode    Surgery: no     Nursing assessment at handoff  unstable    Emergency Contact/POA:Arnel Elmore   Family updated: yes throughout shift     Most recent vitals: BP 95/81   Pulse (!) 137   Temp 98.5 °F (36.9 °C) (Bladder)   Resp 25   Ht 1.6 m (5' 3\")   Wt (!) 137.5 kg (303 lb 2.1 oz)   SpO2 96%   BMI 53.70 kg/m²      Rhythm: Paced/Vtach    NC/HFNC- 6 lpm or CPAP  Respiratory support: - No ventilator support  - CPAP     Vent days: Day 0    Increased O2 requirements: no    Admission weight Weight - Scale: 127 kg (280 lb)  Today's weight   Wt Readings from Last 1 Encounters:   08/09/24 (!) 137.5 kg (303 lb 2.1 oz)         UOP >30ml/hr: no. CRRT  Locke need assessed each shift: yes, critically ill    Restraints: no  Order current and documentation up to date?    Lines/Drains  LDA Insertion Date Discontinued Date Dressing Changes   PIV  8/5     TLC/Midline 8/7 8/8     Arterial  8/9     Locke  8/7     Vas Cath      ETT       Surgical drains        Night Shift Hospitalist Interventions    Problem(Brief) Date Time Intervention Physician contacted                                               Drip rates at handoff:    prismaSATE BGK 4/2.5 1,000 mL/hr at 08/09/24 0356    prismaSATE BGK 4/2.5 1,000 mL/hr at 08/09/24 0356    prismaSATE BGK 4/2.5 500 mL/hr at 08/09/24 0311    lidocaine 1 mg/min (08/09/24 0621)    amiodarone 1 mg/min (08/09/24 0600)    VASOpressin 20 Units in sodium chloride 0.9 % 100 mL infusion 0.03 Units/min (08/09/24 0600)    norepinephrine 20 mcg/min (08/09/24 0600)    sodium chloride         Hospital Course Daily Updates:  Admit Day# 1 Nights 8/7  -worsening kidney Fx, increased pressor requirements  Elevated lactic, impending doom  -1amp bicarb  -K+ replaced  -CXR obtained  -lasix and bicarb gtt started  -vasopressin gtt added   -central line and arterial line  placed  -Sustained VT from 7434-0768, remained alert at this time. MD and multiple RN's at bedside. 2gm Mg+, 300mg Amiodarone IVP, 2G CaCl-  -started on Cefepime and Zyvox  -awaiting amio gtt  -spoke with cardiology, awaiting further orders  -awaiting echo     Admit Day 8/8 Days:  - trending lactic   - levo weaned to 25 mcg   - BiPAP continuously with intermittent breaks per pulm   - Blood cx + Staph aureus, ID consulted   - monitoring UO, scheduled albumin ordered   - amio started   - ICD settings adjusted   - dobutamine d/c     8/8 Niights  - Worsening acidosis  - Received Bicarb push  - Initiated CRRT per nephro  - Increased runs of sustained Vtach through shift  - Amio increased to 1 by cardio  - Lidocaine bolus/infusion started per EP  - Femoral ART line removed/not functioning      Lab Data:   CBC:   Recent Labs     08/08/24  0429 08/08/24  0524 08/08/24  1151 08/09/24  0450   WBC 22.5*  --   --  24.7*   HGB 11.7*   < > 11.6* 10.3*   HCT 37.5  --   --  33.2*   MCV 88.5  --   --  89.2   PLT 38*  --   --  39*    < > = values in this interval not displayed.     BMP:    Recent Labs     08/08/24  2150 08/09/24  0450    140   K 4.6 4.5   CO2 12* 13*   BUN 54* 53*   CREATININE 2.4* 2.3*     LIVR:   Recent Labs     08/06/24  0743   AST 25   ALT 11     PT/INR:   Recent Labs     08/07/24  1142   INR 1.71*     APTT:   Recent Labs     08/07/24  1142   APTT 38.2*     ABG:   Recent Labs     08/08/24  1818 08/08/24  2152   PHART 7.328* 7.250*   FHG9IGV 23.1* 28.7*   PO2ART 78.2 108.6*     Consults (if GI or Nephrology- which group?)-  Ortho, nephrology, cardiology, ICU, hospitalist, EP, Oncology, ID

## 2024-08-09 NOTE — PROGRESS NOTES
Infectious Disease Follow up Notes    CC :  S aureus bacteremia and severe sepsis with MOF      Antibiotics:  Vanc by level, started 8/8  Janesaquiles      Admit Date:   8/6/2024  Hospital Day: 4    Subjective:   Overnight events reviewed with Pulm  Clinical status continued to deteriorate with worsening acidosis, tachyarrythmia, oliguric MADELEINE  Now intubated, on CRRT, s/p DCCV   On levophed and vasopressin   Vent at 80/8    Objective:     Patient Vitals for the past 8 hrs:   BP Temp Temp src Pulse Resp SpO2   08/09/24 1318 -- -- -- 53 -- --   08/09/24 1314 -- -- -- 57 -- --   08/09/24 1312 -- -- -- 67 -- --   08/09/24 1311 -- -- -- -- -- (!) 88 %   08/09/24 1311 -- -- -- 91 -- --   08/09/24 1304 91/61 -- -- (!) 118 -- --   08/09/24 1200 -- 100.4 °F (38 °C) -- (!) 108 19 --   08/09/24 1132 -- -- -- (!) 109 19 (!) 42 %   08/09/24 1100 -- -- -- (!) 108 18 --   08/09/24 1002 (!) 92/55 -- -- 97 18 98 %   08/09/24 1000 -- -- -- -- -- 98 %   08/09/24 0900 (!) 89/54 -- -- (!) 102 24 97 %   08/09/24 0800 (!) 80/20 100 °F (37.8 °C) Bladder 89 22 100 %   08/09/24 0759 -- -- -- 89 22 --   08/09/24 0758 -- -- -- 89 -- --   08/09/24 0757 -- -- -- -- -- 100 %   08/09/24 0757 (!) 80/20 -- -- -- -- --   08/09/24 0757 -- -- -- 89 24 100 %   08/09/24 0755 (!) 64/48 -- -- -- -- --   08/09/24 0748 -- -- -- (!) 118 -- --   08/09/24 0740 (!) 83/62 -- -- (!) 129 24 (!) 66 %   08/09/24 0735 (!) 58/36 -- -- (!) 123 28 (!) 84 %   08/09/24 0730 (!) 82/57 -- -- (!) 133 25 --   08/09/24 0725 97/61 -- -- (!) 137 29 95 %   08/09/24 0720 (!) 113/90 -- -- (!) 120 27 --   08/09/24 0715 (!) 75/64 -- -- (!) 124 24 --   08/09/24 0710 101/71 -- -- (!) 130 23 96 %   08/09/24 0700 (!) 83/72 -- -- (!) 124 26 97 %   08/09/24 0600 95/81 -- -- (!) 137 25 96 %       EXAM:  General:  morbidly obese  Sedated, intubated   Pale     HEENT:  pupils pinpoint    NECK:   Symmetrical     LUNGS:   bicarbonate 150 mEq solution in dextrose 5 % 1,000 mL CRRT 350 mL/hr at 08/09/24 1212    EPINEPHrine      amiodarone 1 mg/min (08/09/24 1100)    VASOpressin 20 Units in sodium chloride 0.9 % 100 mL infusion 0.03 Units/min (08/09/24 1154)    sodium chloride            Data Review:    Lab Results   Component Value Date    WBC 24.7 (H) 08/09/2024    HGB 11.6 (L) 08/09/2024    HCT 33.2 (L) 08/09/2024    MCV 89.2 08/09/2024    PLT 39 (L) 08/09/2024     Lab Results   Component Value Date    CREATININE 1.9 (H) 08/09/2024    BUN 46 (H) 08/09/2024     08/09/2024    K 5.5 (H) 08/09/2024     08/09/2024    CO2 14 (LL) 08/09/2024       Hepatic Function Panel:   Lab Results   Component Value Date/Time    ALKPHOS 99 08/06/2024 07:43 AM    ALT 11 08/06/2024 07:43 AM    AST 25 08/06/2024 07:43 AM    BILITOT 1.6 08/07/2024 11:42 AM    BILIDIR 0.8 08/07/2024 11:42 AM    IBILI 0.8 08/07/2024 11:42 AM       Cultures:   8/7       BC x2 GPC / S aureus, WILBERT pending         Radiology Review:  All pertinent images / reports were reviewed as a part of this visit.      CT R elbow 8/7/24  IMPRESSION:  1. Displaced angulated supracondylar fracture.  2. Extensive subcutaneous soft tissue edema of the elbow and posterior aspect  of the forearm.     CXR CHF     TTE 8/8/24    Image quality is adequate.    Left Ventricle: Mildly reduced left ventricular systolic function with a visually estimated EF of 45 - 50%. Left ventricle size is normal. Mildly increased wall thickness. Findings consistent with concentric hypertrophy. Mild global hypokinesis and paradoxical septal wall motion. Indeterminate diastolic function.    Right Ventricle: Right ventricle size is normal. Reduced systolic function. TAPSE is abnormal. TAPSE is 1.4 cm.  Pacemaker/ICD lead present in the right ventricle.    Mitral Valve: Mild annular calcification. Trace regurgitation.    Tricuspid Valve: Trace regurgitation.    IVC/SVC: IVC diameter is less than or equal to 21 mm  cardioverter-defibrillator) in place 11/21/2016    Incisional hernia, without obstruction or gangrene     Chronic systolic CHF (congestive heart failure) ([facility])     Elevated lactic acid level 07/06/2016    Syncope     Septic shock ([facility]) 07/05/2016    Hypothyroidism 10/26/2015    Dilated cardiomyopathy ([facility]) 10/26/2015    Morbid obesity with BMI of 50.0-59.9, adult ([facility])     Mitral regurgitation     LBBB (left bundle branch block) 07/10/2015    Elevated brain natriuretic peptide (BNP) level 07/10/2015    CHF (congestive heart failure) ([facility]) 07/09/2015    S/P gastric bypass 01/26/2015    Gout 01/26/2015    Localized osteoarthrosis, lower leg 01/26/2015    MADELEINE (acute kidney injury) ([facility]) 01/25/2012       History of CHF, NICM, pHTN, hypothyroidism, history of uterine cancer   PMR on HCQ   Recent closed, displaced distal R humerus fracture      Admitted 8/6/24 with septic shock      S aureus bacteremia  MRSA screen neg, full sensitivities pending   A primary source is not evident, rendering this a more complicated primary bacteremia.  Further complicated by the presence of ICD  IV abx were started on 8/8/24  TTE 8/8/24 without evidence of valve or lead vegetation      AoCKD      Uterine cancer  Thickened endometrial lining by US in 6/2024      TCP   Hematology following  Likely from sepsis      PCN allergy - SOB     Plan:     She has had little more than 24 hours abx, so the clinical deterioration is not entirely unexpected  The sensitivities on the S aureus are still pending.  If it proves to be MSSA, would change to cefazolin does for BMI and CRRT     Will get repeat BC in AM     If condition stabilizes, would favor SANGEETHA prior to extubation, if possible     Prognosis guarded       Discussed with Dr. Guillermo Lee MD  Phone: [phone]   Fax : [phone]

## 2024-08-09 NOTE — PROGRESS NOTES
08/09/24 1132   Patient Observation   Pulse (!) 109   Respirations 19   SpO2 (!) 42 %   ETCO2 (mmHg) 23 mmHg   Observations ambu @ bedside   Breath Sounds   Right Upper Lobe Diminished   Right Middle Lobe Diminished   Right Lower Lobe Diminished   Left Upper Lobe Diminished   Left Lower Lobe Diminished   Vent Information   Vent Mode AC/VC   Ventilator Settings   FiO2  60 %   Vt (Set, mL) 450 mL   Resp Rate (Set) 25 bpm   PEEP/CPAP (cmH2O) 8   Vent Patient Data (Readings)   Vt (Measured) 830 mL   Peak Inspiratory Pressure (cmH2O) 22 cmH2O   Rate Measured 29 br/min   Minute Volume (L/min) 13.9 Liters   Mean Airway Pressure (cmH2O) 7 cmH20   Plateau Pressure (cm H2O) 0 cm H2O   Driving Pressure -8   I:E Ratio 1:1.70   Vent Alarm Settings   High Pressure (cmH2O) 45 cmH2O   Low Minute Volume (lpm) 2 L/min   RR High (bpm) 40 br/min   ETT    Placement Date/Time: 08/09/24 0745   Present on Admission/Arrival: No  Placed By: Licensed provider  Airway Tube Size: 8 mm  Location: Oral  Secured At: 23 cm  Measured From: Lips   Secured At 23 cm   Measured From Lips   ETT Placement Right   Secured By Commercial tube perez   Site Assessment Dry   Cuff Pressure 30 cm H2O

## 2024-08-09 NOTE — PROGRESS NOTES
Pt has no apical pulse, no blood pressure and no spontaneous respirations. Verified by 2 RN's Yarely RN/ Jamee RN. Time of death 1345. Attending physician and consults called.

## 2024-08-09 NOTE — PROGRESS NOTES
0500- Patient having more frequent runs of Vtach. AICD does not seem to be functioning properly. Remains asymptomatic. BP stable during runs. Labs drawn. Notified OLIVER Jain (cardiology). New order received to increase amio to 1 mg/min.  0600-Pt with continued Vtach. Dr Jain paged, deferred to EP Dr Rivera. New orders received for lidocaine IVP/IV infusion. Dr Monroy at bed side for new ART line insertion.

## 2024-08-09 NOTE — CONSULTS
Comprehensive Nutrition Assessment    Type and Reason for Visit:  Reassess, Consult    Nutrition Recommendations/Plan:   NPO   When approved by MD and pressor support improves, consider feeding tube placement and TF initiation. Order: \"Diet: Tube feed continuous/ NPO\".  Initiate Nepro (renal formula) at 10 mL/hr and as tolerated, increase by 10 mL/hr q 4 hours until goal of 40 mL/hr is met via N/G  Recommend 60 mL H20 flush q 4 hours.  Monitor IVF infusion, Na labs and need for adjustments in water flush  Recommend 1 Bottle Proteinex P2Go daily via syringe. Flush with 30 mL H20 before and after  Monitor TF tolerance (abd distention, bowel habits, N/V, cramping)  Monitor nutrition adequacy, pertinent labs, bowel habits, wt changes, and clinical progress     Malnutrition Assessment:  Malnutrition Status:  At risk for malnutrition (Comment) (08/09/24 1005)    Context:  Acute Illness     Findings of the 6 clinical characteristics of malnutrition:  Energy Intake:  50% or less of estimated energy requirements for 5 or more days    Nutrition Assessment:    Follow up/ consult for TF order and management: Pt intubated this morning. Sedated on fentanyl. Hypotensive on levo, vaso, sherry. CRRT started today. NPO. When pressor support improves, consider feeding tube placement TF initiation. Recommendations included, will contiue to monitor.    Nutrition Related Findings:    Generalized + 3 pitting edema. Mg 2.6. NOo BM documented. Wound Type: Pressure Injury, Stage II       Current Nutrition Intake & Therapies:    Average Meal Intake: NPO  Average Supplements Intake: NPO  Diet NPO  Current Tube Feeding (TF) Orders:  Feeding Route: Nasogastric  Formula: Renal Formula  Schedule: Continuous  Additives/Modulars: Protein  Goal TF & Flush Orders Provides: Nepro (renal formula) x 20 hours at goal rate of 40 ml/hr to provide 800 ml TV, 1440 kcals, 65 g protein and 582 ml free water. Free water flush 60 ml q 4 hours. + 1 bottle proteinex  to provide 104 kcals and 26 g protein.    Anthropometric Measures:  Height: 160 cm (5' 3\")  Ideal Body Weight (IBW): 115 lbs (52 kg)       Current Body Weight: 137.4 kg (303 lb), 251.3 % IBW. Weight Source: Bed Scale  Current BMI (kg/m2): 53.7        Weight Adjustment For: No Adjustment                 BMI Categories: Obese Class 3 (BMI 40.0 or greater)    Estimated Daily Nutrient Needs:  Energy Requirements Based On: Kcal/kg (25-30 kcals/kg)  Weight Used for Energy Requirements: Ideal (52 kg)  Energy (kcal/day): 0614-0367  Weight Used for Protein Requirements: Ideal (1.5-2.2 g/kg)  Protein (g/day):  g  Method Used for Fluid Requirements: 1 ml/kcal  Fluid (ml/day):      Nutrition Diagnosis:   Inadequate energy intake related to impaired respiratory function as evidenced by NPO or clear liquid status due to medical condition, intubation, nutrition support - enteral nutrition    Nutrition Interventions:   Food and/or Nutrient Delivery: Start Tube Feeding  Nutrition Education/Counseling: Education not appropriate  Coordination of Nutrition Care: Continue to monitor while inpatient       Goals:  Previous Goal Met: No Progress toward Goal(s)  Goals: Meet at least 75% of estimated needs, prior to discharge       Nutrition Monitoring and Evaluation:   Behavioral-Environmental Outcomes: None Identified  Food/Nutrient Intake Outcomes: Enteral Nutrition Intake/Tolerance  Physical Signs/Symptoms Outcomes: Biochemical Data, Weight, Nutrition Focused Physical Findings, Hemodynamic Status, Constipation    Discharge Planning:    Too soon to determine     Cleopatra Bustos, MS, RD, LD  Contact: Office: [phone]; [place]: [phone]

## 2024-08-09 NOTE — PROGRESS NOTES
ONCOLOGY HEMATOLOGY CARE PROGRESS NOTE      SUBJECTIVE:    Patient developed afib with RVR this AM, electrocardioversion performed and went into ventricular fibrillation. Underwent defibrillation.   Intubated/sedated  Afebrile  On levophed/vasopressin.     ROS:     Unable to assess due to patient intubated/sedated     OBJECTIVE        Physical    VITALS:  Patient Vitals for the past 24 hrs:   BP Temp Temp src Pulse Resp SpO2 Weight   08/09/24 0740 (!) 83/62 -- -- (!) 129 24 (!) 66 % --   08/09/24 0735 (!) 58/36 -- -- (!) 123 28 (!) 84 % --   08/09/24 0730 (!) 82/57 -- -- (!) 133 25 -- --   08/09/24 0725 97/61 -- -- (!) 137 29 95 % --   08/09/24 0720 (!) 113/90 -- -- (!) 120 27 -- --   08/09/24 0715 (!) 75/64 -- -- (!) 124 24 -- --   08/09/24 0710 101/71 -- -- (!) 130 23 96 % --   08/09/24 0700 (!) 83/72 -- -- (!) 124 26 97 % --   08/09/24 0600 95/81 -- -- (!) 137 25 96 % --   08/09/24 0500 -- -- -- (!) 125 25 98 % --   08/09/24 0400 (!) 111/56 98.5 °F (36.9 °C) Bladder (!) 112 24 96 % --   08/09/24 0330 -- -- -- (!) 105 22 98 % --   08/09/24 0306 -- -- -- 96 23 98 % --   08/09/24 0300 -- -- -- (!) 101 22 98 % (!) 137.5 kg (303 lb 2.1 oz)   08/09/24 0230 -- -- -- 75 20 98 % --   08/09/24 0200 -- -- -- 76 -- -- --   08/09/24 0130 -- -- -- 80 19 97 % --   08/09/24 0100 -- -- -- 79 16 97 % --   08/09/24 0030 -- -- -- 76 22 99 % --   08/09/24 0000 -- 97.5 °F (36.4 °C) Bladder 74 17 97 % --   08/08/24 2330 -- -- -- 73 23 97 % --   08/08/24 2303 -- -- -- 81 25 97 % --   08/08/24 2300 -- -- -- 82 22 97 % --   08/08/24 2230 -- -- -- 88 27 98 % --   08/08/24 2200 -- -- -- 86 (!) 33 91 % --   08/08/24 2130 -- -- -- 96 22 97 % --   08/08/24 2100 -- -- -- 100 25 96 % --   08/08/24 2030 -- -- -- 99 20 96 % --   08/08/24 2015 -- -- -- (!) 101 23 95 % --   08/08/24 2000 -- -- -- 99 23 96 % --   08/08/24 1900 (!) 120/56 99.3 °F (37.4 °C) Bladder (!) 110 23 97 % --   08/08/24 1800 -- -- -- (!)

## 2024-08-09 NOTE — PROGRESS NOTES
Dr. Li and Dr. Judd at bedside to assess patient. Decision made to intubate patient. Dr. Judd assessing patient's ICD.       0740: Patient's brotherJosi called and updated on morning events.   0744: 2mg Versed IVP administered            C4 nurse at bedside to take over charting, see code documentation.

## 2024-08-09 NOTE — PROGRESS NOTES
Multiple orders changed because of severe acidosis and recs for rapid deterioration  -Dialysate to 2/2.5  -Start post filter bicarb  Also p.o. bicarbonate drip   With the above changes risks of multiple electrolyte abnormality therefore will BMP every 4 hours and also iCal

## 2024-08-10 LAB
BACTERIA BLD CULT ORG #2: ABNORMAL
BACTERIA BLD CULT ORG #2: ABNORMAL
BACTERIA BLD CULT: ABNORMAL
ORGANISM: ABNORMAL
PA IGG BLD QL FC: NORMAL
STFR SERPL-SCNC: 5.7 MG/L (ref 1.9–4.4)
STFR SERPL-SCNC: 5.9 MG/L (ref 1.9–4.4)

## 2024-08-12 LAB — PATH INTERP BLD-IMP: NORMAL

## 2024-08-12 NOTE — PROGRESS NOTES
Physician Progress Note      PATIENT:               DIVYA KELLY  CSN #:                  [id number]  :                       1960  ADMIT DATE:       2024 11:31 AM  DISCH DATE:        2024 8:44 PM  RESPONDING  PROVIDER #:        Baldev Yang MD          QUERY TEXT:    Pt admitted with weakness, lightheadedness, acute CHF, MADELEINE, and hypotension.    Found to have Staph aureus bacteremia.  Initially 95% on room air, currently   requiring bipap.  If possible, please document in the progress notes and   discharge summary if you are evaluating and/or treating any of the following:    The medical record reflects the following:  Risk Factors: severe sepsis with cardiogenic and septic shock, acute CHF  Clinical Indicators: initially 95% on room air, currently requiring bipap to   maintain O2 sat 95-98%  Treatment: monitoring O2 sat, bipap  Options provided:  -- Acute respiratory failure with hypoxia  -- Acute respiratory failure with hypercapnia  -- Acute respiratory failure with hypoxia nd hypercapnia  -- Other - I will add my own diagnosis  -- Disagree - Not applicable / Not valid  -- Disagree - Clinically unable to determine / Unknown  -- Refer to Clinical Documentation Reviewer    PROVIDER RESPONSE TEXT:    This patient is in acute respiratory failure with hypoxia    Query created by: Ivet Magana on 2024 7:51 AM      QUERY TEXT:    Pt admitted with weakness, lightheadedness, acute CHF, MADELEINE, and hypotension.    Found to have Staph aureus bacteremia.  \"Admitted 24 with septic shock\"   documented by ID consult on .  If possible, please document in progress   notes and discharge summary:    The medical record reflects the following:  Risk Factors: staph aureus bacteremia  Clinical Indicators: BP 85/46 on arrival, WBC 22.5, procalcitonin 6.58; MADELEINE,   acute CHF, thrombocytopenia  Treatment: Levophed, Vasopressin, Maxipime, Vancomycin; cardiology,   nephrology, hematology, critical care, and  ID consults  Options provided:  -- Sepsis with septic shock present on admission  -- Defer to ID consultant documentation regarding sepsis with septic shock   present on admission  -- Other - I will add my own diagnosis  -- Disagree - Not applicable / Not valid  -- Disagree - Clinically unable to determine / Unknown  -- Refer to Clinical Documentation Reviewer    PROVIDER RESPONSE TEXT:    Sepsis with septic shock present on admission    Query created by: Ivet Magana on 8/9/2024 7:52 AM      Electronically signed by:  Baldev Yang MD 8/12/2024 6:30 AM

## 2025-04-24 NOTE — ED NOTES
Bedside report given to [facility]. All paperwork including EMTALA sent with transport team. Pt with one bag of belongings which was sent with stretcher. Pt left ED via stretcher in stable condition at this time on 2LO2 NC. Care transferred.   Reason for visit: return patient     Dx/Hx/Sx: ED, had EDEX 1/5/25 via [facility]    Records/imaging/labs/orders: All records available    At Rooming: Deena Alcala Chi  4/24/2025  10:50 AM

## 2025-04-29 NOTE — H&P
of  therapeutic anticoagulation. 4.  Apixaban 5 mg p.o. q.12h.  5. Plan overdrive pacing or CV for termination.     Thanks for the opportunity to assist in the care of the patient.   Please  contact me if you have any questions regarding her evaluation.  Guy Roy MD   
n/a

## (undated) DEVICE — ELECTRODE ECG MONITR FOAM TEAR DROP ADLT RED

## (undated) DEVICE — CONMED SCOPE SAVER BITE BLOCK, 20X27 MM: Brand: SCOPE SAVER

## (undated) DEVICE — ENDO CARRY-ON PROCEDURE KIT INCLUDES SUCTION TUBING, LUBRICANT, GAUZE, BIOHAZARD STICKER, TRANSPORT PAD AND INTERCEPT BEDSIDE KIT.: Brand: ENDO CARRY-ON PROCEDURE KIT

## (undated) DEVICE — ENDO CARRY-ON PROCEDURE KIT: Brand: ENDO CARRY-ON PROCEDURE KIT

## (undated) DEVICE — AIRLIFE™ NASAL OXYGEN CANNULA CURVED, FLARED TIP, WITH 7 FEET (2.1 M) CRUSH RESISTANT TUBING, OVER-THE-EAR STYLE: Brand: AIRLIFE™

## (undated) DEVICE — Z DISCONTINUED USE 2276105 GOWN PROTCT UNIV CHST W28IN L49IN SL 24IN BLU SPUNBOND FLM